# Patient Record
Sex: FEMALE | Race: WHITE | NOT HISPANIC OR LATINO
[De-identification: names, ages, dates, MRNs, and addresses within clinical notes are randomized per-mention and may not be internally consistent; named-entity substitution may affect disease eponyms.]

---

## 2020-07-09 ENCOUNTER — TRANSCRIPTION ENCOUNTER (OUTPATIENT)
Age: 85
End: 2020-07-09

## 2020-07-09 ENCOUNTER — INPATIENT (INPATIENT)
Facility: HOSPITAL | Age: 85
LOS: 3 days | Discharge: NURSING, REHAB OR LONG-TERM CARE | DRG: 482 | End: 2020-07-13
Attending: INTERNAL MEDICINE | Admitting: INTERNAL MEDICINE
Payer: MEDICARE

## 2020-07-09 VITALS
TEMPERATURE: 98 F | WEIGHT: 160.06 LBS | SYSTOLIC BLOOD PRESSURE: 115 MMHG | RESPIRATION RATE: 17 BRPM | DIASTOLIC BLOOD PRESSURE: 66 MMHG | HEART RATE: 56 BPM | OXYGEN SATURATION: 95 %

## 2020-07-09 DIAGNOSIS — M25.559 PAIN IN UNSPECIFIED HIP: ICD-10-CM

## 2020-07-09 LAB
ALBUMIN SERPL ELPH-MCNC: 3.5 G/DL — SIGNIFICANT CHANGE UP (ref 3.5–5)
ALP SERPL-CCNC: 58 U/L — SIGNIFICANT CHANGE UP (ref 40–120)
ALT FLD-CCNC: 23 U/L DA — SIGNIFICANT CHANGE UP (ref 10–60)
ANION GAP SERPL CALC-SCNC: 7 MMOL/L — SIGNIFICANT CHANGE UP (ref 5–17)
APTT BLD: 30.9 SEC — SIGNIFICANT CHANGE UP (ref 27.5–35.5)
AST SERPL-CCNC: 28 U/L — SIGNIFICANT CHANGE UP (ref 10–40)
BILIRUB SERPL-MCNC: 0.5 MG/DL — SIGNIFICANT CHANGE UP (ref 0.2–1.2)
BUN SERPL-MCNC: 23 MG/DL — HIGH (ref 7–18)
CALCIUM SERPL-MCNC: 9.1 MG/DL — SIGNIFICANT CHANGE UP (ref 8.4–10.5)
CHLORIDE SERPL-SCNC: 104 MMOL/L — SIGNIFICANT CHANGE UP (ref 96–108)
CO2 SERPL-SCNC: 26 MMOL/L — SIGNIFICANT CHANGE UP (ref 22–31)
CREAT SERPL-MCNC: 1.19 MG/DL — SIGNIFICANT CHANGE UP (ref 0.5–1.3)
GLUCOSE SERPL-MCNC: 104 MG/DL — HIGH (ref 70–99)
HCT VFR BLD CALC: 35.3 % — SIGNIFICANT CHANGE UP (ref 34.5–45)
HGB BLD-MCNC: 11.5 G/DL — SIGNIFICANT CHANGE UP (ref 11.5–15.5)
INR BLD: 0.95 RATIO — SIGNIFICANT CHANGE UP (ref 0.88–1.16)
MCHC RBC-ENTMCNC: 29.5 PG — SIGNIFICANT CHANGE UP (ref 27–34)
MCHC RBC-ENTMCNC: 32.6 GM/DL — SIGNIFICANT CHANGE UP (ref 32–36)
MCV RBC AUTO: 90.5 FL — SIGNIFICANT CHANGE UP (ref 80–100)
NRBC # BLD: 0 /100 WBCS — SIGNIFICANT CHANGE UP (ref 0–0)
PLATELET # BLD AUTO: 182 K/UL — SIGNIFICANT CHANGE UP (ref 150–400)
POTASSIUM SERPL-MCNC: 4.9 MMOL/L — SIGNIFICANT CHANGE UP (ref 3.5–5.3)
POTASSIUM SERPL-SCNC: 4.9 MMOL/L — SIGNIFICANT CHANGE UP (ref 3.5–5.3)
PROT SERPL-MCNC: 7.4 G/DL — SIGNIFICANT CHANGE UP (ref 6–8.3)
PROTHROM AB SERPL-ACNC: 11.1 SEC — SIGNIFICANT CHANGE UP (ref 10.6–13.6)
RBC # BLD: 3.9 M/UL — SIGNIFICANT CHANGE UP (ref 3.8–5.2)
RBC # FLD: 13.9 % — SIGNIFICANT CHANGE UP (ref 10.3–14.5)
SARS-COV-2 RNA SPEC QL NAA+PROBE: SIGNIFICANT CHANGE UP
SODIUM SERPL-SCNC: 137 MMOL/L — SIGNIFICANT CHANGE UP (ref 135–145)
WBC # BLD: 8.72 K/UL — SIGNIFICANT CHANGE UP (ref 3.8–10.5)
WBC # FLD AUTO: 8.72 K/UL — SIGNIFICANT CHANGE UP (ref 3.8–10.5)

## 2020-07-09 PROCEDURE — 70450 CT HEAD/BRAIN W/O DYE: CPT | Mod: 26

## 2020-07-09 PROCEDURE — 99283 EMERGENCY DEPT VISIT LOW MDM: CPT

## 2020-07-09 PROCEDURE — 73502 X-RAY EXAM HIP UNI 2-3 VIEWS: CPT | Mod: 26,LT

## 2020-07-09 PROCEDURE — 93010 ELECTROCARDIOGRAM REPORT: CPT

## 2020-07-09 PROCEDURE — 71045 X-RAY EXAM CHEST 1 VIEW: CPT | Mod: 26

## 2020-07-09 PROCEDURE — 72192 CT PELVIS W/O DYE: CPT | Mod: 26

## 2020-07-09 RX ORDER — MORPHINE SULFATE 50 MG/1
4 CAPSULE, EXTENDED RELEASE ORAL ONCE
Refills: 0 | Status: DISCONTINUED | OUTPATIENT
Start: 2020-07-09 | End: 2020-07-09

## 2020-07-09 RX ORDER — MORPHINE SULFATE 50 MG/1
2 CAPSULE, EXTENDED RELEASE ORAL ONCE
Refills: 0 | Status: DISCONTINUED | OUTPATIENT
Start: 2020-07-09 | End: 2020-07-09

## 2020-07-09 RX ADMIN — MORPHINE SULFATE 4 MILLIGRAM(S): 50 CAPSULE, EXTENDED RELEASE ORAL at 21:49

## 2020-07-09 NOTE — H&P ADULT - NSHPPHYSICALEXAM_GEN_ALL_CORE
General - Laying in bed in NAD, elderly, well nourished  Eyes - PERRLA, EOM intact  ENT - Nonicteric sclerae, PERRLA, EOMI. Oropharynx clear. Moist mucous membranes. Conjunctivae appear well perfused.   Cardiovascular - +s1/s2 regular no murmurs  Lungs - Clear to ascultation, no use of accessory muscles, no crackles or wheezes.  Skin - No rashes, skin warm and dry, no erythematous areas  Abdomen - Normal bowel sounds, abdomen soft and nontender  Extremities - No edema, cyanosis or clubbing, varicose veins, pulses +  Neurological – Alert and oriented x 3, CN 2-12 grossly intact.

## 2020-07-09 NOTE — H&P ADULT - PROBLEM SELECTOR PLAN 2
Awaiting list of home medications from patient's daughter  - Start on sliding scale for now, follow up hgb a1c On Janumet at home  - Start on sliding scale for now, follow up hgb a1c

## 2020-07-09 NOTE — ED PROVIDER NOTE - CLINICAL SUMMARY MEDICAL DECISION MAKING FREE TEXT BOX
89 yo F with mechanical fall, likley hip fx. Plan - pre op labs, CT head, xrays, pain meds, reassess.

## 2020-07-09 NOTE — ED ADULT NURSE REASSESSMENT NOTE - NS ED NURSE REASSESS COMMENT FT1
assumed care  Pt returned from xray  PIV placed, labs and COVID sent  No distress  reports falling in hallway, no DME  A&Ox3

## 2020-07-09 NOTE — H&P ADULT - ASSESSMENT
88 year old female with PMHx of CAD, HTN, DM presented s/p mechanical fall. To be admitted for orthopedic evaluation 88 year old female with PMHx of CAD, HTN, DM presented s/p mechanical fall. To be admitted for orthopedic evaluation      ** Medication list is from May 2020. If any concerns for inaccurate medication list, please contact Jesus Mogad. **

## 2020-07-09 NOTE — H&P ADULT - PROBLEM SELECTOR PLAN 1
As per ED attending, radiologist reported intertrochanteric fracture. Follow up official report  - Ortho, Dr. Cisneros was consulted by the ED  - Pain control  - Plan as per Ortho As per ED attending, radiologist reported intertrochanteric fracture. Follow up official report  - Ortho, Dr. Cisneros was consulted by the ED  - Pain control  - Plan as per Ortho  - Physical Therapy when cleared by ortho Intertrochanteric fracture as on CT scan bony pelvis  - Ortho, Dr. Cisneros was consulted by the ED  - Pain control  - Plan as per Ortho  - Physical Therapy when cleared by ortho  - Holding on all antiplatelet/anticoagulation for now until plan delineated by ortho. Please continue if no plans for intervention Intertrochanteric fracture as on CT scan bony pelvis  - Ortho, Dr. Cisneros was consulted by the ED  - Pain control  - Plan as per Ortho  - Physical Therapy when cleared by ortho  - Holding on all antiplatelet/anticoagulation for now until plan delineated by ortho. Please continue if no plans for intervention  - RCRI score of 2, medically optimized for surgery

## 2020-07-09 NOTE — ED ADULT NURSE NOTE - CAS EDN DISCHARGE ASSESSMENT
----- Message from Lashonda Prieto MD sent at 12/11/2018 10:20 AM EST -----  Please call - I want to stop her thyroid medication and recheck labs in 6 weeks.  Her thyroid is still too low for her age.  Blood counts are ok.  Kidney and liver function are ok.  Her A1c looks great at 5.9  
Pt's son informed, states understanding.   
Alert and oriented to person, place and time

## 2020-07-09 NOTE — ED ADULT NURSE NOTE - CHPI ED NUR SYMPTOMS NEG
no deformity/no bleeding/no fever/no vomiting/no abrasion/no weakness/no numbness/no confusion/no tingling/no loss of consciousness

## 2020-07-09 NOTE — ED PROVIDER NOTE - OBJECTIVE STATEMENT
87 yo F pmh of HTN presents with L hip pain s/p mechanical slip and fall. Denies other acute complaints.

## 2020-07-09 NOTE — H&P ADULT - PROBLEM SELECTOR PLAN 5
IMPROVE VTE score: 4  Will manage with:  Heparin S/C    [ ] Previous VTE                                    3  [ ] Thrombophilia                                  2  [ x] Lower limb paralysis                        2  (unable to hold up >15 seconds)    [ ] Current Cancer (within 6 months)        2   [x] Immobilization > 24 hrs                    1  [ ] ICU/CCU stay > 24 hrs                      1  [x] Age > 60                                         1 IMPROVE VTE score: 4  Will manage with:  nothing for now in case patient requires procedure for broken bone    [ ] Previous VTE                                    3  [ ] Thrombophilia                                  2  [ x] Lower limb paralysis                        2  (unable to hold up >15 seconds)    [ ] Current Cancer (within 6 months)        2   [x] Immobilization > 24 hrs                    1  [ ] ICU/CCU stay > 24 hrs                      1  [x] Age > 60                                         1

## 2020-07-09 NOTE — H&P ADULT - PROBLEM SELECTOR PLAN 4
Pt's wife called in on behalf on PT, medication for Belsomra needs prior auth and the pharmacy will not fill medication without the authorization.   She called two weeks ago and was calling to check on what was going on.        [phone] Hx of CAD s/p stents  Awaiting list of home medications from patient's daughter Hx of CAD s/p stents  Continue with statin high intensity  Hold aspirin and plavix for now in case patient requires operation

## 2020-07-09 NOTE — CHART NOTE - NSCHARTNOTEFT_GEN_A_CORE
88 F with hx htn s/p fall p/w Left Hip fracture.   Patient reports mechanical fall, no loss of consciousness.   Reviewed X rays with ED physician.   Reveals intertrochanteric fracture. Will admit to medicine, pain control. Ortho control.  Full H and P to follow.

## 2020-07-09 NOTE — ED PROVIDER NOTE - PHYSICAL EXAMINATION
GENERAL: well appearing, no acute distress   HEAD: atraumatic   EYES: EOMI, pink conjunctiva   ENT: moist oral mucosa   CARDIAC: RRR, no edema, distal pulses present   RESPIRATORY: lungs CTAB, no increased work of breathing   GASTROINTESTINAL: no abdominal tenderness, no rebound or guarding, bowel sounds presents  GENITOURINARY: no CVA tenderness   MUSCULOSKELETAL: no deformity; L hip ttp and painful ROM; compartments of leg soft; good distal pulses   NEUROLOGICAL: AAOx3, spontaneous movement of extremities   SKIN: intact   PSYCHIATRIC: cooperative  HEME LYMPH: no lymphadenopathy

## 2020-07-09 NOTE — H&P ADULT - ATTENDING COMMENTS
88 year old female, lives at home alone, with PMHx of HTN, DM, CAD(s/p stent) presented after a mechanical fall.  No other medical problems.     T(C): 36.6 (07-10-20 @ 05:13), Max: 37 (07-09-20 @ 23:47)  HR: 54 (07-10-20 @ 05:13) (54 - 68)  BP: 146/45 (07-10-20 @ 05:13) (127/67 - 146/45)  RR: 16 (07-10-20 @ 05:13) (16 - 18)  SpO2: 97% (07-10-20 @ 05:13) (96% - 98%)    s/p Fall  Intratrochanteric fracture   Cad S/P STENTS   Pain meds, Ortho consult appreciated.   Patient medically optimized. RCRI score class 2, 30 day 6%mortality risk.   Hold Plavix.   Cards eval.

## 2020-07-09 NOTE — H&P ADULT - PROBLEM SELECTOR PLAN 3
Awaiting list of home medications from patient's daughter.  Normotensive in the ED except for one isolated elevated reading.  Continue to monitor for now On ARB-hctz combo and amlodipine  - WIll start with amlodipine for now. Follow up AM BMP if creatinine stable, would continue ARB-hctz combo. Holding HCTZ and ARB due to suspect LESLIE  Continue to monitor blood pressure for now

## 2020-07-09 NOTE — ED PROVIDER NOTE - PROGRESS NOTE DETAILS
EKG - nsr, rate 54, QTc 415, low voltage EKG - nsr, rate 54, QTc 415, low voltage  imaging shows intertrochanteric fx (verbal report from radiologist)   labs - decreased GFR  Endorsed during day shift to Dr. Guillen unattached. Endorsed to evening MAR after final CT reported.

## 2020-07-10 ENCOUNTER — TRANSCRIPTION ENCOUNTER (OUTPATIENT)
Age: 85
End: 2020-07-10

## 2020-07-10 DIAGNOSIS — I10 ESSENTIAL (PRIMARY) HYPERTENSION: ICD-10-CM

## 2020-07-10 DIAGNOSIS — Z29.9 ENCOUNTER FOR PROPHYLACTIC MEASURES, UNSPECIFIED: ICD-10-CM

## 2020-07-10 DIAGNOSIS — Z71.89 OTHER SPECIFIED COUNSELING: ICD-10-CM

## 2020-07-10 DIAGNOSIS — I25.10 ATHEROSCLEROTIC HEART DISEASE OF NATIVE CORONARY ARTERY WITHOUT ANGINA PECTORIS: ICD-10-CM

## 2020-07-10 DIAGNOSIS — E11.9 TYPE 2 DIABETES MELLITUS WITHOUT COMPLICATIONS: ICD-10-CM

## 2020-07-10 DIAGNOSIS — S72.143A DISPLACED INTERTROCHANTERIC FRACTURE OF UNSPECIFIED FEMUR, INITIAL ENCOUNTER FOR CLOSED FRACTURE: ICD-10-CM

## 2020-07-10 DIAGNOSIS — Z02.9 ENCOUNTER FOR ADMINISTRATIVE EXAMINATIONS, UNSPECIFIED: ICD-10-CM

## 2020-07-10 LAB
A1C WITH ESTIMATED AVERAGE GLUCOSE RESULT: 6.1 % — HIGH (ref 4–5.6)
ABO RH CONFIRMATION: SIGNIFICANT CHANGE UP
ALBUMIN SERPL ELPH-MCNC: 3.1 G/DL — LOW (ref 3.5–5)
ALP SERPL-CCNC: 96 U/L — SIGNIFICANT CHANGE UP (ref 40–120)
ALT FLD-CCNC: 139 U/L DA — HIGH (ref 10–60)
ANION GAP SERPL CALC-SCNC: 10 MMOL/L — SIGNIFICANT CHANGE UP (ref 5–17)
ANION GAP SERPL CALC-SCNC: 7 MMOL/L — SIGNIFICANT CHANGE UP (ref 5–17)
AST SERPL-CCNC: 176 U/L — HIGH (ref 10–40)
BILIRUB SERPL-MCNC: 1.1 MG/DL — SIGNIFICANT CHANGE UP (ref 0.2–1.2)
BUN SERPL-MCNC: 18 MG/DL — SIGNIFICANT CHANGE UP (ref 7–18)
BUN SERPL-MCNC: 24 MG/DL — HIGH (ref 7–18)
CALCIUM SERPL-MCNC: 8.4 MG/DL — SIGNIFICANT CHANGE UP (ref 8.4–10.5)
CALCIUM SERPL-MCNC: 8.7 MG/DL — SIGNIFICANT CHANGE UP (ref 8.4–10.5)
CHLORIDE SERPL-SCNC: 103 MMOL/L — SIGNIFICANT CHANGE UP (ref 96–108)
CHLORIDE SERPL-SCNC: 105 MMOL/L — SIGNIFICANT CHANGE UP (ref 96–108)
CHOLEST SERPL-MCNC: 188 MG/DL — SIGNIFICANT CHANGE UP (ref 10–199)
CO2 SERPL-SCNC: 24 MMOL/L — SIGNIFICANT CHANGE UP (ref 22–31)
CO2 SERPL-SCNC: 26 MMOL/L — SIGNIFICANT CHANGE UP (ref 22–31)
CREAT SERPL-MCNC: 1.01 MG/DL — SIGNIFICANT CHANGE UP (ref 0.5–1.3)
CREAT SERPL-MCNC: 1.09 MG/DL — SIGNIFICANT CHANGE UP (ref 0.5–1.3)
ESTIMATED AVERAGE GLUCOSE: 128 MG/DL — HIGH (ref 68–114)
FOLATE SERPL-MCNC: >20 NG/ML — SIGNIFICANT CHANGE UP
GLUCOSE BLDC GLUCOMTR-MCNC: 106 MG/DL — HIGH (ref 70–99)
GLUCOSE BLDC GLUCOMTR-MCNC: 123 MG/DL — HIGH (ref 70–99)
GLUCOSE BLDC GLUCOMTR-MCNC: 127 MG/DL — HIGH (ref 70–99)
GLUCOSE SERPL-MCNC: 119 MG/DL — HIGH (ref 70–99)
GLUCOSE SERPL-MCNC: 121 MG/DL — HIGH (ref 70–99)
HCT VFR BLD CALC: 31.6 % — LOW (ref 34.5–45)
HCT VFR BLD CALC: 33.2 % — LOW (ref 34.5–45)
HDLC SERPL-MCNC: 73 MG/DL — SIGNIFICANT CHANGE UP
HGB BLD-MCNC: 10.5 G/DL — LOW (ref 11.5–15.5)
HGB BLD-MCNC: 11 G/DL — LOW (ref 11.5–15.5)
LIPID PNL WITH DIRECT LDL SERPL: 104 MG/DL — SIGNIFICANT CHANGE UP
MAGNESIUM SERPL-MCNC: 1.9 MG/DL — SIGNIFICANT CHANGE UP (ref 1.6–2.6)
MCHC RBC-ENTMCNC: 29.4 PG — SIGNIFICANT CHANGE UP (ref 27–34)
MCHC RBC-ENTMCNC: 29.6 PG — SIGNIFICANT CHANGE UP (ref 27–34)
MCHC RBC-ENTMCNC: 33.1 GM/DL — SIGNIFICANT CHANGE UP (ref 32–36)
MCHC RBC-ENTMCNC: 33.2 GM/DL — SIGNIFICANT CHANGE UP (ref 32–36)
MCV RBC AUTO: 88.5 FL — SIGNIFICANT CHANGE UP (ref 80–100)
MCV RBC AUTO: 89.2 FL — SIGNIFICANT CHANGE UP (ref 80–100)
MRSA PCR RESULT.: SIGNIFICANT CHANGE UP
NRBC # BLD: 0 /100 WBCS — SIGNIFICANT CHANGE UP (ref 0–0)
NRBC # BLD: 0 /100 WBCS — SIGNIFICANT CHANGE UP (ref 0–0)
PHOSPHATE SERPL-MCNC: 3.4 MG/DL — SIGNIFICANT CHANGE UP (ref 2.5–4.5)
PLATELET # BLD AUTO: 143 K/UL — LOW (ref 150–400)
PLATELET # BLD AUTO: 157 K/UL — SIGNIFICANT CHANGE UP (ref 150–400)
POTASSIUM SERPL-MCNC: 3.8 MMOL/L — SIGNIFICANT CHANGE UP (ref 3.5–5.3)
POTASSIUM SERPL-MCNC: 4.3 MMOL/L — SIGNIFICANT CHANGE UP (ref 3.5–5.3)
POTASSIUM SERPL-SCNC: 3.8 MMOL/L — SIGNIFICANT CHANGE UP (ref 3.5–5.3)
POTASSIUM SERPL-SCNC: 4.3 MMOL/L — SIGNIFICANT CHANGE UP (ref 3.5–5.3)
PROT SERPL-MCNC: 6.4 G/DL — SIGNIFICANT CHANGE UP (ref 6–8.3)
RBC # BLD: 3.57 M/UL — LOW (ref 3.8–5.2)
RBC # BLD: 3.72 M/UL — LOW (ref 3.8–5.2)
RBC # FLD: 13.5 % — SIGNIFICANT CHANGE UP (ref 10.3–14.5)
RBC # FLD: 13.7 % — SIGNIFICANT CHANGE UP (ref 10.3–14.5)
S AUREUS DNA NOSE QL NAA+PROBE: SIGNIFICANT CHANGE UP
SARS-COV-2 IGG SERPL QL IA: NEGATIVE — SIGNIFICANT CHANGE UP
SARS-COV-2 IGM SERPL IA-ACNC: <0.1 INDEX — SIGNIFICANT CHANGE UP
SODIUM SERPL-SCNC: 137 MMOL/L — SIGNIFICANT CHANGE UP (ref 135–145)
SODIUM SERPL-SCNC: 138 MMOL/L — SIGNIFICANT CHANGE UP (ref 135–145)
TOTAL CHOLESTEROL/HDL RATIO MEASUREMENT: 2.6 RATIO — LOW (ref 3.3–7.1)
TRIGL SERPL-MCNC: 57 MG/DL — SIGNIFICANT CHANGE UP (ref 10–149)
TSH SERPL-MCNC: 0.78 UU/ML — SIGNIFICANT CHANGE UP (ref 0.34–4.82)
VIT B12 SERPL-MCNC: 928 PG/ML — SIGNIFICANT CHANGE UP (ref 232–1245)
WBC # BLD: 10.42 K/UL — SIGNIFICANT CHANGE UP (ref 3.8–10.5)
WBC # BLD: 8.3 K/UL — SIGNIFICANT CHANGE UP (ref 3.8–10.5)
WBC # FLD AUTO: 10.42 K/UL — SIGNIFICANT CHANGE UP (ref 3.8–10.5)
WBC # FLD AUTO: 8.3 K/UL — SIGNIFICANT CHANGE UP (ref 3.8–10.5)

## 2020-07-10 PROCEDURE — 27245 TREAT THIGH FRACTURE: CPT | Mod: AS,LT

## 2020-07-10 PROCEDURE — 27170 REPAIR/GRAFT FEMUR HEAD/NECK: CPT | Mod: AS,59

## 2020-07-10 PROCEDURE — 77071 MNL APPL STRS JT RADIOGRAPHY: CPT | Mod: AS

## 2020-07-10 PROCEDURE — 76000 FLUOROSCOPY <1 HR PHYS/QHP: CPT | Mod: 26,59

## 2020-07-10 RX ORDER — FLUOXETINE HCL 10 MG
10 CAPSULE ORAL DAILY
Refills: 0 | Status: DISCONTINUED | OUTPATIENT
Start: 2020-07-10 | End: 2020-07-10

## 2020-07-10 RX ORDER — ONDANSETRON 8 MG/1
4 TABLET, FILM COATED ORAL EVERY 6 HOURS
Refills: 0 | Status: DISCONTINUED | OUTPATIENT
Start: 2020-07-10 | End: 2020-07-13

## 2020-07-10 RX ORDER — MEMANTINE HYDROCHLORIDE 10 MG/1
5 TABLET ORAL
Refills: 0 | Status: DISCONTINUED | OUTPATIENT
Start: 2020-07-10 | End: 2020-07-10

## 2020-07-10 RX ORDER — FOLIC ACID 0.8 MG
1 TABLET ORAL DAILY
Refills: 0 | Status: DISCONTINUED | OUTPATIENT
Start: 2020-07-10 | End: 2020-07-13

## 2020-07-10 RX ORDER — OXYCODONE HYDROCHLORIDE 5 MG/1
2.5 TABLET ORAL EVERY 6 HOURS
Refills: 0 | Status: DISCONTINUED | OUTPATIENT
Start: 2020-07-10 | End: 2020-07-13

## 2020-07-10 RX ORDER — ATORVASTATIN CALCIUM 80 MG/1
40 TABLET, FILM COATED ORAL AT BEDTIME
Refills: 0 | Status: DISCONTINUED | OUTPATIENT
Start: 2020-07-10 | End: 2020-07-13

## 2020-07-10 RX ORDER — LOSARTAN POTASSIUM 100 MG/1
1 TABLET, FILM COATED ORAL
Qty: 0 | Refills: 0 | DISCHARGE

## 2020-07-10 RX ORDER — HEPARIN SODIUM 5000 [USP'U]/ML
5000 INJECTION INTRAVENOUS; SUBCUTANEOUS EVERY 8 HOURS
Refills: 0 | Status: DISCONTINUED | OUTPATIENT
Start: 2020-07-10 | End: 2020-07-10

## 2020-07-10 RX ORDER — CLOPIDOGREL BISULFATE 75 MG/1
75 TABLET, FILM COATED ORAL DAILY
Refills: 0 | Status: DISCONTINUED | OUTPATIENT
Start: 2020-07-10 | End: 2020-07-10

## 2020-07-10 RX ORDER — DONEPEZIL HYDROCHLORIDE 10 MG/1
10 TABLET, FILM COATED ORAL AT BEDTIME
Refills: 0 | Status: DISCONTINUED | OUTPATIENT
Start: 2020-07-10 | End: 2020-07-13

## 2020-07-10 RX ORDER — POLYETHYLENE GLYCOL 3350 17 G/17G
17 POWDER, FOR SOLUTION ORAL DAILY
Refills: 0 | Status: DISCONTINUED | OUTPATIENT
Start: 2020-07-10 | End: 2020-07-11

## 2020-07-10 RX ORDER — CLOPIDOGREL BISULFATE 75 MG/1
75 TABLET, FILM COATED ORAL DAILY
Refills: 0 | Status: DISCONTINUED | OUTPATIENT
Start: 2020-07-11 | End: 2020-07-13

## 2020-07-10 RX ORDER — AMLODIPINE BESYLATE 2.5 MG/1
5 TABLET ORAL DAILY
Refills: 0 | Status: DISCONTINUED | OUTPATIENT
Start: 2020-07-10 | End: 2020-07-10

## 2020-07-10 RX ORDER — INSULIN LISPRO 100/ML
VIAL (ML) SUBCUTANEOUS
Refills: 0 | Status: DISCONTINUED | OUTPATIENT
Start: 2020-07-10 | End: 2020-07-13

## 2020-07-10 RX ORDER — MEMANTINE HYDROCHLORIDE 10 MG/1
1 TABLET ORAL
Qty: 0 | Refills: 0 | DISCHARGE

## 2020-07-10 RX ORDER — SODIUM CHLORIDE 9 MG/ML
1000 INJECTION INTRAMUSCULAR; INTRAVENOUS; SUBCUTANEOUS
Refills: 0 | Status: COMPLETED | OUTPATIENT
Start: 2020-07-10 | End: 2020-07-10

## 2020-07-10 RX ORDER — METOPROLOL TARTRATE 50 MG
25 TABLET ORAL
Refills: 0 | Status: DISCONTINUED | OUTPATIENT
Start: 2020-07-10 | End: 2020-07-10

## 2020-07-10 RX ORDER — ACETAMINOPHEN 500 MG
650 TABLET ORAL EVERY 6 HOURS
Refills: 0 | Status: DISCONTINUED | OUTPATIENT
Start: 2020-07-10 | End: 2020-07-13

## 2020-07-10 RX ORDER — HYDROMORPHONE HYDROCHLORIDE 2 MG/ML
0.5 INJECTION INTRAMUSCULAR; INTRAVENOUS; SUBCUTANEOUS
Refills: 0 | Status: DISCONTINUED | OUTPATIENT
Start: 2020-07-10 | End: 2020-07-11

## 2020-07-10 RX ORDER — SODIUM CHLORIDE 9 MG/ML
1000 INJECTION INTRAMUSCULAR; INTRAVENOUS; SUBCUTANEOUS
Refills: 0 | Status: DISCONTINUED | OUTPATIENT
Start: 2020-07-10 | End: 2020-07-10

## 2020-07-10 RX ORDER — DONEPEZIL HYDROCHLORIDE 10 MG/1
10 TABLET, FILM COATED ORAL AT BEDTIME
Refills: 0 | Status: DISCONTINUED | OUTPATIENT
Start: 2020-07-10 | End: 2020-07-10

## 2020-07-10 RX ORDER — MEMANTINE HYDROCHLORIDE 10 MG/1
5 TABLET ORAL
Refills: 0 | Status: DISCONTINUED | OUTPATIENT
Start: 2020-07-10 | End: 2020-07-13

## 2020-07-10 RX ORDER — CHLORHEXIDINE GLUCONATE 213 G/1000ML
1 SOLUTION TOPICAL ONCE
Refills: 0 | Status: COMPLETED | OUTPATIENT
Start: 2020-07-10 | End: 2020-07-10

## 2020-07-10 RX ORDER — SENNA PLUS 8.6 MG/1
2 TABLET ORAL AT BEDTIME
Refills: 0 | Status: DISCONTINUED | OUTPATIENT
Start: 2020-07-10 | End: 2020-07-13

## 2020-07-10 RX ORDER — POVIDONE-IODINE 5 %
1 AEROSOL (ML) TOPICAL ONCE
Refills: 0 | Status: COMPLETED | OUTPATIENT
Start: 2020-07-10 | End: 2020-07-10

## 2020-07-10 RX ORDER — INSULIN LISPRO 100/ML
VIAL (ML) SUBCUTANEOUS
Refills: 0 | Status: DISCONTINUED | OUTPATIENT
Start: 2020-07-10 | End: 2020-07-10

## 2020-07-10 RX ORDER — AMLODIPINE BESYLATE 2.5 MG/1
5 TABLET ORAL DAILY
Refills: 0 | Status: DISCONTINUED | OUTPATIENT
Start: 2020-07-10 | End: 2020-07-13

## 2020-07-10 RX ORDER — TRAMADOL HYDROCHLORIDE 50 MG/1
25 TABLET ORAL EVERY 6 HOURS
Refills: 0 | Status: DISCONTINUED | OUTPATIENT
Start: 2020-07-10 | End: 2020-07-10

## 2020-07-10 RX ORDER — MORPHINE SULFATE 50 MG/1
2 CAPSULE, EXTENDED RELEASE ORAL EVERY 6 HOURS
Refills: 0 | Status: DISCONTINUED | OUTPATIENT
Start: 2020-07-10 | End: 2020-07-10

## 2020-07-10 RX ORDER — ATORVASTATIN CALCIUM 80 MG/1
40 TABLET, FILM COATED ORAL AT BEDTIME
Refills: 0 | Status: DISCONTINUED | OUTPATIENT
Start: 2020-07-10 | End: 2020-07-10

## 2020-07-10 RX ORDER — FLUOXETINE HCL 10 MG
10 CAPSULE ORAL DAILY
Refills: 0 | Status: DISCONTINUED | OUTPATIENT
Start: 2020-07-10 | End: 2020-07-13

## 2020-07-10 RX ORDER — SODIUM CHLORIDE 9 MG/ML
1000 INJECTION, SOLUTION INTRAVENOUS
Refills: 0 | Status: DISCONTINUED | OUTPATIENT
Start: 2020-07-10 | End: 2020-07-11

## 2020-07-10 RX ORDER — METOPROLOL TARTRATE 50 MG
25 TABLET ORAL
Refills: 0 | Status: DISCONTINUED | OUTPATIENT
Start: 2020-07-10 | End: 2020-07-13

## 2020-07-10 RX ORDER — OXYCODONE HYDROCHLORIDE 5 MG/1
10 TABLET ORAL EVERY 6 HOURS
Refills: 0 | Status: DISCONTINUED | OUTPATIENT
Start: 2020-07-10 | End: 2020-07-10

## 2020-07-10 RX ORDER — FOLIC ACID 0.8 MG
1 TABLET ORAL DAILY
Refills: 0 | Status: DISCONTINUED | OUTPATIENT
Start: 2020-07-10 | End: 2020-07-10

## 2020-07-10 RX ORDER — ENOXAPARIN SODIUM 100 MG/ML
30 INJECTION SUBCUTANEOUS EVERY 12 HOURS
Refills: 0 | Status: DISCONTINUED | OUTPATIENT
Start: 2020-07-11 | End: 2020-07-13

## 2020-07-10 RX ORDER — ACETAMINOPHEN 500 MG
650 TABLET ORAL EVERY 6 HOURS
Refills: 0 | Status: DISCONTINUED | OUTPATIENT
Start: 2020-07-10 | End: 2020-07-10

## 2020-07-10 RX ADMIN — MEMANTINE HYDROCHLORIDE 5 MILLIGRAM(S): 10 TABLET ORAL at 05:36

## 2020-07-10 RX ADMIN — SODIUM CHLORIDE 75 MILLILITER(S): 9 INJECTION INTRAMUSCULAR; INTRAVENOUS; SUBCUTANEOUS at 17:56

## 2020-07-10 RX ADMIN — Medication 25 MILLIGRAM(S): at 05:36

## 2020-07-10 RX ADMIN — Medication 1 APPLICATION(S): at 17:55

## 2020-07-10 RX ADMIN — OXYCODONE HYDROCHLORIDE 10 MILLIGRAM(S): 5 TABLET ORAL at 01:15

## 2020-07-10 RX ADMIN — AMLODIPINE BESYLATE 5 MILLIGRAM(S): 2.5 TABLET ORAL at 05:36

## 2020-07-10 RX ADMIN — CHLORHEXIDINE GLUCONATE 1 APPLICATION(S): 213 SOLUTION TOPICAL at 09:45

## 2020-07-10 NOTE — DISCHARGE NOTE PROVIDER - PROVIDER TOKENS
PROVIDER:[TOKEN:[[id number]]] PROVIDER:[TOKEN:[[id number]]],PROVIDER:[TOKEN:[[id number]],FOLLOWUP:[2 weeks]]

## 2020-07-10 NOTE — PROGRESS NOTE ADULT - PROBLEM SELECTOR PLAN 4
Hx of CAD s/p stents  Continue with statin high intensity  Hold aspirin and plavix for now in case patient requires operation Hx of CAD s/p stents  Continue with statin high intensity  Hold aspirin and plavix pre-op

## 2020-07-10 NOTE — DISCHARGE NOTE PROVIDER - NSDCCPCAREPLAN_GEN_ALL_CORE_FT
PRINCIPAL DISCHARGE DIAGNOSIS  Diagnosis: Hip pain  Assessment and Plan of Treatment: Follow-up with your primary care physician.  When walking: ensure proper footwear, adequate lighting ,avoid loose rugs and clutter in walkway.   Ensure adequate rest ,nutrition, and hydration. PRINCIPAL DISCHARGE DIAGNOSIS  Diagnosis: Hip pain  Assessment and Plan of Treatment: Follow-up with your primary care physician.  When walking: ensure proper footwear, adequate lighting ,avoid loose rugs and clutter in walkway.   Ensure adequate rest ,nutrition, and hydration.        SECONDARY DISCHARGE DIAGNOSES  Diagnosis: CAD (coronary artery disease)  Assessment and Plan of Treatment: CAD (coronary artery disease)  Low salt, low fat diet.   Weight management.   Take medications as prescribed.   No smoking.  Follow up appointments with your doctor(s)  as instruced.      Diagnosis: Hypertension  Assessment and Plan of Treatment: Low salt diet  Activity as tolerated.  Take all medication as prescribed.  Follow up with your medical doctor for routine blood pressure monitoring at your next visit.  Notify your doctor if you have any of the following symptoms:   Dizziness, Lightheadedness, Blurry vision, Headache, Chest pain, Shortness of breath      Diagnosis: Diabetes Mellitus, Type 2  Assessment and Plan of Treatment: Diabetes Mellitus, Type 2  HgA1C this admission  ?.  Make sure you get your HgA1c checked every three months.  If you take oral diabetes medications, check your blood glucose two times a day.  If you take insulin, check your blood glucose before meals and at bedtime.  It's important not to skip any meals.  Keep a log of your blood glucose results and always take it with you to your doctor appointments.  Keep a list of your current medications including injectables and over the counter medications and bring this medication list with you to all your doctor appointments.  If you have not seen your ophthalmologist this year call for appointment.  Check your feet daily for redness, sores, or openings. Do not self treat. If no improvement in two days call your primary care physician for an appointment.  Low blood sugar (hypoglycemia) is a blood sugar below 70mg/dl. Check your blood sugar if you feel signs/symptoms of hypoglycemia. If your blood sugar is below 70 take 15 grams of carbohydrates (ex 4 oz of apple juice, 3-4 glucose tablets, or 4-6 oz of regular soda) wait 15 minutes and repeat blood sugar to make sure it comes up above 70.  If your blood sugar is above 70 and you are due for a meal, have a meal.  If you are not due for a meal have a snack.  This snack helps keeps your blood sugar at a safe range.

## 2020-07-10 NOTE — PROGRESS NOTE ADULT - SUBJECTIVE AND OBJECTIVE BOX
NP Note discussed with  primary attending    Patient is an 88 year old female, lives at home alone, with PMHx of HTN, DM, CAD(s/p stent) presented after a mechanical fall. Patient a poor historian and per children  had increasingly poor memory. In the ED, imaging revealed left hip intertrochanteric fracture. Dr Cisneros consulted and plan for Left hip intramedullary nailing today at 5pm. Pending echo, and cardiology clearance for OR.   Patient seen and evaluated at bedside, hemodynamically stable appears in no acute distress. Limited ROM to LLE secondary to fracture.   Offers no complaints of pain. Family updated on plan for OR today.     INTERVAL HPI/OVERNIGHT EVENTS: no new complaints    MEDICATIONS  (STANDING):  amLODIPine   Tablet 5 milliGRAM(s) Oral daily  atorvastatin 40 milliGRAM(s) Oral at bedtime  chlorhexidine 2% Cloths 1 Application(s) Topical once  donepezil 10 milliGRAM(s) Oral at bedtime  FLUoxetine 10 milliGRAM(s) Oral daily  folic acid 1 milliGRAM(s) Oral daily  insulin lispro (HumaLOG) corrective regimen sliding scale   SubCutaneous three times a day before meals  memantine 5 milliGRAM(s) Oral two times a day  metoprolol tartrate 25 milliGRAM(s) Oral two times a day  povidone iodine 5% Nasal Swab 1 Application(s) Both Nostrils once  sodium chloride 0.9%. 1000 milliLiter(s) (75 mL/Hr) IV Continuous <Continuous>  sodium chloride 0.9%. 1000 milliLiter(s) (75 mL/Hr) IV Continuous <Continuous>    MEDICATIONS  (PRN):  acetaminophen   Tablet .. 650 milliGRAM(s) Oral every 6 hours PRN Mild Pain (1 - 3)  morphine  - Injectable 2 milliGRAM(s) IV Push every 6 hours PRN Severe Pain (7 - 10)  oxyCODONE    IR 10 milliGRAM(s) Oral every 6 hours PRN Severe Pain (7 - 10)  traMADol 25 milliGRAM(s) Oral every 6 hours PRN Moderate Pain (4 - 6)      __________________________________________________  REVIEW OF SYSTEMS:    CONSTITUTIONAL: No fever,   EYES: no acute visual disturbances  NECK: No pain or stiffness  RESPIRATORY: No cough; No shortness of breath  CARDIOVASCULAR: No chest pain, no palpitations  GASTROINTESTINAL: No pain. No nausea or vomiting; No diarrhea   NEUROLOGICAL: No headache or numbness, no tremors  MUSCULOSKELETAL: +LLE pain with movement   GENITOURINARY: no dysuria, no frequency, no hesitancy  PSYCHIATRY: no depression , no anxiety  ALL OTHER  ROS negative        Vital Signs Last 24 Hrs  T(C): 36.6 (10 Jul 2020 05:13), Max: 37 (09 Jul 2020 23:47)  T(F): 97.8 (10 Jul 2020 05:13), Max: 98.6 (09 Jul 2020 23:47)  HR: 54 (10 Jul 2020 05:13) (54 - 68)  BP: 146/45 (10 Jul 2020 05:13) (115/66 - 188/64)  BP(mean): --  RR: 16 (10 Jul 2020 05:13) (16 - 18)  SpO2: 97% (10 Jul 2020 05:13) (95% - 98%)    ________________________________________________  PHYSICAL EXAM:  GENERAL: NAD  HEENT: Normocephalic;  conjunctivae and sclerae clear;  NECK : supple  CHEST/LUNG: Clear to auscultation bilaterally with good air entry   HEART: S1 S2  regular; no murmurs, gallops or rubs  ABDOMEN: Soft, Nontender, Nondistended; Bowel sounds present  EXTREMITIES: no cyanosis; no edema; no calf tenderness +pulses in all 4 extremities equal bilaterally  SKIN: warm and dry; no rash  NERVOUS SYSTEM:  Awake and alert Oriented only to person    _________________________________________________  LABS:                        11.0   10.42 )-----------( 157      ( 10 Jul 2020 07:09 )             33.2     07-10    137  |  103  |  24<H>  ----------------------------<  121<H>  4.3   |  24  |  1.09    Ca    8.7      10 Jul 2020 07:09  Phos  3.4     07-10  Mg     1.9     07-10    TPro  6.4  /  Alb  3.1<L>  /  TBili  1.1  /  DBili  x   /  AST  176<H>  /  ALT  139<H>  /  AlkPhos  96  07-10    PT/INR - ( 09 Jul 2020 19:50 )   PT: 11.1 sec;   INR: 0.95 ratio         PTT - ( 09 Jul 2020 19:50 )  PTT:30.9 sec    CAPILLARY BLOOD GLUCOSE      POCT Blood Glucose.: 123 mg/dL (10 Jul 2020 11:23)      RADIOLOGY & ADDITIONAL TESTS:    Imaging Personally Reviewed:  YES    Consultant(s) Notes Reviewed:   YES    Care Discussed with Consultants : Cardiology, Ortho    Plan of care was discussed with patient and /or primary care giver; all questions and concerns were addressed and care was aligned with patient's wishes.

## 2020-07-10 NOTE — DISCHARGE NOTE PROVIDER - CARE PROVIDERS DIRECT ADDRESSES
,DirectAddress_Unknown ,DirectAddress_Unknown,[email or web address]

## 2020-07-10 NOTE — PROGRESS NOTE ADULT - PROBLEM SELECTOR PLAN 5
IMPROVE VTE score: 4  Will manage with:  nothing for now in case patient requires procedure for broken bone    [ ] Previous VTE                                    3  [ ] Thrombophilia                                  2  [ x] Lower limb paralysis                        2  (unable to hold up >15 seconds)    [ ] Current Cancer (within 6 months)        2   [x] Immobilization > 24 hrs                    1  [ ] ICU/CCU stay > 24 hrs                      1  [x] Age > 60                                         1 IMPROVE VTE score: 4  Will manage post op     [ ] Previous VTE                                    3  [ ] Thrombophilia                                  2  [ x] Lower limb paralysis                        2  (unable to hold up >15 seconds)    [ ] Current Cancer (within 6 months)        2   [x] Immobilization > 24 hrs                    1  [ ] ICU/CCU stay > 24 hrs                      1  [x] Age > 60                                         1

## 2020-07-10 NOTE — DISCHARGE NOTE PROVIDER - CARE PROVIDER_API CALL
Gideon Mcknight  CARDIOVASCULAR DISEASE  [address] [address]  Phone: [phone]  Fax: [phone]  Follow Up Time: Gideon Mcknight  CARDIOVASCULAR DISEASE  [address] [facility] [address]  [place] [address]  Phone: [phone]  Fax: [phone]  Follow Up Time:     Bobby Cisneros  ORTHOPAEDIC SURGERY  [address] [place] [address]  Phone: [phone]  Fax: [phone]  Follow Up Time: 2 weeks

## 2020-07-10 NOTE — DISCHARGE NOTE PROVIDER - NSDCMRMEDTOKEN_GEN_ALL_CORE_FT
amLODIPine 5 mg oral tablet: 1 tab(s) orally once a day  aspirin 81 mg oral tablet, chewable: 1 tab(s) orally once a day  donepezil 10 mg oral tablet: 1 tab(s) orally once a day (at bedtime)  ezetimibe 10 mg oral tablet: 1 tab(s) orally once a day  FLUoxetine 10 mg oral capsule: 1 cap(s) orally once a day  folic acid 1 mg oral tablet: 1 tab(s) orally once a day  irbesartan-hydrochlorothiazide 150mg-12.5mg oral tablet: 1 tab(s) orally once a day  Janumet 50 mg-500 mg oral tablet: 1 tab(s) orally 2 times a day  loratadine 10 mg oral tablet: 1 tab(s) orally once a day  memantine 14 mg oral capsule, extended release: 1 cap(s) orally once a day  metoprolol succinate 50 mg oral tablet, extended release: 1 tab(s) orally once a day  Plavix 75 mg oral tablet: 1 tab(s) orally once a day  rosuvastatin 40 mg oral tablet: 1 tab(s) orally once a day Actamin 325 mg oral tablet: 2 tab(s) orally every 6 hours, As Needed - 6)  amLODIPine 5 mg oral tablet: 1 tab(s) orally once a day  aspirin 81 mg oral tablet, chewable: 1 tab(s) orally once a day  atorvastatin 40 mg oral tablet: 1 tab(s) orally once a day (at bedtime)  clopidogrel 75 mg oral tablet: 1 tab(s) orally once a day  donepezil 10 mg oral tablet: 1 tab(s) orally once a day (at bedtime)  enoxaparin: 40 milligram(s) subcutaneous once a day til 07/25/2020   FLUoxetine 10 mg oral capsule: 1 cap(s) orally once a day  folic acid 1 mg oral tablet: 1 tab(s) orally once a day  Janumet 50 mg-500 mg oral tablet: 1 tab(s) orally 2 times a day  memantine 5 mg oral tablet: 1 tab(s) orally 2 times a day  metoprolol tartrate 25 mg oral tablet: 1 tab(s) orally 2 times a day hold sbp &lt;100/60 HR &lt;60  oxyCODONE: 2.5 milligram(s) orally every 8 hours, As Needed  polyethylene glycol 3350 oral powder for reconstitution: 17 gram(s) orally once a day  senna oral tablet: 2 tab(s) orally once a day (at bedtime)

## 2020-07-10 NOTE — BRIEF OPERATIVE NOTE - NSICDXBRIEFPOSTOP_GEN_ALL_CORE_FT
POST-OP DIAGNOSIS:  Closed intertrochanteric fracture of left hip 10-Jul-2020 19:57:57  Grey Barnett

## 2020-07-10 NOTE — PROGRESS NOTE ADULT - PROBLEM SELECTOR PLAN 2
A1c- 6.1   On Janumet at home  - Start on sliding scale for now, follow up hgb a1c A1c- 6.1   Currently NPO  On Janumet at home  Continue HSS TID

## 2020-07-10 NOTE — PROGRESS NOTE ADULT - PROBLEM SELECTOR PLAN 3
On ARB-hctz combo and amlodipine  - WIll start with amlodipine for now. Follow up AM BMP if creatinine stable, would continue ARB-hctz combo. Holding HCTZ and ARB due to suspect LESLIE  Continue to monitor blood pressure for now Controlled  Continue Amlodipine + Metoprolol   Holding HCTZ and ARB due to suspect LESLIE  Continue to monitor blood pressure for now

## 2020-07-10 NOTE — CONSULT NOTE ADULT - ATTENDING COMMENTS
pt seen and evaluated.  hip It fracture.  treat with closed fracture care / treatment.  will need medical clearance / optimization.     If medically cleared / stable - rec IM nailing.   explained risks benefits alternatives to pt and family, including but not limited to bleeding, infection, nerve damage, blood clot to leg or lung, fracture, non untion, leg length discrepency, heart attack / stroke / death.  pt family understands and wishes to proceed.

## 2020-07-10 NOTE — DISCHARGE NOTE PROVIDER - HOSPITAL COURSE
88 year old female, lives at home alone, with PMHx of HTN, DM, CAD(s/p stent) presented after a mechanical fall. Patient was outside her apartment and the floors had just been waxed and patient tripped and fell, falling on her side. Patient has been having increasingly poor memory as of late as per patient's son and daughter.      Xray and CT imaging revealed intertrochanteric fracture, no surgical intervention.         ? Eval by PT, rec ???            COVID-19 PCR: Martina (09 Jul 2020 19:54)        >>>>>TO BE COMPLETED>>>>>>>>>>>>>>> 88 year old female, lives at home alone, with PMHx of HTN, DM, CAD(s/p stent) presented after a mechanical fall. Patient was outside her apartment and the floors had just been waxed and patient tripped and fell, falling on her side. Patient has been having increasingly poor memory as of late as per patient's son and daughter.      Xray and CT imaging revealed intertrochanteric fracture, no surgical intervention.     Ortho consulted - S/p Left Hip IM Nailing POD#4      Patient orthopedically stable for discharge    -  Staples to be removed on 07/25/2020 by nurse, steri-strips to be applied to incision sites    -  Continue with Lovenox until 07/25/2020            PT recommend JACINDA placement.     Discharge condition stable.

## 2020-07-11 DIAGNOSIS — G89.18 OTHER ACUTE POSTPROCEDURAL PAIN: ICD-10-CM

## 2020-07-11 LAB
ANION GAP SERPL CALC-SCNC: 9 MMOL/L — SIGNIFICANT CHANGE UP (ref 5–17)
BUN SERPL-MCNC: 17 MG/DL — SIGNIFICANT CHANGE UP (ref 7–18)
CALCIUM SERPL-MCNC: 8.5 MG/DL — SIGNIFICANT CHANGE UP (ref 8.4–10.5)
CHLORIDE SERPL-SCNC: 103 MMOL/L — SIGNIFICANT CHANGE UP (ref 96–108)
CO2 SERPL-SCNC: 26 MMOL/L — SIGNIFICANT CHANGE UP (ref 22–31)
CREAT SERPL-MCNC: 0.97 MG/DL — SIGNIFICANT CHANGE UP (ref 0.5–1.3)
GLUCOSE BLDC GLUCOMTR-MCNC: 100 MG/DL — HIGH (ref 70–99)
GLUCOSE BLDC GLUCOMTR-MCNC: 118 MG/DL — HIGH (ref 70–99)
GLUCOSE BLDC GLUCOMTR-MCNC: 130 MG/DL — HIGH (ref 70–99)
GLUCOSE BLDC GLUCOMTR-MCNC: 164 MG/DL — HIGH (ref 70–99)
GLUCOSE SERPL-MCNC: 117 MG/DL — HIGH (ref 70–99)
HCT VFR BLD CALC: 31.6 % — LOW (ref 34.5–45)
HGB BLD-MCNC: 10.6 G/DL — LOW (ref 11.5–15.5)
MCHC RBC-ENTMCNC: 29.9 PG — SIGNIFICANT CHANGE UP (ref 27–34)
MCHC RBC-ENTMCNC: 33.5 GM/DL — SIGNIFICANT CHANGE UP (ref 32–36)
MCV RBC AUTO: 89 FL — SIGNIFICANT CHANGE UP (ref 80–100)
NRBC # BLD: 0 /100 WBCS — SIGNIFICANT CHANGE UP (ref 0–0)
PLATELET # BLD AUTO: 135 K/UL — LOW (ref 150–400)
POTASSIUM SERPL-MCNC: 4.3 MMOL/L — SIGNIFICANT CHANGE UP (ref 3.5–5.3)
POTASSIUM SERPL-SCNC: 4.3 MMOL/L — SIGNIFICANT CHANGE UP (ref 3.5–5.3)
RBC # BLD: 3.55 M/UL — LOW (ref 3.8–5.2)
RBC # FLD: 13.6 % — SIGNIFICANT CHANGE UP (ref 10.3–14.5)
SODIUM SERPL-SCNC: 138 MMOL/L — SIGNIFICANT CHANGE UP (ref 135–145)
WBC # BLD: 8.32 K/UL — SIGNIFICANT CHANGE UP (ref 3.8–10.5)
WBC # FLD AUTO: 8.32 K/UL — SIGNIFICANT CHANGE UP (ref 3.8–10.5)

## 2020-07-11 PROCEDURE — 99221 1ST HOSP IP/OBS SF/LOW 40: CPT

## 2020-07-11 RX ORDER — ACETAMINOPHEN 500 MG
1000 TABLET ORAL ONCE
Refills: 0 | Status: COMPLETED | OUTPATIENT
Start: 2020-07-11 | End: 2020-07-11

## 2020-07-11 RX ORDER — MAGNESIUM HYDROXIDE 400 MG/1
30 TABLET, CHEWABLE ORAL ONCE
Refills: 0 | Status: COMPLETED | OUTPATIENT
Start: 2020-07-11 | End: 2020-07-11

## 2020-07-11 RX ORDER — POLYETHYLENE GLYCOL 3350 17 G/17G
17 POWDER, FOR SOLUTION ORAL DAILY
Refills: 0 | Status: DISCONTINUED | OUTPATIENT
Start: 2020-07-11 | End: 2020-07-13

## 2020-07-11 RX ADMIN — AMLODIPINE BESYLATE 5 MILLIGRAM(S): 2.5 TABLET ORAL at 06:02

## 2020-07-11 RX ADMIN — ATORVASTATIN CALCIUM 40 MILLIGRAM(S): 80 TABLET, FILM COATED ORAL at 21:11

## 2020-07-11 RX ADMIN — Medication 400 MILLIGRAM(S): at 09:05

## 2020-07-11 RX ADMIN — ENOXAPARIN SODIUM 30 MILLIGRAM(S): 100 INJECTION SUBCUTANEOUS at 09:05

## 2020-07-11 RX ADMIN — OXYCODONE HYDROCHLORIDE 2.5 MILLIGRAM(S): 5 TABLET ORAL at 12:40

## 2020-07-11 RX ADMIN — Medication 100 MILLIGRAM(S): at 12:37

## 2020-07-11 RX ADMIN — CLOPIDOGREL BISULFATE 75 MILLIGRAM(S): 75 TABLET, FILM COATED ORAL at 12:36

## 2020-07-11 RX ADMIN — Medication 10 MILLIGRAM(S): at 12:36

## 2020-07-11 RX ADMIN — Medication 1 MILLIGRAM(S): at 12:36

## 2020-07-11 RX ADMIN — Medication 25 MILLIGRAM(S): at 06:02

## 2020-07-11 RX ADMIN — SENNA PLUS 2 TABLET(S): 8.6 TABLET ORAL at 21:11

## 2020-07-11 RX ADMIN — POLYETHYLENE GLYCOL 3350 17 GRAM(S): 17 POWDER, FOR SOLUTION ORAL at 12:37

## 2020-07-11 RX ADMIN — OXYCODONE HYDROCHLORIDE 2.5 MILLIGRAM(S): 5 TABLET ORAL at 21:19

## 2020-07-11 RX ADMIN — MEMANTINE HYDROCHLORIDE 5 MILLIGRAM(S): 10 TABLET ORAL at 06:02

## 2020-07-11 RX ADMIN — DONEPEZIL HYDROCHLORIDE 10 MILLIGRAM(S): 10 TABLET, FILM COATED ORAL at 21:11

## 2020-07-11 RX ADMIN — ENOXAPARIN SODIUM 30 MILLIGRAM(S): 100 INJECTION SUBCUTANEOUS at 21:11

## 2020-07-11 RX ADMIN — Medication 100 MILLIGRAM(S): at 02:51

## 2020-07-11 RX ADMIN — Medication 1: at 17:24

## 2020-07-11 RX ADMIN — MAGNESIUM HYDROXIDE 30 MILLILITER(S): 400 TABLET, CHEWABLE ORAL at 18:45

## 2020-07-11 RX ADMIN — OXYCODONE HYDROCHLORIDE 2.5 MILLIGRAM(S): 5 TABLET ORAL at 06:09

## 2020-07-11 RX ADMIN — MEMANTINE HYDROCHLORIDE 5 MILLIGRAM(S): 10 TABLET ORAL at 17:24

## 2020-07-11 NOTE — CONSULT NOTE ADULT - ASSESSMENT
Confidential Drug Utilization Report  Search Terms: Gita Thurman, 06/09/1932   Search Date: 07/11/2020 11:24:33 AM   The Drug Utilization Report below displays all of the controlled substance prescriptions, if any, that your patient has filled in the last twelve months. The information displayed on this report is compiled from pharmacy submissions to the Department, and accurately reflects the information as submitted by the pharmacies.  This report was requested by: Madison Yañez | Reference #: [id number]   There are no results for the search terms that you entered.

## 2020-07-11 NOTE — PHYSICAL THERAPY INITIAL EVALUATION ADULT - CRITERIA FOR SKILLED THERAPEUTIC INTERVENTIONS
predicted duration of therapy intervention/impairments found/anticipated discharge recommendation/rehab potential/therapy frequency

## 2020-07-11 NOTE — PROGRESS NOTE ADULT - ATTENDING COMMENTS
CARDIOLOGY ATTENDING    Agree with above. Given unremarkable echo no further inpatient cardiac workup expected.

## 2020-07-11 NOTE — PROGRESS NOTE ADULT - PROBLEM SELECTOR PLAN 5
IMPROVE VTE score: 4  Will manage post op     [ ] Previous VTE                                    3  [ ] Thrombophilia                                  2  [ x] Lower limb paralysis                        2  (unable to hold up >15 seconds)    [ ] Current Cancer (within 6 months)        2   [x] Immobilization > 24 hrs                    1  [ ] ICU/CCU stay > 24 hrs                      1  [x] Age > 60                                         1

## 2020-07-11 NOTE — PROGRESS NOTE ADULT - SUBJECTIVE AND OBJECTIVE BOX
88yFemale    Diagnosis:  S/p IM nailing Left hip fx POD# 1    24hr interval/acute events: no acute events    Pain is mild (7/10); iv tylenol ordered  retook pulse ox and obtained a reading of 98% deep breaths on right middle finger  denies BM. last bm 3 days ago.     Denies cp, sob, palpitations, paresthesias, nvd.    Vital Signs Last 24 Hrs  T(C): 36.6 (11 Jul 2020 05:07), Max: 37.3 (10 Jul 2020 16:36)  T(F): 97.9 (11 Jul 2020 05:07), Max: 99.2 (10 Jul 2020 16:36)  HR: 64 (11 Jul 2020 05:07) (51 - 82)  BP: 139/54 (11 Jul 2020 05:07) (90/63 - 149/57)  BP(mean): 77 (10 Jul 2020 20:49) (69 - 77)  RR: 17 (11 Jul 2020 05:07) (12 - 20)  SpO2: 98% (11 Jul 2020 05:07) (92% - 100%)  I&O's Detail    10 Jul 2020 07:01  -  11 Jul 2020 07:00  --------------------------------------------------------  IN:    Lactated Ringers IV Bolus: 1050 mL  Total IN: 1050 mL    OUT:    Estimated Blood Loss: 50 mL    Indwelling Catheter - Urethral: 1480 mL  Total OUT: 1530 mL    Total NET: -480 mL          Physical Exam:    General: AAOx3, in NAD, resting comfortably in bed.  Left hip:    Skin pink, warm LLE.  In nl. alignment.   Dressing is C/D/I.    No ct, calves soft  2+pulses  NVI silt  5/5 strength ehl/ta/gs b/l.                          10.6   8.32  )-----------( 135      ( 11 Jul 2020 07:19 )             31.6     07-11    138  |  103  |  17  ----------------------------<  117<H>  4.3   |  26  |  0.97    Ca    8.5      11 Jul 2020 07:19  Phos  3.4     07-10  Mg     1.9     07-10    TPro  6.4  /  Alb  3.1<L>  /  TBili  1.1  /  DBili  x   /  AST  176<H>  /  ALT  139<H>  /  AlkPhos  96  07-10      Impression:  88yFemale S/p IM nailing Left hip fx POD# 1  Plan:  -  Continue pain management and home meds  -  DVT prophylaxis with Lovenox 30mg sc q12hrs  -  Daily Physical Therapy:  WBAT on LLE with appropriate assistive device  -  Discharge planning: Home Vs. Rehab pending Physical therapy eval.  -  Dc morales catheter now. good urine output overnight  -  Constipation- fleet enema ordered  -  Continue Antibiotics x 24hrs post-op  -  Case d/w

## 2020-07-11 NOTE — PHYSICAL THERAPY INITIAL EVALUATION ADULT - ACTIVE RANGE OF MOTION EXAMINATION, REHAB EVAL
Left ankle-WFL/bilateral upper extremity Active ROM was WFL (within functional limits)/Right LE Active ROM was WFL (within functional limits)

## 2020-07-11 NOTE — CONSULT NOTE ADULT - PROBLEM SELECTOR RECOMMENDATION 9
Pt with left hip pain which is somatic and neuropathic in nature due to POD #1, s/p left hip IM nailing on 7/10.   High risk medications reviewed. Avoid polypharmacy. Avoid IV opioids. Avoid NSAIDs and benzodiazepines. Non-pharmacological sleep aides initiated. Non-opioid medications and non-pharmacological pain management measures initiated.   Opioid pain recommendations   - Continue Oxycodone 2.5 mg PO q 6 hours PRN severe pain. Monitor for sedation/ respiratory depression.   Non-opioid pain recommendations   - Continue Acetaminophen 650mg PO q 6 hours PRN moderate pain.   Bowel Regimen  - Continue Miralax 17G PO daily  - Continue Senna 2 tablets at bedtime for constipation  Mild pain   - Non-pharmacological pain treatment recommendations  - Warm/ Cool packs PRN   - Repositioning, imagery, relaxation, distraction.  - Physical therapy OOB if no contraindications   Recommendations discussed with primary team and RN

## 2020-07-11 NOTE — PROGRESS NOTE ADULT - SUBJECTIVE AND OBJECTIVE BOX
NP Note discussed with  primary attending    Patient is an 88 year old female, lives at home alone, with PMHx of HTN, DM, CAD(s/p stent) presented after a mechanical fall. Patient a poor historian and per children  had increasingly poor memory. In the ED, imaging revealed left hip intertrochanteric fracture.     INTERVAL HPI/OVERNIGHT EVENTS: no new complaints    MEDICATIONS  (STANDING):  amLODIPine   Tablet 5 milliGRAM(s) Oral daily  atorvastatin 40 milliGRAM(s) Oral at bedtime  clopidogrel Tablet 75 milliGRAM(s) Oral daily  donepezil 10 milliGRAM(s) Oral at bedtime  enoxaparin Injectable 30 milliGRAM(s) SubCutaneous every 12 hours  FLUoxetine 10 milliGRAM(s) Oral daily  folic acid 1 milliGRAM(s) Oral daily  insulin lispro (HumaLOG) corrective regimen sliding scale   SubCutaneous three times a day before meals  memantine 5 milliGRAM(s) Oral two times a day  metoprolol tartrate 25 milliGRAM(s) Oral two times a day  polyethylene glycol 3350 17 Gram(s) Oral daily  senna 2 Tablet(s) Oral at bedtime    MEDICATIONS  (PRN):  acetaminophen   Tablet .. 650 milliGRAM(s) Oral every 6 hours PRN Moderate Pain (4 - 6)  ondansetron Injectable 4 milliGRAM(s) IV Push every 6 hours PRN Nausea  oxyCODONE    IR 2.5 milliGRAM(s) Oral every 6 hours PRN Severe Pain (7 - 10)      __________________________________________________  REVIEW OF SYSTEMS:    CONSTITUTIONAL: No fever,   EYES: no acute visual disturbances  NECK: No pain or stiffness  RESPIRATORY: No cough; No shortness of breath  CARDIOVASCULAR: No chest pain, no palpitations  GASTROINTESTINAL: No pain. No nausea or vomiting; No diarrhea   NEUROLOGICAL: No headache or numbness, no tremors  MUSCULOSKELETAL: +LLE pain with movement   GENITOURINARY: no dysuria, no frequency, no hesitancy  PSYCHIATRY: no depression , no anxiety  ALL OTHER  ROS negative      T(C): 36.8 (07-11-20 @ 20:51), Max: 36.8 (07-11-20 @ 20:51)  HR: 59 (07-11-20 @ 20:51) (57 - 65)  BP: 151/65 (07-11-20 @ 20:51) (120/58 - 151/65)  RR: 18 (07-11-20 @ 20:51) (18 - 18)  SpO2: 93% (07-11-20 @ 20:51) (93% - 98%)      ________________________________________________  PHYSICAL EXAM:  GENERAL: NAD  HEENT: Normocephalic;  conjunctivae and sclerae clear;  NECK : supple  CHEST/LUNG: Clear to auscultation bilaterally with good air entry   HEART: S1 S2  regular; no murmurs, gallops or rubs  ABDOMEN: Soft, Nontender, Nondistended; Bowel sounds present  EXTREMITIES: Lt Hip dressing   no cyanosis; no edema; no calf tenderness +pulses in all 4 extremities equal bilaterally  SKIN: warm and dry; no rash  NERVOUS SYSTEM:  Awake and alert Oriented only to person    _________________________________________________                        10.6   8.32  )-----------( 135      ( 11 Jul 2020 07:19 )             31.6           LIVER FUNCTIONS - ( 10 Jul 2020 07:09 )  Alb: 3.1 g/dL / Pro: 6.4 g/dL / ALK PHOS: 96 U/L / ALT: 139 U/L DA / AST: 176 U/L / GGT: x             138|103|17<117  4.3|26|0.97  8.5,--,--  07-11 @ 07:19      RADIOLOGY & ADDITIONAL TESTS:    Imaging Personally Reviewed:  YES    Consultant(s) Notes Reviewed:   YES    Care Discussed with Consultants : Cardiology, Ortho    Plan of care was discussed with patient and /or primary care giver; all questions and concerns were addressed and care was aligned with patient's wishes.

## 2020-07-11 NOTE — CONSULT NOTE ADULT - SUBJECTIVE AND OBJECTIVE BOX
HISTORY OF PRESENT ILLNESS: HPI:  Patient is an 88 year old female, lives at home alone, with PMHx of HTN, DM, ?CAD(s/p stent) follows Dr. Howell  presented after a mechanical fall. Patient was outside her apartment and the floors had just been waxed and patient tripped and fell, falling on her side. Patient denied any chest pain, SOB, dizziness, LOC blurry vision at time of fall. Has not had fevers, chills, cough, abdominal pain, nausea, vomiting, diarrhea. Patient has been having increasingly poor memory as of late as per patient's son and daughter.     In the ED, patient obtained Xray and CT. As per ED attending who received report from radiologist, imaging revealed intertrochanteric fracture.     GoC: Discussion initiated with patient. Primary team to confirm with patient and children. (09 Jul 2020 23:17)      PAST MEDICAL & SURGICAL HISTORY:  History of Cholecystectomy  Diabetes Mellitus, Type 2  Hypertension  History of Cholecystectomy          MEDICATIONS:  MEDICATIONS  (STANDING):  amLODIPine   Tablet 5 milliGRAM(s) Oral daily  atorvastatin 40 milliGRAM(s) Oral at bedtime  donepezil 10 milliGRAM(s) Oral at bedtime  FLUoxetine 10 milliGRAM(s) Oral daily  folic acid 1 milliGRAM(s) Oral daily  insulin lispro (HumaLOG) corrective regimen sliding scale   SubCutaneous three times a day before meals  memantine 5 milliGRAM(s) Oral two times a day  metoprolol tartrate 25 milliGRAM(s) Oral two times a day  sodium chloride 0.9%. 1000 milliLiter(s) (75 mL/Hr) IV Continuous <Continuous>  sodium chloride 0.9%. 1000 milliLiter(s) (75 mL/Hr) IV Continuous <Continuous>      Allergies    aspirin (Anaphylaxis)  Celebrex (Rash)  penicillin (Anaphylaxis)    Intolerances        FAMILY HISTORY:    Non-contributary for premature coronary disease or sudden cardiac death    SOCIAL HISTORY:    [X ] Non-smoker  [ ] Smoker  [ ] Alcohol        REVIEW OF SYSTEMS:  [ ]chest pain  [  ]shortness of breath  [  ]palpitations  [  ]syncope  [ ]near syncope [ ]upper extremity weakness   [ ] lower extremity weakness  [  ]diplopia  [  ]altered mental status   [  ]fevers  [ ]chills [ ]nausea  [ ]vomitting  [  ]dysphagia    [ ]abdominal pain  [ ]melena  [ ]BRBPR    [  ]epistaxis  [  ]rash    [ ]lower extremity edema        [ X] All others negative	  [ ] Unable to obtain      LABS:	 	    CARDIAC MARKERS:                              11.0   10.42 )-----------( 157      ( 10 Jul 2020 07:09 )             33.2     Hb Trend: 11.0<--, 11.5<--    07-10    137  |  103  |  24<H>  ----------------------------<  121<H>  4.3   |  24  |  1.09    Ca    8.7      10 Jul 2020 07:09  Phos  3.4     07-10  Mg     1.9     07-10    TPro  6.4  /  Alb  3.1<L>  /  TBili  1.1  /  DBili  x   /  AST  176<H>  /  ALT  139<H>  /  AlkPhos  96  07-10    Creatinine Trend: 1.09<--, 1.19<--    Coags:  PT/INR - ( 09 Jul 2020 19:50 )   PT: 11.1 sec;   INR: 0.95 ratio         PTT - ( 09 Jul 2020 19:50 )  PTT:30.9 sec    TSH: Thyroid Stimulating Hormone, Serum: 0.78 uU/mL (07-10 @ 07:09)          PHYSICAL EXAM:  T(C): 36.6 (07-10-20 @ 05:13), Max: 37 (07-09-20 @ 23:47)  HR: 54 (07-10-20 @ 05:13) (54 - 68)  BP: 146/45 (07-10-20 @ 05:13) (115/66 - 188/64)  RR: 16 (07-10-20 @ 05:13) (16 - 18)  SpO2: 97% (07-10-20 @ 05:13) (95% - 98%)  Wt(kg): --     I&O's Summary      Gen: Appears well in NAD  HEENT:  (-)icterus (-)pallor  CV: N S1 S2 1/6 MANASA (+)2 Pulses B/l  Resp:  Clear to ausculatation B/L, normal effort  GI: (+) BS Soft, NT, ND  Lymph:  (-)Edema, (-)obvious lymphadenopathy  Skin: Warm to touch, Normal turgor  Psych: Appropriate mood and affect      ECG:  	sinus wilber, low voltage    RADIOLOGY:         CXR:   No infiltrate     ASSESSMENT/PLAN: 	88y Female PMHx of HTN, DM, ?CAD(s/p stent) follows Dr. Howell  presented after a mechanical fall preop hip surgery.    - low voltage QRS on EKG, Check echo  - no evidence of clinical CHF or unstable cardiac syndromes  - by virtue of having DM and CAD she is moderate cardiac risk assuming she has normal left ventricular function and no severe valve disease on echo  - cont statin BB      I once again thank you for allowing me to participate in the care of your patient.  If you have any questions or concerns please do not hesitate to contact me.    Gideon Mcknight MD, [facility]  BEEPER [phone]
Pt Name: PRASANTH REICH  MRN: [id number]    ORTHOPEDIC CONSULT: Left hip fracture    Orthopedic diagnosis: Left hip intertrochanteric fx    88yFemaleHPI:  89 y/o F, lives at home alone, ambulates independently at baseline, with a PMHx of HTN, DM, and CAD (with stents) presents today c/o left hip pain s/p fall yesterday. Patient states she was walking to the elevators outside her apartment, where she reports the floors had just been waxed and subsequently patient slipped and fell onto her left side. Patient does not recall if there was any head trauma or LOC. Patient states pain is located at the left hip, non-radiating, scaled and "6/10" in severity, exacerbated with movement. Pt denies Chest pain, SOB, dyspnea, paresthesias, N/V/D, abdominal pain.    AMBULATION: Baseline Ambulation [ X] independent [ ] With Cane [ ] With Walker [ ]  Bedbound [ ] Pivot transfers to Wheelchair only    PAST MEDICAL & SURGICAL HISTORY:  History of Cholecystectomy  Diabetes Mellitus, Type 2  Hypertension  History of Cholecystectomy      ALLERGIES: aspirin (Anaphylaxis)  Celebrex (Rash)  penicillin (Anaphylaxis)      MEDICATIONS: acetaminophen   Tablet .. 650 milliGRAM(s) Oral every 6 hours PRN  amLODIPine   Tablet 5 milliGRAM(s) Oral daily  atorvastatin 40 milliGRAM(s) Oral at bedtime  donepezil 10 milliGRAM(s) Oral at bedtime  FLUoxetine 10 milliGRAM(s) Oral daily  folic acid 1 milliGRAM(s) Oral daily  insulin lispro (HumaLOG) corrective regimen sliding scale   SubCutaneous three times a day before meals  memantine 5 milliGRAM(s) Oral two times a day  metoprolol tartrate 25 milliGRAM(s) Oral two times a day  morphine  - Injectable 2 milliGRAM(s) IV Push every 6 hours PRN  oxyCODONE    IR 10 milliGRAM(s) Oral every 6 hours PRN  sodium chloride 0.9%. 1000 milliLiter(s) IV Continuous <Continuous>  sodium chloride 0.9%. 1000 milliLiter(s) IV Continuous <Continuous>  traMADol 25 milliGRAM(s) Oral every 6 hours PRN      PHYSICAL EXAM:    Vital Signs Last 24 Hrs  T(C): 36.6 (10 Jul 2020 05:13), Max: 37 (09 Jul 2020 23:47)  T(F): 97.8 (10 Jul 2020 05:13), Max: 98.6 (09 Jul 2020 23:47)  HR: 54 (10 Jul 2020 05:13) (54 - 68)  BP: 146/45 (10 Jul 2020 05:13) (115/66 - 188/64)  BP(mean): --  RR: 16 (10 Jul 2020 05:13) (16 - 18)  SpO2: 97% (10 Jul 2020 05:13) (95% - 98%)    Gen: well developed, well nourished, comfortable  Musculoskeletal:    Left hip: Skin warm and pink. No ecchymosis. No open wounds/lesions. Hip ROM limited 2/2 pain. TTP noted over left hip. Positive heel strike. Positive log roll. NVI. SILT.   Lower extremities: Calves soft and NTTP b/l. NVI. SILT. (+)EHL/FHL/ADF/APF intact b/l. Palpable dp pulses.        LABS:                        11.0   10.42 )-----------( 157      ( 10 Jul 2020 07:09 )             33.2     07-10    137  |  103  |  24<H>  ----------------------------<  121<H>  4.3   |  24  |  1.09    Ca    8.7      10 Jul 2020 07:09  Phos  3.4     07-10  Mg     1.9     07-10    TPro  6.4  /  Alb  3.1<L>  /  TBili  1.1  /  DBili  x   /  AST  176<H>  /  ALT  139<H>  /  AlkPhos  96  07-10    PT/INR - ( 09 Jul 2020 19:50 )   PT: 11.1 sec;   INR: 0.95 ratio         PTT - ( 09 Jul 2020 19:50 )  PTT:30.9 sec    RADIOLOGY:   EXAM:  XR HIP WITH PELV 2-3V LT                        PROCEDURE DATE:  07/09/2020      INTERPRETATION:  Left hip with full pelvic view. Patient had a fall with local trauma. 3 views.    There is an intratrochanteric fracture of the left hip. There may be comminution of the greater trochanteric tip.    There is mild degeneration of the outer corner of the left hip and minimal similar degenerative changes on the right.    IMPRESSION: Left hip fracture.    IMPRESSION: Pt is a  88y Female with Left hip intertrochanteric fx    PLAN:  -  Recommendation: [  ] Conservative treatment   [ X ] Surgical treatment/fixation  -  Pain control  -  Keep NPO today for possible surgery   -  DVT ppx with venodynes     > Hold all anticoagulation today for possible surgery  -  Fracture care, bedrest, NWB to LLE  -  Surgeon: Dr. Cisneros  -  Procedure: Left hip intramedullary nailing   -  [place] for comfort   -  CHG ordered   -  Patient medically cleared/optimized for surgery as per Dr. Chacko  -  Case d/w Dr. Cisneros
Source of information: PRASANTH REICH, Chart review  Patient language: English  : n/a    HPI:  Patient is an 88 year old female, lives at home alone, with PMHx of HTN, DM, CAD(s/p stent) presented after a mechanical fall. Patient was outside her apartment and the floors had just been waxed and patient tripped and fell, falling on her side. Patient denied any chest pain, SOB, dizziness, blurry vision at time of fall. Has not had fevers, chills, cough, abdominal pain, nausea, vomiting, diarrhea. Patient has been having increasingly poor memory as of late as per patient's son and daughter.     In the ED, patient obtained Xray and CT. As per ED attending who received report from radiologist, imaging revealed intertrochanteric fracture.     GoC: Discussion initiated with patient. Primary team to confirm with patient and children. (09 Jul 2020 23:17)      Patient is a 88y old  Female who presents to the hospital after a mechanical fall. Pt is now POD #1, s/p left hip IM nailing on 7/10. Pt seen and examined at bedside. Reports pain score 5/10 and tolerable SCALE USED: (1-10 VNRS). Pt describes pain as aching, non- radiating, alleviated by pain medication, exacerbated by movement. Pt tolerating PO diet. States she was drinking clears and is now ordered for a soft diet. Denies lethargy, nausea, vomiting, constipation, itchiness. Reports she has not yet passed flatus. Patient stated goal for pain control: to be able to take deep breaths, get out of bed to chair and ambulate with tolerable pain control. Pt denies taking medications for pain at home.     PAST MEDICAL & SURGICAL HISTORY:  History of Cholecystectomy  Diabetes Mellitus, Type 2  Hypertension  History of Cholecystectomy      Social History:  quit smoking (09 Jul 2020 23:17)   [X ] Denies illicit drug use, + ETOH use occasionally wine    Allergies    aspirin (Anaphylaxis)  Celebrex (Rash)  penicillin (Anaphylaxis)    MEDICATIONS  (STANDING):  amLODIPine   Tablet 5 milliGRAM(s) Oral daily  atorvastatin 40 milliGRAM(s) Oral at bedtime  clindamycin IVPB 900 milliGRAM(s) IV Intermittent every 8 hours  clopidogrel Tablet 75 milliGRAM(s) Oral daily  donepezil 10 milliGRAM(s) Oral at bedtime  enoxaparin Injectable 30 milliGRAM(s) SubCutaneous every 12 hours  FLUoxetine 10 milliGRAM(s) Oral daily  folic acid 1 milliGRAM(s) Oral daily  insulin lispro (HumaLOG) corrective regimen sliding scale   SubCutaneous three times a day before meals  magnesium hydroxide Suspension 30 milliLiter(s) Oral once  memantine 5 milliGRAM(s) Oral two times a day  metoprolol tartrate 25 milliGRAM(s) Oral two times a day  polyethylene glycol 3350 17 Gram(s) Oral daily  senna 2 Tablet(s) Oral at bedtime    MEDICATIONS  (PRN):  acetaminophen   Tablet .. 650 milliGRAM(s) Oral every 6 hours PRN Moderate Pain (4 - 6)  ondansetron Injectable 4 milliGRAM(s) IV Push every 6 hours PRN Nausea  oxyCODONE    IR 2.5 milliGRAM(s) Oral every 6 hours PRN Severe Pain (7 - 10)      Vital Signs Last 24 Hrs  T(C): 36.6 (11 Jul 2020 05:07), Max: 37.3 (10 Jul 2020 16:36)  T(F): 97.9 (11 Jul 2020 05:07), Max: 99.2 (10 Jul 2020 16:36)  HR: 64 (11 Jul 2020 05:07) (51 - 82)  BP: 139/54 (11 Jul 2020 05:07) (90/63 - 149/57)  BP(mean): 77 (10 Jul 2020 20:49) (69 - 77)  RR: 17 (11 Jul 2020 05:07) (12 - 20)  SpO2: 98% (11 Jul 2020 05:07) (92% - 100%)    LABS: Reviewed.                          10.6   8.32  )-----------( 135      ( 11 Jul 2020 07:19 )             31.6     07-11    138  |  103  |  17  ----------------------------<  117<H>  4.3   |  26  |  0.97    Ca    8.5      11 Jul 2020 07:19  Phos  3.4     07-10  Mg     1.9     07-10    TPro  6.4  /  Alb  3.1<L>  /  TBili  1.1  /  DBili  x   /  AST  176<H>  /  ALT  139<H>  /  AlkPhos  96  07-10    PT/INR - ( 09 Jul 2020 19:50 )   PT: 11.1 sec;   INR: 0.95 ratio         PTT - ( 09 Jul 2020 19:50 )  PTT:30.9 sec  LIVER FUNCTIONS - ( 10 Jul 2020 07:09 )  Alb: 3.1 g/dL / Pro: 6.4 g/dL / ALK PHOS: 96 U/L / ALT: 139 U/L DA / AST: 176 U/L / GGT: x             CAPILLARY BLOOD GLUCOSE      POCT Blood Glucose.: 130 mg/dL (11 Jul 2020 08:09)  POCT Blood Glucose.: 127 mg/dL (10 Jul 2020 19:53)  POCT Blood Glucose.: 106 mg/dL (10 Jul 2020 16:48)    COVID-19 PCR: NotDetec (09 Jul 2020 19:54)      Radiology: Reviewed.   < from: CT Pelvis Bony Only No Cont (07.09.20 @ 21:19) >    EXAM:  CT PELVIS BONY ONLY                            PROCEDURE DATE:  07/09/2020          INTERPRETATION:  CLINICAL INFORMATION: Trip and fall with left hip pain.    TECHNIQUE: CT of the pelvis was performed in bone and soft tissue windows with coronal and sagittal reformats. No intravenous contrast was administered. 3-D reformats were performed on a separate workstation.    COMPARISON: No similar prior studies are available for comparison.    FINDINGS:    Bone: An acute, comminuted and impacted intertrochanteric left hip fracture is noted. A well-corticated ossific density is seen superior to the left pubic symphysis likely a sequela of prior trauma. No additional fracture or dislocation is demonstrated. Degenerative changes of the visualized lower lumbar spine are seen.    Soft tissues: Vascular calcifications are noted. The urinary bladder, uterus and ovaries are unremarkable.     IMPRESSION:  Acute, comminuted and impacted intertrochanteric left hip fracture.                MARTÍNEZ GOLDSTEIN M.D., ATTENDING RADIOLOGIST  This document has been electronically signed. Jul 9 2020 11:34PM          < end of copied text >      ORT Score -   Family Hx of substance abuse	Female	      Male  Alcohol 	                                           1                     3  Illegal drugs	                                   2                     3  Rx drugs                                           4 	                  4  Personal Hx of substance abuse		  Alcohol 	                                          3	                  3  Illegal drugs                                     4	                  4  Rx drugs                                            5 	                  5  Age between 16- 45 years	           1                     1  hx preadolescent sexual abuse	   3 	                  0  Psychological disease		  ADD, OCD, bipolar, schizophrenia   2	          2  Depression                                           1 	          1  Total: 0    a score of 3 or lower indicates low risk for opioid abuse		  a score of 4-7 indicates moderate risk for opioid abuse		  a score of 8 or higher indicates high risk for opioid abuse  	  4AT (Assessment test for delirium & cognitive impairment)  _________________________________________________________  [1] ALERTNESS  This includes patients who may be markedly drowsy (eg. difficult to rouse and/or obviously sleepy  during assessment) or agitated/hyperactive. Observe the patient. If asleep, attempt to wake with  speech or gentle touch on shoulder. Ask the patient to state their name and address to assist rating.  Normal (fully alert, but not agitated, throughout assessment) 0  Mild sleepiness for <10 seconds after waking, then normal 0  Clearly abnormal 4    [2] AMT4  Age, date of birth, place (name of the hospital or building), current year.  No mistakes 0  1 mistake 1  2 or more mistakes/untestable 2    [3] ATTENTION  Ask the patient: “Please tell me the months of the year in backwards order, starting at December.”  To assist initial understanding one prompt of “what is the month before December?” is permitted.  Months of the year backwards Achieves 7 months or more correctly 0  Starts but scores <7 months / refuses to start 1   Untestable (cannot start because unwell, drowsy, inattentive) 2    [4] ACUTE CHANGE OR FLUCTUATING COURSE  Evidence of significant change or fluctuation in: alertness, cognition, other mental function  (eg. paranoia, hallucinations) arising over the last 2 weeks and still evident in last 24hrs  No 0  Yes 4    4 or above: possible delirium +/- cognitive impairment  1-3: possible cognitive impairment  0: delirium or severe cognitive impairment unlikely (but delirium still possible if [4] information incomplete)    4AT SCORE: 2    REVIEW OF SYSTEMS:  CONSTITUTIONAL: No fever or fatigue  RESPIRATORY: No cough, wheezing, chills or hemoptysis; No shortness of breath  CARDIOVASCULAR: No chest pain, palpitations, dizziness, or leg swelling  GASTROINTESTINAL: No abdominal or epigastric pain. No nausea, vomiting; No diarrhea or constipation.   GENITOURINARY: No dysuria, frequency, hematuria, retention or incontinence  MUSCULOSKELETAL: + left hip pain and mild swelling; no upper or left lower motor strength weakness, + Rt lower extremity weakness; no saddle anesthesia, bowel/bladder incontinence  NEURO: No numbness/ tingling in b/l LE   PSYCHIATRIC: No depression, anxiety, mood swings, or difficulty sleeping    PHYSICAL EXAM:  GENERAL:  Alert & Oriented X2 to person and place, confused to date, NAD, Good concentration  CHEST/LUNG: Clear to auscultation bilaterally; No rales, rhonchi, wheezing, or rubs  HEART: Regular rate and rhythm; No murmurs, rubs, or gallops  ABDOMEN: Soft, Nontender, Nondistended; Bowel sounds present  EXTREMITIES:  2+ Peripheral Pulses, No cyanosis, or edema  MUSCULOSKELETAL: Motor Strength 5/5 B/L upper and Rt lower extremities; + weakness left leg   SKIN: + left hip dressing c/d/i; No rashes or lesions    Risk factors associated with adverse outcomes related to opioid treatment  [ ]  Concurrent benzodiazepine use  [ ]  History/ Active substance use or alcohol use disorder  [ ] Psychiatric co-morbidity  [ ] Sleep apnea  [ ] COPD  [ ] BMI> 35  [ ] Liver dysfunction  [ ] Renal dysfunction  [ ] CHF  [X ] Hx Smoker  [X ]  Age > 60 years    [X ]  [place]  Reviewed and Copied to Chart. See below.    Plan of care and goal oriented pain management treatment options were discussed with patient and /or primary care giver; all questions and concerns were addressed and care was aligned with patient's wishes.    Educated patient on goal oriented pain management treatment options

## 2020-07-11 NOTE — PROGRESS NOTE ADULT - PROBLEM SELECTOR PLAN 3
Controlled  Continue Amlodipine + Metoprolol   Holding HCTZ and ARB due to suspect LESLIE  Continue to monitor blood pressure for now

## 2020-07-11 NOTE — PROGRESS NOTE ADULT - SUBJECTIVE AND OBJECTIVE BOX
Subjective: pt seen and examined, no complaints, ROS - .     acetaminophen   Tablet .. 650 milliGRAM(s) Oral every 6 hours PRN  acetaminophen  IVPB .. 1000 milliGRAM(s) IV Intermittent once  amLODIPine   Tablet 5 milliGRAM(s) Oral daily  atorvastatin 40 milliGRAM(s) Oral at bedtime  clindamycin IVPB 900 milliGRAM(s) IV Intermittent every 8 hours  clopidogrel Tablet 75 milliGRAM(s) Oral daily  donepezil 10 milliGRAM(s) Oral at bedtime  enoxaparin Injectable 30 milliGRAM(s) SubCutaneous every 12 hours  FLUoxetine 10 milliGRAM(s) Oral daily  folic acid 1 milliGRAM(s) Oral daily  insulin lispro (HumaLOG) corrective regimen sliding scale   SubCutaneous three times a day before meals  memantine 5 milliGRAM(s) Oral two times a day  metoprolol tartrate 25 milliGRAM(s) Oral two times a day  ondansetron Injectable 4 milliGRAM(s) IV Push every 6 hours PRN  oxyCODONE    IR 2.5 milliGRAM(s) Oral every 6 hours PRN  polyethylene glycol 3350 17 Gram(s) Oral daily PRN  senna 2 Tablet(s) Oral at bedtime                            10.6   8.32  )-----------( 135      ( 11 Jul 2020 07:19 )             31.6       Hemoglobin: 10.6 g/dL (07-11 @ 07:19)  Hemoglobin: 10.5 g/dL (07-10 @ 20:45)  Hemoglobin: 11.0 g/dL (07-10 @ 07:09)  Hemoglobin: 11.5 g/dL (07-09 @ 19:50)      07-11    138  |  103  |  17  ----------------------------<  117<H>  4.3   |  26  |  0.97    Ca    8.5      11 Jul 2020 07:19  Phos  3.4     07-10  Mg     1.9     07-10    TPro  6.4  /  Alb  3.1<L>  /  TBili  1.1  /  DBili  x   /  AST  176<H>  /  ALT  139<H>  /  AlkPhos  96  07-10    Creatinine Trend: 0.97<--, 1.01<--, 1.09<--, 1.19<--    COAGS:     T(C): 36.6 (07-11-20 @ 05:07), Max: 37.3 (07-10-20 @ 16:36)  HR: 64 (07-11-20 @ 05:07) (51 - 82)  BP: 139/54 (07-11-20 @ 05:07) (90/63 - 149/57)  RR: 17 (07-11-20 @ 05:07) (12 - 20)  SpO2: 94% (07-11-20 @ 05:07) (92% - 100%)  Wt(kg): --    I&O's Summary    10 Jul 2020 07:01  -  11 Jul 2020 07:00  --------------------------------------------------------  IN: 1050 mL / OUT: 1530 mL / NET: -480 mL    Appearance: Normal	  HEENT:   Normal oral mucosa, PERRL, EOMI	  Lymphatic: No lymphadenopathy , no edema  Cardiovascular: Normal S1 S2, No JVD, No murmurs , Peripheral pulses palpable 2+ bilaterally  Respiratory: Lungs clear to auscultation, normal effort 	  Gastrointestinal:  Soft, Non-tender, + BS	  Skin: No rashes, No ecchymoses, No cyanosis, warm to touch  Musculoskeletal: Normal range of motion, normal strength  Psychiatry:  Mood & affect appropriate    TELEMETRY: 	  Nsr, Messi      [ ] Echocardiogram: < from: Transthoracic Echocardiogram (07.10.20 @ 09:46) >  CONCLUSIONS:  1. Mitral annular calcification. Mild mitral regurgitation.    2. Calcified trileaflet aortic valve with decreased  opening. Peak transaortic valve gradient equals 21.5 mm Hg,  consistent with mild aortic stenosis.The dimensionless  index is .49. Mild aortic regurgitation.  3. Aortic Root: 2.5 cm.  4. Mildly dilated left atrium.  LA volume index = 35 cc/m2.  5. Normal left ventricular internal dimensions and wall  thicknesses.  6. The distal septum and apex  are hypokinetic.,  (EF =  50-55%).  7. Grade II diastolic dysfunction.  8. Normal right atrium.  9. Normal right ventricular size and systolic function  (TAPSE  cm).  10. RA Pressure is 8 mm Hg.  11. RV systolic pressure is moderately increased at  54 mm  Hg.  12. There is moderate tricuspid regurgitation.  13. Normal pulmonic valve.  14. Normal pericardium with no pericardial effusion.    ------------------------------------------------------------------------  Confirmed on  7/10/2020 - 12:39:07 by Georgina Washington MD  ------------------------------------------------------------------------    < end of copied text >    [ ]  Catheterization:  [ ] Stress Test:    OTHER: 	        ASSESSMENT/PLAN: 	88y Female  PMHx of HTN, DM, ?CAD(s/p stent) follows Dr. Howell  presented after a mechanical fall preop hip surgery. s/p IM to left hip    - ECHO noted   - Cont plavix, statin   - no evidence of clinical CHF or unstable cardiac syndromes  - tolerated sx well.   - cont statin BB  - BP stable , cont norvasc

## 2020-07-12 LAB
ANION GAP SERPL CALC-SCNC: 8 MMOL/L — SIGNIFICANT CHANGE UP (ref 5–17)
BUN SERPL-MCNC: 13 MG/DL — SIGNIFICANT CHANGE UP (ref 7–18)
CALCIUM SERPL-MCNC: 8.5 MG/DL — SIGNIFICANT CHANGE UP (ref 8.4–10.5)
CHLORIDE SERPL-SCNC: 102 MMOL/L — SIGNIFICANT CHANGE UP (ref 96–108)
CO2 SERPL-SCNC: 27 MMOL/L — SIGNIFICANT CHANGE UP (ref 22–31)
CREAT SERPL-MCNC: 0.91 MG/DL — SIGNIFICANT CHANGE UP (ref 0.5–1.3)
GLUCOSE BLDC GLUCOMTR-MCNC: 115 MG/DL — HIGH (ref 70–99)
GLUCOSE BLDC GLUCOMTR-MCNC: 126 MG/DL — HIGH (ref 70–99)
GLUCOSE BLDC GLUCOMTR-MCNC: 130 MG/DL — HIGH (ref 70–99)
GLUCOSE BLDC GLUCOMTR-MCNC: 232 MG/DL — HIGH (ref 70–99)
GLUCOSE SERPL-MCNC: 117 MG/DL — HIGH (ref 70–99)
HCT VFR BLD CALC: 27.6 % — LOW (ref 34.5–45)
HGB BLD-MCNC: 9.1 G/DL — LOW (ref 11.5–15.5)
MCHC RBC-ENTMCNC: 29.5 PG — SIGNIFICANT CHANGE UP (ref 27–34)
MCHC RBC-ENTMCNC: 33 GM/DL — SIGNIFICANT CHANGE UP (ref 32–36)
MCV RBC AUTO: 89.6 FL — SIGNIFICANT CHANGE UP (ref 80–100)
NRBC # BLD: 0 /100 WBCS — SIGNIFICANT CHANGE UP (ref 0–0)
PLATELET # BLD AUTO: 130 K/UL — LOW (ref 150–400)
POTASSIUM SERPL-MCNC: 3.7 MMOL/L — SIGNIFICANT CHANGE UP (ref 3.5–5.3)
POTASSIUM SERPL-SCNC: 3.7 MMOL/L — SIGNIFICANT CHANGE UP (ref 3.5–5.3)
RBC # BLD: 3.08 M/UL — LOW (ref 3.8–5.2)
RBC # FLD: 14 % — SIGNIFICANT CHANGE UP (ref 10.3–14.5)
SARS-COV-2 RNA SPEC QL NAA+PROBE: SIGNIFICANT CHANGE UP
SODIUM SERPL-SCNC: 137 MMOL/L — SIGNIFICANT CHANGE UP (ref 135–145)
WBC # BLD: 7.67 K/UL — SIGNIFICANT CHANGE UP (ref 3.8–10.5)
WBC # FLD AUTO: 7.67 K/UL — SIGNIFICANT CHANGE UP (ref 3.8–10.5)

## 2020-07-12 NOTE — PROGRESS NOTE ADULT - SUBJECTIVE AND OBJECTIVE BOX
pt seen and examined, no complaints, ROS - .        acetaminophen   Tablet .. 650 milliGRAM(s) Oral every 6 hours PRN  amLODIPine   Tablet 5 milliGRAM(s) Oral daily  atorvastatin 40 milliGRAM(s) Oral at bedtime  clopidogrel Tablet 75 milliGRAM(s) Oral daily  donepezil 10 milliGRAM(s) Oral at bedtime  enoxaparin Injectable 30 milliGRAM(s) SubCutaneous every 12 hours  FLUoxetine 10 milliGRAM(s) Oral daily  folic acid 1 milliGRAM(s) Oral daily  insulin lispro (HumaLOG) corrective regimen sliding scale   SubCutaneous three times a day before meals  memantine 5 milliGRAM(s) Oral two times a day  metoprolol tartrate 25 milliGRAM(s) Oral two times a day  ondansetron Injectable 4 milliGRAM(s) IV Push every 6 hours PRN  oxyCODONE    IR 2.5 milliGRAM(s) Oral every 6 hours PRN  polyethylene glycol 3350 17 Gram(s) Oral daily  senna 2 Tablet(s) Oral at bedtime                            9.1    7.67  )-----------( 130      ( 12 Jul 2020 07:44 )             27.6       Hemoglobin: 9.1 g/dL (07-12 @ 07:44)  Hemoglobin: 10.6 g/dL (07-11 @ 07:19)  Hemoglobin: 10.5 g/dL (07-10 @ 20:45)  Hemoglobin: 11.0 g/dL (07-10 @ 07:09)  Hemoglobin: 11.5 g/dL (07-09 @ 19:50)      07-12    137  |  102  |  13  ----------------------------<  117<H>  3.7   |  27  |  0.91    Ca    8.5      12 Jul 2020 07:44      Creatinine Trend: 0.91<--, 0.97<--, 1.01<--, 1.09<--, 1.19<--    COAGS:           T(C): 36.7 (07-12-20 @ 05:07), Max: 36.8 (07-11-20 @ 20:51)  HR: 70 (07-12-20 @ 05:07) (57 - 70)  BP: 126/95 (07-12-20 @ 05:07) (120/58 - 151/65)  RR: 17 (07-12-20 @ 05:07) (17 - 18)  SpO2: 93% (07-12-20 @ 05:07) (93% - 98%)  Wt(kg): --    I&O's Summary    11 Jul 2020 07:01  -  12 Jul 2020 07:00  --------------------------------------------------------  IN: 0 mL / OUT: 650 mL / NET: -650 mL      Appearance: Normal	  HEENT:   Normal oral mucosa, PERRL, EOMI	  Lymphatic: No lymphadenopathy , no edema  Cardiovascular: Normal S1 S2, No JVD, No murmurs , Peripheral pulses palpable 2+ bilaterally  Respiratory: Lungs clear to auscultation, normal effort 	  Gastrointestinal:  Soft, Non-tender, + BS	  Skin: No rashes, No ecchymoses, No cyanosis, warm to touch  Musculoskeletal: Normal range of motion, normal strength  Psychiatry:  Mood & affect appropriate    TELEMETRY: 	  Nsr, Messi      [ ] Echocardiogram: < from: Transthoracic Echocardiogram (07.10.20 @ 09:46) >  CONCLUSIONS:  1. Mitral annular calcification. Mild mitral regurgitation.    2. Calcified trileaflet aortic valve with decreased  opening. Peak transaortic valve gradient equals 21.5 mm Hg,  consistent with mild aortic stenosis.The dimensionless  index is .49. Mild aortic regurgitation.  3. Aortic Root: 2.5 cm.  4. Mildly dilated left atrium.  LA volume index = 35 cc/m2.  5. Normal left ventricular internal dimensions and wall  thicknesses.  6. The distal septum and apex  are hypokinetic.,  (EF =  50-55%).  7. Grade II diastolic dysfunction.  8. Normal right atrium.  9. Normal right ventricular size and systolic function  (TAPSE  cm).  10. RA Pressure is 8 mm Hg.  11. RV systolic pressure is moderately increased at  54 mm  Hg.  12. There is moderate tricuspid regurgitation.  13. Normal pulmonic valve.  14. Normal pericardium with no pericardial effusion.    ------------------------------------------------------------------------  Confirmed on  7/10/2020 - 12:39:07 by Georgina Washington MD  ------------------------------------------------------------------------    < end of copied text >    [ ]  Catheterization:  [ ] Stress Test:    OTHER: 	        ASSESSMENT/PLAN: 	88y Female  PMHx of HTN, DM, ?CAD(s/p stent) follows Dr. Howell  presented after a mechanical fall preop hip surgery. s/p IM to left hip    - ECHO noted   - Cont plavix, statin   - no evidence of clinical CHF or unstable cardiac syndromes  - tolerated sx well.   - cont statin BB  - BP stable , cont norvasc

## 2020-07-12 NOTE — PROGRESS NOTE ADULT - SUBJECTIVE AND OBJECTIVE BOX
Patient is an 88 year old female, lives at home alone, with PMHx of HTN, DM, CAD(s/p stent) presented after a mechanical fall. Patient a poor historian and per children  had increasingly poor memory. In the ED, imaging revealed left hip intertrochanteric fracture.     INTERVAL HPI/OVERNIGHT EVENTS: no new complaints    MEDICATIONS  (STANDING):  amLODIPine   Tablet 5 milliGRAM(s) Oral daily  atorvastatin 40 milliGRAM(s) Oral at bedtime  clopidogrel Tablet 75 milliGRAM(s) Oral daily  donepezil 10 milliGRAM(s) Oral at bedtime  enoxaparin Injectable 30 milliGRAM(s) SubCutaneous every 12 hours  FLUoxetine 10 milliGRAM(s) Oral daily  folic acid 1 milliGRAM(s) Oral daily  insulin lispro (HumaLOG) corrective regimen sliding scale   SubCutaneous three times a day before meals  memantine 5 milliGRAM(s) Oral two times a day  metoprolol tartrate 25 milliGRAM(s) Oral two times a day  polyethylene glycol 3350 17 Gram(s) Oral daily  senna 2 Tablet(s) Oral at bedtime    MEDICATIONS  (PRN):  acetaminophen   Tablet .. 650 milliGRAM(s) Oral every 6 hours PRN Moderate Pain (4 - 6)  ondansetron Injectable 4 milliGRAM(s) IV Push every 6 hours PRN Nausea  oxyCODONE    IR 2.5 milliGRAM(s) Oral every 6 hours PRN Severe Pain (7 - 10)    __________________________________________________  REVIEW OF SYSTEMS:    CONSTITUTIONAL: No fever,   EYES: no acute visual disturbances  NECK: No pain or stiffness  RESPIRATORY: No cough; No shortness of breath  CARDIOVASCULAR: No chest pain, no palpitations  GASTROINTESTINAL: No pain. No nausea or vomiting; No diarrhea   NEUROLOGICAL: No headache or numbness, no tremors  MUSCULOSKELETAL: +LLE pain with movement   GENITOURINARY: no dysuria, no frequency, no hesitancy  PSYCHIATRY: no depression , no anxiety  ALL OTHER  ROS negative      T(C): 37.1 (07-12-20 @ 13:52), Max: 37.1 (07-12-20 @ 13:52)  HR: 61 (07-12-20 @ 17:01) (58 - 61)  BP: 128/35 (07-12-20 @ 17:01) (128/35 - 149/47)  RR: 16 (07-12-20 @ 13:52) (16 - 16)  SpO2: 98% (07-12-20 @ 13:52) (98% - 98%)  ________________________________________________  PHYSICAL EXAM:  GENERAL: NAD  HEENT: Normocephalic;  conjunctivae and sclerae clear;  NECK : supple  CHEST/LUNG: Clear to auscultation bilaterally with good air entry   HEART: S1 S2  regular; no murmurs, gallops or rubs  ABDOMEN: Soft, Nontender, Nondistended; Bowel sounds present  EXTREMITIES: Lt Hip dressing   no cyanosis; no edema; no calf tenderness +pulses in all 4 extremities equal bilaterally  SKIN: warm and dry; no rash  NERVOUS SYSTEM:  Awake and alert Oriented only to person    _________________________________________________                                        9.1    7.67  )-----------( 130      ( 12 Jul 2020 07:44 )             27.6               137|102|13<117  3.7|27|0.91  8.5,--,--  07-12 @ 07:44        RADIOLOGY & ADDITIONAL TESTS:    Imaging Personally Reviewed:  YES    Consultant(s) Notes Reviewed:   YES    Care Discussed with Consultants : Cardiology, Ortho    Plan of care was discussed with patient and /or primary care giver; all questions and concerns were addressed and care was aligned with patient's wishes. Patient is an 88 year old female, lives at home alone, with PMHx of HTN, DM, CAD(s/p stent) presented after a mechanical fall. Patient a poor historian and per children  had increasingly poor memory. In the ED, imaging revealed left hip intertrochanteric fracture.     INTERVAL HPI/OVERNIGHT EVENTS: no new complaints  son bed side     MEDICATIONS  (STANDING):  amLODIPine   Tablet 5 milliGRAM(s) Oral daily  atorvastatin 40 milliGRAM(s) Oral at bedtime  clopidogrel Tablet 75 milliGRAM(s) Oral daily  donepezil 10 milliGRAM(s) Oral at bedtime  enoxaparin Injectable 30 milliGRAM(s) SubCutaneous every 12 hours  FLUoxetine 10 milliGRAM(s) Oral daily  folic acid 1 milliGRAM(s) Oral daily  insulin lispro (HumaLOG) corrective regimen sliding scale   SubCutaneous three times a day before meals  memantine 5 milliGRAM(s) Oral two times a day  metoprolol tartrate 25 milliGRAM(s) Oral two times a day  polyethylene glycol 3350 17 Gram(s) Oral daily  senna 2 Tablet(s) Oral at bedtime    MEDICATIONS  (PRN):  acetaminophen   Tablet .. 650 milliGRAM(s) Oral every 6 hours PRN Moderate Pain (4 - 6)  ondansetron Injectable 4 milliGRAM(s) IV Push every 6 hours PRN Nausea  oxyCODONE    IR 2.5 milliGRAM(s) Oral every 6 hours PRN Severe Pain (7 - 10)    __________________________________________________  REVIEW OF SYSTEMS:    CONSTITUTIONAL: No fever,   EYES: no acute visual disturbances  NECK: No pain or stiffness  RESPIRATORY: No cough; No shortness of breath  CARDIOVASCULAR: No chest pain, no palpitations  GASTROINTESTINAL: No pain. No nausea or vomiting; No diarrhea   NEUROLOGICAL: No headache or numbness, no tremors  MUSCULOSKELETAL: +LLE pain with movement   GENITOURINARY: no dysuria, no frequency, no hesitancy  PSYCHIATRY: no depression , no anxiety  ALL OTHER  ROS negative      T(C): 37.1 (07-12-20 @ 13:52), Max: 37.1 (07-12-20 @ 13:52)  HR: 61 (07-12-20 @ 17:01) (58 - 61)  BP: 128/35 (07-12-20 @ 17:01) (128/35 - 149/47)  RR: 16 (07-12-20 @ 13:52) (16 - 16)  SpO2: 98% (07-12-20 @ 13:52) (98% - 98%)  ________________________________________________  PHYSICAL EXAM:  GENERAL: NAD  HEENT: Normocephalic;  conjunctivae and sclerae clear;  NECK : supple  CHEST/LUNG: Clear to auscultation bilaterally with good air entry   HEART: S1 S2  regular; no murmurs, gallops or rubs  ABDOMEN: Soft, Nontender, Nondistended; Bowel sounds present  EXTREMITIES: Lt Hip dressing   no cyanosis; no edema; no calf tenderness +pulses in all 4 extremities equal bilaterally  SKIN: warm and dry; no rash  NERVOUS SYSTEM:  Awake and alert Oriented only to person    _________________________________________________                                        9.1    7.67  )-----------( 130      ( 12 Jul 2020 07:44 )             27.6               137|102|13<117  3.7|27|0.91  8.5,--,--  07-12 @ 07:44        RADIOLOGY & ADDITIONAL TESTS:    Imaging Personally Reviewed:  YES    Consultant(s) Notes Reviewed:   YES    Care Discussed with Consultants : Cardiology, Ortho    Plan of care was discussed with patient and /or primary care giver; all questions and concerns were addressed and care was aligned with patient's wishes.

## 2020-07-13 ENCOUNTER — TRANSCRIPTION ENCOUNTER (OUTPATIENT)
Age: 85
End: 2020-07-13

## 2020-07-13 VITALS
HEART RATE: 69 BPM | DIASTOLIC BLOOD PRESSURE: 49 MMHG | RESPIRATION RATE: 18 BRPM | TEMPERATURE: 98 F | OXYGEN SATURATION: 94 % | SYSTOLIC BLOOD PRESSURE: 135 MMHG

## 2020-07-13 LAB
ANION GAP SERPL CALC-SCNC: 9 MMOL/L — SIGNIFICANT CHANGE UP (ref 5–17)
BUN SERPL-MCNC: 13 MG/DL — SIGNIFICANT CHANGE UP (ref 7–18)
CALCIUM SERPL-MCNC: 8.6 MG/DL — SIGNIFICANT CHANGE UP (ref 8.4–10.5)
CHLORIDE SERPL-SCNC: 103 MMOL/L — SIGNIFICANT CHANGE UP (ref 96–108)
CO2 SERPL-SCNC: 26 MMOL/L — SIGNIFICANT CHANGE UP (ref 22–31)
CREAT SERPL-MCNC: 0.98 MG/DL — SIGNIFICANT CHANGE UP (ref 0.5–1.3)
GLUCOSE BLDC GLUCOMTR-MCNC: 109 MG/DL — HIGH (ref 70–99)
GLUCOSE BLDC GLUCOMTR-MCNC: 127 MG/DL — HIGH (ref 70–99)
GLUCOSE SERPL-MCNC: 102 MG/DL — HIGH (ref 70–99)
HCT VFR BLD CALC: 28.4 % — LOW (ref 34.5–45)
HGB BLD-MCNC: 9.6 G/DL — LOW (ref 11.5–15.5)
MCHC RBC-ENTMCNC: 30 PG — SIGNIFICANT CHANGE UP (ref 27–34)
MCHC RBC-ENTMCNC: 33.8 GM/DL — SIGNIFICANT CHANGE UP (ref 32–36)
MCV RBC AUTO: 88.8 FL — SIGNIFICANT CHANGE UP (ref 80–100)
NRBC # BLD: 0 /100 WBCS — SIGNIFICANT CHANGE UP (ref 0–0)
PLATELET # BLD AUTO: 147 K/UL — LOW (ref 150–400)
POTASSIUM SERPL-MCNC: 3.5 MMOL/L — SIGNIFICANT CHANGE UP (ref 3.5–5.3)
POTASSIUM SERPL-SCNC: 3.5 MMOL/L — SIGNIFICANT CHANGE UP (ref 3.5–5.3)
RBC # BLD: 3.2 M/UL — LOW (ref 3.8–5.2)
RBC # FLD: 13.7 % — SIGNIFICANT CHANGE UP (ref 10.3–14.5)
SODIUM SERPL-SCNC: 138 MMOL/L — SIGNIFICANT CHANGE UP (ref 135–145)
WBC # BLD: 6.9 K/UL — SIGNIFICANT CHANGE UP (ref 3.8–10.5)
WBC # FLD AUTO: 6.9 K/UL — SIGNIFICANT CHANGE UP (ref 3.8–10.5)

## 2020-07-13 PROCEDURE — 87641 MR-STAPH DNA AMP PROBE: CPT

## 2020-07-13 PROCEDURE — 87635 SARS-COV-2 COVID-19 AMP PRB: CPT

## 2020-07-13 PROCEDURE — 80053 COMPREHEN METABOLIC PANEL: CPT

## 2020-07-13 PROCEDURE — U0003: CPT

## 2020-07-13 PROCEDURE — 71045 X-RAY EXAM CHEST 1 VIEW: CPT

## 2020-07-13 PROCEDURE — 76000 FLUOROSCOPY <1 HR PHYS/QHP: CPT

## 2020-07-13 PROCEDURE — C1713: CPT

## 2020-07-13 PROCEDURE — 97116 GAIT TRAINING THERAPY: CPT

## 2020-07-13 PROCEDURE — 86923 COMPATIBILITY TEST ELECTRIC: CPT

## 2020-07-13 PROCEDURE — 83036 HEMOGLOBIN GLYCOSYLATED A1C: CPT

## 2020-07-13 PROCEDURE — 85027 COMPLETE CBC AUTOMATED: CPT

## 2020-07-13 PROCEDURE — 85730 THROMBOPLASTIN TIME PARTIAL: CPT

## 2020-07-13 PROCEDURE — C1769: CPT

## 2020-07-13 PROCEDURE — 70450 CT HEAD/BRAIN W/O DYE: CPT

## 2020-07-13 PROCEDURE — 93005 ELECTROCARDIOGRAM TRACING: CPT

## 2020-07-13 PROCEDURE — 80048 BASIC METABOLIC PNL TOTAL CA: CPT

## 2020-07-13 PROCEDURE — 86850 RBC ANTIBODY SCREEN: CPT

## 2020-07-13 PROCEDURE — 93306 TTE W/DOPPLER COMPLETE: CPT

## 2020-07-13 PROCEDURE — 80061 LIPID PANEL: CPT

## 2020-07-13 PROCEDURE — 83735 ASSAY OF MAGNESIUM: CPT

## 2020-07-13 PROCEDURE — 82746 ASSAY OF FOLIC ACID SERUM: CPT

## 2020-07-13 PROCEDURE — 84443 ASSAY THYROID STIM HORMONE: CPT

## 2020-07-13 PROCEDURE — 82607 VITAMIN B-12: CPT

## 2020-07-13 PROCEDURE — 97110 THERAPEUTIC EXERCISES: CPT

## 2020-07-13 PROCEDURE — 99285 EMERGENCY DEPT VISIT HI MDM: CPT | Mod: 25

## 2020-07-13 PROCEDURE — 36415 COLL VENOUS BLD VENIPUNCTURE: CPT

## 2020-07-13 PROCEDURE — 84100 ASSAY OF PHOSPHORUS: CPT

## 2020-07-13 PROCEDURE — 99231 SBSQ HOSP IP/OBS SF/LOW 25: CPT

## 2020-07-13 PROCEDURE — 87640 STAPH A DNA AMP PROBE: CPT

## 2020-07-13 PROCEDURE — 73502 X-RAY EXAM HIP UNI 2-3 VIEWS: CPT

## 2020-07-13 PROCEDURE — 86901 BLOOD TYPING SEROLOGIC RH(D): CPT

## 2020-07-13 PROCEDURE — 85610 PROTHROMBIN TIME: CPT

## 2020-07-13 PROCEDURE — 86900 BLOOD TYPING SEROLOGIC ABO: CPT

## 2020-07-13 PROCEDURE — 97530 THERAPEUTIC ACTIVITIES: CPT

## 2020-07-13 PROCEDURE — 72192 CT PELVIS W/O DYE: CPT

## 2020-07-13 PROCEDURE — 82962 GLUCOSE BLOOD TEST: CPT

## 2020-07-13 PROCEDURE — 86769 SARS-COV-2 COVID-19 ANTIBODY: CPT

## 2020-07-13 PROCEDURE — 96374 THER/PROPH/DIAG INJ IV PUSH: CPT

## 2020-07-13 RX ORDER — METOPROLOL TARTRATE 50 MG
1 TABLET ORAL
Qty: 0 | Refills: 0 | DISCHARGE
Start: 2020-07-13

## 2020-07-13 RX ORDER — METOPROLOL TARTRATE 50 MG
1 TABLET ORAL
Qty: 0 | Refills: 0 | DISCHARGE

## 2020-07-13 RX ORDER — OXYCODONE HYDROCHLORIDE 5 MG/1
2.5 TABLET ORAL
Qty: 0 | Refills: 0 | DISCHARGE
Start: 2020-07-13

## 2020-07-13 RX ORDER — ACETAMINOPHEN 500 MG
2 TABLET ORAL
Qty: 0 | Refills: 0 | DISCHARGE
Start: 2020-07-13

## 2020-07-13 RX ORDER — LORATADINE 10 MG/1
1 TABLET ORAL
Qty: 0 | Refills: 0 | DISCHARGE

## 2020-07-13 RX ORDER — MEMANTINE HYDROCHLORIDE 10 MG/1
1 TABLET ORAL
Qty: 0 | Refills: 0 | DISCHARGE
Start: 2020-07-13

## 2020-07-13 RX ORDER — AMLODIPINE BESYLATE 2.5 MG/1
1 TABLET ORAL
Qty: 0 | Refills: 0 | DISCHARGE

## 2020-07-13 RX ORDER — DONEPEZIL HYDROCHLORIDE 10 MG/1
1 TABLET, FILM COATED ORAL
Qty: 0 | Refills: 0 | DISCHARGE

## 2020-07-13 RX ORDER — ENOXAPARIN SODIUM 100 MG/ML
40 INJECTION SUBCUTANEOUS
Qty: 0 | Refills: 0 | DISCHARGE
Start: 2020-07-13

## 2020-07-13 RX ORDER — ROSUVASTATIN CALCIUM 5 MG/1
1 TABLET ORAL
Qty: 0 | Refills: 0 | DISCHARGE

## 2020-07-13 RX ORDER — AMLODIPINE BESYLATE 2.5 MG/1
1 TABLET ORAL
Qty: 0 | Refills: 0 | DISCHARGE
Start: 2020-07-13

## 2020-07-13 RX ORDER — CLOPIDOGREL BISULFATE 75 MG/1
1 TABLET, FILM COATED ORAL
Qty: 0 | Refills: 0 | DISCHARGE

## 2020-07-13 RX ORDER — EZETIMIBE 10 MG/1
1 TABLET ORAL
Qty: 0 | Refills: 0 | DISCHARGE

## 2020-07-13 RX ORDER — ATORVASTATIN CALCIUM 80 MG/1
1 TABLET, FILM COATED ORAL
Qty: 0 | Refills: 0 | DISCHARGE
Start: 2020-07-13

## 2020-07-13 RX ORDER — MEMANTINE HYDROCHLORIDE 10 MG/1
1 TABLET ORAL
Qty: 0 | Refills: 0 | DISCHARGE

## 2020-07-13 RX ORDER — CLOPIDOGREL BISULFATE 75 MG/1
1 TABLET, FILM COATED ORAL
Qty: 0 | Refills: 0 | DISCHARGE
Start: 2020-07-13

## 2020-07-13 RX ORDER — FLUOXETINE HCL 10 MG
1 CAPSULE ORAL
Qty: 0 | Refills: 0 | DISCHARGE
Start: 2020-07-13

## 2020-07-13 RX ORDER — FLUOXETINE HCL 10 MG
1 CAPSULE ORAL
Qty: 0 | Refills: 0 | DISCHARGE

## 2020-07-13 RX ORDER — DONEPEZIL HYDROCHLORIDE 10 MG/1
1 TABLET, FILM COATED ORAL
Qty: 0 | Refills: 0 | DISCHARGE
Start: 2020-07-13

## 2020-07-13 RX ORDER — POLYETHYLENE GLYCOL 3350 17 G/17G
17 POWDER, FOR SOLUTION ORAL
Qty: 0 | Refills: 0 | DISCHARGE
Start: 2020-07-13

## 2020-07-13 RX ORDER — SENNA PLUS 8.6 MG/1
2 TABLET ORAL
Qty: 0 | Refills: 0 | DISCHARGE
Start: 2020-07-13

## 2020-07-13 NOTE — PROGRESS NOTE ADULT - PROBLEM SELECTOR PROBLEM 4
CAD (coronary artery disease)
Hypertension
CAD (coronary artery disease)

## 2020-07-13 NOTE — PROGRESS NOTE ADULT - SUBJECTIVE AND OBJECTIVE BOX
Patient denies CP,SOB.  Review of systems otherwise (-)    acetaminophen   Tablet .. 650 milliGRAM(s) Oral every 6 hours PRN  amLODIPine   Tablet 5 milliGRAM(s) Oral daily  atorvastatin 40 milliGRAM(s) Oral at bedtime  clopidogrel Tablet 75 milliGRAM(s) Oral daily  donepezil 10 milliGRAM(s) Oral at bedtime  enoxaparin Injectable 30 milliGRAM(s) SubCutaneous every 12 hours  FLUoxetine 10 milliGRAM(s) Oral daily  folic acid 1 milliGRAM(s) Oral daily  insulin lispro (HumaLOG) corrective regimen sliding scale   SubCutaneous three times a day before meals  memantine 5 milliGRAM(s) Oral two times a day  metoprolol tartrate 25 milliGRAM(s) Oral two times a day  ondansetron Injectable 4 milliGRAM(s) IV Push every 6 hours PRN  oxyCODONE    IR 2.5 milliGRAM(s) Oral every 6 hours PRN  polyethylene glycol 3350 17 Gram(s) Oral daily  senna 2 Tablet(s) Oral at bedtime                            9.6    6.90  )-----------( 147      ( 13 Jul 2020 06:57 )             28.4       Hemoglobin: 9.6 g/dL (07-13 @ 06:57)  Hemoglobin: 9.1 g/dL (07-12 @ 07:44)  Hemoglobin: 10.6 g/dL (07-11 @ 07:19)  Hemoglobin: 10.5 g/dL (07-10 @ 20:45)  Hemoglobin: 11.0 g/dL (07-10 @ 07:09)      07-13    138  |  103  |  13  ----------------------------<  102<H>  3.5   |  26  |  0.98    Ca    8.6      13 Jul 2020 06:57      Creatinine Trend: 0.98<--, 0.91<--, 0.97<--, 1.01<--, 1.09<--, 1.19<--    COAGS:           T(C): 36.6 (07-13-20 @ 05:35), Max: 37.1 (07-12-20 @ 13:52)  HR: 75 (07-13-20 @ 09:24) (58 - 75)  BP: 123/66 (07-13-20 @ 09:24) (123/66 - 167/65)  RR: 16 (07-13-20 @ 05:35) (16 - 16)  SpO2: 96% (07-13-20 @ 09:24) (95% - 98%)  Wt(kg): --    I&O's Summary        ASSESSMENT/PLAN: 	88y Female  PMHx of HTN, DM, ?CAD(s/p stent) follows Dr. Howell  presented after a mechanical fall preop hip surgery. s/p IM to left hip    - Cont plavix, statin   - no evidence of clinical CHF or unstable cardiac syndromes  - tolerated sx well.   - cont statin BB  - BP stable , cont norvasc   - d/C plan per ortho      Gideon Mcknight MD, [facility]  [place] [phone]

## 2020-07-13 NOTE — PROGRESS NOTE ADULT - ASSESSMENT
88yFemale S/p IM nailing Left hip fx POD# 1
88yFemale S/p IM nailing Left hip fx POD# 1
Confidential Drug Utilization Report  Search Terms: Gita Thurman, 06/09/1932   Search Date: 07/11/2020 11:24:33 AM   The Drug Utilization Report below displays all of the controlled substance prescriptions, if any, that your patient has filled in the last twelve months. The information displayed on this report is compiled from pharmacy submissions to the Department, and accurately reflects the information as submitted by the pharmacies.  This report was requested by: Madison Yañez | Reference #: [id number]   There are no results for the search terms that you entered.
88yFemale S/p IM nailing Left hip fx POD# 1

## 2020-07-13 NOTE — PROGRESS NOTE ADULT - PROBLEM SELECTOR PLAN 6
[facility] for decision making [phone]
[facility] for decision making [phone]
[facility] for decision making [phone]
[facility] for decision making [phone]

## 2020-07-13 NOTE — PROGRESS NOTE ADULT - PROBLEM SELECTOR PROBLEM 2
Diabetes Mellitus, Type 2
Intertrochanteric fracture
Diabetes Mellitus, Type 2

## 2020-07-13 NOTE — PROGRESS NOTE ADULT - PROBLEM SELECTOR PROBLEM 1
Intertrochanteric fracture
Intertrochanteric fracture
Acute postoperative pain of left hip
Intertrochanteric fracture
Intertrochanteric fracture

## 2020-07-13 NOTE — PROGRESS NOTE ADULT - PROBLEM SELECTOR PROBLEM 5
Need for prophylactic measure
CAD (coronary artery disease)
Need for prophylactic measure

## 2020-07-13 NOTE — PROGRESS NOTE ADULT - SUBJECTIVE AND OBJECTIVE BOX
Source of information: PRASANTH REICH, Chart review  Patient language: English  : n/a    HPI:  Patient is an 88 year old female, lives at home alone, with PMHx of HTN, DM, CAD(s/p stent) presented after a mechanical fall. Patient was outside her apartment and the floors had just been waxed and patient tripped and fell, falling on her side. Patient denied any chest pain, SOB, dizziness, blurry vision at time of fall. Has not had fevers, chills, cough, abdominal pain, nausea, vomiting, diarrhea. Patient has been having increasingly poor memory as of late as per patient's son and daughter.     In the ED, patient obtained Xray and CT. As per ED attending who received report from radiologist, imaging revealed intertrochanteric fracture.     GoC: Discussion initiated with patient. Primary team to confirm with patient and children. (09 Jul 2020 23:17)      Patient is a 88y old  Female who presents to the hospital after a mechanical fall. Pt is now POD #3, s/p left hip IM nailing on 7/10. Pt seen and examined at bedside. Reports pain score 8/10 and not tolerable SCALE USED: (1-10 VNRS). Pt due for oxycodone at time of assessment, RN aware. Pt describes pain as aching, non- radiating, alleviated by pain medication, exacerbated by movement. Pt tolerating PO diet. Denies lethargy, nausea, vomiting, constipation, itchiness. Last BM 7/12. Patient stated goal for pain control: to be able to take deep breaths, get out of bed to chair and ambulate with tolerable pain control. Pt denies taking medications for pain at home.     PAST MEDICAL & SURGICAL HISTORY:  History of Cholecystectomy  Diabetes Mellitus, Type 2  Hypertension  History of Cholecystectomy    Social History:  quit smoking (09 Jul 2020 23:17)   [X ] Denies illicit drug use, + ETOH use occasionally wine    Allergies    aspirin (Anaphylaxis)  Celebrex (Rash)  penicillin (Anaphylaxis)      MEDICATIONS  (STANDING):  amLODIPine   Tablet 5 milliGRAM(s) Oral daily  atorvastatin 40 milliGRAM(s) Oral at bedtime  clopidogrel Tablet 75 milliGRAM(s) Oral daily  donepezil 10 milliGRAM(s) Oral at bedtime  enoxaparin Injectable 30 milliGRAM(s) SubCutaneous every 12 hours  FLUoxetine 10 milliGRAM(s) Oral daily  folic acid 1 milliGRAM(s) Oral daily  insulin lispro (HumaLOG) corrective regimen sliding scale   SubCutaneous three times a day before meals  memantine 5 milliGRAM(s) Oral two times a day  metoprolol tartrate 25 milliGRAM(s) Oral two times a day  polyethylene glycol 3350 17 Gram(s) Oral daily  senna 2 Tablet(s) Oral at bedtime    MEDICATIONS  (PRN):  acetaminophen   Tablet .. 650 milliGRAM(s) Oral every 6 hours PRN Moderate Pain (4 - 6)  ondansetron Injectable 4 milliGRAM(s) IV Push every 6 hours PRN Nausea  oxyCODONE    IR 2.5 milliGRAM(s) Oral every 6 hours PRN Severe Pain (7 - 10)      Vital Signs Last 24 Hrs  T(C): 36.6 (13 Jul 2020 05:35), Max: 37.1 (12 Jul 2020 13:52)  T(F): 97.8 (13 Jul 2020 05:35), Max: 98.7 (12 Jul 2020 13:52)  HR: 75 (13 Jul 2020 09:24) (58 - 75)  BP: 123/66 (13 Jul 2020 09:24) (123/66 - 167/65)  BP(mean): --  RR: 16 (13 Jul 2020 05:35) (16 - 16)  SpO2: 96% (13 Jul 2020 09:24) (95% - 98%)  COVID-19 PCR: NotDetec (12 Jul 2020 09:23)    LABS: Reviewed                          9.6    6.90  )-----------( 147      ( 13 Jul 2020 06:57 )             28.4     07-13    138  |  103  |  13  ----------------------------<  102<H>  3.5   |  26  |  0.98    Ca    8.6      13 Jul 2020 06:57            CAPILLARY BLOOD GLUCOSE      POCT Blood Glucose.: 127 mg/dL (13 Jul 2020 11:20)  POCT Blood Glucose.: 109 mg/dL (13 Jul 2020 07:47)  POCT Blood Glucose.: 115 mg/dL (12 Jul 2020 21:49)  POCT Blood Glucose.: 130 mg/dL (12 Jul 2020 16:13)    COVID-19 PCR: NotDetec (12 Jul 2020 09:23)        Radiology: Reviewed.   < from: CT Pelvis Bony Only No Cont (07.09.20 @ 21:19) >    EXAM:  CT PELVIS BONY ONLY                            PROCEDURE DATE:  07/09/2020          INTERPRETATION:  CLINICAL INFORMATION: Trip and fall with left hip pain.    TECHNIQUE: CT of the pelvis was performed in bone and soft tissue windows with coronal and sagittal reformats. No intravenous contrast was administered. 3-D reformats were performed on a separate workstation.    COMPARISON: No similar prior studies are available for comparison.    FINDINGS:    Bone: An acute, comminuted and impacted intertrochanteric left hip fracture is noted. A well-corticated ossific density is seen superior to the left pubic symphysis likely a sequela of prior trauma. No additional fracture or dislocation is demonstrated. Degenerative changes of the visualized lower lumbar spine are seen.    Soft tissues: Vascular calcifications are noted. The urinary bladder, uterus and ovaries are unremarkable.     IMPRESSION:  Acute, comminuted and impacted intertrochanteric left hip fracture.                MARTÍNEZ GOLDSTEIN M.D., ATTENDING RADIOLOGIST  This document has been electronically signed. Jul 9 2020 11:34PM          < end of copied text >      ORT Score -   Family Hx of substance abuse	Female	      Male  Alcohol 	                                           1                     3  Illegal drugs	                                   2                     3  Rx drugs                                           4 	                  4  Personal Hx of substance abuse		  Alcohol 	                                          3	                  3  Illegal drugs                                     4	                  4  Rx drugs                                            5 	                  5  Age between 16- 45 years	           1                     1  hx preadolescent sexual abuse	   3 	                  0  Psychological disease		  ADD, OCD, bipolar, schizophrenia   2	          2  Depression                                           1 	          1  Total: 0    a score of 3 or lower indicates low risk for opioid abuse		  a score of 4-7 indicates moderate risk for opioid abuse		  a score of 8 or higher indicates high risk for opioid abuse  	  4AT (Assessment test for delirium & cognitive impairment)  _________________________________________________________  [1] ALERTNESS  This includes patients who may be markedly drowsy (eg. difficult to rouse and/or obviously sleepy  during assessment) or agitated/hyperactive. Observe the patient. If asleep, attempt to wake with  speech or gentle touch on shoulder. Ask the patient to state their name and address to assist rating.  Normal (fully alert, but not agitated, throughout assessment) 0  Mild sleepiness for <10 seconds after waking, then normal 0  Clearly abnormal 4    [2] AMT4  Age, date of birth, place (name of the hospital or building), current year.  No mistakes 0  1 mistake 1  2 or more mistakes/untestable 2    [3] ATTENTION  Ask the patient: “Please tell me the months of the year in backwards order, starting at December.”  To assist initial understanding one prompt of “what is the month before December?” is permitted.  Months of the year backwards Achieves 7 months or more correctly 0  Starts but scores <7 months / refuses to start 1   Untestable (cannot start because unwell, drowsy, inattentive) 2    [4] ACUTE CHANGE OR FLUCTUATING COURSE  Evidence of significant change or fluctuation in: alertness, cognition, other mental function  (eg. paranoia, hallucinations) arising over the last 2 weeks and still evident in last 24hrs  No 0  Yes 4    4 or above: possible delirium +/- cognitive impairment  1-3: possible cognitive impairment  0: delirium or severe cognitive impairment unlikely (but delirium still possible if [4] information incomplete)    4AT SCORE: 2    REVIEW OF SYSTEMS:  CONSTITUTIONAL: No fever or fatigue  RESPIRATORY: No cough, wheezing, chills or hemoptysis; No shortness of breath  CARDIOVASCULAR: No chest pain, palpitations, dizziness, or leg swelling  GASTROINTESTINAL: No abdominal or epigastric pain. No nausea, vomiting; No diarrhea or constipation.   GENITOURINARY: No dysuria, frequency, hematuria, retention or incontinence  MUSCULOSKELETAL: + left hip pain and mild swelling; no upper or left lower motor strength weakness, + Rt lower extremity weakness; no saddle anesthesia, bowel/bladder incontinence  NEURO: No numbness/ tingling in b/l LE   PSYCHIATRIC: No depression, anxiety, mood swings, or difficulty sleeping    PHYSICAL EXAM:  GENERAL:  Alert & Oriented X2 to person and place, confused to date, NAD, Good concentration  CHEST/LUNG: Clear to auscultation bilaterally; No rales, rhonchi, wheezing, or rubs  HEART: Regular rate and rhythm; No murmurs, rubs, or gallops  ABDOMEN: Soft, Nontender, Nondistended; Bowel sounds present  EXTREMITIES:  2+ Peripheral Pulses, No cyanosis, or edema  MUSCULOSKELETAL: Motor Strength 5/5 B/L upper and Rt lower extremities; + weakness left leg   SKIN: + left hip dressing c/d/i; No rashes or lesions    Risk factors associated with adverse outcomes related to opioid treatment  [ ]  Concurrent benzodiazepine use  [ ]  History/ Active substance use or alcohol use disorder  [ ] Psychiatric co-morbidity  [ ] Sleep apnea  [ ] COPD  [ ] BMI> 35  [ ] Liver dysfunction  [ ] Renal dysfunction  [ ] CHF  [X ] Hx Smoker  [X ]  Age > 60 years    [X ]  [place]  Reviewed and Copied to Chart. See below.    Plan of care and goal oriented pain management treatment options were discussed with patient and /or primary care giver; all questions and concerns were addressed and care was aligned with patient's wishes.    Educated patient on goal oriented pain management treatment options     07-13-20 @ 12:43

## 2020-07-13 NOTE — PROGRESS NOTE ADULT - SUBJECTIVE AND OBJECTIVE BOX
88yFemale    Diagnosis:  S/p Left Hip IM Nailing POD#3    Patient was seen and evaluated at bedside. Patient with no acute complaints.   Pain is  well controlled.    Denies CP/SOB, dyspnea, paresthesias, N/V/D, palpitations.     Vital Signs Last 24 Hrs  T(C): 36.6 (13 Jul 2020 05:35), Max: 37.1 (12 Jul 2020 13:52)  T(F): 97.8 (13 Jul 2020 05:35), Max: 98.7 (12 Jul 2020 13:52)  HR: 69 (13 Jul 2020 05:35) (58 - 69)  BP: 167/65 (13 Jul 2020 05:35) (108/35 - 167/65)  BP(mean): --  RR: 16 (13 Jul 2020 05:35) (16 - 16)  SpO2: 98% (13 Jul 2020 05:35) (94% - 100%)  I&O's Detail      Physical Exam:  General:  NAD, resting comfortably in bed.    Left Hip:  Dressing with minimal serosanguinous staining. Skin is pink and warm. Staples intact. No erythema. SILT.  Wound with no drainage, healing well.   Lower extremity:  No calf tenderness, calves are soft. 2+pulses. NVI. 5/5 Strength of EHL/TA/gastrocnemius B/L.  Good capillary refill. Warm, well-perfused.                           9.6    6.90  )-----------( 147      ( 13 Jul 2020 06:57 )             28.4     07-13    138  |  103  |  x   ----------------------------<  x   3.5   |  x   |  x     Ca    8.5      12 Jul 2020 07:44        Impression:  88yFemale S/p Left Hip IM Nailing POD#3  Plan:  -  Pain management  -  DVT prophylaxis with lovenox and venodynes   -  Daily Physical Therapy  WBAT of the Left lower extremity  -  Dressing changed today   -  C/w care as per medicine   -  Patient orthopedically stable for discharge  -  Staples to be removed on 07/25/2020 by nurse, steri-strips to be applied to incision sites  -  Continue with Lovenox until 07/25/2020  -  Case d/w Dr. Cisneros

## 2020-07-13 NOTE — PROGRESS NOTE ADULT - SUBJECTIVE AND OBJECTIVE BOX
Patient is an 88 year old female, lives at home alone, with PMHx of HTN, DM, CAD(s/p stent) presented after a mechanical fall. Patient a poor historian and per children  had increasingly poor memory. In the ED, imaging revealed left hip intertrochanteric fracture.     INTERVAL HPI/OVERNIGHT EVENTS: no new complaints  son bed side     MEDICATIONS  (STANDING):  amLODIPine   Tablet 5 milliGRAM(s) Oral daily  atorvastatin 40 milliGRAM(s) Oral at bedtime  clopidogrel Tablet 75 milliGRAM(s) Oral daily  donepezil 10 milliGRAM(s) Oral at bedtime  enoxaparin Injectable 30 milliGRAM(s) SubCutaneous every 12 hours  FLUoxetine 10 milliGRAM(s) Oral daily  folic acid 1 milliGRAM(s) Oral daily  insulin lispro (HumaLOG) corrective regimen sliding scale   SubCutaneous three times a day before meals  memantine 5 milliGRAM(s) Oral two times a day  metoprolol tartrate 25 milliGRAM(s) Oral two times a day  polyethylene glycol 3350 17 Gram(s) Oral daily  senna 2 Tablet(s) Oral at bedtime    MEDICATIONS  (PRN):  acetaminophen   Tablet .. 650 milliGRAM(s) Oral every 6 hours PRN Moderate Pain (4 - 6)  ondansetron Injectable 4 milliGRAM(s) IV Push every 6 hours PRN Nausea  oxyCODONE    IR 2.5 milliGRAM(s) Oral every 6 hours PRN Severe Pain (7 - 10)    __________________________________________________  REVIEW OF SYSTEMS:    CONSTITUTIONAL: No fever,   EYES: no acute visual disturbances  NECK: No pain or stiffness  RESPIRATORY: No cough; No shortness of breath  CARDIOVASCULAR: No chest pain, no palpitations  GASTROINTESTINAL: No pain. No nausea or vomiting; No diarrhea   NEUROLOGICAL: No headache or numbness, no tremors  MUSCULOSKELETAL: +LLE pain with movement   GENITOURINARY: no dysuria, no frequency, no hesitancy  PSYCHIATRY: no depression , no anxiety  ALL OTHER  ROS negative      T(C): 37.1 (07-12-20 @ 13:52), Max: 37.1 (07-12-20 @ 13:52)  HR: 61 (07-12-20 @ 17:01) (58 - 61)  BP: 128/35 (07-12-20 @ 17:01) (128/35 - 149/47)  RR: 16 (07-12-20 @ 13:52) (16 - 16)  SpO2: 98% (07-12-20 @ 13:52) (98% - 98%)  ________________________________________________  PHYSICAL EXAM:  GENERAL: NAD  HEENT: Normocephalic;  conjunctivae and sclerae clear;  NECK : supple  CHEST/LUNG: Clear to auscultation bilaterally with good air entry   HEART: S1 S2  regular; no murmurs, gallops or rubs  ABDOMEN: Soft, Nontender, Nondistended; Bowel sounds present  EXTREMITIES: Lt Hip dressing   no cyanosis; no edema; no calf tenderness +pulses in all 4 extremities equal bilaterally  SKIN: warm and dry; no rash  NERVOUS SYSTEM:  Awake and alert Oriented only to person    _________________________________________________                                        9.1    7.67  )-----------( 130      ( 12 Jul 2020 07:44 )             27.6               137|102|13<117  3.7|27|0.91  8.5,--,--  07-12 @ 07:44        RADIOLOGY & ADDITIONAL TESTS:    Imaging Personally Reviewed:  YES    Consultant(s) Notes Reviewed:   YES    Care Discussed with Consultants : Cardiology, Ortho    Plan of care was discussed with patient and /or primary care giver; all questions and concerns were addressed and care was aligned with patient's wishes.

## 2020-07-13 NOTE — PROGRESS NOTE ADULT - PROVIDER SPECIALTY LIST ADULT
Cardiology
Internal Medicine
Internal Medicine
Orthopedics
Orthopedics
Pain Medicine
Internal Medicine
Internal Medicine

## 2020-07-13 NOTE — DISCHARGE NOTE NURSING/CASE MANAGEMENT/SOCIAL WORK - PATIENT PORTAL LINK FT
You can access the FollowMyHealth Patient Portal offered by [facility] by registering at the following website: [email or web address]. By joining [employer]’s FollowMyHealth portal, you will also be able to view your health information using other applications (apps) compatible with our system.
You can access the FollowMyHealth Patient Portal offered by [facility] by registering at the following website: [email or web address]. By joining [employer]’s FollowMyHealth portal, you will also be able to view your health information using other applications (apps) compatible with our system.

## 2020-07-13 NOTE — PROGRESS NOTE ADULT - PROBLEM SELECTOR PLAN 1
Intertrochanteric fracture as on CT scan bony pelvis  Plan for Left hip intramedullary nailing today   Pending Cardiology clearance   - OrthoDr. Cisneros   - Pain control  - Physical Therapy when cleared by ortho  - Holding on all antiplatelet/anticoagulation
Intertrochanteric fracture as on CT scan bony pelvis  s/p Left hip intramedullary nailing POD #1  - Pain control  - Physical Therapy - JACINDA placement   - Holding on all antiplatelet/anticoagulation
Intertrochanteric fracture as on CT scan bony pelvis  s/p Left hip intramedullary nailing POD #2   - Pain control  - Physical Therapy - JACINDA placement   - Holding on all antiplatelet/anticoagulation
Pt with left hip pain which is somatic and neuropathic in nature due to POD #3, s/p left hip IM nailing on 7/10.   High risk medications reviewed. Avoid polypharmacy. Avoid IV opioids. Avoid NSAIDs and benzodiazepines. Non-pharmacological sleep aides initiated. Non-opioid medications and non-pharmacological pain management measures initiated.   Opioid pain recommendations   - Continue Oxycodone 2.5 mg PO q 6 hours PRN severe pain. Monitor for sedation/ respiratory depression.   Non-opioid pain recommendations   - Continue Acetaminophen 650mg PO q 6 hours PRN moderate pain.   Bowel Regimen  - Continue Miralax 17G PO daily  - Continue Senna 2 tablets at bedtime for constipation  Mild pain   - Non-pharmacological pain treatment recommendations  - Warm/ Cool packs PRN   - Repositioning, imagery, relaxation, distraction.  - Physical therapy OOB if no contraindications   Recommendations discussed with primary team and RN.
Intertrochanteric fracture as on CT scan bony pelvis  s/p Left hip intramedullary nailing POD #3    - Pain control  - Physical Therapy - JACINDA placement   -D/C planning today

## 2020-07-14 LAB — GLUCOSE BLDC GLUCOMTR-MCNC: 116 MG/DL — HIGH (ref 70–99)

## 2020-10-11 ENCOUNTER — INPATIENT (INPATIENT)
Facility: HOSPITAL | Age: 85
LOS: 6 days | Discharge: HOME | DRG: 872 | End: 2020-10-18
Attending: INTERNAL MEDICINE | Admitting: INTERNAL MEDICINE
Payer: MEDICARE

## 2020-10-11 VITALS
RESPIRATION RATE: 18 BRPM | OXYGEN SATURATION: 94 % | HEIGHT: 60 IN | DIASTOLIC BLOOD PRESSURE: 77 MMHG | TEMPERATURE: 100 F | SYSTOLIC BLOOD PRESSURE: 154 MMHG | HEART RATE: 82 BPM

## 2020-10-11 DIAGNOSIS — R78.81 BACTEREMIA: ICD-10-CM

## 2020-10-11 LAB
ACETONE SERPL-MCNC: NEGATIVE — SIGNIFICANT CHANGE UP
ALBUMIN SERPL ELPH-MCNC: 3.2 G/DL — LOW (ref 3.5–5)
ALP SERPL-CCNC: 63 U/L — SIGNIFICANT CHANGE UP (ref 40–120)
ALT FLD-CCNC: 14 U/L DA — SIGNIFICANT CHANGE UP (ref 10–60)
ANION GAP SERPL CALC-SCNC: 8 MMOL/L — SIGNIFICANT CHANGE UP (ref 5–17)
APPEARANCE UR: CLEAR — SIGNIFICANT CHANGE UP
AST SERPL-CCNC: 18 U/L — SIGNIFICANT CHANGE UP (ref 10–40)
BACTERIA # UR AUTO: ABNORMAL /HPF
BASOPHILS # BLD AUTO: 0.02 K/UL — SIGNIFICANT CHANGE UP (ref 0–0.2)
BASOPHILS NFR BLD AUTO: 0.2 % — SIGNIFICANT CHANGE UP (ref 0–2)
BILIRUB SERPL-MCNC: 0.6 MG/DL — SIGNIFICANT CHANGE UP (ref 0.2–1.2)
BILIRUB UR-MCNC: NEGATIVE — SIGNIFICANT CHANGE UP
BUN SERPL-MCNC: 13 MG/DL — SIGNIFICANT CHANGE UP (ref 7–18)
CALCIUM SERPL-MCNC: 8.7 MG/DL — SIGNIFICANT CHANGE UP (ref 8.4–10.5)
CHLORIDE SERPL-SCNC: 106 MMOL/L — SIGNIFICANT CHANGE UP (ref 96–108)
CK SERPL-CCNC: 47 U/L — SIGNIFICANT CHANGE UP (ref 21–215)
CO2 SERPL-SCNC: 26 MMOL/L — SIGNIFICANT CHANGE UP (ref 22–31)
COLOR SPEC: YELLOW — SIGNIFICANT CHANGE UP
COMMENT - URINE: SIGNIFICANT CHANGE UP
CREAT SERPL-MCNC: 0.78 MG/DL — SIGNIFICANT CHANGE UP (ref 0.5–1.3)
DIFF PNL FLD: ABNORMAL
EOSINOPHIL # BLD AUTO: 0 K/UL — SIGNIFICANT CHANGE UP (ref 0–0.5)
EOSINOPHIL NFR BLD AUTO: 0 % — SIGNIFICANT CHANGE UP (ref 0–6)
EPI CELLS # UR: SIGNIFICANT CHANGE UP /HPF
GLUCOSE SERPL-MCNC: 111 MG/DL — HIGH (ref 70–99)
GLUCOSE UR QL: NEGATIVE — SIGNIFICANT CHANGE UP
HCT VFR BLD CALC: 29.9 % — LOW (ref 34.5–45)
HGB BLD-MCNC: 10.2 G/DL — LOW (ref 11.5–15.5)
IMM GRANULOCYTES NFR BLD AUTO: 0.5 % — SIGNIFICANT CHANGE UP (ref 0–1.5)
KETONES UR-MCNC: NEGATIVE — SIGNIFICANT CHANGE UP
LACTATE SERPL-SCNC: 1.4 MMOL/L — SIGNIFICANT CHANGE UP (ref 0.7–2)
LEUKOCYTE ESTERASE UR-ACNC: NEGATIVE — SIGNIFICANT CHANGE UP
LIDOCAIN IGE QN: 259 U/L — SIGNIFICANT CHANGE UP (ref 73–393)
LYMPHOCYTES # BLD AUTO: 0.53 K/UL — LOW (ref 1–3.3)
LYMPHOCYTES # BLD AUTO: 4.4 % — LOW (ref 13–44)
MAGNESIUM SERPL-MCNC: 1.5 MG/DL — LOW (ref 1.6–2.6)
MCHC RBC-ENTMCNC: 30 PG — SIGNIFICANT CHANGE UP (ref 27–34)
MCHC RBC-ENTMCNC: 34.1 GM/DL — SIGNIFICANT CHANGE UP (ref 32–36)
MCV RBC AUTO: 87.9 FL — SIGNIFICANT CHANGE UP (ref 80–100)
MONOCYTES # BLD AUTO: 0.39 K/UL — SIGNIFICANT CHANGE UP (ref 0–0.9)
MONOCYTES NFR BLD AUTO: 3.2 % — SIGNIFICANT CHANGE UP (ref 2–14)
NEUTROPHILS # BLD AUTO: 11.11 K/UL — HIGH (ref 1.8–7.4)
NEUTROPHILS NFR BLD AUTO: 91.7 % — HIGH (ref 43–77)
NITRITE UR-MCNC: NEGATIVE — SIGNIFICANT CHANGE UP
NRBC # BLD: 0 /100 WBCS — SIGNIFICANT CHANGE UP (ref 0–0)
PH UR: 7 — SIGNIFICANT CHANGE UP (ref 5–8)
PLATELET # BLD AUTO: 195 K/UL — SIGNIFICANT CHANGE UP (ref 150–400)
POTASSIUM SERPL-MCNC: 3.3 MMOL/L — LOW (ref 3.5–5.3)
POTASSIUM SERPL-SCNC: 3.3 MMOL/L — LOW (ref 3.5–5.3)
PROT SERPL-MCNC: 6.5 G/DL — SIGNIFICANT CHANGE UP (ref 6–8.3)
PROT UR-MCNC: 30 MG/DL
RAPID RVP RESULT: SIGNIFICANT CHANGE UP
RBC # BLD: 3.4 M/UL — LOW (ref 3.8–5.2)
RBC # FLD: 12.8 % — SIGNIFICANT CHANGE UP (ref 10.3–14.5)
RBC CASTS # UR COMP ASSIST: ABNORMAL /HPF (ref 0–2)
SARS-COV-2 RNA SPEC QL NAA+PROBE: SIGNIFICANT CHANGE UP
SARS-COV-2 RNA SPEC QL NAA+PROBE: SIGNIFICANT CHANGE UP
SODIUM SERPL-SCNC: 140 MMOL/L — SIGNIFICANT CHANGE UP (ref 135–145)
SP GR SPEC: 1.01 — SIGNIFICANT CHANGE UP (ref 1.01–1.02)
TROPONIN I SERPL-MCNC: <0.015 NG/ML — SIGNIFICANT CHANGE UP (ref 0–0.04)
UROBILINOGEN FLD QL: NEGATIVE — SIGNIFICANT CHANGE UP
WBC # BLD: 12.11 K/UL — HIGH (ref 3.8–10.5)
WBC # FLD AUTO: 12.11 K/UL — HIGH (ref 3.8–10.5)
WBC UR QL: SIGNIFICANT CHANGE UP /HPF (ref 0–5)

## 2020-10-11 PROCEDURE — 70450 CT HEAD/BRAIN W/O DYE: CPT | Mod: 26

## 2020-10-11 PROCEDURE — 71045 X-RAY EXAM CHEST 1 VIEW: CPT | Mod: 26

## 2020-10-11 PROCEDURE — 99222 1ST HOSP IP/OBS MODERATE 55: CPT

## 2020-10-11 PROCEDURE — 99285 EMERGENCY DEPT VISIT HI MDM: CPT

## 2020-10-11 PROCEDURE — 73502 X-RAY EXAM HIP UNI 2-3 VIEWS: CPT | Mod: 26,LT

## 2020-10-11 PROCEDURE — 73552 X-RAY EXAM OF FEMUR 2/>: CPT | Mod: 26,LT

## 2020-10-11 RX ORDER — FAMOTIDINE 10 MG/ML
20 INJECTION INTRAVENOUS ONCE
Refills: 0 | Status: COMPLETED | OUTPATIENT
Start: 2020-10-11 | End: 2020-10-11

## 2020-10-11 RX ORDER — ONDANSETRON 8 MG/1
4 TABLET, FILM COATED ORAL ONCE
Refills: 0 | Status: COMPLETED | OUTPATIENT
Start: 2020-10-11 | End: 2020-10-11

## 2020-10-11 RX ORDER — POTASSIUM CHLORIDE 20 MEQ
40 PACKET (EA) ORAL ONCE
Refills: 0 | Status: COMPLETED | OUTPATIENT
Start: 2020-10-11 | End: 2020-10-11

## 2020-10-11 RX ORDER — SODIUM CHLORIDE 9 MG/ML
1700 INJECTION INTRAMUSCULAR; INTRAVENOUS; SUBCUTANEOUS ONCE
Refills: 0 | Status: COMPLETED | OUTPATIENT
Start: 2020-10-11 | End: 2020-10-11

## 2020-10-11 RX ORDER — MAGNESIUM SULFATE 500 MG/ML
1 VIAL (ML) INJECTION ONCE
Refills: 0 | Status: COMPLETED | OUTPATIENT
Start: 2020-10-11 | End: 2020-10-11

## 2020-10-11 RX ORDER — AZITHROMYCIN 500 MG/1
500 TABLET, FILM COATED ORAL ONCE
Refills: 0 | Status: COMPLETED | OUTPATIENT
Start: 2020-10-11 | End: 2020-10-11

## 2020-10-11 RX ORDER — ONDANSETRON 8 MG/1
4 TABLET, FILM COATED ORAL EVERY 6 HOURS
Refills: 0 | Status: DISCONTINUED | OUTPATIENT
Start: 2020-10-11 | End: 2020-10-18

## 2020-10-11 RX ORDER — IOHEXOL 300 MG/ML
30 INJECTION, SOLUTION INTRAVENOUS ONCE
Refills: 0 | Status: COMPLETED | OUTPATIENT
Start: 2020-10-11 | End: 2020-10-11

## 2020-10-11 RX ORDER — SODIUM CHLORIDE 9 MG/ML
1000 INJECTION INTRAMUSCULAR; INTRAVENOUS; SUBCUTANEOUS
Refills: 0 | Status: DISCONTINUED | OUTPATIENT
Start: 2020-10-11 | End: 2020-10-14

## 2020-10-11 RX ORDER — CEFTRIAXONE 500 MG/1
1000 INJECTION, POWDER, FOR SOLUTION INTRAMUSCULAR; INTRAVENOUS ONCE
Refills: 0 | Status: COMPLETED | OUTPATIENT
Start: 2020-10-11 | End: 2020-10-11

## 2020-10-11 RX ORDER — ACETAMINOPHEN 500 MG
650 TABLET ORAL ONCE
Refills: 0 | Status: COMPLETED | OUTPATIENT
Start: 2020-10-11 | End: 2020-10-11

## 2020-10-11 RX ADMIN — Medication 100 GRAM(S): at 20:27

## 2020-10-11 RX ADMIN — AZITHROMYCIN 255 MILLIGRAM(S): 500 TABLET, FILM COATED ORAL at 20:24

## 2020-10-11 RX ADMIN — SODIUM CHLORIDE 150 MILLILITER(S): 9 INJECTION INTRAMUSCULAR; INTRAVENOUS; SUBCUTANEOUS at 20:28

## 2020-10-11 RX ADMIN — FAMOTIDINE 20 MILLIGRAM(S): 10 INJECTION INTRAVENOUS at 20:11

## 2020-10-11 RX ADMIN — SODIUM CHLORIDE 150 MILLILITER(S): 9 INJECTION INTRAMUSCULAR; INTRAVENOUS; SUBCUTANEOUS at 20:27

## 2020-10-11 RX ADMIN — Medication 1 GRAM(S): at 20:31

## 2020-10-11 RX ADMIN — CEFTRIAXONE 1000 MILLIGRAM(S): 500 INJECTION, POWDER, FOR SOLUTION INTRAMUSCULAR; INTRAVENOUS at 20:31

## 2020-10-11 RX ADMIN — CEFTRIAXONE 100 MILLIGRAM(S): 500 INJECTION, POWDER, FOR SOLUTION INTRAMUSCULAR; INTRAVENOUS at 20:11

## 2020-10-11 RX ADMIN — AZITHROMYCIN 500 MILLIGRAM(S): 500 TABLET, FILM COATED ORAL at 20:31

## 2020-10-11 RX ADMIN — SODIUM CHLORIDE 150 MILLILITER(S): 9 INJECTION INTRAMUSCULAR; INTRAVENOUS; SUBCUTANEOUS at 20:24

## 2020-10-11 RX ADMIN — Medication 40 MILLIEQUIVALENT(S): at 20:27

## 2020-10-11 RX ADMIN — Medication 650 MILLIGRAM(S): at 20:12

## 2020-10-11 RX ADMIN — SODIUM CHLORIDE 1700 MILLILITER(S): 9 INJECTION INTRAMUSCULAR; INTRAVENOUS; SUBCUTANEOUS at 20:31

## 2020-10-11 RX ADMIN — SODIUM CHLORIDE 3400 MILLILITER(S): 9 INJECTION INTRAMUSCULAR; INTRAVENOUS; SUBCUTANEOUS at 20:28

## 2020-10-11 RX ADMIN — Medication 650 MILLIGRAM(S): at 20:25

## 2020-10-11 RX ADMIN — IOHEXOL 30 MILLILITER(S): 300 INJECTION, SOLUTION INTRAVENOUS at 22:18

## 2020-10-11 RX ADMIN — ONDANSETRON 4 MILLIGRAM(S): 8 TABLET, FILM COATED ORAL at 20:11

## 2020-10-11 NOTE — ED PROVIDER NOTE - PMH
Diabetes Mellitus, Type 2    History of Cholecystectomy    Hyperlipidemia    Hypertension     Dementia    Diabetes Mellitus, Type 2    History of Cholecystectomy    Hyperlipidemia    Hypertension

## 2020-10-11 NOTE — ED ADULT TRIAGE NOTE - NS ED NURSE BANDS TYPE
[facility] Nephrology Progress Note               Author: Nacho Ram Date & Time created: 12/30/2017  8:04 AM     Interval History:  79-year-old  male well known to our service who I have followed as an outpatient for years.  He has   CKD stage V due to diabetes and hypertension.  We have varied in by already having placed an AV fistula in his right upper arm.  He routinely follows with this, was most recently seen 4 weeks ago.  At that time, he had creatinine of 4.8, hemoglobin of 10, normal electrolytes.  He was not uremic or fluid overloaded.  Hemodialysis has not yet been initiated.     The patient presented to the emergency room complaining of coughing for 2 or 3weeks.  He has been only productive over the last couple of days.  He became increasingly weak, unable to provide care for himself.  He could not get around.  He was not eating very well.     In the ER, he presented, he was normotensive with a blood pressure of 140/96, pulse 80.  He was mildly hypoxic on room air, was started on a little bit of oxygen.  Laboratory showed white count of 16 with a left shift.  He also has evidence potentially of fluid.  Chest x-ray suggests a possible pneumonia.     Laboratory results showed BUN 91, creatinine 5.3, potassium 5.2, was not fluid  overloaded on exam.  His lactic acid levels were elevated.  He is being admitted to the hospital under the hospitalist care.  He was given some  intravenous fluids.  We have been asked to follow him for CKD stage V + acidosis    Daily Nephrology Summary  12/29/17 - see in ER - IVF recommended.  Advanced CKD 5 - if no response to fluids will need to start dialysis  12/30/17 - placed on bicarb overnight.  SCreat down a bit.  K ok.  Bicarb improving.  Urine output 960cc       Review of Systems   Constitutional: Positive for malaise/fatigue.   HENT: Negative.    Eyes: Negative.    Respiratory: Positive for cough and sputum production.    Cardiovascular: Negative.     Gastrointestinal: Negative.    Genitourinary: Negative.    Musculoskeletal: Negative.    Skin: Negative.        Physical Exam   Constitutional: No distress.   HENT:   Head: Atraumatic.   Eyes: No scleral icterus.   Neck: No JVD present.   Cardiovascular: Normal rate.  Exam reveals no friction rub.    Pulmonary/Chest: No respiratory distress.   Abdominal: Soft. There is no tenderness.   Musculoskeletal: He exhibits no edema.   Neurological: He is alert.   Skin: Skin is warm and dry.       Labs:  Recent Labs      17   1728   ISTATAPH  7.285*   ISTATAPCO2  15.8*   ISTATAPO2  57*   ISTATATCO2  8*   [id number]  87*   ISTATARTHCO3  7.5*   ISTATARTBE  -17*   ISTATTEMP  36.2 C   ISTATFIO2  25   ISTATSPEC  Arterial   ISTATAPHTC  7.296*   [id number]  54*     Recent Labs      17   0917   1203  17   TROPONINI  0.06*  0.08*  0.05*   BNPBTYPENAT  108*   --    --      Recent Labs      17   0150  17   0415   SODIUM  138  137  139   POTASSIUM  4.9  4.7  4.6   CHLORIDE  112  111  111   CO2  7*  11*  12*   BUN  95*  89*  90*   CREATININE  5.18*  4.80*  4.75*   PHOSPHORUS   --   6.2*   --    CALCIUM  7.6*  7.4*  7.3*     Recent Labs      17   0150  17   0415   ALTSGPT  11   --    --   9   --    ASTSGOT  35   --    --   28   --    ALKPHOSPHAT  98   --    --   75   --    TBILIRUBIN  0.5   --    --   0.3   --    GLUCOSE  141*   < >  125*  101*  95    < > = values in this interval not displayed.     Recent Labs      17   RBC  3.24*   HEMOGLOBIN  9.7*   HEMATOCRIT  30.2*   PLATELETCT  163*     Recent Labs      17   015   WBC  16.6*   --    NEUTSPOLYS  63.40   --    LYMPHOCYTES  4.50*   --    MONOCYTES  7.10   --    EOSINOPHILS  0.00   --    BASOPHILS  0.00   --    ASTSGOT  35  28   ALTSGPT  11  9   ALKPHOSPHAT  98  75   TBILIRUBIN  0.5  0.3           Hemodynamics:  Temp (24hrs), Av.6 °C  (97.9 °F), Min:35.4 °C (95.7 °F), Max:38 °C (100.4 °F)  Temperature: 38 °C (100.4 °F), Monitored Temp: 37.3 °C (99.1 °F)  Pulse  Av  Min: 74  Max: 130Heart Rate (Monitored): 88  Blood Pressure : 145/91, NIBP: 118/44     Respiratory:    Respiration: (!) 24, Pulse Oximetry: 94 %, O2 Daily Delivery Respiratory : Silicone Nasal Cannula     PEP/CPT Method: Positive Airway Pressure Device, Work Of Breathing / Effort: Mild  RUL Breath Sounds: Crackles, RML Breath Sounds: Crackles, RLL Breath Sounds: Diminished, EMERSON Breath Sounds: Crackles, LLL Breath Sounds: Diminished  Fluids:    Intake/Output Summary (Last 24 hours) at 17 0804  Last data filed at 17 0800   Gross per 24 hour   Intake             6020 ml   Output              930 ml   Net             5090 ml     Weight: 76.8 kg (169 lb 5 oz)  GI/Nutrition:  Orders Placed This Encounter   Procedures   • DIET ORDER     Standing Status:   Standing     Number of Occurrences:   1     Order Specific Question:   Diet:     Answer:   Clear Liquid [10]     Order Specific Question:   Diet:     Answer:   Diabetic [3]     Medical Decision Making, by Problem:  Active Hospital Problems    Diagnosis   • *Severe sepsis (CMS-[facility]) [A41.9, R65.20]   • High anion gap metabolic acidosis [E87.2]   • Acute on chronic renal failure (CMS-HCC) [N17.9, N18.9]   • 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]   • Influenza A [J10.1]   • Insulin dependent diabetes mellitus (CMS-[facility]) [E11.9, Z79.4]   • Diarrhea [R19.7]   • Atrial fibrillation (CMS-[facility]) [I48.91]   • CAP (community acquired pneumonia) [J18.9]   • Normocytic anemia [D64.9]       IMPRESSION/PLAN    # CKD 5   -- at baseline creatinine as outpatient now (4.8)  -- no uremia at this time  -- continue with bicarb though could be PO now  -- monitor I/Os  -- avoid contrast    # Acidosis  -- metabolic  -- continue bicarb - change it to PO    # PNX  -- on O2 + ABX    # Anemia  -- chronic   -- no overt bleeding  -- presumed due to CKD  5  -- give EPOGEN while here  -- check iron levels            Quality-Core Measures     Name band;

## 2020-10-11 NOTE — H&P ADULT - NSICDXPASTMEDICALHX_GEN_ALL_CORE_FT
PAST MEDICAL HISTORY:  Dementia     Diabetes Mellitus, Type 2     History of Cholecystectomy     Hyperlipidemia     Hypertension

## 2020-10-11 NOTE — H&P ADULT - PROBLEM SELECTOR PLAN 4
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  - outpatient f/u with MRI  - f/u BMP , mg , phos and replace as needed

## 2020-10-11 NOTE — ED PROVIDER NOTE - MUSCULOSKELETAL, MLM
Spine appears normal, range of motion is not limited, Lt hip-no deformity, hematoma, sl tenderness to palp., FROM, no shortening, no CVAT

## 2020-10-11 NOTE — ED PROVIDER NOTE - PROGRESS NOTE DETAILS
daughter came, pt was @ her son's house since Fri., pt appeared to be weak since yest. evening, pt vomited few times, no appetite.  Pt lives alone, has HHA few hrs a day.  Pt did not fall today. pt with fever, weakness, hypomagnesemia, will admit, case d/w Dr. Portillo daughter came, pt was @ her son's house since Fri., pt appeared to be weak since yest. evening, pt vomited few times, no appetite, rigors.  Pt lives alone, has HHA few hrs a day.  Pt did not fall today. Case d/w Dr. Chacko, will admit pt

## 2020-10-11 NOTE — ED PROVIDER NOTE - CLINICAL SUMMARY MEDICAL DECISION MAKING FREE TEXT BOX
s/p fall, also weakness, vomiting, concern for infectious process, UTI, will get labs, x-ray, poss admission

## 2020-10-11 NOTE — ED ADULT TRIAGE NOTE - NS ED TRIAGE AVPU SCALE
Alert-The patient is alert, awake and responds to voice. The patient is oriented to time, place, and person. The triage nurse is able to obtain subjective information. Reinaldo Lopez  [facility]  [address] [place] [address]  Phone: [phone]  Fax: [phone]  Follow Up Time:

## 2020-10-11 NOTE — ED ADULT NURSE NOTE - NSIMPLEMENTINTERV_GEN_ALL_ED
Implemented All Fall with Harm Risk Interventions:  [place]estic to call system. Call bell, personal items and telephone within reach. Instruct patient to call for assistance. Room bathroom lighting operational. Non-slip footwear when patient is off stretcher. Physically safe environment: no spills, clutter or unnecessary equipment. Stretcher in lowest position, wheels locked, appropriate side rails in place. Provide visual cue, wrist band, yellow gown, etc. Monitor gait and stability. Monitor for mental status changes and reorient to person, place, and time. Review medications for side effects contributing to fall risk. Reinforce activity limits and safety measures with patient and family. Provide visual clues: red socks.

## 2020-10-11 NOTE — H&P ADULT - NSHPPHYSICALEXAM_GEN_ALL_CORE
CONSTITUTIONAL:  NAD   ENMT: Airway patent, Nasal mucosa clear. Mouth with normal mucosa. Throat has no vesicles, no oropharyngeal exudates and uvula is midline.  EYES: Clear bilaterally, pupils equal, round and reactive to light. EOMI.  CARDIAC: Normal rate, regular rhythm.  Heart sounds S1, S2.  No murmurs, rubs or gallops   RESPIRATORY: Breath sounds clear and equal bilaterally. No wheezes, rhales or rhonchi  EXTREMITIES: b/l ankle swelling  NEUROLOGICAL: Alert and oriented, no focal deficits, no motor or sensory deficits.  SKIN:  Nodular lesion on right clavicle, not infected     abdomen : soft , NT , ND

## 2020-10-11 NOTE — H&P ADULT - PROBLEM SELECTOR PLAN 3
Pt likely on statin, ezetimibe , Plavix and aspirin at home ,  Last echo on 7/10 showed   hypokinetic distal septum and apex  (EF =  50-55%). Grade II diastolic dysfunction.   Patient allergic to aspirin per chart, confirm with pharmacy if pt on aspirin  will resume Plavix , statin, will hold of betablocker and Norvasc in setting of sepsis  Patient p/w bl ankle edema, however  no evidence of clinical CHF or unstable cardiac syndromes  - check pro- BNP

## 2020-10-11 NOTE — ED PROVIDER NOTE - OBJECTIVE STATEMENT
RN translates  88 y.o. female with h/o NIDDM, last dose yest., BIBH pt claims 3 mos. ago she lost balance & fell in her hallway while trying to throw away her garbage.  Pt's neighbor helped pt up & called 911.  Pt fell on her Lt side c/o Lt hip pain, unable to ambulate.  Denies head injury, other injury.  As per Talia,  pt was picked up from son's home, with generalized weakness, vomited few times today.  No fever, abd pain, dysuria.  Pt also admits to falling again today.  Poor historian

## 2020-10-11 NOTE — ED ADULT NURSE NOTE - OBJECTIVE STATEMENT
PT aox2, BIBA from home. Poor historian, c/o left hip pain. Pt states she fell a few months ago and then she fell again today. Then she changes the story. As per EMS, pt was picked up at son's house c/o generalized body malaise, weakness and vomiting today. EKG done, pt placed on cardiac monitor, no signs of distress noted. Pt denies CP, SOB, dizziness.

## 2020-10-11 NOTE — ED PROVIDER NOTE - CARE PLAN
Principal Discharge DX:	Bacteremia  Secondary Diagnosis:	Hypomagnesemia  Secondary Diagnosis:	Hypokalemia

## 2020-10-11 NOTE — H&P ADULT - ATTENDING COMMENTS
PT seen and examined  Case discussed with Medical admitting Resident.    Vital Signs Last 24 Hrs  T(C): 36.8 (11 Oct 2020 21:06), Max: 39 (11 Oct 2020 19:00)  T(F): 98.2 (11 Oct 2020 21:06), Max: 102.2 (11 Oct 2020 19:00)  HR: 74 (11 Oct 2020 21:06) (67 - 82)  BP: 119/55 (11 Oct 2020 21:06) (112/60 - 154/77)  RR: 17 (11 Oct 2020 21:06) (16 - 18)  SpO2: 98% (11 Oct 2020 21:06) (94% - 98%)      Labs                        10.2   12.11 )-----------( 195      ( 11 Oct 2020 19:44 )             29.9     10-11    140  |  106  |  13  ----------------------<  111<H>  3.3  |  26  |  0.78    Ca    8.7        Mg     1.5      TPro  6.5  /  Alb  3.2<L>  /  TBili  0.6  /  DBili  x   /  AST  18  /  ALT  14  /  AlkPhos  63  10-11    CARDIAC MARKERS ( 11 Oct 2020 19:44 )  <0.015 ng/mL / x     / 47 U/L / x     / x        UA - negative  COVID 19 -negative    CT head  No acute transcortical infarction or acute intracranial hemorrhage. Bilateral basal ganglia chronic lacunar infarcts versus perivascular spaces.  Stable appearance of extra-axial collections along the bilateral frontoparietal convexities without evidence for acute components.    Impression PT seen and examined  Case discussed with Medical admitting Resident.  88 year old woman with PMH of DM2, HTN, CAD s/p PCI here after a fall outside her apartment while putting trash away. She denies any symptoms of note before the fall but states the floor was recently waxed. She fell on her left side. She did not lose consciousness. She states she has however not been feeling well all day. She had nausea and vomiting and in the ED had high grade fever.   NO other findings on ROS  OF note, pt was admitted a few months ago after a mechanical fall and found to have a left hip fracture. She had just recently returned from rehab.    Vital Signs Last 24 Hrs  T(C): 36.8 (11 Oct 2020 21:06), Max: 39 (11 Oct 2020 19:00)  T(F): 98.2 (11 Oct 2020 21:06), Max: 102.2 (11 Oct 2020 19:00)  HR: 74 (11 Oct 2020 21:06) (67 - 82)  BP: 119/55 (11 Oct 2020 21:06) (112/60 - 154/77)  RR: 17 (11 Oct 2020 21:06) (16 - 18)  SpO2: 98% (11 Oct 2020 21:06) (94% - 98%)    Elderly woman, lying in bed , NAD AAO X 3; denies any present concern.  CTA B/L RRR S1S2 only  Soft NTND BS +  No pedal edema  No focal deficits    Labs                        10.2   12.11 )-----------( 195      ( 11 Oct 2020 19:44 )             29.9     10-11    140  |  106  |  13  ----------------------<  111<H>  3.3  |  26  |  0.78    Ca    8.7        Mg     1.5      TPro  6.5  /  Alb  3.2<L>  /  TBili  0.6  /  DBili  x   /  AST  18  /  ALT  14  /  AlkPhos  63  10-11    CARDIAC MARKERS ( 11 Oct 2020 19:44 )  <0.015 ng/mL / x     / 47 U/L / x     / x        UA - negative  COVID 19 -negative    CT head  No acute transcortical infarction or acute intracranial hemorrhage. Bilateral basal ganglia chronic lacunar infarcts versus perivascular spaces.  Stable appearance of extra-axial collections along the bilateral frontoparietal convexities without evidence for acute components.    Impression  88 year old woman with PMH as detailed above presenting with fall preceded by nausea, vomiting, malaise and fever. There is no obvious organ-system involvement. Suspicion for infectious bronchiolitis on imaging but no respiratory symptoms.  Received a dose of empiric antibiotics in the ED; remains asymptomatic    A/P  - Febrile illness with sepsis  Admit to Medicine  Sepsis work up  Empiric antibiotics given in the ED  NO evidence of UTI or abdominal pathology  Incidental finding of a tree-in-bud in RML concerning for infectious bronchiolitis but  no clinical findings of note.    - Mechanical fall / Physical debility  recent completion of rehab   PT evaluation    CAD/DM/HTN  - Continue home meds  - PT seen and examined  Case discussed with Medical admitting Resident.  88 year old woman with PMH of DM2, HTN, CAD s/p PCI here after a fall outside her apartment while putting trash away. She denies any symptoms of note before the fall but states the floor was recently waxed. She fell on her left side. She did not lose consciousness. She states she has however not been feeling well all day. She had nausea and vomiting and in the ED had high grade fever.   NO other findings on ROS  OF note, pt was admitted a few months ago after a mechanical fall and found to have a left hip fracture. She had just recently returned from rehab.    Vital Signs Last 24 Hrs  T(C): 36.8 (11 Oct 2020 21:06), Max: 39 (11 Oct 2020 19:00)  T(F): 98.2 (11 Oct 2020 21:06), Max: 102.2 (11 Oct 2020 19:00)  HR: 74 (11 Oct 2020 21:06) (67 - 82)  BP: 119/55 (11 Oct 2020 21:06) (112/60 - 154/77)  RR: 17 (11 Oct 2020 21:06) (16 - 18)  SpO2: 98% (11 Oct 2020 21:06) (94% - 98%)    Elderly woman, lying in bed , NAD AAO X 3; denies any present concern.  CTA B/L RRR S1S2 only  Soft NTND BS +  No pedal edema  No focal deficits    Labs                        10.2   12.11 )-----------( 195      ( 11 Oct 2020 19:44 )             29.9     10-11    140  |  106  |  13  ----------------------<  111<H>  3.3  |  26  |  0.78    Ca    8.7        Mg     1.5      TPro  6.5  /  Alb  3.2<L>  /  TBili  0.6  /  DBili  x   /  AST  18  /  ALT  14  /  AlkPhos  63  10-11    CARDIAC MARKERS ( 11 Oct 2020 19:44 )  <0.015 ng/mL / x     / 47 U/L / x     / x        UA - negative  COVID 19 -negative    CT abdomen  A focus of air in the urinary bladder may be iatrogenic. Correlate with urinalysis for the possibility of cystitis.  Tree in bud nodules in the right middle lobe of the lung suggests an infectious bronchiolitis.  A 1.1 cm indeterminate right anterior hypodensity in the kidney may be further characterized with ultrasound or MRI.  Cholecystectomy. No small bowel obstruction.  A 9 mm lucency in the tail the pancreas may represent a focus of side branch intraductal papillary mucinous neoplasm. If clinically indicated, further characterization with MRI/MRCP may be considered.    CT head  No acute transcortical infarction or acute intracranial hemorrhage. Bilateral basal ganglia chronic lacunar infarcts versus perivascular spaces.  Stable appearance of extra-axial collections along the bilateral frontoparietal convexities without evidence for acute components.    Impression  88 year old woman with PMH as detailed above presenting with fall preceded by nausea, vomiting, malaise and fever. There is no obvious organ-system involvement. Suspicion for infectious bronchiolitis on imaging but no respiratory symptoms.  Received a dose of empiric antibiotics in the ED; remains asymptomatic    A/P  - Febrile illness with sepsis  Admit to Medicine  Sepsis work up  Empiric antibiotics given in the ED  NO evidence of UTI or abdominal pathology  Incidental finding of a tree-in-bud in RML concerning for infectious bronchiolitis but  no clinical findings of note.    - Mechanical fall / Physical debility  recent completion of rehab   PT evaluation    CAD/DM/HTN  - Continue home meds    - Abdominal incidentaloma  1. A 1.1 cm indeterminate right anterior hypodensity in the kidney may be further characterized with ultrasound or MRI.  2. A 9 mm lucency in the tail the pancreas may represent a focus of side branch intraductal papillary mucinous neoplasm. If clinically indicated, further characterization with MRI/M RCP may be considered.  Will consider non- emergent MRI for kidney and pancreatic findings  GI consult PT seen and examined  Case discussed with Medical admitting Resident.  88 year old woman with PMH of DM2, HTN, CAD s/p PCI here after a fall outside her apartment while putting trash away. She denies any symptoms of note before the fall but states the floor was recently waxed. She fell on her left side. She did not lose consciousness. She states she has however not been feeling well all day. She had nausea and vomiting and in the ED had high grade fever.   NO other findings on ROS  OF note, pt was admitted a few months ago after a mechanical fall and found to have a left hip fracture. She had just recently returned from rehab.    Vital Signs Last 24 Hrs  T(C): 36.8 (11 Oct 2020 21:06), Max: 39 (11 Oct 2020 19:00)  T(F): 98.2 (11 Oct 2020 21:06), Max: 102.2 (11 Oct 2020 19:00)  HR: 74 (11 Oct 2020 21:06) (67 - 82)  BP: 119/55 (11 Oct 2020 21:06) (112/60 - 154/77)  RR: 17 (11 Oct 2020 21:06) (16 - 18)  SpO2: 98% (11 Oct 2020 21:06) (94% - 98%)    Elderly woman, lying in bed , NAD AAO X 3; denies any present concern.  CTA B/L RRR S1S2 only  Soft NTND BS +  No pedal edema  No focal deficits    Labs                        10.2   12.11 )-----------( 195      ( 11 Oct 2020 19:44 )             29.9     10-11    140  |  106  |  13  ----------------------<  111<H>  3.3  |  26  |  0.78    Ca    8.7        Mg     1.5      TPro  6.5  /  Alb  3.2<L>  /  TBili  0.6  /  DBili  x   /  AST  18  /  ALT  14  /  AlkPhos  63  10-11    CARDIAC MARKERS ( 11 Oct 2020 19:44 )  <0.015 ng/mL / x     / 47 U/L / x     / x        UA - negative  COVID 19 -negative    CT abdomen  A focus of air in the urinary bladder may be iatrogenic. Correlate with urinalysis for the possibility of cystitis.  Tree in bud nodules in the right middle lobe of the lung suggests an infectious bronchiolitis.  A 1.1 cm indeterminate right anterior hypodensity in the kidney may be further characterized with ultrasound or MRI.  Cholecystectomy. No small bowel obstruction.  A 9 mm lucency in the tail the pancreas may represent a focus of side branch intraductal papillary mucinous neoplasm. If clinically indicated, further characterization with MRI/MRCP may be considered.    CT head  No acute transcortical infarction or acute intracranial hemorrhage. Bilateral basal ganglia chronic lacunar infarcts versus perivascular spaces.  Stable appearance of extra-axial collections along the bilateral frontoparietal convexities without evidence for acute components.    Impression  88 year old woman with PMH as detailed above presenting with fall preceded by nausea, vomiting, malaise and fever. There is no obvious organ-system involvement clinically but  findings suggestive of infectious bronchiolitis on imaging despite absence of respiratory symptoms.  Received a dose of empiric antibiotics in the ED; remains asymptomatic    A/P  - Febrile illness with sepsis  Concern for HCAP vs CAP based on sepsis picture and CT chest finding of a tree-in-bud in RML concerning for infectious bronchiolitis.  NO evidence of UTI or abdominal pathology  Admit to Medicine  Sepsis work up follow up  Will continue empiric antibiotics coverage with cefepime and azithromycin and plan to de-escalate in 48 to 72 hours if patient remains stable /continue to improve.    - Mechanical fall / Physical debility  recent completion of rehab   PT evaluation    CAD/DM/HTN  - Continue home meds    - Abdominal incidentaloma  1. A 1.1 cm indeterminate right anterior hypodensity in the kidney may be further characterized with ultrasound or MRI.  2. A 9 mm lucency in the tail the pancreas may represent a focus of side branch intraductal papillary mucinous neoplasm. If clinically indicated, further characterization with MRI/M RCP may be considered.  Will order non- emergent MRI for kidney and pancreatic findings  GI consult

## 2020-10-11 NOTE — H&P ADULT - PROBLEM SELECTOR PLAN 1
p/w fever . chills and vomiting, WBC: 12k, fever: 102.2, occasional cough  CXR looks clear , f/u official result or lateral view  Viral panel negative   s/p 1700 cc Ns, Rocephin and Zithromax X 1 in ED    CT abdomen with IV and Oral contrast revealed tree-in-bud Tree nodules in the right middle lobe of the lung suggests an infectious bronchiolitis   -will start on cefepime given history of residing in rehab  - f/u [facility], UC   - check CT chest  - ID Dr Baptiste Consulted p/w fever . chills and vomiting, WBC: 12k, fever: 102.2, occasional cough  CXR looks clear , f/u official result or lateral view  Viral panel negative   s/p 1700 cc Ns, Rocephin and Zithromax X 1 in ED    CT abdomen with IV and Oral contrast revealed tree-in-bud Tree nodules in the RML suggests an infectious bronchiolitis however clinically asymptomatic    -will start on Cefepime given history of residing in rehab  - f/u BC, UC   - check CT chest  - ID Dr Baptiste Consulted p/w fever . chills and vomiting, WBC: 12k, fever: 102.2, occasional cough  CXR looks clear , f/u official result or lateral view  Viral panel negative   s/p 1700 cc Ns, Rocephin and Zithromax X 1 in ED    CT abdomen with IV and Oral contrast revealed tree-in-bud Tree nodules in the RML suggests an infectious bronchiolitis however clinically asymptomatic    -will start on Cefepime given history of residing in rehab  - f/u BC, UC   - check CT chest  - primary team Consult ID in the morning

## 2020-10-11 NOTE — H&P ADULT - PROBLEM SELECTOR PLAN 7
Patient jerrell on Donepezil , Memantine and Fluoxetine at home  will resume with above mentioned meds for now   primary team to follow up with pharmacy

## 2020-10-11 NOTE — H&P ADULT - ASSESSMENT
Patient is an 88 year old female, from home, lives alone, walks with assistant at home, with shopping card outdoor, HHA (9am -1pm),with PMHx of HTN, DM, HLD, CAD(s/p stent) presented with fever, vomiting and chills. Admitte dfir sepsis with unknown origin Patient is an 88 year old female, from home, lives alone, walks with assistant at home, with shopping card outdoor, HHA (9am -1pm),with PMHx of HTN, DM, HLD, CAD(s/p stent) presented with fever, vomiting and chills. Admitted for sepsis with unknown origin.               Daughter does not recall medication, reported medication dosage has been changed recently. Med rec based on daughter's medication record on her phone. PRIMARY TEAM PLEASE CALL PHARMACY AND EDIT MED REC.

## 2020-10-11 NOTE — H&P ADULT - PROBLEM SELECTOR PLAN 2
Likely Mechanical on slippery floor, no LOC , no prodromal sx , no head trauma denies pain    - h/o fall and left hip frx , s/p rehab for >60 days    - CTH negative    - Xray hip and left femor negative for frx  - PT evaluation

## 2020-10-11 NOTE — H&P ADULT - HISTORY OF PRESENT ILLNESS
Patient is an 88 year old female, lives at home alone, with PMHx of HTN, DM, CAD(s/p stent) presented Patient is an 88 year old female, lives at home alone, walks with assistant at home, with shopping card outdoor, A (9am -1pm),with PMHx of HTN, DM, HLD, CAD(s/p stent) presented with fever , vomitihj Patient is an 88 year old female, from home, lives alone, walks with assistant at home, with shopping card outdoor, HHA (9am -1pm),with PMHx of HTN, DM, HLD, CAD(s/p stent) presented with fever, vomiting and chills. History obtained from pt and daughter at bedside. On my evaluation patient is AAOX2(place and person), Per daughter patient was in her usual health until yesterday. The other day she was at her son's house for Synagogue holiday when she started to have chills, poor apatite with non bloody non biliary vomiting associated with fever. Patient denies abdominal pain , diarrhea , LBM this morning and regular , denies urinary sx , URTI sx  , chest pain , palpitation or vertigo. Patient c/o non specific occasional cough and dizziness.   Patient was recently admitted to  on july 2020s/p mechanical fall and had and been to rehab for about 60 days. patient reports 20-30 pounds weight loss over last 6 month due to poor oral intake in rehab. Patient had b/l swelling around ankles but does not remember for how long.      ED course : patient spiked fever , with WBC 12K , Sepsis work up has been done and patient recived 1700cc Ns , Azithromycin and Rocephin X1 was given     GOC : FULL CODE      Patient is an 88 year old female, from home, lives alone, walks with assistant at home, with shopping card outdoor, HHA (9am -1pm),with PMHx of HTN, DM, HLD, CAD(s/p stent) presented with fever, vomiting and chills. History obtained from pt and daughter at bedside. On my evaluation patient is AAOX2(place and person), Per daughter patient was in her usual health until yesterday. The other day she was at her son's house for Protestant holiday when she started to have chills, poor apatite with non bloody non biliary vomiting associated with fever. Patient denies abdominal pain , diarrhea , LBM this morning and regular , denies urinary sx , URTI sx  , chest pain , palpitation or vertigo. Patient c/o non specific occasional cough and dizziness.   Patient was recently admitted to  on july 2020s/p mechanical fall and had and been to rehab for about 60 days. patient reports 20-30 pounds weight loss over last 6 month due to poor oral intake in rehab. Patient had b/l swelling around ankles but does not remember for how long.      ED course : patient spiked fever , with WBC 12K , Sepsis work up has been done and patient recived 1700cc Ns , Azithromycin and Rocephin X1 was given    Off note : patient endorsed one episode of fall today, on my evaluation patient and daughter denied any episode of fall. Xray of HIp and Left femor without frx , CTH no acute pathology    GOC : FULL CODE      Patient is an 88 year old female, from home, lives alone, walks with assistant at home, with shopping card outdoor, HHA (9am -1pm),with PMHx of HTN, DM, HLD, CAD(s/p stent) presented with fever, vomiting and chills. History obtained from pt and daughter at bedside. On my evaluation patient is AAOX2(place and person), Per daughter patient was in her usual health until yesterday. The other day she was at her son's house for J[employer] holiday when she started to have chills, poor apatite with non bloody non biliary vomiting associated with fever. Patient denies abdominal pain , diarrhea , LBM this morning and regular , denies urinary sx , URTI sx  , chest pain , palpitation or vertigo. Patient c/o non specific occasional cough and dizziness.   Patient was recently admitted to  on july 2020s/p mechanical fall and had and been to rehab for about 60 days. patient reports 20-30 pounds weight loss over last 6 month due to poor oral intake in rehab. Patient had b/l swelling around ankles but does not remember for how long.      ED course : patient spiked fever , with WBC 12K , Sepsis work up has been done and patient recived 1700cc Ns , Azithromycin and Rocephin X1 was given    Off note : patient endorsed one episode of fall today to ED attending, described it as a mechanical fall on left hip due to slippery floor. Daughter denied any episode of fall. Xray of HIp and Left femor without frx , CTH no acute pathology    GOC : FULL CODE

## 2020-10-11 NOTE — H&P ADULT - PROBLEM SELECTOR PLAN 6
on metoprolol 25mg half a tablet and Norvasc unknown dose ,primary team to follow up with pharmacy  Bp on soft side on admission   will Hold off BP meds in setting of sepsis  Monitor BP  resume medication as needed

## 2020-10-12 DIAGNOSIS — Z71.89 OTHER SPECIFIED COUNSELING: ICD-10-CM

## 2020-10-12 DIAGNOSIS — E87.6 HYPOKALEMIA: ICD-10-CM

## 2020-10-12 DIAGNOSIS — A41.9 SEPSIS, UNSPECIFIED ORGANISM: ICD-10-CM

## 2020-10-12 DIAGNOSIS — I25.10 ATHEROSCLEROTIC HEART DISEASE OF NATIVE CORONARY ARTERY WITHOUT ANGINA PECTORIS: ICD-10-CM

## 2020-10-12 DIAGNOSIS — E11.9 TYPE 2 DIABETES MELLITUS WITHOUT COMPLICATIONS: ICD-10-CM
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
## 2020-10-12 DIAGNOSIS — F03.90 UNSPECIFIED DEMENTIA, UNSPECIFIED SEVERITY, WITHOUT BEHAVIORAL DISTURBANCE, PSYCHOTIC DISTURBANCE, MOOD DISTURBANCE, AND ANXIETY: ICD-10-CM

## 2020-10-12 DIAGNOSIS — Z02.9 ENCOUNTER FOR ADMINISTRATIVE EXAMINATIONS, UNSPECIFIED: ICD-10-CM

## 2020-10-12 DIAGNOSIS — I10 ESSENTIAL (PRIMARY) HYPERTENSION: ICD-10-CM

## 2020-10-12 DIAGNOSIS — R11.10 VOMITING, UNSPECIFIED: ICD-10-CM

## 2020-10-12 DIAGNOSIS — Z29.9 ENCOUNTER FOR PROPHYLACTIC MEASURES, UNSPECIFIED: ICD-10-CM

## 2020-10-12 LAB
24R-OH-CALCIDIOL SERPL-MCNC: 35.2 NG/ML — SIGNIFICANT CHANGE UP (ref 30–80)
A1C WITH ESTIMATED AVERAGE GLUCOSE RESULT: 5.5 % — SIGNIFICANT CHANGE UP (ref 4–5.6)
ANION GAP SERPL CALC-SCNC: 5 MMOL/L — SIGNIFICANT CHANGE UP (ref 5–17)
BUN SERPL-MCNC: 9 MG/DL — SIGNIFICANT CHANGE UP (ref 7–18)
CALCIUM SERPL-MCNC: 8.1 MG/DL — LOW (ref 8.4–10.5)
CHLORIDE SERPL-SCNC: 111 MMOL/L — HIGH (ref 96–108)
CHOLEST SERPL-MCNC: 139 MG/DL — SIGNIFICANT CHANGE UP (ref 10–199)
CO2 SERPL-SCNC: 27 MMOL/L — SIGNIFICANT CHANGE UP (ref 22–31)
CREAT SERPL-MCNC: 0.73 MG/DL — SIGNIFICANT CHANGE UP (ref 0.5–1.3)
ESTIMATED AVERAGE GLUCOSE: 111 MG/DL — SIGNIFICANT CHANGE UP (ref 68–114)
FOLATE SERPL-MCNC: 16.8 NG/ML — SIGNIFICANT CHANGE UP
GLUCOSE BLDC GLUCOMTR-MCNC: 80 MG/DL — SIGNIFICANT CHANGE UP (ref 70–99)
GLUCOSE BLDC GLUCOMTR-MCNC: 82 MG/DL — SIGNIFICANT CHANGE UP (ref 70–99)
GLUCOSE BLDC GLUCOMTR-MCNC: 92 MG/DL — SIGNIFICANT CHANGE UP (ref 70–99)
GLUCOSE BLDC GLUCOMTR-MCNC: 94 MG/DL — SIGNIFICANT CHANGE UP (ref 70–99)
GLUCOSE SERPL-MCNC: 86 MG/DL — SIGNIFICANT CHANGE UP (ref 70–99)
HCT VFR BLD CALC: 26.9 % — LOW (ref 34.5–45)
HDLC SERPL-MCNC: 61 MG/DL — SIGNIFICANT CHANGE UP
HGB BLD-MCNC: 9.1 G/DL — LOW (ref 11.5–15.5)
LIPID PNL WITH DIRECT LDL SERPL: 64 MG/DL — SIGNIFICANT CHANGE UP
MAGNESIUM SERPL-MCNC: 2.1 MG/DL — SIGNIFICANT CHANGE UP (ref 1.6–2.6)
MCHC RBC-ENTMCNC: 30.3 PG — SIGNIFICANT CHANGE UP (ref 27–34)
MCHC RBC-ENTMCNC: 33.8 GM/DL — SIGNIFICANT CHANGE UP (ref 32–36)
MCV RBC AUTO: 89.7 FL — SIGNIFICANT CHANGE UP (ref 80–100)
NRBC # BLD: 0 /100 WBCS — SIGNIFICANT CHANGE UP (ref 0–0)
PHOSPHATE SERPL-MCNC: 2.8 MG/DL — SIGNIFICANT CHANGE UP (ref 2.5–4.5)
PLATELET # BLD AUTO: 176 K/UL — SIGNIFICANT CHANGE UP (ref 150–400)
POTASSIUM SERPL-MCNC: 3.4 MMOL/L — LOW (ref 3.5–5.3)
POTASSIUM SERPL-SCNC: 3.4 MMOL/L — LOW (ref 3.5–5.3)
RBC # BLD: 3 M/UL — LOW (ref 3.8–5.2)
RBC # FLD: 12.9 % — SIGNIFICANT CHANGE UP (ref 10.3–14.5)
SARS-COV-2 IGG SERPL QL IA: NEGATIVE — SIGNIFICANT CHANGE UP
SARS-COV-2 IGM SERPL IA-ACNC: <0.1 INDEX — SIGNIFICANT CHANGE UP
SODIUM SERPL-SCNC: 143 MMOL/L — SIGNIFICANT CHANGE UP (ref 135–145)
TOTAL CHOLESTEROL/HDL RATIO MEASUREMENT: 2.3 RATIO — LOW (ref 3.3–7.1)
TRIGL SERPL-MCNC: 70 MG/DL — SIGNIFICANT CHANGE UP (ref 10–149)
TSH SERPL-MCNC: 0.54 UU/ML — SIGNIFICANT CHANGE UP (ref 0.34–4.82)
VIT B12 SERPL-MCNC: 819 PG/ML — SIGNIFICANT CHANGE UP (ref 232–1245)
WBC # BLD: 10.54 K/UL — HIGH (ref 3.8–10.5)
WBC # FLD AUTO: 10.54 K/UL — HIGH (ref 3.8–10.5)

## 2020-10-12 PROCEDURE — 74177 CT ABD & PELVIS W/CONTRAST: CPT | Mod: 26

## 2020-10-12 RX ORDER — DEXTROSE 50 % IN WATER 50 %
25 SYRINGE (ML) INTRAVENOUS ONCE
Refills: 0 | Status: DISCONTINUED | OUTPATIENT
Start: 2020-10-12 | End: 2020-10-18

## 2020-10-12 RX ORDER — FOLIC ACID 0.8 MG
1 TABLET ORAL DAILY
Refills: 0 | Status: DISCONTINUED | OUTPATIENT
Start: 2020-10-12 | End: 2020-10-18

## 2020-10-12 RX ORDER — GLUCAGON INJECTION, SOLUTION 0.5 MG/.1ML
1 INJECTION, SOLUTION SUBCUTANEOUS ONCE
Refills: 0 | Status: DISCONTINUED | OUTPATIENT
Start: 2020-10-12 | End: 2020-10-18

## 2020-10-12 RX ORDER — DEXTROSE 50 % IN WATER 50 %
12.5 SYRINGE (ML) INTRAVENOUS ONCE
Refills: 0 | Status: DISCONTINUED | OUTPATIENT
Start: 2020-10-12 | End: 2020-10-18

## 2020-10-12 RX ORDER — ATORVASTATIN CALCIUM 80 MG/1
40 TABLET, FILM COATED ORAL AT BEDTIME
Refills: 0 | Status: DISCONTINUED | OUTPATIENT
Start: 2020-10-12 | End: 2020-10-18

## 2020-10-12 RX ORDER — INSULIN LISPRO 100/ML
VIAL (ML) SUBCUTANEOUS AT BEDTIME
Refills: 0 | Status: DISCONTINUED | OUTPATIENT
Start: 2020-10-12 | End: 2020-10-18

## 2020-10-12 RX ORDER — MEMANTINE HYDROCHLORIDE 10 MG/1
5 TABLET ORAL
Refills: 0 | Status: DISCONTINUED | OUTPATIENT
Start: 2020-10-12 | End: 2020-10-18

## 2020-10-12 RX ORDER — DONEPEZIL HYDROCHLORIDE 10 MG/1
10 TABLET, FILM COATED ORAL AT BEDTIME
Refills: 0 | Status: DISCONTINUED | OUTPATIENT
Start: 2020-10-12 | End: 2020-10-18

## 2020-10-12 RX ORDER — SODIUM CHLORIDE 9 MG/ML
1000 INJECTION, SOLUTION INTRAVENOUS
Refills: 0 | Status: DISCONTINUED | OUTPATIENT
Start: 2020-10-12 | End: 2020-10-18

## 2020-10-12 RX ORDER — FLUOXETINE HCL 10 MG
10 CAPSULE ORAL DAILY
Refills: 0 | Status: DISCONTINUED | OUTPATIENT
Start: 2020-10-12 | End: 2020-10-18

## 2020-10-12 RX ORDER — POTASSIUM CHLORIDE 20 MEQ
20 PACKET (EA) ORAL
Refills: 0 | Status: COMPLETED | OUTPATIENT
Start: 2020-10-12 | End: 2020-10-12

## 2020-10-12 RX ORDER — DEXTROSE 50 % IN WATER 50 %
15 SYRINGE (ML) INTRAVENOUS ONCE
Refills: 0 | Status: DISCONTINUED | OUTPATIENT
Start: 2020-10-12 | End: 2020-10-18

## 2020-10-12 RX ORDER — CLOPIDOGREL BISULFATE 75 MG/1
75 TABLET, FILM COATED ORAL DAILY
Refills: 0 | Status: DISCONTINUED | OUTPATIENT
Start: 2020-10-12 | End: 2020-10-18

## 2020-10-12 RX ORDER — INSULIN LISPRO 100/ML
VIAL (ML) SUBCUTANEOUS
Refills: 0 | Status: DISCONTINUED | OUTPATIENT
Start: 2020-10-12 | End: 2020-10-18

## 2020-10-12 RX ORDER — CEFEPIME 1 G/1
1000 INJECTION, POWDER, FOR SOLUTION INTRAMUSCULAR; INTRAVENOUS EVERY 8 HOURS
Refills: 0 | Status: DISCONTINUED | OUTPATIENT
Start: 2020-10-12 | End: 2020-10-14

## 2020-10-12 RX ADMIN — CEFEPIME 100 MILLIGRAM(S): 1 INJECTION, POWDER, FOR SOLUTION INTRAMUSCULAR; INTRAVENOUS at 22:30

## 2020-10-12 RX ADMIN — Medication 20 MILLIEQUIVALENT(S): at 11:40

## 2020-10-12 RX ADMIN — SODIUM CHLORIDE 150 MILLILITER(S): 9 INJECTION INTRAMUSCULAR; INTRAVENOUS; SUBCUTANEOUS at 08:26

## 2020-10-12 RX ADMIN — CLOPIDOGREL BISULFATE 75 MILLIGRAM(S): 75 TABLET, FILM COATED ORAL at 11:41

## 2020-10-12 RX ADMIN — ATORVASTATIN CALCIUM 40 MILLIGRAM(S): 80 TABLET, FILM COATED ORAL at 22:30

## 2020-10-12 RX ADMIN — Medication 20 MILLIEQUIVALENT(S): at 09:31

## 2020-10-12 RX ADMIN — Medication 1 MILLIGRAM(S): at 11:41

## 2020-10-12 RX ADMIN — Medication 10 MILLIGRAM(S): at 11:41

## 2020-10-12 RX ADMIN — CEFEPIME 100 MILLIGRAM(S): 1 INJECTION, POWDER, FOR SOLUTION INTRAMUSCULAR; INTRAVENOUS at 05:40

## 2020-10-12 RX ADMIN — DONEPEZIL HYDROCHLORIDE 10 MILLIGRAM(S): 10 TABLET, FILM COATED ORAL at 22:30

## 2020-10-12 RX ADMIN — MEMANTINE HYDROCHLORIDE 5 MILLIGRAM(S): 10 TABLET ORAL at 05:40

## 2020-10-12 RX ADMIN — MEMANTINE HYDROCHLORIDE 5 MILLIGRAM(S): 10 TABLET ORAL at 17:24

## 2020-10-12 RX ADMIN — SODIUM CHLORIDE 150 MILLILITER(S): 9 INJECTION INTRAMUSCULAR; INTRAVENOUS; SUBCUTANEOUS at 22:30

## 2020-10-12 RX ADMIN — CEFEPIME 100 MILLIGRAM(S): 1 INJECTION, POWDER, FOR SOLUTION INTRAMUSCULAR; INTRAVENOUS at 13:25

## 2020-10-12 NOTE — PROGRESS NOTE ADULT - PROBLEM SELECTOR PLAN 6
P/w poor oral intake, and vomiting  Admits to 20-30 pounds weight loss over last 6 month due to poor oral intake in rehab  Vomiting since resolved  C/w PRN Zofran  Nutrition consult

## 2020-10-12 NOTE — PATIENT PROFILE ADULT - FUNCTIONAL SCREEN CURRENT LEVEL: COMMUNICATION, MLM
1st attempt to schedule follow up with Dr. Monsivais for September, no answer, left message notifying.   
2nd attempt to schedule follow up, no answer, left message with direct number notifying.   
Scheduled with brown  
0 = understands/communicates without difficulty

## 2020-10-12 NOTE — PROGRESS NOTE ADULT - PROBLEM SELECTOR PLAN 9
Remains full code  Fartun Thurman (emergency contact): [phone] was updated regarding pt's plan of care.

## 2020-10-12 NOTE — PROGRESS NOTE ADULT - PROBLEM SELECTOR PLAN 3
Hx of CAD s/p stents  Echo on 7/10 showed hypokinetic distal septum and apex  (EF =  50-55%). Grade II diastolic dysfunction.  C/w Plavix and Atorvastatin

## 2020-10-12 NOTE — PROGRESS NOTE ADULT - SUBJECTIVE AND OBJECTIVE BOX
88 year old female, from home, lives alone, walks with assistant at home, with shopping card outdoor, [facility] (9am -1pm),with PMHx of HTN, DM, HLD, CAD(s/p stent) presented with fever, vomiting and chills. Per daughter patient was in her usual health until one day ago when she started to experience fever, chills, poor appetite, and vomiting.Patient c/o non specific occasional cough and dizziness which resulted in a fall. X-ray of left hip negative for fracture. CTH with no acute pathology. Recently admitted to  in 2020 s/p mechanical fall and was discharged to rehab for about 60 days. Patient admits to 20-30 pounds weight loss over last 6 month due to poor oral intake in rehab. In the ED,  was febrile with leukocytosis. CXR and UA negative. CT A&P showed Tree in bud nodules in the right middle lobe of the lung suggests an infectious bronchiolitis .Pt admitted s/p mechanical fall and for sepsis work up, started on Cefepime. ID consulted. Blood and urine cx testing.     INTERVAL HPI/OVERNIGHT EVENTS: no new complaints    MEDICATIONS  (STANDING):  atorvastatin 40 milliGRAM(s) Oral at bedtime  cefepime   IVPB 1000 milliGRAM(s) IV Intermittent every 8 hours  clopidogrel Tablet 75 milliGRAM(s) Oral daily  dextrose 5%. 1000 milliLiter(s) (50 mL/Hr) IV Continuous <Continuous>  dextrose 50% Injectable 12.5 Gram(s) IV Push once  dextrose 50% Injectable 25 Gram(s) IV Push once  dextrose 50% Injectable 25 Gram(s) IV Push once  donepezil 10 milliGRAM(s) Oral at bedtime  FLUoxetine 10 milliGRAM(s) Oral daily  folic acid 1 milliGRAM(s) Oral daily  insulin lispro (HumaLOG) corrective regimen sliding scale   SubCutaneous three times a day before meals  insulin lispro (HumaLOG) corrective regimen sliding scale   SubCutaneous at bedtime  memantine 5 milliGRAM(s) Oral two times a day  sodium chloride 0.9%. 1000 milliLiter(s) (150 mL/Hr) IV Continuous <Continuous>    MEDICATIONS  (PRN):  dextrose 40% Gel 15 Gram(s) Oral once PRN Blood Glucose LESS THAN 70 milliGRAM(s)/deciliter  glucagon  Injectable 1 milliGRAM(s) IntraMuscular once PRN Glucose LESS THAN 70 milligrams/deciliter  ondansetron Injectable 4 milliGRAM(s) IV Push every 6 hours PRN Nausea and/or Vomiting    __________________________________________________  REVIEW OF SYSTEMS:    CONSTITUTIONAL: No fever,   EYES: no acute visual disturbances  NECK: No pain or stiffness  RESPIRATORY: No cough; No shortness of breath  CARDIOVASCULAR: No chest pain, no palpitations  GASTROINTESTINAL: No pain. No nausea or vomiting; No diarrhea   NEUROLOGICAL: No headache or numbness, no tremors  MUSCULOSKELETAL: No joint pain, no muscle pain  GENITOURINARY: no dysuria, no frequency, no hesitancy  PSYCHIATRY: no depression , no anxiety  ALL OTHER  ROS negative      Vital Signs Last 24 Hrs  T(C): 36.7 (12 Oct 2020 14:19), Max: 39 (11 Oct 2020 19:00)  T(F): 98.1 (12 Oct 2020 14:19), Max: 102.2 (11 Oct 2020 19:00)  HR: 56 (12 Oct 2020 14:19) (56 - 82)  BP: 143/50 (12 Oct 2020 14:19) (112/60 - 154/77)  BP(mean): --  RR: 18 (12 Oct 2020 14:19) (16 - 18)  SpO2: 95% (12 Oct 2020 14:19) (94% - 98%)    ________________________________________________  PHYSICAL EXAM:  GENERAL: NAD  HEENT: Normocephalic;  conjunctivae and sclerae clear; moist mucous membranes;   NECK : supple  CHEST/LUNG: Clear to auscultation bilaterally with good air entry   HEART: S1 S2  regular; no murmurs, gallops or rubs  ABDOMEN: Soft, Nontender, Nondistended; Bowel sounds present  EXTREMITIES: no cyanosis; no edema; no calf tenderness  SKIN: warm and dry; no rash  NERVOUS SYSTEM:  Awake and alert; Oriented  to place, person    _________________________________________________  LABS:                        9.1    10.54 )-----------( 176      ( 12 Oct 2020 05:50 )             26.9     10-12    143  |  111<H>  |  9   ----------------------------<  86  3.4<L>   |  27  |  0.73    Ca    8.1<L>      12 Oct 2020 05:50  Phos  2.8     1012  Mg     2.1     10-12    TPro  6.5  /  Alb  3.2<L>  /  TBili  0.6  /  DBili  x   /  AST  18  /  ALT  14  /  AlkPhos  63  10-11      Urinalysis Basic - ( 11 Oct 2020 19:53 )    Color: Yellow / Appearance: Clear / S.010 / pH: x  Gluc: x / Ketone: Negative  / Bili: Negative / Urobili: Negative   Blood: x / Protein: 30 mg/dL / Nitrite: Negative   Leuk Esterase: Negative / RBC: 2-5 /HPF / WBC 3-5 /HPF   Sq Epi: x / Non Sq Epi: Few /HPF / Bacteria: Trace /HPF      CAPILLARY BLOOD GLUCOSE      POCT Blood Glucose.: 80 mg/dL (12 Oct 2020 12:01)  POCT Blood Glucose.: 94 mg/dL (12 Oct 2020 07:49)  POCT Blood Glucose.: 116 mg/dL (11 Oct 2020 18:57)    RADIOLOGY & ADDITIONAL TESTS:    < from: Xray Chest 1 View AP/PA (10.11.20 @ 20:20) >    IMPRESSION:  No active pulmonary disease.    < from: Xray Hip w/ Pelvis 2 or 3 Views, Left (10.11.20 @ 20:22) >    Frontal view of the pelvis shows no evidence of fracture nor destructive change of the intrinsic bones of the pelvis.    Two views of the left hip show intact surgical hardware with no evidence of acute fracture or dislocation of the hip joint.    IMPRESSION: No acute bony pathology.    < from: Xray Femur 2 Views, Left (10.11.20 @ 20:22) >  IMPRESSION: No acute bony pathology.    Note - This does not exclude fractures in perfect alignment and apposition as presence may be indicated at one to three weeks following callus formation.    < from: CT Abdomen and Pelvis w/ Oral Cont and w/ IV Cont (10.12.20 @ 00:34) >    IMPRESSION:    A focus of air in the urinary bladder may be iatrogenic. Correlate with urinalysis for the possibility of cystitis.  Tree in bud nodules in the right middle lobe of the lung suggests an infectious bronchiolitis.  A 1.1 cm indeterminate right anterior hypodensity in the kidney may be further characterized with ultrasound or MRI.  Cholecystectomy. No small bowel obstruction.  A 9 mm lucency in the tail the pancreas may represent a focus of side branch intraductal papillary mucinous neoplasm. If clinicallyindicated, further characterization with MRI/MRCP may be considered.      Imaging  Reviewed:  YES    Consultant(s) Notes Reviewed:   YES      Plan of care was discussed with patient and /or primary care giver; all questions and concerns were addressed

## 2020-10-12 NOTE — PROGRESS NOTE ADULT - PROBLEM SELECTOR PLAN 5
Normotensive  On Amlodopine 5 mg QD at home  Held secondary to sepsis  Monitor BP and restart BP meds as warranted  DASH/TLC diet

## 2020-10-12 NOTE — PROGRESS NOTE ADULT - PROBLEM SELECTOR PLAN 1
Afebrile, mild leukocytosis  CXR-see above  CT A&P-tree nodules in RML, concern for infectious bronchiolitis  C/w Cefepime  Blood and urine cx testing  ID Dr. Baptiste consulted

## 2020-10-12 NOTE — CONSULT NOTE ADULT - SUBJECTIVE AND OBJECTIVE BOX
Time of visit:    CHIEF COMPLAINT: Patient is a 88y old  Female who presents with a chief complaint of Sepsis (11 Oct 2020 20:59)      HPI:  Patient is an 88 year old female, from home, lives alone, walks with assistant at home, with shopping card outdoor, [facility] (9am -1pm),with PMHx of HTN, DM, HLD, CAD(s/p stent) presented with fever, vomiting and chills. History obtained from pt and daughter at bedside. On my evaluation patient is AAOX2(place and person), Per daughter patient was in her usual health until yesterday. The other day she was at her son's house for [employer] holiday when she started to have chills, poor apatite with non bloody non biliary vomiting associated with fever. Patient denies abdominal pain , diarrhea , LBM this morning and regular , denies urinary sx , URTI sx  , chest pain , palpitation or vertigo. Patient c/o non specific occasional cough and dizziness.   Patient was recently admitted to  on 2020s/p mechanical fall and had and been to rehab for about 60 days. patient reports 20-30 pounds weight loss over last 6 month due to poor oral intake in rehab. Patient had b/l swelling around ankles but does not remember for how long.      ED course : patient spiked fever , with WBC 12K , Sepsis work up has been done and patient recived 1700cc Ns , Azithromycin and Rocephin X1 was given    Off note : patient endorsed one episode of fall today to ED attending, described it as a mechanical fall on left hip due to slippery floor. Daughter denied any episode of fall. Xray of HIp and Left femor without frx , CTH no acute pathology    GOC : FULL CODE      (11 Oct 2020 20:59)   Patient seen and examined.     PAST MEDICAL & SURGICAL HISTORY:  Dementia    Hyperlipidemia    History of Cholecystectomy    Diabetes Mellitus, Type 2    Hypertension    History of Cholecystectomy        Allergies    aspirin (Anaphylaxis)  Celebrex (Rash)  penicillin (Anaphylaxis)    Intolerances        MEDICATIONS  (STANDING):  atorvastatin 40 milliGRAM(s) Oral at bedtime  cefepime   IVPB 1000 milliGRAM(s) IV Intermittent every 8 hours  clopidogrel Tablet 75 milliGRAM(s) Oral daily  dextrose 5%. 1000 milliLiter(s) (50 mL/Hr) IV Continuous <Continuous>  dextrose 50% Injectable 12.5 Gram(s) IV Push once  dextrose 50% Injectable 25 Gram(s) IV Push once  dextrose 50% Injectable 25 Gram(s) IV Push once  donepezil 10 milliGRAM(s) Oral at bedtime  FLUoxetine 10 milliGRAM(s) Oral daily  folic acid 1 milliGRAM(s) Oral daily  insulin lispro (HumaLOG) corrective regimen sliding scale   SubCutaneous three times a day before meals  insulin lispro (HumaLOG) corrective regimen sliding scale   SubCutaneous at bedtime  memantine 5 milliGRAM(s) Oral two times a day  sodium chloride 0.9%. 1000 milliLiter(s) (150 mL/Hr) IV Continuous <Continuous>      MEDICATIONS  (PRN):  dextrose 40% Gel 15 Gram(s) Oral once PRN Blood Glucose LESS THAN 70 milliGRAM(s)/deciliter  glucagon  Injectable 1 milliGRAM(s) IntraMuscular once PRN Glucose LESS THAN 70 milligrams/deciliter  ondansetron Injectable 4 milliGRAM(s) IV Push every 6 hours PRN Nausea and/or Vomiting   Medications up to date at time of exam.    Medications up to date at time of exam.    FAMILY HISTORY:      SOCIAL HISTORY  Smoking History: [   ] smoking/smoke exposure, [ x  ] former smoker  Living Condition: [   ] apartment, [   ] private house  Work History:   Travel History: denies recent travel  Illicit Substance Use: denies  Alcohol Use: denies    REVIEW OF SYSTEMS:    CONSTITUTIONAL:  denies fevers, chills, sweats, weight loss    HEENT:  denies diplopia or blurred vision, sore throat or runny nose.    CARDIOVASCULAR:  denies pressure, squeezing, tightness, or heaviness about the chest; no palpitations.    RESPIRATORY:  denies SOB, cough, CABRERA, wheezing.    GASTROINTESTINAL:  denies abdominal pain, nausea, vomiting or diarrhea.    GENITOURINARY: denies dysuria, frequency or urgency.    NEUROLOGIC:  denies numbness, tingling, seizures or weakness.    PSYCHIATRIC:  denies disorder of thought or mood.    MSK: denies swelling, redness      PHYSICAL EXAMINATION:    GENERAL: The patient is a well-developed, well-nourished, in no apparent distress.     Vital Signs Last 24 Hrs  T(C): 36.7 (12 Oct 2020 14:19), Max: 39 (11 Oct 2020 19:00)  T(F): 98.1 (12 Oct 2020 14:19), Max: 102.2 (11 Oct 2020 19:00)  HR: 56 (12 Oct 2020 14:19) (56 - 82)  BP: 143/50 (12 Oct 2020 14:19) (112/60 - 154/77)  BP(mean): --  RR: 18 (12 Oct 2020 14:19) (16 - 18)  SpO2: 95% (12 Oct 2020 14:19) (94% - 98%)   (if applicable)    Chest Tube (if applicable)    HEENT: Head is normocephalic and atraumatic. Extraocular muscles are intact. Mucous membranes are moist.     NECK: Supple, no palpable adenopathy.    LUNGS: Clear to auscultation, no wheezing, rales, or rhonchi.    HEART: Regular rate and rhythm without murmur.    ABDOMEN: Soft, nontender, and nondistended.  No hepatosplenomegaly is noted.    RENAL: No difficulty voiding, no pelvic pain    EXTREMITIES: Without any cyanosis, clubbing, rash, lesions or edema.    NEUROLOGIC: Awake, alert, oriented, grossly intact    SKIN: Warm, dry, good turgor.      LABS:                        9.1    10.54 )-----------( 176      ( 12 Oct 2020 05:50 )             26.9     10-12    143  |  111<H>  |  9   ----------------------------<  86  3.4<L>   |  27  |  0.73    Ca    8.1<L>      12 Oct 2020 05:50  Phos  2.8     10-12  Mg     2.1     10-12    TPro  6.5  /  Alb  3.2<L>  /  TBili  0.6  /  DBili  x   /  AST  18  /  ALT  14  /  AlkPhos  63  10-11      Urinalysis Basic - ( 11 Oct 2020 19:53 )    Color: Yellow / Appearance: Clear / S.010 / pH: x  Gluc: x / Ketone: Negative  / Bili: Negative / Urobili: Negative   Blood: x / Protein: 30 mg/dL / Nitrite: Negative   Leuk Esterase: Negative / RBC: 2-5 /HPF / WBC 3-5 /HPF   Sq Epi: x / Non Sq Epi: Few /HPF / Bacteria: Trace /HPF        CARDIAC MARKERS ( 11 Oct 2020 19:44 )  <0.015 ng/mL / x     / 47 U/L / x     / x              Lactate, Blood: 1.4 mmol/L (10-11-20 @ 19:44)        MICROBIOLOGY: (if applicable)    RADIOLOGY & ADDITIONAL STUDIES:  EKG:   CXR:  < from: Xray Chest 1 View AP/PA (10.11.20 @ 20:20) >    EXAM:  XR CHEST AP OR PA 1V                            PROCEDURE DATE:  10/11/2020          INTERPRETATION:  Chest one view    HISTORY: Weakness and vomiting    COMPARISON STUDY: 2020    Frontal expiratory view of the chest shows the heart to be normal in size. The lungs are clear and there is no evidence of pneumothorax nor pleural effusion. Coronary artery stents and right upper quadrant clips are again noted.    IMPRESSION:  No active pulmonary disease.    Thank you for the courtesy of this referral.            SHAHIDA RADFORD M.D., ATTENDING RADIOLOGIST  This document has been electronically signed. Oct 12 2020 10:04AM    < end of copied text >  ECHO:    IMPRESSION: 88y Female PAST MEDICAL & SURGICAL HISTORY:  Dementia    Hyperlipidemia    History of Cholecystectomy    Diabetes Mellitus, Type 2    Hypertension    History of Cholecystectomy     p/w         IMP: This is an 88 yr woman , former smoker  with   HTN, DM, HLD, CAD(s/p stent) presented with fever, vomiting and chills due to sepsis of unknown source.  COVID-19 neg. She was started on iv antibx    Sugg:  -continue antibx  -f/u cultures  -dvt/gi prophy  -continue meds  -fall precautions  -blood sugar control  -monitor PLT

## 2020-10-12 NOTE — PROGRESS NOTE ADULT - PROBLEM SELECTOR PLAN 2
S/p mechanical fall at home  [facility] and was discharged to rehab for about 60 days with home PT  X-ray of left hip with no fracture  PT eval pending

## 2020-10-13 DIAGNOSIS — K86.9 DISEASE OF PANCREAS, UNSPECIFIED: ICD-10-CM

## 2020-10-13 DIAGNOSIS — J18.9 PNEUMONIA, UNSPECIFIED ORGANISM: ICD-10-CM

## 2020-10-13 LAB
A1C WITH ESTIMATED AVERAGE GLUCOSE RESULT: 5.4 % — SIGNIFICANT CHANGE UP (ref 4–5.6)
ANION GAP SERPL CALC-SCNC: 5 MMOL/L — SIGNIFICANT CHANGE UP (ref 5–17)
BUN SERPL-MCNC: 9 MG/DL — SIGNIFICANT CHANGE UP (ref 7–18)
CALCIUM SERPL-MCNC: 8.9 MG/DL — SIGNIFICANT CHANGE UP (ref 8.4–10.5)
CHLORIDE SERPL-SCNC: 110 MMOL/L — HIGH (ref 96–108)
CO2 SERPL-SCNC: 27 MMOL/L — SIGNIFICANT CHANGE UP (ref 22–31)
CREAT SERPL-MCNC: 0.79 MG/DL — SIGNIFICANT CHANGE UP (ref 0.5–1.3)
ESTIMATED AVERAGE GLUCOSE: 108 MG/DL — SIGNIFICANT CHANGE UP (ref 68–114)
GLUCOSE BLDC GLUCOMTR-MCNC: 85 MG/DL — SIGNIFICANT CHANGE UP (ref 70–99)
GLUCOSE BLDC GLUCOMTR-MCNC: 90 MG/DL — SIGNIFICANT CHANGE UP (ref 70–99)
GLUCOSE BLDC GLUCOMTR-MCNC: 97 MG/DL — SIGNIFICANT CHANGE UP (ref 70–99)
GLUCOSE BLDC GLUCOMTR-MCNC: 99 MG/DL — SIGNIFICANT CHANGE UP (ref 70–99)
GLUCOSE SERPL-MCNC: 79 MG/DL — SIGNIFICANT CHANGE UP (ref 70–99)
HCT VFR BLD CALC: 28.4 % — LOW (ref 34.5–45)
HGB BLD-MCNC: 9.3 G/DL — LOW (ref 11.5–15.5)
MAGNESIUM SERPL-MCNC: 2 MG/DL — SIGNIFICANT CHANGE UP (ref 1.6–2.6)
MCHC RBC-ENTMCNC: 29.6 PG — SIGNIFICANT CHANGE UP (ref 27–34)
MCHC RBC-ENTMCNC: 32.7 GM/DL — SIGNIFICANT CHANGE UP (ref 32–36)
MCV RBC AUTO: 90.4 FL — SIGNIFICANT CHANGE UP (ref 80–100)
NRBC # BLD: 0 /100 WBCS — SIGNIFICANT CHANGE UP (ref 0–0)
NT-PROBNP SERPL-SCNC: 2951 PG/ML — HIGH (ref 0–450)
PHOSPHATE SERPL-MCNC: 2.3 MG/DL — LOW (ref 2.5–4.5)
PLATELET # BLD AUTO: 168 K/UL — SIGNIFICANT CHANGE UP (ref 150–400)
POTASSIUM SERPL-MCNC: 3.7 MMOL/L — SIGNIFICANT CHANGE UP (ref 3.5–5.3)
POTASSIUM SERPL-SCNC: 3.7 MMOL/L — SIGNIFICANT CHANGE UP (ref 3.5–5.3)
RBC # BLD: 3.14 M/UL — LOW (ref 3.8–5.2)
RBC # FLD: 13.1 % — SIGNIFICANT CHANGE UP (ref 10.3–14.5)
SODIUM SERPL-SCNC: 142 MMOL/L — SIGNIFICANT CHANGE UP (ref 135–145)
WBC # BLD: 5.96 K/UL — SIGNIFICANT CHANGE UP (ref 3.8–10.5)
WBC # FLD AUTO: 5.96 K/UL — SIGNIFICANT CHANGE UP (ref 3.8–10.5)

## 2020-10-13 RX ORDER — METOPROLOL TARTRATE 50 MG
25 TABLET ORAL
Refills: 0 | Status: DISCONTINUED | OUTPATIENT
Start: 2020-10-13 | End: 2020-10-18

## 2020-10-13 RX ORDER — AMLODIPINE BESYLATE 2.5 MG/1
5 TABLET ORAL DAILY
Refills: 0 | Status: DISCONTINUED | OUTPATIENT
Start: 2020-10-13 | End: 2020-10-18

## 2020-10-13 RX ADMIN — DONEPEZIL HYDROCHLORIDE 10 MILLIGRAM(S): 10 TABLET, FILM COATED ORAL at 21:49

## 2020-10-13 RX ADMIN — Medication 25 MILLIGRAM(S): at 17:59

## 2020-10-13 RX ADMIN — Medication 10 MILLIGRAM(S): at 13:08

## 2020-10-13 RX ADMIN — CEFEPIME 100 MILLIGRAM(S): 1 INJECTION, POWDER, FOR SOLUTION INTRAMUSCULAR; INTRAVENOUS at 21:49

## 2020-10-13 RX ADMIN — MEMANTINE HYDROCHLORIDE 5 MILLIGRAM(S): 10 TABLET ORAL at 05:30

## 2020-10-13 RX ADMIN — Medication 1 MILLIGRAM(S): at 13:08

## 2020-10-13 RX ADMIN — AMLODIPINE BESYLATE 5 MILLIGRAM(S): 2.5 TABLET ORAL at 16:38

## 2020-10-13 RX ADMIN — MEMANTINE HYDROCHLORIDE 5 MILLIGRAM(S): 10 TABLET ORAL at 17:59

## 2020-10-13 RX ADMIN — CEFEPIME 100 MILLIGRAM(S): 1 INJECTION, POWDER, FOR SOLUTION INTRAMUSCULAR; INTRAVENOUS at 05:29

## 2020-10-13 RX ADMIN — CLOPIDOGREL BISULFATE 75 MILLIGRAM(S): 75 TABLET, FILM COATED ORAL at 13:08

## 2020-10-13 RX ADMIN — ATORVASTATIN CALCIUM 40 MILLIGRAM(S): 80 TABLET, FILM COATED ORAL at 21:49

## 2020-10-13 RX ADMIN — CEFEPIME 100 MILLIGRAM(S): 1 INJECTION, POWDER, FOR SOLUTION INTRAMUSCULAR; INTRAVENOUS at 13:10

## 2020-10-13 NOTE — PROGRESS NOTE ADULT - PROBLEM SELECTOR PLAN 2
resolved, no leucocytosis, afebrile   CT A&P-tree nodules in RML, concern for infectious bronchiolitis  C/w Cefepime  Blood and urine cx testing  ID Dr. Baptiste consulted

## 2020-10-13 NOTE — PROGRESS NOTE ADULT - PROBLEM SELECTOR PLAN 7
Resolved  K+ 3.7   Supplemented  BMP in AM Normotensive  On Amlodopine 5 mg QD at home  Held secondary to sepsis  Monitor BP and restart BP meds as warranted  DASH/TLC diet

## 2020-10-13 NOTE — PROGRESS NOTE ADULT - SUBJECTIVE AND OBJECTIVE BOX
The chart and Labs has been reviewed. The Full consult to follow.     Thank you   Dr. Baptiste  [phone]

## 2020-10-13 NOTE — PROGRESS NOTE ADULT - PROBLEM SELECTOR PLAN 3
S/p mechanical fall at home  Recently admitted to [facility] in July 2020 s/p mechanical fall and was discharged to rehab for about 60 days with home PT  X-ray of left hip with no fracture  PT eval:  no need for physical therapy

## 2020-10-13 NOTE — CONSULT NOTE ADULT - SUBJECTIVE AND OBJECTIVE BOX
Patient is a 88y old  Female who presents with a chief complaint of Sepsis (13 Oct 2020 14:51)      INTERVAL HPI/OVERNIGHT EVENTS:        PAST MEDICAL & SURGICAL HISTORY:  Dementia    Hyperlipidemia    History of Cholecystectomy    Diabetes Mellitus, Type 2    Hypertension    History of Cholecystectomy        REVIEW OF SYSTEMS: Total of twelve systems have been reviewed with patient and found to be negative unless mentioned in HPI      SOCIAL HISTORY  Alcohol: Does not drink  Tobacco: Does not smoke  Illicit substance use: None      FAMILY HISTORY: Non contributory to the present illness        ALLERGIES: penicillin (Anaphylaxis) -- Tolerating Cefepime   aspirin (Anaphylaxis), Celebrex (Rash)        Vital Signs Last 24 Hrs  T(C): 36.5 (13 Oct 2020 13:13), Max: 36.5 (13 Oct 2020 13:13)  T(F): 97.7 (13 Oct 2020 13:13), Max: 97.7 (13 Oct 2020 13:13)  HR: 64 (13 Oct 2020 13:13) (64 - 67)  BP: 181/58 (13 Oct 2020 13:13) (143/56 - 181/58)  BP(mean): 87 (13 Oct 2020 12:52) (87 - 87)  RR: 18 (13 Oct 2020 13:13) (16 - 18)  SpO2: 99% (13 Oct 2020 13:13) (96% - 99%)          PHYSICAL EXAM:  GENERAL: Not in distress   CHEST/LUNG: Not using accessory muscles   HEART: s1 and s2 present  ABDOMEN:  Nontender and  Nondistended  EXTREMITIES: No pedal  edema  CNS: Awake and Alert        LABS:                        9.3    5.96  )-----------( 168      ( 13 Oct 2020 07:21 )             28.4         10-13    142  |  110<H>  |  9   ----------------------------<  79  3.7   |  27  |  0.79    Ca    8.9      13 Oct 2020 07:21  Phos  2.3     10-13  Mg     2.0     10-13    TPro  6.5  /  Alb  3.2<L>  /  TBili  0.6  /  DBili  x   /  AST  18  /  ALT  14  /  AlkPhos  63  10-11        CAPILLARY BLOOD GLUCOSE  POCT Blood Glucose.: 99 mg/dL (13 Oct 2020 11:23)  POCT Blood Glucose.: 90 mg/dL (13 Oct 2020 07:35)  POCT Blood Glucose.: 92 mg/dL (12 Oct 2020 21:25)  POCT Blood Glucose.: 82 mg/dL (12 Oct 2020 16:45)        Urinalysis Basic - ( 11 Oct 2020 19:53 )  Color: Yellow / Appearance: Clear / S.010 / pH: x  Gluc: x / Ketone: Negative  / Bili: Negative / Urobili: Negative   Blood: x / Protein: 30 mg/dL / Nitrite: Negative   Leuk Esterase: Negative / RBC: 2-5 /HPF / WBC 3-5 /HPF   Sq Epi: x / Non Sq Epi: Few /HPF / Bacteria: Trace /HPF          MEDICATIONS  (STANDING):    amLODIPine   Tablet 5 milliGRAM(s) Oral daily  atorvastatin 40 milliGRAM(s) Oral at bedtime  cefepime   IVPB 1000 milliGRAM(s) IV Intermittent every 8 hours  clopidogrel Tablet 75 milliGRAM(s) Oral daily  donepezil 10 milliGRAM(s) Oral at bedtime  FLUoxetine 10 milliGRAM(s) Oral daily  folic acid 1 milliGRAM(s) Oral daily  insulin lispro (HumaLOG) corrective regimen sliding scale   SubCutaneous three times a day before meals  insulin lispro (HumaLOG) corrective regimen sliding scale   SubCutaneous at bedtime  memantine 5 milliGRAM(s) Oral two times a day  metoprolol tartrate 25 milliGRAM(s) Oral two times a day  sodium chloride 0.9%. 1000 milliLiter(s) (150 mL/Hr) IV Continuous <Continuous>    MEDICATIONS  (PRN):  dextrose 40% Gel 15 Gram(s) Oral once PRN Blood Glucose LESS THAN 70 milliGRAM(s)/deciliter  glucagon  Injectable 1 milliGRAM(s) IntraMuscular once PRN Glucose LESS THAN 70 milligrams/deciliter  ondansetron Injectable 4 milliGRAM(s) IV Push every 6 hours PRN Nausea and/or Vomiting        RADIOLOGY & ADDITIONAL TESTS:    r< from: CT Abdomen and Pelvis w/ Oral Cont and w/ IV Cont (10.12.20 @ 00:34) >  A focus of air in the urinary bladder may be iatrogenic. Correlate with urinalysis for the possibility of cystitis.  Tree in bud nodules in the right middle lobe of the lung suggests an infectious bronchiolitis.  A 1.1 cm indeterminate right anterior hypodensity in the kidney may be further characterized with ultrasound or MRI.  Cholecystectomy. No small bowel obstruction.  A 9 mm lucency in the tail the pancreas may represent a focus of side branch intraductal papillary mucinous neoplasm. If clinicallyindicated, further characterization with MRI/MRCP may be considered.    < end of copied text >  < from: Xray Femur 2 Views, Left (10.11.20 @ 20:22) >  IMPRESSION: No acute bony pathology.    Note - This does not exclude fractures in perfect alignment and apposition as presence may be indicated at one to three weeks following callus formation.    < end of copied text >  < from: Xray Chest 1 View AP/PA (10.11.20 @ 20:20) >  No active pulmonary disease.    < end of copied text >  < from: CT Head No Cont (10.11.20 @ 19:57) >    -No acute intracranial findings.        MICROBIOLOGY  DATA:    Culture - Blood (10.12.20 @ 05:59)   Specimen Source: .Blood Blood-Peripheral   Culture Results:   No growth to date.     Culture - Blood (10.12.20 @ 05:59)   Specimen Source: .Blood Blood-Peripheral   Culture Results:   No growth to date.     Culture - Urine (10.12.20 @ 00:38)   Specimen Source: .Urine Clean Catch (Midstream)   Culture Results:   10,000 - 49,000 CFU/mL Escherichia coli   <10,000 CFU/ml Normal Urogenital mathew present Respiratory Viral Panel + COVID-19 by [facility] (10.11.20 @ 21:27)   Rapid RVP Result: NotDete   SARS-CoV-2: NotDete: This Respiratory Panel uses polymerase chain reaction (PCR) to detect for   adenovirus; coronavirus (HKU1, NL63, 229E, OC43); human metapneumovirus   (hMPV); human enterovirus/rhinovirus (Entero/RV); influenza A; influenza   A/H1; influenza A/H3; influenza A/H1-2009; influenza B; parainfluenza   viruses 1, 2, 3, 4; respiratory syncytial virus; Mycoplasma pneumoniae;   Chlamydophila pneumoniae; and SARS-CoV-2.          Patient is a 88y old  Female from home, lives alone, walks with assistant at home, with  HHA (9am -1pm),with PMHx of HTN, DM, HLD, CAD(s/p stent) presents to the ER on 10/11/20 for evaluation of fever, vomiting and chills.  Patient denies abdominal pain , diarrhea, and  denies urinary sx. Patient c/o non specific occasional cough and dizziness. On admission, she found to have fever, and Leukocytosis. The CXR and CT abd/pelvis was not revealing.  She has given a dose of Azithromycin and Rocephin and then started on cefepime. The ID consult requested to assist with further evaluation and antibiotic management.         REVIEW OF SYSTEMS: Total of twelve systems have been reviewed with patient and found to be negative unless mentioned in HPI        PAST MEDICAL & SURGICAL HISTORY:  Dementia  Hyperlipidemia  History of Cholecystectomy  Diabetes Mellitus, Type 2  Hypertension  History of Cholecystectomy          SOCIAL HISTORY  Alcohol: Does not drink  Tobacco: Does not smoke  Illicit substance use: None      FAMILY HISTORY: Non contributory to the present illness        ALLERGIES: penicillin (Anaphylaxis) -- Tolerating Cefepime   aspirin (Anaphylaxis), Celebrex (Rash)        Vital Signs Last 24 Hrs  T(C): 36.5 (13 Oct 2020 13:13), Max: 36.5 (13 Oct 2020 13:13)  T(F): 97.7 (13 Oct 2020 13:13), Max: 97.7 (13 Oct 2020 13:13)  HR: 64 (13 Oct 2020 13:13) (64 - 67)  BP: 181/58 (13 Oct 2020 13:13) (143/56 - 181/58)  BP(mean): 87 (13 Oct 2020 12:52) (87 - 87)  RR: 18 (13 Oct 2020 13:13) (16 - 18)  SpO2: 99% (13 Oct 2020 13:13) (96% - 99%)          PHYSICAL EXAM:  GENERAL: Not in distress   CHEST/LUNG: Not using accessory muscles   HEART: s1 and s2 present  ABDOMEN:  Nontender and  Nondistended  EXTREMITIES: No pedal  edema  CNS: Awake and Alert        LABS:                        9.3    5.96  )-----------( 168      ( 13 Oct 2020 07:21 )             28.4         10-13    142  |  110<H>  |  9   ----------------------------<  79  3.7   |  27  |  0.79    Ca    8.9      13 Oct 2020 07:21  Phos  2.3     10-13  Mg     2.0     10-13    TPro  6.5  /  Alb  3.2<L>  /  TBili  0.6  /  DBili  x   /  AST  18  /  ALT  14  /  AlkPhos  63  10-11        CAPILLARY BLOOD GLUCOSE  POCT Blood Glucose.: 99 mg/dL (13 Oct 2020 11:23)  POCT Blood Glucose.: 90 mg/dL (13 Oct 2020 07:35)  POCT Blood Glucose.: 92 mg/dL (12 Oct 2020 21:25)  POCT Blood Glucose.: 82 mg/dL (12 Oct 2020 16:45)        Urinalysis Basic - ( 11 Oct 2020 19:53 )  Color: Yellow / Appearance: Clear / S.010 / pH: x  Gluc: x / Ketone: Negative  / Bili: Negative / Urobili: Negative   Blood: x / Protein: 30 mg/dL / Nitrite: Negative   Leuk Esterase: Negative / RBC: 2-5 /HPF / WBC 3-5 /HPF   Sq Epi: x / Non Sq Epi: Few /HPF / Bacteria: Trace /HPF          MEDICATIONS  (STANDING):    amLODIPine   Tablet 5 milliGRAM(s) Oral daily  atorvastatin 40 milliGRAM(s) Oral at bedtime  cefepime   IVPB 1000 milliGRAM(s) IV Intermittent every 8 hours  clopidogrel Tablet 75 milliGRAM(s) Oral daily  donepezil 10 milliGRAM(s) Oral at bedtime  FLUoxetine 10 milliGRAM(s) Oral daily  folic acid 1 milliGRAM(s) Oral daily  insulin lispro (HumaLOG) corrective regimen sliding scale   SubCutaneous three times a day before meals  insulin lispro (HumaLOG) corrective regimen sliding scale   SubCutaneous at bedtime  memantine 5 milliGRAM(s) Oral two times a day  metoprolol tartrate 25 milliGRAM(s) Oral two times a day  sodium chloride 0.9%. 1000 milliLiter(s) (150 mL/Hr) IV Continuous <Continuous>    MEDICATIONS  (PRN):  dextrose 40% Gel 15 Gram(s) Oral once PRN Blood Glucose LESS THAN 70 milliGRAM(s)/deciliter  glucagon  Injectable 1 milliGRAM(s) IntraMuscular once PRN Glucose LESS THAN 70 milligrams/deciliter  ondansetron Injectable 4 milliGRAM(s) IV Push every 6 hours PRN Nausea and/or Vomiting        RADIOLOGY & ADDITIONAL TESTS:    10/12/20 : CT Abdomen and Pelvis w/ Oral Cont and w/ IV Cont (10.12.20 @ 00:34) A focus of air in the urinary bladder may be iatrogenic. Correlate with urinalysis for the possibility of cystitis.  Tree in bud nodules in the right middle lobe of the lung suggests an infectious bronchiolitis.  A 1.1 cm indeterminate right anterior hypodensity in the kidney may be further characterized with ultrasound or MRI.  Cholecystectomy. No small bowel obstruction.  A 9 mm lucency in the tail the pancreas may represent a focus of side branch intraductal papillary mucinous neoplasm. If clinically indicated, further characterization with MRI/MRCP may be considered.      10/11/20 : Xray Femur 2 Views, Left (10.11.20 @ 20:22) IMPRESSION: No acute bony pathology.    Note - This does not exclude fractures in perfect alignment and apposition as presence may be indicated at one to three weeks following callus formation.      10/11/20 : Xray Chest 1 View AP/PA (10.11.20 @ 20:20) No active pulmonary disease.      10/11/20 : CT Head No Cont (10.11.20 @ 19:57) No acute intracranial findings.        MICROBIOLOGY  DATA:    Culture - Blood (10.12.20 @ 05:59)   Specimen Source: .Blood Blood-Peripheral   Culture Results:   No growth to date.     Culture - Blood (10.12.20 @ 05:59)   Specimen Source: .Blood Blood-Peripheral   Culture Results:   No growth to date.     Culture - Urine (10.12.20 @ 00:38)   Specimen Source: .Urine Clean Catch (Midstream)   Culture Results:   10,000 - 49,000 CFU/mL Escherichia coli   <10,000 CFU/ml Normal Urogenital mathew present Respiratory Viral Panel + COVID-19 by [facility] (10.11.20 @ 21:27)   Rapid RVP Result: NotDetec   SARS-CoV-2: NotDete: This Respiratory Panel uses polymerase chain reaction (PCR) to detect for   adenovirus; coronavirus (HKU1, NL63, 229E, OC43); human metapneumovirus   (hMPV); human enterovirus/rhinovirus (Entero/RV); influenza A; influenza   A/H1; influenza A/H3; influenza A/H1-2009; influenza B; parainfluenza   viruses 1, 2, 3, 4; respiratory syncytial virus; Mycoplasma pneumoniae;   Chlamydophila pneumoniae; and SARS-CoV-2.

## 2020-10-13 NOTE — PROGRESS NOTE ADULT - PROBLEM SELECTOR PLAN 9
Remains full code  Fartun Thurman (emergency contact): [phone] was updated regarding pt's plan of care.

## 2020-10-13 NOTE — PROGRESS NOTE ADULT - PROBLEM SELECTOR PLAN 1
CT Abdomen and Pelvis   Tree in bud nodules in the right middle lobe of the lung suggests an infectious bronchiolitis  continue cefepime   f/u blood culture   Dr pierson ID consulted, will follow recommendations

## 2020-10-13 NOTE — PROGRESS NOTE ADULT - PROBLEM SELECTOR PLAN 5
Controlled  On Janumet BID at home  HgbA1c 5.5  C/w sliding scale Hx of CAD s/p stents  Echo on 7/10 showed hypokinetic distal septum and apex  (EF =  50-55%). Grade II diastolic dysfunction.  C/w Plavix and Atorvastatin

## 2020-10-13 NOTE — PHYSICAL THERAPY INITIAL EVALUATION ADULT - ADDITIONAL COMMENTS
reports having DME of rolling walker and cane at home s/p recent discharge from subacute rehabilitation facility; She reports not using assistive device for home ambulation.

## 2020-10-13 NOTE — PHYSICAL THERAPY INITIAL EVALUATION ADULT - DIAGNOSIS, PT EVAL
none - patient is independent in mobility and function without an assistive device; slow gait speed - fall risk

## 2020-10-13 NOTE — PROGRESS NOTE ADULT - SUBJECTIVE AND OBJECTIVE BOX
NP Note discussed with  primary attending    Patient is a 88y old  Female who presents with a chief complaint of Sepsis (13 Oct 2020 12:20)      INTERVAL HPI/OVERNIGHT EVENTS: no new complaints    MEDICATIONS  (STANDING):  atorvastatin 40 milliGRAM(s) Oral at bedtime  cefepime   IVPB 1000 milliGRAM(s) IV Intermittent every 8 hours  clopidogrel Tablet 75 milliGRAM(s) Oral daily  dextrose 5%. 1000 milliLiter(s) (50 mL/Hr) IV Continuous <Continuous>  dextrose 50% Injectable 12.5 Gram(s) IV Push once  dextrose 50% Injectable 25 Gram(s) IV Push once  dextrose 50% Injectable 25 Gram(s) IV Push once  donepezil 10 milliGRAM(s) Oral at bedtime  FLUoxetine 10 milliGRAM(s) Oral daily  folic acid 1 milliGRAM(s) Oral daily  insulin lispro (HumaLOG) corrective regimen sliding scale   SubCutaneous three times a day before meals  insulin lispro (HumaLOG) corrective regimen sliding scale   SubCutaneous at bedtime  memantine 5 milliGRAM(s) Oral two times a day  sodium chloride 0.9%. 1000 milliLiter(s) (150 mL/Hr) IV Continuous <Continuous>    MEDICATIONS  (PRN):  dextrose 40% Gel 15 Gram(s) Oral once PRN Blood Glucose LESS THAN 70 milliGRAM(s)/deciliter  glucagon  Injectable 1 milliGRAM(s) IntraMuscular once PRN Glucose LESS THAN 70 milligrams/deciliter  ondansetron Injectable 4 milliGRAM(s) IV Push every 6 hours PRN Nausea and/or Vomiting      __________________________________________________  REVIEW OF SYSTEMS:    CONSTITUTIONAL: No fever,   EYES: no acute visual disturbances  NECK: No pain or stiffness  RESPIRATORY: No cough; No shortness of breath  CARDIOVASCULAR: No chest pain, no palpitations  GASTROINTESTINAL: No pain. No nausea or vomiting; No diarrhea   NEUROLOGICAL: No headache or numbness, no tremors  MUSCULOSKELETAL: No joint pain, no muscle pain  GENITOURINARY: no dysuria, no frequency, no hesitancy  PSYCHIATRY: no depression , no anxiety  ALL OTHER  ROS negative        Vital Signs Last 24 Hrs  T(C): 36.5 (13 Oct 2020 13:13), Max: 36.5 (13 Oct 2020 13:13)  T(F): 97.7 (13 Oct 2020 13:13), Max: 97.7 (13 Oct 2020 13:13)  HR: 64 (13 Oct 2020 13:13) (64 - 67)  BP: 181/58 (13 Oct 2020 13:13) (143/56 - 181/58)  BP(mean): 87 (13 Oct 2020 12:52) (87 - 87)  RR: 18 (13 Oct 2020 13:13) (16 - 18)  SpO2: 99% (13 Oct 2020 13:13) (96% - 99%)    ________________________________________________  PHYSICAL EXAM:  GENERAL: NAD  HEENT: Normocephalic;  conjunctivae and sclerae clear; moist mucous membranes;   NECK : supple  CHEST/LUNG: Clear to ausculitation bilaterally with good air entry   HEART: S1 S2  regular; no murmurs, gallops or rubs  ABDOMEN: Soft, Nontender, Nondistended; Bowel sounds present  EXTREMITIES: no cyanosis; no edema; no calf tenderness  SKIN: warm and dry; no rash  NERVOUS SYSTEM:  Awake and alert; Oriented  to place, person and time ; no new deficits    _________________________________________________  LABS:                        9.3    5.96  )-----------( 168      ( 13 Oct 2020 07:21 )             28.4     10-13    142  |  110<H>  |  9   ----------------------------<  79  3.7   |  27  |  0.79    Ca    8.9      13 Oct 2020 07:21  Phos  2.3     10-13  Mg     2.0     10-13    TPro  6.5  /  Alb  3.2<L>  /  TBili  0.6  /  DBili  x   /  AST  18  /  ALT  14  /  AlkPhos  63  10-11      Urinalysis Basic - ( 11 Oct 2020 19:53 )    Color: Yellow / Appearance: Clear / S.010 / pH: x  Gluc: x / Ketone: Negative  / Bili: Negative / Urobili: Negative   Blood: x / Protein: 30 mg/dL / Nitrite: Negative   Leuk Esterase: Negative / RBC: 2-5 /HPF / WBC 3-5 /HPF   Sq Epi: x / Non Sq Epi: Few /HPF / Bacteria: Trace /HPF      CAPILLARY BLOOD GLUCOSE      POCT Blood Glucose.: 99 mg/dL (13 Oct 2020 11:23)  POCT Blood Glucose.: 90 mg/dL (13 Oct 2020 07:35)  POCT Blood Glucose.: 92 mg/dL (12 Oct 2020 21:25)  POCT Blood Glucose.: 82 mg/dL (12 Oct 2020 16:45)        RADIOLOGY & ADDITIONAL TESTS:    Imaging Personally Reviewed:  YES/NO    Consultant(s) Notes Reviewed:   YES/ No    Care Discussed with Consultants :     Plan of care was discussed with patient and /or primary care giver; all questions and concerns were addressed and care was aligned with patient's wishes.

## 2020-10-13 NOTE — CONSULT NOTE ADULT - SUBJECTIVE AND OBJECTIVE BOX
Patient is a 88y old  Female who presents with a chief complaint of Sepsis (13 Oct 2020 07:05)     .     HPI:  HPI:  Patient is an 88 year old female, from home, lives alone, walks with assistant at home,with PMHx of HTN, DM, HLD, CAD(s/p stent) presented with fever, vomiting and chills. History obtained from pt and daughter at bedside. On my evaluation patient is AAOX2(place and person), Per daughter patient was in her usual health until yesterday. The other day she was at her son's house for Jew holiday when she started to have chills, poor apatite with non bloody non biliary vomiting associated with fever. Patient denies abdominal pain , diarrhea , LBM this morning and regular , denies urinary sx , URTI sx  , chest pain , palpitation or vertigo. Patient c/o non specific occasional cough and dizziness.   Patient was recently admitted to  on 2020s/p mechanical fall and had and been to rehab for about 60 days. patient reports 20-30 pounds weight loss over last 6 month due to poor oral intake in rehab. Patient had b/l swelling around ankles but does not remember for how long.      ED course : patient spiked fever , with WBC 12K , Sepsis work up has been done and patient recived 1700cc Ns , Azithromycin and Rocephin X1 was given    Off note : patient endorsed one episode of fall today to ED attending, described it as a mechanical fall on left hip due to slippery floor. Daughter denied any episode of fall. Xray of HIp and Left femor without frx , CTH no acute pathology    GOC : FULL CODE      (11 Oct 2020 20:59)            REVIEW OF SYSTEMS  Constitutional:   No fever, no fatigue, no pallor, no night sweats, no weight loss.  HEENT:   No eye pain, no vision changes, no icterus, no mouth ulcers.  Respiratory:   No shortness of breath, no cough, no respiratory distress.   Cardiovascular:   No chest pain, no palpitations.   Gastrointestinal: No abdominal pain, no nausea, no vomiting , no diahrrea, no constipation, no hematochezia,no melena.  Skin:   No rashes, no jaundice, no eczema.   Musculoskeletal:   No joint pain, no swelling, no myalgia.   Neurologic:   No headache, no seizure, no weakness.   Genitourinary:   No dysuria, no decreased urine output.  Psychiatric:  No depression, no anxiety,   Endocrine:   No thyroid disease, no diabetes.  Heme/Lymphatic:   No anemia, no blood transfusions, no lymph node enlargement, no bleeding, no bruising.  ___________________________________________________________________________________________  Allergies    aspirin (Anaphylaxis)  Celebrex (Rash)  penicillin (Anaphylaxis)    Intolerances      MEDICATIONS  (STANDING):  atorvastatin 40 milliGRAM(s) Oral at bedtime  cefepime   IVPB 1000 milliGRAM(s) IV Intermittent every 8 hours  clopidogrel Tablet 75 milliGRAM(s) Oral daily  dextrose 5%. 1000 milliLiter(s) (50 mL/Hr) IV Continuous <Continuous>  dextrose 50% Injectable 12.5 Gram(s) IV Push once  dextrose 50% Injectable 25 Gram(s) IV Push once  dextrose 50% Injectable 25 Gram(s) IV Push once  donepezil 10 milliGRAM(s) Oral at bedtime  FLUoxetine 10 milliGRAM(s) Oral daily  folic acid 1 milliGRAM(s) Oral daily  insulin lispro (HumaLOG) corrective regimen sliding scale   SubCutaneous three times a day before meals  insulin lispro (HumaLOG) corrective regimen sliding scale   SubCutaneous at bedtime  memantine 5 milliGRAM(s) Oral two times a day  sodium chloride 0.9%. 1000 milliLiter(s) (150 mL/Hr) IV Continuous <Continuous>    MEDICATIONS  (PRN):  dextrose 40% Gel 15 Gram(s) Oral once PRN Blood Glucose LESS THAN 70 milliGRAM(s)/deciliter  glucagon  Injectable 1 milliGRAM(s) IntraMuscular once PRN Glucose LESS THAN 70 milligrams/deciliter  ondansetron Injectable 4 milliGRAM(s) IV Push every 6 hours PRN Nausea and/or Vomiting      PAST MEDICAL & SURGICAL HISTORY:  Dementia    Hyperlipidemia    History of Cholecystectomy    Diabetes Mellitus, Type 2    Hypertension    History of Cholecystectomy      FAMILY HISTORY:    Social History: No hsitory of : Tobacco use, IVDA, EToH  ______________________________________________________________________________________    PHYSICAL EXAM    Daily     Daily Weight in k.6 (13 Oct 2020 05:20)  BMI: 23.5 (10-11 @ 19:08)  Change in Weight:  Vital Signs Last 24 Hrs  T(C): 36.4 (13 Oct 2020 05:20), Max: 36.7 (12 Oct 2020 14:19)  T(F): 97.5 (13 Oct 2020 05:20), Max: 98.1 (12 Oct 2020 14:19)  HR: 66 (13 Oct 2020 05:20) (56 - 67)  BP: 179/55 (13 Oct 2020 05:20) (143/50 - 179/55)  BP(mean): --  RR: 18 (13 Oct 2020 05:20) (16 - 18)  SpO2: 99% (13 Oct 2020 05:20) (95% - 99%)    General:  Well developed, well nourished, alert and active, no pallor, NAD.  HEENT:    Normal appearance of conjunctiva, ears, nose, lips, oropharynx, and oral mucosa, anicteric.  Neck:  No masses, no asymmetry.  Lymph Nodes:  No lymphadenopathy.   Cardiovascular:  RRR normal S1/S2, no murmur.  Respiratory:  CTA B/L, normal respiratory effort.   Abdominal:   soft, no masses or tenderness, normoactive BS, NT/ND, no HSM.  Extremities:   No clubbing or cyanosis, normal capillary refill, no edema.   Skin:   No rash, jaundice, lesions, eczema.   Musculoskeletal:  No joint swelling, erythema or tenderness.   Neuro: No focal deficits.   Other:   _______________________________________________________________________________________________  Lab Results:                          9.3    5.96  )-----------( 168      ( 13 Oct 2020 07:21 )             28.4     10-    142  |  110<H>  |  9   ----------------------------<  79  3.7   |  27  |  0.79    Ca    8.9      13 Oct 2020 07:21  Phos  2.3     10-  Mg     2.0     10-    TPro  6.5  /  Alb  3.2<L>  /  TBili  0.6  /  DBili  x   /  AST  18  /  ALT  14  /  AlkPhos  63  10-11    LIVER FUNCTIONS - ( 11 Oct 2020 19:44 )  Alb: 3.2 g/dL / Pro: 6.5 g/dL / ALK PHOS: 63 U/L / ALT: 14 U/L DA / AST: 18 U/L / GGT: x               CARDIAC MARKERS ( 11 Oct 2020 19:44 )  <0.015 ng/mL / x     / 47 U/L / x     / x          Stool Results:          RADIOLOGY RESULTS:    SURGICAL PATHOLOGY:      Patient is a 88y old  Female who presents with a chief complaint of Sepsis (13 Oct 2020 07:05)     .     HPI:  HPI:  Patient is an 88 year old female, from home, lives alone, walks with assistant at home,with PMHx of HTN, DM, HLD, CAD(s/p stent) presented with fever, vomiting and chills. History obtained from pt and daughter at bedside. On my evaluation patient is AAOX2(place and person), Per daughter patient was in her usual health until yesterday. The other day she was at her son's house for Faith holiday when she started to have chills, poor apatite with non bloody non biliary vomiting associated with fever. Patient denies abdominal pain , diarrhea , LBM this morning and regular , denies urinary sx , URTI sx  , chest pain , palpitation or vertigo. Patient c/o non specific occasional cough and dizziness.   Patient was recently admitted to  on 2020s/p mechanical fall and had and been to rehab for about 60 days. patient reports 20-30 pounds weight loss over last 6 month due to poor oral intake in rehab. Patient had b/l swelling around ankles but does not remember for how long.      ED course : patient spiked fever , with WBC 12K , Sepsis work up has been done and patient recived 1700cc Ns , Azithromycin and Rocephin X1 was given    Off note : patient endorsed one episode of fall today to ED attending, described it as a mechanical fall on left hip due to slippery floor. Daughter denied any episode of fall. Xray of HIp and Left femor without frx , CTH no acute pathology    GOC : FULL CODE      (11 Oct 2020 20:59)            REVIEW OF SYSTEMS  Constitutional:   No fever, no fatigue, no pallor, no night sweats, + weight loss.  HEENT:   No eye pain, no vision changes, no icterus, no mouth ulcers.  Respiratory:   No shortness of breath, + cough, no respiratory distress.   Cardiovascular:   No chest pain, no palpitations.   Gastrointestinal: No abdominal pain, no nausea, no vomiting , no diahrrea, no constipation, no hematochezia,no melena.  Skin:   No rashes, no jaundice, no eczema.   Musculoskeletal:   No joint pain, no swelling, no myalgia.   Neurologic:   No headache, no seizure, no weakness.   Genitourinary:   No dysuria, no decreased urine output.  Psychiatric:  No depression, no anxiety,   Endocrine:   No thyroid disease, no diabetes.  Heme/Lymphatic:   No anemia, no blood transfusions, no lymph node enlargement, no bleeding, no bruising.  ___________________________________________________________________________________________  Allergies    aspirin (Anaphylaxis)  Celebrex (Rash)  penicillin (Anaphylaxis)    Intolerances      MEDICATIONS  (STANDING):  atorvastatin 40 milliGRAM(s) Oral at bedtime  cefepime   IVPB 1000 milliGRAM(s) IV Intermittent every 8 hours  clopidogrel Tablet 75 milliGRAM(s) Oral daily  dextrose 5%. 1000 milliLiter(s) (50 mL/Hr) IV Continuous <Continuous>  dextrose 50% Injectable 12.5 Gram(s) IV Push once  dextrose 50% Injectable 25 Gram(s) IV Push once  dextrose 50% Injectable 25 Gram(s) IV Push once  donepezil 10 milliGRAM(s) Oral at bedtime  FLUoxetine 10 milliGRAM(s) Oral daily  folic acid 1 milliGRAM(s) Oral daily  insulin lispro (HumaLOG) corrective regimen sliding scale   SubCutaneous three times a day before meals  insulin lispro (HumaLOG) corrective regimen sliding scale   SubCutaneous at bedtime  memantine 5 milliGRAM(s) Oral two times a day  sodium chloride 0.9%. 1000 milliLiter(s) (150 mL/Hr) IV Continuous <Continuous>    MEDICATIONS  (PRN):  dextrose 40% Gel 15 Gram(s) Oral once PRN Blood Glucose LESS THAN 70 milliGRAM(s)/deciliter  glucagon  Injectable 1 milliGRAM(s) IntraMuscular once PRN Glucose LESS THAN 70 milligrams/deciliter  ondansetron Injectable 4 milliGRAM(s) IV Push every 6 hours PRN Nausea and/or Vomiting      PAST MEDICAL & SURGICAL HISTORY:  Dementia    Hyperlipidemia    History of Cholecystectomy    Diabetes Mellitus, Type 2    Hypertension    History of Cholecystectomy      FAMILY HISTORY:    Social History: No hsitory of : Tobacco use, IVDA, EToH  ______________________________________________________________________________________    PHYSICAL EXAM    Daily     Daily Weight in k.6 (13 Oct 2020 05:20)  BMI: 23.5 (10-11 @ 19:08)  Change in Weight:  Vital Signs Last 24 Hrs  T(C): 36.4 (13 Oct 2020 05:20), Max: 36.7 (12 Oct 2020 14:19)  T(F): 97.5 (13 Oct 2020 05:20), Max: 98.1 (12 Oct 2020 14:19)  HR: 66 (13 Oct 2020 05:20) (56 - 67)  BP: 179/55 (13 Oct 2020 05:20) (143/50 - 179/55)  BP(mean): --  RR: 18 (13 Oct 2020 05:20) (16 - 18)  SpO2: 99% (13 Oct 2020 05:20) (95% - 99%)    General:  Well developed, well nourished, alert and active, no pallor, NAD.  HEENT:    Normal appearance of conjunctiva, ears, nose, lips, oropharynx, and oral mucosa, anicteric.  Neck:  No masses, no asymmetry.  Lymph Nodes:  No lymphadenopathy.   Cardiovascular:  RRR normal S1/S2, no murmur.  Respiratory:  CTA B/L, normal respiratory effort.   Abdominal:   soft, no masses or tenderness, normoactive BS, NT/ND, no HSM.  Extremities:   No clubbing or cyanosis, normal capillary refill, no edema.   Skin:   No rash, jaundice, lesions, eczema.   Musculoskeletal:  No joint swelling, erythema or tenderness.   Neuro: No focal deficits.   Other:   _______________________________________________________________________________________________  Lab Results:                          9.3    5.96  )-----------( 168      ( 13 Oct 2020 07:21 )             28.4     10-13    142  |  110<H>  |  9   ----------------------------<  79  3.7   |  27  |  0.79    Ca    8.9      13 Oct 2020 07:21  Phos  2.3     10-13  Mg     2.0     10-    TPro  6.5  /  Alb  3.2<L>  /  TBili  0.6  /  DBili  x   /  AST  18  /  ALT  14  /  AlkPhos  63  10-11    LIVER FUNCTIONS - ( 11 Oct 2020 19:44 )  Alb: 3.2 g/dL / Pro: 6.5 g/dL / ALK PHOS: 63 U/L / ALT: 14 U/L DA / AST: 18 U/L / GGT: x               CARDIAC MARKERS ( 11 Oct 2020 19:44 )  <0.015 ng/mL / x     / 47 U/L / x     / x          Stool Results:          RADIOLOGY RESULTS:  < from: CT Abdomen and Pelvis w/ Oral Cont and w/ IV Cont (10.12.20 @ 00:34) >    EXAM:  CT ABDOMEN AND PELVIS OC IC                            PROCEDURE DATE:  10/12/2020          INTERPRETATION:  Abdominal/Pelvic CT    10/12/2020 12:57 AM    Indication: Vomiting, weakness    Technique: Axial images were obtained following oraland IV contrast from the lung bases through pubic symphysis.  90 cc of Omnipaque 350 was administered intravenously without complication and 10 cc was discarded.  Reformatted coronal and sagittal images are submitted.    Comparison: None    FINDINGS:    LUNG BASES: The heart is enlarged. There is coronary atherosclerosis. Tree in bud nodules in the right middle lobe suggesting infectious spondylitis. Dependent atelectatic changes at the lung bases.    PERITONEUM:  There is no free air or focal collection.  Trace amount of free fluid.  LIVER: Normal.  SPLEEN: Normal.  GALLBLADDER: Cholecystectomy  BILIARY TREE: Unremarkable.  PANCREAS: There is a 9 mm cystic lucency in the tail of the pancreas..  ADRENAL GLANDS: Normal.  KIDNEYS: There is an indeterminate 1.1 cm hypodensity in the right anterior kidney.  BOWEL: The stomach is incompletely distended.  Oral contrast is seen as distally as rectosigmoid junction.  There is no small bowel obstruction, focal bowel wall thickening or diverticulitis. The appendix is unremarkable. The colon is underdistended without significant fecal load.    URINARY BLADDER: Incompletely distended with a focus of air. Correlate for recent instrumentation.  PELVIC ORGANS:    There is no significant adenopathy.  VASCULATURE: The aorta is not dilated. Moderate atherosclerotic vascular calcification.  RETROPERITONEUM:  There is no mass.  BONES: Partial visualization of hardware in the left proximal femur.  ABDOMINAL WALL: Unremarkable.    IMPRESSION:    A focus of air in the urinary bladder may be iatrogenic. Correlate with urinalysis for the possibility of cystitis.  Tree in bud nodules in the right middle lobe of the lung suggests an infectious bronchiolitis.  A 1.1 cm indeterminate right anterior hypodensity in the kidney may be further characterized with ultrasound or MRI.  Cholecystectomy. No small bowel obstruction.  A 9 mm lucency in the tail the pancreas may represent a focus of side branch intraductal papillary mucinous neoplasm. If clinicallyindicated, further characterization with MRI/MRCP may be considered.            PATTI VALDOVINOS M.D, ATTENDING RADIOLOGIST  This document has been electronically signed. Oct 12 2020  1:08AM    < end of copied text >    SURGICAL PATHOLOGY:

## 2020-10-13 NOTE — PROGRESS NOTE ADULT - PROBLEM SELECTOR PLAN 4
Hx of CAD s/p stents  Echo on 7/10 showed hypokinetic distal septum and apex  (EF =  50-55%). Grade II diastolic dysfunction.  C/w Plavix and Atorvastatin CT abd: A 9 mm lucency in the tail the pancreas may represent a focus of side branch intraductal papillary mucinous neoplasm.   GI consulted  further characterization with MRI/MRCP was recommended CT abd: A 9 mm lucency in the tail the pancreas may represent a focus of side branch intraductal papillary mucinous neoplasm.   GI consulted, recommended MRI if family agreeable to proceed with further workup   spoke with daughter Zoey Nunn at [phone] who wants to discuss that with her brother   further characterization with MRI/MRCP was recommended

## 2020-10-13 NOTE — PROGRESS NOTE ADULT - PROBLEM SELECTOR PLAN 6
Normotensive  On Amlodopine 5 mg QD at home  Held secondary to sepsis  Monitor BP and restart BP meds as warranted  DASH/TLC diet Controlled  On Janumet BID at home  HgbA1c 5.5  C/w sliding scale

## 2020-10-13 NOTE — PROGRESS NOTE ADULT - ASSESSMENT
88 year old female, from home, lives alone, walks with assistant at home, with shopping card outdoor, HHA (9am -1pm),with PMHx of HTN, DM, HLD, CAD(s/p stent) presented with fever, vomiting and chills. Per daughter patient was in her usual health until one day ago when she started to experience fever, chills, poor appetite, and vomiting.Patient c/o non specific occasional cough and dizziness which resulted in a fall. X-ray of left hip negative for fracture. CTH with no acute pathology. Recently admitted to  in July 2020 s/p mechanical fall and was discharged to rehab for about 60 days. Patient admits to 20-30 pounds weight loss over last 6 month due to poor oral intake in rehab. In the ED,  was febrile with leukocytosis. CXR and UA negative. CT A&P showed Tree in bud nodules in the right middle lobe of the lung suggests an infectious bronchiolitis .Pt admitted s/p mechanical fall and for sepsis work up, started on Cefepime for presumed pneumonia.  ID consulted. Blood and urine cx testing.   Pt seen at bedside, alert and oriented, denies any new complaints, ambulating to bathroom without assistance.

## 2020-10-13 NOTE — CONSULT NOTE ADULT - ASSESSMENT
# SIRS ( Fever + Leukocytosis )- resolved, Blood cultures are negative   # COVID 19 negative  # Positive Urine culture- E.coli  # PCN- ( ?? Anaphylaxis)- Tolerating Cefepime     would recommend:    1. Please obtain  Procalcitonin level  2. Continue Cefepime until work up is done      will follow the patient with you and make further recommendation based on the clinical course and Lab results  Thank you for the opportunity to participate in Ms. JUNE's care      Attending Attestation:    Spent more than 65 minutes on total encounter, more than 50 % of the visit was spent counseling and/or coordinating care by the Attending physician.         Patient is a 88y old  Female from home, lives alone, walks with assistant at home, with  HHA (9am -1pm),with PMHx of HTN, DM, HLD, CAD(s/p stent) presents to the ER on 10/11/20 for evaluation of fever, vomiting and chills.  Patient denies abdominal pain , diarrhea, and  denies urinary sx. Patient c/o non specific occasional cough and dizziness. On admission, she found to have fever, and Leukocytosis. The CXR and CT abd/pelvis was not revealing.  She has given a dose of Azithromycin and Rocephin and then started on cefepime. The ID consult requested to assist with further evaluation and antibiotic management.       # SIRS ( Fever + Leukocytosis )- resolved, Blood cultures are negative   # COVID 19 negative  # Positive Urine culture- E.coli  # PCN- ( ?? Anaphylaxis)- Tolerating Cefepime     would recommend:    1. Please obtain  Procalcitonin level  2. Continue Cefepime until work up is done  3. Repeat Urine analysis  4. Continue supportive care    d/w Patient    will follow the patient with you and make further recommendation based on the clinical course and Lab results  Thank you for the opportunity to participate in Ms. JUNE's care      Attending Attestation:    Spent more than 65 minutes on total encounter, more than 50 % of the visit was spent counseling and/or coordinating care by the Attending physician.

## 2020-10-13 NOTE — CONSULT NOTE ADULT - ASSESSMENT
88 year old female presents with fever and chills. Sepsis workup is ongoing. Gi consulted for new pancreatic lesion

## 2020-10-14 DIAGNOSIS — N39.0 URINARY TRACT INFECTION, SITE NOT SPECIFIED: ICD-10-CM

## 2020-10-14 LAB
-  AMIKACIN: SIGNIFICANT CHANGE UP
-  AMOXICILLIN/CLAVULANIC ACID: SIGNIFICANT CHANGE UP
-  AMPICILLIN/SULBACTAM: SIGNIFICANT CHANGE UP
-  AMPICILLIN: SIGNIFICANT CHANGE UP
-  AZTREONAM: SIGNIFICANT CHANGE UP
-  CEFAZOLIN: SIGNIFICANT CHANGE UP
-  CEFEPIME: SIGNIFICANT CHANGE UP
-  CEFOXITIN: SIGNIFICANT CHANGE UP
-  CEFTRIAXONE: SIGNIFICANT CHANGE UP
-  CIPROFLOXACIN: SIGNIFICANT CHANGE UP
-  ERTAPENEM: SIGNIFICANT CHANGE UP
-  GENTAMICIN: SIGNIFICANT CHANGE UP
-  IMIPENEM: SIGNIFICANT CHANGE UP
-  LEVOFLOXACIN: SIGNIFICANT CHANGE UP
-  MEROPENEM: SIGNIFICANT CHANGE UP
-  NITROFURANTOIN: SIGNIFICANT CHANGE UP
-  PIPERACILLIN/TAZOBACTAM: SIGNIFICANT CHANGE UP
-  TIGECYCLINE: SIGNIFICANT CHANGE UP
-  TOBRAMYCIN: SIGNIFICANT CHANGE UP
-  TRIMETHOPRIM/SULFAMETHOXAZOLE: SIGNIFICANT CHANGE UP
ANION GAP SERPL CALC-SCNC: 5 MMOL/L — SIGNIFICANT CHANGE UP (ref 5–17)
APPEARANCE UR: CLEAR — SIGNIFICANT CHANGE UP
BACTERIA # UR AUTO: ABNORMAL /HPF
BILIRUB UR-MCNC: NEGATIVE — SIGNIFICANT CHANGE UP
BUN SERPL-MCNC: 6 MG/DL — LOW (ref 7–18)
CALCIUM SERPL-MCNC: 8.9 MG/DL — SIGNIFICANT CHANGE UP (ref 8.4–10.5)
CHLORIDE SERPL-SCNC: 109 MMOL/L — HIGH (ref 96–108)
CO2 SERPL-SCNC: 26 MMOL/L — SIGNIFICANT CHANGE UP (ref 22–31)
COLOR SPEC: YELLOW — SIGNIFICANT CHANGE UP
CREAT SERPL-MCNC: 0.72 MG/DL — SIGNIFICANT CHANGE UP (ref 0.5–1.3)
CULTURE RESULTS: SIGNIFICANT CHANGE UP
DIFF PNL FLD: NEGATIVE — SIGNIFICANT CHANGE UP
EPI CELLS # UR: SIGNIFICANT CHANGE UP /HPF
GLUCOSE BLDC GLUCOMTR-MCNC: 107 MG/DL — HIGH (ref 70–99)
GLUCOSE BLDC GLUCOMTR-MCNC: 91 MG/DL — SIGNIFICANT CHANGE UP (ref 70–99)
GLUCOSE BLDC GLUCOMTR-MCNC: 95 MG/DL — SIGNIFICANT CHANGE UP (ref 70–99)
GLUCOSE BLDC GLUCOMTR-MCNC: 98 MG/DL — SIGNIFICANT CHANGE UP (ref 70–99)
GLUCOSE SERPL-MCNC: 103 MG/DL — HIGH (ref 70–99)
GLUCOSE UR QL: NEGATIVE — SIGNIFICANT CHANGE UP
HCT VFR BLD CALC: 30.8 % — LOW (ref 34.5–45)
HGB BLD-MCNC: 10 G/DL — LOW (ref 11.5–15.5)
KETONES UR-MCNC: NEGATIVE — SIGNIFICANT CHANGE UP
LEUKOCYTE ESTERASE UR-ACNC: NEGATIVE — SIGNIFICANT CHANGE UP
MCHC RBC-ENTMCNC: 29 PG — SIGNIFICANT CHANGE UP (ref 27–34)
MCHC RBC-ENTMCNC: 32.5 GM/DL — SIGNIFICANT CHANGE UP (ref 32–36)
MCV RBC AUTO: 89.3 FL — SIGNIFICANT CHANGE UP (ref 80–100)
METHOD TYPE: SIGNIFICANT CHANGE UP
NITRITE UR-MCNC: NEGATIVE — SIGNIFICANT CHANGE UP
NRBC # BLD: 0 /100 WBCS — SIGNIFICANT CHANGE UP (ref 0–0)
ORGANISM # SPEC MICROSCOPIC CNT: SIGNIFICANT CHANGE UP
ORGANISM # SPEC MICROSCOPIC CNT: SIGNIFICANT CHANGE UP
PH UR: 6.5 — SIGNIFICANT CHANGE UP (ref 5–8)
PLATELET # BLD AUTO: 197 K/UL — SIGNIFICANT CHANGE UP (ref 150–400)
POTASSIUM SERPL-MCNC: 3.3 MMOL/L — LOW (ref 3.5–5.3)
POTASSIUM SERPL-SCNC: 3.3 MMOL/L — LOW (ref 3.5–5.3)
PROT UR-MCNC: 15
RBC # BLD: 3.45 M/UL — LOW (ref 3.8–5.2)
RBC # FLD: 12.7 % — SIGNIFICANT CHANGE UP (ref 10.3–14.5)
RBC CASTS # UR COMP ASSIST: ABNORMAL /HPF (ref 0–2)
SODIUM SERPL-SCNC: 140 MMOL/L — SIGNIFICANT CHANGE UP (ref 135–145)
SP GR SPEC: 1 — LOW (ref 1.01–1.02)
SPECIMEN SOURCE: SIGNIFICANT CHANGE UP
UROBILINOGEN FLD QL: NEGATIVE — SIGNIFICANT CHANGE UP
WBC # BLD: 5.86 K/UL — SIGNIFICANT CHANGE UP (ref 3.8–10.5)
WBC # FLD AUTO: 5.86 K/UL — SIGNIFICANT CHANGE UP (ref 3.8–10.5)
WBC UR QL: SIGNIFICANT CHANGE UP /HPF (ref 0–5)

## 2020-10-14 RX ORDER — POTASSIUM CHLORIDE 20 MEQ
40 PACKET (EA) ORAL ONCE
Refills: 0 | Status: COMPLETED | OUTPATIENT
Start: 2020-10-14 | End: 2020-10-14

## 2020-10-14 RX ORDER — LANOLIN ALCOHOL/MO/W.PET/CERES
3 CREAM (GRAM) TOPICAL AT BEDTIME
Refills: 0 | Status: DISCONTINUED | OUTPATIENT
Start: 2020-10-14 | End: 2020-10-18

## 2020-10-14 RX ORDER — CIPROFLOXACIN LACTATE 400MG/40ML
400 VIAL (ML) INTRAVENOUS EVERY 12 HOURS
Refills: 0 | Status: DISCONTINUED | OUTPATIENT
Start: 2020-10-14 | End: 2020-10-16

## 2020-10-14 RX ADMIN — MEMANTINE HYDROCHLORIDE 5 MILLIGRAM(S): 10 TABLET ORAL at 05:06

## 2020-10-14 RX ADMIN — SODIUM CHLORIDE 150 MILLILITER(S): 9 INJECTION INTRAMUSCULAR; INTRAVENOUS; SUBCUTANEOUS at 05:06

## 2020-10-14 RX ADMIN — MEMANTINE HYDROCHLORIDE 5 MILLIGRAM(S): 10 TABLET ORAL at 17:22

## 2020-10-14 RX ADMIN — Medication 10 MILLIGRAM(S): at 12:16

## 2020-10-14 RX ADMIN — ATORVASTATIN CALCIUM 40 MILLIGRAM(S): 80 TABLET, FILM COATED ORAL at 21:06

## 2020-10-14 RX ADMIN — Medication 40 MILLIEQUIVALENT(S): at 10:09

## 2020-10-14 RX ADMIN — Medication 200 MILLIGRAM(S): at 17:23

## 2020-10-14 RX ADMIN — AMLODIPINE BESYLATE 5 MILLIGRAM(S): 2.5 TABLET ORAL at 05:06

## 2020-10-14 RX ADMIN — Medication 25 MILLIGRAM(S): at 17:22

## 2020-10-14 RX ADMIN — CEFEPIME 100 MILLIGRAM(S): 1 INJECTION, POWDER, FOR SOLUTION INTRAMUSCULAR; INTRAVENOUS at 05:06

## 2020-10-14 RX ADMIN — CEFEPIME 100 MILLIGRAM(S): 1 INJECTION, POWDER, FOR SOLUTION INTRAMUSCULAR; INTRAVENOUS at 15:33

## 2020-10-14 RX ADMIN — Medication 1 MILLIGRAM(S): at 12:16

## 2020-10-14 RX ADMIN — DONEPEZIL HYDROCHLORIDE 10 MILLIGRAM(S): 10 TABLET, FILM COATED ORAL at 21:06

## 2020-10-14 RX ADMIN — CLOPIDOGREL BISULFATE 75 MILLIGRAM(S): 75 TABLET, FILM COATED ORAL at 12:16

## 2020-10-14 NOTE — PROGRESS NOTE ADULT - SUBJECTIVE AND OBJECTIVE BOX
NP Note discussed with  primary attending    Patient is a 88y old  Female who presents with a chief complaint of Sepsis (13 Oct 2020 16:00)      INTERVAL HPI/OVERNIGHT EVENTS: no new complaints    MEDICATIONS  (STANDING):  amLODIPine   Tablet 5 milliGRAM(s) Oral daily  atorvastatin 40 milliGRAM(s) Oral at bedtime  cefepime   IVPB 1000 milliGRAM(s) IV Intermittent every 8 hours  clopidogrel Tablet 75 milliGRAM(s) Oral daily  dextrose 5%. 1000 milliLiter(s) (50 mL/Hr) IV Continuous <Continuous>  dextrose 50% Injectable 12.5 Gram(s) IV Push once  dextrose 50% Injectable 25 Gram(s) IV Push once  dextrose 50% Injectable 25 Gram(s) IV Push once  donepezil 10 milliGRAM(s) Oral at bedtime  FLUoxetine 10 milliGRAM(s) Oral daily  folic acid 1 milliGRAM(s) Oral daily  insulin lispro (HumaLOG) corrective regimen sliding scale   SubCutaneous three times a day before meals  insulin lispro (HumaLOG) corrective regimen sliding scale   SubCutaneous at bedtime  memantine 5 milliGRAM(s) Oral two times a day  metoprolol tartrate 25 milliGRAM(s) Oral two times a day    MEDICATIONS  (PRN):  dextrose 40% Gel 15 Gram(s) Oral once PRN Blood Glucose LESS THAN 70 milliGRAM(s)/deciliter  glucagon  Injectable 1 milliGRAM(s) IntraMuscular once PRN Glucose LESS THAN 70 milligrams/deciliter  ondansetron Injectable 4 milliGRAM(s) IV Push every 6 hours PRN Nausea and/or Vomiting      __________________________________________________  REVIEW OF SYSTEMS:    CONSTITUTIONAL: No fever,   EYES: no acute visual disturbances  NECK: No pain or stiffness  RESPIRATORY: No cough; No shortness of breath  CARDIOVASCULAR: No chest pain, no palpitations  GASTROINTESTINAL: No pain. No nausea or vomiting; No diarrhea   NEUROLOGICAL: No headache or numbness, no tremors  MUSCULOSKELETAL: No joint pain, no muscle pain  GENITOURINARY: no dysuria, no frequency, no hesitancy  PSYCHIATRY: no depression , no anxiety  ALL OTHER  ROS negative        Vital Signs Last 24 Hrs  T(C): 36.6 (14 Oct 2020 13:40), Max: 36.7 (14 Oct 2020 05:00)  T(F): 97.8 (14 Oct 2020 13:40), Max: 98.1 (14 Oct 2020 05:00)  HR: 53 (14 Oct 2020 13:40) (52 - 65)  BP: 166/44 (14 Oct 2020 13:40) (155/59 - 176/62)  BP(mean): --  RR: 17 (14 Oct 2020 13:40) (17 - 18)  SpO2: 95% (14 Oct 2020 13:40) (95% - 99%)    ________________________________________________  PHYSICAL EXAM:  GENERAL: NAD  HEENT: Normocephalic;  conjunctivae and sclerae clear; moist mucous membranes;   NECK : supple  CHEST/LUNG: Clear to ausculitation bilaterally with good air entry   HEART: S1 S2  regular; no murmurs, gallops or rubs  ABDOMEN: Soft, Nontender, Nondistended; Bowel sounds present  EXTREMITIES: no cyanosis; no edema; no calf tenderness  SKIN: warm and dry; no rash  NERVOUS SYSTEM:  Awake and alert; Oriented  to place, person and time ; no new deficits    _________________________________________________  LABS:                        10.0   5.86  )-----------( 197      ( 14 Oct 2020 06:37 )             30.8     10-14    140  |  109<H>  |  6<L>  ----------------------------<  103<H>  3.3<L>   |  26  |  0.72    Ca    8.9      14 Oct 2020 06:37  Phos  2.3     10-13  Mg     2.0     10-13          CAPILLARY BLOOD GLUCOSE      POCT Blood Glucose.: 107 mg/dL (14 Oct 2020 11:50)  POCT Blood Glucose.: 95 mg/dL (14 Oct 2020 07:39)  POCT Blood Glucose.: 85 mg/dL (13 Oct 2020 21:47)  POCT Blood Glucose.: 97 mg/dL (13 Oct 2020 17:46)        RADIOLOGY & ADDITIONAL TESTS:    Imaging Personally Reviewed:  YES/NO    Consultant(s) Notes Reviewed:   YES/ No    Care Discussed with Consultants :     Plan of care was discussed with patient and /or primary care giver; all questions and concerns were addressed and care was aligned with patient's wishes.

## 2020-10-14 NOTE — PROGRESS NOTE ADULT - PROBLEM SELECTOR PLAN 5
Hx of CAD s/p stents  Echo on 7/10 showed hypokinetic distal septum and apex  (EF =  50-55%). Grade II diastolic dysfunction.  C/w Plavix and Atorvastatin Controlled  On Janumet BID at home  HgbA1c 5.5  C/w sliding scale

## 2020-10-14 NOTE — PROGRESS NOTE ADULT - PROBLEM SELECTOR PLAN 3
S/p mechanical fall at home  Recently admitted to [facility] in July 2020 s/p mechanical fall and was discharged to rehab for about 60 days with home PT  X-ray of left hip with no fracture  PT eval:  no need for physical therapy CT abd: A 9 mm lucency in the tail the pancreas may represent a focus of side branch intraductal papillary mucinous neoplasm.   GI consulted, recommended MRI if family agreeable to proceed with further workup   spoke with daughter Zoey Nunn at [phone] she prefers outpatient MRCP

## 2020-10-14 NOTE — PROGRESS NOTE ADULT - PROBLEM SELECTOR PLAN 1
CT Abdomen and Pelvis   Tree in bud nodules in the right middle lobe of the lung suggests an infectious bronchiolitis  continue cefepime   f/u blood culture   Dr pierson ID consulted, will follow recommendations C/w Ciprofloxacin   Blood neg  ID Dr. Baptiste on board

## 2020-10-14 NOTE — PROGRESS NOTE ADULT - PROBLEM SELECTOR PLAN 10
From home (lives alone), with HHA (9a-1p)  D/c planning pending PT eval  CM following
From home (lives alone), with HHA (9a-1p)  D/c planning: home  CM following
From home (lives alone), with HHA (9a-1p)  D/c planning: home  CM following

## 2020-10-14 NOTE — PROGRESS NOTE ADULT - PROBLEM SELECTOR PLAN 6
Controlled  On Janumet BID at home  HgbA1c 5.5  C/w sliding scale Normotensive  On Amlodopine 5 mg QD at home  Held secondary to sepsis  Monitor BP and restart BP meds as warranted  DASH/TLC diet

## 2020-10-14 NOTE — PROGRESS NOTE ADULT - PROBLEM SELECTOR PLAN 4
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   GI consulted, recommended MRI if family agreeable to proceed with further workup   spoke with daughter Zoey Nunn at [phone] she prefers outpatient MRCP Hx of CAD s/p stents  Echo on 7/10 showed hypokinetic distal septum and apex  (EF =  50-55%). Grade II diastolic dysfunction.  C/w Plavix and Atorvastatin

## 2020-10-14 NOTE — PROGRESS NOTE ADULT - ASSESSMENT
Patient is a 88y old  Female from home, lives alone, walks with assistant at home, with  HHA (9am -1pm),with PMHx of HTN, DM, HLD, CAD(s/p stent) presents to the ER on 10/11/20 for evaluation of fever, vomiting and chills.  Patient denies abdominal pain , diarrhea, and  denies urinary sx. Patient c/o non specific occasional cough and dizziness. On admission, she found to have fever, and Leukocytosis. The CXR and CT abd/pelvis was not revealing.  She has given a dose of Azithromycin and Rocephin and then started on cefepime. The ID consult requested to assist with further evaluation and antibiotic management.       # SIRS ( Fever + Leukocytosis )- resolved, Blood cultures are negative   # COVID 19 negative  # Positive Urine culture- E.coli  # PCN- ( ?? Anaphylaxis)- Tolerating Cefepime     would recommend:    1. Follow up  Procalcitonin level  2. Continue Cipro until 10/17/20 if procalcitonin level is elevated > 0.25  3. OOB to chair   4. Continue supportive care    d/w Patient and covering NP      Attending Attestation:    Spent more than 45 minutes on total encounter, more than 50 % of the visit was spent counseling and/or coordinating care by the Attending physician.

## 2020-10-14 NOTE — PROGRESS NOTE ADULT - ASSESSMENT
88 year old female, from home, lives alone, walks with assistant at home, with shopping card outdoor, A (9am -1pm),with PMHx of HTN, DM, HLD, CAD(s/p stent) presented with fever, vomiting and chills. Per daughter patient was in her usual health until one day ago when she started to experience fever, chills, poor appetite, and vomiting.Patient c/o non specific occasional cough and dizziness which resulted in a fall. X-ray of left hip negative for fracture. CTH with no acute pathology. Recently admitted to  in July 2020 s/p mechanical fall and was discharged to rehab for about 60 days. Patient admits to 20-30 pounds weight loss over last 6 month due to poor oral intake in rehab. In the ED,  was febrile with leukocytosis. CXR and UA negative. CT A&P showed Tree in bud nodules in the right middle lobe of the lung suggests an infectious bronchiolitis .Pt admitted s/p mechanical fall and for sepsis work up, started on Cefepime for presumed pneumonia.  ID consulted. Blood and urine cx testing. Urine culture grew e.coli resistant to Cefepime. Cefepime discontinue and started on...  Pt seen at bedside, alert and oriented, denies any new complaints, ambulating to bathroom without assistance. 88 year old female, from home, lives alone, walks with assistant at home, with shopping card outdoor, A (9am -1pm),with PMHx of HTN, DM, HLD, CAD(s/p stent) presented with fever, vomiting and chills. Per daughter patient was in her usual health until one day ago when she started to experience fever, chills, poor appetite, and vomiting.Patient c/o non specific occasional cough and dizziness which resulted in a fall. X-ray of left hip negative for fracture. CTH with no acute pathology. Recently admitted to  in July 2020 s/p mechanical fall and was discharged to rehab for about 60 days. Patient admits to 20-30 pounds weight loss over last 6 month due to poor oral intake in rehab. In the ED,  was febrile with leukocytosis. CXR and UA negative. CT A&P showed Tree in bud nodules in the right middle lobe of the lung suggests an infectious bronchiolitis .Pt admitted s/p mechanical fall and for sepsis work up, started on Cefepime for presumed pneumonia.  ID consulted. Blood and urine cx testing. Urine culture grew e.coli resistant to Cefepime. Cefepime discontinue and started on Cipro  Pt seen at bedside, alert and oriented, denies any new complaints, ambulating to bathroom without assistance.

## 2020-10-14 NOTE — PROGRESS NOTE ADULT - PROBLEM SELECTOR PLAN 8
DVT ppx-SCDs ordered  On Plavix 2/2 CAD with stents Remains full code  Fartun Thurman (emergency contact): [phone] was updated regarding pt's plan of care.

## 2020-10-14 NOTE — PROGRESS NOTE ADULT - PROBLEM SELECTOR PLAN 7
Normotensive  On Amlodopine 5 mg QD at home  Held secondary to sepsis  Monitor BP and restart BP meds as warranted  DASH/TLC diet DVT ppx-SCDs ordered  On Plavix 2/2 CAD with stents

## 2020-10-14 NOTE — PHARMACOTHERAPY INTERVENTION NOTE - COMMENTS
Age Friendly Medication Audit: Patient's medication list reviewed for appropriate indication and dosages for older adults. No recommendations at this time.

## 2020-10-14 NOTE — PROGRESS NOTE ADULT - PROBLEM SELECTOR PLAN 9
Remains full code  Fartun Thurman (emergency contact): [phone] was updated regarding pt's plan of care. From home (lives alone), with HHA (9a-1p)  D/c planning: home  CM following

## 2020-10-14 NOTE — PROGRESS NOTE ADULT - PROBLEM SELECTOR PLAN 2
resolved, no leucocytosis, afebrile   CT A&P-tree nodules in RML, concern for infectious bronchiolitis  C/w Cefepime  Blood neg  Urine culture + ecoli   ID Dr. Baptiste on board S/p mechanical fall at home  Recently admitted to [facility] in July 2020 s/p mechanical fall and was discharged to rehab for about 60 days with home PT  X-ray of left hip with no fracture  PT eval:  no need for physical therapy

## 2020-10-14 NOTE — PROGRESS NOTE ADULT - SUBJECTIVE AND OBJECTIVE BOX
Patient is seen and examined at the bed side, is afebrile. She is doing better. The blood cultures remains negative and urine  culture grew ESBLL  E.coli.         REVIEW OF SYSTEMS: All other review systems are negative        ALLERGIES: penicillin (Anaphylaxis) -- Tolerating Cefepime   aspirin (Anaphylaxis), Celebrex (Rash)        Vital Signs Last 24 Hrs  T(C): 36.6 (14 Oct 2020 13:40), Max: 36.7 (14 Oct 2020 05:00)  T(F): 97.8 (14 Oct 2020 13:40), Max: 98.1 (14 Oct 2020 05:00)  HR: 60 (14 Oct 2020 17:20) (52 - 60)  BP: 176/61 (14 Oct 2020 17:20) (155/59 - 176/61)  BP(mean): --  RR: 17 (14 Oct 2020 13:40) (17 - 18)  SpO2: 95% (14 Oct 2020 13:40) (95% - 99%)      PHYSICAL EXAM:  GENERAL: Not in distress   CHEST/LUNG: Not using accessory muscles   HEART: s1 and s2 present  ABDOMEN:  Nontender and  Nondistended  EXTREMITIES: No pedal  edema  CNS: Awake and Alert        LABS:                        10.0   5.86  )-----------( 197      ( 14 Oct 2020 06:37 )             30.8                           9.3    5.96  )-----------( 168      ( 13 Oct 2020 07:21 )             28.4         10-14    140  |  109<H>  |  6<L>  ----------------------------<  103<H>  3.3<L>   |  26  |  0.72    Ca    8.9      14 Oct 2020 06:37  Phos  2.3     10-13  Mg     2.0     10-13      10-13    142  |  110<H>  |  9   ----------------------------<  79  3.7   |  27  |  0.79    Ca    8.9      13 Oct 2020 07:21  Phos  2.3     10-13  Mg     2.0     10-13    TPro  6.5  /  Alb  3.2<L>  /  TBili  0.6  /  DBili  x   /  AST  18  /  ALT  14  /  AlkPhos  63  10-11        CAPILLARY BLOOD GLUCOSE  POCT Blood Glucose.: 99 mg/dL (13 Oct 2020 11:23)  POCT Blood Glucose.: 90 mg/dL (13 Oct 2020 07:35)  POCT Blood Glucose.: 92 mg/dL (12 Oct 2020 21:25)  POCT Blood Glucose.: 82 mg/dL (12 Oct 2020 16:45)        Urinalysis Basic - ( 11 Oct 2020 19:53 )  Color: Yellow / Appearance: Clear / S.010 / pH: x  Gluc: x / Ketone: Negative  / Bili: Negative / Urobili: Negative   Blood: x / Protein: 30 mg/dL / Nitrite: Negative   Leuk Esterase: Negative / RBC: 2-5 /HPF / WBC 3-5 /HPF   Sq Epi: x / Non Sq Epi: Few /HPF / Bacteria: Trace /HPF          MEDICATIONS  (STANDING):    amLODIPine   Tablet 5 milliGRAM(s) Oral daily  atorvastatin 40 milliGRAM(s) Oral at bedtime  ciprofloxacin   IVPB 400 milliGRAM(s) IV Intermittent every 12 hours  clopidogrel Tablet 75 milliGRAM(s) Oral daily  dextrose 5%. 1000 milliLiter(s) (50 mL/Hr) IV Continuous <Continuous>  dextrose 50% Injectable 12.5 Gram(s) IV Push once  dextrose 50% Injectable 25 Gram(s) IV Push once  dextrose 50% Injectable 25 Gram(s) IV Push once  donepezil 10 milliGRAM(s) Oral at bedtime  FLUoxetine 10 milliGRAM(s) Oral daily  folic acid 1 milliGRAM(s) Oral daily  insulin lispro (HumaLOG) corrective regimen sliding scale   SubCutaneous three times a day before meals  insulin lispro (HumaLOG) corrective regimen sliding scale   SubCutaneous at bedtime  memantine 5 milliGRAM(s) Oral two times a day  metoprolol tartrate 25 milliGRAM(s) Oral two times a day          RADIOLOGY & ADDITIONAL TESTS:    10/12/20 : CT Abdomen and Pelvis w/ Oral Cont and w/ IV Cont (10.12.20 @ 00:34) A focus of air in the urinary bladder may be iatrogenic. Correlate with urinalysis for the possibility of cystitis.  Tree in bud nodules in the right middle lobe of the lung suggests an infectious bronchiolitis.  A 1.1 cm indeterminate right anterior hypodensity in the kidney may be further characterized with ultrasound or MRI.  Cholecystectomy. No small bowel obstruction.  A 9 mm lucency in the tail the pancreas may represent a focus of side branch intraductal papillary mucinous neoplasm. If clinically indicated, further characterization with MRI/MRCP may be considered.      10/11/20 : Xray Femur 2 Views, Left (10.11.20 @ 20:22) IMPRESSION: No acute bony pathology.    Note - This does not exclude fractures in perfect alignment and apposition as presence may be indicated at one to three weeks following callus formation.      10/11/20 : Xray Chest 1 View AP/PA (10.11.20 @ 20:20) No active pulmonary disease.      10/11/20 : CT Head No Cont (10.11.20 @ 19:57) No acute intracranial findings.        MICROBIOLOGY  DATA:    mUrine Microscopic-Add On (NC) (10.14.20 @ 16:21)   Red Blood Cell - Urine: 2-5 /HPF   White Blood Cell - Urine: 0-2 /HPF   Bacteria: Few /HPF   Epithelial Cells: Few /HPF     Culture - Urine (10.12.20 @ 00:38)   - Amikacin: S <=16   - Amoxicillin/Clavulanic Acid: S <=8/4   - Ampicillin: R >16 These ampicillin results predict results for amoxicillin   - Ampicillin/Sulbactam: I  Enterobacter, Citrobacter, and Serratia may develop resistance during prolonged therapy (3-4 days)   - Aztreonam: R >16   - Cefazolin: R >16 (MIC_CL_COM_ENTERIC_CEFAZU) For uncomplicated UTI with K. pneumoniae, E. coli, or P. mirablis: ARNOLD <=16 is sensitive and ARNOLD >=32 is resistant. This also predicts results for oral agents cefaclor, cefdinir, cefpodoxime, cefprozil, cefuroxime axetil, cephalexin and locarbef for uncomplicated UTI. Note that some isolates may be susceptible to these agents while testing resistant to cefazolin.   - Cefepime: R >16   - Cefoxitin: S <=8   - Ceftriaxone: R >32 Enterobacter, Citrobacter, and Serratia may develop resistance during prolonged therapy   - Ciprofloxacin: S <=0.25   - Ertapenem: S <=0.5   - Gentamicin: S <=2   - Imipenem: S <=1   - Levofloxacin: S <=0.5   - Meropenem: S <=1   - Nitrofurantoin: S <=32 Should not be used to treat pyelonephritis   - Piperacillin/Tazobactam: S <=8   - Tigecycline: S <=2   - Tobramycin: S <=2   - Trimethoprim/Sulfamethoxazole: S <=0.5/9.5   Specimen Source: .Urine Clean Catch (Midstream)   Culture Results:   10,000 - 49,000 CFU/mL Escherichia coli ESBL   <10,000 CFU/ml Normal Urogenital mathew present   Organism Identification: Escherichia coli ESBL   Organism: Escherichia coli ESBL   Method Type: ARNOLD     Culture - Blood (10.12.20 @ 05:59)   Specimen Source: .Blood Blood-Peripheral   Culture Results:   No growth to date.     Culture - Blood (10.12.20 @ 05:59)   Specimen Source: .Blood Blood-Peripheral   Culture Results:   No growth to date.     Culture - Urine (10.12.20 @ 00:38)   Specimen Source: .Urine Clean Catch (Midstream)   Culture Results:   10,000 - 49,000 CFU/mL Escherichia coli   <10,000 CFU/ml Normal Urogenital mathew present Respiratory Viral Panel + COVID-19 by BREN (10.11.20 @ 21:27)   Rapid RVP Result: NotDete   SARS-CoV-2: NotDete: This Respiratory Panel uses polymerase chain reaction (PCR) to detect for   adenovirus; coronavirus (HKU1, NL63, 229E, OC43); human metapneumovirus   (hMPV); human enterovirus/rhinovirus (Entero/RV); influenza A; influenza   A/H1; influenza A/H3; influenza A/H1-2009; influenza B; parainfluenza   viruses 1, 2, 3, 4; respiratory syncytial virus; Mycoplasma pneumoniae;   Chlamydophila pneumoniae; and SARS-CoV-2.

## 2020-10-15 ENCOUNTER — TRANSCRIPTION ENCOUNTER (OUTPATIENT)
Age: 85
End: 2020-10-15

## 2020-10-15 LAB
ANION GAP SERPL CALC-SCNC: 7 MMOL/L — SIGNIFICANT CHANGE UP (ref 5–17)
BUN SERPL-MCNC: 9 MG/DL — SIGNIFICANT CHANGE UP (ref 7–18)
CALCIUM SERPL-MCNC: 9.2 MG/DL — SIGNIFICANT CHANGE UP (ref 8.4–10.5)
CHLORIDE SERPL-SCNC: 111 MMOL/L — HIGH (ref 96–108)
CO2 SERPL-SCNC: 25 MMOL/L — SIGNIFICANT CHANGE UP (ref 22–31)
CREAT SERPL-MCNC: 0.82 MG/DL — SIGNIFICANT CHANGE UP (ref 0.5–1.3)
GLUCOSE BLDC GLUCOMTR-MCNC: 100 MG/DL — HIGH (ref 70–99)
GLUCOSE BLDC GLUCOMTR-MCNC: 123 MG/DL — HIGH (ref 70–99)
GLUCOSE BLDC GLUCOMTR-MCNC: 85 MG/DL — SIGNIFICANT CHANGE UP (ref 70–99)
GLUCOSE BLDC GLUCOMTR-MCNC: 99 MG/DL — SIGNIFICANT CHANGE UP (ref 70–99)
GLUCOSE SERPL-MCNC: 85 MG/DL — SIGNIFICANT CHANGE UP (ref 70–99)
HCT VFR BLD CALC: 29.4 % — LOW (ref 34.5–45)
HGB BLD-MCNC: 10.2 G/DL — LOW (ref 11.5–15.5)
MCHC RBC-ENTMCNC: 30.3 PG — SIGNIFICANT CHANGE UP (ref 27–34)
MCHC RBC-ENTMCNC: 34.7 GM/DL — SIGNIFICANT CHANGE UP (ref 32–36)
MCV RBC AUTO: 87.2 FL — SIGNIFICANT CHANGE UP (ref 80–100)
NRBC # BLD: 0 /100 WBCS — SIGNIFICANT CHANGE UP (ref 0–0)
PLATELET # BLD AUTO: 197 K/UL — SIGNIFICANT CHANGE UP (ref 150–400)
POTASSIUM SERPL-MCNC: 3.6 MMOL/L — SIGNIFICANT CHANGE UP (ref 3.5–5.3)
POTASSIUM SERPL-SCNC: 3.6 MMOL/L — SIGNIFICANT CHANGE UP (ref 3.5–5.3)
PROCALCITONIN SERPL-MCNC: 0.11 NG/ML — HIGH (ref 0.02–0.1)
RBC # BLD: 3.37 M/UL — LOW (ref 3.8–5.2)
RBC # FLD: 12.5 % — SIGNIFICANT CHANGE UP (ref 10.3–14.5)
SODIUM SERPL-SCNC: 143 MMOL/L — SIGNIFICANT CHANGE UP (ref 135–145)
WBC # BLD: 5.7 K/UL — SIGNIFICANT CHANGE UP (ref 3.8–10.5)
WBC # FLD AUTO: 5.7 K/UL — SIGNIFICANT CHANGE UP (ref 3.8–10.5)

## 2020-10-15 RX ADMIN — Medication 200 MILLIGRAM(S): at 06:02

## 2020-10-15 RX ADMIN — Medication 1 MILLIGRAM(S): at 12:21

## 2020-10-15 RX ADMIN — CLOPIDOGREL BISULFATE 75 MILLIGRAM(S): 75 TABLET, FILM COATED ORAL at 12:21

## 2020-10-15 RX ADMIN — Medication 3 MILLIGRAM(S): at 21:47

## 2020-10-15 RX ADMIN — MEMANTINE HYDROCHLORIDE 5 MILLIGRAM(S): 10 TABLET ORAL at 06:02

## 2020-10-15 RX ADMIN — Medication 25 MILLIGRAM(S): at 06:02

## 2020-10-15 RX ADMIN — Medication 25 MILLIGRAM(S): at 17:59

## 2020-10-15 RX ADMIN — Medication 200 MILLIGRAM(S): at 17:56

## 2020-10-15 RX ADMIN — DONEPEZIL HYDROCHLORIDE 10 MILLIGRAM(S): 10 TABLET, FILM COATED ORAL at 21:47

## 2020-10-15 RX ADMIN — MEMANTINE HYDROCHLORIDE 5 MILLIGRAM(S): 10 TABLET ORAL at 17:56

## 2020-10-15 RX ADMIN — AMLODIPINE BESYLATE 5 MILLIGRAM(S): 2.5 TABLET ORAL at 06:02

## 2020-10-15 RX ADMIN — Medication 10 MILLIGRAM(S): at 12:21

## 2020-10-15 RX ADMIN — ATORVASTATIN CALCIUM 40 MILLIGRAM(S): 80 TABLET, FILM COATED ORAL at 21:47

## 2020-10-15 NOTE — PROGRESS NOTE ADULT - ASSESSMENT
Patient is a 88y old  Female from home, lives alone, walks with assistant at home, with  HHA (9am -1pm),with PMHx of HTN, DM, HLD, CAD(s/p stent) presents to the ER on 10/11/20 for evaluation of fever, vomiting and chills.  Patient denies abdominal pain , diarrhea, and  denies urinary sx. Patient c/o non specific occasional cough and dizziness. On admission, she found to have fever, and Leukocytosis. The CXR and CT abd/pelvis was not revealing.  She has given a dose of Azithromycin and Rocephin and then started on cefepime. The ID consult requested to assist with further evaluation and antibiotic management.       # SIRS ( Fever + Leukocytosis )- resolved, Blood cultures are negative   # COVID 19 negative  # Positive Urine culture- E.coli in the setting of negative UA  # PCN- ( ?? Anaphylaxis)- Tolerating Cefepime     would recommend:    1. Please discontinue Cipro since procalcitonin level is low and negative UA  2. OOB to chair   3. Continue supportive care      Attending Attestation:    Spent more than 35 minutes on total encounter, more than 50 % of the visit was spent counseling and/or coordinating care by the Attending physician.

## 2020-10-15 NOTE — PROGRESS NOTE ADULT - PROBLEM SELECTOR PLAN 2
S/p mechanical fall at home  Recently admitted to [facility] in July 2020 s/p mechanical fall and was discharged to rehab for about 60 days with home PT  X-ray of left hip with no fracture  PT eval:  no need for physical therapy

## 2020-10-15 NOTE — PROGRESS NOTE ADULT - PROBLEM SELECTOR PLAN 9
From home (lives alone), with HHA (9a-1p)  D/c planning: home  discharged delayed, pt daughter unable to take  her mother home until sunday  due to personal medical issues   CM following

## 2020-10-15 NOTE — DISCHARGE NOTE PROVIDER - CARE PROVIDER_API CALL
Lorena Chacko)  Internal Medicine  [address] [place] [address]  Phone: [phone]  Fax: [phone]  Follow Up Time:

## 2020-10-15 NOTE — DISCHARGE NOTE PROVIDER - NSDCCPCAREPLAN_GEN_ALL_CORE_FT
PRINCIPAL DISCHARGE DIAGNOSIS  Diagnosis: Urinary tract infection  Assessment and Plan of Treatment: You were admitted to the hospital with fever and disgnosed with urinary tract infection. You were treated with antibiotic. HOME CARE INSTRUCTIONS  Drink enough water and fluids to keep your urine clear or pale yellow.  Avoid caffeine, tea, and carbonated beverages. They tend to irritate your bladder.  Empty your bladder often. Avoid holding urine for long periods of time.  SEEK MEDICAL CARE IF:  You have back pain.  You develop a fever.  Your symptoms do not begin to resolve within 3 days.  SEEK IMMEDIATE MEDICAL CARE IF:  You have severe back pain or lower abdominal pain.  You develop chills.  You have nausea or vomiting.  You have continued burning or discomfort with urination.      SECONDARY DISCHARGE DIAGNOSES  Diagnosis: Pneumonia  Assessment and Plan of Treatment: CT scan showed: Tree in bud nodules in the right middle lobe of the lung suggests an infectious bronchiolitis. You were treated with antibiotics   Follow up with your doctor for reevalaution       Diagnosis: Pancreatic lesion  Assessment and Plan of Treatment: A 9 mm lesion  was incidently found in the tail the pancreas. That may represent a neoplasm. further characterization with MRI/MRCP should be considered. Please follow up your doctor for further evaluaiton and managemet

## 2020-10-15 NOTE — PROGRESS NOTE ADULT - SUBJECTIVE AND OBJECTIVE BOX
Subjective:   No complaints     Objective:    MEDICATIONS  (STANDING):  amLODIPine   Tablet 5 milliGRAM(s) Oral daily  atorvastatin 40 milliGRAM(s) Oral at bedtime  ciprofloxacin   IVPB 400 milliGRAM(s) IV Intermittent every 12 hours  clopidogrel Tablet 75 milliGRAM(s) Oral daily  dextrose 5%. 1000 milliLiter(s) (50 mL/Hr) IV Continuous <Continuous>  dextrose 50% Injectable 25 Gram(s) IV Push once  dextrose 50% Injectable 25 Gram(s) IV Push once  dextrose 50% Injectable 12.5 Gram(s) IV Push once  donepezil 10 milliGRAM(s) Oral at bedtime  FLUoxetine 10 milliGRAM(s) Oral daily  folic acid 1 milliGRAM(s) Oral daily  insulin lispro (HumaLOG) corrective regimen sliding scale   SubCutaneous three times a day before meals  insulin lispro (HumaLOG) corrective regimen sliding scale   SubCutaneous at bedtime  memantine 5 milliGRAM(s) Oral two times a day  metoprolol tartrate 25 milliGRAM(s) Oral two times a day    MEDICATIONS  (PRN):  dextrose 40% Gel 15 Gram(s) Oral once PRN Blood Glucose LESS THAN 70 milliGRAM(s)/deciliter  glucagon  Injectable 1 milliGRAM(s) IntraMuscular once PRN Glucose LESS THAN 70 milligrams/deciliter  melatonin 3 milliGRAM(s) Oral at bedtime PRN Sleep  ondansetron Injectable 4 milliGRAM(s) IV Push every 6 hours PRN Nausea and/or Vomiting              Vital Signs Last 24 Hrs  T(C): 36.7 (15 Oct 2020 05:28), Max: 36.7 (14 Oct 2020 21:50)  T(F): 98 (15 Oct 2020 05:28), Max: 98 (14 Oct 2020 21:50)  HR: 56 (15 Oct 2020 05:28) (52 - 60)  BP: 169/43 (15 Oct 2020 05:28) (169/43 - 188/60)  BP(mean): --  RR: 17 (15 Oct 2020 05:28) (16 - 17)  SpO2: 98% (15 Oct 2020 05:28) (98% - 98%)      General:  Well developed, well nourished, alert and active, no pallor, NAD.  HEENT:    Normal appearance of conjunctiva, ears, nose, lips, oropharynx, and oral mucosa, anicteric.  Neck:  No masses, no asymmetry.  Lymph Nodes:  No lymphadenopathy.   Cardiovascular:  RRR normal S1/S2, no murmur.  Respiratory:  CTA B/L, normal respiratory effort.   Abdominal:   soft, no masses or tenderness, normoactive BS, NT/ND, no HSM.  Extremities:   No clubbing or cyanosis, normal capillary refill, no edema.   Skin:   No rash, jaundice, lesions, eczema.   Musculoskeletal:  No joint swelling, erythema or tenderness.   Neuro: No focal deficits.   Other:       LABS:                        10.2   5.70  )-----------( 197      ( 15 Oct 2020 06:12 )             29.4     10-15    143  |  111<H>  |  9   ----------------------------<  85  3.6   |  25  |  0.82    Ca    9.2      15 Oct 2020 06:12        Urinalysis Basic - ( 14 Oct 2020 16:21 )    Color: Yellow / Appearance: Clear / S.005 / pH: x  Gluc: x / Ketone: Negative  / Bili: Negative / Urobili: Negative   Blood: x / Protein: 15 / Nitrite: Negative   Leuk Esterase: Negative / RBC: 2-5 /HPF / WBC 0-2 /HPF   Sq Epi: x / Non Sq Epi: Few /HPF / Bacteria: Few /HPF        RADIOLOGY & ADDITIONAL TESTS:

## 2020-10-15 NOTE — PROGRESS NOTE ADULT - ASSESSMENT
88 year old female, from home, lives alone, walks with assistant at home, with shopping card outdoor, HHA (9am -1pm),with PMHx of HTN, DM, HLD, CAD(s/p stent) presented with fever, vomiting and chills. Per daughter patient was in her usual health until one day ago when she started to experience fever, chills, poor appetite, and vomiting.Patient c/o non specific occasional cough and dizziness which resulted in a fall. X-ray of left hip negative for fracture. CTH with no acute pathology. Recently admitted to  in July 2020 s/p mechanical fall and was discharged to rehab for about 60 days. Patient admits to 20-30 pounds weight loss over last 6 month due to poor oral intake in rehab. In the ED,  was febrile with leukocytosis. CXR and UA negative. CT A&P showed Tree in bud nodules in the right middle lobe of the lung suggests an infectious bronchiolitis .Pt admitted s/p mechanical fall and for sepsis work up, started on Cefepime for presumed pneumonia.  ID consulted. Blood and urine cx testing. Urine culture grew e.coli  started on Cipro  Pt seen at bedside, alert and oriented, denies any new complaints, ambulating to bathroom without assistance. Medically optimized for discharge.    88 year old female, from home, lives alone, walks with assistant at home, with shopping card outdoor, A (9am -1pm),with PMHx of HTN, DM, HLD, CAD(s/p stent) presented with fever, vomiting and chills. Per daughter patient was in her usual health until one day ago when she started to experience fever, chills, poor appetite, and vomiting.Patient c/o non specific occasional cough and dizziness which resulted in a fall. X-ray of left hip negative for fracture. CTH with no acute pathology. Recently admitted to  in July 2020 s/p mechanical fall and was discharged to rehab for about 60 days. Patient admits to 20-30 pounds weight loss over last 6 month due to poor oral intake in rehab. In the ED,  was febrile with leukocytosis. CXR and UA negative. CT A&P showed Tree in bud nodules in the right middle lobe of the lung suggests an infectious bronchiolitis .Pt admitted s/p mechanical fall and for sepsis work up, started on Cefepime for presumed pneumonia.  ID consulted. Blood and urine cx testing. Urine culture grew e.coli  started on Cipro

## 2020-10-15 NOTE — DISCHARGE NOTE PROVIDER - NSDCMRMEDTOKEN_GEN_ALL_CORE_FT
Actamin 325 mg oral tablet: 2 tab(s) orally every 6 hours, As Needed - 6)  amLODIPine 5 mg oral tablet: 1 tab(s) orally once a day  aspirin 81 mg oral tablet, chewable: 1 tab(s) orally once a day  atorvastatin 40 mg oral tablet: 1 tab(s) orally once a day (at bedtime)  clopidogrel 75 mg oral tablet: 1 tab(s) orally once a day  donepezil 10 mg oral tablet: 1 tab(s) orally once a day (at bedtime)  enoxaparin: 40 milligram(s) subcutaneous once a day til 07/25/2020   ezetimibe 10 mg oral tablet: 1 tab(s) orally once a day  FLUoxetine 10 mg oral capsule: 1 cap(s) orally once a day  folic acid 1 mg oral tablet: 1 tab(s) orally once a day  Janumet 50 mg-500 mg oral tablet: 1 tab(s) orally 2 times a day  memantine 5 mg oral tablet: 1 tab(s) orally 2 times a day  Metoprolol Tartrate 25 mg oral tablet: 0.5 tab(s) orally 2 times a day  oxyCODONE: 2.5 milligram(s) orally every 8 hours, As Needed  polyethylene glycol 3350 oral powder for reconstitution: 17 gram(s) orally once a day  senna oral tablet: 2 tab(s) orally once a day (at bedtime)   Actamin 325 mg oral tablet: 2 tab(s) orally every 6 hours, As Needed - 6)  amLODIPine 5 mg oral tablet: 1 tab(s) orally once a day  aspirin 81 mg oral tablet, chewable: 1 tab(s) orally once a day  atorvastatin 40 mg oral tablet: 1 tab(s) orally once a day (at bedtime)  clopidogrel 75 mg oral tablet: 1 tab(s) orally once a day  donepezil 10 mg oral tablet: 1 tab(s) orally once a day (at bedtime)  ezetimibe 10 mg oral tablet: 1 tab(s) orally once a day  FLUoxetine 10 mg oral capsule: 1 cap(s) orally once a day  folic acid 1 mg oral tablet: 1 tab(s) orally once a day  Janumet 50 mg-500 mg oral tablet: 1 tab(s) orally 2 times a day  memantine 5 mg oral tablet: 1 tab(s) orally 2 times a day  Metoprolol Tartrate 25 mg oral tablet: 0.5 tab(s) orally 2 times a day  polyethylene glycol 3350 oral powder for reconstitution: 17 gram(s) orally once a day  senna oral tablet: 2 tab(s) orally once a day (at bedtime)

## 2020-10-15 NOTE — DISCHARGE NOTE PROVIDER - HOSPITAL COURSE
88 year old female, from home, lives alone, walks with assistant at home, with shopping card outdoor, A (9am -1pm),with PMHx of HTN, DM, HLD, CAD(s/p stent) presented with fever, vomiting and chills. Per daughter patient was in her usual health until one day ago when she started to experience fever, chills, poor appetite, and vomiting. Patient c/o non specific occasional cough and dizziness which resulted in a fall. X-ray of left hip negative for fracture. CTH with no acute pathology. Recently admitted to  in July 2020 s/p mechanical fall and was discharged to rehab for about 60 days. Patient admits to 20-30 pounds weight loss over last 6 month due to poor oral intake in rehab. In the ED,  was febrile with leukocytosis. CXR and UA negative. CT A&P showed Tree in bud nodules in the right middle lobe of the lung suggests an infectious bronchiolitis . She has given a dose of Azithromycin and Rocephin and then started on cefepime.   Urine culture grew E coli.  ID consulted for further assistance with antibiotic therapy. Cefepime discontinue and patient was treated with Ciprofloxacin   A 9 mm lesion  was incidentally found in the tail the pancreas. That may represent a neoplasm. further characterization with MRI/MRCP should be considered.   Please follow up your doctor for further evaluaiton and managemet   Patient improved clinically and is medically optimized for discharge                            88 year old female, from home, lives alone, walks with assistant at home, with shopping card outdoor, A (9am -1pm),with PMHx of HTN, DM, HLD, CAD(s/p stent) presented with fever, vomiting and chills. Per daughter patient was in her usual health until one day ago when she started to experience fever, chills, poor appetite, and vomiting. Patient c/o non specific occasional cough and dizziness which resulted in a fall. X-ray of left hip negative for fracture. CTH with no acute pathology. Recently admitted to  in July 2020 s/p mechanical fall and was discharged to rehab for about 60 days. Patient admits to 20-30 pounds weight loss over last 6 month due to poor oral intake in rehab. In the ED,  was febrile with leukocytosis. CXR and UA negative. CT A&P showed Tree in bud nodules in the right middle lobe of the lung suggests an infectious bronchiolitis . She has given a dose of Azithromycin and Rocephin and then started on cefepime.   Urine culture grew E coli.  ID consulted for further assistance with antibiotic therapy. Cefepime discontinue and patient was treated with Ciprofloxacin   A 9 mm lesion  was incidentally found in the tail the pancreas. That may represent a neoplasm. further characterization with MRI/MRCP should be considered.   As per daughter request pt to follow up on outpatient bases for further evaluation and management  of the lesion   Patient improved clinically and is medically optimized for discharge

## 2020-10-15 NOTE — PROGRESS NOTE ADULT - PROBLEM SELECTOR PLAN 3
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   GI consulted, recommended MRI if family agreeable to proceed with further workup   spoke with daughter Zoey Nunn at [phone] she prefers outpatient MRCP

## 2020-10-15 NOTE — PROGRESS NOTE ADULT - PROBLEM SELECTOR PLAN 8
Remains full code  Fartun Thurman (emergency contact): [phone] was updated regarding pt's plan of care.

## 2020-10-15 NOTE — DISCHARGE NOTE NURSING/CASE MANAGEMENT/SOCIAL WORK - PATIENT PORTAL LINK FT
You can access the FollowMyHealth Patient Portal offered by [facility] by registering at the following website: [email or web address]. By joining [facility]’s FollowMyHealth portal, you will also be able to view your health information using other applications (apps) compatible with our system.

## 2020-10-15 NOTE — PROGRESS NOTE ADULT - SUBJECTIVE AND OBJECTIVE BOX
Patient is seen and examined at the bed side, is afebrile. No new events. The Procalcitonin level is 0.11.        REVIEW OF SYSTEMS: All other review systems are negative        ALLERGIES: penicillin (Anaphylaxis) -- Tolerating Cefepime   aspirin (Anaphylaxis), Celebrex (Rash)      Vital Signs Last 24 Hrs  T(C): 36.2 (15 Oct 2020 14:13), Max: 36.7 (14 Oct 2020 21:50)  T(F): 97.1 (15 Oct 2020 14:13), Max: 98 (14 Oct 2020 21:50)  HR: 49 (15 Oct 2020 14:13) (49 - 60)  BP: 160/61 (15 Oct 2020 14:13) (160/61 - 188/60)  BP(mean): --  RR: 16 (15 Oct 2020 14:13) (16 - 17)  SpO2: 98% (15 Oct 2020 14:13) (98% - 98%)        PHYSICAL EXAM:  GENERAL: Not in distress   CHEST/LUNG: Not using accessory muscles   HEART: s1 and s2 present  ABDOMEN:  Nontender and  Nondistended  EXTREMITIES: No pedal  edema  CNS: Awake and Alert        LABS:                        10.2   5.70  )-----------( 197      ( 15 Oct 2020 06:12 )             29.4                           10.0   5.86  )-----------( 197      ( 14 Oct 2020 06:37 )             30.8                 10-15    143  |  111<H>  |  9   ----------------------------<  85  3.6   |  25  |  0.82    Ca    9.2      15 Oct 2020 06:12      10-14    140  |  109<H>  |  6<L>  ----------------------------<  103<H>  3.3<L>   |  26  |  0.72    Ca    8.9      14 Oct 2020 06:37  Phos  2.3     10-13  Mg     2.0     10-13      TPro  6.5  /  Alb  3.2<L>  /  TBili  0.6  /  DBili  x   /  AST  18  /  ALT  14  /  AlkPhos  63  10-11        CAPILLARY BLOOD GLUCOSE  POCT Blood Glucose.: 99 mg/dL (13 Oct 2020 11:23)  POCT Blood Glucose.: 90 mg/dL (13 Oct 2020 07:35)  POCT Blood Glucose.: 92 mg/dL (12 Oct 2020 21:25)  POCT Blood Glucose.: 82 mg/dL (12 Oct 2020 16:45)        Urinalysis Basic - ( 11 Oct 2020 19:53 )  Color: Yellow / Appearance: Clear / S.010 / pH: x  Gluc: x / Ketone: Negative  / Bili: Negative / Urobili: Negative   Blood: x / Protein: 30 mg/dL / Nitrite: Negative   Leuk Esterase: Negative / RBC: 2-5 /HPF / WBC 3-5 /HPF     Procalcitonin, Serum (10.14.20 @ 19:53)   Procalcitonin, Serum: 0.11: Procalcitonin (PCT) Interpretation (ng/mL) - Diagnosis of systemic   bacterial infection/sepsis Sq Epi: x / Non Sq Epi: Few /HPF / Bacteria: Trace /HPF        MEDICATIONS  (STANDING):    amLODIPine   Tablet 5 milliGRAM(s) Oral daily  atorvastatin 40 milliGRAM(s) Oral at bedtime  ciprofloxacin   IVPB 400 milliGRAM(s) IV Intermittent every 12 hours  clopidogrel Tablet 75 milliGRAM(s) Oral daily  dextrose 50% Injectable 25 Gram(s) IV Push once  donepezil 10 milliGRAM(s) Oral at bedtime  FLUoxetine 10 milliGRAM(s) Oral daily  folic acid 1 milliGRAM(s) Oral daily  insulin lispro (HumaLOG) corrective regimen sliding scale   SubCutaneous three times a day before meals  insulin lispro (HumaLOG) corrective regimen sliding scale   SubCutaneous at bedtime  memantine 5 milliGRAM(s) Oral two times a day  metoprolol tartrate 25 milliGRAM(s) Oral two times a day        RADIOLOGY & ADDITIONAL TESTS:    10/12/20 : CT Abdomen and Pelvis w/ Oral Cont and w/ IV Cont (10.12.20 @ 00:34) A focus of air in the urinary bladder may be iatrogenic. Correlate with urinalysis for the possibility of cystitis.  Tree in bud nodules in the right middle lobe of the lung suggests an infectious bronchiolitis.  A 1.1 cm indeterminate right anterior hypodensity in the kidney may be further characterized with ultrasound or MRI.  Cholecystectomy. No small bowel obstruction.  A 9 mm lucency in the tail the pancreas may represent a focus of side branch intraductal papillary mucinous neoplasm. If clinically indicated, further characterization with MRI/MRCP may be considered.      10/11/20 : Xray Femur 2 Views, Left (10.11.20 @ 20:22) IMPRESSION: No acute bony pathology.    Note - This does not exclude fractures in perfect alignment and apposition as presence may be indicated at one to three weeks following callus formation.      10/11/20 : Xray Chest 1 View AP/PA (10.11.20 @ 20:20) No active pulmonary disease.      10/11/20 : CT Head No Cont (10.11.20 @ 19:57) No acute intracranial findings.        MICROBIOLOGY  DATA:    mUrine Microscopic-Add On (NC) (10.14.20 @ 16:21)   Red Blood Cell - Urine: 2-5 /HPF   White Blood Cell - Urine: 0-2 /HPF   Bacteria: Few /HPF   Epithelial Cells: Few /HPF     Culture - Urine (10.12.20 @ 00:38)   - Amikacin: S <=16   - Amoxicillin/Clavulanic Acid: S <=8/4   - Ampicillin: R >16 These ampicillin results predict results for amoxicillin   - Ampicillin/Sulbactam: I 16/ Enterobacter, Citrobacter, and Serratia may develop resistance during prolonged therapy (3-4 days)   - Aztreonam: R >16   - Cefazolin: R >16 (MIC_CL_COM_ENTERIC_CEFAZU) For uncomplicated UTI with K. pneumoniae, E. coli, or P. mirablis: ARNOLD <=16 is sensitive and ARNOLD >=32 is resistant. This also predicts results for oral agents cefaclor, cefdinir, cefpodoxime, cefprozil, cefuroxime axetil, cephalexin and locarbef for uncomplicated UTI. Note that some isolates may be susceptible to these agents while testing resistant to cefazolin.   - Cefepime: R >16   - Cefoxitin: S <=8   - Ceftriaxone: R >32 Enterobacter, Citrobacter, and Serratia may develop resistance during prolonged therapy   - Ciprofloxacin: S <=0.25   - Ertapenem: S <=0.5   - Gentamicin: S <=2   - Imipenem: S <=1   - Levofloxacin: S <=0.5   - Meropenem: S <=1   - Nitrofurantoin: S <=32 Should not be used to treat pyelonephritis   - Piperacillin/Tazobactam: S <=8   - Tigecycline: S <=2   - Tobramycin: S <=2   - Trimethoprim/Sulfamethoxazole: S <=0.5/9.5   Specimen Source: .Urine Clean Catch (Midstream)   Culture Results:   10,000 - 49,000 CFU/mL Escherichia coli ESBL   <10,000 CFU/ml Normal Urogenital mathew present   Organism Identification: Escherichia coli ESBL   Organism: Escherichia coli ESBL   Method Type: ARNOLD     Culture - Blood (10.12.20 @ 05:59)   Specimen Source: .Blood Blood-Peripheral   Culture Results:   No growth to date.     Culture - Blood (10.12.20 @ 05:59)   Specimen Source: .Blood Blood-Peripheral   Culture Results:   No growth to date.     Culture - Urine (10.12.20 @ 00:38)   Specimen Source: .Urine Clean Catch (Midstream)   Culture Results:   10,000 - 49,000 CFU/mL Escherichia coli   <10,000 CFU/ml Normal Urogenital mathew present Respiratory Viral Panel + COVID-19 by BREN (10.11.20 @ 21:27)   Rapid RVP Result: NotDetec   SARS-CoV-2: NotDetec: This Respiratory Panel uses polymerase chain reaction (PCR) to detect for   adenovirus; coronavirus (HKU1, NL63, 229E, OC43); human metapneumovirus   (hMPV); human enterovirus/rhinovirus (Entero/RV); influenza A; influenza   A/H1; influenza A/H3; influenza A/H1-2009; influenza B; parainfluenza   viruses 1, 2, 3, 4; respiratory syncytial virus; Mycoplasma pneumoniae;   Chlamydophila pneumoniae; and SARS-CoV-2.

## 2020-10-15 NOTE — PROGRESS NOTE ADULT - SUBJECTIVE AND OBJECTIVE BOX
NP Note discussed with  primary attending    Patient is a 88y old  Female who presents with a chief complaint of Sepsis (15 Oct 2020 14:00)      INTERVAL HPI/OVERNIGHT EVENTS: no new complaints    MEDICATIONS  (STANDING):  amLODIPine   Tablet 5 milliGRAM(s) Oral daily  atorvastatin 40 milliGRAM(s) Oral at bedtime  ciprofloxacin   IVPB 400 milliGRAM(s) IV Intermittent every 12 hours  clopidogrel Tablet 75 milliGRAM(s) Oral daily  dextrose 5%. 1000 milliLiter(s) (50 mL/Hr) IV Continuous <Continuous>  dextrose 50% Injectable 12.5 Gram(s) IV Push once  dextrose 50% Injectable 25 Gram(s) IV Push once  dextrose 50% Injectable 25 Gram(s) IV Push once  donepezil 10 milliGRAM(s) Oral at bedtime  FLUoxetine 10 milliGRAM(s) Oral daily  folic acid 1 milliGRAM(s) Oral daily  insulin lispro (HumaLOG) corrective regimen sliding scale   SubCutaneous three times a day before meals  insulin lispro (HumaLOG) corrective regimen sliding scale   SubCutaneous at bedtime  memantine 5 milliGRAM(s) Oral two times a day  metoprolol tartrate 25 milliGRAM(s) Oral two times a day    MEDICATIONS  (PRN):  dextrose 40% Gel 15 Gram(s) Oral once PRN Blood Glucose LESS THAN 70 milliGRAM(s)/deciliter  glucagon  Injectable 1 milliGRAM(s) IntraMuscular once PRN Glucose LESS THAN 70 milligrams/deciliter  melatonin 3 milliGRAM(s) Oral at bedtime PRN Sleep  ondansetron Injectable 4 milliGRAM(s) IV Push every 6 hours PRN Nausea and/or Vomiting      __________________________________________________  REVIEW OF SYSTEMS:    CONSTITUTIONAL: No fever,   EYES: no acute visual disturbances  NECK: No pain or stiffness  RESPIRATORY: No cough; No shortness of breath  CARDIOVASCULAR: No chest pain, no palpitations  GASTROINTESTINAL: No pain. No nausea or vomiting; No diarrhea   NEUROLOGICAL: No headache or numbness, no tremors  MUSCULOSKELETAL: No joint pain, no muscle pain  GENITOURINARY: no dysuria, no frequency, no hesitancy  PSYCHIATRY: no depression , no anxiety  ALL OTHER  ROS negative        Vital Signs Last 24 Hrs  T(C): 36.2 (15 Oct 2020 14:13), Max: 36.7 (14 Oct 2020 21:50)  T(F): 97.1 (15 Oct 2020 14:13), Max: 98 (14 Oct 2020 21:50)  HR: 49 (15 Oct 2020 14:13) (49 - 60)  BP: 160/61 (15 Oct 2020 14:13) (160/61 - 188/60)  BP(mean): --  RR: 16 (15 Oct 2020 14:13) (16 - 17)  SpO2: 98% (15 Oct 2020 14:13) (98% - 98%)    ________________________________________________  PHYSICAL EXAM:  GENERAL: NAD  HEENT: Normocephalic;  conjunctivae and sclerae clear; moist mucous membranes;   NECK : supple  CHEST/LUNG: Clear to ausculitation bilaterally with good air entry   HEART: S1 S2  regular; no murmurs, gallops or rubs  ABDOMEN: Soft, Nontender, Nondistended; Bowel sounds present  EXTREMITIES: no cyanosis; no edema; no calf tenderness  SKIN: warm and dry; no rash  NERVOUS SYSTEM:  Awake and alert; Oriented  to place, person and time ; no new deficits    _________________________________________________  LABS:                        10.2   5.70  )-----------( 197      ( 15 Oct 2020 06:12 )             29.4     10-15    143  |  111<H>  |  9   ----------------------------<  85  3.6   |  25  |  0.82    Ca    9.2      15 Oct 2020 06:12        Urinalysis Basic - ( 14 Oct 2020 16:21 )    Color: Yellow / Appearance: Clear / S.005 / pH: x  Gluc: x / Ketone: Negative  / Bili: Negative / Urobili: Negative   Blood: x / Protein: 15 / Nitrite: Negative   Leuk Esterase: Negative / RBC: 2-5 /HPF / WBC 0-2 /HPF   Sq Epi: x / Non Sq Epi: Few /HPF / Bacteria: Few /HPF      CAPILLARY BLOOD GLUCOSE      POCT Blood Glucose.: 123 mg/dL (15 Oct 2020 12:03)  POCT Blood Glucose.: 99 mg/dL (15 Oct 2020 08:19)  POCT Blood Glucose.: 91 mg/dL (14 Oct 2020 21:13)  POCT Blood Glucose.: 98 mg/dL (14 Oct 2020 16:36)        RADIOLOGY & ADDITIONAL TESTS:    Imaging Personally Reviewed:  YES/NO    Consultant(s) Notes Reviewed:   YES/ No    Care Discussed with Consultants :     Plan of care was discussed with patient and /or primary care giver; all questions and concerns were addressed and care was aligned with patient's wishes.

## 2020-10-16 LAB
GLUCOSE BLDC GLUCOMTR-MCNC: 110 MG/DL — HIGH (ref 70–99)
GLUCOSE BLDC GLUCOMTR-MCNC: 117 MG/DL — HIGH (ref 70–99)
GLUCOSE BLDC GLUCOMTR-MCNC: 86 MG/DL — SIGNIFICANT CHANGE UP (ref 70–99)
GLUCOSE BLDC GLUCOMTR-MCNC: 93 MG/DL — SIGNIFICANT CHANGE UP (ref 70–99)

## 2020-10-16 RX ADMIN — Medication 1 MILLIGRAM(S): at 11:36

## 2020-10-16 RX ADMIN — Medication 10 MILLIGRAM(S): at 11:36

## 2020-10-16 RX ADMIN — DONEPEZIL HYDROCHLORIDE 10 MILLIGRAM(S): 10 TABLET, FILM COATED ORAL at 22:25

## 2020-10-16 RX ADMIN — Medication 25 MILLIGRAM(S): at 17:03

## 2020-10-16 RX ADMIN — MEMANTINE HYDROCHLORIDE 5 MILLIGRAM(S): 10 TABLET ORAL at 17:03

## 2020-10-16 RX ADMIN — ATORVASTATIN CALCIUM 40 MILLIGRAM(S): 80 TABLET, FILM COATED ORAL at 22:25

## 2020-10-16 RX ADMIN — Medication 25 MILLIGRAM(S): at 05:34

## 2020-10-16 RX ADMIN — Medication 200 MILLIGRAM(S): at 05:34

## 2020-10-16 RX ADMIN — AMLODIPINE BESYLATE 5 MILLIGRAM(S): 2.5 TABLET ORAL at 05:34

## 2020-10-16 RX ADMIN — CLOPIDOGREL BISULFATE 75 MILLIGRAM(S): 75 TABLET, FILM COATED ORAL at 11:36

## 2020-10-16 RX ADMIN — MEMANTINE HYDROCHLORIDE 5 MILLIGRAM(S): 10 TABLET ORAL at 05:34

## 2020-10-16 NOTE — PROGRESS NOTE ADULT - PROBLEM SELECTOR PLAN 1
treated with Ciprofloxacin. Abx discontinued, repeat UA neg and normal procalcitonin   Blood cutlure neg  ID Dr. Baptiste on board

## 2020-10-16 NOTE — PROGRESS NOTE ADULT - ASSESSMENT
Patient is a 88y old  Female from home, lives alone, walks with assistant at home, with  HHA (9am -1pm),with PMHx of HTN, DM, HLD, CAD(s/p stent) presents to the ER on 10/11/20 for evaluation of fever, vomiting and chills.  Patient denies abdominal pain , diarrhea, and  denies urinary sx. Patient c/o non specific occasional cough and dizziness. On admission, she found to have fever, and Leukocytosis. The CXR and CT abd/pelvis was not revealing.  She has given a dose of Azithromycin and Rocephin and then started on cefepime. The ID consult requested to assist with further evaluation and antibiotic management.       # SIRS ( Fever + Leukocytosis )- resolved, Blood cultures are negative   # COVID 19 negative  # Positive Urine culture- E.coli in the setting of negative UA  # PCN- ( ?? Anaphylaxis)- Tolerating Cefepime     would recommend:    1. Monitor OFF Abx   2. OOB to chair   3. Continue supportive care      Attending Attestation:    Spent more than 35 minutes on total encounter, more than 50 % of the visit was spent counseling and/or coordinating care by the Attending physician.

## 2020-10-16 NOTE — PROGRESS NOTE ADULT - SUBJECTIVE AND OBJECTIVE BOX
Patient is seen and examined at the bed side, is afebrile. She is doing good.        REVIEW OF SYSTEMS: All other review systems are negative        ALLERGIES: penicillin (Anaphylaxis) -- Tolerating Cefepime   aspirin (Anaphylaxis), Celebrex (Rash)        Vital Signs Last 24 Hrs  T(C): 36.1 (16 Oct 2020 14:53), Max: 36.6 (15 Oct 2020 21:11)  T(F): 97 (16 Oct 2020 14:53), Max: 97.9 (15 Oct 2020 21:11)  HR: 48 (16 Oct 2020 14:53) (48 - 51)  BP: 140/43 (16 Oct 2020 14:53) (140/43 - 151/53)  BP(mean): --  RR: 18 (16 Oct 2020 14:53) (16 - 18)  SpO2: 97% (16 Oct 2020 14:53) (97% - 98%)        PHYSICAL EXAM:  GENERAL: Not in distress   CHEST/LUNG: Not using accessory muscles   HEART: s1 and s2 present  ABDOMEN:  Nontender and  Nondistended  EXTREMITIES: No pedal  edema  CNS: Awake and Alert        LABS: No new  Labs                        10.2   5.70  )-----------( 197      ( 15 Oct 2020 06:12 )             29.4                  10.0   5.86  )-----------( 197      ( 14 Oct 2020 06:37 )             30.8                 10-15    143  |  111<H>  |  9   ----------------------------<  85  3.6   |  25  |  0.82    Ca    9.2      15 Oct 2020 06:12        10-14    140  |  109<H>  |  6<L>  ----------------------------<  103<H>  3.3<L>   |  26  |  0.72    Ca    8.9      14 Oct 2020 06:37  Phos  2.3     10-13  Mg     2.0     10-13      TPro  6.5  /  Alb  3.2<L>  /  TBili  0.6  /  DBili  x   /  AST  18  /  ALT  14  /  AlkPhos  63  10-11        CAPILLARY BLOOD GLUCOSE  POCT Blood Glucose.: 99 mg/dL (13 Oct 2020 11:23)  POCT Blood Glucose.: 90 mg/dL (13 Oct 2020 07:35)  POCT Blood Glucose.: 92 mg/dL (12 Oct 2020 21:25)  POCT Blood Glucose.: 82 mg/dL (12 Oct 2020 16:45)        Urinalysis Basic - ( 11 Oct 2020 19:53 )  Color: Yellow / Appearance: Clear / S.010 / pH: x  Gluc: x / Ketone: Negative  / Bili: Negative / Urobili: Negative   Blood: x / Protein: 30 mg/dL / Nitrite: Negative   Leuk Esterase: Negative / RBC: 2-5 /HPF / WBC 3-5 /HPF     Procalcitonin, Serum (10.14.20 @ 19:53)   Procalcitonin, Serum: 0.11: Procalcitonin (PCT) Interpretation (ng/mL) - Diagnosis of systemic   bacterial infection/sepsis Sq Epi: x / Non Sq Epi: Few /HPF / Bacteria: Trace /HPF        MEDICATIONS  (STANDING):      amLODIPine   Tablet 5 milliGRAM(s) Oral daily  atorvastatin 40 milliGRAM(s) Oral at bedtime  clopidogrel Tablet 75 milliGRAM(s) Oral daily  donepezil 10 milliGRAM(s) Oral at bedtime  FLUoxetine 10 milliGRAM(s) Oral daily  folic acid 1 milliGRAM(s) Oral daily  insulin lispro (HumaLOG) corrective regimen sliding scale   SubCutaneous three times a day before meals  insulin lispro (HumaLOG) corrective regimen sliding scale   SubCutaneous at bedtime  memantine 5 milliGRAM(s) Oral two times a day  metoprolol tartrate 25 milliGRAM(s) Oral two times a day        RADIOLOGY & ADDITIONAL TESTS:    10/12/20 : CT Abdomen and Pelvis w/ Oral Cont and w/ IV Cont (10.12.20 @ 00:34) A focus of air in the urinary bladder may be iatrogenic. Correlate with urinalysis for the possibility of cystitis.  Tree in bud nodules in the right middle lobe of the lung suggests an infectious bronchiolitis.  A 1.1 cm indeterminate right anterior hypodensity in the kidney may be further characterized with ultrasound or MRI.  Cholecystectomy. No small bowel obstruction.  A 9 mm lucency in the tail the pancreas may represent a focus of side branch intraductal papillary mucinous neoplasm. If clinically indicated, further characterization with MRI/MRCP may be considered.      10/11/20 : Xray Femur 2 Views, Left (10.11.20 @ 20:22) IMPRESSION: No acute bony pathology.    Note - This does not exclude fractures in perfect alignment and apposition as presence may be indicated at one to three weeks following callus formation.      10/11/20 : Xray Chest 1 View AP/PA (10.11.20 @ 20:20) No active pulmonary disease.      10/11/20 : CT Head No Cont (10.11.20 @ 19:57) No acute intracranial findings.        MICROBIOLOGY  DATA:    mUrine Microscopic-Add On (NC) (10.14.20 @ 16:21)   Red Blood Cell - Urine: 2-5 /HPF   White Blood Cell - Urine: 0-2 /HPF   Bacteria: Few /HPF   Epithelial Cells: Few /HPF     Culture - Urine (10.12.20 @ 00:38)   - Amikacin: S <=16   - Amoxicillin/Clavulanic Acid: S <=8/4   - Ampicillin: R >16 These ampicillin results predict results for amoxicillin   - Ampicillin/Sulbactam: I  Enterobacter, Citrobacter, and Serratia may develop resistance during prolonged therapy (3-4 days)   - Aztreonam: R >16   - Cefazolin: R >16 (MIC_CL_COM_ENTERIC_CEFAZU) For uncomplicated UTI with K. pneumoniae, E. coli, or P. mirablis: ARNOLD <=16 is sensitive and ARNOLD >=32 is resistant. This also predicts results for oral agents cefaclor, cefdinir, cefpodoxime, cefprozil, cefuroxime axetil, cephalexin and locarbef for uncomplicated UTI. Note that some isolates may be susceptible to these agents while testing resistant to cefazolin.   - Cefepime: R >16   - Cefoxitin: S <=8   - Ceftriaxone: R >32 Enterobacter, Citrobacter, and Serratia may develop resistance during prolonged therapy   - Ciprofloxacin: S <=0.25   - Ertapenem: S <=0.5   - Gentamicin: S <=2   - Imipenem: S <=1   - Levofloxacin: S <=0.5   - Meropenem: S <=1   - Nitrofurantoin: S <=32 Should not be used to treat pyelonephritis   - Piperacillin/Tazobactam: S <=8   - Tigecycline: S <=2   - Tobramycin: S <=2   - Trimethoprim/Sulfamethoxazole: S <=0.5/9.5   Specimen Source: .Urine Clean Catch (Midstream)   Culture Results:   10,000 - 49,000 CFU/mL Escherichia coli ESBL   <10,000 CFU/ml Normal Urogenital mathew present   Organism Identification: Escherichia coli ESBL   Organism: Escherichia coli ESBL   Method Type: ARNOLD     Culture - Blood (10.12.20 @ 05:59)   Specimen Source: .Blood Blood-Peripheral   Culture Results:   No growth to date.     Culture - Blood (10.12.20 @ 05:59)   Specimen Source: .Blood Blood-Peripheral   Culture Results:   No growth to date.     Culture - Urine (10.12.20 @ 00:38)   Specimen Source: .Urine Clean Catch (Midstream)   Culture Results:   10,000 - 49,000 CFU/mL Escherichia coli   <10,000 CFU/ml Normal Urogenital mathew present Respiratory Viral Panel + COVID-19 by BREN (10.11.20 @ 21:27)   Rapid RVP Result: NotDete   SARS-CoV-2: NotDete: This Respiratory Panel uses polymerase chain reaction (PCR) to detect for   adenovirus; coronavirus (HKU1, NL63, 229E, OC43); human metapneumovirus   (hMPV); human enterovirus/rhinovirus (Entero/RV); influenza A; influenza   A/H1; influenza A/H3; influenza A/H1-2009; influenza B; parainfluenza   viruses 1, 2, 3, 4; respiratory syncytial virus; Mycoplasma pneumoniae;   Chlamydophila pneumoniae; and SARS-CoV-2.

## 2020-10-16 NOTE — PROGRESS NOTE ADULT - PROBLEM SELECTOR PLAN 3
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   GI consulted, recommended MRI if family agreeable to proceed with further workup   spoke with daughter Zoey Nunn at [phone] she prefers outpatient MRCP

## 2020-10-16 NOTE — DIETITIAN INITIAL EVALUATION ADULT. - ORAL INTAKE PTA/DIET HISTORY
per H&P: Patient was recently admitted to  on July 2020, s/p mechanical fall and had and been to rehab for about 60 days, reports 20-30 pounds weight loss over last 6 month due to poor oral intake in rehab.

## 2020-10-16 NOTE — DIETITIAN INITIAL EVALUATION ADULT. - FACTORS AFF FOOD INTAKE
difficulty with food procurement/preparation/[place]/ethnic/cultural/personal food preferences/persistent lack of appetite/acute on chronic comorbidities

## 2020-10-16 NOTE — DIETITIAN INITIAL EVALUATION ADULT. - PERTINENT MEDS FT
MEDICATIONS  (STANDING):  amLODIPine   Tablet 5 milliGRAM(s) Oral daily  atorvastatin 40 milliGRAM(s) Oral at bedtime  clopidogrel Tablet 75 milliGRAM(s) Oral daily  dextrose 5%. 1000 milliLiter(s) (50 mL/Hr) IV Continuous <Continuous>  dextrose 50% Injectable 12.5 Gram(s) IV Push once  dextrose 50% Injectable 25 Gram(s) IV Push once  dextrose 50% Injectable 25 Gram(s) IV Push once  donepezil 10 milliGRAM(s) Oral at bedtime  FLUoxetine 10 milliGRAM(s) Oral daily  folic acid 1 milliGRAM(s) Oral daily  insulin lispro (HumaLOG) corrective regimen sliding scale   SubCutaneous three times a day before meals  insulin lispro (HumaLOG) corrective regimen sliding scale   SubCutaneous at bedtime  memantine 5 milliGRAM(s) Oral two times a day  metoprolol tartrate 25 milliGRAM(s) Oral two times a day Alert and oriented to person, place, time/situation. normal mood and affect. no apparent risk to self or others.

## 2020-10-16 NOTE — PROGRESS NOTE ADULT - ASSESSMENT
88 year old female, from home, lives alone, walks with assistant at home, with shopping card outdoor, HHA (9am -1pm),with PMHx of HTN, DM, HLD, CAD(s/p stent) presented with fever, vomiting and chills. Per daughter patient was in her usual health until one day ago when she started to experience fever, chills, poor appetite, and vomiting. Patient c/o non specific occasional cough and dizziness which resulted in a fall. X-ray of left hip negative for fracture. CTH with no acute pathology. Recently admitted to  in July 2020 s/p mechanical fall and was discharged to rehab for about 60 days. Patient admits to 20-30 pounds weight loss over last 6 month due to poor oral intake in rehab. In the ED,  was febrile with leukocytosis. CXR and UA negative. CT A&P showed Tree in bud nodules in the right middle lobe of the lung suggests an infectious bronchiolitis . She has given a dose of Azithromycin and Rocephin and then started on cefepime.   Urine culture grew E coli.  ID consulted for further assistance with antibiotic therapy. Cefepime discontinue and patient was treated with Ciprofloxacin   A 9 mm lesion  was incidentally found in the tail the pancreas. That may represent a neoplasm. further characterization with MRI/MRCP should be considered. As per daughter request pt to follow up on outpatient bases for further evaluation and management  of the lesion.   Pt seen at bedside, NAD, VS stable, no complaints.  Patient is medically optimized for discharge.

## 2020-10-16 NOTE — PROGRESS NOTE ADULT - ATTENDING COMMENTS
D/C planning 10/18/20
Patient seen and examined.       T(C): 36.4 (10-12-20 @ 17:42), Max: 36.7 (10-12-20 @ 14:19)  HR: 67 (10-12-20 @ 17:42) (56 - 67)  BP: 143/56 (10-12-20 @ 17:42) (143/50 - 143/56)  RR: 16 (10-12-20 @ 17:42) (16 - 18)  SpO2: 98% (10-12-20 @ 17:42) (95% - 98%)  MEDICATIONS  (STANDING):  atorvastatin 40 milliGRAM(s) Oral at bedtime  cefepime   IVPB 1000 milliGRAM(s) IV Intermittent every 8 hours  clopidogrel Tablet 75 milliGRAM(s) Oral daily  dextrose 5%. 1000 milliLiter(s) (50 mL/Hr) IV Continuous <Continuous>  dextrose 50% Injectable 12.5 Gram(s) IV Push once  dextrose 50% Injectable 25 Gram(s) IV Push once  dextrose 50% Injectable 25 Gram(s) IV Push once  donepezil 10 milliGRAM(s) Oral at bedtime  FLUoxetine 10 milliGRAM(s) Oral daily  folic acid 1 milliGRAM(s) Oral daily  insulin lispro (HumaLOG) corrective regimen sliding scale   SubCutaneous three times a day before meals  insulin lispro (HumaLOG) corrective regimen sliding scale   SubCutaneous at bedtime  memantine 5 milliGRAM(s) Oral two times a day  sodium chloride 0.9%. 1000 milliLiter(s) (150 mL/Hr) IV Continuous <Continuous>    MEDICATIONS  (PRN):  dextrose 40% Gel 15 Gram(s) Oral once PRN Blood Glucose LESS THAN 70 milliGRAM(s)/deciliter  glucagon  Injectable 1 milliGRAM(s) IntraMuscular once PRN Glucose LESS THAN 70 milligrams/deciliter  ondansetron Injectable 4 milliGRAM(s) IV Push every 6 hours PRN Nausea and/or Vomiting    A 9 mm lucency in the tail the pancreas may represent a focus of side branch intraductal papillary mucinous neoplasm. If clinically indicated, further characterization with MRI/MRCP may be considered.      Fevers Infectious Bronchiolitis Pulm following .  Follow up cultures. If patient continues to spike consider CT Hip for pain.   CAD Echo reviewed Cards following.  CT abdomen suspicious finding fro Pancreatic ca will get GI consult.
Patient seen and examined.     T(C): 36.2 (10-15-20 @ 14:13), Max: 36.2 (10-15-20 @ 14:13)  HR: 56 (10-15-20 @ 18:09) (49 - 56)  BP: 132/57 (10-15-20 @ 18:09) (132/57 - 160/61)  RR: 16 (10-15-20 @ 14:13) (16 - 16)  SpO2: 98% (10-15-20 @ 14:13) (98% - 98%)    UTI   Pancreatic lesion   CAD   DM type 2    Continue with Cipro. Out Patient MRI  D/C planning
Patient seen and examined.     T(C): 36.5 (10-13-20 @ 13:13), Max: 36.5 (10-13-20 @ 13:13)  HR: 65 (10-13-20 @ 17:58) (64 - 65)  BP: 176/62 (10-13-20 @ 17:58) (169/62 - 181/58)  RR: 18 (10-13-20 @ 13:13) (18 - 18)  SpO2: 99% (10-13-20 @ 13:13) (96% - 99%)    Reviewed labs and imaging.   Sepsis  UTI urine cultures positive for E.coli     Tolerating Cefepime. ID following. 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 GI consulted, recommended MRI. Spoke to patients daughter at length she prefers patient to have MRI as out patient.   PT eval.
Patient seen and examined.     T(C): 36.6 (10-14-20 @ 13:40), Max: 36.6 (10-14-20 @ 13:40)  HR: 53 (10-14-20 @ 13:40) (53 - 53)  BP: 166/44 (10-14-20 @ 13:40) (166/44 - 166/44)  RR: 17 (10-14-20 @ 13:40) (17 - 17)  SpO2: 95% (10-14-20 @ 13:40) (95% - 95%)    ESBL UTI Continue with Cipro as per ID.   Pancreatic lesion Patient s family wants to have open MRI as out Patient  D/C planning.

## 2020-10-16 NOTE — DIETITIAN INITIAL EVALUATION ADULT. - PERTINENT LABORATORY DATA
10-15 Na143 mmol/L Glu 85 mg/dL K+ 3.6 mmol/L Cr  0.82 mg/dL BUN 9 mg/dL   10-13 Phos 2.3 mg/dL<L>   10-11 Alb 3.2 g/dL<L>       10-12 Chol 139 mg/dL LDL 64 mg/dL HDL 61 mg/dL Trig 70 mg/dL  10-13-20 @ 09:35 HgbA1C 5.4 [4.0 - 5.6]  10-12-20 @ 09:51 HgbA1C 5.5 [4.0 - 5.6]

## 2020-10-16 NOTE — PROGRESS NOTE ADULT - PROBLEM SELECTOR PLAN 2
S/p mechanical fall at home  Recently admitted to [facility] in July 2020 s/p mechanical fall and was discharged to rehab for about 60 days with home PT  X-ray of left hip with no fracture  PT eval:  no need for physical therapy

## 2020-10-16 NOTE — PROGRESS NOTE ADULT - SUBJECTIVE AND OBJECTIVE BOX
NP Note discussed with  primary attending    Patient is a 88y old  Female who presents with a chief complaint of Sepsis (15 Oct 2020 14:59)      INTERVAL HPI/OVERNIGHT EVENTS: no new complaints    MEDICATIONS  (STANDING):  amLODIPine   Tablet 5 milliGRAM(s) Oral daily  atorvastatin 40 milliGRAM(s) Oral at bedtime  clopidogrel Tablet 75 milliGRAM(s) Oral daily  dextrose 5%. 1000 milliLiter(s) (50 mL/Hr) IV Continuous <Continuous>  dextrose 50% Injectable 12.5 Gram(s) IV Push once  dextrose 50% Injectable 25 Gram(s) IV Push once  dextrose 50% Injectable 25 Gram(s) IV Push once  donepezil 10 milliGRAM(s) Oral at bedtime  FLUoxetine 10 milliGRAM(s) Oral daily  folic acid 1 milliGRAM(s) Oral daily  insulin lispro (HumaLOG) corrective regimen sliding scale   SubCutaneous three times a day before meals  insulin lispro (HumaLOG) corrective regimen sliding scale   SubCutaneous at bedtime  memantine 5 milliGRAM(s) Oral two times a day  metoprolol tartrate 25 milliGRAM(s) Oral two times a day    MEDICATIONS  (PRN):  dextrose 40% Gel 15 Gram(s) Oral once PRN Blood Glucose LESS THAN 70 milliGRAM(s)/deciliter  glucagon  Injectable 1 milliGRAM(s) IntraMuscular once PRN Glucose LESS THAN 70 milligrams/deciliter  melatonin 3 milliGRAM(s) Oral at bedtime PRN Sleep  ondansetron Injectable 4 milliGRAM(s) IV Push every 6 hours PRN Nausea and/or Vomiting      __________________________________________________  REVIEW OF SYSTEMS:    CONSTITUTIONAL: No fever,   EYES: no acute visual disturbances  NECK: No pain or stiffness  RESPIRATORY: No cough; No shortness of breath  CARDIOVASCULAR: No chest pain, no palpitations  GASTROINTESTINAL: No pain. No nausea or vomiting; No diarrhea   NEUROLOGICAL: No headache or numbness, no tremors  MUSCULOSKELETAL: No joint pain, no muscle pain  GENITOURINARY: no dysuria, no frequency, no hesitancy  PSYCHIATRY: no depression , no anxiety  ALL OTHER  ROS negative        Vital Signs Last 24 Hrs  T(C): 36.4 (16 Oct 2020 05:23), Max: 36.6 (15 Oct 2020 21:11)  T(F): 97.5 (16 Oct 2020 05:23), Max: 97.9 (15 Oct 2020 21:11)  HR: 51 (16 Oct 2020 05:23) (49 - 56)  BP: 151/53 (16 Oct 2020 05:23) (132/57 - 160/61)  BP(mean): --  RR: 17 (16 Oct 2020 05:23) (16 - 17)  SpO2: 98% (16 Oct 2020 05:23) (98% - 98%)    ________________________________________________  PHYSICAL EXAM:  GENERAL: NAD  HEENT: Normocephalic;  conjunctivae and sclerae clear; moist mucous membranes;   NECK : supple  CHEST/LUNG: Clear to ausculitation bilaterally with good air entry   HEART: S1 S2  regular; no murmurs, gallops or rubs  ABDOMEN: Soft, Nontender, Nondistended; Bowel sounds present  EXTREMITIES: no cyanosis; no edema; no calf tenderness  SKIN: warm and dry; no rash  NERVOUS SYSTEM:  Awake and alert; Oriented  to place, person and time ; no new deficits    _________________________________________________  LABS:                        10.2   5.70  )-----------( 197      ( 15 Oct 2020 06:12 )             29.4     10-15    143  |  111<H>  |  9   ----------------------------<  85  3.6   |  25  |  0.82    Ca    9.2      15 Oct 2020 06:12        Urinalysis Basic - ( 14 Oct 2020 16:21 )    Color: Yellow / Appearance: Clear / S.005 / pH: x  Gluc: x / Ketone: Negative  / Bili: Negative / Urobili: Negative   Blood: x / Protein: 15 / Nitrite: Negative   Leuk Esterase: Negative / RBC: 2-5 /HPF / WBC 0-2 /HPF   Sq Epi: x / Non Sq Epi: Few /HPF / Bacteria: Few /HPF      CAPILLARY BLOOD GLUCOSE      POCT Blood Glucose.: 93 mg/dL (16 Oct 2020 08:11)  POCT Blood Glucose.: 85 mg/dL (15 Oct 2020 21:24)  POCT Blood Glucose.: 100 mg/dL (15 Oct 2020 17:09)  POCT Blood Glucose.: 123 mg/dL (15 Oct 2020 12:03)        RADIOLOGY & ADDITIONAL TESTS:    Imaging Personally Reviewed:  YES/NO    Consultant(s) Notes Reviewed:   YES/ No    Care Discussed with Consultants :     Plan of care was discussed with patient and /or primary care giver; all questions and concerns were addressed and care was aligned with patient's wishes.

## 2020-10-16 NOTE — PROGRESS NOTE ADULT - PROBLEM SELECTOR PLAN 8
Remains full code  Fartun Thurman (emergency contact): [phone] was updated regarding pt's plan of care.

## 2020-10-16 NOTE — DIETITIAN INITIAL EVALUATION ADULT. - OTHER INFO
Pt alert, oriented, weak, well-communicated, but forgetful at times reported, lives alone at home with HHA help PTA; appetite fair, pt unsure of wt changes, denied GI distress, chewing or swallowing problem at present, 25% intake observed at breakfast, willing to try oral nutritional supplement; spoke to daughter Edouard Greer at [phone], gradually decreasing intake over past 1.5y, worsening x 2m since 07/2020 during rehab stay, wt loss ( see below), usual ag=009 lb 1.5y ago, xm=439 lb 07/2020, food choices obtained from family and forwarded to dietary

## 2020-10-16 NOTE — CHART NOTE - TREATMENT: THE FOLLOWING DIET HAS BEEN RECOMMENDED
Diet, Soft:   Consistent Carbohydrate {Evening Snacks}  DASH/TLC {Sodium & Cholesterol Restricted}  Kosher  Supplement Feeding Modality:  Oral  Glucerna Shake Cans or Servings Per Day:  1       Frequency:  Two Times a day (10-16-20 @ 09:20) [Pending Verification By Attending]

## 2020-10-17 LAB
CULTURE RESULTS: SIGNIFICANT CHANGE UP
CULTURE RESULTS: SIGNIFICANT CHANGE UP
GLUCOSE BLDC GLUCOMTR-MCNC: 108 MG/DL — HIGH (ref 70–99)
GLUCOSE BLDC GLUCOMTR-MCNC: 156 MG/DL — HIGH (ref 70–99)
GLUCOSE BLDC GLUCOMTR-MCNC: 86 MG/DL — SIGNIFICANT CHANGE UP (ref 70–99)
GLUCOSE BLDC GLUCOMTR-MCNC: 87 MG/DL — SIGNIFICANT CHANGE UP (ref 70–99)
GLUCOSE BLDC GLUCOMTR-MCNC: 93 MG/DL — SIGNIFICANT CHANGE UP (ref 70–99)
SPECIMEN SOURCE: SIGNIFICANT CHANGE UP
SPECIMEN SOURCE: SIGNIFICANT CHANGE UP

## 2020-10-17 RX ADMIN — Medication 1: at 11:24

## 2020-10-17 RX ADMIN — DONEPEZIL HYDROCHLORIDE 10 MILLIGRAM(S): 10 TABLET, FILM COATED ORAL at 21:25

## 2020-10-17 RX ADMIN — Medication 3 MILLIGRAM(S): at 21:25

## 2020-10-17 RX ADMIN — MEMANTINE HYDROCHLORIDE 5 MILLIGRAM(S): 10 TABLET ORAL at 05:34

## 2020-10-17 RX ADMIN — AMLODIPINE BESYLATE 5 MILLIGRAM(S): 2.5 TABLET ORAL at 05:35

## 2020-10-17 RX ADMIN — Medication 10 MILLIGRAM(S): at 11:03

## 2020-10-17 RX ADMIN — Medication 1 MILLIGRAM(S): at 11:03

## 2020-10-17 RX ADMIN — ATORVASTATIN CALCIUM 40 MILLIGRAM(S): 80 TABLET, FILM COATED ORAL at 21:25

## 2020-10-17 RX ADMIN — CLOPIDOGREL BISULFATE 75 MILLIGRAM(S): 75 TABLET, FILM COATED ORAL at 11:03

## 2020-10-17 RX ADMIN — MEMANTINE HYDROCHLORIDE 5 MILLIGRAM(S): 10 TABLET ORAL at 17:09

## 2020-10-17 RX ADMIN — Medication 25 MILLIGRAM(S): at 05:34

## 2020-10-17 NOTE — PROGRESS NOTE ADULT - PROBLEM SELECTOR PLAN 3
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   GI consulted, recommended MRI if family agreeable to proceed with further workup   spoke with daughter Zoey Nunn at [phone] she prefers outpatient MRCP

## 2020-10-17 NOTE — PROGRESS NOTE ADULT - PROBLEM SELECTOR PLAN 1
treated with Ciprofloxacin. Abx discontinued, repeat UA neg and normal procalcitonin   Blood cutlure neg  ID Dr. Baptiste on board  D/C planning

## 2020-10-17 NOTE — PROGRESS NOTE ADULT - PROBLEM SELECTOR PLAN 2
S/p mechanical fall at home  Recently admitted to [facility] in July 2020 s/p mechanical fall and was discharged to rehab for about 60 days with home PT  X-ray of left hip with no fracture  PT eval:  no need for physical therapy

## 2020-10-17 NOTE — PROGRESS NOTE ADULT - SUBJECTIVE AND OBJECTIVE BOX
Patient is seen and examined at the bed side, is afebrile. She has no new complaints.         REVIEW OF SYSTEMS: All other review systems are negative        ALLERGIES: penicillin (Anaphylaxis) -- Tolerating Cefepime   aspirin (Anaphylaxis), Celebrex (Rash)        Vital Signs Last 24 Hrs  T(C): 36.7 (17 Oct 2020 21:11), Max: 37 (17 Oct 2020 13:23)  T(F): 98.1 (17 Oct 2020 21:11), Max: 98.6 (17 Oct 2020 13:23)  HR: 58 (17 Oct 2020 21:11) (50 - 62)  BP: 156/65 (17 Oct 2020 21:11) (142/60 - 166/80)  BP(mean): --  RR: 18 (17 Oct 2020 21:11) (17 - 18)  SpO2: 97% (17 Oct 2020 21:11) (97% - 99%)        PHYSICAL EXAM:  GENERAL: Not in distress   CHEST/LUNG: Not using accessory muscles   HEART: s1 and s2 present  ABDOMEN:  Nontender and  Nondistended  EXTREMITIES: No pedal  edema  CNS: Awake and Alert        LABS: No new  Labs                          10.2   5.70  )-----------( 197      ( 15 Oct 2020 06:12 )             29.4                  10.0   5.86  )-----------( 197      ( 14 Oct 2020 06:37 )             30.8                   10-15    143  |  111<H>  |  9   ----------------------------<  85  3.6   |  25  |  0.82    Ca    9.2      15 Oct 2020 06:12        10-14    140  |  109<H>  |  6<L>  ----------------------------<  103<H>  3.3<L>   |  26  |  0.72    Ca    8.9      14 Oct 2020 06:37  Phos  2.3     10-13  Mg     2.0     10-13      TPro  6.5  /  Alb  3.2<L>  /  TBili  0.6  /  DBili  x   /  AST  18  /  ALT  14  /  AlkPhos  63  10-11        CAPILLARY BLOOD GLUCOSE  POCT Blood Glucose.: 99 mg/dL (13 Oct 2020 11:23)  POCT Blood Glucose.: 90 mg/dL (13 Oct 2020 07:35)  POCT Blood Glucose.: 92 mg/dL (12 Oct 2020 21:25)  POCT Blood Glucose.: 82 mg/dL (12 Oct 2020 16:45)        Urinalysis Basic - ( 11 Oct 2020 19:53 )  Color: Yellow / Appearance: Clear / S.010 / pH: x  Gluc: x / Ketone: Negative  / Bili: Negative / Urobili: Negative   Blood: x / Protein: 30 mg/dL / Nitrite: Negative   Leuk Esterase: Negative / RBC: 2-5 /HPF / WBC 3-5 /HPF     Procalcitonin, Serum (10.14.20 @ 19:53)   Procalcitonin, Serum: 0.11: Procalcitonin (PCT) Interpretation (ng/mL) - Diagnosis of systemic   bacterial infection/sepsis Sq Epi: x / Non Sq Epi: Few /HPF / Bacteria: Trace /HPF        MEDICATIONS  (STANDING):    amLODIPine   Tablet 5 milliGRAM(s) Oral daily  atorvastatin 40 milliGRAM(s) Oral at bedtime  clopidogrel Tablet 75 milliGRAM(s) Oral daily  donepezil 10 milliGRAM(s) Oral at bedtime  FLUoxetine 10 milliGRAM(s) Oral daily  folic acid 1 milliGRAM(s) Oral daily  insulin lispro (HumaLOG) corrective regimen sliding scale   SubCutaneous three times a day before meals  insulin lispro (HumaLOG) corrective regimen sliding scale   SubCutaneous at bedtime  memantine 5 milliGRAM(s) Oral two times a day  metoprolol tartrate 25 milliGRAM(s) Oral two times a day        RADIOLOGY & ADDITIONAL TESTS:    10/12/20 : CT Abdomen and Pelvis w/ Oral Cont and w/ IV Cont (10.12.20 @ 00:34) A focus of air in the urinary bladder may be iatrogenic. Correlate with urinalysis for the possibility of cystitis.  Tree in bud nodules in the right middle lobe of the lung suggests an infectious bronchiolitis.  A 1.1 cm indeterminate right anterior hypodensity in the kidney may be further characterized with ultrasound or MRI.  Cholecystectomy. No small bowel obstruction.  A 9 mm lucency in the tail the pancreas may represent a focus of side branch intraductal papillary mucinous neoplasm. If clinically indicated, further characterization with MRI/MRCP may be considered.      10/11/20 : Xray Femur 2 Views, Left (10.11.20 @ 20:22) IMPRESSION: No acute bony pathology.    Note - This does not exclude fractures in perfect alignment and apposition as presence may be indicated at one to three weeks following callus formation.      10/11/20 : Xray Chest 1 View AP/PA (10.11.20 @ 20:20) No active pulmonary disease.      10/11/20 : CT Head No Cont (10.11.20 @ 19:57) No acute intracranial findings.          MICROBIOLOGY  DATA:    Urine Microscopic-Add On (NC) (10.14.20 @ 16:21)   Red Blood Cell - Urine: 2-5 /HPF   White Blood Cell - Urine: 0-2 /HPF   Bacteria: Few /HPF   Epithelial Cells: Few /HPF       Culture - Urine (10.12.20 @ 00:38)   - Amikacin: S <=16   - Amoxicillin/Clavulanic Acid: S <=8/4   - Ampicillin: R >16 These ampicillin results predict results for amoxicillin   - Ampicillin/Sulbactam: I  Enterobacter, Citrobacter, and Serratia may develop resistance during prolonged therapy (3-4 days)   - Aztreonam: R >16   - Cefazolin: R >16 (MIC_CL_COM_ENTERIC_[id number]) For uncomplicated UTI with K. pneumoniae, E. coli, or P. mirablis: ARNOLD <=16 is sensitive and ARNOLD >=32 is resistant. This also predicts results for oral agents cefaclor, cefdinir, cefpodoxime, cefprozil, cefuroxime axetil, cephalexin and locarbef for uncomplicated UTI. Note that some isolates may be susceptible to these agents while testing resistant to cefazolin.   - Cefepime: R >16   - Cefoxitin: S <=8   - Ceftriaxone: R >32 Enterobacter, Citrobacter, and Serratia may develop resistance during prolonged therapy   - Ciprofloxacin: S <=0.25   - Ertapenem: S <=0.5   - Gentamicin: S <=2   - Imipenem: S <=1   - Levofloxacin: S <=0.5   - Meropenem: S <=1   - Nitrofurantoin: S <=32 Should not be used to treat pyelonephritis   - Piperacillin/Tazobactam: S <=8   - Tigecycline: S <=2   - Tobramycin: S <=2   - Trimethoprim/Sulfamethoxazole: S <=0.5/9.5   Specimen Source: .Urine Clean Catch (Midstream)   Culture Results:   10,000 - 49,000 CFU/mL Escherichia coli ESBL   <10,000 CFU/ml Normal Urogenital mathew present   Organism Identification: Escherichia coli ESBL   Organism: Escherichia coli ESBL   Method Type: ARNOLD     Culture - Blood (10.12.20 @ 05:59)   Specimen Source: .Blood Blood-Peripheral   Culture Results:   No growth to date.     Culture - Blood (10.12.20 @ 05:59)   Specimen Source: .Blood Blood-Peripheral   Culture Results:   No growth to date.     Culture - Urine (10.12.20 @ 00:38)   Specimen Source: .Urine Clean Catch (Midstream)   Culture Results:   10,000 - 49,000 CFU/mL Escherichia coli   <10,000 CFU/ml Normal Urogenital mathew present Respiratory Viral Panel + COVID-19 by BREN (10.11.20 @ 21:27)   Rapid RVP Result: NotDete   SARS-CoV-2: NotDete: This Respiratory Panel uses polymerase chain reaction (PCR) to detect for   adenovirus; coronavirus (HKU1, NL63, 229E, OC43); human metapneumovirus   (hMPV); human enterovirus/rhinovirus (Entero/RV); influenza A; influenza   A/H1; influenza A/H3; influenza A/H1-2009; influenza B; parainfluenza   viruses 1, 2, 3, 4; respiratory syncytial virus; Mycoplasma pneumoniae;   Chlamydophila pneumoniae; and SARS-CoV-2.

## 2020-10-17 NOTE — PROGRESS NOTE ADULT - ASSESSMENT
Patient is a 88y old  Female from home, lives alone, walks with assistant at home, with  HHA (9am -1pm),with PMHx of HTN, DM, HLD, CAD(s/p stent) presents to the ER on 10/11/20 for evaluation of fever, vomiting and chills.  Patient denies abdominal pain , diarrhea, and  denies urinary sx. Patient c/o non specific occasional cough and dizziness. On admission, she found to have fever, and Leukocytosis. The CXR and CT abd/pelvis was not revealing.  She has given a dose of Azithromycin and Rocephin and then started on cefepime. The ID consult requested to assist with further evaluation and antibiotic management.       # SIRS ( Fever + Leukocytosis )- resolved, Blood cultures are negative   # COVID 19 negative  # Positive Urine culture- E.coli in the setting of negative UA  # PCN- ( ?? Anaphylaxis)- Tolerating Cefepime     would recommend:    1. OOB to chair   2. Monitor OFF Abx   3. Continue supportive care      Attending Attestation:    Spent more than 35 minutes on total encounter, more than 50 % of the visit was spent counseling and/or coordinating care by the Attending physician.

## 2020-10-17 NOTE — PROGRESS NOTE ADULT - SUBJECTIVE AND OBJECTIVE BOX
Patient is a 88y old  Female who presents with a chief complaint of Sepsis (15 Oct 2020 14:59)      INTERVAL HPI/OVERNIGHT EVENTS: no new complaints    T(C): 36.6 (10-17-20 @ 14:26), Max: 37 (10-17-20 @ 13:23)  HR: 50 (10-17-20 @ 17:10) (50 - 50)  BP: 142/60 (10-17-20 @ 17:10) (142/60 - 147/55)  RR: 17 (10-17-20 @ 14:26) (17 - 18)  SpO2: 97% (10-17-20 @ 14:26) (97% - 99%)      MEDICATIONS  (STANDING):  amLODIPine   Tablet 5 milliGRAM(s) Oral daily  atorvastatin 40 milliGRAM(s) Oral at bedtime  clopidogrel Tablet 75 milliGRAM(s) Oral daily  dextrose 5%. 1000 milliLiter(s) (50 mL/Hr) IV Continuous <Continuous>  dextrose 50% Injectable 12.5 Gram(s) IV Push once  dextrose 50% Injectable 25 Gram(s) IV Push once  dextrose 50% Injectable 25 Gram(s) IV Push once  donepezil 10 milliGRAM(s) Oral at bedtime  FLUoxetine 10 milliGRAM(s) Oral daily  folic acid 1 milliGRAM(s) Oral daily  insulin lispro (HumaLOG) corrective regimen sliding scale   SubCutaneous three times a day before meals  insulin lispro (HumaLOG) corrective regimen sliding scale   SubCutaneous at bedtime  memantine 5 milliGRAM(s) Oral two times a day  metoprolol tartrate 25 milliGRAM(s) Oral two times a day    MEDICATIONS  (PRN):  dextrose 40% Gel 15 Gram(s) Oral once PRN Blood Glucose LESS THAN 70 milliGRAM(s)/deciliter  glucagon  Injectable 1 milliGRAM(s) IntraMuscular once PRN Glucose LESS THAN 70 milligrams/deciliter  melatonin 3 milliGRAM(s) Oral at bedtime PRN Sleep  ondansetron Injectable 4 milliGRAM(s) IV Push every 6 hours PRN Nausea and/or Vomiting  __________________________________________________  REVIEW OF SYSTEMS:    CONSTITUTIONAL: No fever,   EYES: no acute visual disturbances  NECK: No pain or stiffness  RESPIRATORY: No cough; No shortness of breath  CARDIOVASCULAR: No chest pain, no palpitations  GASTROINTESTINAL: No pain. No nausea or vomiting; No diarrhea   NEUROLOGICAL: No headache or numbness, no tremors  MUSCULOSKELETAL: No joint pain, no muscle pain  GENITOURINARY: no dysuria, no frequency, no hesitancy  PSYCHIATRY: no depression , no anxiety  ALL OTHER  ROS negative      ________________________________________________  PHYSICAL EXAM:  GENERAL: NAD  HEENT: Normocephalic;  conjunctivae and sclerae clear; moist mucous membranes;   NECK : supple  CHEST/LUNG: Clear to ausculitation bilaterally with good air entry   HEART: S1 S2  regular; no murmurs, gallops or rubs  ABDOMEN: Soft, Nontender, Nondistended; Bowel sounds present  EXTREMITIES: no cyanosis; no edema; no calf tenderness  SKIN: warm and dry; no rash  NERVOUS SYSTEM:  Awake and alert; Oriented  to place, person and time ; no new deficits    _________________________________________________        no new labs     RADIOLOGY & ADDITIONAL TESTS:    Imaging Personally Reviewed:  YES/NO    Consultant(s) Notes Reviewed:   YES/ No    Care Discussed with Consultants :     Plan of care was discussed with patient and /or primary care giver; all questions and concerns were addressed and care was aligned with patient's wishes.

## 2020-10-17 NOTE — PROGRESS NOTE ADULT - PROBLEM SELECTOR PLAN 8
Remains full code  Fartun Thurman (emergency contact): [phone] was updated regarding pt's plan of care.

## 2020-10-17 NOTE — PROGRESS NOTE ADULT - NUTRITIONAL ASSESSMENT
This patient has been assessed with a concern for Malnutrition and has been determined to have a diagnosis/diagnoses of Moderate protein-calorie malnutrition.    This patient is being managed with:   Diet Soft-  Consistent Carbohydrate {Evening Snacks}  DASH/TLC {Sodium & Cholesterol Restricted}  Kosher  Supplement Feeding Modality:  Oral  Glucerna Shake Cans or Servings Per Day:  1       Frequency:  Two Times a day  Entered: Oct 16 2020  9:20AM    
88 year old female, from home, lives alone, walks with assistant at home, with shopping card outdoor, A (9am -1pm),with PMHx of HTN, DM, HLD, CAD(s/p stent) presented with fever, vomiting and chills. Per daughter patient was in her usual health until one day ago when she started to experience fever, chills, poor appetite, and vomiting. Patient c/o non specific occasional cough and dizziness which resulted in a fall. X-ray of left hip negative for fracture. CTH with no acute pathology. Recently admitted to  in July 2020 s/p mechanical fall and was discharged to rehab for about 60 days. Patient admits to 20-30 pounds weight loss over last 6 month due to poor oral intake in rehab. In the ED,  was febrile with leukocytosis. CXR and UA negative. CT A&P showed Tree in bud nodules in the right middle lobe of the lung suggests an infectious bronchiolitis . She was given a dose of Azithromycin and Rocephin and then started on cefepime.  Urine culture grew E coli.  ID on board.  Cefepime discontinue and patient was treated with Ciprofloxacin   Pt seen at bedside, alert and oriented, denies any new complaints, ambulating to bathroom without assistance. Medically optimized for discharge.

## 2020-10-18 VITALS
RESPIRATION RATE: 18 BRPM | OXYGEN SATURATION: 98 % | SYSTOLIC BLOOD PRESSURE: 161 MMHG | DIASTOLIC BLOOD PRESSURE: 63 MMHG | HEART RATE: 71 BPM | TEMPERATURE: 97 F

## 2020-10-18 LAB
GLUCOSE BLDC GLUCOMTR-MCNC: 100 MG/DL — HIGH (ref 70–99)
GLUCOSE BLDC GLUCOMTR-MCNC: 96 MG/DL — SIGNIFICANT CHANGE UP (ref 70–99)

## 2020-10-18 PROCEDURE — 82962 GLUCOSE BLOOD TEST: CPT

## 2020-10-18 PROCEDURE — 84484 ASSAY OF TROPONIN QUANT: CPT

## 2020-10-18 PROCEDURE — 36415 COLL VENOUS BLD VENIPUNCTURE: CPT

## 2020-10-18 PROCEDURE — 87186 SC STD MICRODIL/AGAR DIL: CPT

## 2020-10-18 PROCEDURE — 71045 X-RAY EXAM CHEST 1 VIEW: CPT

## 2020-10-18 PROCEDURE — 84443 ASSAY THYROID STIM HORMONE: CPT

## 2020-10-18 PROCEDURE — 84100 ASSAY OF PHOSPHORUS: CPT

## 2020-10-18 PROCEDURE — 73552 X-RAY EXAM OF FEMUR 2/>: CPT

## 2020-10-18 PROCEDURE — 0225U NFCT DS DNA&RNA 21 SARSCOV2: CPT

## 2020-10-18 PROCEDURE — 83735 ASSAY OF MAGNESIUM: CPT

## 2020-10-18 PROCEDURE — 87635 SARS-COV-2 COVID-19 AMP PRB: CPT

## 2020-10-18 PROCEDURE — 85025 COMPLETE CBC W/AUTO DIFF WBC: CPT

## 2020-10-18 PROCEDURE — 81001 URINALYSIS AUTO W/SCOPE: CPT

## 2020-10-18 PROCEDURE — 70450 CT HEAD/BRAIN W/O DYE: CPT

## 2020-10-18 PROCEDURE — 99285 EMERGENCY DEPT VISIT HI MDM: CPT

## 2020-10-18 PROCEDURE — 82607 VITAMIN B-12: CPT

## 2020-10-18 PROCEDURE — 83036 HEMOGLOBIN GLYCOSYLATED A1C: CPT

## 2020-10-18 PROCEDURE — 87040 BLOOD CULTURE FOR BACTERIA: CPT

## 2020-10-18 PROCEDURE — 80053 COMPREHEN METABOLIC PANEL: CPT

## 2020-10-18 PROCEDURE — 80061 LIPID PANEL: CPT

## 2020-10-18 PROCEDURE — 93005 ELECTROCARDIOGRAM TRACING: CPT

## 2020-10-18 PROCEDURE — 82306 VITAMIN D 25 HYDROXY: CPT

## 2020-10-18 PROCEDURE — 96375 TX/PRO/DX INJ NEW DRUG ADDON: CPT

## 2020-10-18 PROCEDURE — 73502 X-RAY EXAM HIP UNI 2-3 VIEWS: CPT

## 2020-10-18 PROCEDURE — 86769 SARS-COV-2 COVID-19 ANTIBODY: CPT

## 2020-10-18 PROCEDURE — 82009 KETONE BODYS QUAL: CPT

## 2020-10-18 PROCEDURE — 83605 ASSAY OF LACTIC ACID: CPT

## 2020-10-18 PROCEDURE — 83880 ASSAY OF NATRIURETIC PEPTIDE: CPT

## 2020-10-18 PROCEDURE — 84145 PROCALCITONIN (PCT): CPT

## 2020-10-18 PROCEDURE — 85027 COMPLETE CBC AUTOMATED: CPT

## 2020-10-18 PROCEDURE — 82550 ASSAY OF CK (CPK): CPT

## 2020-10-18 PROCEDURE — 96374 THER/PROPH/DIAG INJ IV PUSH: CPT

## 2020-10-18 PROCEDURE — 83690 ASSAY OF LIPASE: CPT

## 2020-10-18 PROCEDURE — 87086 URINE CULTURE/COLONY COUNT: CPT

## 2020-10-18 PROCEDURE — 80048 BASIC METABOLIC PNL TOTAL CA: CPT

## 2020-10-18 PROCEDURE — 82746 ASSAY OF FOLIC ACID SERUM: CPT

## 2020-10-18 RX ADMIN — MEMANTINE HYDROCHLORIDE 5 MILLIGRAM(S): 10 TABLET ORAL at 05:25

## 2020-10-18 RX ADMIN — Medication 10 MILLIGRAM(S): at 11:07

## 2020-10-18 RX ADMIN — Medication 1 MILLIGRAM(S): at 11:07

## 2020-10-18 RX ADMIN — Medication 25 MILLIGRAM(S): at 05:25

## 2020-10-18 RX ADMIN — AMLODIPINE BESYLATE 5 MILLIGRAM(S): 2.5 TABLET ORAL at 05:25

## 2020-10-18 RX ADMIN — CLOPIDOGREL BISULFATE 75 MILLIGRAM(S): 75 TABLET, FILM COATED ORAL at 11:07

## 2021-06-26 NOTE — DISCHARGE NOTE NURSING/CASE MANAGEMENT/SOCIAL WORK - NSDCPEFALRISK_GEN_ALL_CORE
71year old male PMHX of diabetes type 2, c/o ulcers on his left toe with purulent drainage for the past week, recently moved to [place] from [place], was following with a podiatrist in [address] [place] [address] but has not established care in [place].  A&Ox4, denies any N/V/D
Detail Level: Simple
Patient information on fall and injury prevention
Patient information on fall and injury prevention

## 2021-07-01 ENCOUNTER — INPATIENT (INPATIENT)
Facility: HOSPITAL | Age: 86
LOS: 4 days | Discharge: NURSING, REHAB OR LONG-TERM CARE | DRG: 884 | End: 2021-07-06
Attending: INTERNAL MEDICINE | Admitting: INTERNAL MEDICINE
Payer: MEDICARE

## 2021-07-01 VITALS
HEART RATE: 78 BPM | SYSTOLIC BLOOD PRESSURE: 222 MMHG | OXYGEN SATURATION: 87 % | RESPIRATION RATE: 18 BRPM | DIASTOLIC BLOOD PRESSURE: 83 MMHG | HEIGHT: 60 IN | TEMPERATURE: 98 F

## 2021-07-01 DIAGNOSIS — G93.40 ENCEPHALOPATHY, UNSPECIFIED: ICD-10-CM

## 2021-07-01 PROBLEM — E78.5 HYPERLIPIDEMIA, UNSPECIFIED: Chronic | Status: ACTIVE | Noted: 2020-10-11

## 2021-07-01 PROBLEM — F03.90 UNSPECIFIED DEMENTIA, UNSPECIFIED SEVERITY, WITHOUT BEHAVIORAL DISTURBANCE, PSYCHOTIC DISTURBANCE, MOOD DISTURBANCE, AND ANXIETY: Chronic | Status: ACTIVE | Noted: 2020-10-11

## 2021-07-01 LAB
ACETONE SERPL-MCNC: NEGATIVE — SIGNIFICANT CHANGE UP
ALBUMIN SERPL ELPH-MCNC: 3.6 G/DL — SIGNIFICANT CHANGE UP (ref 3.5–5)
ALP SERPL-CCNC: 58 U/L — SIGNIFICANT CHANGE UP (ref 40–120)
ALT FLD-CCNC: 15 U/L DA — SIGNIFICANT CHANGE UP (ref 10–60)
ANION GAP SERPL CALC-SCNC: 8 MMOL/L — SIGNIFICANT CHANGE UP (ref 5–17)
AST SERPL-CCNC: 28 U/L — SIGNIFICANT CHANGE UP (ref 10–40)
BASOPHILS # BLD AUTO: 0.02 K/UL — SIGNIFICANT CHANGE UP (ref 0–0.2)
BASOPHILS NFR BLD AUTO: 0.3 % — SIGNIFICANT CHANGE UP (ref 0–2)
BILIRUB SERPL-MCNC: 0.8 MG/DL — SIGNIFICANT CHANGE UP (ref 0.2–1.2)
BUN SERPL-MCNC: 16 MG/DL — SIGNIFICANT CHANGE UP (ref 7–18)
CALCIUM SERPL-MCNC: 9.2 MG/DL — SIGNIFICANT CHANGE UP (ref 8.4–10.5)
CHLORIDE SERPL-SCNC: 108 MMOL/L — SIGNIFICANT CHANGE UP (ref 96–108)
CK SERPL-CCNC: 116 U/L — SIGNIFICANT CHANGE UP (ref 21–215)
CO2 SERPL-SCNC: 24 MMOL/L — SIGNIFICANT CHANGE UP (ref 22–31)
CREAT SERPL-MCNC: 0.87 MG/DL — SIGNIFICANT CHANGE UP (ref 0.5–1.3)
EOSINOPHIL # BLD AUTO: 0.03 K/UL — SIGNIFICANT CHANGE UP (ref 0–0.5)
EOSINOPHIL NFR BLD AUTO: 0.4 % — SIGNIFICANT CHANGE UP (ref 0–6)
GLUCOSE BLDC GLUCOMTR-MCNC: 111 MG/DL — HIGH (ref 70–99)
GLUCOSE SERPL-MCNC: 112 MG/DL — HIGH (ref 70–99)
HCT VFR BLD CALC: 32.4 % — LOW (ref 34.5–45)
HGB BLD-MCNC: 10.1 G/DL — LOW (ref 11.5–15.5)
IMM GRANULOCYTES NFR BLD AUTO: 0.4 % — SIGNIFICANT CHANGE UP (ref 0–1.5)
LYMPHOCYTES # BLD AUTO: 0.7 K/UL — LOW (ref 1–3.3)
LYMPHOCYTES # BLD AUTO: 10.2 % — LOW (ref 13–44)
MAGNESIUM SERPL-MCNC: 2.1 MG/DL — SIGNIFICANT CHANGE UP (ref 1.6–2.6)
MCHC RBC-ENTMCNC: 26.5 PG — LOW (ref 27–34)
MCHC RBC-ENTMCNC: 31.2 GM/DL — LOW (ref 32–36)
MCV RBC AUTO: 85 FL — SIGNIFICANT CHANGE UP (ref 80–100)
MONOCYTES # BLD AUTO: 0.21 K/UL — SIGNIFICANT CHANGE UP (ref 0–0.9)
MONOCYTES NFR BLD AUTO: 3.1 % — SIGNIFICANT CHANGE UP (ref 2–14)
NEUTROPHILS # BLD AUTO: 5.85 K/UL — SIGNIFICANT CHANGE UP (ref 1.8–7.4)
NEUTROPHILS NFR BLD AUTO: 85.6 % — HIGH (ref 43–77)
NRBC # BLD: 0 /100 WBCS — SIGNIFICANT CHANGE UP (ref 0–0)
NT-PROBNP SERPL-SCNC: 2225 PG/ML — HIGH (ref 0–450)
PLATELET # BLD AUTO: 219 K/UL — SIGNIFICANT CHANGE UP (ref 150–400)
POTASSIUM SERPL-MCNC: 4.5 MMOL/L — SIGNIFICANT CHANGE UP (ref 3.5–5.3)
POTASSIUM SERPL-SCNC: 4.5 MMOL/L — SIGNIFICANT CHANGE UP (ref 3.5–5.3)
PROT SERPL-MCNC: 7.4 G/DL — SIGNIFICANT CHANGE UP (ref 6–8.3)
RBC # BLD: 3.81 M/UL — SIGNIFICANT CHANGE UP (ref 3.8–5.2)
RBC # FLD: 14.6 % — HIGH (ref 10.3–14.5)
SARS-COV-2 RNA SPEC QL NAA+PROBE: SIGNIFICANT CHANGE UP
SODIUM SERPL-SCNC: 140 MMOL/L — SIGNIFICANT CHANGE UP (ref 135–145)
TROPONIN I SERPL-MCNC: <0.015 NG/ML — SIGNIFICANT CHANGE UP (ref 0–0.04)
WBC # BLD: 6.84 K/UL — SIGNIFICANT CHANGE UP (ref 3.8–10.5)
WBC # FLD AUTO: 6.84 K/UL — SIGNIFICANT CHANGE UP (ref 3.8–10.5)

## 2021-07-01 PROCEDURE — 70450 CT HEAD/BRAIN W/O DYE: CPT | Mod: 26,MA

## 2021-07-01 PROCEDURE — 71045 X-RAY EXAM CHEST 1 VIEW: CPT | Mod: 26

## 2021-07-01 PROCEDURE — 99285 EMERGENCY DEPT VISIT HI MDM: CPT

## 2021-07-01 RX ORDER — SODIUM CHLORIDE 9 MG/ML
1000 INJECTION INTRAMUSCULAR; INTRAVENOUS; SUBCUTANEOUS
Refills: 0 | Status: DISCONTINUED | OUTPATIENT
Start: 2021-07-01 | End: 2021-07-02

## 2021-07-01 RX ORDER — METOCLOPRAMIDE HCL 10 MG
10 TABLET ORAL ONCE
Refills: 0 | Status: COMPLETED | OUTPATIENT
Start: 2021-07-01 | End: 2021-07-01

## 2021-07-01 RX ORDER — MECLIZINE HCL 12.5 MG
12.5 TABLET ORAL ONCE
Refills: 0 | Status: COMPLETED | OUTPATIENT
Start: 2021-07-01 | End: 2021-07-01

## 2021-07-01 RX ORDER — MECLIZINE HCL 12.5 MG
12.5 TABLET ORAL ONCE
Refills: 0 | Status: DISCONTINUED | OUTPATIENT
Start: 2021-07-01 | End: 2021-07-01

## 2021-07-01 RX ADMIN — Medication 104 MILLIGRAM(S): at 18:26

## 2021-07-01 RX ADMIN — SODIUM CHLORIDE 125 MILLILITER(S): 9 INJECTION INTRAMUSCULAR; INTRAVENOUS; SUBCUTANEOUS at 18:27

## 2021-07-01 RX ADMIN — Medication 12.5 MILLIGRAM(S): at 18:56

## 2021-07-01 NOTE — H&P ADULT - HISTORY OF PRESENT ILLNESS
Pt is a 89 yo F from home, recently discharged from [place] [address] at baseline with PMH of HTN, DM, HLD, CAD(s/p stent), dementia, PSH of cholecystectomy came for complaints of vomiting and upper abdominal pain since yesterday. History couldn't obtain the history from patient due to dementia, so verified with daughter Zoey. Pt had 4 episodes of NBNB vomiting yesterday evening till this morning, followed by upper abdominal pain, which was intermittent. Patient states she called her daughter and told her she was not feeling well, was dizzy and vomiting. Daughter then called 911 who brought patient to the ED. Pt did not have fever, dysuria, weakness, chest pain, room spinning sensation or any other acute complaints. Patient was asymptomatic in ED and when asked all ROS was negative. Daughter would like evaluation for more hours of HHA or LTC.     GOC; FULL CODE

## 2021-07-01 NOTE — H&P ADULT - NSHPPHYSICALEXAM_GEN_ALL_CORE
ICU Vital Signs Last 24 Hrs  T(C): 36.7 (02 Jul 2021 02:00), Max: 36.7 (02 Jul 2021 02:00)  T(F): 98 (02 Jul 2021 02:00), Max: 98 (02 Jul 2021 02:00)  HR: 89 (02 Jul 2021 04:00) (60 - 89)  BP: 183/77 (02 Jul 2021 04:00) (177/70 - 222/83)  RR: 19 (02 Jul 2021 02:00) (18 - 19)  SpO2: 96% (02 Jul 2021 02:00) (87% - 96%)    GENERAL: NAD, lying in bed comfortably  HEAD:  Atraumatic, Normocephalic  EYES: EOMI, PERRLA, conjunctiva and sclera clear  ENT: Moist mucous membranes  NECK: Supple, No JVD  CHEST/LUNG: Clear to auscultation bilaterally; No rales, rhonchi, wheezing, or rubs. Unlabored respirations  HEART: Regular rate and rhythm; No murmurs, rubs, or gallops  ABDOMEN: Bowel sounds present; Soft, Nontender, Nondistended. No hepatomegaly  EXTREMITIES:  2+ Peripheral Pulses, brisk capillary refill. No clubbing, cyanosis, or edema  NERVOUS SYSTEM:  Alert & Oriented X1, speech clear. No deficits   MSK: FROM all 4 extremities, full and equal strength  SKIN: No rashes or lesions

## 2021-07-01 NOTE — H&P ADULT - ASSESSMENT
Pt is a 89 yo F from home, recently discharged from [place] [address] at baseline with PMH of HTN, DM, HLD, CAD(s/p stent), dementia, PSH of cholecystectomy came for complaints of vomiting and upper abdominal pain since yesterday. Symptoms resolved now. Unsafe for discharge from ED, so admitted for SW evaluation and possible LTC placement

## 2021-07-01 NOTE — ED PROVIDER NOTE - CLINICAL SUMMARY MEDICAL DECISION MAKING FREE TEXT BOX
89 y/o F presents with abdominal pain and vomiting. Patient now claims to be feeling better, however is A&O x2 and is unable to provide clear history. Will get labs and talk to patient's daughter. Possible admission with need for aide vs placement.

## 2021-07-01 NOTE — H&P ADULT - PROBLEM SELECTOR PLAN 7
IMPROVE VTE Individual Risk Assessment  RISK                                                                Points  [  ] Previous VTE                                                  3  [  ] Thrombophilia                                               2  [  ] Lower limb paralysis                                      2        (unable to hold up >15 seconds)    [  ] Current Cancer                                              2         (within 6 months)  [x  ] Immobilization > 24 hrs                                1  [  ] ICU/CCU stay > 24 hours                              1  [x  ] Age > 60                                                      1  IMPROVE VTE Score _________2,  lovenox sq for DVT proph

## 2021-07-01 NOTE — H&P ADULT - PROBLEM SELECTOR PLAN 6
at home on amlodipine and metoprolol  on admission BP was high at 222/83 mmHg, came down to 177/70  continued home meds  also started on vasotec IV PRN for SBP>180 at home on amlodipine and metoprolol  on admission BP was high at 222/83 mmHg, came down to 177/70  continued home meds with parameters  also started on vasotec IV PRN for SBP>180

## 2021-07-01 NOTE — ED PROVIDER NOTE - OBJECTIVE STATEMENT
88 y/o F with a significant PMHx of DM, HTN, HLD, dementia, former smoker, claims to live alone, presents to the ED with complaints of vomiting and upper abdominal pain since yesterday. Patient reports many episodes of vomiting yesterday, followed by upper abdominal pain. Patient notes pain comes and goes. Patient also endorsing dizziness, which she has had for many years. Patient notes last episode of vomiting occurred this morning. Patient states she called her daughter and told her she was not feeling well, was dizzy and vomiting. Daughter then called 911 who brought patient to the ED. Patient is a poor historian and is unable to recall last BM or number of episodes of vomiting. Denies fever, dysuria, weakness, chest pain, room spinning sensation or any other acute complaints. Patient states she feels better now. Patient states she may have received 1 COVID-19 vaccine but is unsure.

## 2021-07-01 NOTE — H&P ADULT - PROBLEM SELECTOR PLAN 1
Pt p/w with multiple episodes od NBNB vomiting and abdominal pain  In ED pt was nauseous was given one dose of metoclopramide  Vitals stable  PE: No abdominal tenderness, fullness, BS heard  pt is asymptomatic now  If nauseous can add PRN Zofran

## 2021-07-01 NOTE — H&P ADULT - PROBLEM SELECTOR PLAN 2
Pt has h/o dementia, baseline AAOx1  CT head negative for acute findings  continued home meds  SW consulted for increase in HHA or LTC placement Pt has h/o dementia, baseline AAOx1  CT head negative for acute findings  continued home meds of memantine and donepezil  SW consulted for increase in HHA or LTC placement

## 2021-07-01 NOTE — ED PROVIDER NOTE - PMH
Dementia    Diabetes Mellitus, Type 2    History of Cholecystectomy    Hyperlipidemia    Hypertension

## 2021-07-01 NOTE — H&P ADULT - PROBLEM SELECTOR PLAN 5
at home on   started on insulin sliding scale  follow A1c at home on metformin  started on insulin sliding scale  follow A1c

## 2021-07-01 NOTE — H&P ADULT - PROBLEM SELECTOR PLAN 3
has CAD s/p stent placement  EKG showed NSR with some TWI  proBNP was elevated at   no signs of volume overload  troponins x2 negative, follow T3  continued home meds of has CAD s/p stent placement? 2020  EKG showed NSR with some TWI  proBNP was elevated at 2225  CXR normal  no signs of volume overload  troponins x2 negative, follow T3  continued home meds of ASA, plavix, atorvastatin and metoprolol  Echocardiogram ordered has CAD s/p stent placement? 2020  EKG showed NSR with some TWI  proBNP was elevated at 2225  CXR normal  no signs of volume overload  troponins x2 negative, follow T3  continued home meds of plavix (ASA allergy), atorvastatin and metoprolol  Echocardiogram ordered

## 2021-07-01 NOTE — ED PROVIDER NOTE - PROGRESS NOTE DETAILS
Pt's daughter Zoey [phone] called, pt with chronic vertigo, also received COVID Pfizer vaccine.  Will speak with Dr. Portillo Case d/w Dr. Portillo, claims pt with dementia, currently on meds., agreed to admit pt Pt's daughter Zoey [phone] called, pt with chronic vertigo, also received COVID Pfizer vaccine.  Pt refused home care.  Will speak with Dr. Portillo Case d/w Dr. Portillo, claims pt with dementia, currently on meds., agreed to admit pt.  advised to admit pt under Dr. Hoffamn Case d/w Dr. Hoffman

## 2021-07-01 NOTE — H&P ADULT - ATTENDING COMMENTS
Pt appears to have baseline dementia and upon d/c by ER a safe d/c planning could not be assured or ascertained, as pt's living condition could not be confirmed (i.e. does pt live alone or with fulltime aid or guardian). Hence a social admit at present is needed, will consult SW in this regard.

## 2021-07-02 DIAGNOSIS — I10 ESSENTIAL (PRIMARY) HYPERTENSION: ICD-10-CM

## 2021-07-02 DIAGNOSIS — I25.10 ATHEROSCLEROTIC HEART DISEASE OF NATIVE CORONARY ARTERY WITHOUT ANGINA PECTORIS: ICD-10-CM

## 2021-07-02 DIAGNOSIS — F03.90 UNSPECIFIED DEMENTIA WITHOUT BEHAVIORAL DISTURBANCE: ICD-10-CM

## 2021-07-02 DIAGNOSIS — R10.9 UNSPECIFIED ABDOMINAL PAIN: ICD-10-CM

## 2021-07-02 DIAGNOSIS — D64.9 ANEMIA, UNSPECIFIED: ICD-10-CM

## 2021-07-02 DIAGNOSIS — E11.9 TYPE 2 DIABETES MELLITUS WITHOUT COMPLICATIONS: ICD-10-CM

## 2021-07-02 DIAGNOSIS — Z29.9 ENCOUNTER FOR PROPHYLACTIC MEASURES, UNSPECIFIED: ICD-10-CM

## 2021-07-02 LAB
24R-OH-CALCIDIOL SERPL-MCNC: 41.7 NG/ML — SIGNIFICANT CHANGE UP (ref 30–80)
A1C WITH ESTIMATED AVERAGE GLUCOSE RESULT: 5.5 % — SIGNIFICANT CHANGE UP (ref 4–5.6)
ALBUMIN SERPL ELPH-MCNC: 3.3 G/DL — LOW (ref 3.5–5)
ALP SERPL-CCNC: 56 U/L — SIGNIFICANT CHANGE UP (ref 40–120)
ALT FLD-CCNC: 14 U/L DA — SIGNIFICANT CHANGE UP (ref 10–60)
ANION GAP SERPL CALC-SCNC: 7 MMOL/L — SIGNIFICANT CHANGE UP (ref 5–17)
AST SERPL-CCNC: 17 U/L — SIGNIFICANT CHANGE UP (ref 10–40)
BASOPHILS # BLD AUTO: 0.03 K/UL — SIGNIFICANT CHANGE UP (ref 0–0.2)
BASOPHILS NFR BLD AUTO: 0.4 % — SIGNIFICANT CHANGE UP (ref 0–2)
BILIRUB SERPL-MCNC: 0.9 MG/DL — SIGNIFICANT CHANGE UP (ref 0.2–1.2)
BUN SERPL-MCNC: 14 MG/DL — SIGNIFICANT CHANGE UP (ref 7–18)
CALCIUM SERPL-MCNC: 9.1 MG/DL — SIGNIFICANT CHANGE UP (ref 8.4–10.5)
CHLORIDE SERPL-SCNC: 108 MMOL/L — SIGNIFICANT CHANGE UP (ref 96–108)
CHOLEST SERPL-MCNC: 213 MG/DL — HIGH
CO2 SERPL-SCNC: 27 MMOL/L — SIGNIFICANT CHANGE UP (ref 22–31)
COVID-19 SPIKE DOMAIN AB INTERP: POSITIVE
COVID-19 SPIKE DOMAIN ANTIBODY RESULT: >250 U/ML — HIGH
CREAT SERPL-MCNC: 0.93 MG/DL — SIGNIFICANT CHANGE UP (ref 0.5–1.3)
EOSINOPHIL # BLD AUTO: 0.03 K/UL — SIGNIFICANT CHANGE UP (ref 0–0.5)
EOSINOPHIL NFR BLD AUTO: 0.4 % — SIGNIFICANT CHANGE UP (ref 0–6)
ESTIMATED AVERAGE GLUCOSE: 111 MG/DL — SIGNIFICANT CHANGE UP (ref 68–114)
FOLATE SERPL-MCNC: >20 NG/ML — SIGNIFICANT CHANGE UP
GLUCOSE BLDC GLUCOMTR-MCNC: 103 MG/DL — HIGH (ref 70–99)
GLUCOSE BLDC GLUCOMTR-MCNC: 115 MG/DL — HIGH (ref 70–99)
GLUCOSE BLDC GLUCOMTR-MCNC: 96 MG/DL — SIGNIFICANT CHANGE UP (ref 70–99)
GLUCOSE BLDC GLUCOMTR-MCNC: 98 MG/DL — SIGNIFICANT CHANGE UP (ref 70–99)
GLUCOSE SERPL-MCNC: 88 MG/DL — SIGNIFICANT CHANGE UP (ref 70–99)
HCT VFR BLD CALC: 31.3 % — LOW (ref 34.5–45)
HDLC SERPL-MCNC: 68 MG/DL — SIGNIFICANT CHANGE UP
HGB BLD-MCNC: 9.7 G/DL — LOW (ref 11.5–15.5)
IMM GRANULOCYTES NFR BLD AUTO: 0.4 % — SIGNIFICANT CHANGE UP (ref 0–1.5)
LIPID PNL WITH DIRECT LDL SERPL: 132 MG/DL — HIGH
LYMPHOCYTES # BLD AUTO: 0.68 K/UL — LOW (ref 1–3.3)
LYMPHOCYTES # BLD AUTO: 10 % — LOW (ref 13–44)
MAGNESIUM SERPL-MCNC: 2.1 MG/DL — SIGNIFICANT CHANGE UP (ref 1.6–2.6)
MCHC RBC-ENTMCNC: 26.4 PG — LOW (ref 27–34)
MCHC RBC-ENTMCNC: 31 GM/DL — LOW (ref 32–36)
MCV RBC AUTO: 85.1 FL — SIGNIFICANT CHANGE UP (ref 80–100)
MONOCYTES # BLD AUTO: 0.58 K/UL — SIGNIFICANT CHANGE UP (ref 0–0.9)
MONOCYTES NFR BLD AUTO: 8.5 % — SIGNIFICANT CHANGE UP (ref 2–14)
NEUTROPHILS # BLD AUTO: 5.45 K/UL — SIGNIFICANT CHANGE UP (ref 1.8–7.4)
NEUTROPHILS NFR BLD AUTO: 80.3 % — HIGH (ref 43–77)
NON HDL CHOLESTEROL: 145 MG/DL — HIGH
NRBC # BLD: 0 /100 WBCS — SIGNIFICANT CHANGE UP (ref 0–0)
PHOSPHATE SERPL-MCNC: 2.9 MG/DL — SIGNIFICANT CHANGE UP (ref 2.5–4.5)
PLATELET # BLD AUTO: 204 K/UL — SIGNIFICANT CHANGE UP (ref 150–400)
POTASSIUM SERPL-MCNC: 4 MMOL/L — SIGNIFICANT CHANGE UP (ref 3.5–5.3)
POTASSIUM SERPL-SCNC: 4 MMOL/L — SIGNIFICANT CHANGE UP (ref 3.5–5.3)
PROT SERPL-MCNC: 6.7 G/DL — SIGNIFICANT CHANGE UP (ref 6–8.3)
RBC # BLD: 3.68 M/UL — LOW (ref 3.8–5.2)
RBC # FLD: 14.6 % — HIGH (ref 10.3–14.5)
SARS-COV-2 IGG+IGM SERPL QL IA: >250 U/ML — HIGH
SARS-COV-2 IGG+IGM SERPL QL IA: POSITIVE
SODIUM SERPL-SCNC: 142 MMOL/L — SIGNIFICANT CHANGE UP (ref 135–145)
TRIGL SERPL-MCNC: 65 MG/DL — SIGNIFICANT CHANGE UP
TROPONIN I SERPL-MCNC: <0.015 NG/ML — SIGNIFICANT CHANGE UP (ref 0–0.04)
TSH SERPL-MCNC: 1.43 UU/ML — SIGNIFICANT CHANGE UP (ref 0.34–4.82)
VIT B12 SERPL-MCNC: 1387 PG/ML — HIGH (ref 232–1245)
WBC # BLD: 6.8 K/UL — SIGNIFICANT CHANGE UP (ref 3.8–10.5)
WBC # FLD AUTO: 6.8 K/UL — SIGNIFICANT CHANGE UP (ref 3.8–10.5)

## 2021-07-02 RX ORDER — ATORVASTATIN CALCIUM 80 MG/1
40 TABLET, FILM COATED ORAL AT BEDTIME
Refills: 0 | Status: DISCONTINUED | OUTPATIENT
Start: 2021-07-02 | End: 2021-07-06

## 2021-07-02 RX ORDER — FOLIC ACID 0.8 MG
1 TABLET ORAL DAILY
Refills: 0 | Status: DISCONTINUED | OUTPATIENT
Start: 2021-07-02 | End: 2021-07-06

## 2021-07-02 RX ORDER — POLYETHYLENE GLYCOL 3350 17 G/17G
17 POWDER, FOR SOLUTION ORAL DAILY
Refills: 0 | Status: DISCONTINUED | OUTPATIENT
Start: 2021-07-02 | End: 2021-07-06

## 2021-07-02 RX ORDER — METOPROLOL TARTRATE 50 MG
12.5 TABLET ORAL
Refills: 0 | Status: DISCONTINUED | OUTPATIENT
Start: 2021-07-02 | End: 2021-07-04

## 2021-07-02 RX ORDER — DONEPEZIL HYDROCHLORIDE 10 MG/1
10 TABLET, FILM COATED ORAL AT BEDTIME
Refills: 0 | Status: DISCONTINUED | OUTPATIENT
Start: 2021-07-02 | End: 2021-07-06

## 2021-07-02 RX ORDER — ASPIRIN/CALCIUM CARB/MAGNESIUM 324 MG
81 TABLET ORAL DAILY
Refills: 0 | Status: DISCONTINUED | OUTPATIENT
Start: 2021-07-02 | End: 2021-07-02

## 2021-07-02 RX ORDER — ENOXAPARIN SODIUM 100 MG/ML
40 INJECTION SUBCUTANEOUS DAILY
Refills: 0 | Status: DISCONTINUED | OUTPATIENT
Start: 2021-07-02 | End: 2021-07-06

## 2021-07-02 RX ORDER — FLUOXETINE HCL 10 MG
10 CAPSULE ORAL DAILY
Refills: 0 | Status: DISCONTINUED | OUTPATIENT
Start: 2021-07-02 | End: 2021-07-06

## 2021-07-02 RX ORDER — AMLODIPINE BESYLATE 2.5 MG/1
5 TABLET ORAL DAILY
Refills: 0 | Status: DISCONTINUED | OUTPATIENT
Start: 2021-07-02 | End: 2021-07-06

## 2021-07-02 RX ORDER — MEMANTINE HYDROCHLORIDE 10 MG/1
5 TABLET ORAL
Refills: 0 | Status: DISCONTINUED | OUTPATIENT
Start: 2021-07-02 | End: 2021-07-06

## 2021-07-02 RX ORDER — CLOPIDOGREL BISULFATE 75 MG/1
75 TABLET, FILM COATED ORAL DAILY
Refills: 0 | Status: DISCONTINUED | OUTPATIENT
Start: 2021-07-02 | End: 2021-07-06

## 2021-07-02 RX ORDER — SENNA PLUS 8.6 MG/1
2 TABLET ORAL AT BEDTIME
Refills: 0 | Status: DISCONTINUED | OUTPATIENT
Start: 2021-07-02 | End: 2021-07-06

## 2021-07-02 RX ORDER — INSULIN LISPRO 100/ML
VIAL (ML) SUBCUTANEOUS
Refills: 0 | Status: DISCONTINUED | OUTPATIENT
Start: 2021-07-02 | End: 2021-07-06

## 2021-07-02 RX ADMIN — ATORVASTATIN CALCIUM 40 MILLIGRAM(S): 80 TABLET, FILM COATED ORAL at 22:45

## 2021-07-02 RX ADMIN — Medication 10 MILLIGRAM(S): at 02:50

## 2021-07-02 RX ADMIN — SENNA PLUS 2 TABLET(S): 8.6 TABLET ORAL at 22:45

## 2021-07-02 RX ADMIN — DONEPEZIL HYDROCHLORIDE 10 MILLIGRAM(S): 10 TABLET, FILM COATED ORAL at 22:45

## 2021-07-02 RX ADMIN — Medication 10 MILLIGRAM(S): at 13:31

## 2021-07-02 RX ADMIN — Medication 12.5 MILLIGRAM(S): at 06:33

## 2021-07-02 RX ADMIN — Medication 12.5 MILLIGRAM(S): at 17:57

## 2021-07-02 RX ADMIN — POLYETHYLENE GLYCOL 3350 17 GRAM(S): 17 POWDER, FOR SOLUTION ORAL at 11:28

## 2021-07-02 RX ADMIN — Medication 1.25 MILLIGRAM(S): at 04:01

## 2021-07-02 RX ADMIN — AMLODIPINE BESYLATE 5 MILLIGRAM(S): 2.5 TABLET ORAL at 06:33

## 2021-07-02 RX ADMIN — ENOXAPARIN SODIUM 40 MILLIGRAM(S): 100 INJECTION SUBCUTANEOUS at 22:45

## 2021-07-02 RX ADMIN — Medication 1 MILLIGRAM(S): at 11:28

## 2021-07-02 RX ADMIN — MEMANTINE HYDROCHLORIDE 5 MILLIGRAM(S): 10 TABLET ORAL at 17:57

## 2021-07-02 RX ADMIN — CLOPIDOGREL BISULFATE 75 MILLIGRAM(S): 75 TABLET, FILM COATED ORAL at 11:28

## 2021-07-02 RX ADMIN — MEMANTINE HYDROCHLORIDE 5 MILLIGRAM(S): 10 TABLET ORAL at 06:33

## 2021-07-02 NOTE — PATIENT PROFILE ADULT - NSPRONUTRITIONRISK_GEN_A_NUR
Regarding: possible UTI  ----- Message from Britney Adames sent at 9/5/2020  2:40 PM EDT -----  Pt states that she believes she is getting a UTI, states there is burning sensation No indicators present

## 2021-07-03 ENCOUNTER — TRANSCRIPTION ENCOUNTER (OUTPATIENT)
Age: 86
End: 2021-07-03

## 2021-07-03 LAB
ALBUMIN SERPL ELPH-MCNC: 3.2 G/DL — LOW (ref 3.5–5)
ALP SERPL-CCNC: 50 U/L — SIGNIFICANT CHANGE UP (ref 40–120)
ALT FLD-CCNC: 13 U/L DA — SIGNIFICANT CHANGE UP (ref 10–60)
ANION GAP SERPL CALC-SCNC: 11 MMOL/L — SIGNIFICANT CHANGE UP (ref 5–17)
AST SERPL-CCNC: 18 U/L — SIGNIFICANT CHANGE UP (ref 10–40)
BILIRUB SERPL-MCNC: 0.5 MG/DL — SIGNIFICANT CHANGE UP (ref 0.2–1.2)
BUN SERPL-MCNC: 17 MG/DL — SIGNIFICANT CHANGE UP (ref 7–18)
CALCIUM SERPL-MCNC: 9.1 MG/DL — SIGNIFICANT CHANGE UP (ref 8.4–10.5)
CHLORIDE SERPL-SCNC: 107 MMOL/L — SIGNIFICANT CHANGE UP (ref 96–108)
CO2 SERPL-SCNC: 23 MMOL/L — SIGNIFICANT CHANGE UP (ref 22–31)
CREAT SERPL-MCNC: 0.8 MG/DL — SIGNIFICANT CHANGE UP (ref 0.5–1.3)
GLUCOSE BLDC GLUCOMTR-MCNC: 106 MG/DL — HIGH (ref 70–99)
GLUCOSE BLDC GLUCOMTR-MCNC: 118 MG/DL — HIGH (ref 70–99)
GLUCOSE BLDC GLUCOMTR-MCNC: 92 MG/DL — SIGNIFICANT CHANGE UP (ref 70–99)
GLUCOSE BLDC GLUCOMTR-MCNC: 93 MG/DL — SIGNIFICANT CHANGE UP (ref 70–99)
GLUCOSE SERPL-MCNC: 87 MG/DL — SIGNIFICANT CHANGE UP (ref 70–99)
HCT VFR BLD CALC: 31.1 % — LOW (ref 34.5–45)
HGB BLD-MCNC: 9.6 G/DL — LOW (ref 11.5–15.5)
MCHC RBC-ENTMCNC: 25.9 PG — LOW (ref 27–34)
MCHC RBC-ENTMCNC: 30.9 GM/DL — LOW (ref 32–36)
MCV RBC AUTO: 83.8 FL — SIGNIFICANT CHANGE UP (ref 80–100)
NRBC # BLD: 0 /100 WBCS — SIGNIFICANT CHANGE UP (ref 0–0)
PLATELET # BLD AUTO: 228 K/UL — SIGNIFICANT CHANGE UP (ref 150–400)
POTASSIUM SERPL-MCNC: 3.4 MMOL/L — LOW (ref 3.5–5.3)
POTASSIUM SERPL-SCNC: 3.4 MMOL/L — LOW (ref 3.5–5.3)
PROT SERPL-MCNC: 6.4 G/DL — SIGNIFICANT CHANGE UP (ref 6–8.3)
RBC # BLD: 3.71 M/UL — LOW (ref 3.8–5.2)
RBC # FLD: 14.4 % — SIGNIFICANT CHANGE UP (ref 10.3–14.5)
SODIUM SERPL-SCNC: 141 MMOL/L — SIGNIFICANT CHANGE UP (ref 135–145)
WBC # BLD: 4.46 K/UL — SIGNIFICANT CHANGE UP (ref 3.8–10.5)
WBC # FLD AUTO: 4.46 K/UL — SIGNIFICANT CHANGE UP (ref 3.8–10.5)

## 2021-07-03 RX ORDER — HYDRALAZINE HCL 50 MG
10 TABLET ORAL ONCE
Refills: 0 | Status: COMPLETED | OUTPATIENT
Start: 2021-07-03 | End: 2021-07-03

## 2021-07-03 RX ORDER — HYDRALAZINE HCL 50 MG
5 TABLET ORAL ONCE
Refills: 0 | Status: COMPLETED | OUTPATIENT
Start: 2021-07-03 | End: 2021-07-03

## 2021-07-03 RX ORDER — POTASSIUM CHLORIDE 20 MEQ
40 PACKET (EA) ORAL ONCE
Refills: 0 | Status: COMPLETED | OUTPATIENT
Start: 2021-07-03 | End: 2021-07-03

## 2021-07-03 RX ADMIN — Medication 10 MILLIGRAM(S): at 08:05

## 2021-07-03 RX ADMIN — CLOPIDOGREL BISULFATE 75 MILLIGRAM(S): 75 TABLET, FILM COATED ORAL at 11:48

## 2021-07-03 RX ADMIN — ENOXAPARIN SODIUM 40 MILLIGRAM(S): 100 INJECTION SUBCUTANEOUS at 11:48

## 2021-07-03 RX ADMIN — SENNA PLUS 2 TABLET(S): 8.6 TABLET ORAL at 22:41

## 2021-07-03 RX ADMIN — MEMANTINE HYDROCHLORIDE 5 MILLIGRAM(S): 10 TABLET ORAL at 17:36

## 2021-07-03 RX ADMIN — Medication 40 MILLIEQUIVALENT(S): at 08:22

## 2021-07-03 RX ADMIN — AMLODIPINE BESYLATE 5 MILLIGRAM(S): 2.5 TABLET ORAL at 05:17

## 2021-07-03 RX ADMIN — ATORVASTATIN CALCIUM 40 MILLIGRAM(S): 80 TABLET, FILM COATED ORAL at 22:41

## 2021-07-03 RX ADMIN — Medication 10 MILLIGRAM(S): at 11:48

## 2021-07-03 RX ADMIN — Medication 12.5 MILLIGRAM(S): at 05:17

## 2021-07-03 RX ADMIN — MEMANTINE HYDROCHLORIDE 5 MILLIGRAM(S): 10 TABLET ORAL at 05:17

## 2021-07-03 RX ADMIN — DONEPEZIL HYDROCHLORIDE 10 MILLIGRAM(S): 10 TABLET, FILM COATED ORAL at 22:41

## 2021-07-03 RX ADMIN — Medication 1 MILLIGRAM(S): at 11:48

## 2021-07-03 RX ADMIN — Medication 5 MILLIGRAM(S): at 17:47

## 2021-07-03 NOTE — CHART NOTE - NSCHARTNOTEFT_GEN_A_CORE
EVENT: Vitals reviewed. 's    HPI: Pt is a 88 yo F from home, recently discharged from [place] [address] at baseline with PMH of HTN, DM, HLD, CAD(s/p stent), dementia, PSH of cholecystectomy came for complaints of vomiting and upper abdominal pain since yesterday. Symptoms resolved now. Unsafe for discharge from ED, so admitted for SW evaluation and possible LTC placement    Subjective: n/a    OBJECTIVE:  Vital Signs Last 24 Hrs  T(C): 36.7 (03 Jul 2021 06:00), Max: 36.7 (02 Jul 2021 12:51)  T(F): 98.1 (03 Jul 2021 06:00), Max: 98.1 (02 Jul 2021 12:51)  HR: 49 (03 Jul 2021 06:00) (49 - 61)  BP: 184/58 (03 Jul 2021 06:00) (143/50 - 184/58)  BP(mean): --  RR: 18 (03 Jul 2021 06:00) (14 - 20)  SpO2: 98% (03 Jul 2021 06:00) (96% - 100%)    FOCUSED PHYSICAL EXAM:  Neuro: awake, confused, in NAD  Cardiovascular: Pulses +2 B/L in lower and upper extremities, HR regular, BP stable, No edema.  Respiratory: Respirations regular, unlabored, breath sounds clear B/L.   GI: Abdomen soft, non-tender, positive bowel sounds.  : no bladder distention noted. No complaints at this time.  Skin: Dry, intact, no bruising, no diaphoresis.    LABS:                        9.6    4.46  )-----------( 228      ( 03 Jul 2021 05:50 )             31.1   CARDIAC MARKERS ( 02 Jul 2021 03:08 )  <0.015 ng/mL / x     / x     / x     / x      CARDIAC MARKERS ( 01 Jul 2021 18:43 )  <0.015 ng/mL / x     / 116 U/L / x     / x        07-03    141  |  107  |  17  ----------------------------<  87  3.4<L>   |  23  |  0.80    Ca    9.1      03 Jul 2021 05:50  Phos  2.9     07-02  Mg     2.1     07-02    TPro  6.4  /  Alb  3.2<L>  /  TBili  0.5  /  DBili  x   /  AST  18  /  ALT  13  /  AlkPhos  50  07-03      EKG:   IMAGING:    ASSESSMENT/PLAN: Hypertension      PLAN:   1. Hydralazine 10mg, IVP x 1 dose ordered  2. Monitor effectiveness of hydralazine  3. Cont with current BP meds  4. If SBP continue to be elevated consider increasing BP meds to a higher dose  5. Cont present care/treatment  6. Reassess

## 2021-07-03 NOTE — DISCHARGE NOTE PROVIDER - NSDCMRMEDTOKEN_GEN_ALL_CORE_FT
Actamin 325 mg oral tablet: 2 tab(s) orally every 6 hours, As Needed - 6)  amLODIPine 5 mg oral tablet: 1 tab(s) orally once a day  aspirin 81 mg oral tablet, chewable: 1 tab(s) orally once a day  atorvastatin 40 mg oral tablet: 1 tab(s) orally once a day (at bedtime)  clopidogrel 75 mg oral tablet: 1 tab(s) orally once a day  donepezil 10 mg oral tablet: 1 tab(s) orally once a day (at bedtime)  ezetimibe 10 mg oral tablet: 1 tab(s) orally once a day  FLUoxetine 10 mg oral capsule: 1 cap(s) orally once a day  folic acid 1 mg oral tablet: 1 tab(s) orally once a day  Janumet 50 mg-500 mg oral tablet: 1 tab(s) orally 2 times a day  memantine 5 mg oral tablet: 1 tab(s) orally 2 times a day  Metoprolol Tartrate 25 mg oral tablet: 0.5 tab(s) orally 2 times a day  polyethylene glycol 3350 oral powder for reconstitution: 17 gram(s) orally once a day  senna oral tablet: 2 tab(s) orally once a day (at bedtime)

## 2021-07-04 LAB
GLUCOSE BLDC GLUCOMTR-MCNC: 105 MG/DL — HIGH (ref 70–99)
GLUCOSE BLDC GLUCOMTR-MCNC: 114 MG/DL — HIGH (ref 70–99)
GLUCOSE BLDC GLUCOMTR-MCNC: 91 MG/DL — SIGNIFICANT CHANGE UP (ref 70–99)
GLUCOSE BLDC GLUCOMTR-MCNC: 94 MG/DL — SIGNIFICANT CHANGE UP (ref 70–99)

## 2021-07-04 RX ORDER — HYDRALAZINE HCL 50 MG
25 TABLET ORAL
Refills: 0 | Status: DISCONTINUED | OUTPATIENT
Start: 2021-07-04 | End: 2021-07-06

## 2021-07-04 RX ORDER — HYDRALAZINE HCL 50 MG
5 TABLET ORAL ONCE
Refills: 0 | Status: COMPLETED | OUTPATIENT
Start: 2021-07-04 | End: 2021-07-04

## 2021-07-04 RX ADMIN — SENNA PLUS 2 TABLET(S): 8.6 TABLET ORAL at 21:26

## 2021-07-04 RX ADMIN — Medication 10 MILLIGRAM(S): at 11:59

## 2021-07-04 RX ADMIN — ATORVASTATIN CALCIUM 40 MILLIGRAM(S): 80 TABLET, FILM COATED ORAL at 21:26

## 2021-07-04 RX ADMIN — Medication 5 MILLIGRAM(S): at 23:01

## 2021-07-04 RX ADMIN — MEMANTINE HYDROCHLORIDE 5 MILLIGRAM(S): 10 TABLET ORAL at 17:07

## 2021-07-04 RX ADMIN — ENOXAPARIN SODIUM 40 MILLIGRAM(S): 100 INJECTION SUBCUTANEOUS at 11:59

## 2021-07-04 RX ADMIN — CLOPIDOGREL BISULFATE 75 MILLIGRAM(S): 75 TABLET, FILM COATED ORAL at 11:59

## 2021-07-04 RX ADMIN — Medication 1 MILLIGRAM(S): at 11:59

## 2021-07-04 RX ADMIN — MEMANTINE HYDROCHLORIDE 5 MILLIGRAM(S): 10 TABLET ORAL at 05:26

## 2021-07-04 RX ADMIN — Medication 12.5 MILLIGRAM(S): at 17:07

## 2021-07-04 RX ADMIN — DONEPEZIL HYDROCHLORIDE 10 MILLIGRAM(S): 10 TABLET, FILM COATED ORAL at 21:26

## 2021-07-04 RX ADMIN — AMLODIPINE BESYLATE 5 MILLIGRAM(S): 2.5 TABLET ORAL at 05:26

## 2021-07-04 NOTE — CHART NOTE - NSCHARTNOTEFT_GEN_A_CORE
EVENT: /46; repeat 185/68 (04 Jul 2021 21:53) (175/59 - 192/46)    BRIEF HPI: 88 yo F from home, recently discharged from [place] [address] at baseline with PMH of HTN, DM, HLD, CAD(s/p stent), dementia, PSH of cholecystectomy came for complaints of vomiting and upper abdominal pain. Patient admitted to medicine as social admission to obtain more services at home ot LTC placement. Now with isolated systolic htn.    OBJECTIVE:  Vital Signs Last 24 Hrs  T(C): 36.3 (04 Jul 2021 20:17), Max: 36.4 (04 Jul 2021 05:00)  T(F): 97.3 (04 Jul 2021 20:17), Max: 97.6 (04 Jul 2021 12:11)  HR: 56 (04 Jul 2021 21:53) (55 - 64)  BP: 185/68 (04 Jul 2021 21:53) (175/59 - 192/46)  BP(mean): --  RR: 18 (04 Jul 2021 20:17) (16 - 18)  SpO2: 100% (04 Jul 2021 20:17) (98% - 100%)    FOCUSED PHYSICAL EXAM:  CV: S1 S2 wilber  RESP: Even, unlabored  NEURO: Awake, oriented X 1    LABS:                        9.6    4.46  )-----------( 228      ( 03 Jul 2021 05:50 )             31.1     07-03    141  |  107  |  17  ----------------------------<  87  3.4<L>   |  23  |  0.80    Ca    9.1      03 Jul 2021 05:50    TPro  6.4  /  Alb  3.2<L>  /  TBili  0.5  /  DBili  x   /  AST  18  /  ALT  13  /  AlkPhos  50  07-03      PROBLEM: Insolated systolic htn probably due to old age  PLAN:   1. Cont amlodipine Tablet 5 meme GRAM(s) Oral daily  2. Hydralazine Injectable 5 meme GRAM(s) IV Push once now  3. Cont metoprolol tartrate 12.5 meme GRAM(s) Oral two times a day    FOLLOW UP / RESULT:  BP EVENT: /46; repeat 185/68 (04 Jul 2021 21:53) (175/59 - 192/46)    BRIEF HPI: 89 yo F from home, recently discharged from [place] [address] at baseline with PMH of HTN, DM, HLD, CAD(s/p stent), dementia, PSH of cholecystectomy came for complaints of vomiting and upper abdominal pain. Patient admitted to medicine as social admission to obtain more services at home ot LTC placement. Now with isolated systolic htn.    OBJECTIVE:  Vital Signs Last 24 Hrs  T(C): 36.3 (04 Jul 2021 20:17), Max: 36.4 (04 Jul 2021 05:00)  T(F): 97.3 (04 Jul 2021 20:17), Max: 97.6 (04 Jul 2021 12:11)  HR: 56 (04 Jul 2021 21:53) (55 - 64)  BP: 185/68 (04 Jul 2021 21:53) (175/59 - 192/46)  BP(mean): --  RR: 18 (04 Jul 2021 20:17) (16 - 18)  SpO2: 100% (04 Jul 2021 20:17) (98% - 100%)    FOCUSED PHYSICAL EXAM:  CV: S1 S2 wilber  RESP: Even, unlabored  NEURO: Awake, oriented X 1    LABS:                        9.6    4.46  )-----------( 228      ( 03 Jul 2021 05:50 )             31.1     07-03    141  |  107  |  17  ----------------------------<  87  3.4<L>   |  23  |  0.80    Ca    9.1      03 Jul 2021 05:50    TPro  6.4  /  Alb  3.2<L>  /  TBili  0.5  /  DBili  x   /  AST  18  /  ALT  13  /  AlkPhos  50  07-03      PROBLEM: Insolated systolic htn probably due to old age  PLAN:   1. Cont amlodipine Tablet 5 meme GRAM(s) Oral daily  2. Hydralazine Injectable 5 meme GRAM(s) IV Push once now with parameters  3. Hydralazine 25 meme GRAM(s) Oral two times a day with parameters    FOLLOW UP / RESULT:  BP

## 2021-07-05 LAB
ALBUMIN SERPL ELPH-MCNC: 3.2 G/DL — LOW (ref 3.5–5)
ALP SERPL-CCNC: 55 U/L — SIGNIFICANT CHANGE UP (ref 40–120)
ALT FLD-CCNC: 15 U/L DA — SIGNIFICANT CHANGE UP (ref 10–60)
ANION GAP SERPL CALC-SCNC: 12 MMOL/L — SIGNIFICANT CHANGE UP (ref 5–17)
AST SERPL-CCNC: 18 U/L — SIGNIFICANT CHANGE UP (ref 10–40)
BILIRUB SERPL-MCNC: 0.6 MG/DL — SIGNIFICANT CHANGE UP (ref 0.2–1.2)
BUN SERPL-MCNC: 14 MG/DL — SIGNIFICANT CHANGE UP (ref 7–18)
CALCIUM SERPL-MCNC: 9.7 MG/DL — SIGNIFICANT CHANGE UP (ref 8.4–10.5)
CHLORIDE SERPL-SCNC: 107 MMOL/L — SIGNIFICANT CHANGE UP (ref 96–108)
CO2 SERPL-SCNC: 22 MMOL/L — SIGNIFICANT CHANGE UP (ref 22–31)
CREAT SERPL-MCNC: 0.78 MG/DL — SIGNIFICANT CHANGE UP (ref 0.5–1.3)
GLUCOSE BLDC GLUCOMTR-MCNC: 103 MG/DL — HIGH (ref 70–99)
GLUCOSE BLDC GLUCOMTR-MCNC: 108 MG/DL — HIGH (ref 70–99)
GLUCOSE BLDC GLUCOMTR-MCNC: 98 MG/DL — SIGNIFICANT CHANGE UP (ref 70–99)
GLUCOSE BLDC GLUCOMTR-MCNC: 98 MG/DL — SIGNIFICANT CHANGE UP (ref 70–99)
GLUCOSE SERPL-MCNC: 103 MG/DL — HIGH (ref 70–99)
HCT VFR BLD CALC: 35 % — SIGNIFICANT CHANGE UP (ref 34.5–45)
HGB BLD-MCNC: 11.1 G/DL — LOW (ref 11.5–15.5)
MCHC RBC-ENTMCNC: 26.5 PG — LOW (ref 27–34)
MCHC RBC-ENTMCNC: 31.7 GM/DL — LOW (ref 32–36)
MCV RBC AUTO: 83.5 FL — SIGNIFICANT CHANGE UP (ref 80–100)
NRBC # BLD: 0 /100 WBCS — SIGNIFICANT CHANGE UP (ref 0–0)
PLATELET # BLD AUTO: 259 K/UL — SIGNIFICANT CHANGE UP (ref 150–400)
POTASSIUM SERPL-MCNC: 3.6 MMOL/L — SIGNIFICANT CHANGE UP (ref 3.5–5.3)
POTASSIUM SERPL-SCNC: 3.6 MMOL/L — SIGNIFICANT CHANGE UP (ref 3.5–5.3)
PROT SERPL-MCNC: 6.9 G/DL — SIGNIFICANT CHANGE UP (ref 6–8.3)
RBC # BLD: 4.19 M/UL — SIGNIFICANT CHANGE UP (ref 3.8–5.2)
RBC # FLD: 14.7 % — HIGH (ref 10.3–14.5)
SODIUM SERPL-SCNC: 141 MMOL/L — SIGNIFICANT CHANGE UP (ref 135–145)
WBC # BLD: 7.59 K/UL — SIGNIFICANT CHANGE UP (ref 3.8–10.5)
WBC # FLD AUTO: 7.59 K/UL — SIGNIFICANT CHANGE UP (ref 3.8–10.5)

## 2021-07-05 RX ORDER — HYDRALAZINE HCL 50 MG
5 TABLET ORAL ONCE
Refills: 0 | Status: COMPLETED | OUTPATIENT
Start: 2021-07-05 | End: 2021-07-05

## 2021-07-05 RX ADMIN — Medication 25 MILLIGRAM(S): at 05:16

## 2021-07-05 RX ADMIN — AMLODIPINE BESYLATE 5 MILLIGRAM(S): 2.5 TABLET ORAL at 05:16

## 2021-07-05 RX ADMIN — POLYETHYLENE GLYCOL 3350 17 GRAM(S): 17 POWDER, FOR SOLUTION ORAL at 11:48

## 2021-07-05 RX ADMIN — MEMANTINE HYDROCHLORIDE 5 MILLIGRAM(S): 10 TABLET ORAL at 17:29

## 2021-07-05 RX ADMIN — Medication 10 MILLIGRAM(S): at 11:48

## 2021-07-05 RX ADMIN — SENNA PLUS 2 TABLET(S): 8.6 TABLET ORAL at 21:50

## 2021-07-05 RX ADMIN — ATORVASTATIN CALCIUM 40 MILLIGRAM(S): 80 TABLET, FILM COATED ORAL at 21:50

## 2021-07-05 RX ADMIN — DONEPEZIL HYDROCHLORIDE 10 MILLIGRAM(S): 10 TABLET, FILM COATED ORAL at 21:50

## 2021-07-05 RX ADMIN — ENOXAPARIN SODIUM 40 MILLIGRAM(S): 100 INJECTION SUBCUTANEOUS at 11:48

## 2021-07-05 RX ADMIN — MEMANTINE HYDROCHLORIDE 5 MILLIGRAM(S): 10 TABLET ORAL at 05:16

## 2021-07-05 RX ADMIN — CLOPIDOGREL BISULFATE 75 MILLIGRAM(S): 75 TABLET, FILM COATED ORAL at 11:48

## 2021-07-05 RX ADMIN — Medication 1 MILLIGRAM(S): at 11:48

## 2021-07-05 RX ADMIN — Medication 25 MILLIGRAM(S): at 17:29

## 2021-07-05 NOTE — PROGRESS NOTE ADULT - ATTENDING COMMENTS
Awaiting  consult and input to facilitate a safe d/c for this social admission.
Awaiting SW consult and input to facilitate a safe d/c for this social admission.    Pt is medically stable for d/c and has no medical merit for admission aside for safe d/c planning issue which can hopefully be solved by rosa
Awaiting  consult and input to facilitate a safe d/c for this social admission.
Awaiting  consult and input to facilitate a safe d/c for this social admission.

## 2021-07-05 NOTE — PROGRESS NOTE ADULT - ASSESSMENT
Pt is a 88 yo F from home, recently discharged from [place] [address] at baseline with PMH of HTN, DM, HLD, CAD(s/p stent), dementia, PSH of cholecystectomy came for complaints of vomiting and upper abdominal pain since yesterday. History couldn't obtain the history from patient due to dementia, so verified with daughter Zoey. Pt had 4 episodes of NBNB vomiting yesterday evening till this morning, followed by upper abdominal pain, which was intermittent. Patient states she called her daughter and told her she was not feeling well, was dizzy and vomiting. Daughter then called 911 who brought patient to the ED. Patient was asymptomatic in ED and when asked all ROS was negative. Daughter would like evaluation for more hours of HHA or LTC.   Patient admitted to medicine as social admission to obtain more services at home ot LTC placement.    Patient seen and examined at bedside, denies any pain, resting in comfort. SW and PT consulted f/u recommendations.
Pt is a 89 yo F from home, recently discharged from [place] [address] at baseline with PMH of HTN, DM, HLD, CAD(s/p stent), dementia, PSH of cholecystectomy came for complaints of vomiting and upper abdominal pain since yesterday. History couldn't obtain the history from patient due to dementia, so verified with daughter Zoey. Pt had 4 episodes of NBNB vomiting yesterday evening till this morning, followed by upper abdominal pain, which was intermittent. Patient states she called her daughter and told her she was not feeling well, was dizzy and vomiting. Daughter then called 911 who brought patient to the ED. Patient was asymptomatic in ED and when asked all ROS was negative. Daughter would like evaluation for more hours of HHA or LTC.   Patient admitted to medicine as social admission to obtain more services at home ot LTC placement.    Patient seen and examined at bedside, denies any pain, resting in comfort. SW and PT consulted f/u recommendations.
Pt is a 88 yo F from home, recently discharged from [place] [address] at baseline with PMH of HTN, DM, HLD, CAD(s/p stent), dementia, PSH of cholecystectomy came for complaints of vomiting and upper abdominal pain since yesterday. History couldn't obtain the history from patient due to dementia, so verified with daughter Zoey. Pt had 4 episodes of NBNB vomiting yesterday evening till this morning, followed by upper abdominal pain, which was intermittent. Patient states she called her daughter and told her she was not feeling well, was dizzy and vomiting. Daughter then called 911 who brought patient to the ED. Patient was asymptomatic in ED and when asked all ROS was negative. Daughter would like evaluation for more hours of HHA or LTC.   Patient admitted to medicine as social admission to obtain more services at home ot LTC placement.    Patient seen and examined at bedside, denies any pain, resting in comfort. SW and PT consulted f/u recommendations.
Pt is a 89 yo F from home, recently discharged from [place] [address] at baseline with PMH of HTN, DM, HLD, CAD(s/p stent), dementia, PSH of cholecystectomy came for complaints of vomiting and upper abdominal pain since yesterday. History couldn't obtain the history from patient due to dementia, so verified with daughter Zoey. Pt had 4 episodes of NBNB vomiting yesterday evening till this morning, followed by upper abdominal pain, which was intermittent. Patient states she called her daughter and told her she was not feeling well, was dizzy and vomiting. Daughter then called 911 who brought patient to the ED. Patient was asymptomatic in ED and when asked all ROS was negative. Daughter would like evaluation for more hours of HHA or LTC.   Patient admitted to medicine as social admission to obtain more services at home ot LTC placement.    Patient seen and examined at bedside, denies any pain, resting in comfort. SW and PT consulted f/u recommendations.

## 2021-07-05 NOTE — PROGRESS NOTE ADULT - PROBLEM SELECTOR PLAN 4
has CAD s/p stent placement? 2020  EKG showed NSR with some TWI  proBNP was elevated at 2225  CXR normal  no signs of volume overload  troponins x2 negative, follow T3  continued home meds of plavix (ASA allergy), atorvastatin and metoprolol  Echocardiogram ordered.

## 2021-07-05 NOTE — PROGRESS NOTE ADULT - PROBLEM SELECTOR PLAN 1
Pt has h/o dementia, baseline AAOx1  CT head negative for acute findings  continued home meds of memantine and donepezil  SW consulted for increase in HHA or LTC placement.
family not present at bedside/unable to assess

## 2021-07-05 NOTE — PROGRESS NOTE ADULT - PROBLEM SELECTOR PROBLEM 3
Diabetes Mellitus, Type 2

## 2021-07-05 NOTE — PROGRESS NOTE ADULT - PROBLEM SELECTOR PLAN 2
Pt p/w with multiple episodes od NBNB vomiting and abdominal pain  In ED pt was nauseous was given one dose of metoclopramide  Now resolved, pt denies n/v  Vitals stable  PE: No abdominal tenderness, fullness, BS heard  PRN Zofran.

## 2021-07-05 NOTE — CHART NOTE - NSCHARTNOTEFT_GEN_A_CORE
EVENT:     HPI:    SUBJECTIVE:    OBJECTIVE:  Vital Signs Last 24 Hrs  T(C): 36.6 (05 Jul 2021 21:38), Max: 36.9 (05 Jul 2021 17:25)  T(F): 97.8 (05 Jul 2021 21:38), Max: 98.5 (05 Jul 2021 17:25)  HR: 66 (05 Jul 2021 21:59) (62 - 81)  BP: 176/64 (05 Jul 2021 21:59) (109/47 - 179/64)  BP(mean): 63 (05 Jul 2021 13:28) (63 - 63)  RR: 16 (05 Jul 2021 21:38) (16 - 18)  SpO2: 99% (05 Jul 2021 21:38) (95% - 99%)    PHYSICAL EXAM:  Neuro: Awake and alert, oriented to person, place, and time  Cardiovascular: + S1, S2, no murmurs, rubs, or bruits  Respiratory: clear to auscultation bilaterally with good air entry   GI: Abdomen soft, non-tender, bowel sounds present   : Non distended;   Skin: warm and dry; no rash      LABS:                        11.1   7.59  )-----------( 259      ( 05 Jul 2021 05:55 )             35.0     07-05    141  |  107  |  14  ----------------------------<  103<H>  3.6   |  22  |  0.78    Ca    9.7      05 Jul 2021 05:55    TPro  6.9  /  Alb  3.2<L>  /  TBili  0.6  /  DBili  x   /  AST  18  /  ALT  15  /  AlkPhos  55  07-05        EKG:   IMAGING:    ASSESSMENT:    PLAN:     FOLLOW UP / RESULT: EVENT: Paged by RN, pt is hypertensive--SBP 170s x 3.     HPI:   88 yo F from home, recently discharged from [place] [address] at baseline with PMH of HTN, DM, HLD, CAD(s/p stent), dementia, PSH of cholecystectomy came for complaints of vomiting and upper abdominal pain since yesterday. History couldn't obtain the history from patient due to dementia, so verified with daughter Zoey. Pt had 4 episodes of NBNB vomiting yesterday evening till this morning, followed by upper abdominal pain, which was intermittent. Patient states she called her daughter and told her she was not feeling well, was dizzy and vomiting. Daughter then called 911 who brought patient to the ED. Patient was asymptomatic in ED and when asked all ROS was negative. Daughter would like evaluation for more hours of HHA or LTC. Patient admitted to medicine as social admission to obtain more services at home for LTC placement.    SUBJECTIVE: Unable to assess due to mental status (dementia)    OBJECTIVE:  Vital Signs Last 24 Hrs  T(C): 36.6 (05 Jul 2021 21:38), Max: 36.9 (05 Jul 2021 17:25)  T(F): 97.8 (05 Jul 2021 21:38), Max: 98.5 (05 Jul 2021 17:25)  HR: 66 (05 Jul 2021 21:59) (62 - 81)  BP: 176/64 (05 Jul 2021 21:59) (109/47 - 179/64)  BP(mean): 63 (05 Jul 2021 13:28) (63 - 63)  RR: 16 (05 Jul 2021 21:38) (16 - 18)  SpO2: 99% (05 Jul 2021 21:38) (95% - 99%)    PHYSICAL EXAM:  Neuro: A&Ox1  Cardiovascular: + S1, S2, no murmurs, rubs, or bruits  Respiratory: clear to auscultation bilaterally with good air entry   GI: Abdomen soft, non-tender, bowel sounds present   : Non distended;   Skin: warm and dry; no rash      LABS:                        11.1   7.59  )-----------( 259      ( 05 Jul 2021 05:55 )             35.0     07-05    141  |  107  |  14  ----------------------------<  103<H>  3.6   |  22  |  0.78    Ca    9.7      05 Jul 2021 05:55    TPro  6.9  /  Alb  3.2<L>  /  TBili  0.6  /  DBili  x   /  AST  18  /  ALT  15  /  AlkPhos  55  07-05        EKG:   IMAGING:    ASSESSMENT: Persistent hypertension, uncontrolled with current regimen    PLAN:     -Hydralazine 5 mg IVP x 1 dose ordered  -Continue PO Hydralazine 25 mg BID and Amlodipine 5 mg daily  -Continue current/supportive care    FOLLOW UP / RESULT:    -Monitor effectiveness of antihypertensives  -Reassess BP per hospital policy

## 2021-07-05 NOTE — PROGRESS NOTE ADULT - PROBLEM SELECTOR PLAN 3
at home on metformin  started on insulin sliding scale  A1c 5.5

## 2021-07-05 NOTE — PROGRESS NOTE ADULT - SUBJECTIVE AND OBJECTIVE BOX
PRASANTH REICH  MR# [id number]  89yFemale        Patient is a 89y old  Female who presents with a chief complaint of Social Admission (03 Jul 2021 18:40)      INTERVAL HPI/OVERNIGHT EVENTS:  Patient seen and examined at bedside. No notations of chest pain, palpitation, SOB, orthopnea, nausea, vomiting or abdominal pain.    ALLERGIES  aspirin (Anaphylaxis)  Celebrex (Rash)  penicillin (Anaphylaxis)      MEDICATIONS  amLODIPine   Tablet 5 milliGRAM(s) Oral daily  atorvastatin 40 milliGRAM(s) Oral at bedtime  clopidogrel Tablet 75 milliGRAM(s) Oral daily  donepezil 10 milliGRAM(s) Oral at bedtime  enoxaparin Injectable 40 milliGRAM(s) SubCutaneous daily  FLUoxetine 10 milliGRAM(s) Oral daily  folic acid 1 milliGRAM(s) Oral daily  insulin lispro (ADMELOG) corrective regimen sliding scale   SubCutaneous Before meals and at bedtime  memantine 5 milliGRAM(s) Oral two times a day  metoprolol tartrate 12.5 milliGRAM(s) Oral two times a day  polyethylene glycol 3350 17 Gram(s) Oral daily  senna 2 Tablet(s) Oral at bedtime              REVIEW OF SYSTEMS:  CONSTITUTIONAL: No fever, weight loss, or fatigue  EYES: No eye pain, visual disturbances, or discharge  ENT:  No difficulty hearing, tinnitus, vertigo; No sinus or throat pain  NECK: No pain or stiffness  RESPIRATORY: No cough, wheezing, chills or hemoptysis; No Shortness of Breath  CARDIOVASCULAR: No chest pain, palpitations, passing out, dizziness, or leg swelling  GASTROINTESTINAL: No abdominal or epigastric pain. No nausea, vomiting, or hematemesis; No diarrhea or constipation. No melena or hematochezia.  GENITOURINARY: No dysuria, frequency, hematuria, or incontinence  NEUROLOGICAL: No headaches, memory loss, loss of strength, numbness, or tremors  SKIN: No itching, burning, rashes, or lesions   LYMPH Nodes: No enlarged glands  ENDOCRINE: No heat or cold intolerance; No hair loss  MUSCULOSKELETAL: No joint pain or swelling; No muscle, back, or extremity pain  PSYCHIATRIC: No depression, anxiety, mood swings, or difficulty sleeping  HEME/LYMPH: No easy bruising, or bleeding gums  ALLERGY AND IMMUNOLOGIC: No hives or eczema	    [ ] All others negative	  [ ] Unable to obtain      T(C): 36.4 (07-04-21 @ 12:11), Max: 36.8 (07-03-21 @ 20:04)  T(F): 97.6 (07-04-21 @ 12:11), Max: 98.2 (07-03-21 @ 20:04)  HR: 64 (07-04-21 @ 17:11) (50 - 64)  BP: 188/64 (07-04-21 @ 17:11) (150/45 - 188/64)  RR: 16 (07-04-21 @ 12:11) (16 - 18)  SpO2: 98% (07-04-21 @ 12:11) (98% - 100%)  Wt(kg): --    I&O's Summary        PHYSICAL EXAM:  A X O x  HEAD:  Atraumatic, Normocephalic  EYES: EOMI, PERRLA, conjunctiva and sclera clear  NECK: Supple, No JVD, Normal thyroid  Resp: CTAB, No crackles, wheezing,   CVS: Regular rate and rhythm; No discernable murmurs, rubs, or gallops  ABD: Soft, Nontender, Nondistended; Bowel sounds present  EXTREMITIES:  2+ Peripheral Pulses, No edema  LYMPH: No dicernable lymphadenopathy noted  GENERAL: NAD, well-groomed, well-developed      LABS:                        9.6    4.46  )-----------( 228      ( 03 Jul 2021 05:50 )             31.1     07-03    141  |  107  |  17  ----------------------------<  87  3.4<L>   |  23  |  0.80    Ca    9.1      03 Jul 2021 05:50    TPro  6.4  /  Alb  3.2<L>  /  TBili  0.5  /  DBili  x   /  AST  18  /  ALT  13  /  AlkPhos  50  07-03        CAPILLARY BLOOD GLUCOSE      POCT Blood Glucose.: 114 mg/dL (04 Jul 2021 16:39)  POCT Blood Glucose.: 91 mg/dL (04 Jul 2021 11:18)  POCT Blood Glucose.: 94 mg/dL (04 Jul 2021 07:51)  POCT Blood Glucose.: 106 mg/dL (03 Jul 2021 20:50)      Troponins:  ProBNP:  Lipid Profile:   HgA1c:  TSH:           RADIOLOGY & ADDITIONAL TESTS:    Imaging Personally Reviewed:  [ ] YES  [ ] NO      Consultant(s) Notes Reviewed:  [x ] YES  [ ] NO    Care Discussed with Consultants/Other Providers [ x] YES  [ ] NO          PAST MEDICAL & SURGICAL HISTORY:  Hypertension    Diabetes Mellitus, Type 2    History of Cholecystectomy    Hyperlipidemia    Dementia    History of Cholecystectomy          Encephalopathy    Handoff    MEWS Score    Hypertension    Diabetes Mellitus, Type 2    History of Cholecystectomy    Hyperlipidemia    Dementia    Acute encephalopathy    Abdominal pain with vomiting    Dementia    CAD (coronary artery disease)    Anemia    Diabetes Mellitus, Type 2    Hypertension    Prophylactic measure    History of Cholecystectomy    A-NESUEA/WEAKNESS    90+    Vomiting    SysAdmin_VisitLink            
PRASANTH REICH  MR# [id number]  89yFemale        Patient is a 89y old  Female who presents with a chief complaint of Social Admission (03 Jul 2021 06:52)      INTERVAL HPI/OVERNIGHT EVENTS:  Patient seen and examined at bedside. No notations of chest pain, palpitation, SOB, orthopnea, nausea, vomiting or abdominal pain.    ALLERGIES  aspirin (Anaphylaxis)  Celebrex (Rash)  penicillin (Anaphylaxis)      MEDICATIONS  amLODIPine   Tablet 5 milliGRAM(s) Oral daily  atorvastatin 40 milliGRAM(s) Oral at bedtime  clopidogrel Tablet 75 milliGRAM(s) Oral daily  donepezil 10 milliGRAM(s) Oral at bedtime  enoxaparin Injectable 40 milliGRAM(s) SubCutaneous daily  FLUoxetine 10 milliGRAM(s) Oral daily  folic acid 1 milliGRAM(s) Oral daily  insulin lispro (ADMELOG) corrective regimen sliding scale   SubCutaneous Before meals and at bedtime  memantine 5 milliGRAM(s) Oral two times a day  metoprolol tartrate 12.5 milliGRAM(s) Oral two times a day  polyethylene glycol 3350 17 Gram(s) Oral daily  senna 2 Tablet(s) Oral at bedtime              REVIEW OF SYSTEMS:  CONSTITUTIONAL: No fever, weight loss, or fatigue  EYES: No eye pain, visual disturbances, or discharge  ENT:  No difficulty hearing, tinnitus, vertigo; No sinus or throat pain  NECK: No pain or stiffness  RESPIRATORY: No cough, wheezing, chills or hemoptysis; No Shortness of Breath  CARDIOVASCULAR: No chest pain, palpitations, passing out, dizziness, or leg swelling  GASTROINTESTINAL: No abdominal or epigastric pain. No nausea, vomiting, or hematemesis; No diarrhea or constipation. No melena or hematochezia.  GENITOURINARY: No dysuria, frequency, hematuria, or incontinence  NEUROLOGICAL: No headaches, memory loss, loss of strength, numbness, or tremors  SKIN: No itching, burning, rashes, or lesions   LYMPH Nodes: No enlarged glands  ENDOCRINE: No heat or cold intolerance; No hair loss  MUSCULOSKELETAL: No joint pain or swelling; No muscle, back, or extremity pain  PSYCHIATRIC: No depression, anxiety, mood swings, or difficulty sleeping  HEME/LYMPH: No easy bruising, or bleeding gums  ALLERGY AND IMMUNOLOGIC: No hives or eczema	    [ ] All others negative	  [ ] Unable to obtain      T(C): 36.8 (07-03-21 @ 20:04), Max: 36.8 (07-03-21 @ 20:04)  T(F): 98.2 (07-03-21 @ 20:04), Max: 98.2 (07-03-21 @ 20:04)  HR: 50 (07-03-21 @ 20:04) (48 - 54)  BP: 150/45 (07-03-21 @ 20:04) (145/42 - 188/59)  RR: 17 (07-03-21 @ 20:04) (17 - 18)  SpO2: 100% (07-03-21 @ 20:04) (98% - 100%)  Wt(kg): --    I&O's Summary        PHYSICAL EXAM:  A X O x  HEAD:  Atraumatic, Normocephalic  EYES: EOMI, PERRLA, conjunctiva and sclera clear  NECK: Supple, No JVD, Normal thyroid  Resp: CTAB, No crackles, wheezing,   CVS: Regular rate and rhythm; No discernable murmurs, rubs, or gallops  ABD: Soft, Nontender, Nondistended; Bowel sounds present  EXTREMITIES:  2+ Peripheral Pulses, No edema  LYMPH: No dicernable lymphadenopathy noted  GENERAL: NAD, well-groomed, well-developed      LABS:                        9.6    4.46  )-----------( 228      ( 03 Jul 2021 05:50 )             31.1     07-03    141  |  107  |  17  ----------------------------<  87  3.4<L>   |  23  |  0.80    Ca    9.1      03 Jul 2021 05:50  Phos  2.9     07-02  Mg     2.1     07-02    TPro  6.4  /  Alb  3.2<L>  /  TBili  0.5  /  DBili  x   /  AST  18  /  ALT  13  /  AlkPhos  50  07-03        CAPILLARY BLOOD GLUCOSE      POCT Blood Glucose.: 106 mg/dL (03 Jul 2021 20:50)  POCT Blood Glucose.: 93 mg/dL (03 Jul 2021 16:59)  POCT Blood Glucose.: 118 mg/dL (03 Jul 2021 11:23)  POCT Blood Glucose.: 92 mg/dL (03 Jul 2021 07:37)      Troponins:  ProBNP:  Lipid Profile:   HgA1c:  TSH:           RADIOLOGY & ADDITIONAL TESTS:    Imaging Personally Reviewed:  [ ] YES  [ ] NO      Consultant(s) Notes Reviewed:  [x ] YES  [ ] NO    Care Discussed with Consultants/Other Providers [ x] YES  [ ] NO          PAST MEDICAL & SURGICAL HISTORY:  Hypertension    Diabetes Mellitus, Type 2    History of Cholecystectomy    Hyperlipidemia    Dementia    History of Cholecystectomy          Encephalopathy    Handoff    MEWS Score    Hypertension    Diabetes Mellitus, Type 2    History of Cholecystectomy    Hyperlipidemia    Dementia    Acute encephalopathy    Abdominal pain with vomiting    Dementia    CAD (coronary artery disease)    Anemia    Diabetes Mellitus, Type 2    Hypertension    Prophylactic measure    History of Cholecystectomy    A-NESUEA/WEAKNESS    90+    Vomiting    SysAdmin_VisitLink            
PRASANTH REICH  MR# [id number]  89yFemale        Patient is a 89y old  Female who presents with a chief complaint of Social Admission (04 Jul 2021 19:12)      INTERVAL HPI/OVERNIGHT EVENTS:  Patient seen and examined at bedside. No notations of chest pain, palpitation, SOB, orthopnea, nausea, vomiting or abdominal pain.    ALLERGIES  aspirin (Anaphylaxis)  Celebrex (Rash)  penicillin (Anaphylaxis)      MEDICATIONS  amLODIPine   Tablet 5 milliGRAM(s) Oral daily  atorvastatin 40 milliGRAM(s) Oral at bedtime  clopidogrel Tablet 75 milliGRAM(s) Oral daily  donepezil 10 milliGRAM(s) Oral at bedtime  enoxaparin Injectable 40 milliGRAM(s) SubCutaneous daily  FLUoxetine 10 milliGRAM(s) Oral daily  folic acid 1 milliGRAM(s) Oral daily  hydrALAZINE 25 milliGRAM(s) Oral two times a day  insulin lispro (ADMELOG) corrective regimen sliding scale   SubCutaneous Before meals and at bedtime  memantine 5 milliGRAM(s) Oral two times a day  polyethylene glycol 3350 17 Gram(s) Oral daily  senna 2 Tablet(s) Oral at bedtime              REVIEW OF SYSTEMS:  CONSTITUTIONAL: No fever, weight loss, or fatigue  EYES: No eye pain, visual disturbances, or discharge  ENT:  No difficulty hearing, tinnitus, vertigo; No sinus or throat pain  NECK: No pain or stiffness  RESPIRATORY: No cough, wheezing, chills or hemoptysis; No Shortness of Breath  CARDIOVASCULAR: No chest pain, palpitations, passing out, dizziness, or leg swelling  GASTROINTESTINAL: No abdominal or epigastric pain. No nausea, vomiting, or hematemesis; No diarrhea or constipation. No melena or hematochezia.  GENITOURINARY: No dysuria, frequency, hematuria, or incontinence  NEUROLOGICAL: No headaches, memory loss, loss of strength, numbness, or tremors  SKIN: No itching, burning, rashes, or lesions   LYMPH Nodes: No enlarged glands  ENDOCRINE: No heat or cold intolerance; No hair loss  MUSCULOSKELETAL: No joint pain or swelling; No muscle, back, or extremity pain  PSYCHIATRIC: No depression, anxiety, mood swings, or difficulty sleeping  HEME/LYMPH: No easy bruising, or bleeding gums  ALLERGY AND IMMUNOLOGIC: No hives or eczema	    [ ] All others negative	  [ ] Unable to obtain      T(C): 36.3 (07-05-21 @ 13:28), Max: 36.6 (07-05-21 @ 05:00)  T(F): 97.3 (07-05-21 @ 13:28), Max: 97.8 (07-05-21 @ 05:00)  HR: 81 (07-05-21 @ 13:28) (55 - 81)  BP: 109/47 (07-05-21 @ 13:28) (109/47 - 192/46)  RR: 17 (07-05-21 @ 13:28) (17 - 18)  SpO2: 99% (07-05-21 @ 13:28) (95% - 100%)  Wt(kg): --    I&O's Summary        PHYSICAL EXAM:  A X O x  HEAD:  Atraumatic, Normocephalic  EYES: EOMI, PERRLA, conjunctiva and sclera clear  NECK: Supple, No JVD, Normal thyroid  Resp: CTAB, No crackles, wheezing,   CVS: Regular rate and rhythm; No discernable murmurs, rubs, or gallops  ABD: Soft, Nontender, Nondistended; Bowel sounds present  EXTREMITIES:  2+ Peripheral Pulses, No edema  LYMPH: No dicernable lymphadenopathy noted  GENERAL: NAD, well-groomed, well-developed      LABS:                        11.1   7.59  )-----------( 259      ( 05 Jul 2021 05:55 )             35.0     07-05    141  |  107  |  14  ----------------------------<  103<H>  3.6   |  22  |  0.78    Ca    9.7      05 Jul 2021 05:55    TPro  6.9  /  Alb  3.2<L>  /  TBili  0.6  /  DBili  x   /  AST  18  /  ALT  15  /  AlkPhos  55  07-05        CAPILLARY BLOOD GLUCOSE      POCT Blood Glucose.: 98 mg/dL (05 Jul 2021 11:14)  POCT Blood Glucose.: 108 mg/dL (05 Jul 2021 07:48)  POCT Blood Glucose.: 105 mg/dL (04 Jul 2021 20:57)  POCT Blood Glucose.: 114 mg/dL (04 Jul 2021 16:39)      Troponins:  ProBNP:  Lipid Profile:   HgA1c:  TSH:           RADIOLOGY & ADDITIONAL TESTS:    Imaging Personally Reviewed:  [ ] YES  [ ] NO      Consultant(s) Notes Reviewed:  [x ] YES  [ ] NO    Care Discussed with Consultants/Other Providers [ x] YES  [ ] NO          PAST MEDICAL & SURGICAL HISTORY:  Hypertension    Diabetes Mellitus, Type 2    History of Cholecystectomy    Hyperlipidemia    Dementia    History of Cholecystectomy          Encephalopathy    Handoff    MEWS Score    Hypertension    Diabetes Mellitus, Type 2    History of Cholecystectomy    Hyperlipidemia    Dementia    Acute encephalopathy    Abdominal pain with vomiting    Dementia    CAD (coronary artery disease)    Anemia    Diabetes Mellitus, Type 2    Hypertension    Prophylactic measure    History of Cholecystectomy    A-NESUEA/WEAKNESS    90+    Vomiting    SysAdmin_VisitLink            
NP Note discussed with Primary Attending.      Patient is a 89y old  Female who presents with a chief complaint of Social Admission (01 Jul 2021 20:43)      INTERVAL HPI/OVERNIGHT EVENTS: no new complaints    MEDICATIONS  (STANDING):  amLODIPine   Tablet 5 milliGRAM(s) Oral daily  atorvastatin 40 milliGRAM(s) Oral at bedtime  clopidogrel Tablet 75 milliGRAM(s) Oral daily  donepezil 10 milliGRAM(s) Oral at bedtime  FLUoxetine 10 milliGRAM(s) Oral daily  folic acid 1 milliGRAM(s) Oral daily  insulin lispro (ADMELOG) corrective regimen sliding scale   SubCutaneous Before meals and at bedtime  memantine 5 milliGRAM(s) Oral two times a day  metoprolol tartrate 12.5 milliGRAM(s) Oral two times a day  polyethylene glycol 3350 17 Gram(s) Oral daily  senna 2 Tablet(s) Oral at bedtime    MEDICATIONS  (PRN):      __________________________________________________  REVIEW OF SYSTEMS:    CONSTITUTIONAL: No fever,   EYES: no acute visual disturbances  NECK: No pain or stiffness  RESPIRATORY: No cough; No shortness of breath  CARDIOVASCULAR: No chest pain, no palpitations  GASTROINTESTINAL: No pain. No nausea or vomiting; No diarrhea   NEUROLOGICAL: No headache or numbness, no tremors  MUSCULOSKELETAL: No joint pain, no muscle pain  GENITOURINARY: no dysuria, no frequency, no hesitancy  PSYCHIATRY: no depression , no anxiety  ALL OTHER  ROS negative        Vital Signs Last 24 Hrs  T(C): 36.6 (02 Jul 2021 05:33), Max: 36.7 (02 Jul 2021 02:00)  T(F): 97.9 (02 Jul 2021 05:33), Max: 98 (02 Jul 2021 02:00)  HR: 56 (02 Jul 2021 05:33) (56 - 89)  BP: 175/50 (02 Jul 2021 05:33) (169/64 - 222/83)  BP(mean): --  RR: 18 (02 Jul 2021 05:33) (18 - 19)  SpO2: 95% (02 Jul 2021 05:33) (87% - 96%)    ________________________________________________  PHYSICAL EXAM:  GENERAL: NAD  HEENT: Normocephalic;  conjunctivae and sclerae clear; moist mucous membranes;   NECK : supple  CHEST/LUNG: Clear to auscultation bilaterally with good air entry   HEART: S1 S2  regular; no murmurs, gallops or rubs  ABDOMEN: Soft, Nontender, Nondistended; Bowel sounds present  EXTREMITIES: no cyanosis; no edema; no calf tenderness  SKIN: warm and dry; no rash  NERVOUS SYSTEM:  Awake and alert; Oriented  to place, person and time ; no new deficits    _________________________________________________  LABS:                        9.7    6.80  )-----------( 204      ( 02 Jul 2021 05:56 )             31.3     07-02    142  |  108  |  14  ----------------------------<  88  4.0   |  27  |  0.93    Ca    9.1      02 Jul 2021 05:56  Phos  2.9     07-02  Mg     2.1     07-02    TPro  6.7  /  Alb  3.3<L>  /  TBili  0.9  /  DBili  x   /  AST  17  /  ALT  14  /  AlkPhos  56  07-02        CAPILLARY BLOOD GLUCOSE      POCT Blood Glucose.: 96 mg/dL (02 Jul 2021 07:40)  POCT Blood Glucose.: 111 mg/dL (01 Jul 2021 21:08)        RADIOLOGY & ADDITIONAL TESTS:    Imaging  Reviewed:  YES/NO    Consultant(s) Notes Reviewed:   YES/ No      Plan of care was discussed with patient and /or primary care giver; all questions and concerns were addressed

## 2021-07-05 NOTE — PROGRESS NOTE ADULT - PROBLEM SELECTOR PLAN 6
IMPROVE VTE Individual Risk Assessment  RISK                                                                Points  [  ] Previous VTE                                                  3  [  ] Thrombophilia                                               2  [  ] Lower limb paralysis                                      2        (unable to hold up >15 seconds)    [  ] Current Cancer                                              2         (within 6 months)  [x  ] Immobilization > 24 hrs                                1  [  ] ICU/CCU stay > 24 hours                              1  [x  ] Age > 60                                                      1  IMPROVE VTE Score _________2,  lovenox sq for DVT proph.

## 2021-07-05 NOTE — PROGRESS NOTE ADULT - PROBLEM SELECTOR PROBLEM 2
Abdominal pain with vomiting

## 2021-07-06 ENCOUNTER — TRANSCRIPTION ENCOUNTER (OUTPATIENT)
Age: 86
End: 2021-07-06

## 2021-07-06 VITALS
HEART RATE: 76 BPM | RESPIRATION RATE: 18 BRPM | TEMPERATURE: 98 F | SYSTOLIC BLOOD PRESSURE: 159 MMHG | OXYGEN SATURATION: 100 % | DIASTOLIC BLOOD PRESSURE: 70 MMHG

## 2021-07-06 LAB
ALBUMIN SERPL ELPH-MCNC: 3.2 G/DL — LOW (ref 3.5–5)
ALP SERPL-CCNC: 51 U/L — SIGNIFICANT CHANGE UP (ref 40–120)
ALT FLD-CCNC: 13 U/L DA — SIGNIFICANT CHANGE UP (ref 10–60)
ANION GAP SERPL CALC-SCNC: 13 MMOL/L — SIGNIFICANT CHANGE UP (ref 5–17)
AST SERPL-CCNC: 16 U/L — SIGNIFICANT CHANGE UP (ref 10–40)
BILIRUB SERPL-MCNC: 0.6 MG/DL — SIGNIFICANT CHANGE UP (ref 0.2–1.2)
BUN SERPL-MCNC: 15 MG/DL — SIGNIFICANT CHANGE UP (ref 7–18)
CALCIUM SERPL-MCNC: 9.3 MG/DL — SIGNIFICANT CHANGE UP (ref 8.4–10.5)
CHLORIDE SERPL-SCNC: 106 MMOL/L — SIGNIFICANT CHANGE UP (ref 96–108)
CO2 SERPL-SCNC: 22 MMOL/L — SIGNIFICANT CHANGE UP (ref 22–31)
CREAT SERPL-MCNC: 0.85 MG/DL — SIGNIFICANT CHANGE UP (ref 0.5–1.3)
GLUCOSE BLDC GLUCOMTR-MCNC: 101 MG/DL — HIGH (ref 70–99)
GLUCOSE BLDC GLUCOMTR-MCNC: 169 MG/DL — HIGH (ref 70–99)
GLUCOSE BLDC GLUCOMTR-MCNC: 91 MG/DL — SIGNIFICANT CHANGE UP (ref 70–99)
GLUCOSE SERPL-MCNC: 87 MG/DL — SIGNIFICANT CHANGE UP (ref 70–99)
HCT VFR BLD CALC: 32.8 % — LOW (ref 34.5–45)
HGB BLD-MCNC: 10.3 G/DL — LOW (ref 11.5–15.5)
MCHC RBC-ENTMCNC: 26.1 PG — LOW (ref 27–34)
MCHC RBC-ENTMCNC: 31.4 GM/DL — LOW (ref 32–36)
MCV RBC AUTO: 83.2 FL — SIGNIFICANT CHANGE UP (ref 80–100)
NRBC # BLD: 0 /100 WBCS — SIGNIFICANT CHANGE UP (ref 0–0)
PLATELET # BLD AUTO: 259 K/UL — SIGNIFICANT CHANGE UP (ref 150–400)
POTASSIUM SERPL-MCNC: 3 MMOL/L — LOW (ref 3.5–5.3)
POTASSIUM SERPL-SCNC: 3 MMOL/L — LOW (ref 3.5–5.3)
PROT SERPL-MCNC: 6.5 G/DL — SIGNIFICANT CHANGE UP (ref 6–8.3)
RBC # BLD: 3.94 M/UL — SIGNIFICANT CHANGE UP (ref 3.8–5.2)
RBC # FLD: 14.8 % — HIGH (ref 10.3–14.5)
SODIUM SERPL-SCNC: 141 MMOL/L — SIGNIFICANT CHANGE UP (ref 135–145)
WBC # BLD: 4.81 K/UL — SIGNIFICANT CHANGE UP (ref 3.8–10.5)
WBC # FLD AUTO: 4.81 K/UL — SIGNIFICANT CHANGE UP (ref 3.8–10.5)

## 2021-07-06 PROCEDURE — 82607 VITAMIN B-12: CPT

## 2021-07-06 PROCEDURE — 93306 TTE W/DOPPLER COMPLETE: CPT

## 2021-07-06 PROCEDURE — 82746 ASSAY OF FOLIC ACID SERUM: CPT

## 2021-07-06 PROCEDURE — 93005 ELECTROCARDIOGRAM TRACING: CPT

## 2021-07-06 PROCEDURE — 86769 SARS-COV-2 COVID-19 ANTIBODY: CPT

## 2021-07-06 PROCEDURE — 96374 THER/PROPH/DIAG INJ IV PUSH: CPT

## 2021-07-06 PROCEDURE — 80061 LIPID PANEL: CPT

## 2021-07-06 PROCEDURE — 84443 ASSAY THYROID STIM HORMONE: CPT

## 2021-07-06 PROCEDURE — 85027 COMPLETE CBC AUTOMATED: CPT

## 2021-07-06 PROCEDURE — 36415 COLL VENOUS BLD VENIPUNCTURE: CPT

## 2021-07-06 PROCEDURE — 83735 ASSAY OF MAGNESIUM: CPT

## 2021-07-06 PROCEDURE — 82962 GLUCOSE BLOOD TEST: CPT

## 2021-07-06 PROCEDURE — 80053 COMPREHEN METABOLIC PANEL: CPT

## 2021-07-06 PROCEDURE — 84100 ASSAY OF PHOSPHORUS: CPT

## 2021-07-06 PROCEDURE — 85025 COMPLETE CBC W/AUTO DIFF WBC: CPT

## 2021-07-06 PROCEDURE — 71045 X-RAY EXAM CHEST 1 VIEW: CPT

## 2021-07-06 PROCEDURE — 82306 VITAMIN D 25 HYDROXY: CPT

## 2021-07-06 PROCEDURE — 83880 ASSAY OF NATRIURETIC PEPTIDE: CPT

## 2021-07-06 PROCEDURE — 83036 HEMOGLOBIN GLYCOSYLATED A1C: CPT

## 2021-07-06 PROCEDURE — 99285 EMERGENCY DEPT VISIT HI MDM: CPT | Mod: 25

## 2021-07-06 PROCEDURE — 82009 KETONE BODYS QUAL: CPT

## 2021-07-06 PROCEDURE — 87635 SARS-COV-2 COVID-19 AMP PRB: CPT

## 2021-07-06 PROCEDURE — 70450 CT HEAD/BRAIN W/O DYE: CPT | Mod: MA

## 2021-07-06 PROCEDURE — 82550 ASSAY OF CK (CPK): CPT

## 2021-07-06 PROCEDURE — 84484 ASSAY OF TROPONIN QUANT: CPT

## 2021-07-06 RX ORDER — POLYETHYLENE GLYCOL 3350 17 G/17G
17 POWDER, FOR SOLUTION ORAL
Qty: 0 | Refills: 0 | DISCHARGE
Start: 2021-07-06

## 2021-07-06 RX ORDER — DONEPEZIL HYDROCHLORIDE 10 MG/1
1 TABLET, FILM COATED ORAL
Qty: 0 | Refills: 0 | DISCHARGE
Start: 2021-07-06

## 2021-07-06 RX ORDER — PANTOPRAZOLE SODIUM 20 MG/1
1 TABLET, DELAYED RELEASE ORAL
Qty: 0 | Refills: 0 | DISCHARGE
Start: 2021-07-06

## 2021-07-06 RX ORDER — FLUOXETINE HCL 10 MG
1 CAPSULE ORAL
Qty: 0 | Refills: 0 | DISCHARGE
Start: 2021-07-06

## 2021-07-06 RX ORDER — SENNA PLUS 8.6 MG/1
2 TABLET ORAL
Qty: 0 | Refills: 0 | DISCHARGE
Start: 2021-07-06

## 2021-07-06 RX ORDER — LISINOPRIL 2.5 MG/1
1 TABLET ORAL
Qty: 0 | Refills: 0 | DISCHARGE
Start: 2021-07-06

## 2021-07-06 RX ORDER — CLOPIDOGREL BISULFATE 75 MG/1
1 TABLET, FILM COATED ORAL
Qty: 0 | Refills: 0 | DISCHARGE
Start: 2021-07-06

## 2021-07-06 RX ORDER — POTASSIUM CHLORIDE 20 MEQ
40 PACKET (EA) ORAL EVERY 4 HOURS
Refills: 0 | Status: COMPLETED | OUTPATIENT
Start: 2021-07-06 | End: 2021-07-06

## 2021-07-06 RX ORDER — LISINOPRIL 2.5 MG/1
10 TABLET ORAL DAILY
Refills: 0 | Status: DISCONTINUED | OUTPATIENT
Start: 2021-07-06 | End: 2021-07-06

## 2021-07-06 RX ORDER — PANTOPRAZOLE SODIUM 20 MG/1
40 TABLET, DELAYED RELEASE ORAL
Refills: 0 | Status: DISCONTINUED | OUTPATIENT
Start: 2021-07-06 | End: 2021-07-06

## 2021-07-06 RX ORDER — ASPIRIN/CALCIUM CARB/MAGNESIUM 324 MG
1 TABLET ORAL
Qty: 0 | Refills: 0 | DISCHARGE

## 2021-07-06 RX ORDER — SITAGLIPTIN AND METFORMIN HYDROCHLORIDE 500; 50 MG/1; MG/1
1 TABLET, FILM COATED ORAL
Qty: 0 | Refills: 0 | DISCHARGE

## 2021-07-06 RX ORDER — AMLODIPINE BESYLATE 2.5 MG/1
1 TABLET ORAL
Qty: 0 | Refills: 0 | DISCHARGE
Start: 2021-07-06

## 2021-07-06 RX ADMIN — MEMANTINE HYDROCHLORIDE 5 MILLIGRAM(S): 10 TABLET ORAL at 05:26

## 2021-07-06 RX ADMIN — Medication 40 MILLIEQUIVALENT(S): at 11:32

## 2021-07-06 RX ADMIN — CLOPIDOGREL BISULFATE 75 MILLIGRAM(S): 75 TABLET, FILM COATED ORAL at 11:31

## 2021-07-06 RX ADMIN — Medication 10 MILLIGRAM(S): at 11:31

## 2021-07-06 RX ADMIN — ENOXAPARIN SODIUM 40 MILLIGRAM(S): 100 INJECTION SUBCUTANEOUS at 11:31

## 2021-07-06 RX ADMIN — Medication 1 MILLIGRAM(S): at 11:31

## 2021-07-06 RX ADMIN — AMLODIPINE BESYLATE 5 MILLIGRAM(S): 2.5 TABLET ORAL at 05:25

## 2021-07-06 RX ADMIN — Medication 5 MILLIGRAM(S): at 00:51

## 2021-07-06 RX ADMIN — Medication 25 MILLIGRAM(S): at 05:26

## 2021-07-06 RX ADMIN — Medication 40 MILLIEQUIVALENT(S): at 14:17

## 2021-07-06 NOTE — DISCHARGE NOTE PROVIDER - HOSPITAL COURSE
89 y/o F( from home), recently discharged from [facility] at baseline with PMH of Dementia,  HTN, DM, HLD, CAD(s/p stent), PSH of cholecystectomy who initially presented to the ED w/ complaints of vomiting and upper abdominal pain x1 day. History couldn't obtain the history from patient due to dementia, so verified with daughter Zoey.   Patient was asymptomatic in ED and when further assessed  ROS was negative. Daughter stated that she would like  evaluation for more hours of HHA vs LTC. Patient admitted to medicine as social admission. She was evaluated by PT with recommendation for rehab made.  Followed by CM; authorization obtained. Patient stable for discharge today to [facility] facility with follow up as advised, including repeat BMP to monitor potassium level as patient was treated for hypokalemia 7/6.   *** Please see EMR for more detailed information on patient's hospital course***          
Pt is a 88 yo F from home, recently discharged from [place] [id number] at baseline with PMH of HTN, DM, HLD, CAD(s/p stent), dementia, PSH of cholecystectomy came for complaints of vomiting and upper abdominal pain since yesterday. Symptoms resolved now. Unsafe for discharge from ED, so admitted for SW evaluation and possible LTC placement.        >>>>>>>>>>>>>>>>>>>>>>>>>>>>>>>>>>>>>>>INCOMPLETE>>>>>>>>>>>>>>>>>>>>>>>>>>>>>>>>>>>>>

## 2021-07-06 NOTE — DISCHARGE NOTE PROVIDER - NSDCCPCAREPLAN_GEN_ALL_CORE_FT
PRINCIPAL DISCHARGE DIAGNOSIS  Diagnosis: Dementia  Assessment and Plan of Treatment: Continue with Medications as prescribed  Maintain Fall precaution and provide reorientation as neccessary.   HOME CARE INSTRUCTIONS.The care of individuals with dementia is varied and dependent upon the progression of the dementia. The following suggestions are intended for the person living with, or caring for, the person with dementia.Create a safe environment.Remove the locks on bathroom doors to prevent the person from accidentally locking himself or herself in.Use childproof latches on cabinets and any place where cleaning supplies, chemicals, or alcohol are kept.Keep the house free from clutter. Remove rugs or anything that might contribute to a fall.Use night lights or dim lights at night; Have a consistent nighttime routine; limit napping during the day.Monitor chewing and swallowing ability.Only give over-the-counter or prescription edicines as directed by the caregiver.SEEK MEDICAL CARE IF:New behavioral problems start uch as moodiness, aggressiveness, or seeing things that are not there (hallucinations).Any new problem with rain function such as balance, speech, or falling a lot.Problems with swallowing develop.SEEK IMMEDIATE MEDICAL CARE IF:A fever develops.New or worsened confusion and/or sleepiness develops.      SECONDARY DISCHARGE DIAGNOSES  Diagnosis: Hypertension  Assessment and Plan of Treatment: Blood pressure medication regimen was adjusted with improvement in BP trend  Low salt diet  Activity as tolerated.  Continue medication as prescribed. Patient to follow up with her medical doctor for routine blood pressure monitoring  Notify MDr if patient has  any of the following symptoms:   Dizziness, Lightheadedness, Blurry vision, Headache, Chest pain, Shortness of breath    Diagnosis: Diabetes mellitus  Assessment and Plan of Treatment: Controlled with HA1c of 5.5; Her Janumet was discontinued .  Continue with Consistent Carbohydrate diet with monitoring of HA1c every 3 months with adjustment in plan of care if necessary    Diagnosis: Moderate left ventricular systolic dysfunction  Assessment and Plan of Treatment: History of CAD w/ ECho done while hospitalized revealed moderate Left systolic dysfunction w/ EF of 40-45%   - Continue Lisinopril and Metformin as recommended  - Continue patient on  lipitor as recommended   - Patient to continue to follow up with her PMD/Cardiologist for continuity of care     PRINCIPAL DISCHARGE DIAGNOSIS  Diagnosis: Dementia  Assessment and Plan of Treatment: Continue with Medications as prescribed  Maintain Fall precaution and provide reorientation as neccessary.   HOME CARE INSTRUCTIONS.The care of individuals with dementia is varied and dependent upon the progression of the dementia. The following suggestions are intended for the person living with, or caring for, the person with dementia.Create a safe environment.Remove the locks on bathroom doors to prevent the person from accidentally locking himself or herself in.Use childproof latches on cabinets and any place where cleaning supplies, chemicals, or alcohol are kept.Keep the house free from clutter. Remove rugs or anything that might contribute to a fall.Use night lights or dim lights at night; Have a consistent nighttime routine; limit napping during the day.Monitor chewing and swallowing ability.Only give over-the-counter or prescription edicines as directed by the caregiver.SEEK MEDICAL CARE IF:New behavioral problems start uch as moodiness, aggressiveness, or seeing things that are not there (hallucinations).Any new problem with rain function such as balance, speech, or falling a lot.Problems with swallowing develop.SEEK IMMEDIATE MEDICAL CARE IF:A fever develops.New or worsened confusion and/or sleepiness develops.      SECONDARY DISCHARGE DIAGNOSES  Diagnosis: Hypertension  Assessment and Plan of Treatment: Blood pressure medication regimen was adjusted with improvement in BP trend  Low salt diet  Activity as tolerated.  Continue medication as prescribed. Patient to follow up with her medical doctor for routine blood pressure monitoring  Notify MDr if patient has  any of the following symptoms:   Dizziness, Lightheadedness, Blurry vision, Headache, Chest pain, Shortness of breath    Diagnosis: Diabetes mellitus  Assessment and Plan of Treatment: Controlled with HA1c of 5.5; Her Janumet was discontinued .  Continue with Consistent Carbohydrate diet with monitoring of HA1c every 3 months with adjustment in plan of care if necessary    Diagnosis: Moderate left ventricular systolic dysfunction  Assessment and Plan of Treatment: History of CAD w/ ECho done while hospitalized revealed moderate Left systolic dysfunction w/ EF of 40-45%   - Continue Lisinopril and Metoprolol  as recommended  - Continue patient on  lipitor, plavix  as recommended   - Patient to continue to follow up with her PMD/Cardiologist for continuity of care    Diagnosis: Hypokalemia  Assessment and Plan of Treatment: Potassium level of 3.0 today was supplemented  CHECK FOLLOW UP BMP at nursing facility tomorrow 7/7/21 with repletion if necessary.   Ensure a balanced diet

## 2021-07-06 NOTE — DISCHARGE NOTE NURSING/CASE MANAGEMENT/SOCIAL WORK - PATIENT PORTAL LINK FT
You can access the FollowMyHealth Patient Portal offered by [facility] by registering at the following website: [email or web address]. By joining [employer]’s FollowMyHealth portal, you will also be able to view your health information using other applications (apps) compatible with our system.

## 2021-07-06 NOTE — DISCHARGE NOTE PROVIDER - NSDCMRMEDTOKEN_GEN_ALL_CORE_FT
Actamin 325 mg oral tablet: 2 tab(s) orally every 6 hours, As Needed - 6)  amLODIPine 5 mg oral tablet: 1 tab(s) orally once a day  aspirin 81 mg oral tablet, chewable: 1 tab(s) orally once a day  atorvastatin 40 mg oral tablet: 1 tab(s) orally once a day (at bedtime)  clopidogrel 75 mg oral tablet: 1 tab(s) orally once a day  donepezil 10 mg oral tablet: 1 tab(s) orally once a day (at bedtime)  ezetimibe 10 mg oral tablet: 1 tab(s) orally once a day  FLUoxetine 10 mg oral capsule: 1 cap(s) orally once a day  folic acid 1 mg oral tablet: 1 tab(s) orally once a day  Janumet 50 mg-500 mg oral tablet: 1 tab(s) orally 2 times a day  memantine 5 mg oral tablet: 1 tab(s) orally 2 times a day  Metoprolol Tartrate 25 mg oral tablet: 0.5 tab(s) orally 2 times a day  polyethylene glycol 3350 oral powder for reconstitution: 17 gram(s) orally once a day  senna oral tablet: 2 tab(s) orally once a day (at bedtime)   amLODIPine 5 mg oral tablet: 1 tab(s) orally once a day  atorvastatin 40 mg oral tablet: 1 tab(s) orally once a day (at bedtime)  clopidogrel 75 mg oral tablet: 1 tab(s) orally once a day  donepezil 10 mg oral tablet: 1 tab(s) orally once a day (at bedtime)  ezetimibe 10 mg oral tablet: 1 tab(s) orally once a day  FLUoxetine 10 mg oral capsule: 1 cap(s) orally once a day  folic acid 1 mg oral tablet: 1 tab(s) orally once a day  lisinopril 10 mg oral tablet: 1 tab(s) orally once a day  memantine 5 mg oral tablet: 1 tab(s) orally 2 times a day  Metoprolol Tartrate 25 mg oral tablet: 0.5 tab(s) orally 2 times a day  pantoprazole 40 mg oral delayed release tablet: 1 tab(s) orally once a day (before a meal)  polyethylene glycol 3350 oral powder for reconstitution: 17 gram(s) orally once a day, As Needed  senna oral tablet: 2 tab(s) orally once a day (at bedtime)

## 2021-08-24 ENCOUNTER — INPATIENT (INPATIENT)
Facility: HOSPITAL | Age: 86
LOS: 3 days | Discharge: NURSING, REHAB OR LONG-TERM CARE | DRG: 563 | End: 2021-08-28
Attending: INTERNAL MEDICINE | Admitting: INTERNAL MEDICINE
Payer: MEDICARE

## 2021-08-24 VITALS
SYSTOLIC BLOOD PRESSURE: 160 MMHG | HEIGHT: 60 IN | RESPIRATION RATE: 18 BRPM | TEMPERATURE: 98 F | OXYGEN SATURATION: 96 % | HEART RATE: 64 BPM | DIASTOLIC BLOOD PRESSURE: 70 MMHG | WEIGHT: 101.41 LBS

## 2021-08-24 PROCEDURE — 99285 EMERGENCY DEPT VISIT HI MDM: CPT

## 2021-08-24 PROCEDURE — 73560 X-RAY EXAM OF KNEE 1 OR 2: CPT | Mod: 26,RT

## 2021-08-24 NOTE — ED PROVIDER NOTE - PROGRESS NOTE DETAILS
PATRICK: Patient admitted by Dr. Reynoso to Dr. Hoffman. Yasmin told her he was on vacation and asked her to admit to Iman or Eleanor. Iman also away. Eleanor did not answer multiple calls. I spoke to Dr. Yanez, who accepted to the patient to the hospitalist service. patient already on floor, not seen by me.

## 2021-08-24 NOTE — ED ADULT NURSE NOTE - NSIMPLEMENTINTERV_GEN_ALL_ED
Implemented All Fall Risk Interventions:  [place] to call system. Call bell, personal items and telephone within reach. Instruct patient to call for assistance. Room bathroom lighting operational. Non-slip footwear when patient is off stretcher. Physically safe environment: no spills, clutter or unnecessary equipment. Stretcher in lowest position, wheels locked, appropriate side rails in place. Provide visual cue, wrist band, yellow gown, etc. Monitor gait and stability. Monitor for mental status changes and reorient to person, place, and time. Review medications for side effects contributing to fall risk. Reinforce activity limits and safety measures with patient and family.

## 2021-08-24 NOTE — ED PROVIDER NOTE - NSTIMEPROVIDERCAREINITIATE_GEN_ER
Meseret Mckee is a 80 Plains Regional Medical Centerca 75.)     Dion Merritt MD  10/9/2018 24-Aug-2021 17:13

## 2021-08-24 NOTE — ED ADULT NURSE NOTE - NSFALLRSKASSESSDT_ED_ALL_ED
5-18-20 Left message for pt to call back and schedule an appt for Depo-provera.    Per Dr. Cintron pt can get depo-provera.         24-Aug-2021 17:03

## 2021-08-24 NOTE — ED PROVIDER NOTE - CARE PLAN
1 Principal Discharge DX:	Patellar fracture  Secondary Diagnosis:	Inability to ambulate due to knee

## 2021-08-24 NOTE — ED PROVIDER NOTE - OBJECTIVE STATEMENT
88 y/o F( from home), recently discharged from [place] [address] at baseline with PMH of Dementia,  HTN, DM, HLD, CAD(s/p stent), presenting with knee pain after a fall 2-3 weeks ago. Had a mechanical fall on the way to the doctor at that time, was taken for outpatient x-rays that showed knee fracture, followed up with Orthopedics who did not do any interventions. Patient continues to have knee pain and trouble ambulating.     ROS from patient limited by pt's dementia.

## 2021-08-24 NOTE — ED PROVIDER NOTE - CLINICAL SUMMARY MEDICAL DECISION MAKING FREE TEXT BOX
88 y/o F( from home), recently discharged from [place] [address] at baseline with PMH of Dementia,  HTN, DM, HLD, CAD(s/p stent), presenting with knee pain after a fall 2-3 weeks ago. Unable to ambulate. Xray demonstrating patellar fracture. Labs and CTH pending, wIll admit for inability to ambulate/inpatient PT.

## 2021-08-25 ENCOUNTER — TRANSCRIPTION ENCOUNTER (OUTPATIENT)
Age: 86
End: 2021-08-25

## 2021-08-25 DIAGNOSIS — S82.009A UNSPECIFIED FRACTURE OF UNSPECIFIED PATELLA, INITIAL ENCOUNTER FOR CLOSED FRACTURE: ICD-10-CM

## 2021-08-25 DIAGNOSIS — D64.9 ANEMIA, UNSPECIFIED: ICD-10-CM

## 2021-08-25 DIAGNOSIS — E78.5 HYPERLIPIDEMIA, UNSPECIFIED: ICD-10-CM

## 2021-08-25 DIAGNOSIS — Z86.39 PERSONAL HISTORY OF OTHER ENDOCRINE, NUTRITIONAL AND METABOLIC DISEASE: ICD-10-CM

## 2021-08-25 DIAGNOSIS — F03.90 UNSPECIFIED DEMENTIA WITHOUT BEHAVIORAL DISTURBANCE: ICD-10-CM

## 2021-08-25 DIAGNOSIS — Z29.9 ENCOUNTER FOR PROPHYLACTIC MEASURES, UNSPECIFIED: ICD-10-CM

## 2021-08-25 DIAGNOSIS — I10 ESSENTIAL (PRIMARY) HYPERTENSION: ICD-10-CM

## 2021-08-25 LAB
ALBUMIN SERPL ELPH-MCNC: 3.4 G/DL — LOW (ref 3.5–5)
ALBUMIN SERPL ELPH-MCNC: 3.5 G/DL — SIGNIFICANT CHANGE UP (ref 3.5–5)
ALP SERPL-CCNC: 52 U/L — SIGNIFICANT CHANGE UP (ref 40–120)
ALP SERPL-CCNC: 54 U/L — SIGNIFICANT CHANGE UP (ref 40–120)
ALT FLD-CCNC: 15 U/L DA — SIGNIFICANT CHANGE UP (ref 10–60)
ALT FLD-CCNC: 17 U/L DA — SIGNIFICANT CHANGE UP (ref 10–60)
ANION GAP SERPL CALC-SCNC: 7 MMOL/L — SIGNIFICANT CHANGE UP (ref 5–17)
ANION GAP SERPL CALC-SCNC: 7 MMOL/L — SIGNIFICANT CHANGE UP (ref 5–17)
AST SERPL-CCNC: 12 U/L — SIGNIFICANT CHANGE UP (ref 10–40)
AST SERPL-CCNC: 13 U/L — SIGNIFICANT CHANGE UP (ref 10–40)
BILIRUB SERPL-MCNC: 0.5 MG/DL — SIGNIFICANT CHANGE UP (ref 0.2–1.2)
BILIRUB SERPL-MCNC: 0.6 MG/DL — SIGNIFICANT CHANGE UP (ref 0.2–1.2)
BUN SERPL-MCNC: 13 MG/DL — SIGNIFICANT CHANGE UP (ref 7–18)
BUN SERPL-MCNC: 13 MG/DL — SIGNIFICANT CHANGE UP (ref 7–18)
CALCIUM SERPL-MCNC: 9.1 MG/DL — SIGNIFICANT CHANGE UP (ref 8.4–10.5)
CALCIUM SERPL-MCNC: 9.3 MG/DL — SIGNIFICANT CHANGE UP (ref 8.4–10.5)
CHLORIDE SERPL-SCNC: 108 MMOL/L — SIGNIFICANT CHANGE UP (ref 96–108)
CHLORIDE SERPL-SCNC: 108 MMOL/L — SIGNIFICANT CHANGE UP (ref 96–108)
CO2 SERPL-SCNC: 26 MMOL/L — SIGNIFICANT CHANGE UP (ref 22–31)
CO2 SERPL-SCNC: 26 MMOL/L — SIGNIFICANT CHANGE UP (ref 22–31)
CREAT SERPL-MCNC: 0.87 MG/DL — SIGNIFICANT CHANGE UP (ref 0.5–1.3)
CREAT SERPL-MCNC: 0.91 MG/DL — SIGNIFICANT CHANGE UP (ref 0.5–1.3)
GLUCOSE BLDC GLUCOMTR-MCNC: 102 MG/DL — HIGH (ref 70–99)
GLUCOSE BLDC GLUCOMTR-MCNC: 103 MG/DL — HIGH (ref 70–99)
GLUCOSE BLDC GLUCOMTR-MCNC: 105 MG/DL — HIGH (ref 70–99)
GLUCOSE SERPL-MCNC: 85 MG/DL — SIGNIFICANT CHANGE UP (ref 70–99)
GLUCOSE SERPL-MCNC: 86 MG/DL — SIGNIFICANT CHANGE UP (ref 70–99)
HCT VFR BLD CALC: 31.3 % — LOW (ref 34.5–45)
HCT VFR BLD CALC: 31.4 % — LOW (ref 34.5–45)
HGB BLD-MCNC: 9.7 G/DL — LOW (ref 11.5–15.5)
HGB BLD-MCNC: 9.8 G/DL — LOW (ref 11.5–15.5)
MAGNESIUM SERPL-MCNC: 2.2 MG/DL — SIGNIFICANT CHANGE UP (ref 1.6–2.6)
MCHC RBC-ENTMCNC: 26.8 PG — LOW (ref 27–34)
MCHC RBC-ENTMCNC: 26.8 PG — LOW (ref 27–34)
MCHC RBC-ENTMCNC: 30.9 GM/DL — LOW (ref 32–36)
MCHC RBC-ENTMCNC: 31.3 GM/DL — LOW (ref 32–36)
MCV RBC AUTO: 85.8 FL — SIGNIFICANT CHANGE UP (ref 80–100)
MCV RBC AUTO: 86.7 FL — SIGNIFICANT CHANGE UP (ref 80–100)
NRBC # BLD: 0 /100 WBCS — SIGNIFICANT CHANGE UP (ref 0–0)
NRBC # BLD: 0 /100 WBCS — SIGNIFICANT CHANGE UP (ref 0–0)
PHOSPHATE SERPL-MCNC: 3.3 MG/DL — SIGNIFICANT CHANGE UP (ref 2.5–4.5)
PLATELET # BLD AUTO: 185 K/UL — SIGNIFICANT CHANGE UP (ref 150–400)
PLATELET # BLD AUTO: 187 K/UL — SIGNIFICANT CHANGE UP (ref 150–400)
POTASSIUM SERPL-MCNC: 4.1 MMOL/L — SIGNIFICANT CHANGE UP (ref 3.5–5.3)
POTASSIUM SERPL-MCNC: 4.3 MMOL/L — SIGNIFICANT CHANGE UP (ref 3.5–5.3)
POTASSIUM SERPL-SCNC: 4.1 MMOL/L — SIGNIFICANT CHANGE UP (ref 3.5–5.3)
POTASSIUM SERPL-SCNC: 4.3 MMOL/L — SIGNIFICANT CHANGE UP (ref 3.5–5.3)
PROT SERPL-MCNC: 6.5 G/DL — SIGNIFICANT CHANGE UP (ref 6–8.3)
PROT SERPL-MCNC: 6.6 G/DL — SIGNIFICANT CHANGE UP (ref 6–8.3)
RBC # BLD: 3.62 M/UL — LOW (ref 3.8–5.2)
RBC # BLD: 3.65 M/UL — LOW (ref 3.8–5.2)
RBC # FLD: 18.9 % — HIGH (ref 10.3–14.5)
RBC # FLD: 19 % — HIGH (ref 10.3–14.5)
SARS-COV-2 RNA SPEC QL NAA+PROBE: SIGNIFICANT CHANGE UP
SODIUM SERPL-SCNC: 141 MMOL/L — SIGNIFICANT CHANGE UP (ref 135–145)
SODIUM SERPL-SCNC: 141 MMOL/L — SIGNIFICANT CHANGE UP (ref 135–145)
WBC # BLD: 4.81 K/UL — SIGNIFICANT CHANGE UP (ref 3.8–10.5)
WBC # BLD: 4.85 K/UL — SIGNIFICANT CHANGE UP (ref 3.8–10.5)
WBC # FLD AUTO: 4.81 K/UL — SIGNIFICANT CHANGE UP (ref 3.8–10.5)
WBC # FLD AUTO: 4.85 K/UL — SIGNIFICANT CHANGE UP (ref 3.8–10.5)

## 2021-08-25 PROCEDURE — 99223 1ST HOSP IP/OBS HIGH 75: CPT | Mod: GC,AI

## 2021-08-25 PROCEDURE — 70450 CT HEAD/BRAIN W/O DYE: CPT | Mod: 26,MH

## 2021-08-25 PROCEDURE — 72125 CT NECK SPINE W/O DYE: CPT | Mod: 26,MH

## 2021-08-25 RX ORDER — ACETAMINOPHEN 500 MG
650 TABLET ORAL EVERY 6 HOURS
Refills: 0 | Status: DISCONTINUED | OUTPATIENT
Start: 2021-08-25 | End: 2021-08-28

## 2021-08-25 RX ORDER — MEMANTINE HYDROCHLORIDE 10 MG/1
5 TABLET ORAL
Refills: 0 | Status: DISCONTINUED | OUTPATIENT
Start: 2021-08-25 | End: 2021-08-25

## 2021-08-25 RX ORDER — ESCITALOPRAM OXALATE 10 MG/1
5 TABLET, FILM COATED ORAL DAILY
Refills: 0 | Status: DISCONTINUED | OUTPATIENT
Start: 2021-08-25 | End: 2021-08-28

## 2021-08-25 RX ORDER — FERROUS SULFATE 325(65) MG
325 TABLET ORAL DAILY
Refills: 0 | Status: DISCONTINUED | OUTPATIENT
Start: 2021-08-25 | End: 2021-08-28

## 2021-08-25 RX ORDER — SPIRONOLACTONE 25 MG/1
50 TABLET, FILM COATED ORAL DAILY
Refills: 0 | Status: DISCONTINUED | OUTPATIENT
Start: 2021-08-25 | End: 2021-08-28

## 2021-08-25 RX ORDER — CLOPIDOGREL BISULFATE 75 MG/1
75 TABLET, FILM COATED ORAL DAILY
Refills: 0 | Status: DISCONTINUED | OUTPATIENT
Start: 2021-08-25 | End: 2021-08-28

## 2021-08-25 RX ORDER — SODIUM CHLORIDE 9 MG/ML
1000 INJECTION, SOLUTION INTRAVENOUS
Refills: 0 | Status: DISCONTINUED | OUTPATIENT
Start: 2021-08-25 | End: 2021-08-28

## 2021-08-25 RX ORDER — EZETIMIBE 10 MG/1
1 TABLET ORAL
Qty: 0 | Refills: 0 | DISCHARGE

## 2021-08-25 RX ORDER — DONEPEZIL HYDROCHLORIDE 10 MG/1
10 TABLET, FILM COATED ORAL AT BEDTIME
Refills: 0 | Status: DISCONTINUED | OUTPATIENT
Start: 2021-08-25 | End: 2021-08-28

## 2021-08-25 RX ORDER — METOPROLOL TARTRATE 50 MG
0.5 TABLET ORAL
Qty: 0 | Refills: 0 | DISCHARGE

## 2021-08-25 RX ORDER — PANTOPRAZOLE SODIUM 20 MG/1
40 TABLET, DELAYED RELEASE ORAL
Refills: 0 | Status: DISCONTINUED | OUTPATIENT
Start: 2021-08-25 | End: 2021-08-28

## 2021-08-25 RX ORDER — DEXTROSE 50 % IN WATER 50 %
12.5 SYRINGE (ML) INTRAVENOUS ONCE
Refills: 0 | Status: DISCONTINUED | OUTPATIENT
Start: 2021-08-25 | End: 2021-08-25

## 2021-08-25 RX ORDER — DEXTROSE 50 % IN WATER 50 %
15 SYRINGE (ML) INTRAVENOUS ONCE
Refills: 0 | Status: DISCONTINUED | OUTPATIENT
Start: 2021-08-25 | End: 2021-08-28

## 2021-08-25 RX ORDER — FLUOXETINE HCL 10 MG
10 CAPSULE ORAL DAILY
Refills: 0 | Status: DISCONTINUED | OUTPATIENT
Start: 2021-08-25 | End: 2021-08-25

## 2021-08-25 RX ORDER — SENNA PLUS 8.6 MG/1
2 TABLET ORAL AT BEDTIME
Refills: 0 | Status: DISCONTINUED | OUTPATIENT
Start: 2021-08-25 | End: 2021-08-28

## 2021-08-25 RX ORDER — DEXTROSE 50 % IN WATER 50 %
25 SYRINGE (ML) INTRAVENOUS ONCE
Refills: 0 | Status: DISCONTINUED | OUTPATIENT
Start: 2021-08-25 | End: 2021-08-25

## 2021-08-25 RX ORDER — POLYETHYLENE GLYCOL 3350 17 G/17G
17 POWDER, FOR SOLUTION ORAL DAILY
Refills: 0 | Status: DISCONTINUED | OUTPATIENT
Start: 2021-08-25 | End: 2021-08-28

## 2021-08-25 RX ORDER — HYDRALAZINE HCL 50 MG
5 TABLET ORAL ONCE
Refills: 0 | Status: COMPLETED | OUTPATIENT
Start: 2021-08-25 | End: 2021-08-25

## 2021-08-25 RX ORDER — AMLODIPINE BESYLATE 2.5 MG/1
5 TABLET ORAL DAILY
Refills: 0 | Status: DISCONTINUED | OUTPATIENT
Start: 2021-08-25 | End: 2021-08-25

## 2021-08-25 RX ORDER — MEMANTINE HYDROCHLORIDE 10 MG/1
10 TABLET ORAL DAILY
Refills: 0 | Status: DISCONTINUED | OUTPATIENT
Start: 2021-08-25 | End: 2021-08-26

## 2021-08-25 RX ORDER — LISINOPRIL 2.5 MG/1
10 TABLET ORAL DAILY
Refills: 0 | Status: DISCONTINUED | OUTPATIENT
Start: 2021-08-25 | End: 2021-08-25

## 2021-08-25 RX ORDER — METOPROLOL TARTRATE 50 MG
12.5 TABLET ORAL
Refills: 0 | Status: DISCONTINUED | OUTPATIENT
Start: 2021-08-25 | End: 2021-08-25

## 2021-08-25 RX ORDER — GLUCAGON INJECTION, SOLUTION 0.5 MG/.1ML
1 INJECTION, SOLUTION SUBCUTANEOUS ONCE
Refills: 0 | Status: DISCONTINUED | OUTPATIENT
Start: 2021-08-25 | End: 2021-08-28

## 2021-08-25 RX ORDER — INSULIN LISPRO 100/ML
VIAL (ML) SUBCUTANEOUS AT BEDTIME
Refills: 0 | Status: DISCONTINUED | OUTPATIENT
Start: 2021-08-25 | End: 2021-08-28

## 2021-08-25 RX ORDER — INSULIN LISPRO 100/ML
VIAL (ML) SUBCUTANEOUS
Refills: 0 | Status: DISCONTINUED | OUTPATIENT
Start: 2021-08-25 | End: 2021-08-28

## 2021-08-25 RX ORDER — ATORVASTATIN CALCIUM 80 MG/1
40 TABLET, FILM COATED ORAL AT BEDTIME
Refills: 0 | Status: DISCONTINUED | OUTPATIENT
Start: 2021-08-25 | End: 2021-08-28

## 2021-08-25 RX ORDER — ENOXAPARIN SODIUM 100 MG/ML
40 INJECTION SUBCUTANEOUS DAILY
Refills: 0 | Status: DISCONTINUED | OUTPATIENT
Start: 2021-08-25 | End: 2021-08-28

## 2021-08-25 RX ORDER — LISINOPRIL 2.5 MG/1
5 TABLET ORAL ONCE
Refills: 0 | Status: COMPLETED | OUTPATIENT
Start: 2021-08-25 | End: 2021-08-25

## 2021-08-25 RX ORDER — SPIRONOLACTONE 25 MG/1
1 TABLET, FILM COATED ORAL
Qty: 0 | Refills: 0 | DISCHARGE

## 2021-08-25 RX ORDER — TRAMADOL HYDROCHLORIDE 50 MG/1
50 TABLET ORAL ONCE
Refills: 0 | Status: DISCONTINUED | OUTPATIENT
Start: 2021-08-25 | End: 2021-08-25

## 2021-08-25 RX ORDER — DEXTROSE 50 % IN WATER 50 %
25 SYRINGE (ML) INTRAVENOUS ONCE
Refills: 0 | Status: DISCONTINUED | OUTPATIENT
Start: 2021-08-25 | End: 2021-08-28

## 2021-08-25 RX ORDER — CARVEDILOL PHOSPHATE 80 MG/1
6.25 CAPSULE, EXTENDED RELEASE ORAL EVERY 12 HOURS
Refills: 0 | Status: DISCONTINUED | OUTPATIENT
Start: 2021-08-25 | End: 2021-08-26

## 2021-08-25 RX ADMIN — AMLODIPINE BESYLATE 5 MILLIGRAM(S): 2.5 TABLET ORAL at 08:12

## 2021-08-25 RX ADMIN — ENOXAPARIN SODIUM 40 MILLIGRAM(S): 100 INJECTION SUBCUTANEOUS at 15:32

## 2021-08-25 RX ADMIN — TRAMADOL HYDROCHLORIDE 50 MILLIGRAM(S): 50 TABLET ORAL at 10:20

## 2021-08-25 RX ADMIN — ESCITALOPRAM OXALATE 5 MILLIGRAM(S): 10 TABLET, FILM COATED ORAL at 15:32

## 2021-08-25 RX ADMIN — Medication 5 MILLIGRAM(S): at 22:04

## 2021-08-25 RX ADMIN — TRAMADOL HYDROCHLORIDE 50 MILLIGRAM(S): 50 TABLET ORAL at 09:26

## 2021-08-25 RX ADMIN — Medication 325 MILLIGRAM(S): at 15:33

## 2021-08-25 RX ADMIN — PANTOPRAZOLE SODIUM 40 MILLIGRAM(S): 20 TABLET, DELAYED RELEASE ORAL at 08:11

## 2021-08-25 RX ADMIN — MEMANTINE HYDROCHLORIDE 10 MILLIGRAM(S): 10 TABLET ORAL at 22:04

## 2021-08-25 RX ADMIN — CLOPIDOGREL BISULFATE 75 MILLIGRAM(S): 75 TABLET, FILM COATED ORAL at 15:32

## 2021-08-25 RX ADMIN — DONEPEZIL HYDROCHLORIDE 10 MILLIGRAM(S): 10 TABLET, FILM COATED ORAL at 22:04

## 2021-08-25 RX ADMIN — SENNA PLUS 2 TABLET(S): 8.6 TABLET ORAL at 22:04

## 2021-08-25 RX ADMIN — Medication 650 MILLIGRAM(S): at 21:55

## 2021-08-25 RX ADMIN — ATORVASTATIN CALCIUM 40 MILLIGRAM(S): 80 TABLET, FILM COATED ORAL at 22:04

## 2021-08-25 RX ADMIN — Medication 12.5 MILLIGRAM(S): at 05:39

## 2021-08-25 RX ADMIN — LISINOPRIL 5 MILLIGRAM(S): 2.5 TABLET ORAL at 22:04

## 2021-08-25 RX ADMIN — MEMANTINE HYDROCHLORIDE 5 MILLIGRAM(S): 10 TABLET ORAL at 08:12

## 2021-08-25 RX ADMIN — LISINOPRIL 10 MILLIGRAM(S): 2.5 TABLET ORAL at 05:41

## 2021-08-25 RX ADMIN — Medication 650 MILLIGRAM(S): at 20:56

## 2021-08-25 NOTE — PACU DISCHARGE NOTE - NAUSEA/VOMITING:
Purposeful Rounding    Patient Location: Day room    Patient willing to engage in conversation: Yes    Presentation/behavior: Cooperative and Pleasant    Affect: Neutral/Euthymic(normal)    Concerns reported: No     PRN medications given: No    Environmental assessment: Room free from clutter, Clear path to bathroom  and No safety hazards noted    Fall prevention interventions in place: N/A    Daily Junior Fall Risk Score: 55    Daily Kendrick Fall Risk Score: 0 None

## 2021-08-25 NOTE — H&P ADULT - NSHPPHYSICALEXAM_GEN_ALL_CORE
GENERAL: NAD, no signs of respiratory distress, patient confused  HEAD:  Atraumatic, Normocephalic  EYES: EOMI, PERRLA, conjunctiva and sclera clear  NECK: Supple, No JVD  CHEST/LUNG: Clear to auscultation bilaterally; No wheeze; No crackles; No accessory muscles used  HEART: Regular rate and rhythm; No murmurs;   ABDOMEN: Soft, Nontender, Nondistended; Bowel sounds present; No guarding  EXTREMITIES:  2+ Peripheral Pulses, No cyanosis or edema, pain in right knee on active and passive movement  PSYCH:  Normal Affect  NEUROLOGY: non-focal, AAO X 2. Oriented to person and place, Strength is 5/5. no sensory loss.  SKIN: No rashes or lesions ICU Vital Signs Last 24 Hrs  T(C): 36.3 (25 Aug 2021 02:37), Max: 36.7 (24 Aug 2021 16:34)  T(F): 97.3 (25 Aug 2021 02:37), Max: 98 (24 Aug 2021 16:34)  HR: 60 (25 Aug 2021 02:37) (53 - 64)  BP: 170/64 (25 Aug 2021 02:37) (160/70 - 189/61)  BP(mean): --  ABP: --  ABP(mean): --  RR: 18 (25 Aug 2021 02:37) (18 - 18)  SpO2: 97% (25 Aug 2021 02:37) (96% - 100%)    GENERAL: NAD, no signs of respiratory distress, patient confused  HEAD:  Atraumatic, Normocephalic  EYES: EOMI, PERRLA, conjunctiva and sclera clear  NECK: Supple, No JVD  CHEST/LUNG: Clear to auscultation bilaterally; No wheeze; No crackles; No accessory muscles used  HEART: Regular rate and rhythm; No murmurs;   ABDOMEN: Soft, Nontender, Nondistended; Bowel sounds present; No guarding  EXTREMITIES:  2+ Peripheral Pulses, No cyanosis or edema, pain in right knee on active and passive movement  PSYCH:  Normal Affect  NEUROLOGY: non-focal, AAO X 2. Oriented to person and place, Strength is 5/5. no sensory loss.  SKIN: No rashes or lesions ICU Vital Signs Last 24 Hrs  T(C): 36.3 (25 Aug 2021 02:37), Max: 36.7 (24 Aug 2021 16:34)  T(F): 97.3 (25 Aug 2021 02:37), Max: 98 (24 Aug 2021 16:34)  HR: 60 (25 Aug 2021 02:37) (53 - 64)  BP: 170/64 (25 Aug 2021 02:37) (160/70 - 189/61)  BP(mean): --  ABP: --  ABP(mean): --  RR: 18 (25 Aug 2021 02:37) (18 - 18)  SpO2: 97% (25 Aug 2021 02:37) (96% - 100%)    GENERAL: NAD, no signs of respiratory distress, patient confused  HEAD:  Atraumatic, Normocephalic  EYES: EOMI, PERRLA, conjunctiva and sclera clear  NECK: Supple, No JVD  CHEST/LUNG: Clear to auscultation bilaterally; No wheeze; No crackles; No accessory muscles used  HEART: Regular rate and rhythm; No murmurs;   ABDOMEN: Soft, Nontender, Nondistended; Bowel sounds present; No guarding  EXTREMITIES:  2+ Peripheral Pulses, No cyanosis or edema, pain in right knee on active and passive movement  PSYCH:  Normal Affect  NEUROLOGY: non-focal, AAO X 2. Oriented to person and place, Strength is 5/5. no sensory loss. Speech appropriate and coherent  SKIN: No rashes or lesions

## 2021-08-25 NOTE — DISCHARGE NOTE PROVIDER - CARE PROVIDER_API CALL
Bill Shaffer)  Internal Medicine  [address], [place] [address]  Phone: [phone]  Fax: [phone]  Follow Up Time: 1 week    Xu Han)  Orthopaedic Surgery  [address] [facility], [address] [place] [address]  Phone: [phone]  Fax: [phone]  Follow Up Time: 1 week

## 2021-08-25 NOTE — DISCHARGE NOTE PROVIDER - NSDCMRMEDTOKEN_GEN_ALL_CORE_FT
atorvastatin 40 mg oral tablet: 1 tab(s) orally once a day (at bedtime)  carvedilol 6.25 mg oral tablet: 1 tab(s) orally 2 times a day  clopidogrel 75 mg oral tablet: 1 tab(s) orally once a day  donepezil 10 mg oral tablet: 1 tab(s) orally once a day (at bedtime)  folic acid 1 mg oral tablet: 1 tab(s) orally once a day  Lexapro 5 mg oral tablet: 1 tab(s) orally once a day  memantine 5 mg oral tablet: 1 tab(s) orally 2 times a day  pantoprazole 40 mg oral delayed release tablet: 1 tab(s) orally once a day (before a meal)  senna oral tablet: 2 tab(s) orally once a day (at bedtime)  spironolactone 25 mg oral tablet: 2 tab(s) orally once a day   acetaminophen 325 mg oral tablet: 2 tab(s) orally every 6 hours, As needed, Moderate Pain (4 - 6)  aspirin 81 mg oral delayed release tablet: 1 tab(s) orally with dinner  carvedilol 3.125 mg oral tablet: 1 tab(s) orally every 12 hours  clopidogrel 75 mg oral tablet: 1 tab(s) orally once a day  cyanocobalamin 1000 mcg/mL injectable solution: injectable once a week  escitalopram 5 mg oral tablet: 1 tab(s) orally once a day  ferrous sulfate 325 mg (65 mg elemental iron) oral tablet: 1 tab(s) orally once a day  folic acid 1 mg oral tablet: 1 tab(s) orally once a day  losartan 25 mg oral tablet: 1 tab(s) orally once a day  meclizine 12.5 mg oral tablet: 1 tab(s) orally 2 times a day, As Needed  Namzaric 28 mg-10 mg oral capsule, extended release: 1 cap(s) orally once a day with dinner  pantoprazole 40 mg oral delayed release tablet: 1 tab(s) orally once a day (before a meal)  rosuvastatin 40 mg oral tablet: 1 tab(s) orally once a day (in the evening)  senna oral tablet: 2 tab(s) orally once a day (at bedtime)  spironolactone 25 mg oral tablet: 2 tab(s) orally once a day  Vitamin D3 50 mcg (2000 intl units) oral capsule: 1 cap(s) orally once a day with breakfast  Voltaren 1% topical gel: Apply topically to affected area 4 times a day, As Needed

## 2021-08-25 NOTE — CHART NOTE - NSCHARTNOTEFT_GEN_A_CORE
Pt /65, repeated and found to be 181/68  Tylenol given for pain  Pt on max aldactone  Patient bradycardic, HR 48-50 so do not want to give more carvedilol  Ordered 5 lisinopril po and 5 hydralazine IV

## 2021-08-25 NOTE — H&P ADULT - PROBLEM SELECTOR PLAN 5
c/w home meds  supportive measures  fall and aspiration precautions c/w home meds  supportive measures  fall and aspiration precautions.

## 2021-08-25 NOTE — PHYSICAL THERAPY INITIAL EVALUATION ADULT - PREDICTED DURATION OF THERAPY (DAYS/WKS), PT EVAL
PHYSICAL MEDICINE AND REHABILITATION H&P/ADMISSION NOTE  Bo Gage 70 y o  male MRN: [id number]  Unit/Bed#: -01 Encounter: [id number]     Rehab Diagnosis: Impairment of mobility, safety and Activities of Daily Living (ADLs) due to Orthopedic Disorders:  08 9  Other Orthopedic Spondylolisthesis lumbar spine     History of Present Illness:   Bo Gage is a 70 y o  male with HTN, DM2, Gout, known NPH s/p VPS chronic left foot weakness, lumbar spinal stenosis who presented to the [facility] on 7/6/20 for worsening back pain and gait instability from known spondylolisthesis L5/S1  He was recommended to have operative intervention, and was taken to the OR on 7/6/20 by Dr Elvis Rosales for an elective L5-S1 transverse lumbar interbody fusion and deformity correction at L5-S1  Patient found to have functional deficits from baseline and admitted to [address] [place] [address] on 7/10/20  Plan:     Rehabilitation   Begin PT/OT/SLP  Rehabilitation goals are to achieve a Mod I level with mobility and self care  Prognosis is good  ELOS is 10-14 days  Estimated discharge is home  DVT prophylaxis   Managed on SQ Heparin    Bladder plan   Continent    Bowel plan   Continent    Code Status   FULL CODE      * Spondylolisthesis of lumbosacral region  Assessment & Plan  s/p L5-S1 transverse lumbar interbody fusion and deformity correction at L5-S1 on 7/6/20 by Dr Elvis Rosales    Scalp laceration  Josafat Dee Dee in place from scalp closure 4 days ago  Pain  Assessment & Plan  Managed on Tylenol and Robaxin  P r n  Oxycodone    ZHOU on CPAP  Assessment & Plan  Continue nocturnal CPAP home settings    Gout  Assessment & Plan  Managed on allopurinol    Essential hypertension  Assessment & Plan  Stable without blood pressure medication    Normal pressure hydrocephalus (HCC)  Assessment & Plan  Chronic SDH      Subjective:   Patient seen face to face    Feeling minimal pain in his back currently sitting at rest   Denies spasms in his legs  No BM in several days, but states +Flatus    Review of Systems   Constitutional: Negative  HENT: Negative  Eyes: Negative  Respiratory: Negative  Cardiovascular: Negative  Gastrointestinal: Negative  Endocrine: Negative  Genitourinary: Negative  Musculoskeletal: Negative  Skin: Negative  Allergic/Immunologic: Negative  Neurological: Negative  Hematological: Negative  Psychiatric/Behavioral: Negative  Function:  Prior level of function and living situation:  Patient lives with a friend in a [facility] with 3STE  His friend can assist   PTA, patient was independent with mobility and self care  Current level of function:  Physical Therapy: Mod - Max A with transfers, Min A with ambuation 30 feet   Occupational Therapy: Mod - Max A     Physical Exam:  /81 (BP Location: Right arm)   Pulse 58   Temp 97 8 °F (36 6 °C) (Oral)   Resp 16   Ht 6' 3" (1 905 m)   Wt 125 kg (276 lb 8 oz)   SpO2 99%   BMI 34 56 kg/m²        Intake/Output Summary (Last 24 hours) at 7/10/2020 1807  Last data filed at 7/10/2020 1756  Gross per 24 hour   Intake 240 ml   Output    Net 240 ml       Body mass index is 34 56 kg/m²  Physical Exam   Constitutional: He is oriented to person, place, and time  He appears well-developed and well-nourished  No distress  HENT:   Nose: Nose normal    Sutures on scalp    Eyes: Conjunctivae and EOM are normal    Neck: Neck supple  Cardiovascular: Normal rate and intact distal pulses  Pulmonary/Chest: Effort normal  He has no wheezes  Abdominal: Soft  He exhibits no distension  Musculoskeletal: He exhibits no edema  Neurological: He is alert and oriented to person, place, and time     Sensation to light touch is diminished in legs and hands   Motor exam:  BUE 4/5 throughout  BLE 4-/5 HF, 4/5 KE, KF, DF, PF   Skin:   Back incisions are clean dry and intact   Psychiatric: He has a normal mood and affect  Nursing note and vitals reviewed  Labs, medications, and imaging personally reviewed      Laboratory:    Lab Results   Component Value Date    SODIUM 138 07/07/2020    K 4 1 07/07/2020     (H) 07/07/2020    CO2 24 07/07/2020    BUN 16 07/07/2020    CREATININE 0 99 07/07/2020    GLUC 132 07/07/2020    CALCIUM 8 4 07/07/2020     Lab Results   Component Value Date    WBC 14 22 (H) 07/07/2020    HGB 13 0 07/07/2020    HCT 39 2 07/07/2020    MCV 87 07/07/2020     07/07/2020     Lab Results   Component Value Date    INR 1 06 06/16/2020    INR 1 14 05/28/2020    PROTIME 13 4 06/16/2020    PROTIME 14 0 05/28/2020         Current Facility-Administered Medications:     acetaminophen (TYLENOL) tablet 650 mg, 650 mg, Oral, Q8H [facility], Prieto Ponce MD    [START ON 7/11/2020] allopurinol (ZYLOPRIM) tablet 300 mg, 300 mg, Oral, Daily, Prieto Ponce MD    bisacodyl (DULCOLAX) rectal suppository 10 mg, 10 mg, Rectal, Daily PRN, Prieto Ponce MD    docusate sodium (COLACE) capsule 100 mg, 100 mg, Oral, BID, Prieto Ponce MD, 100 mg at 07/10/20 1705    heparin (porcine) subcutaneous injection 5,000 Units, 5,000 Units, Subcutaneous, Q8H [facility], Prieto Ponce MD    insulin lispro (HumaLOG) 100 units/mL subcutaneous injection 1-5 Units, 1-5 Units, Subcutaneous, TID AC **AND** [START ON 7/11/2020] Fingerstick Glucose (POCT), , , TID AC, AR Epstein    insulin lispro (HumaLOG) 100 units/mL subcutaneous injection 1-5 Units, 1-5 Units, Subcutaneous, HS, AR Epstein    melatonin tablet 6 mg, 6 mg, Oral, HS, Prieto Ponce MD    methocarbamol (ROBAXIN) tablet 500 mg, 500 mg, Oral, Q6H [facility], Prieto Ponce MD, 500 mg at 07/10/20 1705    ondansetron (ZOFRAN-ODT) dispersible tablet 4 mg, 4 mg, Oral, Q6H PRN, Prieto Ponce MD    oxyCODONE (ROXICODONE) IR tablet 2 5 mg, 2 5 mg, Oral, Q4H PRN, Prieto Ponce MD    oxyCODONE (ROXICODONE) IR tablet 5 mg, 5 mg, Oral, Q4H PRN, Bryan Henriquez MD    polyethylene glycol (MIRALAX) packet 17 g, 17 g, Oral, Daily PRN, Bryan Henriquez MD    [START ON 7/11/2020] senna (SENOKOT) tablet 8 6 mg, 1 tablet, Oral, Daily, Bryan Henriquez MD    travoprost (TRAVATAN-Z) 0 004 % ophthalmic solution 1 drop, 1 drop, Right Eye, HS, Bryan Henriquez MD  Allergies   Allergen Reactions    Pollen Extract Allergic Rhinitis      Patient Active Problem List    Diagnosis Date Noted    Spondylolisthesis of lumbosacral region 07/02/2020     Priority: High    Weakness of left foot 07/10/2020    Pain 07/10/2020    Scalp hematoma 07/10/2020    Scalp laceration 07/10/2020    Impaired functional mobility, balance, gait, and endurance 07/02/2020    Diabetic ulcer of left foot associated with type 2 diabetes mellitus ([facility] ) 05/28/2020    Type 2 diabetes mellitus ([facility] ) 05/28/2020    Normal pressure hydrocephalus ([facility] ) 05/28/2020    Essential hypertension 05/28/2020    Gout 05/28/2020    ZHOU on CPAP 05/28/2020    Status post ventriculoperitoneal shunt 04/21/2020    Unspecified abnormalities of gait and mobility 04/21/2020     Past Medical History:   Diagnosis Date    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     Diabetes mellitus ([facility] )     DM2 (diabetes mellitus, type 2) ([facility] )     Gout     HTN (hypertension)     Hydrocephalus ([facility] )     Hypertension     Neuropathy     ZHOU on CPAP     Pressure injury of skin     TIA (transient ischemic attack)      Past Surgical History:   Procedure Laterality Date    CSF SHUNT      x3 Op Reports, Lizbeth Villalobos in [facility] chart viewer    [address] Left 5/30/2020    Procedure: excision 5th metatarsal head   excision 5th metatarsal ulcer ;  Surgeon: Gabriela Lynn DPM;  Location: [facility];  Service: Podiatry    ND ARTHDSIS POST/POSTEROLATRL/POSTINTERBODY LUMBAR N/A 7/6/2020    Procedure: MINIMALLY INVASIVE TRANSVERSE LUMBAR INTERBODY FUSION L5-S1 FROM LEFT-SIDED APPROACH;  Surgeon: Fransisco Rosario MD;  Location: [facility];  Service: Neurosurgery    OH REPLACEMENT/REVISION,CSF SHUNT Right 7/6/2020    Procedure: REVISION OF SCALP OVER VENTRICULAR CATHETER IN THE RIGHT CORONAL REGION;  Surgeon: Fransisco Rosario MD;  Location[facility];  Service: Neurosurgery     Social History     Socioeconomic History    Marital status:      Spouse name: Not on file    Number of children: Not on file    Years of education: Not on file    Highest education level: Not on file   Occupational History    Not on file   Social Needs    Financial resource strain: Not on file    Food insecurity:     Worry: Not on file     Inability: Not on file    Transportation needs:     Medical: Not on file     Non-medical: Not on file   Tobacco Use    Smoking status: Never Smoker    Smokeless tobacco: Never Used   Substance and Sexual Activity    Alcohol use: Not Currently     Frequency: Monthly or less     Drinks per session: 1 or 2    Drug use: Never    Sexual activity: Not on file   Lifestyle    Physical activity:     Days per week: Not on file     Minutes per session: Not on file    Stress: Not on file   Relationships    Social connections:     Talks on phone: Not on file     Gets together: Not on file     Attends Sabianism service: Not on file     Active member of club or organization: Not on file     Attends meetings of clubs or organizations: Not on file     Relationship status: Not on file    Intimate partner violence:     Fear of current or ex partner: Not on file     Emotionally abused: Not on file     Physically abused: Not on file     Forced sexual activity: Not on file   Other Topics Concern    Not on file   Social History Narrative    Not on file     Social History     Tobacco Use   Smoking Status Never Smoker   Smokeless Tobacco Never Used     Social History     Substance and Sexual Activity   Alcohol Use Not Currently    Frequency: Monthly or less    Drinks per session: 1 or 2     Family History   Problem Relation Age of Onset    Polymyositis Mother     Heart failure Father          Medical Necessity Criteria for [facility] Admission: Hypertension, Diabetes requiring close blood glucose monitoring and incision care and pain control  In addition, the preadmission screen, post-admission physical evaluation, overall plan of care and admissions order demonstrate a reasonable expectation that the following criteria were met at the time of admission to the [facility]  1  The patient requires active and ongoing therapeutic intervention of multiple therapy disciplines (physical therapy, occupational therapy, speech-language pathology, or prosthetics/orthotics), one of which is physical or occupational therapy  2  Patient requires an intensive rehabilitation therapy program, as defined in Chapter 1, section 110 2 2 of the CMS Medicare Policy Manual  This intensive rehabilitation therapy program will consist of at least 3 hours of therapy per day at least 5 days per week or at least 15 hours of intensive rehabilitation therapy within a 7 consecutive day period, beginning with the date of admission to the [facility]  3  The patient is reasonably expected to actively participate in, and benefit significantly from, the intensive rehabilitation therapy program as defined in Chapter 1, section 110 2 2 of the CMS Medicare Policy Manual at this time of admission to the [facility]  He can reasonably be expected to make measurable improvement (that will be of practical value to improve the patients functional capacity or adaptation to impairments) as a result of the rehabilitation treatment, as defined in section 110 3, and such improvement can be expected to be made within the prescribed period of time   As noted in the CMS Medicare Policy Manual, the patient need not be expected to achieve complete independence in the domain of self-care nor be expected to return to his or her prior level of functioning in order to meet this standard  4  The patient must require physician supervision by a rehabilitation physician  As such, a rehabilitation physician will conduct face-to-face visits with the patient at least 3 days per week throughout the patients stay in the [facility] to assess the patient both medically and functionally, as well as to modify the course of treatment as needed to maximize the patients capacity to benefit from the rehabilitation process  5  The patient requires an intensive and coordinated interdisciplinary approach to providing rehabilitation, as defined in Chapter 1, section 110 2 5 of the CMS Medicare Policy Manual  This will be achieved through periodic team conferences, conducted at least once in a 7-day period, and comprising of an interdisciplinary team of medical professionals consisting of: a rehabilitation physician, registered nurse,  and/or , and a licensed/certified therapist from each therapy discipline involved in treating the patient  Changes Since Pre-admission Assessment: None -This patient's participation in rehab continues to be reasonable, necessary and appropriate  CMS Required Post-Admission Physician Evaluation Elements  History and Physical, including medical history, functional history and active comorbidities as in above text  PostAdmission Physician Evaluation:  The patient has the potential to make improvement and is in need of physical, occupational, and/or therapy services  The patient may also need nutritional services  Given the patient's complex medical condition and risk of further medical complications, rehabilitative services cannot be safely provided at a lower level of care, such as a skilled nursing facility  I have reviewed the patient's functional and medical status at the time of the preadmission screening and they are the same as on the day of this admission   I acknowledge that I have personally performed a full physical examination on this patient within 24 hours of admission  The patient and/or family demonstrated understanding the rehabilitation program and the discharge process after we discussed them  Agree in entirety: yes  Minor adaptions: none    Major changes: none    Sav Titus MD    ** Please Note: Fluency Direct voice to text software may have been used in the creation of this document   ** 2-3x a week x 4 weeks at HPT

## 2021-08-25 NOTE — H&P ADULT - PROBLEM SELECTOR PLAN 3
as per previous med rec, pt on lisinopril, metoprolol, amlodipine  Will resume  primary team to confirm

## 2021-08-25 NOTE — DISCHARGE NOTE PROVIDER - PROVIDER TOKENS
PROVIDER:[TOKEN:[[id number]],FOLLOWUP:[1 week]],PROVIDER:[TOKEN:[[id number]],FOLLOWUP:[1 week]]

## 2021-08-25 NOTE — DISCHARGE NOTE PROVIDER - ATTENDING COMMENTS
patient seen this morning    89 y/o Female from home with PMH of Dementia, HTN, DM, HLD, CAD (s/p stent), presenting with right knee pain and trouble ambulating. Patient states that she slipped and fell about 2 weeks ago.  no LOC. Denies chest pain, SOB, dizziness and fever. She states that she was seen by the doctor and they told her she had a knee fracture but only prescribed topical pain meds which did not help. Patient does not remember her meds or unable to contribute to her history. She lives alone.     In ED: XR shows right patellar fracture. sen by Ortho Conservative mgmt with weight bearing as tolerated; Seen by PT recommended Home PT. Patient lives alone. Planned for discharge to Rehab was planned for yesterday but Daughter requested discharge tonight after Sabbath.  Patient comfortable, no new complaints. walked with PT today; NAD    Vital Signs Last 24 Hrs  T(C): 36.4 (28 Aug 2021 14:00), Max: 36.7 (27 Aug 2021 19:46)  T(F): 97.6 (28 Aug 2021 14:00), Max: 98 (27 Aug 2021 19:46)  HR: 57 (28 Aug 2021 14:00) (54 - 59)  BP: 119/54 (28 Aug 2021 14:00) (119/54 - 168/58)  BP(mean): 65 (28 Aug 2021 14:00) (65 - 65)  RR: 18 (28 Aug 2021 14:00) (16 - 18)  SpO2: 99% (28 Aug 2021 14:00) (95% - 99%)    P/E: clinically unchanged; as above  Gait: Balanced with PT    Labs: stable 8/26; no new      D/D:  s/p Mechanical fall with patellar fracture with   Sinus Bradycardia asymptomatic resolved  Acute Pain and Ambulatory difficulty  HTN slightly uncontrolled    Plan:   Continue Coreg 3.125 mg twice daily; Hold only if HR less than 55; D/C Lisinopril for potential cough Hx in past  Continue Losartan and titrate up as needed to control BP; relatively well controlled;  Increase to 50 mg  if BP more than 150 mm Hg  As per Ortho consult/ Dr. Han- extensor mechanism is intact, recommend treat conservatively in a knee immobilizer  Bulky delgado - Knee immobilizer placed- keep on at all times  PT- WBAT to RLE  Pain control with Tylenol; Avoid Narcotics  DVT ppx  Patient will benefit from JACINDA placement given safety and needing assistance with underlying Dementia;   d/w  to [facility]; DAUGHTER REQUESTED TO DELAY DISCHARGE UNTIL SABBATH IS OVER AT 8 PM TONIGHT    PATIENT IS MEDICALLY STABLE FOR DISCHARGE

## 2021-08-25 NOTE — CHART NOTE - NSCHARTNOTEFT_GEN_A_CORE
Pt is a 88 y/o F from home with PMH of dementia, HTN, DM, HLD, CAD(s/p stent), presenting with right knee pain and difficulties  ambulating sp mechanical fall   2 weeks ago,  sustained right  knee fracture that time   In ED: XR shows right patellar fracture.    Admitted to medicine for right patellar fracture   Pt seen at bedside, alert, oriented to place and self, NAD, co right knee pain asking for "something for pain"   Denies any other complaints         OBJECTIVE:  Vital Signs Last 24 Hrs  T(C): 36.3 (25 Aug 2021 09:00), Max: 36.7 (24 Aug 2021 16:34)  T(F): 97.4 (25 Aug 2021 09:00), Max: 98 (24 Aug 2021 16:34)  HR: 45 (25 Aug 2021 09:00) (45 - 78)  BP: 171/41 (25 Aug 2021 09:00) (146/60 - 189/61)  BP(mean): 75 (25 Aug 2021 09:00) (75 - 75)  RR: 18 (25 Aug 2021 09:00) (18 - 19)  SpO2: 100% (25 Aug 2021 09:00) (96% - 100%)    FOCUSED PHYSICAL EXAM:  Neuro: awake, alert, oriented x 2,  No neuro deficit  Cardiovascular: Pulses +2 B/L in lower and upper extremities, HR regular, BP stable, No edema.  Respiratory: Respirations regular, unlabored, clear breath sounds   GI: Abdomen soft, non-tender, positive bowel sounds.  MSK right knee tenderness to anterior aspect, decreased ROM due to pain   : no bladder distention noted. No complaints at this time.  Skin: Dry, intact, no bruising, no diaphoresis.      PLAN:     - pain management   - PT evaluation   - ortho consulted   - continue home meds   - DVT proph   - CM/SW for discharge planning Pt is a 88 y/o F from home with PMH of dementia, HTN, DM, HLD, CAD(s/p stent), presenting with right knee pain and difficulties  ambulating sp mechanical fall   2 weeks ago,  sustained right  knee fracture that time   In ED: XR shows right patellar fracture.    Admitted to medicine for right patellar fracture   Pt seen at bedside, alert, oriented to place and self, NAD, co right knee pain asking for "something for pain"   Denies any other complaints         OBJECTIVE:  Vital Signs Last 24 Hrs  T(C): 36.3 (25 Aug 2021 09:00), Max: 36.7 (24 Aug 2021 16:34)  T(F): 97.4 (25 Aug 2021 09:00), Max: 98 (24 Aug 2021 16:34)  HR: 45 (25 Aug 2021 09:00) (45 - 78)  BP: 171/41 (25 Aug 2021 09:00) (146/60 - 189/61)  BP(mean): 75 (25 Aug 2021 09:00) (75 - 75)  RR: 18 (25 Aug 2021 09:00) (18 - 19)  SpO2: 100% (25 Aug 2021 09:00) (96% - 100%)    FOCUSED PHYSICAL EXAM:  Neuro: awake, alert, oriented x 2,  No neuro deficit  Cardiovascular: Pulses +2 B/L in lower and upper extremities, HR regular, BP stable, No edema.  Respiratory: Respirations regular, unlabored, clear breath sounds   GI: Abdomen soft, non-tender, positive bowel sounds.  MSK right knee tenderness to anterior aspect, decreased ROM due to pain   : no bladder distention noted. No complaints at this time.  Skin: Dry, intact, no bruising, no diaphoresis.      PLAN:     - pain management   - PT evaluation   - ortho consulted   - conservative treatment  in a knee immobilizer  - Daily PT-WBAT to RLE  - continue home meds   - DVT proph   - CM/SW for discharge planning

## 2021-08-25 NOTE — H&P ADULT - ATTENDING COMMENTS
Patient seen and examined. Full note to follow Patient seen and examined this morning around 9.40 AM     88 y/o Female from home with PMH of Dementia, HTN, DM, HLD, CAD (s/p stent), presenting with right knee pain and trouble ambulating. Patient states that she slipped and fell about 2 weeks ago.  no LOC. Denies chest pain, SOB, dizziness and fever. She states that she was seen by the doctor and they told her she had a knee fracture but only prescribed topical pain meds which did not help. Patient does not remember her meds or unable to contribute to her history. She lives alone.     In ED: XR shows right patellar fracture.    Vital Signs Last 24 Hrs: reviewed as above      P/E:  elderly pleasant female, comfortable in bed NAD  Neuro: AAO x3; No focal deficits  CVS: S1S2 present, regular  Resp: BLAE+, No wheeze or Rhonchi  GI: Soft, BS+, NT  Extr: No edema or calf tenderness  Right knee mild swelling and tenderness to palpation    Labs:                        9.8    4.81  )-----------( 187      ( 25 Aug 2021 05:36 )             31.3   08-25    141  |  108  |  13  ----------------------------<  86  4.1   |  26  |  0.87    Ca    9.3      25 Aug 2021 05:36  Phos  3.3     08-25  Mg     2.2     08-25    TPro  6.6  /  Alb  3.4<L>  /  TBili  0.6  /  DBili  x   /  AST  12  /  ALT  15  /  AlkPhos  54  08-25      CT Head No Cont (08.25.21 @ 02:17)    IMPRESSION:  Head CT: No displaced calvarial fracture or acute intracranial hemorrhage.  Cervical spine CT: No acute fracture. Cervical spondylosis    Xray Knee 1 or 2 Views, Right (08.24.21 @ 18:04)     Arterial calcifications are noted.  There is mild to early moderate degeneration most pronounced at the articular patella level.  There is a horizontal fracture of the upper patella.    IMPRESSION: Patellar fracture. Degeneration.    D/D:  s/p Mechanical fall with patellar fracture with   Acute Pain and Ambulatory difficulty  HTN controlled      Plan: Ortho consult appreciated;   As per Dr. Han- extensor mechanism is intact, recommend treat conservatively in a knee immobililzer  Bulky delgado - Knee immobilizer placed- keep on at all times  PT-WBAT to RLE  Pain control  DVT ppx  Patient will need JACINDA placement; d/w  Patsy  Patient lives alone   Discussed with Dr. Webster who advised to continue caring for patient in [facility]'s absence    Discussed with DON Mae; Please obtain correct current medication from family/ pharmacy/ PCP  Discussed with patient findings and plan of care Patient seen and examined this morning around 9.40 AM     88 y/o Female from home with PMH of Dementia, HTN, DM, HLD, CAD (s/p stent), presenting with right knee pain and trouble ambulating. Patient states that she slipped and fell about 2 weeks ago.  no LOC. Denies chest pain, SOB, dizziness and fever. She states that she was seen by the doctor and they told her she had a knee fracture but only prescribed topical pain meds which did not help. Patient does not remember her meds or unable to contribute to her history. She lives alone.     In ED: XR shows right patellar fracture.    Vital Signs Last 24 Hrs: reviewed as above      P/E:  elderly pleasant female, comfortable in bed NAD  Neuro: AAO x3; No focal deficits  CVS: S1S2 present, regular  Resp: BLAE+, No wheeze or Rhonchi  GI: Soft, BS+, NT  Extr: No edema or calf tenderness  Right knee mild swelling and tenderness to palpation    Labs:                        9.8    4.81  )-----------( 187      ( 25 Aug 2021 05:36 )             31.3   08-25    141  |  108  |  13  ----------------------------<  86  4.1   |  26  |  0.87    Ca    9.3      25 Aug 2021 05:36  Phos  3.3     08-25  Mg     2.2     08-25    TPro  6.6  /  Alb  3.4<L>  /  TBili  0.6  /  DBili  x   /  AST  12  /  ALT  15  /  AlkPhos  54  08-25      CT Head No Cont (08.25.21 @ 02:17)    IMPRESSION:  Head CT: No displaced calvarial fracture or acute intracranial hemorrhage.  Cervical spine CT: No acute fracture. Cervical spondylosis    Xray Knee 1 or 2 Views, Right (08.24.21 @ 18:04)     Arterial calcifications are noted.  There is mild to early moderate degeneration most pronounced at the articular patella level.  There is a horizontal fracture of the upper patella.    IMPRESSION: Patellar fracture. Degeneration.    D/D:  s/p Mechanical fall with patellar fracture with   Acute Pain and Ambulatory difficulty  HTN controlled      Plan: Ortho consult appreciated;   As per Dr. Han- extensor mechanism is intact, recommend treat conservatively in a knee immobilizer  Bulky delgado - Knee immobilizer placed- keep on at all times  PT-WBAT to RLE  Pain control  DVT ppx  Patient will need JACINDA placement; d/w  Patsy  Patient lives alone   Discussed with Dr. Webster who advised to continue caring for patient in [facility]'s absence    Discussed with DON Mae; Please obtain correct current medication from family/ pharmacy/ PCP  Discussed with patient findings and plan of care

## 2021-08-25 NOTE — H&P ADULT - HISTORY OF PRESENT ILLNESS
Pt is a 89 y/o F from home with PMH of dementia, HTN, DM, HLD, CAD(s/p stent), presenting with right knee pain and trouble ambulating. Patient states that she slipped and fell about 2 weeks ago. Patient says that she did not hit head, no LOC. Denies chest pain, SOB, dizziness and fever. She states that she was seen by the doctor and they told her she had a knee fracture but only prescribed topical pain meds which did not help. Pt is a 88 y/o F from home with PMH of dementia, HTN, DM, HLD, CAD(s/p stent), presenting with right knee pain and trouble ambulating. Patient states that she slipped and fell about 2 weeks ago. Patient says that she did not hit head, no LOC. Denies chest pain, SOB, dizziness and fever. She states that she was seen by the doctor and they told her she had a knee fracture but only prescribed topical pain meds which did not help.  In ED: XR shows right patellar fracture Pt is a 89 y/o F from home with PMH of dementia, HTN, DM, HLD, CAD(s/p stent), presenting with right knee pain and trouble ambulating. Patient states that she slipped and fell about 2 weeks ago. Patient says that she did not hit head, no LOC. Denies chest pain, SOB, dizziness and fever. She states that she was seen by the doctor and they told her she had a knee fracture but only prescribed topical pain meds which did not help. History limited 2/2 confusion  In ED: XR shows right patellar fracture Pt is a 88 y/o F from home with PMH of dementia, HTN, DM, HLD, CAD(s/p stent), presenting with right knee pain and trouble ambulating. Patient states that she slipped and fell about 2 weeks ago. Patient says that she did not hit head, no LOC. Denies chest pain, SOB, dizziness and fever. She states that she was seen by the doctor and they told her she had a knee fracture but only prescribed topical pain meds which did not help. History limited 2/2 confusion  In ED: XR shows right patellar fracture.  Dr. Hoffman's pt, Webster covering until Monday

## 2021-08-25 NOTE — CONSULT NOTE ADULT - SUBJECTIVE AND OBJECTIVE BOX
Orthopedic Consult:    Pt is a 89 y/o F from home with PMH of dementia, HTN, DM, HLD, CAD(s/p stent), presenting with right knee pain and trouble ambulating. Patient states that she slipped and fell about 2 weeks ago. Patient says that she did not hit head, no LOC. Denies chest pain, SOB, dizziness and fever. She states that she was seen by the doctor and they told her she had a knee fracture but only prescribed topical pain meds which did not help. History limited 2/2 confusion  In ED: XR shows right patellar fracture.  Dr. Hoffman's pt, Webster covering until Monday    89 y/o F w/pmhx of HTN, DM, CAD, dementia presents w/a c/o acute on chronic right knee pain x several days. Pt states she fell 2 months ago onto the right knee although did not seek medical care and she fell again "about 2 days ago" and came to the ER due to knee pain. Denies hitting her head. Pt states the pain comes on minimally with knee flexion and is improved at rest. Denies any numbness/tingling. Pt states she ambulates independently at baseline.       HEALTH ISSUES - PROBLEM Dx:  Patellar fracture  HLD (hyperlipidemia)  HTN (hypertension)  History of diabetes mellitus  Dementia  Anemia    MEDICATIONS  (STANDING):  amLODIPine   Tablet 5 milliGRAM(s) Oral daily  atorvastatin 40 milliGRAM(s) Oral at bedtime  clopidogrel Tablet 75 milliGRAM(s) Oral daily  dextrose 40% Gel 15 Gram(s) Oral once  dextrose 5%. 1000 milliLiter(s) IV Continuous <Continuous>  dextrose 5%. 1000 milliLiter(s) IV Continuous <Continuous>  dextrose 50% Injectable 25 Gram(s) IV Push once  dextrose 50% Injectable 12.5 Gram(s) IV Push once  dextrose 50% Injectable 25 Gram(s) IV Push once  donepezil 10 milliGRAM(s) Oral at bedtime  enoxaparin Injectable 40 milliGRAM(s) SubCutaneous daily  FLUoxetine 10 milliGRAM(s) Oral daily  glucagon  Injectable 1 milliGRAM(s) IntraMuscular once  insulin lispro (ADMELOG) corrective regimen sliding scale   SubCutaneous three times a day before meals  insulin lispro (ADMELOG) corrective regimen sliding scale   SubCutaneous at bedtime  lisinopril 10 milliGRAM(s) Oral daily  memantine 5 milliGRAM(s) Oral two times a day  metoprolol tartrate 12.5 milliGRAM(s) Oral two times a day  pantoprazole    Tablet 40 milliGRAM(s) Oral before breakfast  senna 2 Tablet(s) Oral at bedtime    Allergies    aspirin (Anaphylaxis)  Celebrex (Rash)  penicillin (Anaphylaxis)    Intolerances    Vital Signs Last 24 Hrs  T(C): 36.3 (08-25-21 @ 09:00), Max: 36.7 (08-24-21 @ 16:34)  T(F): 97.4 (08-25-21 @ 09:00), Max: 98 (08-24-21 @ 16:34)  HR: 45 (08-25-21 @ 09:00) (45 - 78)  BP: 171/41 (08-25-21 @ 09:00) (146/60 - 189/61)  BP(mean): 75 (08-25-21 @ 09:00) (75 - 75)  RR: 18 (08-25-21 @ 09:00) (18 - 19)  SpO2: 100% (08-25-21 @ 09:00) (96% - 100%)      Physical Exam  Gen: NAD, laying comfortably in bed  RightLE: skin intact, no erythema/ecchymosis, no swelling, minimal TTP over superior aspect of the patella, Knee ROM: 0-80 degrees without pain, Able to straight leg raise, Calves soft/NT, compartments soft/compressible, (+)EHl/FHL/ADF/APF, NVI, SILT, 2 + pulses.                9.8    4.81  )-----------( 187      ( 25 Aug 2021 05:36 )             31.3     25 Aug 2021 05:36    141    |  108    |  13     ----------------------------<  86     4.1     |  26     |  0.87     Ca    9.3        25 Aug 2021 05:36  Phos  3.3       25 Aug 2021 05:36  Mg     2.2       25 Aug 2021 05:36    TPro  6.6    /  Alb  3.4    /  TBili  0.6    /  DBili  x      /  AST  12     /  ALT  15     /  AlkPhos  54     25 Aug 2021 05:36    XR R knee demonstrates: Patellar fracture. Degeneration.    A/P: 89 y/o Female with Right patella fx  - D/w Dr. Han- extensor mechanism is intact, will treat conservatively in a knee immobililzer  - Bulky delgado  - Knee immobilizer placed- keep on at all times  - Pain control  - DVT ppx  - Daily PT-WBAT to RLE  - D/w Medical team

## 2021-08-25 NOTE — ED ADULT NURSE REASSESSMENT NOTE - NS ED NURSE REASSESS COMMENT FT1
0730: Pt received in bed, AOX2, no s/s of any distress noted. IV line in place, IV fluids infusing as per orders, no signs of infiltration noted. VS WNL. Call bell in reach, bed in lowest position. Safety maintained, hourly rounding performed. Will continue to monitor. Report given to COLLIN [place] in [id number]

## 2021-08-25 NOTE — H&P ADULT - PROBLEM SELECTOR PLAN 6
hemoglobin 9.7, MCV 86.7, RDW 18.9  Baseline hemoglobin ~10  f/u anemia panel hemoglobin 9.7, MCV 86.7, RDW 18.9  Baseline hemoglobin ~10  f/u anemia panel.

## 2021-08-25 NOTE — CONSULT NOTE ADULT - ATTENDING COMMENTS
proximal patella pole fx. fx lysis seen, questionable acuity, possible acute on chronic  tenderness proximal pole  given patient age and activity and fx pattern recommend non-op management

## 2021-08-25 NOTE — H&P ADULT - PROBLEM SELECTOR PLAN 1
s/p fall 2 weeks ago  xray shows right patellar fracture, f/u official read  Pain management with tylenol  PT eval  Fall precautions  Ortho consult s/p fall 2 weeks ago  xray shows right patellar fracture, f/u official read  Pain management with Tylenol  PT eval  Fall precautions  Ortho consult

## 2021-08-25 NOTE — PHYSICAL THERAPY INITIAL EVALUATION ADULT - GENERAL OBSERVATIONS, REHAB EVAL
Patient received in supine; AxOX2-3; patient w/ knee immobilizer on (R); IV access on LUE. Patient reports minimal pain 5/10.

## 2021-08-25 NOTE — H&P ADULT - NSICDXPASTMEDICALHX_GEN_ALL_CORE_FT
PAST MEDICAL HISTORY:  CAD (coronary artery disease)     Dementia     Diabetes Mellitus, Type 2     History of Cholecystectomy     Hyperlipidemia     Hypertension

## 2021-08-25 NOTE — DISCHARGE NOTE PROVIDER - NSDCCPCAREPLAN_GEN_ALL_CORE_FT
PRINCIPAL DISCHARGE DIAGNOSIS  Diagnosis: Patellar fracture  Assessment and Plan of Treatment: You presented to the hospital for right knee pain which started after you had a fall about 2 weeks ago at home   X helen of your knee was done which showed that you gave a Right Pateller fracture which  is a break in your kneecap.  You were evaluated by Orthopedic and Conservative management with knee immobilizer, as needed pain medication and with physical therapy was recommended   You were also seen by Physical therapist and you were able walk with assistive device   --You will receive home Physical therapy. A physical therapist teaches you exercises to help improve movement and strength, and to decrease pain.  Continue medications as prescribed  Follow up with PCP and Orthopedic within 1-2 weeks  --Apply ice to help decrease swelling and pain. Use an ice pack, or put crushed ice in a plastic bag. Cover it with a towel before you place it on your knee or supportive device. Apply ice for 15 to 20 minutes every hour  --Elevate your knee above the level of your heart as often as you can. This will help decrease swelling and pain. Prop your leg on pillows or blankets to keep it elevated comfortably. Do not put pillows directly under your knee.           SECONDARY DISCHARGE DIAGNOSES  Diagnosis: HTN (hypertension)  Assessment and Plan of Treatment: Low salt diet  Activity as tolerated.  Take all medication as prescribed.  Follow up with your medical doctor for routine blood pressure monitoring at your next visit.  Notify your doctor if you have any of the following symptoms:   Dizziness, Lightheadedness, Blurry vision, Headache, Chest pain, Shortness of breath      Diagnosis: Dementia  Assessment and Plan of Treatment: Dementia is a condition that causes loss of memory, thought control, and judgment. Alzheimer disease is the most common cause of dementia Dementia cannot be cured or prevented, but treatment may slow or reduce your symptoms.  Continue medications as prescribed   When should I or someone close to me seek immediate care?   •You have signs of delirium, such as extreme confusion, and seeing or hearing things that are not there.  •You become angry or violent, and cannot be calmed down.  •You faint and cannot be woken.  When should I or someone close to me call my doctor?   •You have a fever.  •You have increased confusion, behavior, or mood changes.  •You have questions or concerns about your condition or care       PRINCIPAL DISCHARGE DIAGNOSIS  Diagnosis: Patellar fracture  Assessment and Plan of Treatment: You presented to the hospital for right knee pain which started after you had a fall about 2 weeks ago at home . X helen of your knee was done which showed that you gave a Right Pateller fracture which  is a break in your kneecap. You were evaluated by Orthopedic and Conservative management with knee immobilizer, as needed pain medication and with physical therapy was recommended . You were also seen by Physical therapist and you were able walk with assistive device. You will receive home Physical therapy. A physical therapist teaches you exercises to help improve movement and strength, and to decrease pain.  Continue medications as prescribed  Follow up with PCP and Orthopedic within 1-2 weeks  --Apply ice to help decrease swelling and pain. Use an ice pack, or put crushed ice in a plastic bag. Cover it with a towel before you place it on your knee or supportive device. Apply ice for 15 to 20 minutes every hour  --Elevate your knee above the level of your heart as often as you can. This will help decrease swelling and pain. Prop your leg on pillows or blankets to keep it elevated comfortably. Do not put pillows directly under your knee.           SECONDARY DISCHARGE DIAGNOSES  Diagnosis: HTN (hypertension)  Assessment and Plan of Treatment: You have high bloo pressure. You should limit your salt intake Low salt diet  Activity as tolerated.  Take all medication as prescribed.  Follow up with your medical doctor for routine blood pressure monitoring at your next visit.  Notify your doctor if you have any of the following symptoms:   Dizziness, Lightheadedness, Blurry vision, Headache, Chest pain, Shortness of breath      Diagnosis: Dementia  Assessment and Plan of Treatment: Dementia is a condition that causes loss of memory, thought control, and judgment. Alzheimer disease is the most common cause of dementia. Dementia cannot be cured or prevented, but treatment may slow or reduce your symptoms. Please continue taking your medications as prescribed.   You or someone close to you should seek immediate care if  •You have signs of delirium, such as extreme confusion, and seeing or hearing things that are not there.  •You become angry or violent, and cannot be calmed down.  •You faint and cannot be woken.  You or someone close to you call your doctor if  •You have a fever.  •You have increased confusion, behavior, or mood changes.  •You have questions or concerns about your condition or care.     PRINCIPAL DISCHARGE DIAGNOSIS  Diagnosis: Patellar fracture  Assessment and Plan of Treatment: You presented to the hospital for right knee pain which started after you had a fall about 2 weeks ago at home . X helen of your knee was done which showed that you gave a Right Pateller fracture which  is a break in your kneecap. You were evaluated by Orthopedic and Conservative management with knee immobilizer, as needed pain medication and with physical therapy was recommended . You were also seen by Physical therapist and you were able walk with assistive device. You will receive home Physical therapy. A physical therapist teaches you exercises to help improve movement and strength, and to decrease pain. Continue medications as prescribed, you can take tylenol as needed for pain.   Follow up with PCP and Orthopedic within 1-2 weeks  --Apply ice to help decrease swelling and pain. Use an ice pack, or put crushed ice in a plastic bag. Cover it with a towel before you place it on your knee or supportive device. Apply ice for 15 to 20 minutes every hour  --Elevate your knee above the level of your heart as often as you can. This will help decrease swelling and pain. Prop your leg on pillows or blankets to keep it elevated comfortably. Do not put pillows directly under your knee.           SECONDARY DISCHARGE DIAGNOSES  Diagnosis: HTN (hypertension)  Assessment and Plan of Treatment: You have high blood pressure. During your admission the primary team adjusted your medications because your blood pressure was elevated.  You were continued on your losartan medication dose, You should limit your salt intake Low salt diet  Activity as tolerated.  Take all medication as prescribed.  Follow up with your medical doctor for routine blood pressure monitoring at your next visit.  Notify your doctor if you have any of the following symptoms:   Dizziness, Lightheadedness, Blurry vision, Headache, Chest pain, Shortness of breath      Diagnosis: Dementia  Assessment and Plan of Treatment: Dementia is a condition that causes loss of memory, thought control, and judgment. Alzheimer disease is the most common cause of dementia. Dementia cannot be cured or prevented, but treatment may slow or reduce your symptoms. Please continue taking your medications as prescribed.   You or someone close to you should seek immediate care if  •You have signs of delirium, such as extreme confusion, and seeing or hearing things that are not there.  •You become angry or violent, and cannot be calmed down.  •You faint and cannot be woken.  You or someone close to you call your doctor if  •You have a fever.  •You have increased confusion, behavior, or mood changes.  •You have questions or concerns about your condition or care.     PRINCIPAL DISCHARGE DIAGNOSIS  Diagnosis: Patellar fracture  Assessment and Plan of Treatment: You presented to the hospital for right knee pain which started after you had a fall about 2 weeks ago at home . X helen of your knee was done which showed that you gave a Right Pateller fracture which  is a break in your kneecap. You were evaluated by Orthopedic and Conservative management with knee immobilizer, as needed pain medication and with physical therapy was recommended . You were also seen by Physical therapist and you were able walk with assistive device. You will receive home Physical therapy. A physical therapist teaches you exercises to help improve movement and strength, and to decrease pain. Continue medications as prescribed, you can take tylenol as needed for pain.   Follow up with PCP and Orthopedic within 1-2 weeks  --Apply ice to help decrease swelling and pain. Use an ice pack, or put crushed ice in a plastic bag. Cover it with a towel before you place it on your knee or supportive device. Apply ice for 15 to 20 minutes every hour  --Elevate your knee above the level of your heart as often as you can. This will help decrease swelling and pain. Prop your leg on pillows or blankets to keep it elevated comfortably. Do not put pillows directly under your knee.           SECONDARY DISCHARGE DIAGNOSES  Diagnosis: HTN (hypertension)  Assessment and Plan of Treatment: You have high blood pressure. During your admission the primary team adjusted your medications because your blood pressure was elevated.  You were continued on your losartan medication dose and your spirinolactone was increased to 50mg daily.  Your carvediol was decreased to 3.125mg, as this medcation also affects your heart rate it was lowered because your heart rate slowed to 50-60s bpm, since lowering your heart rate stayed above 60bmp. Your were kept in the hospital for an extra day to monitor your vitals (i.e. heart rate and blood pressure), you have remained stable and are cleared to be discharged.  For your High Blood Pressure, it is recimmended:  You should limit your salt intake Low salt diet  Activity as tolerated.  Take all medication as prescribed.  Follow up with your medical doctor for routine blood pressure monitoring at your next visit.  Notify your doctor if you have any of the following symptoms:   Dizziness, Lightheadedness, Blurry vision, Headache, Chest pain, Shortness of breath      Diagnosis: Dementia  Assessment and Plan of Treatment: Dementia is a condition that causes loss of memory, thought control, and judgment. Alzheimer disease is the most common cause of dementia. Dementia cannot be cured or prevented, but treatment may slow or reduce your symptoms. Please continue taking your medications as prescribed.   You or someone close to you should seek immediate care if  •You have signs of delirium, such as extreme confusion, and seeing or hearing things that are not there.  •You become angry or violent, and cannot be calmed down.  •You faint and cannot be woken.  You or someone close to you call your doctor if  •You have a fever.  •You have increased confusion, behavior, or mood changes.  •You have questions or concerns about your condition or care.     PRINCIPAL DISCHARGE DIAGNOSIS  Diagnosis: Patellar fracture  Assessment and Plan of Treatment: You presented to the hospital for right knee pain which started after you had a fall about 2 weeks ago at home . X helen of your knee was done which showed that you have a Right Pateller fracture which  is a break in your kneecap. You were evaluated by Orthopedic and Conservative management with knee immobilizer, as needed pain medication and with physical therapy was recommended . You were also seen by Physical therapist and you were able walk with assistive device. recommended home Physical therapy. A physical therapist teaches you exercises to help improve movement and strength, and to decrease pain. Continue medications as prescribed, you can take tylenol as needed for pain.   As you live alone with no assistance available and being in a immobilizer, you may benefit form a short stay in a Rehab. which has been arranged at [facility] where your grandson works.   Follow up with PCP and Orthopedic within 1-2 weeks  --Apply ice to help decrease swelling and pain. Use an ice pack, or put crushed ice in a plastic bag. Cover it with a towel before you place it on your knee or supportive device. Apply ice for 15 to 20 minutes every hour  --Elevate your knee above the level of your heart as often as you can. This will help decrease swelling and pain. Prop your leg on pillows or blankets to keep it elevated comfortably. Do not put pillows directly under your knee.           SECONDARY DISCHARGE DIAGNOSES  Diagnosis: HTN (hypertension)  Assessment and Plan of Treatment: You have high blood pressure. During your admission the primary team adjusted your medications because your blood pressure was elevated.  You were continued on your losartan medication dose and your spirinolactone was increased to 50mg daily.  Your carvediol was decreased to 3.125mg, as this medcation also affects your heart rate it was lowered because your heart rate slowed to 50-60s bpm, since lowering your heart rate stayed above 60. our were kept in the hospital for an extra day to monitor your vitals (i.e. heart rate and blood pressure), you have remained stable and are cleared to be discharged.  For your High Blood Pressure, it is recimmended:  You should limit your salt intake Low salt diet  Activity as tolerated.  Take all medication as prescribed.  Follow up with your medical doctor for routine blood pressure monitoring at your next visit.  Notify your doctor if you have any of the following symptoms:   Dizziness, Lightheadedness, Blurry vision, Headache, Chest pain, Shortness of breath      Diagnosis: Dementia  Assessment and Plan of Treatment: Dementia is a condition that causes loss of memory, thought control, and judgment. Alzheimer disease is the most common cause of dementia. Dementia cannot be cured or prevented, but treatment may slow or reduce your symptoms. Please continue taking your medications as prescribed.   You or someone close to you should seek immediate care if  •You have signs of delirium, such as extreme confusion, and seeing or hearing things that are not there.  •You become angry or violent, and cannot be calmed down.  •You faint and cannot be woken.  You or someone close to you call your doctor if  •You have a fever.  •You have increased confusion, behavior, or mood changes.  •You have questions or concerns about your condition or care.

## 2021-08-25 NOTE — H&P ADULT - ASSESSMENT
Pt is a 88 y/o F from home with PMH of dementia, HTN, DM, HLD, CAD(s/p stent), presenting with right knee pain and trouble ambulating.  Pt is a 88 y/o F from home with PMH of dementia, HTN, DM, HLD, CAD(s/p stent), presenting with right knee pain and trouble ambulating.               Admission med rec not done, patient does not have a list, will resume based on recent discharge Pt is a 88 y/o F from home with PMH of dementia, HTN, DM, HLD, CAD(s/p stent), presenting with right knee pain and trouble ambulating.               Patients home meds were not confirmed , as she didnt have the medication list. Med rec done on basis of recent discharge in July 2021.  Primary team to confirm meds in am.

## 2021-08-25 NOTE — DISCHARGE NOTE PROVIDER - HOSPITAL COURSE
Pt is a 88 y/o F from home with PMH of dementia, HTN, DM, HLD, CAD(s/p stent), presenting with right knee pain and difficulties  ambulating sp mechanical fall   2 weeks ago,  sustained right  knee fracture that time. admitted for right patellar fracture. started on pain mgmt   Ortho consulted and conservative management recommended with  knee immobilizer  and WBAT to RLE  PT evaluated  and recommended home PT     NOT COMPLETE   Please note that this a brief summary of hospital course please refer to daily progress notes and consult notes for full course and events   Pt is a 88 y/o F from home with PMH of dementia, HTN, DM, HLD, CAD(s/p stent), presenting with right knee pain and difficulties ambulating s/p mechanical fall   2 weeks ago, sustained right knee fracture that time. Patient admitted for right patellar fracture and discharge disposition. X-Ray of the knee showed right proximal patellar pole fracture. Started on pain management for pain, patient preferred pain control with Tylenol and heat packs. Orthopedics was consulted and decided to treat conservatively, with knee immobilizer at all times. Physical therapy consulted for assessment for discharge disposition, recommending home discharge with home PT. However, patient lives alone with 24hr HHA, patient and family requested it would be safest for the patient to be discharged to [facility] in light of patient dementia and leg immobilizer limiting her mobility making her an active fall risk. CM and SW on board, arranged for discharge to [facility] as grandson is a  there and she could be accommodated. During admission patient was hypertensive and bradycardic, medication was appropriately adjusted, patient held for one day for monitoring of vitals, patient clinically stable for discharge. Patient recommended to follow up with PCP outpatient follow-up.

## 2021-08-25 NOTE — PATIENT PROFILE ADULT - PATIENT REPRESENTATIVE: ( YOU CAN CHOOSE ANY PERSON THAT CAN ASSIST YOU WITH YOUR HEALTH CARE PREFERENCES, DOES NOT HAVE TO BE A SPOUSE, IMMEDIATE FAMILY OR SIGNIFICANT OTHER/PARTNER)
Patient Education     Animal Bites and Scratches     You may need a tetanus booster.   Most bites and scratches from household pets are nothing to worry about. But some bites or scratches can be serious. Some bites or scratches may become infected or pose the risk of rabies. For that reason, it's best to talk with your healthcare provider or get medical care.  Report severe animal bites to animal control or your local health department.  When to go to the emergency room (ER)  A bite that barely breaks the skin often isn't cause for concern. But get emergency medical care if you:  · Have a deep puncture wound or badly torn skin  · Have redness, swelling, or warmth near the wound  · Think you may have a broken bone or other serious injury  · The attack was unprovoked and you don't know the animal (rabies must be ruled out)  · Are bitten by a domestic cat or a wild animal, such as a skunk, raccoon, or bat  · Don't have a spleen or have a weak immune system  What to expect in the ER  · The wound will be carefully cleaned and inspected.  · X-rays may be taken if deep damage is suspected or to make sure a small piece of the animal's tooth is not left embedded in the wound.  · Infection can occur, especially if you have a cat bite, deep wound, or weak immune system. You may be given antibiotics to help prevent this.  · You may be given a tetanus shot if you haven't had one in the past 5 years. If rabies is a concern, you may be given shots to protect you.  · If serious tissue or joint damage has been done, you may be referred to a plastic or orthopedic surgeon.    Follow-up care  You will likely need to see your healthcare provider within a day or 2 of getting emergency medical treatment. In the meantime, watch for signs of infection. These include:  · Fever of 100.4°F (38°C) or higher, or as directed by your healthcare provider  · Swelling  · Redness  · Pus draining from the wound  Arun last reviewed this educational  content on 8/1/2019  © 1593-2995 [employer]. All rights reserved. This information is not intended as a substitute for professional medical care. Always follow your healthcare professional's instructions.      [facility]    Monday - Friday 8:00 a.m. - 8:00 p.m.  Saturday  8:00 a.m. - 4:00 p.m.  Sunday   CLOSED    -*-*-*-*-*-*-*-  The [facility] is now OPEN on SUNDAYS  -*-*-*-*-*-*-*-    [email or web address]  See current wait times for [facility] in real-time  Reserve your waiting-room spot in line     [email or web address]  [place] for the patient portal  See test results and make virtual visits with your primary care provider    ----------------  Thank you for choosing [facility] today.  We hope you had a pleasant experience and we look forward to serving your future needs.   If you receive a survey in the mail about today's services, we hope that you will take a few minutes to let us know about your experience.    · If you have any questions about your VISIT, please call [phone]    · If you have any questions about your BILL, please call [phone]    · If you need a copy of your MEDICAL RECORD, please call [phone] or email [email or web address]    --------------------------------------------------------------------------------------------------------------  UNLESS OTHERWISE INSTRUCTED BY YOUR URGENT CARE PROVIDER TODAY, all follow-up for your medical issues should be managed by your primary care provider.  The Urgent Care does not manage chronic medical issues or refill medications.  You are responsible for scheduling and keeping any necessary follow-up visits with your primary care provider after this visit today.   --------------------------------------------------------------------------------------------------------------  IF YOU WERE PRESCRIBED AN ANTIBIOTIC TODAY:  We recommend taking an  over-the-counter probiotic (Such as Florajen 3 for adults or [id number] for children -- AVAILABLE IN THE [facility] and other local pharmacies too) once a day for the entire duration of your antibiotics and continuing it for 2 weeks after the antibiotics are finished.  This will help reduce your chance of developing antibiotic-related diarrhea and/or yeast infections.  --------------------------------------------------------------------------------------------------------------  IF YOU HAVE LAB RESULTS or XRAY REPORTS STILL PENDING: We typically call patients back only if (1) the results are \"significantly abnormal\", (2) we need to change your treatment plan based on the results, or (3) the report is different than what we told you during your visit. If we have not called you about your results 48 hours after your visit, you can call us at [phone] to check on the status.  --------------------------------------------------------------------------------------------------------------                declines

## 2021-08-26 LAB
ANION GAP SERPL CALC-SCNC: 4 MMOL/L — LOW (ref 5–17)
BUN SERPL-MCNC: 13 MG/DL — SIGNIFICANT CHANGE UP (ref 7–18)
CALCIUM SERPL-MCNC: 9.7 MG/DL — SIGNIFICANT CHANGE UP (ref 8.4–10.5)
CHLORIDE SERPL-SCNC: 106 MMOL/L — SIGNIFICANT CHANGE UP (ref 96–108)
CO2 SERPL-SCNC: 28 MMOL/L — SIGNIFICANT CHANGE UP (ref 22–31)
COVID-19 SPIKE DOMAIN AB INTERP: POSITIVE
COVID-19 SPIKE DOMAIN ANTIBODY RESULT: 242 U/ML — HIGH
CREAT SERPL-MCNC: 1.07 MG/DL — SIGNIFICANT CHANGE UP (ref 0.5–1.3)
GLUCOSE BLDC GLUCOMTR-MCNC: 115 MG/DL — HIGH (ref 70–99)
GLUCOSE BLDC GLUCOMTR-MCNC: 84 MG/DL — SIGNIFICANT CHANGE UP (ref 70–99)
GLUCOSE BLDC GLUCOMTR-MCNC: 92 MG/DL — SIGNIFICANT CHANGE UP (ref 70–99)
GLUCOSE BLDC GLUCOMTR-MCNC: 95 MG/DL — SIGNIFICANT CHANGE UP (ref 70–99)
GLUCOSE SERPL-MCNC: 90 MG/DL — SIGNIFICANT CHANGE UP (ref 70–99)
HCT VFR BLD CALC: 35.4 % — SIGNIFICANT CHANGE UP (ref 34.5–45)
HGB BLD-MCNC: 11 G/DL — LOW (ref 11.5–15.5)
MCHC RBC-ENTMCNC: 26.9 PG — LOW (ref 27–34)
MCHC RBC-ENTMCNC: 31.1 GM/DL — LOW (ref 32–36)
MCV RBC AUTO: 86.6 FL — SIGNIFICANT CHANGE UP (ref 80–100)
NRBC # BLD: 0 /100 WBCS — SIGNIFICANT CHANGE UP (ref 0–0)
PLATELET # BLD AUTO: 237 K/UL — SIGNIFICANT CHANGE UP (ref 150–400)
POTASSIUM SERPL-MCNC: 4.3 MMOL/L — SIGNIFICANT CHANGE UP (ref 3.5–5.3)
POTASSIUM SERPL-SCNC: 4.3 MMOL/L — SIGNIFICANT CHANGE UP (ref 3.5–5.3)
RBC # BLD: 4.09 M/UL — SIGNIFICANT CHANGE UP (ref 3.8–5.2)
RBC # FLD: 19.1 % — HIGH (ref 10.3–14.5)
SARS-COV-2 IGG+IGM SERPL QL IA: 242 U/ML — HIGH
SARS-COV-2 IGG+IGM SERPL QL IA: POSITIVE
SODIUM SERPL-SCNC: 138 MMOL/L — SIGNIFICANT CHANGE UP (ref 135–145)
WBC # BLD: 4.83 K/UL — SIGNIFICANT CHANGE UP (ref 3.8–10.5)
WBC # FLD AUTO: 4.83 K/UL — SIGNIFICANT CHANGE UP (ref 3.8–10.5)

## 2021-08-26 PROCEDURE — 99233 SBSQ HOSP IP/OBS HIGH 50: CPT | Mod: GC

## 2021-08-26 RX ORDER — SPIRONOLACTONE 25 MG/1
2 TABLET, FILM COATED ORAL
Qty: 0 | Refills: 0 | DISCHARGE

## 2021-08-26 RX ORDER — CARVEDILOL PHOSPHATE 80 MG/1
3.12 CAPSULE, EXTENDED RELEASE ORAL EVERY 12 HOURS
Refills: 0 | Status: DISCONTINUED | OUTPATIENT
Start: 2021-08-26 | End: 2021-08-28

## 2021-08-26 RX ORDER — LISINOPRIL 2.5 MG/1
5 TABLET ORAL DAILY
Refills: 0 | Status: DISCONTINUED | OUTPATIENT
Start: 2021-08-26 | End: 2021-08-26

## 2021-08-26 RX ORDER — MEMANTINE HYDROCHLORIDE 10 MG/1
5 TABLET ORAL
Refills: 0 | Status: DISCONTINUED | OUTPATIENT
Start: 2021-08-26 | End: 2021-08-28

## 2021-08-26 RX ORDER — LOSARTAN POTASSIUM 100 MG/1
25 TABLET, FILM COATED ORAL DAILY
Refills: 0 | Status: DISCONTINUED | OUTPATIENT
Start: 2021-08-26 | End: 2021-08-28

## 2021-08-26 RX ADMIN — ESCITALOPRAM OXALATE 5 MILLIGRAM(S): 10 TABLET, FILM COATED ORAL at 14:29

## 2021-08-26 RX ADMIN — Medication 325 MILLIGRAM(S): at 14:30

## 2021-08-26 RX ADMIN — ENOXAPARIN SODIUM 40 MILLIGRAM(S): 100 INJECTION SUBCUTANEOUS at 14:29

## 2021-08-26 RX ADMIN — PANTOPRAZOLE SODIUM 40 MILLIGRAM(S): 20 TABLET, DELAYED RELEASE ORAL at 05:18

## 2021-08-26 RX ADMIN — MEMANTINE HYDROCHLORIDE 5 MILLIGRAM(S): 10 TABLET ORAL at 17:36

## 2021-08-26 RX ADMIN — SPIRONOLACTONE 50 MILLIGRAM(S): 25 TABLET, FILM COATED ORAL at 05:18

## 2021-08-26 RX ADMIN — CLOPIDOGREL BISULFATE 75 MILLIGRAM(S): 75 TABLET, FILM COATED ORAL at 14:29

## 2021-08-26 NOTE — PROGRESS NOTE ADULT - PROBLEM SELECTOR PLAN 1
s/p fall 2 weeks ago  xray shows right patellar fracture, f/u official read  Pain management with Tylenol  Fall precautions  Ortho consult recs knee immobilizer ATC   PT eval recs home w/ home PT, as patient has a history of dementia and falls patient will be discharged to [facility], per patient and family request.

## 2021-08-26 NOTE — PROGRESS NOTE ADULT - SUBJECTIVE AND OBJECTIVE BOX
PGY-1 Progress Note discussed with attending    PLEASE CONTACT ON CALL TEAM:  - On Call Team (Please refer to Soni) FROM 5:00 PM - 8:30PM  - Nightfloat Team FROM 8:30 -7:30 AM    CHIEF COMPLAINT & BRIEF HOSPITAL COURSE:    INTERVAL HPI/OVERNIGHT EVENTS:       REVIEW OF SYSTEMS:  CONSTITUTIONAL: No fever, weight loss, or fatigue  RESPIRATORY: No cough, wheezing, chills or hemoptysis; No shortness of breath  CARDIOVASCULAR: No chest pain, palpitations, dizziness, or leg swelling  GASTROINTESTINAL: No abdominal pain. No nausea, vomiting, or hematemesis; No diarrhea or constipation. No melena or hematochezia.  GENITOURINARY: No dysuria or hematuria, urinary frequency  NEUROLOGICAL: No headaches, memory loss, loss of strength, numbness, or tremors  SKIN: No itching, burning, rashes, or lesions     Vital Signs Last 24 Hrs  T(C): 36.1 (26 Aug 2021 13:44), Max: 36.4 (25 Aug 2021 20:23)  T(F): 97 (26 Aug 2021 13:44), Max: 97.5 (25 Aug 2021 20:23)  HR: 55 (26 Aug 2021 13:44) (48 - 65)  BP: 117/43 (26 Aug 2021 13:44) (117/43 - 181/68)  BP(mean): --  RR: 18 (26 Aug 2021 13:44) (16 - 18)  SpO2: 100% (26 Aug 2021 13:44) (98% - 100%)    PHYSICAL EXAMINATION:  GENERAL: NAD, well built  HEAD:  Atraumatic, Normocephalic  EYES:  conjunctiva and sclera clear  NECK: Supple, No JVD, Normal thyroid  CHEST/LUNG: Clear to auscultation. Clear to percussion bilaterally; No rales, rhonchi, wheezing, or rubs  HEART: Regular rate and rhythm; No murmurs, rubs, or gallops  ABDOMEN: Soft, Nontender, Nondistended; Bowel sounds present  NERVOUS SYSTEM:  Alert & Oriented X3,    EXTREMITIES:  2+ Peripheral Pulses, No clubbing, cyanosis, or edema  SKIN: warm dry                          11.0   4.83  )-----------( 237      ( 26 Aug 2021 05:46 )             35.4     08-26    138  |  106  |  13  ----------------------------<  90  4.3   |  28  |  1.07    Ca    9.7      26 Aug 2021 05:46  Phos  3.3     08-25  Mg     2.2     08-25    TPro  6.6  /  Alb  3.4<L>  /  TBili  0.6  /  DBili  x   /  AST  12  /  ALT  15  /  AlkPhos  54  08-25    LIVER FUNCTIONS - ( 25 Aug 2021 05:36 )  Alb: 3.4 g/dL / Pro: 6.6 g/dL / ALK PHOS: 54 U/L / ALT: 15 U/L DA / AST: 12 U/L / GGT: x             CAPILLARY BLOOD GLUCOSE  CAPILLARY BLOOD GLUCOSE      POCT Blood Glucose.: 115 mg/dL (26 Aug 2021 11:48)  POCT Blood Glucose.: 95 mg/dL (26 Aug 2021 08:15)  POCT Blood Glucose.: 105 mg/dL (25 Aug 2021 21:00)  POCT Blood Glucose.: 103 mg/dL (25 Aug 2021 16:20)      RADIOLOGY & ADDITIONAL TESTS:                   PGY-1 Progress Note discussed with attending    PLEASE CONTACT ON CALL TEAM:  - On Call Team (Please refer to Soni) FROM 5:00 PM - 8:30PM  - Nightfloat Team FROM 8:30 -7:30 AM    CHIEF COMPLAINT & BRIEF HOSPITAL COURSE:  Pt is a 89 y/o F from home with PMH of dementia, HTN, DM, HLD, CAD(s/p stent), presenting with right knee pain and trouble ambulating. Patient states that she slipped and fell about 2 weeks ago. Patient says that she did not hit head, no LOC. Denies chest pain, SOB, dizziness and fever. She states that she was seen by the doctor and they told her she had a knee fracture but only prescribed topical pain meds which did not help. History limited 2/2 confusion  In ED: XR shows right patellar fracture.  Dr. Hoffman's pt, [place] covering until Monday (25 Aug 2021 04:47)      INTERVAL HPI/OVERNIGHT EVENTS:   O/N patient was sustained      REVIEW OF SYSTEMS:  CONSTITUTIONAL: No fever, weight loss, or fatigue  RESPIRATORY: No cough, wheezing, chills or hemoptysis; No shortness of breath  CARDIOVASCULAR: No chest pain, palpitations, dizziness, or leg swelling  GASTROINTESTINAL: No abdominal pain. No nausea, vomiting, or hematemesis; No diarrhea or constipation. No melena or hematochezia.  GENITOURINARY: No dysuria or hematuria, urinary frequency  NEUROLOGICAL: No headaches, memory loss, loss of strength, numbness, or tremors  SKIN: No itching, burning, rashes, or lesions     Vital Signs Last 24 Hrs  T(C): 36.1 (26 Aug 2021 13:44), Max: 36.4 (25 Aug 2021 20:23)  T(F): 97 (26 Aug 2021 13:44), Max: 97.5 (25 Aug 2021 20:23)  HR: 55 (26 Aug 2021 13:44) (48 - 65)  BP: 117/43 (26 Aug 2021 13:44) (117/43 - 181/68)  BP(mean): --  RR: 18 (26 Aug 2021 13:44) (16 - 18)  SpO2: 100% (26 Aug 2021 13:44) (98% - 100%)    PHYSICAL EXAMINATION:  GENERAL: NAD, well built  HEAD:  Atraumatic, Normocephalic  EYES:  conjunctiva and sclera clear  NECK: Supple, No JVD, Normal thyroid  CHEST/LUNG: Clear to auscultation. Clear to percussion bilaterally; No rales, rhonchi, wheezing, or rubs  HEART: Regular rate and rhythm; No murmurs, rubs, or gallops  ABDOMEN: Soft, Nontender, Nondistended; Bowel sounds present  NERVOUS SYSTEM:  Alert & Oriented X3,    EXTREMITIES:  2+ Peripheral Pulses, No clubbing, cyanosis, or edema  SKIN: warm dry                          11.0   4.83  )-----------( 237      ( 26 Aug 2021 05:46 )             35.4     08-26    138  |  106  |  13  ----------------------------<  90  4.3   |  28  |  1.07    Ca    9.7      26 Aug 2021 05:46  Phos  3.3     08-25  Mg     2.2     08-25    TPro  6.6  /  Alb  3.4<L>  /  TBili  0.6  /  DBili  x   /  AST  12  /  ALT  15  /  AlkPhos  54  08-25    LIVER FUNCTIONS - ( 25 Aug 2021 05:36 )  Alb: 3.4 g/dL / Pro: 6.6 g/dL / ALK PHOS: 54 U/L / ALT: 15 U/L DA / AST: 12 U/L / GGT: x             CAPILLARY BLOOD GLUCOSE  CAPILLARY BLOOD GLUCOSE      POCT Blood Glucose.: 115 mg/dL (26 Aug 2021 11:48)  POCT Blood Glucose.: 95 mg/dL (26 Aug 2021 08:15)  POCT Blood Glucose.: 105 mg/dL (25 Aug 2021 21:00)  POCT Blood Glucose.: 103 mg/dL (25 Aug 2021 16:20)      RADIOLOGY & ADDITIONAL TESTS:                   PGY-1 Progress Note discussed with attending    PLEASE CONTACT ON CALL TEAM:  - On Call Team (Please refer to Soni) FROM 5:00 PM - 8:30PM  - Nightfloat Team FROM 8:30 -7:30 AM    CHIEF COMPLAINT & BRIEF HOSPITAL COURSE:  Pt is a 89 y/o F from home with PMH of dementia, HTN, DM, HLD, CAD(s/p stent), presenting with right knee pain and trouble ambulating. Patient states that she slipped and fell about 2 weeks ago. Patient says that she did not hit head, no LOC. Denies chest pain, SOB, dizziness and fever. She states that she was seen by the doctor and they told her she had a knee fracture but only prescribed topical pain meds which did not help. History limited 2/2 confusion  In ED: XR shows right patellar fracture.  Dr. Hoffman's pt, [place] covering until Monday (25 Aug 2021 04:47)      INTERVAL HPI/OVERNIGHT EVENTS:   O/N patient was bradycardic with 40-50s HR and hypertensive 181/68, primary team lowered carvedilol dose to 3.125mg BID. Patient is medically stable for discharge, holding patient for one more day to monitor and observe vitals s/p medication dose adjustment. Patient has no new complaints to report. Patient will be discharged to [facility], updated [facility] about discharge disposition status. Will get COVID PCR prior to discharge tomorrow in the AM.     MEDICATIONS  (STANDING):  atorvastatin 40 milliGRAM(s) Oral at bedtime  carvedilol 3.125 milliGRAM(s) Oral every 12 hours  clopidogrel Tablet 75 milliGRAM(s) Oral daily  dextrose 40% Gel 15 Gram(s) Oral once  dextrose 5%. 1000 milliLiter(s) (50 mL/Hr) IV Continuous <Continuous>  dextrose 5%. 1000 milliLiter(s) (100 mL/Hr) IV Continuous <Continuous>  dextrose 50% Injectable 25 Gram(s) IV Push once  donepezil 10 milliGRAM(s) Oral at bedtime  enoxaparin Injectable 40 milliGRAM(s) SubCutaneous daily  escitalopram 5 milliGRAM(s) Oral daily  ferrous    sulfate 325 milliGRAM(s) Oral daily  glucagon  Injectable 1 milliGRAM(s) IntraMuscular once  insulin lispro (ADMELOG) corrective regimen sliding scale   SubCutaneous three times a day before meals  insulin lispro (ADMELOG) corrective regimen sliding scale   SubCutaneous at bedtime  losartan 25 milliGRAM(s) Oral daily  memantine 5 milliGRAM(s) Oral two times a day  pantoprazole    Tablet 40 milliGRAM(s) Oral before breakfast  senna 2 Tablet(s) Oral at bedtime  spironolactone 50 milliGRAM(s) Oral daily    MEDICATIONS  (PRN):  acetaminophen   Tablet .. 650 milliGRAM(s) Oral every 6 hours PRN Moderate Pain (4 - 6)  polyethylene glycol 3350 17 Gram(s) Oral daily PRN Constipation      REVIEW OF SYSTEMS:  CONSTITUTIONAL: No fever or fatigue  RESPIRATORY: No cough ; No shortness of breath  CARDIOVASCULAR: No chest pain, palpitations, dizziness  GASTROINTESTINAL: No abdominal pain. No N/V/D. No  constipation.   GENITOURINARY: No dysuria or hematuria, urinary frequency  NEUROLOGICAL: No headaches, loss of strength, numbness, or tremors  SKIN: No itching, burning, rashes, or lesions     Vital Signs Last 24 Hrs  T(C): 36.1 (26 Aug 2021 13:44), Max: 36.4 (25 Aug 2021 20:23)  T(F): 97 (26 Aug 2021 13:44), Max: 97.5 (25 Aug 2021 20:23)  HR: 55 (26 Aug 2021 13:44) (48 - 65)  BP: 117/43 (26 Aug 2021 13:44) (117/43 - 181/68)  BP(mean): --  RR: 18 (26 Aug 2021 13:44) (16 - 18)  SpO2: 100% (26 Aug 2021 13:44) (98% - 100%)    PHYSICAL EXAMINATION:  GENERAL: NAD, well built  HEAD:  Atraumatic, Normocephalic  EYES:  conjunctiva and sclera clear  NECK: Supple, No JVD  CHEST/LUNG: Clear to auscultation. No rales, rhonchi, wheezing, or rubs  HEART: Regular rate and rhythm; No murmurs, rubs, or gallops  ABDOMEN: Soft, Nontender, Nondistended; Bowel sounds present  NERVOUS SYSTEM:  Alert & Oriented X3   EXTREMITIES:  Right knee immobilized with knee immobilization with heat packs. 2+ Peripheral Pulses, No clubbing, cyanosis, or edema  SKIN: warm dry                          11.0   4.83  )-----------( 237      ( 26 Aug 2021 05:46 )             35.4     08-26    138  |  106  |  13  ----------------------------<  90  4.3   |  28  |  1.07    Ca    9.7      26 Aug 2021 05:46  Phos  3.3     08-25  Mg     2.2     08-25    TPro  6.6  /  Alb  3.4<L>  /  TBili  0.6  /  DBili  x   /  AST  12  /  ALT  15  /  AlkPhos  54  08-25    LIVER FUNCTIONS - ( 25 Aug 2021 05:36 )  Alb: 3.4 g/dL / Pro: 6.6 g/dL / ALK PHOS: 54 U/L / ALT: 15 U/L DA / AST: 12 U/L / GGT: x               CAPILLARY BLOOD GLUCOSE  POCT Blood Glucose.: 115 mg/dL (26 Aug 2021 11:48)  POCT Blood Glucose.: 95 mg/dL (26 Aug 2021 08:15)  POCT Blood Glucose.: 105 mg/dL (25 Aug 2021 21:00)  POCT Blood Glucose.: 103 mg/dL (25 Aug 2021 16:20)      RADIOLOGY & ADDITIONAL TESTS:  < from: Xray Knee 1 or 2 Views, Right (08.24.21 @ 18:04) >  INTERPRETATION:  Right knee. Patient has local pain. 2 views.    Arterial calcifications are noted.    There is mild to early moderate degeneration most pronounced at the articular patella level.    There is a horizontal fracture of the upper patella.    IMPRESSION: Patellar fracture. Degeneration.

## 2021-08-27 ENCOUNTER — TRANSCRIPTION ENCOUNTER (OUTPATIENT)
Age: 86
End: 2021-08-27

## 2021-08-27 LAB
GLUCOSE BLDC GLUCOMTR-MCNC: 115 MG/DL — HIGH (ref 70–99)
GLUCOSE BLDC GLUCOMTR-MCNC: 83 MG/DL — SIGNIFICANT CHANGE UP (ref 70–99)
GLUCOSE BLDC GLUCOMTR-MCNC: 83 MG/DL — SIGNIFICANT CHANGE UP (ref 70–99)
GLUCOSE BLDC GLUCOMTR-MCNC: 91 MG/DL — SIGNIFICANT CHANGE UP (ref 70–99)

## 2021-08-27 PROCEDURE — 99233 SBSQ HOSP IP/OBS HIGH 50: CPT | Mod: GC

## 2021-08-27 RX ORDER — ESCITALOPRAM OXALATE 10 MG/1
1 TABLET, FILM COATED ORAL
Qty: 0 | Refills: 0 | DISCHARGE

## 2021-08-27 RX ORDER — SPIRONOLACTONE 25 MG/1
2 TABLET, FILM COATED ORAL
Qty: 0 | Refills: 0 | DISCHARGE
Start: 2021-08-27

## 2021-08-27 RX ORDER — ACETAMINOPHEN 500 MG
2 TABLET ORAL
Qty: 0 | Refills: 0 | DISCHARGE
Start: 2021-08-27

## 2021-08-27 RX ORDER — FERROUS SULFATE 325(65) MG
1 TABLET ORAL
Qty: 0 | Refills: 0 | DISCHARGE

## 2021-08-27 RX ORDER — FERROUS SULFATE 325(65) MG
1 TABLET ORAL
Qty: 0 | Refills: 0 | DISCHARGE
Start: 2021-08-27

## 2021-08-27 RX ORDER — SENNA PLUS 8.6 MG/1
2 TABLET ORAL
Qty: 0 | Refills: 0 | DISCHARGE
Start: 2021-08-27

## 2021-08-27 RX ORDER — LOSARTAN POTASSIUM 100 MG/1
1 TABLET, FILM COATED ORAL
Qty: 0 | Refills: 0 | DISCHARGE
Start: 2021-08-27

## 2021-08-27 RX ORDER — ROSUVASTATIN CALCIUM 5 MG/1
1 TABLET ORAL
Qty: 0 | Refills: 0 | DISCHARGE

## 2021-08-27 RX ORDER — ESCITALOPRAM OXALATE 10 MG/1
2 TABLET, FILM COATED ORAL
Refills: 0 | DISCHARGE
Start: 2021-08-27

## 2021-08-27 RX ORDER — CARVEDILOL PHOSPHATE 80 MG/1
1 CAPSULE, EXTENDED RELEASE ORAL
Qty: 0 | Refills: 0 | DISCHARGE
Start: 2021-08-27

## 2021-08-27 RX ORDER — CLOPIDOGREL BISULFATE 75 MG/1
1 TABLET, FILM COATED ORAL
Qty: 0 | Refills: 0 | DISCHARGE
Start: 2021-08-27

## 2021-08-27 RX ORDER — SPIRONOLACTONE 25 MG/1
2 TABLET, FILM COATED ORAL
Qty: 0 | Refills: 0 | DISCHARGE

## 2021-08-27 RX ORDER — CARVEDILOL PHOSPHATE 80 MG/1
1 CAPSULE, EXTENDED RELEASE ORAL
Qty: 0 | Refills: 0 | DISCHARGE

## 2021-08-27 RX ORDER — PANTOPRAZOLE SODIUM 20 MG/1
1 TABLET, DELAYED RELEASE ORAL
Qty: 0 | Refills: 0 | DISCHARGE
Start: 2021-08-27

## 2021-08-27 RX ADMIN — CARVEDILOL PHOSPHATE 3.12 MILLIGRAM(S): 80 CAPSULE, EXTENDED RELEASE ORAL at 17:33

## 2021-08-27 RX ADMIN — LOSARTAN POTASSIUM 25 MILLIGRAM(S): 100 TABLET, FILM COATED ORAL at 05:25

## 2021-08-27 RX ADMIN — DONEPEZIL HYDROCHLORIDE 10 MILLIGRAM(S): 10 TABLET, FILM COATED ORAL at 21:19

## 2021-08-27 RX ADMIN — MEMANTINE HYDROCHLORIDE 5 MILLIGRAM(S): 10 TABLET ORAL at 05:25

## 2021-08-27 RX ADMIN — SPIRONOLACTONE 50 MILLIGRAM(S): 25 TABLET, FILM COATED ORAL at 05:25

## 2021-08-27 RX ADMIN — PANTOPRAZOLE SODIUM 40 MILLIGRAM(S): 20 TABLET, DELAYED RELEASE ORAL at 05:25

## 2021-08-27 RX ADMIN — ATORVASTATIN CALCIUM 40 MILLIGRAM(S): 80 TABLET, FILM COATED ORAL at 21:19

## 2021-08-27 RX ADMIN — MEMANTINE HYDROCHLORIDE 5 MILLIGRAM(S): 10 TABLET ORAL at 17:34

## 2021-08-27 RX ADMIN — SENNA PLUS 2 TABLET(S): 8.6 TABLET ORAL at 21:19

## 2021-08-27 RX ADMIN — Medication 650 MILLIGRAM(S): at 05:25

## 2021-08-27 RX ADMIN — ESCITALOPRAM OXALATE 5 MILLIGRAM(S): 10 TABLET, FILM COATED ORAL at 11:22

## 2021-08-27 RX ADMIN — CARVEDILOL PHOSPHATE 3.12 MILLIGRAM(S): 80 CAPSULE, EXTENDED RELEASE ORAL at 05:25

## 2021-08-27 RX ADMIN — CLOPIDOGREL BISULFATE 75 MILLIGRAM(S): 75 TABLET, FILM COATED ORAL at 11:22

## 2021-08-27 RX ADMIN — Medication 325 MILLIGRAM(S): at 11:22

## 2021-08-27 RX ADMIN — ENOXAPARIN SODIUM 40 MILLIGRAM(S): 100 INJECTION SUBCUTANEOUS at 11:22

## 2021-08-27 RX ADMIN — Medication 650 MILLIGRAM(S): at 06:51

## 2021-08-27 NOTE — PROGRESS NOTE ADULT - PROBLEM SELECTOR PLAN 1
s/p fall 2 weeks ago  xray shows right patellar fracture, f/u official read  Pain management with Tylenol  Fall precautions  Ortho consult recs knee immobilizer ATC   PT eval recs home w/ home PT, as patient has a history of dementia and falls patient will be discharged to [facility], per patient and family request. s/p fall 2 weeks ago  Xray shows right Patellar fracture. Degeneration.  Pain management with Tylenol  Fall precautions  Ortho consult recs knee immobilizer ATC   PT eval recs home w/ home PT, as patient has a history of dementia and falls patient will be discharged to [facility], per patient and family request.  C/w acetaminophen 650 mg q6h PRN for pain control

## 2021-08-27 NOTE — PROGRESS NOTE ADULT - ATTENDING COMMENTS
88 y/o Female from home with PMH of Dementia, HTN, DM, HLD, CAD (s/p stent), presenting with right knee pain and trouble ambulating. Patient states that she slipped and fell about 2 weeks ago.  no LOC. Denies chest pain, SOB, dizziness and fever. She states that she was seen by the doctor and they told her she had a knee fracture but only prescribed topical pain meds which did not help. Patient does not remember her meds or unable to contribute to her history. She lives alone.     In ED: XR shows right patellar fracture.    Vital Signs Last 24 Hrs  T(C): 36.8 (26 Aug 2021 20:16), Max: 36.8 (26 Aug 2021 20:16)  T(F): 98.2 (26 Aug 2021 20:16), Max: 98.2 (26 Aug 2021 20:16)  HR: 56 (26 Aug 2021 20:16) (48 - 65)  BP: 165/55 (26 Aug 2021 20:16) (117/43 - 181/68)  RR: 18 (26 Aug 2021 20:16) (16 - 18)  SpO2: 100% (26 Aug 2021 20:16) (98% - 100%)    P/E:  elderly pleasant female, comfortable in bed NAD  Neuro: AAO x3; No focal deficits  CVS: S1S2 present, regular  Resp: BLAE+, No wheeze or Rhonchi  GI: Soft, BS+, NT  Extr: No edema or calf tenderness  Right knee mild swelling and tenderness to palpation    Labs:                        11.0   4.83  )-----------( 237      ( 26 Aug 2021 05:46 )             35.4   08-26    138  |  106  |  13  ----------------------------<  90  4.3   |  28  |  1.07    Ca    9.7      26 Aug 2021 05:46  Phos  3.3     08-25  Mg     2.2     08-25    TPro  6.6  /  Alb  3.4<L>  /  TBili  0.6  /  DBili  x   /  AST  12  /  ALT  15  /  AlkPhos  54  08-25        CT Head No Cont (08.25.21 @ 02:17)    IMPRESSION:  Head CT: No displaced calvarial fracture or acute intracranial hemorrhage.  Cervical spine CT: No acute fracture. Cervical spondylosis    Xray Knee 1 or 2 Views, Right (08.24.21 @ 18:04)     Arterial calcifications are noted.  There is mild to early moderate degeneration most pronounced at the articular patella level.  There is a horizontal fracture of the upper patella.    IMPRESSION: Patellar fracture. Degeneration.    D/D:  s/p Mechanical fall with patellar fracture with   Sinus Bradyvardia asymptomatic  Acute Pain and Ambulatory difficulty  HTN controlled      Plan:   reduce Coreg to 3.125 mg twice daily; D/C Lisinopril for potential cough Hx in past  Use Losartan and titrate up as needed to control BP  Ortho consult appreciated;   As per Dr. Han- extensor mechanism is intact, recommend treat conservatively in a knee immobilizer  Bulky delgado - Knee immobilizer placed- keep on at all times  PT-WBAT to RLE  Pain control  DVT ppx  Patient will need JACINDA placement; d/w  Patsy; accpeted to [facility];   D? Cplan AM  Patient lives alone   Discussed with Dr. Webster who advised t
patient seen this afternoon.     89 y/o Female from home with PMH of Dementia, HTN, DM, HLD, CAD (s/p stent), presenting with right knee pain and trouble ambulating. Patient states that she slipped and fell about 2 weeks ago.  no LOC. Denies chest pain, SOB, dizziness and fever. She states that she was seen by the doctor and they told her she had a knee fracture but only prescribed topical pain meds which did not help. Patient does not remember her meds or unable to contribute to her history. She lives alone.     In ED: XR shows right patellar fracture. sen by Ortho Conservative mgmt with weight bearing as tolerated; Seen by PT recommended Home PT. Patient lives alone. Planned for discharge to Rehab     Vital Signs Last 24 Hrs  T(C): 36.5 (27 Aug 2021 13:06), Max: 36.8 (26 Aug 2021 20:16)  T(F): 97.7 (27 Aug 2021 13:06), Max: 98.2 (26 Aug 2021 20:16)  HR: 61 (27 Aug 2021 17:01) (55 - 61)  BP: 170/71 (27 Aug 2021 17:01) (120/45 - 174/47)  BP(mean): 80 (27 Aug 2021 05:19) (80 - 80)  RR: 16 (27 Aug 2021 13:06) (16 - 18)  SpO2: 96% (27 Aug 2021 13:06) (96% - 100%)    P/E: clinically unchanged; as above  Gait: Balanced with PT    Labs: stable 8/26                        11.0   4.83  )-----------( 237      ( 26 Aug 2021 05:46 )             35.4   08-26    138  |  106  |  13  ----------------------------<  90  4.3   |  28  |  1.07    Ca    9.7      26 Aug 2021 05:46    D/D:  s/p Mechanical fall with patellar fracture with   Sinus Bradycardia asymptomatic resolved  Acute Pain and Ambulatory difficulty  HTN slightly uncontrolled    Plan:   Continue Coreg 3.125 mg twice daily; Hold only if HR less than 55; D/C Lisinopril for potential cough Hx in past  Use Losartan and titrate up as needed to control BP; relatively well controlled;  Increase to 50 mg AM if BP more than 150 mm Hg  As per Ortho consult/ Dr. Han- extensor mechanism is intact, recommend treat conservatively in a knee immobilizer  Bulky delgado - Knee immobilizer placed- keep on at all times  PT- WBAT to RLE  Pain control with Tylenol; Avoid Narcotics  DVT ppx  Patient will benefit from JACINDA placement given safety and needing assistance with underlying Dementia;   d/w  Patsy; accepted to [facility]; was scheduled to be discharged around 3.30 PM and transport arranged but DAUGHTER REQUESTED TO DELAY DISCHARGE UNTIL SABBA IS OVER AT 8 PM TOMORROW NIGHT    PATIENT IS MEDICALLY STABLE FOR DISCHARGE

## 2021-08-27 NOTE — PROGRESS NOTE ADULT - PROBLEM SELECTOR PLAN 4
Last HbA1c: 5.5 7/02/21  c/w sliding scale Last HbA1c: 5.5 7/02/21  c/w sliding scale  c/w consistent carbohydrate diet  Glucagon 1 mg IM if blood glucose <70 mg/dl and no IV access Last HbA1c: 5.5 7/02/21  c/w sliding scale, sugars have been stable   c/w consistent carbohydrate diet  Glucagon 1 mg IM if blood glucose <70 mg/dl and no IV access  confirm DM diagnosis, not taking insulin as home meds

## 2021-08-27 NOTE — PROGRESS NOTE ADULT - PROBLEM SELECTOR PLAN 6
hemoglobin 9.7, MCV 86.7, RDW 18.9  Baseline hemoglobin ~10  f/u anemia panel. hemoglobin 9.7, MCV 86.7, RDW 18.9  Baseline hemoglobin ~10  Continue monitoring H/H  C/w Ferrous sulfate 325 mg daily

## 2021-08-27 NOTE — DISCHARGE NOTE NURSING/CASE MANAGEMENT/SOCIAL WORK - NSDCPEFALRISK_GEN_ALL_CORE
For information on Fall & injury Prevention, visit [email or web address]

## 2021-08-27 NOTE — PROGRESS NOTE ADULT - SUBJECTIVE AND OBJECTIVE BOX
PGY-1 Progress Note discussed with attending    PLEASE CONTACT ON CALL TEAM:  - On Call Team (Please refer to Soni) FROM 5:00 PM - 8:30PM  - Nightfloat Team FROM 8:30 -7:30 AM    CHIEF COMPLAINT & BRIEF HOSPITAL COURSE:      INTERVAL HPI/OVERNIGHT EVENTS:         MEDICATIONS  (STANDING):  atorvastatin 40 milliGRAM(s) Oral at bedtime  carvedilol 3.125 milliGRAM(s) Oral every 12 hours  clopidogrel Tablet 75 milliGRAM(s) Oral daily  dextrose 40% Gel 15 Gram(s) Oral once  dextrose 5%. 1000 milliLiter(s) (50 mL/Hr) IV Continuous <Continuous>  dextrose 5%. 1000 milliLiter(s) (100 mL/Hr) IV Continuous <Continuous>  dextrose 50% Injectable 25 Gram(s) IV Push once  donepezil 10 milliGRAM(s) Oral at bedtime  enoxaparin Injectable 40 milliGRAM(s) SubCutaneous daily  escitalopram 5 milliGRAM(s) Oral daily  ferrous    sulfate 325 milliGRAM(s) Oral daily  glucagon  Injectable 1 milliGRAM(s) IntraMuscular once  insulin lispro (ADMELOG) corrective regimen sliding scale   SubCutaneous three times a day before meals  insulin lispro (ADMELOG) corrective regimen sliding scale   SubCutaneous at bedtime  losartan 25 milliGRAM(s) Oral daily  memantine 5 milliGRAM(s) Oral two times a day  pantoprazole    Tablet 40 milliGRAM(s) Oral before breakfast  senna 2 Tablet(s) Oral at bedtime  spironolactone 50 milliGRAM(s) Oral daily    MEDICATIONS  (PRN):  acetaminophen   Tablet .. 650 milliGRAM(s) Oral every 6 hours PRN Moderate Pain (4 - 6)  polyethylene glycol 3350 17 Gram(s) Oral daily PRN Constipation        REVIEW OF SYSTEMS:  CONSTITUTIONAL: No fever, weight loss, or fatigue  RESPIRATORY: No cough, wheezing, chills or hemoptysis; No shortness of breath  CARDIOVASCULAR: No chest pain, palpitations, dizziness, or leg swelling  GASTROINTESTINAL: No abdominal pain. No nausea, vomiting, or hematemesis; No diarrhea or constipation. No melena or hematochezia.  GENITOURINARY: No dysuria or hematuria, urinary frequency  NEUROLOGICAL: No headaches, memory loss, loss of strength, numbness, or tremors  SKIN: No itching, burning, rashes, or lesions     Vital Signs Last 24 Hrs  T(C): 36.5 (27 Aug 2021 13:06), Max: 36.8 (26 Aug 2021 20:16)  T(F): 97.7 (27 Aug 2021 13:06), Max: 98.2 (26 Aug 2021 20:16)  HR: 58 (27 Aug 2021 13:06) (55 - 59)  BP: 120/45 (27 Aug 2021 13:06) (120/45 - 174/47)  BP(mean): 80 (27 Aug 2021 05:19) (80 - 80)  RR: 16 (27 Aug 2021 13:06) (16 - 18)  SpO2: 96% (27 Aug 2021 13:06) (96% - 100%)    PHYSICAL EXAMINATION:  GENERAL: NAD, well built  HEAD:  Atraumatic, Normocephalic  EYES:  conjunctiva and sclera clear  NECK: Supple, No JVD, Normal thyroid  CHEST/LUNG: Clear to auscultation. Clear to percussion bilaterally; No rales, rhonchi, wheezing, or rubs  HEART: Regular rate and rhythm; No murmurs, rubs, or gallops  ABDOMEN: Soft, Nontender, Nondistended; Bowel sounds present  NERVOUS SYSTEM:  Alert & Oriented X3,    EXTREMITIES:  2+ Peripheral Pulses, No clubbing, cyanosis, or edema  SKIN: warm dry                          11.0   4.83  )-----------( 237      ( 26 Aug 2021 05:46 )             35.4     08-26    138  |  106  |  13  ----------------------------<  90  4.3   |  28  |  1.07    Ca    9.7      26 Aug 2021 05:46                CAPILLARY BLOOD GLUCOSE      RADIOLOGY & ADDITIONAL TESTS:                   PGY-1 Progress Note discussed with attending    PLEASE CONTACT ON CALL TEAM:  - On Call Team (Please refer to Soni) FROM 5:00 PM - 8:30PM  - Nightfloat Team FROM 8:30 -7:30 AM    CHIEF COMPLAINT & BRIEF HOSPITAL COURSE:  Pt is a 88 y/o F from home with PMH of dementia, HTN, DM, HLD, CAD(s/p stent), presenting with right knee pain and trouble ambulating. Patient states that she slipped and fell about 2 weeks ago. Patient says that she did not hit head, no LOC. Denies chest pain, SOB, dizziness and fever. She states that she was seen by the doctor and they told her she had a knee fracture but only prescribed topical pain meds which did not help. History limited 2/2 confusion  In ED: XR shows right patellar fracture.  Dr. Hoffman's pt, [place] covering until Monday (25 Aug 2021 04:47)    INTERVAL HPI/OVERNIGHT EVENTS:   Patient is medically stable for discharge, patient's heart rate is above 50 and blood pressure improved after medication adjustment. 24Hr notice given. Patient has no new complaints to report. Patient will be discharged to [facility], updated grandson [facility] about discharge disposition status.       MEDICATIONS  (STANDING):  atorvastatin 40 milliGRAM(s) Oral at bedtime  carvedilol 3.125 milliGRAM(s) Oral every 12 hours  clopidogrel Tablet 75 milliGRAM(s) Oral daily  dextrose 40% Gel 15 Gram(s) Oral once  dextrose 5%. 1000 milliLiter(s) (50 mL/Hr) IV Continuous <Continuous>  dextrose 5%. 1000 milliLiter(s) (100 mL/Hr) IV Continuous <Continuous>  dextrose 50% Injectable 25 Gram(s) IV Push once  donepezil 10 milliGRAM(s) Oral at bedtime  enoxaparin Injectable 40 milliGRAM(s) SubCutaneous daily  escitalopram 5 milliGRAM(s) Oral daily  ferrous    sulfate 325 milliGRAM(s) Oral daily  glucagon  Injectable 1 milliGRAM(s) IntraMuscular once  insulin lispro (ADMELOG) corrective regimen sliding scale   SubCutaneous three times a day before meals  insulin lispro (ADMELOG) corrective regimen sliding scale   SubCutaneous at bedtime  losartan 25 milliGRAM(s) Oral daily  memantine 5 milliGRAM(s) Oral two times a day  pantoprazole    Tablet 40 milliGRAM(s) Oral before breakfast  senna 2 Tablet(s) Oral at bedtime  spironolactone 50 milliGRAM(s) Oral daily    MEDICATIONS  (PRN):  acetaminophen   Tablet .. 650 milliGRAM(s) Oral every 6 hours PRN Moderate Pain (4 - 6)  polyethylene glycol 3350 17 Gram(s) Oral daily PRN Constipation        REVIEW OF SYSTEMS:  CONSTITUTIONAL: No fever or fatigue  RESPIRATORY: No cough ; No shortness of breath  CARDIOVASCULAR: No chest pain, palpitations, dizziness  GASTROINTESTINAL: No abdominal pain. No N/V/D. No  constipation.   GENITOURINARY: No dysuria or hematuria, urinary frequency  NEUROLOGICAL: No headaches, loss of strength, numbness, or tremors  SKIN: No itching, burning, rashes, or lesions     Vital Signs Last 24 Hrs  T(C): 36.5 (27 Aug 2021 13:06), Max: 36.8 (26 Aug 2021 20:16)  T(F): 97.7 (27 Aug 2021 13:06), Max: 98.2 (26 Aug 2021 20:16)  HR: 58 (27 Aug 2021 13:06) (55 - 59)  BP: 120/45 (27 Aug 2021 13:06) (120/45 - 174/47)  BP(mean): 80 (27 Aug 2021 05:19) (80 - 80)  RR: 16 (27 Aug 2021 13:06) (16 - 18)  SpO2: 96% (27 Aug 2021 13:06) (96% - 100%)    PHYSICAL EXAMINATION:  GENERAL: NAD, well built  HEAD:  Atraumatic, Normocephalic  EYES:  conjunctiva and sclera clear  NECK: Supple, No JVD  CHEST/LUNG: Clear to auscultation. No rales, rhonchi, wheezing, or rubs  HEART: Regular rate and rhythm; S1+S2 with no murmurs, rubs, or gallops  ABDOMEN: Soft, Nontender, Nondistended; Bowel sounds present  NERVOUS SYSTEM:  Alert & Oriented X3   EXTREMITIES:  Right knee immobilized with knee immobilization with heat packs. 2+ Peripheral Pulses, No clubbing, cyanosis, or edema  SKIN: warm dry                        11.0   4.83  )-----------( 237      ( 26 Aug 2021 05:46 )             35.4     08-26    138  |  106  |  13  ----------------------------<  90  4.3   |  28  |  1.07    Ca    9.7      26 Aug 2021 05:46      CAPILLARY BLOOD GLUCOSE    POCT Blood Glucose.: 83 mg/dL (27 Aug 2021 11:27)  POCT Blood Glucose.: 91 mg/dL (27 Aug 2021 07:16)  POCT Blood Glucose.: 92 mg/dL (26 Aug 2021 23:43)  POCT Blood Glucose.: 84 mg/dL (26 Aug 2021 16:25)    RADIOLOGY & ADDITIONAL TESTS:                   PGY-1 Progress Note discussed with attending    PLEASE CONTACT ON CALL TEAM:  - On Call Team (Please refer to Soni) FROM 5:00 PM - 8:30PM  - Nightfloat Team FROM 8:30 -7:30 AM    CHIEF COMPLAINT & BRIEF HOSPITAL COURSE:  Pt is a 88 y/o F from home with PMH of dementia, HTN, DM, HLD, CAD(s/p stent), presenting with right knee pain and trouble ambulating. Patient states that she slipped and fell about 2 weeks ago. Patient says that she did not hit head, no LOC. Denies chest pain, SOB, dizziness and fever. She states that she was seen by the doctor and they told her she had a knee fracture but only prescribed topical pain meds which did not help. History limited 2/2 confusion  In ED: XR shows right patellar fracture.  Dr. Hoffman's pt, [place] covering until Monday (25 Aug 2021 04:47)    INTERVAL HPI/OVERNIGHT EVENTS:   Patient is medically stable for discharge, patient's heart rate is above 50 and blood pressure improved after medication adjustment. 24Hr notice given. Patient has no new complaints to report. Patient will be discharged to [facility], updated grandson [facility] about discharge disposition status.       MEDICATIONS  (STANDING):  atorvastatin 40 milliGRAM(s) Oral at bedtime  carvedilol 3.125 milliGRAM(s) Oral every 12 hours  clopidogrel Tablet 75 milliGRAM(s) Oral daily  dextrose 40% Gel 15 Gram(s) Oral once  dextrose 5%. 1000 milliLiter(s) (50 mL/Hr) IV Continuous <Continuous>  dextrose 5%. 1000 milliLiter(s) (100 mL/Hr) IV Continuous <Continuous>  dextrose 50% Injectable 25 Gram(s) IV Push once  donepezil 10 milliGRAM(s) Oral at bedtime  enoxaparin Injectable 40 milliGRAM(s) SubCutaneous daily  escitalopram 5 milliGRAM(s) Oral daily  ferrous    sulfate 325 milliGRAM(s) Oral daily  glucagon  Injectable 1 milliGRAM(s) IntraMuscular once  insulin lispro (ADMELOG) corrective regimen sliding scale   SubCutaneous three times a day before meals  insulin lispro (ADMELOG) corrective regimen sliding scale   SubCutaneous at bedtime  losartan 25 milliGRAM(s) Oral daily  memantine 5 milliGRAM(s) Oral two times a day  pantoprazole    Tablet 40 milliGRAM(s) Oral before breakfast  senna 2 Tablet(s) Oral at bedtime  spironolactone 50 milliGRAM(s) Oral daily    MEDICATIONS  (PRN):  acetaminophen   Tablet .. 650 milliGRAM(s) Oral every 6 hours PRN Moderate Pain (4 - 6)  polyethylene glycol 3350 17 Gram(s) Oral daily PRN Constipation        REVIEW OF SYSTEMS:  CONSTITUTIONAL: No fever or fatigue  RESPIRATORY: No cough ; No shortness of breath  CARDIOVASCULAR: No chest pain, palpitations, dizziness  GASTROINTESTINAL: No abdominal pain. No N/V/D. No  constipation.   GENITOURINARY: No dysuria or hematuria, urinary frequency  NEUROLOGICAL: No headaches, loss of strength, numbness, or tremors  SKIN: No itching, burning, rashes, or lesions     Vital Signs Last 24 Hrs  T(C): 36.5 (27 Aug 2021 13:06), Max: 36.8 (26 Aug 2021 20:16)  T(F): 97.7 (27 Aug 2021 13:06), Max: 98.2 (26 Aug 2021 20:16)  HR: 58 (27 Aug 2021 13:06) (55 - 59)  BP: 120/45 (27 Aug 2021 13:06) (120/45 - 174/47)  BP(mean): 80 (27 Aug 2021 05:19) (80 - 80)  RR: 16 (27 Aug 2021 13:06) (16 - 18)  SpO2: 96% (27 Aug 2021 13:06) (96% - 100%)    PHYSICAL EXAMINATION:  GENERAL: NAD, well built  HEAD:  Atraumatic, Normocephalic  EYES:  conjunctiva and sclera clear  NECK: Supple, No JVD  CHEST/LUNG: Clear to auscultation. No rales, rhonchi, wheezing, or rubs  HEART: Regular rate and rhythm; S1+S2 with no murmurs, rubs, or gallops  ABDOMEN: Soft, Nontender, Nondistended; Bowel sounds present  NERVOUS SYSTEM:  Alert & Oriented X3   EXTREMITIES:  Right knee immobilized with knee immobilization with heat packs. 2+ Peripheral Pulses, No clubbing, cyanosis, or edema  SKIN: warm dry                        11.0   4.83  )-----------( 237      ( 26 Aug 2021 05:46 )             35.4     08-26    138  |  106  |  13  ----------------------------<  90  4.3   |  28  |  1.07    Ca    9.7      26 Aug 2021 05:46      CAPILLARY BLOOD GLUCOSE    POCT Blood Glucose.: 83 mg/dL (27 Aug 2021 11:27)  POCT Blood Glucose.: 91 mg/dL (27 Aug 2021 07:16)  POCT Blood Glucose.: 92 mg/dL (26 Aug 2021 23:43)  POCT Blood Glucose.: 84 mg/dL (26 Aug 2021 16:25)      RADIOLOGY & ADDITIONAL TESTS:  < from: Xray Knee 1 or 2 Views, Right (08.24.21 @ 18:04) >  INTERPRETATION:  Right knee. Patient has local pain. 2 views.    Arterial calcifications are noted.    There is mild to early moderate degeneration most pronounced at the articular patella level.    There is a horizontal fracture of the upper patella.    IMPRESSION: Patellar fracture. Degeneration.

## 2021-08-27 NOTE — DISCHARGE NOTE NURSING/CASE MANAGEMENT/SOCIAL WORK - PATIENT PORTAL LINK FT
You can access the FollowMyHealth Patient Portal offered by [facility] by registering at the following website: [email or web address]. By joining [employer]’s FollowMyHealth portal, you will also be able to view your health information using other applications (apps) compatible with our system.

## 2021-08-27 NOTE — PROGRESS NOTE ADULT - PROBLEM SELECTOR PLAN 3
as per previous med rec, pt on lisinopril, metoprolol, amlodipine  Will resume as per previous med rec, pt on Losartan, metoprolol, amlodipine  Reduced Carvedilol to 3.125 mg bid on 8/26 as patient was bradycardic with 40-50s HR.  Resumed Losartan 25 mg daily on 8/26.  C/w Spironolactone to 50 mg daily as per previous med rec, pt on Losartan, metoprolol, amlodipine  Reduced Carvedilol to 3.125 mg bid on 8/26 as patient was bradycardic with 40-50s HR.  Resumed Losartan 25 mg daily on 8/26.  C/w Spironolactone to 50 mg daily  vitals remained improved and remain stable

## 2021-08-27 NOTE — PROGRESS NOTE ADULT - PROBLEM SELECTOR PLAN 7
RISK                                                          Points  [] Previous VTE                                           3  [] Thrombophilia                                        2  [] Lower limb paralysis                              2   [] Current Cancer                                       2   [x] Immobilization > 24 hrs                        1  [] ICU/CCU stay > 24 hours                       1  [x] Age > 60                                                   1 DVT prophylaxis: Lovenox 40 mg SQ  GI prophylaxis: Protonix 40 mg PO  Constipation: Miralax 17 grams daily prn, Senna 2 Tab qhs  RISK                                                          Points  [] Previous VTE                                           3  [] Thrombophilia                                        2  [] Lower limb paralysis                              2   [] Current Cancer                                       2   [x] Immobilization > 24 hrs                        1  [] ICU/CCU stay > 24 hours                       1  [x] Age > 60                                                   1

## 2021-08-27 NOTE — PROGRESS NOTE ADULT - NUTRITIONAL ASSESSMENT
Diet, Soft:   Consistent Carbohydrate {Evening Snacks}  DASH/TLC {Sodium & Cholesterol Restricted}  Lety (08-25-21 @ 10:39) [Active]
Diet, Soft:   Consistent Carbohydrate {Evening Snacks}  DASH/TLC {Sodium & Cholesterol Restricted}  Lety (08-25-21 @ 10:39) [Active]

## 2021-08-27 NOTE — PROGRESS NOTE ADULT - PROBLEM SELECTOR PLAN 5
c/w home meds  supportive measures  fall and aspiration precautions.  Enhanced observation c/w home meds, Memantine 5 mg bid, Donepezil 10 mg qhs and escitalopram 5 mg daily  supportive measures  fall and aspiration precautions.  Enhanced observation

## 2021-08-28 VITALS
OXYGEN SATURATION: 98 % | HEART RATE: 54 BPM | RESPIRATION RATE: 18 BRPM | SYSTOLIC BLOOD PRESSURE: 148 MMHG | DIASTOLIC BLOOD PRESSURE: 48 MMHG | TEMPERATURE: 98 F

## 2021-08-28 LAB
GLUCOSE BLDC GLUCOMTR-MCNC: 100 MG/DL — HIGH (ref 70–99)
GLUCOSE BLDC GLUCOMTR-MCNC: 83 MG/DL — SIGNIFICANT CHANGE UP (ref 70–99)
GLUCOSE BLDC GLUCOMTR-MCNC: 99 MG/DL — SIGNIFICANT CHANGE UP (ref 70–99)

## 2021-08-28 PROCEDURE — 97530 THERAPEUTIC ACTIVITIES: CPT

## 2021-08-28 PROCEDURE — 80048 BASIC METABOLIC PNL TOTAL CA: CPT

## 2021-08-28 PROCEDURE — 97110 THERAPEUTIC EXERCISES: CPT

## 2021-08-28 PROCEDURE — 70450 CT HEAD/BRAIN W/O DYE: CPT

## 2021-08-28 PROCEDURE — 84100 ASSAY OF PHOSPHORUS: CPT

## 2021-08-28 PROCEDURE — 80053 COMPREHEN METABOLIC PANEL: CPT

## 2021-08-28 PROCEDURE — 99238 HOSP IP/OBS DSCHRG MGMT 30/<: CPT

## 2021-08-28 PROCEDURE — 87635 SARS-COV-2 COVID-19 AMP PRB: CPT

## 2021-08-28 PROCEDURE — 83735 ASSAY OF MAGNESIUM: CPT

## 2021-08-28 PROCEDURE — 82962 GLUCOSE BLOOD TEST: CPT

## 2021-08-28 PROCEDURE — 86769 SARS-COV-2 COVID-19 ANTIBODY: CPT

## 2021-08-28 PROCEDURE — 97161 PT EVAL LOW COMPLEX 20 MIN: CPT

## 2021-08-28 PROCEDURE — 99285 EMERGENCY DEPT VISIT HI MDM: CPT | Mod: 25

## 2021-08-28 PROCEDURE — 97116 GAIT TRAINING THERAPY: CPT

## 2021-08-28 PROCEDURE — 85027 COMPLETE CBC AUTOMATED: CPT

## 2021-08-28 PROCEDURE — 72125 CT NECK SPINE W/O DYE: CPT

## 2021-08-28 PROCEDURE — 36415 COLL VENOUS BLD VENIPUNCTURE: CPT

## 2021-08-28 PROCEDURE — 73560 X-RAY EXAM OF KNEE 1 OR 2: CPT

## 2021-08-28 RX ADMIN — LOSARTAN POTASSIUM 25 MILLIGRAM(S): 100 TABLET, FILM COATED ORAL at 05:42

## 2021-08-28 RX ADMIN — ENOXAPARIN SODIUM 40 MILLIGRAM(S): 100 INJECTION SUBCUTANEOUS at 11:50

## 2021-08-28 RX ADMIN — Medication 325 MILLIGRAM(S): at 11:50

## 2021-08-28 RX ADMIN — MEMANTINE HYDROCHLORIDE 5 MILLIGRAM(S): 10 TABLET ORAL at 17:12

## 2021-08-28 RX ADMIN — CLOPIDOGREL BISULFATE 75 MILLIGRAM(S): 75 TABLET, FILM COATED ORAL at 11:50

## 2021-08-28 RX ADMIN — CARVEDILOL PHOSPHATE 3.12 MILLIGRAM(S): 80 CAPSULE, EXTENDED RELEASE ORAL at 05:42

## 2021-08-28 RX ADMIN — Medication 650 MILLIGRAM(S): at 11:46

## 2021-08-28 RX ADMIN — MEMANTINE HYDROCHLORIDE 5 MILLIGRAM(S): 10 TABLET ORAL at 05:41

## 2021-08-28 RX ADMIN — Medication 650 MILLIGRAM(S): at 13:16

## 2021-08-28 RX ADMIN — ESCITALOPRAM OXALATE 5 MILLIGRAM(S): 10 TABLET, FILM COATED ORAL at 11:50

## 2021-08-28 RX ADMIN — PANTOPRAZOLE SODIUM 40 MILLIGRAM(S): 20 TABLET, DELAYED RELEASE ORAL at 06:14

## 2021-08-28 RX ADMIN — CARVEDILOL PHOSPHATE 3.12 MILLIGRAM(S): 80 CAPSULE, EXTENDED RELEASE ORAL at 17:13

## 2021-08-28 RX ADMIN — SPIRONOLACTONE 50 MILLIGRAM(S): 25 TABLET, FILM COATED ORAL at 05:42

## 2021-09-03 NOTE — PROGRESS NOTE ADULT - PROBLEM SELECTOR PLAN 5
Notified Sary, patient's mother, that we received prior authorization approval for Elena's genetic testing. Valid from 8/16/2021 to 8/15/2022. Authorization number is [id number].     Explained that insurance benefits may still apply, therefore, there could be an out of pocket cost.     Sary expressed understanding and stated that she wants Elena to proceed with testing. We will call when results are available. Sary had no further questions.    TALHA Victoria  Genomics Billing    [facility]   Molecular Diagnostics Laboratory  [address] [place] [id number]  [phone]    
On admission pt had hbg of 10.1 (baseline around 10)  H&H 9.7/31.3 today  f/u CBC n AM

## 2021-09-28 NOTE — PATIENT PROFILE ADULT - NSPRESCRUSEDDRG_GEN_A_NUR
He has may check blood sugars twice a day as needed for monitoring of his diabetes.  Also established patient with diabetic educator for poorly-controlled diabetes.  Give patient contact number diabetic educator so he can schedule an appointment.  Please send new script for checking blood sugars twice a day for poorly-controlled diabetes.   No

## 2021-10-05 PROBLEM — I25.10 ATHEROSCLEROTIC HEART DISEASE OF NATIVE CORONARY ARTERY WITHOUT ANGINA PECTORIS: Chronic | Status: ACTIVE | Noted: 2021-08-25

## 2021-10-19 NOTE — CONSULT NOTE ADULT - CONSULT REASON
Orders received for Resuming HHC.
You can reach your [facility] Care Team any time of the day by calling [phone]. This number will put you in touch with the 24 hour nurse line if the clinic is closed.    To contact your OB/GYN Station Coordinator/Surgery Scheduler please call [phone]. This is a direct number for your care team between 8 a.m. and 4 p.m. Monday through Friday.    [facility] is open for your convenience:  Monday through Friday 8 a.m. to 6 p.m.  Closed weekends and all major holidays.    
-
sepsis
sepsis

## 2021-11-08 ENCOUNTER — RESULT REVIEW (OUTPATIENT)
Age: 86
End: 2021-11-08

## 2021-11-10 ENCOUNTER — APPOINTMENT (OUTPATIENT)
Dept: RADIOLOGY | Facility: HOSPITAL | Age: 86
End: 2021-11-10

## 2021-11-10 ENCOUNTER — APPOINTMENT (OUTPATIENT)
Dept: ORTHOPEDIC SURGERY | Facility: HOSPITAL | Age: 86
End: 2021-11-10

## 2021-11-10 ENCOUNTER — OUTPATIENT (OUTPATIENT)
Dept: OUTPATIENT SERVICES | Facility: HOSPITAL | Age: 86
LOS: 1 days | End: 2021-11-10
Payer: MEDICARE

## 2021-11-10 VITALS
DIASTOLIC BLOOD PRESSURE: 58 MMHG | HEIGHT: 55 IN | HEART RATE: 55 BPM | SYSTOLIC BLOOD PRESSURE: 175 MMHG | BODY MASS INDEX: 28.46 KG/M2 | TEMPERATURE: 97.2 F | WEIGHT: 123 LBS

## 2021-11-10 DIAGNOSIS — S82.001A UNSPECIFIED FRACTURE OF RIGHT PATELLA, INITIAL ENCOUNTER FOR CLOSED FRACTURE: ICD-10-CM

## 2021-11-10 PROCEDURE — 73562 X-RAY EXAM OF KNEE 3: CPT | Mod: 26,RT

## 2022-02-08 ENCOUNTER — RESULT REVIEW (OUTPATIENT)
Age: 87
End: 2022-02-08

## 2022-02-09 ENCOUNTER — OUTPATIENT (OUTPATIENT)
Dept: OUTPATIENT SERVICES | Facility: HOSPITAL | Age: 87
LOS: 1 days | End: 2022-02-09
Payer: MEDICARE

## 2022-02-09 ENCOUNTER — APPOINTMENT (OUTPATIENT)
Dept: ORTHOPEDIC SURGERY | Facility: HOSPITAL | Age: 87
End: 2022-02-09

## 2022-02-09 ENCOUNTER — APPOINTMENT (OUTPATIENT)
Dept: RADIOLOGY | Facility: HOSPITAL | Age: 87
End: 2022-02-09

## 2022-02-09 VITALS
TEMPERATURE: 97.3 F | DIASTOLIC BLOOD PRESSURE: 64 MMHG | HEART RATE: 73 BPM | WEIGHT: 121 LBS | HEIGHT: 55 IN | BODY MASS INDEX: 28 KG/M2 | SYSTOLIC BLOOD PRESSURE: 160 MMHG

## 2022-02-09 DIAGNOSIS — S82.031D DISPLACED TRANSVERSE FRACTURE OF RIGHT PATELLA, SUBSEQUENT ENCOUNTER FOR CLOSED FRACTURE WITH ROUTINE HEALING: ICD-10-CM

## 2022-02-09 PROCEDURE — 73564 X-RAY EXAM KNEE 4 OR MORE: CPT | Mod: 26,RT

## 2022-02-10 DIAGNOSIS — S82.031D DISPLACED TRANSVERSE FRACTURE OF RIGHT PATELLA, SUBSEQUENT ENCOUNTER FOR CLOSED FRACTURE WITH ROUTINE HEALING: ICD-10-CM

## 2022-02-20 NOTE — H&P ADULT - I WAS PHYSICALLY PRESENT FOR THE KEY PORTIONS OF THE EVALUATION AND MANAGEMENT (E/M) SERVICE PROVIDED.  I AGREE WITH THE ABOVE HISTORY, PHYSICAL, AND PLAN WHICH I HAVE REVIEWED AND EDITED WHERE APPROPRIATE
Bed in lowest position, wheels locked, appropriate side rails in place/Call bell, personal items and telephone in reach/Instruct patient to call for assistance before getting out of bed or chair/Non-slip footwear when patient is out of bed/[place] to call system/Physically safe environment - no spills, clutter or unnecessary equipment/Purposeful Proactive Rounding/Room/bathroom lighting operational, light cord in reach
Statement Selected

## 2023-01-18 ENCOUNTER — EMERGENCY (EMERGENCY)
Facility: HOSPITAL | Age: 88
LOS: 1 days | Discharge: HOME | End: 2023-01-18
Attending: STUDENT IN AN ORGANIZED HEALTH CARE EDUCATION/TRAINING PROGRAM
Payer: MEDICARE

## 2023-01-18 VITALS
DIASTOLIC BLOOD PRESSURE: 80 MMHG | SYSTOLIC BLOOD PRESSURE: 180 MMHG | TEMPERATURE: 98 F | HEIGHT: 59 IN | WEIGHT: 143.08 LBS | OXYGEN SATURATION: 95 % | HEART RATE: 60 BPM | RESPIRATION RATE: 18 BRPM

## 2023-01-18 PROCEDURE — 99284 EMERGENCY DEPT VISIT MOD MDM: CPT

## 2023-01-18 RX ORDER — LIDOCAINE HCL 20 MG/ML
10 VIAL (ML) INJECTION ONCE
Refills: 0 | Status: DISCONTINUED | OUTPATIENT
Start: 2023-01-18 | End: 2023-01-22

## 2023-01-18 RX ORDER — ACETAMINOPHEN 500 MG
650 TABLET ORAL ONCE
Refills: 0 | Status: COMPLETED | OUTPATIENT
Start: 2023-01-18 | End: 2023-01-18

## 2023-01-18 RX ORDER — TETANUS TOXOID, REDUCED DIPHTHERIA TOXOID AND ACELLULAR PERTUSSIS VACCINE, ADSORBED 5; 2.5; 8; 8; 2.5 [IU]/.5ML; [IU]/.5ML; UG/.5ML; UG/.5ML; UG/.5ML
0.5 SUSPENSION INTRAMUSCULAR ONCE
Refills: 0 | Status: COMPLETED | OUTPATIENT
Start: 2023-01-18 | End: 2023-01-18

## 2023-01-19 PROCEDURE — 90471 IMMUNIZATION ADMIN: CPT

## 2023-01-19 PROCEDURE — 90715 TDAP VACCINE 7 YRS/> IM: CPT

## 2023-01-19 PROCEDURE — 99283 EMERGENCY DEPT VISIT LOW MDM: CPT | Mod: 25

## 2023-01-19 RX ADMIN — TETANUS TOXOID, REDUCED DIPHTHERIA TOXOID AND ACELLULAR PERTUSSIS VACCINE, ADSORBED 0.5 MILLILITER(S): 5; 2.5; 8; 8; 2.5 SUSPENSION INTRAMUSCULAR at 00:14

## 2023-01-19 RX ADMIN — Medication 650 MILLIGRAM(S): at 00:13

## 2023-01-19 NOTE — ED PROVIDER NOTE - PHYSICAL EXAMINATION
Vital Signs Reviewed  GEN: Comfortable, NAD, AAOx3  HEENT: NCAT, MMM, Neck Supple  RESP: CTAB, No rales/rhonchi/wheezing  CV: RRR, S1S2, No murmurs  ABD: No TTP, Soft, ND, No masses, No CVA Tenderness  Extrem/Skin: Superficial lac on lateral aspect of distal R index finger with no active bleeding, Equal pulses bilat, No cyanosis/edema/rashes  Neuro: No focal deficits

## 2023-01-19 NOTE — ED PROVIDER NOTE - OBJECTIVE STATEMENT
89 yo F h/o HTN, denies any blood thinners p/w lac on R index finger from an exposed nail in a wall at 5pm. Pt felt well before the event and denies head trauma, LOC, vomiting, other pain/injuries, etoh/drug use, fever/infectious symptoms, severe headache or neck stiffness, focal numbness/weakness, other recent illness or hospitalizations. Last tetanus unknown.

## 2023-01-19 NOTE — ED PROVIDER NOTE - CLINICAL SUMMARY MEDICAL DECISION MAKING FREE TEXT BOX
Pt p/w superficial lac of R index finger not requiring sutures. Steristrips and splint placed with updated tetanus. Discussed with pt my clinical impression and results, patient given strict return precautions if persistent or worsening of symptoms occurs, and need for close follow up. Pt expressed understanding and agrees with plan. Pt is well appearing with a reassuring exam. Discharge home with PMD or Specialist f/u within 5 days.

## 2023-01-19 NOTE — ED PROVIDER NOTE - PATIENT PORTAL LINK FT
You can access the FollowMyHealth Patient Portal offered by [facility] by registering at the following website: [email or web address]. By joining [employer]’s FollowMyHealth portal, you will also be able to view your health information using other applications (apps) compatible with our system.

## 2023-01-19 NOTE — ED PROVIDER NOTE - NSFOLLOWUPINSTRUCTIONS_ED_ALL_ED_FT
You were seen in the emergency room today for a finger laceration. Please monitor for signs of infection like we discussed.    Please call your primary doctor to inform them of this ER visit and obtain the next available appointment within the next 5 days. As we discussed, return to the ER if you have any worsening symptoms.    We no longer feel that you need further emergency care or admission to the hospital at this time.    While we have determined that you are currently stable for discharge, we know that things can change. Please seek immediate medical attention or return to the ER if you experience any of the following:  Any worsening or persistent symptoms  Severe Pain  Chest Pain  Difficulty Breathing  Bleeding  Passing Out  Severe Rash  Inability to Eat or Drink  Persistent Fever    Please see a primary care doctor or specialist within 5 days to ensure that you are improving.    Please call the [facility] phone numbers on this document if you have any problems obtaining a follow up appointment.    I wish you well! -Dr Yao

## 2023-05-19 NOTE — INPATIENT CERTIFICATION FOR MEDICARE PATIENTS - CURRENT MEDICAL NEEDS AND CARE PLANS
HPI: Ms. Magda Hawkins is a 59-year-old lady here today for her preoperative visit regarding her right shoulder. She has been diagnosed with a high-grade partial-thickness tear and has failed attempts at conservative management. Consequently she is elected to proceed with surgery by way of an arthroscopic rotator cuff repair. We once again discussed the details of the procedure, postoperative recovery course and expected outcome. Evaluation of her right shoulder and upper extremity demonstrates intact skin without warmth erythema or swelling. She was provided her prescriptions for postoperative analgesia and a sling to protect her shoulder leading up to and following surgery. She has obtained appropriate preoperative medical clearance. All questions were answered. We will proceed with surgery as currently scheduled. Of note today she did indicate that she tripped and fell into a wall and struck the ulnar aspect of her right wrist against the wall a few days ago. This is her wrist that she has had a previous ORIF of a distal radius fracture end. She is having pain primarily along the ulnar aspect of her wrist and was just concerned that she may have done some damage to the previously repaired wrist.  Consequently x-rays were obtained as outlined below. No significant acute osseous abnormalities. She was encouraged with this news and will continue to manage her current pain symptomatically expecting that it will gradually resolve. Imaging studies:  3 x-ray views of the right wrist completed on 5/19/2023 reviewed independently demonstrating well-preserved joint spaces. A volar-based distal radius plate and screw construct. No obvious acute fracture, dislocation or subluxation.
Possible Skilled Nursing Facilty (SNF)

## 2023-05-19 NOTE — ED ADULT NURSE NOTE - NSFALLRSKHARMRISK_ED_ALL_ED
Patient has been going to [facility].  He states that he has low testosterone.  I do not have any lab work that confirms this.  I am going to send him to endocrinology to determine if he in fact needs testosterone replacement therapy.  His PSA has tripled in the last 3 years and his hemoglobin hematocrit have increased.  I am worried he is having side effects to testosterone therapy.  This may preclude him to continue this form of therapy.  I will let the endocrinologist determine this.   no

## 2023-07-10 NOTE — PATIENT PROFILE ADULT - NSPROPTRIGHTBILLOFRIGHTS_GEN_A_NUR
Anesthesia Volume In Cc: 0 Detail Level: Detailed Anesthesia Type: 1% lidocaine with epinephrine patient

## 2023-07-27 NOTE — DISCHARGE NOTE NURSING/CASE MANAGEMENT/SOCIAL WORK - NURSING SECTION COMPLETE
Alisa Hernandez was admitted to [facility] from ED via cart accompanied by transport staff.   Reason for hospitalization is Right sided weakness, R sided facial numbness.   Upon arrival, patient is stable. Patient has history significant for   Past Medical History:   Diagnosis Date   • Abscess of great toe 10/2021   • Arthritis     feet, hands, wrists, knees   • Bronchitis     recurrent   • Cancer (CMD)     cervix  CA at 16   • Cellulitis of left lower leg 10/2021   • Chronic pain    • Complete tear of right rotator cuff 06/05/2018   • Concussion     as a kid   • Depression     in past, no meds at present   • History of gestational diabetes 1991   • Pneumonia    • Psoriasis 04/06/2022   • Psoriatic arthritis (CMD) 04/06/2022   • Urinary incontinence      Patient oriented to bed, call light, , room and unit.  Patient provided with the following educational materials upon admission:safety, advanced directives, infection control and pain.   Level of understanding patient verbalized understanding.   Admission orders received at this time.   Dr. Jamil notified of patient arrival.   See Epic documentation for patient individualized nursing care plan.   Patient/Caregiver provided printed discharge information.

## 2023-11-09 ENCOUNTER — EMERGENCY (EMERGENCY)
Facility: HOSPITAL | Age: 88
LOS: 1 days | Discharge: HOME | End: 2023-11-09
Attending: EMERGENCY MEDICINE
Payer: MEDICARE

## 2023-11-09 VITALS
HEART RATE: 60 BPM | TEMPERATURE: 98 F | RESPIRATION RATE: 18 BRPM | DIASTOLIC BLOOD PRESSURE: 75 MMHG | OXYGEN SATURATION: 98 % | SYSTOLIC BLOOD PRESSURE: 150 MMHG

## 2023-11-09 VITALS
SYSTOLIC BLOOD PRESSURE: 151 MMHG | HEART RATE: 60 BPM | HEIGHT: 60 IN | OXYGEN SATURATION: 98 % | TEMPERATURE: 97 F | RESPIRATION RATE: 18 BRPM | DIASTOLIC BLOOD PRESSURE: 59 MMHG | WEIGHT: 114.64 LBS

## 2023-11-09 LAB
ALBUMIN SERPL ELPH-MCNC: 3.7 G/DL — SIGNIFICANT CHANGE UP (ref 3.5–5)
ALBUMIN SERPL ELPH-MCNC: 3.7 G/DL — SIGNIFICANT CHANGE UP (ref 3.5–5)
ALP SERPL-CCNC: 43 U/L — SIGNIFICANT CHANGE UP (ref 40–120)
ALP SERPL-CCNC: 43 U/L — SIGNIFICANT CHANGE UP (ref 40–120)
ALT FLD-CCNC: 32 U/L DA — SIGNIFICANT CHANGE UP (ref 10–60)
ALT FLD-CCNC: 32 U/L DA — SIGNIFICANT CHANGE UP (ref 10–60)
ANION GAP SERPL CALC-SCNC: 5 MMOL/L — SIGNIFICANT CHANGE UP (ref 5–17)
ANION GAP SERPL CALC-SCNC: 5 MMOL/L — SIGNIFICANT CHANGE UP (ref 5–17)
APPEARANCE UR: ABNORMAL
APPEARANCE UR: ABNORMAL
AST SERPL-CCNC: 25 U/L — SIGNIFICANT CHANGE UP (ref 10–40)
AST SERPL-CCNC: 25 U/L — SIGNIFICANT CHANGE UP (ref 10–40)
BACTERIA # UR AUTO: ABNORMAL /HPF
BACTERIA # UR AUTO: ABNORMAL /HPF
BASOPHILS # BLD AUTO: 0.03 K/UL — SIGNIFICANT CHANGE UP (ref 0–0.2)
BASOPHILS # BLD AUTO: 0.03 K/UL — SIGNIFICANT CHANGE UP (ref 0–0.2)
BASOPHILS NFR BLD AUTO: 0.4 % — SIGNIFICANT CHANGE UP (ref 0–2)
BASOPHILS NFR BLD AUTO: 0.4 % — SIGNIFICANT CHANGE UP (ref 0–2)
BILIRUB SERPL-MCNC: 0.8 MG/DL — SIGNIFICANT CHANGE UP (ref 0.2–1.2)
BILIRUB SERPL-MCNC: 0.8 MG/DL — SIGNIFICANT CHANGE UP (ref 0.2–1.2)
BILIRUB UR-MCNC: NEGATIVE — SIGNIFICANT CHANGE UP
BILIRUB UR-MCNC: NEGATIVE — SIGNIFICANT CHANGE UP
BLD GP AB SCN SERPL QL: SIGNIFICANT CHANGE UP
BLD GP AB SCN SERPL QL: SIGNIFICANT CHANGE UP
BUN SERPL-MCNC: 20 MG/DL — HIGH (ref 7–18)
BUN SERPL-MCNC: 20 MG/DL — HIGH (ref 7–18)
CALCIUM SERPL-MCNC: 9.4 MG/DL — SIGNIFICANT CHANGE UP (ref 8.4–10.5)
CALCIUM SERPL-MCNC: 9.4 MG/DL — SIGNIFICANT CHANGE UP (ref 8.4–10.5)
CHLORIDE SERPL-SCNC: 104 MMOL/L — SIGNIFICANT CHANGE UP (ref 96–108)
CHLORIDE SERPL-SCNC: 104 MMOL/L — SIGNIFICANT CHANGE UP (ref 96–108)
CO2 SERPL-SCNC: 28 MMOL/L — SIGNIFICANT CHANGE UP (ref 22–31)
CO2 SERPL-SCNC: 28 MMOL/L — SIGNIFICANT CHANGE UP (ref 22–31)
COLOR SPEC: YELLOW — SIGNIFICANT CHANGE UP
COLOR SPEC: YELLOW — SIGNIFICANT CHANGE UP
CREAT SERPL-MCNC: 1.72 MG/DL — HIGH (ref 0.5–1.3)
CREAT SERPL-MCNC: 1.72 MG/DL — HIGH (ref 0.5–1.3)
DIFF PNL FLD: ABNORMAL
DIFF PNL FLD: ABNORMAL
EGFR: 28 ML/MIN/1.73M2 — LOW
EGFR: 28 ML/MIN/1.73M2 — LOW
EOSINOPHIL # BLD AUTO: 0.06 K/UL — SIGNIFICANT CHANGE UP (ref 0–0.5)
EOSINOPHIL # BLD AUTO: 0.06 K/UL — SIGNIFICANT CHANGE UP (ref 0–0.5)
EOSINOPHIL NFR BLD AUTO: 0.9 % — SIGNIFICANT CHANGE UP (ref 0–6)
EOSINOPHIL NFR BLD AUTO: 0.9 % — SIGNIFICANT CHANGE UP (ref 0–6)
EPI CELLS # UR: PRESENT
EPI CELLS # UR: PRESENT
GLUCOSE SERPL-MCNC: 127 MG/DL — HIGH (ref 70–99)
GLUCOSE SERPL-MCNC: 127 MG/DL — HIGH (ref 70–99)
GLUCOSE UR QL: NEGATIVE MG/DL — SIGNIFICANT CHANGE UP
GLUCOSE UR QL: NEGATIVE MG/DL — SIGNIFICANT CHANGE UP
HCT VFR BLD CALC: 27.4 % — LOW (ref 34.5–45)
HCT VFR BLD CALC: 27.4 % — LOW (ref 34.5–45)
HGB BLD-MCNC: 8.8 G/DL — LOW (ref 11.5–15.5)
HGB BLD-MCNC: 8.8 G/DL — LOW (ref 11.5–15.5)
IMM GRANULOCYTES NFR BLD AUTO: 0.3 % — SIGNIFICANT CHANGE UP (ref 0–0.9)
IMM GRANULOCYTES NFR BLD AUTO: 0.3 % — SIGNIFICANT CHANGE UP (ref 0–0.9)
KETONES UR-MCNC: ABNORMAL MG/DL
KETONES UR-MCNC: ABNORMAL MG/DL
LACTATE SERPL-SCNC: 1.4 MMOL/L — SIGNIFICANT CHANGE UP (ref 0.7–2)
LACTATE SERPL-SCNC: 1.4 MMOL/L — SIGNIFICANT CHANGE UP (ref 0.7–2)
LEUKOCYTE ESTERASE UR-ACNC: ABNORMAL
LEUKOCYTE ESTERASE UR-ACNC: ABNORMAL
LIDOCAIN IGE QN: 81 U/L — HIGH (ref 13–75)
LIDOCAIN IGE QN: 81 U/L — HIGH (ref 13–75)
LYMPHOCYTES # BLD AUTO: 1.23 K/UL — SIGNIFICANT CHANGE UP (ref 1–3.3)
LYMPHOCYTES # BLD AUTO: 1.23 K/UL — SIGNIFICANT CHANGE UP (ref 1–3.3)
LYMPHOCYTES # BLD AUTO: 18.2 % — SIGNIFICANT CHANGE UP (ref 13–44)
LYMPHOCYTES # BLD AUTO: 18.2 % — SIGNIFICANT CHANGE UP (ref 13–44)
MCHC RBC-ENTMCNC: 26.5 PG — LOW (ref 27–34)
MCHC RBC-ENTMCNC: 26.5 PG — LOW (ref 27–34)
MCHC RBC-ENTMCNC: 32.1 GM/DL — SIGNIFICANT CHANGE UP (ref 32–36)
MCHC RBC-ENTMCNC: 32.1 GM/DL — SIGNIFICANT CHANGE UP (ref 32–36)
MCV RBC AUTO: 82.5 FL — SIGNIFICANT CHANGE UP (ref 80–100)
MCV RBC AUTO: 82.5 FL — SIGNIFICANT CHANGE UP (ref 80–100)
MONOCYTES # BLD AUTO: 0.49 K/UL — SIGNIFICANT CHANGE UP (ref 0–0.9)
MONOCYTES # BLD AUTO: 0.49 K/UL — SIGNIFICANT CHANGE UP (ref 0–0.9)
MONOCYTES NFR BLD AUTO: 7.3 % — SIGNIFICANT CHANGE UP (ref 2–14)
MONOCYTES NFR BLD AUTO: 7.3 % — SIGNIFICANT CHANGE UP (ref 2–14)
NEUTROPHILS # BLD AUTO: 4.91 K/UL — SIGNIFICANT CHANGE UP (ref 1.8–7.4)
NEUTROPHILS # BLD AUTO: 4.91 K/UL — SIGNIFICANT CHANGE UP (ref 1.8–7.4)
NEUTROPHILS NFR BLD AUTO: 72.9 % — SIGNIFICANT CHANGE UP (ref 43–77)
NEUTROPHILS NFR BLD AUTO: 72.9 % — SIGNIFICANT CHANGE UP (ref 43–77)
NITRITE UR-MCNC: POSITIVE
NITRITE UR-MCNC: POSITIVE
NRBC # BLD: 0 /100 WBCS — SIGNIFICANT CHANGE UP (ref 0–0)
NRBC # BLD: 0 /100 WBCS — SIGNIFICANT CHANGE UP (ref 0–0)
PH UR: 5.5 — SIGNIFICANT CHANGE UP (ref 5–8)
PH UR: 5.5 — SIGNIFICANT CHANGE UP (ref 5–8)
PLATELET # BLD AUTO: 264 K/UL — SIGNIFICANT CHANGE UP (ref 150–400)
PLATELET # BLD AUTO: 264 K/UL — SIGNIFICANT CHANGE UP (ref 150–400)
POTASSIUM SERPL-MCNC: 3 MMOL/L — LOW (ref 3.5–5.3)
POTASSIUM SERPL-MCNC: 3 MMOL/L — LOW (ref 3.5–5.3)
POTASSIUM SERPL-SCNC: 3 MMOL/L — LOW (ref 3.5–5.3)
POTASSIUM SERPL-SCNC: 3 MMOL/L — LOW (ref 3.5–5.3)
PROT SERPL-MCNC: 6.9 G/DL — SIGNIFICANT CHANGE UP (ref 6–8.3)
PROT SERPL-MCNC: 6.9 G/DL — SIGNIFICANT CHANGE UP (ref 6–8.3)
PROT UR-MCNC: 100 MG/DL
PROT UR-MCNC: 100 MG/DL
RBC # BLD: 3.32 M/UL — LOW (ref 3.8–5.2)
RBC # BLD: 3.32 M/UL — LOW (ref 3.8–5.2)
RBC # FLD: 14 % — SIGNIFICANT CHANGE UP (ref 10.3–14.5)
RBC # FLD: 14 % — SIGNIFICANT CHANGE UP (ref 10.3–14.5)
RBC CASTS # UR COMP ASSIST: 6 /HPF — HIGH (ref 0–4)
RBC CASTS # UR COMP ASSIST: 6 /HPF — HIGH (ref 0–4)
SODIUM SERPL-SCNC: 137 MMOL/L — SIGNIFICANT CHANGE UP (ref 135–145)
SODIUM SERPL-SCNC: 137 MMOL/L — SIGNIFICANT CHANGE UP (ref 135–145)
SP GR SPEC: 1.02 — SIGNIFICANT CHANGE UP (ref 1–1.03)
SP GR SPEC: 1.02 — SIGNIFICANT CHANGE UP (ref 1–1.03)
UROBILINOGEN FLD QL: 1 MG/DL — SIGNIFICANT CHANGE UP (ref 0.2–1)
UROBILINOGEN FLD QL: 1 MG/DL — SIGNIFICANT CHANGE UP (ref 0.2–1)
WBC # BLD: 6.74 K/UL — SIGNIFICANT CHANGE UP (ref 3.8–10.5)
WBC # BLD: 6.74 K/UL — SIGNIFICANT CHANGE UP (ref 3.8–10.5)
WBC # FLD AUTO: 6.74 K/UL — SIGNIFICANT CHANGE UP (ref 3.8–10.5)
WBC # FLD AUTO: 6.74 K/UL — SIGNIFICANT CHANGE UP (ref 3.8–10.5)
WBC UR QL: ABNORMAL /HPF (ref 0–5)
WBC UR QL: ABNORMAL /HPF (ref 0–5)

## 2023-11-09 PROCEDURE — 86850 RBC ANTIBODY SCREEN: CPT

## 2023-11-09 PROCEDURE — 74176 CT ABD & PELVIS W/O CONTRAST: CPT | Mod: 26,MA

## 2023-11-09 PROCEDURE — 81001 URINALYSIS AUTO W/SCOPE: CPT

## 2023-11-09 PROCEDURE — 70450 CT HEAD/BRAIN W/O DYE: CPT | Mod: 26,MA

## 2023-11-09 PROCEDURE — 83605 ASSAY OF LACTIC ACID: CPT

## 2023-11-09 PROCEDURE — 85025 COMPLETE CBC W/AUTO DIFF WBC: CPT

## 2023-11-09 PROCEDURE — 83690 ASSAY OF LIPASE: CPT

## 2023-11-09 PROCEDURE — 36415 COLL VENOUS BLD VENIPUNCTURE: CPT

## 2023-11-09 PROCEDURE — 74176 CT ABD & PELVIS W/O CONTRAST: CPT | Mod: MA

## 2023-11-09 PROCEDURE — 86900 BLOOD TYPING SEROLOGIC ABO: CPT

## 2023-11-09 PROCEDURE — 99284 EMERGENCY DEPT VISIT MOD MDM: CPT

## 2023-11-09 PROCEDURE — 86901 BLOOD TYPING SEROLOGIC RH(D): CPT

## 2023-11-09 PROCEDURE — 70450 CT HEAD/BRAIN W/O DYE: CPT | Mod: MA

## 2023-11-09 PROCEDURE — 99284 EMERGENCY DEPT VISIT MOD MDM: CPT | Mod: 25

## 2023-11-09 PROCEDURE — 80053 COMPREHEN METABOLIC PANEL: CPT

## 2023-11-09 RX ORDER — NITROFURANTOIN MACROCRYSTAL 50 MG
100 CAPSULE ORAL ONCE
Refills: 0 | Status: COMPLETED | OUTPATIENT
Start: 2023-11-09 | End: 2023-11-09

## 2023-11-09 RX ORDER — SODIUM CHLORIDE 9 MG/ML
500 INJECTION INTRAMUSCULAR; INTRAVENOUS; SUBCUTANEOUS ONCE
Refills: 0 | Status: COMPLETED | OUTPATIENT
Start: 2023-11-09 | End: 2023-11-09

## 2023-11-09 RX ORDER — POTASSIUM CHLORIDE 20 MEQ
40 PACKET (EA) ORAL ONCE
Refills: 0 | Status: COMPLETED | OUTPATIENT
Start: 2023-11-09 | End: 2023-11-09

## 2023-11-09 RX ORDER — NITROFURANTOIN MACROCRYSTAL 50 MG
1 CAPSULE ORAL
Qty: 14 | Refills: 0
Start: 2023-11-09 | End: 2023-11-15

## 2023-11-09 RX ADMIN — Medication 40 MILLIEQUIVALENT(S): at 19:24

## 2023-11-09 RX ADMIN — SODIUM CHLORIDE 500 MILLILITER(S): 9 INJECTION INTRAMUSCULAR; INTRAVENOUS; SUBCUTANEOUS at 15:13

## 2023-11-09 RX ADMIN — Medication 100 MILLIGRAM(S): at 19:15

## 2023-11-09 NOTE — ED ADULT NURSE NOTE - OBJECTIVE STATEMENT
Per pt's daughter, pt endorses generalized abdominal pain x multiple months and decrease intake. Pt denies pain at this time, no distress noted. pt denies N/V/D. PIV inserted, labs drawn and sent, bolus infusing per MAR. Pt and family updated on plan of care, pt and family verbalized understanding.

## 2023-11-09 NOTE — ED PROVIDER NOTE - PROGRESS NOTE DETAILS
Educated pt and daughter on anemia, CT results. Also discussed tx for UTI. Pt would like to go home and daughter would like to taker her home. Educated on care and close f/u Dr. Shaffer, signs/symptoms to return sooner to ED.

## 2023-11-09 NOTE — ED PROVIDER NOTE - PATIENT PORTAL LINK FT
You can access the FollowMyHealth Patient Portal offered by [facility] by registering at the following website: [email or web address]. By joining [facility]’s FollowMyHealth portal, you will also be able to view your health information using other applications (apps) compatible with our system.

## 2023-11-09 NOTE — ED ADULT TRIAGE NOTE - ADDITIONAL SAFETY/BANDS...
Quality 130: Documentation Of Current Medications In The Medical Record: Current Medications Documented Additional Safety/Bands: Detail Level: Zone

## 2023-11-09 NOTE — ED PROVIDER NOTE - OBJECTIVE STATEMENT
89-year-old female history of dementia presenting with several of worsening episode episodes of abdominal pain with nausea and vomiting now almost every day.  Daughter states that patient appetite has decreased.  Patient was seen by her PCP  who told her to bring her mother to the ER if pain persist.  Daughter states she is not sure about the specifics of her history as she does not live with patient.  States that patient has HHA 24/7.

## 2023-11-09 NOTE — ED PROVIDER NOTE - NSFOLLOWUPINSTRUCTIONS_ED_ALL_ED_FT
Bronquitis aguda    LO QUE NECESITA SABER:    ¿Qué es la bronquitis aguda?La bronquitis aguda es la inflamación e irritación en los pulmones. Normalmente es causada por un virus y ocurre más a menudo en el invierno. La bronquitis también puede ser causada por bacterias o por un irritante químico, keisha el humo.    ¿Cuáles son los signos y síntomas de la bronquitis aguda?    Tos que dura hasta 3 semanas    Secreción nasal o nariz tapada    Ronquera, dolor de garganta    Fiebre    Sentirse más cansado de lo normal y melvin corporales    Sibilancias o dolor al respirar o toser  ¿Cómo se trata la bronquitis crónica?Es posible que usted necesite alguno de los siguientes:    Los jarabes para la tosdisminuyen cassidy necesidad de toser.    Los descongestivosayudan a despejar la mucosidad en elen pulmones y que sea más fácil expectorarla. [place] puede ayudarle a respirar mejor.    Inhaladorespodrían administrarse. Cassidy médico podría darle judy o más inhaladores para ayudarle a respirar más fácil y tener menos tos. Un inhalador le administra medicamento para abrir elen vías aéreas. Pídale a cassidy médico que le muestre cómo usar cassidy inhalador correctamente.  Inhalador de dosis medidas      Medicamentos antiviralestratan las infecciones provocadas por un virus.    Los antibióticospueden administrarse si cassidy bronquitis es causada por bacterias o si tiene win afección pulmonar.    Acetaminofénalivia el dolor y baja la fiebre. Está disponible sin receta médica. Pregunte la cantidad y la frecuencia con que debe tomarlos. Siga las indicaciones. Sofya las etiquetas de todos los demás medicamentos que esté usando para saber si también contienen acetaminofén, o pregunte a cassidy médico o farmacéutico. El acetaminofén puede causar daño en el hígado cuando no se owen de forma correcta.    AINEpueden disminuir la inflamación y el dolor o la fiebre. Nilda medicamento está disponible con o sin win receta médica. Los PANCHO pueden causar sangrado estomacal o problemas renales en ciertas personas. Si usted owen un medicamento anticoagulante, siempre pregúntele a cassidy médico si los PANCHO son seguros para usted. Siempre sofya la etiqueta de nilda medicamento y siga las instrucciones.  ¿Cómo puedo controlar los síntomas?    Convent líquidos keisha se le haya indicado.Es posible que deba derrick más líquido que de costumbre para mantenerse hidratado. Pregunte cuánto líquido debe derrick cada día y cuáles líquidos son los más adecuados para usted.    Use un humidificador de vapor frío.Nilda aumenta el nivel de humedad en el aire de cassidy hogar. [place] podría ayudarle a respirar más fácilmente y al mismo tiempo disminuir la tos.    Descanse más.El descanso ayuda a cassidy cuerpo a sanar. Empiece a hacer un poco más día a día. Descanse cuando usted sienta que es necesario.  ¿Qué puedo ayudar a prevenir la bronquitis aguda?      Pregunte sobre las vacunas que pudiera necesitar.Vacúnese contra la gripe todos los años tan pronto keisha se recomiende, generalmente en septiembre u octubre. Pregunte a cassidy médico si también debe recibir la vacuna contra la neumonía o contra la COVID-19. Cassidy médico puede indicarle si también debe recibir otras vacunas, y cuándo aplicárselas.    Evite la propagación de gérmenes.  Lávese las moy frecuentemente con agua y jabón. Lleve win loción o gel antiséptico para las moy. Usted puede usar la loción o el gel para limpiar elen moy cuando no haya agua disponible.  Lavado de moy      No se toque los ojos, la nariz o la boca a menos que se haya lavado las moy melanie.    Siempre cubra cassidy boca al toser para evitar la propagación de gérmenes. Lo mejor es toser en un pañuelo desechable o en la manga de cassidy camisa, en lugar de en cassidy mano. Pídale a los que le rodean que cubran elen bocas al toser.    Trate de evitar a las personas que están resfriadas o tienen gripe. Si usted está enfermo, manténgase alejado de otras personas lo más que pueda.    Evite irritantes en el aire.Evite productos químicos, gases y polvo. Use win mascarilla si tiene que trabajar alrededor de polvo o gases. Permanezca dentro cuando los niveles de contaminación dm altos. Si tiene alergias, permanezca adentro cuando el conteo de polen sea alto. No use productos en aerosol, keisha desodorante, aerosol contra los insectos y aerosol para el jeny.    No fume ni esté alrededor de personas que fuman.La nicotina y otras sustancias químicas que contienen los cigarrillos y cigarros pueden dañar los pulmones. Pida información a cassidy médico si usted actualmente fuma y necesita ayuda para dejar de fumar. Los cigarrillos electrónicos o el tabaco sin humo igualmente contienen nicotina. Consulte con cassidy médico antes de utilizar estos productos.  ¿Cuándo callum buscar atención inmediata?    Usted expectora gil.    Elen labios o uñas de los dedos se ponen azules.    Usted siente que no está recibiendo suficiente aire cuando respira.  ¿Cuándo callum llamar a mi médico?    Elen síntomas no desaparecen o empeoran, incluso después del tratamiento.    Cassidy tos no mejora dentro de 4 semanas.    Usted tiene preguntas o inquietudes acerca de cassidy condición o cuidado.  ACUERDOS SOBRE CASSIDY CUIDADO:    Usted tiene el derecho de ayudar a planear cassidy cuidado. Aprenda todo lo que pueda sobre cassidy condición y keisha darle tratamiento. Discuta elen opciones de tratamiento con elen médicos para decidir el cuidado que usted desea recibir. Usted siempre tiene el derecho de rechazar el tratamiento.

## 2023-11-09 NOTE — ED PROVIDER NOTE - CLINICAL SUMMARY MEDICAL DECISION MAKING FREE TEXT BOX
89-year-old female presenting with increased episodes of mid abdominal pain and vomiting with poor appetite.  Symptoms may suggest gastritis, pancreatitis, constipation, obstruction or other causes.  Plan to perform labs, CT abdomen and pelvis, IV hydration and reassess.

## 2023-11-26 ENCOUNTER — INPATIENT (INPATIENT)
Facility: HOSPITAL | Age: 88
LOS: 15 days | Discharge: NURSING, REHAB OR LONG-TERM CARE | DRG: 884 | End: 2023-12-12
Attending: HOSPITALIST | Admitting: STUDENT IN AN ORGANIZED HEALTH CARE EDUCATION/TRAINING PROGRAM
Payer: MEDICARE

## 2023-11-26 VITALS
OXYGEN SATURATION: 96 % | DIASTOLIC BLOOD PRESSURE: 45 MMHG | RESPIRATION RATE: 20 BRPM | SYSTOLIC BLOOD PRESSURE: 90 MMHG | WEIGHT: 119.93 LBS | HEART RATE: 54 BPM | HEIGHT: 60 IN | TEMPERATURE: 98 F

## 2023-11-26 DIAGNOSIS — N17.0 ACUTE KIDNEY FAILURE WITH TUBULAR NECROSIS: ICD-10-CM

## 2023-11-26 DIAGNOSIS — E87.6 HYPOKALEMIA: ICD-10-CM

## 2023-11-26 DIAGNOSIS — Z29.9 ENCOUNTER FOR PROPHYLACTIC MEASURES, UNSPECIFIED: ICD-10-CM

## 2023-11-26 DIAGNOSIS — N17.9 ACUTE KIDNEY FAILURE, UNSPECIFIED: ICD-10-CM

## 2023-11-26 DIAGNOSIS — Z96.642 PRESENCE OF LEFT ARTIFICIAL HIP JOINT: Chronic | ICD-10-CM

## 2023-11-26 DIAGNOSIS — E78.5 HYPERLIPIDEMIA, UNSPECIFIED: ICD-10-CM

## 2023-11-26 DIAGNOSIS — F03.90 UNSPECIFIED DEMENTIA, UNSPECIFIED SEVERITY, WITHOUT BEHAVIORAL DISTURBANCE, PSYCHOTIC DISTURBANCE, MOOD DISTURBANCE, AND ANXIETY: ICD-10-CM

## 2023-11-26 DIAGNOSIS — Z96.641 PRESENCE OF RIGHT ARTIFICIAL HIP JOINT: Chronic | ICD-10-CM

## 2023-11-26 DIAGNOSIS — I50.22 CHRONIC SYSTOLIC (CONGESTIVE) HEART FAILURE: ICD-10-CM

## 2023-11-26 DIAGNOSIS — R10.13 EPIGASTRIC PAIN: ICD-10-CM

## 2023-11-26 DIAGNOSIS — I25.10 ATHEROSCLEROTIC HEART DISEASE OF NATIVE CORONARY ARTERY WITHOUT ANGINA PECTORIS: ICD-10-CM

## 2023-11-26 DIAGNOSIS — I10 ESSENTIAL (PRIMARY) HYPERTENSION: ICD-10-CM

## 2023-11-26 DIAGNOSIS — E87.20 ACIDOSIS, UNSPECIFIED: ICD-10-CM

## 2023-11-26 DIAGNOSIS — R62.7 ADULT FAILURE TO THRIVE: ICD-10-CM

## 2023-11-26 DIAGNOSIS — E11.9 TYPE 2 DIABETES MELLITUS WITHOUT COMPLICATIONS: ICD-10-CM

## 2023-11-26 DIAGNOSIS — I95.1 ORTHOSTATIC HYPOTENSION: ICD-10-CM

## 2023-11-26 LAB
A1C WITH ESTIMATED AVERAGE GLUCOSE RESULT: 6.3 % — HIGH (ref 4–5.6)
A1C WITH ESTIMATED AVERAGE GLUCOSE RESULT: 6.3 % — HIGH (ref 4–5.6)
ALBUMIN SERPL ELPH-MCNC: 2.8 G/DL — LOW (ref 3.3–5)
ALBUMIN SERPL ELPH-MCNC: 2.8 G/DL — LOW (ref 3.3–5)
ALP SERPL-CCNC: 39 U/L — LOW (ref 40–120)
ALP SERPL-CCNC: 39 U/L — LOW (ref 40–120)
ALT FLD-CCNC: 42 U/L — SIGNIFICANT CHANGE UP (ref 10–45)
ALT FLD-CCNC: 42 U/L — SIGNIFICANT CHANGE UP (ref 10–45)
ANION GAP SERPL CALC-SCNC: 13 MMOL/L — SIGNIFICANT CHANGE UP (ref 5–17)
ANION GAP SERPL CALC-SCNC: 13 MMOL/L — SIGNIFICANT CHANGE UP (ref 5–17)
ANION GAP SERPL CALC-SCNC: 16 MMOL/L — SIGNIFICANT CHANGE UP (ref 5–17)
ANION GAP SERPL CALC-SCNC: 16 MMOL/L — SIGNIFICANT CHANGE UP (ref 5–17)
APPEARANCE UR: CLEAR — SIGNIFICANT CHANGE UP
APPEARANCE UR: CLEAR — SIGNIFICANT CHANGE UP
APTT BLD: 31 SEC — SIGNIFICANT CHANGE UP (ref 24.5–35.6)
APTT BLD: 31 SEC — SIGNIFICANT CHANGE UP (ref 24.5–35.6)
AST SERPL-CCNC: 41 U/L — HIGH (ref 10–40)
AST SERPL-CCNC: 41 U/L — HIGH (ref 10–40)
BACTERIA # UR AUTO: NEGATIVE /HPF — SIGNIFICANT CHANGE UP
BACTERIA # UR AUTO: NEGATIVE /HPF — SIGNIFICANT CHANGE UP
BASE EXCESS BLDV CALC-SCNC: -10.1 MMOL/L — LOW (ref -2–3)
BASE EXCESS BLDV CALC-SCNC: -10.1 MMOL/L — LOW (ref -2–3)
BASOPHILS # BLD AUTO: 0 K/UL — SIGNIFICANT CHANGE UP (ref 0–0.2)
BASOPHILS # BLD AUTO: 0 K/UL — SIGNIFICANT CHANGE UP (ref 0–0.2)
BASOPHILS NFR BLD AUTO: 0 % — SIGNIFICANT CHANGE UP (ref 0–2)
BASOPHILS NFR BLD AUTO: 0 % — SIGNIFICANT CHANGE UP (ref 0–2)
BILIRUB SERPL-MCNC: 0.4 MG/DL — SIGNIFICANT CHANGE UP (ref 0.2–1.2)
BILIRUB SERPL-MCNC: 0.4 MG/DL — SIGNIFICANT CHANGE UP (ref 0.2–1.2)
BILIRUB UR-MCNC: NEGATIVE — SIGNIFICANT CHANGE UP
BILIRUB UR-MCNC: NEGATIVE — SIGNIFICANT CHANGE UP
BLD GP AB SCN SERPL QL: NEGATIVE — SIGNIFICANT CHANGE UP
BLD GP AB SCN SERPL QL: NEGATIVE — SIGNIFICANT CHANGE UP
BUN SERPL-MCNC: 45 MG/DL — HIGH (ref 7–23)
BUN SERPL-MCNC: 45 MG/DL — HIGH (ref 7–23)
BUN SERPL-MCNC: 46 MG/DL — HIGH (ref 7–23)
BUN SERPL-MCNC: 46 MG/DL — HIGH (ref 7–23)
CA-I SERPL-SCNC: 1.22 MMOL/L — SIGNIFICANT CHANGE UP (ref 1.15–1.33)
CA-I SERPL-SCNC: 1.22 MMOL/L — SIGNIFICANT CHANGE UP (ref 1.15–1.33)
CALCIUM SERPL-MCNC: 8.1 MG/DL — LOW (ref 8.4–10.5)
CALCIUM SERPL-MCNC: 8.1 MG/DL — LOW (ref 8.4–10.5)
CALCIUM SERPL-MCNC: 8.3 MG/DL — LOW (ref 8.4–10.5)
CALCIUM SERPL-MCNC: 8.3 MG/DL — LOW (ref 8.4–10.5)
CAST: 2 /LPF — SIGNIFICANT CHANGE UP (ref 0–4)
CAST: 2 /LPF — SIGNIFICANT CHANGE UP (ref 0–4)
CHLORIDE BLDV-SCNC: 104 MMOL/L — SIGNIFICANT CHANGE UP (ref 96–108)
CHLORIDE BLDV-SCNC: 104 MMOL/L — SIGNIFICANT CHANGE UP (ref 96–108)
CHLORIDE SERPL-SCNC: 101 MMOL/L — SIGNIFICANT CHANGE UP (ref 96–108)
CHLORIDE SERPL-SCNC: 101 MMOL/L — SIGNIFICANT CHANGE UP (ref 96–108)
CHLORIDE SERPL-SCNC: 107 MMOL/L — SIGNIFICANT CHANGE UP (ref 96–108)
CHLORIDE SERPL-SCNC: 107 MMOL/L — SIGNIFICANT CHANGE UP (ref 96–108)
CK SERPL-CCNC: 65 U/L — SIGNIFICANT CHANGE UP (ref 25–170)
CK SERPL-CCNC: 65 U/L — SIGNIFICANT CHANGE UP (ref 25–170)
CO2 BLDV-SCNC: 16 MMOL/L — LOW (ref 22–26)
CO2 BLDV-SCNC: 16 MMOL/L — LOW (ref 22–26)
CO2 SERPL-SCNC: 13 MMOL/L — LOW (ref 22–31)
COLOR SPEC: YELLOW — SIGNIFICANT CHANGE UP
COLOR SPEC: YELLOW — SIGNIFICANT CHANGE UP
CREAT ?TM UR-MCNC: 29 MG/DL — SIGNIFICANT CHANGE UP
CREAT ?TM UR-MCNC: 29 MG/DL — SIGNIFICANT CHANGE UP
CREAT SERPL-MCNC: 7 MG/DL — HIGH (ref 0.5–1.3)
CREAT SERPL-MCNC: 7 MG/DL — HIGH (ref 0.5–1.3)
CREAT SERPL-MCNC: 7.14 MG/DL — HIGH (ref 0.5–1.3)
CREAT SERPL-MCNC: 7.14 MG/DL — HIGH (ref 0.5–1.3)
DIFF PNL FLD: ABNORMAL
DIFF PNL FLD: ABNORMAL
EGFR: 5 ML/MIN/1.73M2 — LOW
EOSINOPHIL # BLD AUTO: 0.03 K/UL — SIGNIFICANT CHANGE UP (ref 0–0.5)
EOSINOPHIL # BLD AUTO: 0.03 K/UL — SIGNIFICANT CHANGE UP (ref 0–0.5)
EOSINOPHIL NFR BLD AUTO: 0.3 % — SIGNIFICANT CHANGE UP (ref 0–6)
EOSINOPHIL NFR BLD AUTO: 0.3 % — SIGNIFICANT CHANGE UP (ref 0–6)
ESTIMATED AVERAGE GLUCOSE: 134 MG/DL — HIGH (ref 68–114)
ESTIMATED AVERAGE GLUCOSE: 134 MG/DL — HIGH (ref 68–114)
FLUAV AG NPH QL: SIGNIFICANT CHANGE UP
FLUAV AG NPH QL: SIGNIFICANT CHANGE UP
FLUBV AG NPH QL: SIGNIFICANT CHANGE UP
FLUBV AG NPH QL: SIGNIFICANT CHANGE UP
GAS PNL BLDV: 127 MMOL/L — LOW (ref 136–145)
GAS PNL BLDV: 127 MMOL/L — LOW (ref 136–145)
GAS PNL BLDV: SIGNIFICANT CHANGE UP
GLUCOSE BLDC GLUCOMTR-MCNC: 109 MG/DL — HIGH (ref 70–99)
GLUCOSE BLDC GLUCOMTR-MCNC: 109 MG/DL — HIGH (ref 70–99)
GLUCOSE BLDC GLUCOMTR-MCNC: 128 MG/DL — HIGH (ref 70–99)
GLUCOSE BLDC GLUCOMTR-MCNC: 128 MG/DL — HIGH (ref 70–99)
GLUCOSE BLDV-MCNC: 108 MG/DL — HIGH (ref 70–99)
GLUCOSE BLDV-MCNC: 108 MG/DL — HIGH (ref 70–99)
GLUCOSE SERPL-MCNC: 107 MG/DL — HIGH (ref 70–99)
GLUCOSE SERPL-MCNC: 107 MG/DL — HIGH (ref 70–99)
GLUCOSE SERPL-MCNC: 111 MG/DL — HIGH (ref 70–99)
GLUCOSE SERPL-MCNC: 111 MG/DL — HIGH (ref 70–99)
GLUCOSE UR QL: 250 MG/DL
GLUCOSE UR QL: 250 MG/DL
HCO3 BLDV-SCNC: 15 MMOL/L — LOW (ref 22–29)
HCO3 BLDV-SCNC: 15 MMOL/L — LOW (ref 22–29)
HCT VFR BLD CALC: 27.2 % — LOW (ref 34.5–45)
HCT VFR BLD CALC: 27.2 % — LOW (ref 34.5–45)
HCT VFR BLDA CALC: 27 % — LOW (ref 34.5–46.5)
HCT VFR BLDA CALC: 27 % — LOW (ref 34.5–46.5)
HGB BLD CALC-MCNC: 9.1 G/DL — LOW (ref 11.7–16.1)
HGB BLD CALC-MCNC: 9.1 G/DL — LOW (ref 11.7–16.1)
HGB BLD-MCNC: 8.8 G/DL — LOW (ref 11.5–15.5)
HGB BLD-MCNC: 8.8 G/DL — LOW (ref 11.5–15.5)
IMM GRANULOCYTES NFR BLD AUTO: 0.8 % — SIGNIFICANT CHANGE UP (ref 0–0.9)
IMM GRANULOCYTES NFR BLD AUTO: 0.8 % — SIGNIFICANT CHANGE UP (ref 0–0.9)
INR BLD: 1.05 RATIO — SIGNIFICANT CHANGE UP (ref 0.85–1.18)
INR BLD: 1.05 RATIO — SIGNIFICANT CHANGE UP (ref 0.85–1.18)
KETONES UR-MCNC: NEGATIVE MG/DL — SIGNIFICANT CHANGE UP
KETONES UR-MCNC: NEGATIVE MG/DL — SIGNIFICANT CHANGE UP
LACTATE BLDV-MCNC: 1.2 MMOL/L — SIGNIFICANT CHANGE UP (ref 0.5–2)
LACTATE BLDV-MCNC: 1.2 MMOL/L — SIGNIFICANT CHANGE UP (ref 0.5–2)
LEUKOCYTE ESTERASE UR-ACNC: NEGATIVE — SIGNIFICANT CHANGE UP
LEUKOCYTE ESTERASE UR-ACNC: NEGATIVE — SIGNIFICANT CHANGE UP
LYMPHOCYTES # BLD AUTO: 0.54 K/UL — LOW (ref 1–3.3)
LYMPHOCYTES # BLD AUTO: 0.54 K/UL — LOW (ref 1–3.3)
LYMPHOCYTES # BLD AUTO: 5.7 % — LOW (ref 13–44)
LYMPHOCYTES # BLD AUTO: 5.7 % — LOW (ref 13–44)
MAGNESIUM SERPL-MCNC: 1.7 MG/DL — SIGNIFICANT CHANGE UP (ref 1.6–2.6)
MAGNESIUM SERPL-MCNC: 1.7 MG/DL — SIGNIFICANT CHANGE UP (ref 1.6–2.6)
MCHC RBC-ENTMCNC: 26.3 PG — LOW (ref 27–34)
MCHC RBC-ENTMCNC: 26.3 PG — LOW (ref 27–34)
MCHC RBC-ENTMCNC: 32.4 GM/DL — SIGNIFICANT CHANGE UP (ref 32–36)
MCHC RBC-ENTMCNC: 32.4 GM/DL — SIGNIFICANT CHANGE UP (ref 32–36)
MCV RBC AUTO: 81.2 FL — SIGNIFICANT CHANGE UP (ref 80–100)
MCV RBC AUTO: 81.2 FL — SIGNIFICANT CHANGE UP (ref 80–100)
MONOCYTES # BLD AUTO: 0.91 K/UL — HIGH (ref 0–0.9)
MONOCYTES # BLD AUTO: 0.91 K/UL — HIGH (ref 0–0.9)
MONOCYTES NFR BLD AUTO: 9.6 % — SIGNIFICANT CHANGE UP (ref 2–14)
MONOCYTES NFR BLD AUTO: 9.6 % — SIGNIFICANT CHANGE UP (ref 2–14)
MRSA PCR RESULT.: SIGNIFICANT CHANGE UP
MRSA PCR RESULT.: SIGNIFICANT CHANGE UP
NEUTROPHILS # BLD AUTO: 7.96 K/UL — HIGH (ref 1.8–7.4)
NEUTROPHILS # BLD AUTO: 7.96 K/UL — HIGH (ref 1.8–7.4)
NEUTROPHILS NFR BLD AUTO: 83.6 % — HIGH (ref 43–77)
NEUTROPHILS NFR BLD AUTO: 83.6 % — HIGH (ref 43–77)
NITRITE UR-MCNC: NEGATIVE — SIGNIFICANT CHANGE UP
NITRITE UR-MCNC: NEGATIVE — SIGNIFICANT CHANGE UP
NRBC # BLD: 0 /100 WBCS — SIGNIFICANT CHANGE UP (ref 0–0)
NRBC # BLD: 0 /100 WBCS — SIGNIFICANT CHANGE UP (ref 0–0)
OSMOLALITY UR: 154 MOS/KG — LOW (ref 300–900)
OSMOLALITY UR: 154 MOS/KG — LOW (ref 300–900)
PCO2 BLDV: 29 MMHG — LOW (ref 39–42)
PCO2 BLDV: 29 MMHG — LOW (ref 39–42)
PH BLDV: 7.32 — SIGNIFICANT CHANGE UP (ref 7.32–7.43)
PH BLDV: 7.32 — SIGNIFICANT CHANGE UP (ref 7.32–7.43)
PH UR: 6.5 — SIGNIFICANT CHANGE UP (ref 5–8)
PH UR: 6.5 — SIGNIFICANT CHANGE UP (ref 5–8)
PHOSPHATE SERPL-MCNC: 4.3 MG/DL — SIGNIFICANT CHANGE UP (ref 2.5–4.5)
PHOSPHATE SERPL-MCNC: 4.3 MG/DL — SIGNIFICANT CHANGE UP (ref 2.5–4.5)
PLATELET # BLD AUTO: 193 K/UL — SIGNIFICANT CHANGE UP (ref 150–400)
PLATELET # BLD AUTO: 193 K/UL — SIGNIFICANT CHANGE UP (ref 150–400)
PO2 BLDV: 123 MMHG — HIGH (ref 25–45)
PO2 BLDV: 123 MMHG — HIGH (ref 25–45)
POTASSIUM BLDV-SCNC: 2.9 MMOL/L — CRITICAL LOW (ref 3.5–5.1)
POTASSIUM BLDV-SCNC: 2.9 MMOL/L — CRITICAL LOW (ref 3.5–5.1)
POTASSIUM SERPL-MCNC: 3 MMOL/L — LOW (ref 3.5–5.3)
POTASSIUM SERPL-MCNC: 3 MMOL/L — LOW (ref 3.5–5.3)
POTASSIUM SERPL-MCNC: 4.3 MMOL/L — SIGNIFICANT CHANGE UP (ref 3.5–5.3)
POTASSIUM SERPL-MCNC: 4.3 MMOL/L — SIGNIFICANT CHANGE UP (ref 3.5–5.3)
POTASSIUM SERPL-SCNC: 3 MMOL/L — LOW (ref 3.5–5.3)
POTASSIUM SERPL-SCNC: 3 MMOL/L — LOW (ref 3.5–5.3)
POTASSIUM SERPL-SCNC: 4.3 MMOL/L — SIGNIFICANT CHANGE UP (ref 3.5–5.3)
POTASSIUM SERPL-SCNC: 4.3 MMOL/L — SIGNIFICANT CHANGE UP (ref 3.5–5.3)
POTASSIUM UR-SCNC: 17 MMOL/L — SIGNIFICANT CHANGE UP
POTASSIUM UR-SCNC: 17 MMOL/L — SIGNIFICANT CHANGE UP
PROT ?TM UR-MCNC: 42 MG/DL — HIGH (ref 0–12)
PROT ?TM UR-MCNC: 42 MG/DL — HIGH (ref 0–12)
PROT SERPL-MCNC: 5.1 G/DL — LOW (ref 6–8.3)
PROT SERPL-MCNC: 5.1 G/DL — LOW (ref 6–8.3)
PROT UR-MCNC: 100 MG/DL
PROT UR-MCNC: 100 MG/DL
PROT/CREAT UR-RTO: 1.4 RATIO — HIGH (ref 0–0.2)
PROT/CREAT UR-RTO: 1.4 RATIO — HIGH (ref 0–0.2)
PROTHROM AB SERPL-ACNC: 11.5 SEC — SIGNIFICANT CHANGE UP (ref 9.5–13)
PROTHROM AB SERPL-ACNC: 11.5 SEC — SIGNIFICANT CHANGE UP (ref 9.5–13)
RBC # BLD: 3.35 M/UL — LOW (ref 3.8–5.2)
RBC # BLD: 3.35 M/UL — LOW (ref 3.8–5.2)
RBC # FLD: 14.8 % — HIGH (ref 10.3–14.5)
RBC # FLD: 14.8 % — HIGH (ref 10.3–14.5)
RBC CASTS # UR COMP ASSIST: 2 /HPF — SIGNIFICANT CHANGE UP (ref 0–4)
RBC CASTS # UR COMP ASSIST: 2 /HPF — SIGNIFICANT CHANGE UP (ref 0–4)
REVIEW: SIGNIFICANT CHANGE UP
REVIEW: SIGNIFICANT CHANGE UP
RH IG SCN BLD-IMP: POSITIVE — SIGNIFICANT CHANGE UP
RH IG SCN BLD-IMP: POSITIVE — SIGNIFICANT CHANGE UP
RSV RNA NPH QL NAA+NON-PROBE: SIGNIFICANT CHANGE UP
RSV RNA NPH QL NAA+NON-PROBE: SIGNIFICANT CHANGE UP
S AUREUS DNA NOSE QL NAA+PROBE: SIGNIFICANT CHANGE UP
S AUREUS DNA NOSE QL NAA+PROBE: SIGNIFICANT CHANGE UP
SAO2 % BLDV: 99.9 % — HIGH (ref 67–88)
SAO2 % BLDV: 99.9 % — HIGH (ref 67–88)
SARS-COV-2 RNA SPEC QL NAA+PROBE: SIGNIFICANT CHANGE UP
SARS-COV-2 RNA SPEC QL NAA+PROBE: SIGNIFICANT CHANGE UP
SODIUM SERPL-SCNC: 130 MMOL/L — LOW (ref 135–145)
SODIUM SERPL-SCNC: 130 MMOL/L — LOW (ref 135–145)
SODIUM SERPL-SCNC: 133 MMOL/L — LOW (ref 135–145)
SODIUM SERPL-SCNC: 133 MMOL/L — LOW (ref 135–145)
SODIUM UR-SCNC: 30 MMOL/L — SIGNIFICANT CHANGE UP
SODIUM UR-SCNC: 30 MMOL/L — SIGNIFICANT CHANGE UP
SP GR SPEC: 1.01 — SIGNIFICANT CHANGE UP (ref 1–1.03)
SP GR SPEC: 1.01 — SIGNIFICANT CHANGE UP (ref 1–1.03)
SQUAMOUS # UR AUTO: 1 /HPF — SIGNIFICANT CHANGE UP (ref 0–5)
SQUAMOUS # UR AUTO: 1 /HPF — SIGNIFICANT CHANGE UP (ref 0–5)
UROBILINOGEN FLD QL: 0.2 MG/DL — SIGNIFICANT CHANGE UP (ref 0.2–1)
UROBILINOGEN FLD QL: 0.2 MG/DL — SIGNIFICANT CHANGE UP (ref 0.2–1)
UUN UR-MCNC: 159 MG/DL — SIGNIFICANT CHANGE UP
UUN UR-MCNC: 159 MG/DL — SIGNIFICANT CHANGE UP
WBC # BLD: 9.52 K/UL — SIGNIFICANT CHANGE UP (ref 3.8–10.5)
WBC # BLD: 9.52 K/UL — SIGNIFICANT CHANGE UP (ref 3.8–10.5)
WBC # FLD AUTO: 9.52 K/UL — SIGNIFICANT CHANGE UP (ref 3.8–10.5)
WBC # FLD AUTO: 9.52 K/UL — SIGNIFICANT CHANGE UP (ref 3.8–10.5)
WBC UR QL: 1 /HPF — SIGNIFICANT CHANGE UP (ref 0–5)
WBC UR QL: 1 /HPF — SIGNIFICANT CHANGE UP (ref 0–5)

## 2023-11-26 PROCEDURE — 93010 ELECTROCARDIOGRAM REPORT: CPT

## 2023-11-26 PROCEDURE — 99285 EMERGENCY DEPT VISIT HI MDM: CPT | Mod: GC

## 2023-11-26 PROCEDURE — 71045 X-RAY EXAM CHEST 1 VIEW: CPT | Mod: 26

## 2023-11-26 PROCEDURE — 99222 1ST HOSP IP/OBS MODERATE 55: CPT | Mod: GC

## 2023-11-26 PROCEDURE — 99223 1ST HOSP IP/OBS HIGH 75: CPT | Mod: GC

## 2023-11-26 RX ORDER — SODIUM BICARBONATE 1 MEQ/ML
0.28 SYRINGE (ML) INTRAVENOUS
Qty: 150 | Refills: 0 | Status: DISCONTINUED | OUTPATIENT
Start: 2023-11-26 | End: 2023-11-27

## 2023-11-26 RX ORDER — DEXTROSE 50 % IN WATER 50 %
25 SYRINGE (ML) INTRAVENOUS ONCE
Refills: 0 | Status: DISCONTINUED | OUTPATIENT
Start: 2023-11-26 | End: 2023-12-12

## 2023-11-26 RX ORDER — MECLIZINE HCL 12.5 MG
1 TABLET ORAL
Qty: 0 | Refills: 0 | DISCHARGE

## 2023-11-26 RX ORDER — SUCRALFATE 1 G
1 TABLET ORAL
Refills: 0 | Status: DISCONTINUED | OUTPATIENT
Start: 2023-11-26 | End: 2023-11-28

## 2023-11-26 RX ORDER — QUETIAPINE FUMARATE 200 MG/1
25 TABLET, FILM COATED ORAL AT BEDTIME
Refills: 0 | Status: DISCONTINUED | OUTPATIENT
Start: 2023-11-26 | End: 2023-11-28

## 2023-11-26 RX ORDER — CHLORHEXIDINE GLUCONATE 213 G/1000ML
1 SOLUTION TOPICAL DAILY
Refills: 0 | Status: DISCONTINUED | OUTPATIENT
Start: 2023-11-26 | End: 2023-12-12

## 2023-11-26 RX ORDER — SODIUM CHLORIDE 9 MG/ML
1000 INJECTION, SOLUTION INTRAVENOUS
Refills: 0 | Status: DISCONTINUED | OUTPATIENT
Start: 2023-11-26 | End: 2023-12-12

## 2023-11-26 RX ORDER — HEPARIN SODIUM 5000 [USP'U]/ML
5000 INJECTION INTRAVENOUS; SUBCUTANEOUS EVERY 12 HOURS
Refills: 0 | Status: DISCONTINUED | OUTPATIENT
Start: 2023-11-26 | End: 2023-12-04

## 2023-11-26 RX ORDER — DEXTROSE 50 % IN WATER 50 %
15 SYRINGE (ML) INTRAVENOUS ONCE
Refills: 0 | Status: DISCONTINUED | OUTPATIENT
Start: 2023-11-26 | End: 2023-12-12

## 2023-11-26 RX ORDER — OLANZAPINE 15 MG/1
2.5 TABLET, FILM COATED ORAL ONCE
Refills: 0 | Status: COMPLETED | OUTPATIENT
Start: 2023-11-26 | End: 2023-11-26

## 2023-11-26 RX ORDER — DICLOFENAC SODIUM 30 MG/G
0 GEL TOPICAL
Qty: 0 | Refills: 0 | DISCHARGE

## 2023-11-26 RX ORDER — INSULIN LISPRO 100/ML
VIAL (ML) SUBCUTANEOUS
Refills: 0 | Status: DISCONTINUED | OUTPATIENT
Start: 2023-11-26 | End: 2023-12-09

## 2023-11-26 RX ORDER — SUCRALFATE 1 G
1 TABLET ORAL
Refills: 0 | Status: DISCONTINUED | OUTPATIENT
Start: 2023-11-26 | End: 2023-11-26

## 2023-11-26 RX ORDER — INSULIN LISPRO 100/ML
VIAL (ML) SUBCUTANEOUS AT BEDTIME
Refills: 0 | Status: DISCONTINUED | OUTPATIENT
Start: 2023-11-26 | End: 2023-12-09

## 2023-11-26 RX ORDER — ACETAMINOPHEN 500 MG
650 TABLET ORAL EVERY 6 HOURS
Refills: 0 | Status: DISCONTINUED | OUTPATIENT
Start: 2023-11-26 | End: 2023-12-12

## 2023-11-26 RX ORDER — POTASSIUM CHLORIDE 20 MEQ
40 PACKET (EA) ORAL ONCE
Refills: 0 | Status: COMPLETED | OUTPATIENT
Start: 2023-11-26 | End: 2023-11-26

## 2023-11-26 RX ORDER — SODIUM CHLORIDE 9 MG/ML
500 INJECTION INTRAMUSCULAR; INTRAVENOUS; SUBCUTANEOUS ONCE
Refills: 0 | Status: COMPLETED | OUTPATIENT
Start: 2023-11-26 | End: 2023-11-26

## 2023-11-26 RX ORDER — POTASSIUM CHLORIDE 20 MEQ
40 PACKET (EA) ORAL ONCE
Refills: 0 | Status: DISCONTINUED | OUTPATIENT
Start: 2023-11-26 | End: 2023-11-26

## 2023-11-26 RX ORDER — PREGABALIN 225 MG/1
0 CAPSULE ORAL
Qty: 0 | Refills: 0 | DISCHARGE

## 2023-11-26 RX ORDER — ASPIRIN/CALCIUM CARB/MAGNESIUM 324 MG
1 TABLET ORAL
Qty: 0 | Refills: 0 | DISCHARGE

## 2023-11-26 RX ORDER — LANOLIN ALCOHOL/MO/W.PET/CERES
3 CREAM (GRAM) TOPICAL AT BEDTIME
Refills: 0 | Status: DISCONTINUED | OUTPATIENT
Start: 2023-11-26 | End: 2023-11-26

## 2023-11-26 RX ORDER — SODIUM BICARBONATE 1 MEQ/ML
1300 SYRINGE (ML) INTRAVENOUS
Refills: 0 | Status: DISCONTINUED | OUTPATIENT
Start: 2023-11-26 | End: 2023-11-26

## 2023-11-26 RX ORDER — DEXTROSE 50 % IN WATER 50 %
12.5 SYRINGE (ML) INTRAVENOUS ONCE
Refills: 0 | Status: DISCONTINUED | OUTPATIENT
Start: 2023-11-26 | End: 2023-12-12

## 2023-11-26 RX ORDER — CHOLECALCIFEROL (VITAMIN D3) 125 MCG
2000 CAPSULE ORAL DAILY
Refills: 0 | Status: DISCONTINUED | OUTPATIENT
Start: 2023-11-26 | End: 2023-12-07

## 2023-11-26 RX ORDER — LANOLIN ALCOHOL/MO/W.PET/CERES
6 CREAM (GRAM) TOPICAL AT BEDTIME
Refills: 0 | Status: DISCONTINUED | OUTPATIENT
Start: 2023-11-26 | End: 2023-12-07

## 2023-11-26 RX ORDER — ONDANSETRON 8 MG/1
4 TABLET, FILM COATED ORAL EVERY 8 HOURS
Refills: 0 | Status: DISCONTINUED | OUTPATIENT
Start: 2023-11-26 | End: 2023-12-12

## 2023-11-26 RX ORDER — POTASSIUM CHLORIDE 20 MEQ
20 PACKET (EA) ORAL
Refills: 0 | Status: DISCONTINUED | OUTPATIENT
Start: 2023-11-26 | End: 2023-11-26

## 2023-11-26 RX ORDER — ATORVASTATIN CALCIUM 80 MG/1
80 TABLET, FILM COATED ORAL AT BEDTIME
Refills: 0 | Status: DISCONTINUED | OUTPATIENT
Start: 2023-11-26 | End: 2023-12-07

## 2023-11-26 RX ORDER — PANTOPRAZOLE SODIUM 20 MG/1
40 TABLET, DELAYED RELEASE ORAL
Refills: 0 | Status: DISCONTINUED | OUTPATIENT
Start: 2023-11-26 | End: 2023-12-07

## 2023-11-26 RX ORDER — GLUCAGON INJECTION, SOLUTION 0.5 MG/.1ML
1 INJECTION, SOLUTION SUBCUTANEOUS ONCE
Refills: 0 | Status: DISCONTINUED | OUTPATIENT
Start: 2023-11-26 | End: 2023-12-12

## 2023-11-26 RX ORDER — FERROUS SULFATE 325(65) MG
325 TABLET ORAL DAILY
Refills: 0 | Status: DISCONTINUED | OUTPATIENT
Start: 2023-11-26 | End: 2023-11-26

## 2023-11-26 RX ORDER — CLOPIDOGREL BISULFATE 75 MG/1
75 TABLET, FILM COATED ORAL DAILY
Refills: 0 | Status: DISCONTINUED | OUTPATIENT
Start: 2023-11-26 | End: 2023-12-02

## 2023-11-26 RX ORDER — ESCITALOPRAM OXALATE 10 MG/1
10 TABLET, FILM COATED ORAL DAILY
Refills: 0 | Status: DISCONTINUED | OUTPATIENT
Start: 2023-11-26 | End: 2023-12-07

## 2023-11-26 RX ORDER — FOLIC ACID 0.8 MG
1 TABLET ORAL DAILY
Refills: 0 | Status: DISCONTINUED | OUTPATIENT
Start: 2023-11-26 | End: 2023-12-07

## 2023-11-26 RX ADMIN — OLANZAPINE 2.5 MILLIGRAM(S): 15 TABLET, FILM COATED ORAL at 18:24

## 2023-11-26 RX ADMIN — Medication 40 MILLIEQUIVALENT(S): at 08:50

## 2023-11-26 RX ADMIN — HEPARIN SODIUM 5000 UNIT(S): 5000 INJECTION INTRAVENOUS; SUBCUTANEOUS at 18:18

## 2023-11-26 RX ADMIN — SODIUM CHLORIDE 500 MILLILITER(S): 9 INJECTION INTRAMUSCULAR; INTRAVENOUS; SUBCUTANEOUS at 08:23

## 2023-11-26 RX ADMIN — ATORVASTATIN CALCIUM 80 MILLIGRAM(S): 80 TABLET, FILM COATED ORAL at 21:23

## 2023-11-26 RX ADMIN — OLANZAPINE 2.5 MILLIGRAM(S): 15 TABLET, FILM COATED ORAL at 14:14

## 2023-11-26 RX ADMIN — CHLORHEXIDINE GLUCONATE 1 APPLICATION(S): 213 SOLUTION TOPICAL at 12:32

## 2023-11-26 RX ADMIN — Medication 1 GRAM(S): at 18:18

## 2023-11-26 RX ADMIN — ESCITALOPRAM OXALATE 10 MILLIGRAM(S): 10 TABLET, FILM COATED ORAL at 12:14

## 2023-11-26 RX ADMIN — Medication 100 MEQ/KG/HR: at 18:23

## 2023-11-26 RX ADMIN — Medication 40 MILLIEQUIVALENT(S): at 13:06

## 2023-11-26 RX ADMIN — Medication 6 MILLIGRAM(S): at 21:23

## 2023-11-26 RX ADMIN — SODIUM CHLORIDE 500 MILLILITER(S): 9 INJECTION INTRAMUSCULAR; INTRAVENOUS; SUBCUTANEOUS at 11:11

## 2023-11-26 RX ADMIN — CLOPIDOGREL BISULFATE 75 MILLIGRAM(S): 75 TABLET, FILM COATED ORAL at 12:15

## 2023-11-26 RX ADMIN — QUETIAPINE FUMARATE 25 MILLIGRAM(S): 200 TABLET, FILM COATED ORAL at 21:22

## 2023-11-26 NOTE — H&P ADULT - PROBLEM SELECTOR PLAN 1
- Differential for syncope includes orthostatic hypotension vs. cardiac (arrythmia, valvular disease) vs. neuro  - Pt has h/o HFrEF, so increased risk of arrythmia  - EKG NSR, no events on tele  - favor orthostatic hypotension as cause as pt has positive orthostatics (103/46 --> 65/45)  - s/p fluid resuscitation w/ 1L NS  - will obtain TTE to eval for valvular disease  - continue to monitor on tele

## 2023-11-26 NOTE — PROVIDER CONTACT NOTE (MEDICATION) - ASSESSMENT
Pt AOx1, confused and agitated. Educated on the importance of taking meds but stilll refused. unable to reorient.

## 2023-11-26 NOTE — H&P ADULT - PROBLEM SELECTOR PLAN 6
- controlled w/ diet  - A1c 6.3%  - FS/low ISS  - hypoglycemia protocol in place given poor PO intake - home regimen: coreg 3.125 BID, valsartan-HCTZ 80-12.5 qd, aldactone 50mg qd  - hold antihypertensives d/t orthostatic hypotension

## 2023-11-26 NOTE — H&P ADULT - PROBLEM SELECTOR PLAN 4
- home regimen: coreg 3.125 BID, valsartan-HCTZ 80-12.5 qd, aldactone 50mg qd  - hold antihypertensives d/t orthostatic hypotension - pt eating less 2/2 reported epigastric pain/nausea  - also suspect there is an element of progressing dementia  - fluid resuscitation  - encourage PO intake, honor dietary preferences

## 2023-11-26 NOTE — PHYSICAL THERAPY INITIAL EVALUATION ADULT - GENERAL OBSERVATIONS, REHAB EVAL
Pt received in bed attempting to get OOB, in NAD, VSS (+) orthostatic, +morales, A&Ox2, confused, impulsive, agreeable to PT.

## 2023-11-26 NOTE — ED PROVIDER NOTE - PHYSICAL EXAMINATION
VITALS:   T(C): 36.4 (11-26-23 @ 03:02), Max: 36.6 (11-26-23 @ 02:23)  HR: 67 (11-26-23 @ 03:02) (54 - 67)  BP: 111/47 (11-26-23 @ 03:02) (90/45 - 111/47)  RR: 19 (11-26-23 @ 03:02) (19 - 20)  SpO2: 100% (11-26-23 @ 03:02) (96% - 100%)    GENERAL: NAD, lying in bed comfortably  HEAD:  Atraumatic, Normocephalic  EYES: EOMI, conjunctiva and sclera clear  CHEST/LUNG: Clear to auscultation bilaterally; No rales, rhonchi, wheezing, or rubs. Unlabored respirations  HEART: Regular rate and rhythm; No murmurs, rubs, or gallops  ABDOMEN: BSx4; Soft, mildly tender in suprapubic region, nondistended  EXTREMITIES:  2+ Peripheral Pulses, brisk capillary refill. No clubbing, cyanosis, or edema  NERVOUS SYSTEM:  A&Ox1-2, no focal deficits   SKIN: No rashes or lesions  Psych: Normal speech, normal behavior, normal affect

## 2023-11-26 NOTE — H&P ADULT - NSHPREVIEWOFSYSTEMS_GEN_ALL_CORE
REVIEW OF SYSTEMS:    CONSTITUTIONAL: No weakness, fevers or chills  EYES/ENT: No visual changes;  No vertigo or throat pain   NECK: No pain or stiffness  RESPIRATORY: No cough, wheezing, hemoptysis; No shortness of breath  CARDIOVASCULAR: No chest pain or palpitations  GASTROINTESTINAL: No abdominal or epigastric pain. No nausea, vomiting, or hematemesis; No diarrhea or constipation. No melena or hematochezia.  GENITOURINARY: +discomfort from [place] catheter  NEUROLOGICAL: No numbness or weakness  SKIN: No itching, burning, rashes, or lesions   All other review of systems is negative unless indicated above.

## 2023-11-26 NOTE — CONSULT NOTE ADULT - SUBJECTIVE AND OBJECTIVE BOX
[facility] DIVISION OF KIDNEY DISEASES AND HYPERTENSION -- [phone]  -- INITIAL CONSULT NOTE  --------------------------------------------------------------------------------  HPI: Ms. Thurman is a 89-year-old female with medical hx of dementia, HTN, HLD anemia, CAD, and multiple past UTIs (growing E. coli or P. mirabilis) presenting from [facility] for syncope and found to have LESLIE, for which nephrology service was consulted. Per EMS, pt was walking to the bathroom when she synopsized and fell backwards. However, pt was caught by staff so didn't hit her head or body on anything. Was then brought to ED. Of note, patient has been reportedly refusing to drink and had been placed on D5-1/2NS at the [facility]. Per paperwork that came with pt, recent positive UA on 11/13 and treated with CTX (unknown culture result). Patient herself is a poor historian due to her dementia and is AAOx1, but she did report some epigastric pain. ROS otherwise negative. She recently presented to [facility] ED on 11/9 for abdominal pain and had CT A/P without contrast that was unremarkable. Cr showed a slight bump to 1.72 at that time. She had a grossly positive UA with no follow-up Cx but was given IVF and Macrobid and discharged to follow up with PCP.    On presentation to the ER this time, patient's BP was 90/54, HR 54, satting well on room air and afebrile. Labs revealed Na 130, K 3.0, bicarb 13, BUN 45, Cr 7.00. Patient has no hx of kidney disease. [facility] ~1.00 and Na typically 135-140s. UA revealed small blood with 100mg/dL protein but otherwise negative for infection. Of note bladder scan in ED showed 300cc urine and morales was inserted.      PAST HISTORY  --------------------------------------------------------------------------------  PAST MEDICAL & SURGICAL HISTORY:  Hypertension  Diabetes Mellitus, Type 2  History of Cholecystectomy  Hyperlipidemia  Dementia  CAD (coronary artery disease)    FAMILY HISTORY:    PAST SOCIAL HISTORY:    ALLERGIES & MEDICATIONS  --------------------------------------------------------------------------------  Allergies    aspirin (Anaphylaxis)  Celebrex (Rash)  penicillin (Anaphylaxis)      Standing Inpatient Medications    PRN Inpatient Medications      REVIEW OF SYSTEMS  --------------------------------------------------------------------------------  Constitutional: No fevers/chills  HEENT: No HA, sore throat   Respiratory: No dyspnea, cough  Cardiovascular: No chest pain  Gastrointestinal: +epigastric pain  Genitourinary: No dysuria, hematuria, urgency  Extremities: No edema  Skin: No rashes  Heme: No easy bruising or bleeding    All other systems were reviewed and are negative, except as noted.    VITALS  --------------------------------------------------------------------------------  T(C): 36.4 (11-26-23 @ 03:02), Max: 36.6 (11-26-23 @ 02:23)  HR: 67 (11-26-23 @ 03:02) (54 - 67)  BP: 111/47 (11-26-23 @ 03:02) (90/45 - 111/47)  RR: 19 (11-26-23 @ 03:02) (19 - 20)  SpO2: 100% (11-26-23 @ 03:02) (96% - 100%)  Wt(kg): --  Height (cm): 152.4 (11-26-23 @ 02:23)  Weight (kg): 54.4 (11-26-23 @ 02:23)  BMI (kg/m2): 23.4 (11-26-23 @ 02:23)  BSA (m2): 1.5 (11-26-23 @ 02:23)    PHYSICAL EXAM:  General: no acute distress  Neuro: no focal deficits, AAOx1  HEENT: NC/AT, anicteric, no JVD, dry oral mucosa  Pulmonary: lungs CTA B/L  Cardiovascular/Chest: +S1S2, RRR  GI/Abdomen: soft, NT/ND, +bowel sounds  Extremities: No edema  : morales with clear urine  Skin: Warm and dry    LABS/STUDIES  --------------------------------------------------------------------------------              8.8    9.52  >-----------<  193      [11-26-23 @ 03:51]              27.2     130  |  101  |  45  ----------------------------<  107      [11-26-23 @ 03:51]  3.0   |  13  |  7.00        Ca     8.1     [11-26-23 @ 03:51]      Mg     1.7     [11-26-23 @ 03:51]      Phos  4.3     [11-26-23 @ 03:51]    TPro  5.1  /  Alb  2.8  /  TBili  0.4  /  DBili  x   /  AST  41  /  ALT  42  /  AlkPhos  39  [11-26-23 @ 03:51]    PT/INR: PT 11.5 , INR 1.05       [11-26-23 @ 03:51]  PTT: 31.0       [11-26-23 @ 03:51]      Creatinine Trend:  SCr 7.00 [11-26 @ 03:51]  SCr 1.72 [11-09 @ 15:10]    Urinalysis - [11-26-23 @ 05:02]      Color Yellow / Appearance Clear / SG 1.006 / pH 6.5      Gluc 250 / Ketone Negative  / Bili Negative / Urobili 0.2       Blood Small / Protein 100 / Leuk Est Negative / Nitrite Negative      RBC 2 / WBC 1 / Hyaline  / Gran  / Sq Epi  / Non Sq Epi 1 / Bacteria Negative    Urine Creatinine 29      [11-26-23 @ 05:30]  Urine Protein 42      [11-26-23 @ 05:30]  Urine Sodium 30      [11-26-23 @ 05:30]  Urine Potassium 17      [11-26-23 @ 05:30]  Urine Osmolality 154      [11-26-23 @ 05:30]

## 2023-11-26 NOTE — CONSULT NOTE ADULT - ASSESSMENT
Ms. Thurman is a 89-year-old female with medical hx of dementia, HTN, HLD anemia, CAD, and multiple past UTIs (growing E. coli or P. mirabilis) presenting from SNF for syncope and found to have LESLIE, for which nephrology service was consulted.   On presentation to the ER this time, patient's BP was 90/54, HR 54, satting well on room air and afebrile. Labs revealed Na 130, K 3.0, bicarb 13, BUN 45, Cr 7.00. Patient has no hx of kidney disease. Per [facility], SCr baseline ~1.00 and Na typically 135-140s. UA revealed small blood with 100mg/dL protein but otherwise negative for infection. Of note bladder scan in ED showed 300cc urine and morales was inserted. CXR was clear.      Prerenal LESLIE with hypovolemic hyponatremia and hypokalemia:   Urine lytes c/w prerenal etiology. In addition, pt appears dry on exam and was hypotensive on presentation. Would check orthostats, may have contributed to reported syncopal episode. Please bolus 1L NS and replete K with 80meq KCl now and recheck renal panel 4 hours after.  Please obtain renal US to r/o hydro given pt may have been retaining. Would review patient's home meds and avoid any anticholinergic meds in the elderly that could contribute to urinary retention, confusion, or falls.  No indication for RRT.  Please record STRICT Is+Os  Avoid any nephrotoxic agents at this time (would hold her antihypertensives given low BP and LESLIE) and dose all other meds based on evolving eGFRs.      Alok Akers, DO  Nephrology Fellow  Feel free to contact me directly on TEAMS with any questions.  (After 5pm or on weekends, please call the on-call fellow).

## 2023-11-26 NOTE — PHYSICAL THERAPY INITIAL EVALUATION ADULT - ADDITIONAL COMMENTS
PTA pt was at skilled nursing facility where she had syncopal event while walking to the bathroom. Pt unable to provide accurate social hx.

## 2023-11-26 NOTE — H&P ADULT - ATTENDING COMMENTS
Patient is a 89 year old female, with PMH of advanced dementia, HTN, HLD, DM2, anemia, CAD (s/p LAD and LCx stent 2010), HFrEF (EF 40-45% 2021), h/o TIA no residual deficits, h/o recurrent UTI presents w/ syncope and fall w/o trauma in the setting of 1 month of poor PO intake, nausea, vomiting, epigastric pain. C/f failure to thrive i/s/o ?PUD? vs. dementia. Syncope likely orthostatic in nature    - orthostatic positive; s/p IV bolus; continue to monitor orthostatics   - hold BP meds for now in setting of orthostatic hypotension   - ECHO ordered to be done   - nephro eval for acute renal failure  - team attempted to obtain repeat BMP this afternoon but patient agitated and not allowing for blood draw; will plan to repeat in AM with phlebotomy   - S/S eval pending   - PT eval   - obtain renal US     Rest as above. Discussed with HS.

## 2023-11-26 NOTE — ED ADULT NURSE NOTE - OBJECTIVE STATEMENT
89 y/o Axox1 F arrived from rehab brought by ambulance status post witnessed fall. PMH dementia, DM, HLD. As per EMS, pt was walking with aide to bathroom at 0000 when staff observed pt syncopize, was lowered to the floor by staff, did not hit head/no AC use. As per EMS, pt does not remember falling, also was told by staff pt has not eaten/drank in 3 days, was on 'dextrose fluids' through a 22G RAC IV placed 11/23 by facility. Pt on CM NSR.

## 2023-11-26 NOTE — ED ADULT TRIAGE NOTE - IDEAL BODY WEIGHT(KG)
----- Message from Elliott Eden sent at 9/13/2018  9:46 AM CDT -----  Contact: pt  The Pt is requesting a call back regarding her BP. Please call Pt to advise.     Call Back#: [phone]   Thanks    
Patient has been taking bp-     127/78 is average. Patient advised this is normal and to monitor.   
46

## 2023-11-26 NOTE — H&P ADULT - NSICDXPASTMEDICALHX_GEN_ALL_CORE_FT
PAST MEDICAL HISTORY:  Anemia     CAD (coronary artery disease)     Dementia     Diabetes Mellitus, Type 2     HFrEF (heart failure with reduced ejection fraction)     History of TIA (transient ischemic attack)     Hyperlipidemia     Hypertension

## 2023-11-26 NOTE — CHART NOTE - NSCHARTNOTEFT_GEN_A_CORE
Notified by RN that patient was agitated, screaming at staff. Saw and evaluated patient at bedside. She was screaming at staff "I want to be inside". She was inside the room at this time. She was also endorsing discomfort from the Morales. I acknowledged her distress and offered pain medications or a topical cream for the morales, which she declined. I attempted to redirect her, telling her she was in the hospital inside her room. Ultimately, I called her daughter Zoey who was able to speak with her on the phone and redirect her.    Later in the day, after her family left bedside, I was notified by RN again that patient was agitated, screaming at staff, attempting to pull out Morales. I saw and evaluated patient at bedside. At this time, 2 staff members were holding her arms and attempting to redirect her since she was trying to pull an inflated Morales out, which would cause harm to her. I attempted to redirect her, as well as provide distraction. She was not redirectable and was an imminent harm to herself. It would not be appropriate to remove her Morales since she has urinary retention and ongoing LESLIE. Therefore, the decision was made to give Zyprexa 2.5mg IM. Shortly after this, she was redirectable. A 1:1 constant obs was started. Patient would benefit from being in an enhanced supervision room, but none were available at that time. Continued attempts at redirection and distraction will be made.    An EKG from today demonstrated a QTc<450ms    Plan:  - if agitated, first attempt to redirect  - then administer Zyprexa 2.5mg IM  - can give another dose after 30 mins  - do not give ativan/benzos within 30 mins of zyprexa d/t risk of respiratory depression  - if this does not work, may have to implement soft mittens until patient is redirectable and not at risk from harming self by pulling out inflated morales

## 2023-11-26 NOTE — H&P ADULT - NSHPPHYSICALEXAM_GEN_ALL_CORE
T(C): 36.4 (11-26-23 @ 12:03), Max: 36.6 (11-26-23 @ 02:23)  HR: 55 (11-26-23 @ 12:03) (54 - 67)  BP: 114/52 (11-26-23 @ 12:03) (90/45 - 114/52)  RR: 18 (11-26-23 @ 12:03) (18 - 20)  SpO2: 96% (11-26-23 @ 12:03) (96% - 100%)    GENERAL: Well-appearing, well-nourished, NAD  EYES: EOMI, no scleral icterus  NECK: No JVD, supple  LUNG: CTAB, no wheezes, no rhonchi, no accessory muscle use  HEART: RRR, no m/g/r  ABDOMEN: Soft, NT, ND. No suprapubic tenderness  EXTREMITIES:  No BLE edema  PSYCH: anxious  NEUROLOGY: A&Ox2 (name, hospital; cannot state year or month); non-focal, MAEx4  SKIN: No rashes or lesions

## 2023-11-26 NOTE — H&P ADULT - NSHPSOCIALHISTORY_GEN_ALL_CORE
Reports history of smoking, from approx age 20 to age 70. Cannot report how many ppd  Drinks <1 alcoholic drink per month  No recreational drugs  Had 24hr HHA. Dependent for ADLs. Came from SNF

## 2023-11-26 NOTE — ED PROVIDER NOTE - CLINICAL SUMMARY MEDICAL DECISION MAKING FREE TEXT BOX
89yo/F w/ PMHx dementia, HLD, E. coli UTI in past, HTN, anemia, FTT, CAD coming in from SNF for syncope. Will obtain CBC, CMP to rule out anemia/electrolyte derangements as causes of syncope. Will order UA to rule out UTI, CXR to rule out PNA as possible causes of syncope. RVP to rule out URI. 89yo/F w/ PMHx dementia, HLD, E. coli UTI in past, HTN, anemia, FTT, CAD coming in from SNF for syncope. Will obtain CBC, CMP to rule out anemia/electrolyte derangements as causes of syncope. Will order UA to rule out UTI, CXR to rule out PNA as possible causes of syncope. RVP to rule out URI. EKG to rule out arrhythmias. Ddx UTI vs. cardiac etiology. 89yo/F w/ PMHx dementia, HLD, E. coli UTI in past, HTN, anemia, FTT, CAD coming in from SNF for syncope. Will obtain CBC, CMP to rule out anemia/electrolyte derangements as causes of syncope. Will order UA to rule out UTI, CXR to rule out PNA as possible causes of syncope. RVP to rule out URI. EKG to rule out arrhythmias. Ddx UTI vs. cardiac etiology. EKG without NEAL/STD. Labs significant for hyponatremia, Cr 7. Bladder scan >300mL. Cornell placed. Will c/s nephrology. Will admit to medicine. 89yo/F w/ PMHx dementia, HLD, E. coli UTI in past, HTN, anemia, FTT, CAD coming in from SNF for syncope. Will obtain CBC, CMP to rule out anemia/electrolyte derangements as causes of syncope. Will order UA to rule out UTI, CXR to rule out PNA as possible causes of syncope. RVP to rule out URI. EKG to rule out arrhythmias. Ddx UTI vs. cardiac etiology. EKG without NEAL/STD. Labs significant for hyponatremia, Cr 7. Bladder scan >300mL. Cornell placed. Will c/s nephrology for LESLIE on CKD and hyopnatremia. Sent off UA and urine studies. Will admit to medicine. 89yo/F w/ PMHx dementia, HLD, E. coli UTI in past, HTN, anemia, FTT, CAD coming in from SNF for syncope. Will obtain CBC, CMP to rule out anemia/electrolyte derangements as causes of syncope. Will order UA to rule out UTI, CXR to rule out PNA as possible causes of syncope. RVP to rule out URI. EKG to rule out arrhythmias. Ddx UTI vs. cardiac etiology. EKG without NEAL/STD.     Purvi Madrigal MD - Attending Physician: Pt here with multiple medical problems, here after unprovoked syncope while walking. No trauma (caught by staff), now at baseline. Multiple risk factors. Has also been refusing PO for 3 days. Labs, EKG, UA for eval

## 2023-11-26 NOTE — H&P ADULT - PROBLEM SELECTOR PLAN 5
- w/ agitation  - seems to be FAST Stage 6? No official staging outpt noted  - hold home nanzeric in the setting of orthostatic hypotension  - Zyprexa 2.5mg IM PRN for agitation w/ harm to self (e.g. pulling at lines) - peptic ulcer disease vs. pancreatitis vs. functional  - no findings on CT AP of pancreatitis, does not have classic history  - never had endoscopy to evaluate  - will trial protonix 40mg qd   - continue sucralfate 1g BID

## 2023-11-26 NOTE — H&P ADULT - PROBLEM SELECTOR PLAN 10
DVT ppx: HSQ  Diet: regular, Kosher  Dispo: pending medical improvement, PT eval - home regimen: coreg 3.125 BID, valsartan-HCTZ 80-12.5 qd, aldactone 50mg qd  - hold given orthostatic hypotensoin

## 2023-11-26 NOTE — H&P ADULT - PROBLEM SELECTOR PLAN 2
- pt eating less 2/2 - pt eating less 2/2 reported epigastric pain/nausea  - also suspect there is an element of progressing dementia  - fluid resuscitation  - encourage PO intake, honor dietary preferences - SCr on admissoin 7  - baseline SCr is around 1  - appreciate nephro recs  - appears to be both prerenal and obstructive  - fluid resuscitation for prerenal  - has morales in place for obstructive LESLIE  - will monitor for post-LESLIE diuresis  - replete electrolytes PRN

## 2023-11-26 NOTE — ED PROVIDER NOTE - CARE PLAN
Principal Discharge DX:	Syncope   1 Principal Discharge DX:	Acute kidney injury superimposed on CKD  Secondary Diagnosis:	Syncope

## 2023-11-26 NOTE — PHYSICAL THERAPY INITIAL EVALUATION ADULT - PERTINENT HX OF CURRENT PROBLEM, REHAB EVAL
89yo/F w/ PMHx dementia, HLD, E. coli UTI in past, HTN, anemia, FTT, CAD coming in from SNF for syncope. Per EMS, pt was walking to the bathroom when she synopsized and fell backwards. However, pt was caught by staff so didn't hit her head or body on anything. Was then brought to ED. Of note, patient has been reportedly refusing to drink and has been placed on D5-1/2NS IVF for nutrition. Per paperwork that came with pt, recent positive UA on 11/13 and treated with CTX. ROS negative for lightheadedness, SOB, CP, palpitations, abd pain. 89 y/o F pmhx advanced dementia, HTN, HLD, DM2, anemia, CAD (s/p LAD and LCx stent 2010), HFrEF (EF 40-45% 2021), h/o TIA no residual deficits, h/o recurrent UTI presents w/ syncope and fall w/o trauma in the setting of 1 month of poor PO intake, nausea, vomiting, epigastric pain. Collateral obtained from pt's son Gianni and daughter Zoey. They report that her baseline is A&Ox2 (does not know location) and has a tendency to become agitated, consisting of screaming while confused. She has 24hr HHA and is dependent for most ADLs. She was had 2 ED visits in the past month for her poor PO intake and epigastric pain. She was given sucralfate by her PCP, with the presumed diagnosis of peptic ulcer disease. She has only taken some of her medications intermittently due to her decreased interest in eating/pain. Her children report her pain is worse with eating. Yesterday, she was at her SNF when she syncopized. It was reported that staff caught her before she hit t he ground. She has no recollection of this incident. Her children are not able to provide collateral on this syncopal event. ED course: found to have LESLIE 2/2 urinary retention, . Cornell placed.

## 2023-11-26 NOTE — H&P ADULT - ASSESSMENT
89 y/o F pmhx advanced dementia, HTN, HLD, DM2, anemia, CAD (s/p LAD and LCx stent 2010), HFrEF (EF 40-45% 2021), h/o TIA no residual deficits, h/o recurrent UTI presents w/ syncope and fall w/o trauma in the setting of 1 month of poor PO intake, nausea, vomiting, epigastric pain. C/f failure to thrive i/s/o ?PUD? vs. dementia. Syncope likely orthostatic in nature

## 2023-11-26 NOTE — H&P ADULT - PROBLEM SELECTOR PLAN 7
- home regimen: Plavix 75mg qd, rosuvastatin 40mg qd, Zetia 10mg qd  - continue home Plavix 75mg qd  - Zetia is nonformulary  - intertherapeutic interchange of rosuvastatin 40mg to lipitor 80mg qd - w/ agitation  - seems to be FAST Stage 6? No official staging outpt noted  - hold home nanzeric in the setting of orthostatic hypotension  - Zyprexa 2.5mg IM PRN for agitation w/ harm to self (e.g. pulling at lines) - w/ agitation  - seems to be FAST Stage 6? No official staging outpt noted  - hold home nanzeric in the setting of orthostatic hypotension  - will move to enhanced supervision room  - Zyprexa 2.5mg IM PRN for agitation w/ harm to self (e.g. pulling at lines)

## 2023-11-26 NOTE — H&P ADULT - NSHPLABSRESULTS_GEN_ALL_CORE
LABS:                        8.8    9.52  )-----------( 193      ( 2023 03:51 )             27.2     -    130<L>  |  101  |  45<H>  ----------------------------<  107<H>  3.0<L>   |  13<L>  |  7.00<H>    Ca    8.1<L>      2023 03:51  Phos  4.3       Mg     1.7         TPro  5.1<L>  /  Alb  2.8<L>  /  TBili  0.4  /  DBili  x   /  AST  41<H>  /  ALT  42  /  AlkPhos  39<L>      CARDIAC MARKERS ( 2023 03:51 )  x     / x     / 65 U/L / x     / x          PT/INR - ( 2023 03:51 )   PT: 11.5 sec;   INR: 1.05 ratio         PTT - ( 2023 03:51 )  PTT:31.0 sec  Urinalysis Basic - ( 2023 05:02 )    Color: Yellow / Appearance: Clear / S.006 / pH: x  Gluc: x / Ketone: Negative mg/dL  / Bili: Negative / Urobili: 0.2 mg/dL   Blood: x / Protein: 100 mg/dL / Nitrite: Negative   Leuk Esterase: Negative / RBC: 2 /HPF / WBC 1 /HPF   Sq Epi: x / Non Sq Epi: 1 /HPF / Bacteria: Negative /HPF      I&O's Summary    BNP    RADIOLOGY & ADDITIONAL STUDIES:    TELEMETRY: reviewed    EKG: reviewed  NSR 60s w/ rare PACs

## 2023-11-26 NOTE — ED PROVIDER NOTE - OBJECTIVE STATEMENT
89yo/F w/ PMHx dementia, HLD, E. coli UTI in past, HTN, anemia, FTT, CAD coming in from SNF for syncope. Per EMS, pt was walking to the bathroom when she synopsized and fell backwards. However, pt was caught by staff so didn't hit her head or body on anything. Was then brought to ED. Per paperwork that came with pt, recent positive UA on 11/13 and treated with CTX. ROS negative for lightheadedness, SOB, CP, palpitations, 89yo/F w/ PMHx dementia, HLD, E. coli UTI in past, HTN, anemia, FTT, CAD coming in from SNF for syncope. Per EMS, pt was walking to the bathroom when she synopsized and fell backwards. However, pt was caught by staff so didn't hit her head or body on anything. Was then brought to ED. Per paperwork that came with pt, recent positive UA on 11/13 and treated with CTX. ROS negative for lightheadedness, SOB, CP, palpitations, abd pain. 89yo/F w/ PMHx dementia, HLD, E. coli UTI in past, HTN, anemia, FTT, CAD coming in from SNF for syncope. Per EMS, pt was walking to the bathroom when she synopsized and fell backwards. However, pt was caught by staff so didn't hit her head or body on anything. Was then brought to ED. Of note, patient has been reportedly refusing to drink and has been placed on D5-1/2NS IVF for nutrition. Per paperwork that came with pt, recent positive UA on 11/13 and treated with CTX. ROS negative for lightheadedness, SOB, CP, palpitations, abd pain.

## 2023-11-26 NOTE — H&P ADULT - PROBLEM SELECTOR PLAN 11
- home regimen: rosuvastatin 40mg qd, Zetia 10mg qd  - Zetia is nonformulary  - intertherapeutic interchange of rosuvastatin 40mg to lipitor 80mg qd

## 2023-11-26 NOTE — H&P ADULT - PROBLEM SELECTOR PLAN 3
- peptic ulcer disease vs. pancreatitis vs. functional  - no findings on CT AP of pancreatitis, does not have classic history  - never had endoscopy to evaluate  - will trial protonix 40mg qd   - continue sucralfate 1g BID - bicarb on admission 13  - 2/2 LESLIE  - if not improved on repeat BMP after morales, will start sodium bicarb 1300 BID

## 2023-11-26 NOTE — H&P ADULT - HISTORY OF PRESENT ILLNESS
IN PROGRESS 89 y/o F pmhx advanced dementia, HTN, HLD, DM2, anemia, CAD (s/p LAD and LCx stent 2010), HFrEF (EF 40-45% 2021), h/o TIA no residual deficits, h/o recurrent UTI presents w/ syncope and fall w/o trauma in the setting of 1 month of poor PO intake, nausea, vomiting, epigastric pain.  89 y/o F pmhx advanced dementia, HTN, HLD, DM2, anemia, CAD (s/p LAD and LCx stent 2010), HFrEF (EF 40-45% 2021), h/o TIA no residual deficits, h/o recurrent UTI presents w/ syncope and fall w/o trauma in the setting of 1 month of poor PO intake, nausea, vomiting, epigastric pain. Collateral obtained from pt's son Gianni and daughter Zoey. They report that her baseline is A&Ox2 (does not know location) and has a tendency to become agitated, consisting of screaming while confused. She has 24hr HHA and is dependent for most ADLs. She was had 2 ED visits in the past month for her poor PO intake and epigastric pain. She was given sucralfate by her PCP, with the presumed diagnosis of peptic ulcer disease. She has only taken some of her medications intermittently due to her decreased interest in eating/pain. Her children report her pain is worse with eating. Yesterday, she was at her SNF when she syncopized. It was reported that staff caught her before she hit t he ground. She has no recollection of this incident. Her children are not able to provide collateral on this syncopal event.    ED course: found to have LESLIE 2/2 urinary retention, . Cornell placed.

## 2023-11-26 NOTE — ED PROVIDER NOTE - PROGRESS NOTE DETAILS
Labs significant for hyponatremia, Cr 7. Bladder scan >300mL. Cornell placed. Will c/s nephrology for LESLIE on CKD and hyponatremia. Sent off UA and urine studies. Will admit to medicine.

## 2023-11-26 NOTE — H&P ADULT - PROBLEM SELECTOR PLAN 9
- home regimen: rosuvastatin 40mg qd, Zetia 10mg qd  - Zetia is nonformulary  - intertherapeutic interchange of rosuvastatin 40mg to lipitor 80mg qd - home regimen: Plavix 75mg qd, rosuvastatin 40mg qd, Zetia 10mg qd  - continue home Plavix 75mg qd  - Zetia is nonformulary  - intertherapeutic interchange of rosuvastatin 40mg to lipitor 80mg qd

## 2023-11-26 NOTE — H&P ADULT - CONVERSATION DETAILS
I reviewed with the family the natural history of dementia and discussed with them how it is a progressive disease w/o a cure.  Reviewed what a code status is with pt's adult children. They expressed understanding. They state that she is [employer] and would like to have her rabbi involved in that decision. They would only like to make that decision if a life-threatening situation should arise. Patient is FULL CODE.

## 2023-11-26 NOTE — ED ADULT NURSE REASSESSMENT NOTE - NS ED NURSE REASSESS COMMENT FT1
Indwelling urinary catheter placed using aseptic technique. Sterile equipment utilized. Second RN present to confirm sterility. Draining to gravity - initial output of 400ml. Secured w/ stat lock to upper R leg. Explained procedure as it was being done - Pt tolerated procedure well. Comfort and safety provided. Indwelling urinary catheter placed using aseptic technique. Sterile equipment utilized. Second RN present to confirm sterility. Draining to gravity - initial output of 200ml. Secured w/ stat lock to upper R leg. Explained procedure as it was being done - Pt tolerated procedure well. Comfort and safety provided.

## 2023-11-26 NOTE — ED ADULT NURSE NOTE - NSFALLHARMRISKINTERV_ED_ALL_ED
Assistance OOB with selected safe patient handling equipment if applicable/Assistance with ambulation/Communicate risk of Fall with Harm to all staff, patient, and family/Monitor gait and stability/Monitor for mental status changes and reorient to person, place, and time, as needed/Move patient closer to nursing station/within visual sight of ED staff/Provide visual cue: red socks, yellow wristband, yellow gown, etc/Reinforce activity limits and safety measures with patient and family/Toileting schedule using arm’s reach rule for commode and bathroom/Use of alarms - bed, stretcher, chair and/or video monitoring/Bed in lowest position, wheels locked, appropriate side rails in place/Call bell, personal items and telephone in reach/Instruct patient to call for assistance before getting out of bed/chair/stretcher/Non-slip footwear applied when patient is off stretcher/[place] to call system/Physically safe environment - no spills, clutter or unnecessary equipment/Purposeful Proactive Rounding/Room/bathroom lighting operational, light cord in reach

## 2023-11-27 LAB
ANION GAP SERPL CALC-SCNC: 13 MMOL/L — SIGNIFICANT CHANGE UP (ref 5–17)
ANION GAP SERPL CALC-SCNC: 13 MMOL/L — SIGNIFICANT CHANGE UP (ref 5–17)
ANION GAP SERPL CALC-SCNC: 14 MMOL/L — SIGNIFICANT CHANGE UP (ref 5–17)
ANION GAP SERPL CALC-SCNC: 14 MMOL/L — SIGNIFICANT CHANGE UP (ref 5–17)
BASE EXCESS BLDV CALC-SCNC: -6.3 MMOL/L — LOW (ref -2–3)
BASE EXCESS BLDV CALC-SCNC: -6.3 MMOL/L — LOW (ref -2–3)
BASE EXCESS BLDV CALC-SCNC: -8.4 MMOL/L — LOW (ref -2–3)
BASE EXCESS BLDV CALC-SCNC: -8.4 MMOL/L — LOW (ref -2–3)
BUN SERPL-MCNC: 44 MG/DL — HIGH (ref 7–23)
BUN SERPL-MCNC: 44 MG/DL — HIGH (ref 7–23)
BUN SERPL-MCNC: 46 MG/DL — HIGH (ref 7–23)
BUN SERPL-MCNC: 46 MG/DL — HIGH (ref 7–23)
CA-I SERPL-SCNC: 1.19 MMOL/L — SIGNIFICANT CHANGE UP (ref 1.15–1.33)
CA-I SERPL-SCNC: 1.19 MMOL/L — SIGNIFICANT CHANGE UP (ref 1.15–1.33)
CA-I SERPL-SCNC: 1.21 MMOL/L — SIGNIFICANT CHANGE UP (ref 1.15–1.33)
CA-I SERPL-SCNC: 1.21 MMOL/L — SIGNIFICANT CHANGE UP (ref 1.15–1.33)
CALCIUM SERPL-MCNC: 8.2 MG/DL — LOW (ref 8.4–10.5)
CALCIUM SERPL-MCNC: 8.2 MG/DL — LOW (ref 8.4–10.5)
CALCIUM SERPL-MCNC: 8.3 MG/DL — LOW (ref 8.4–10.5)
CALCIUM SERPL-MCNC: 8.3 MG/DL — LOW (ref 8.4–10.5)
CHLORIDE BLDV-SCNC: 104 MMOL/L — SIGNIFICANT CHANGE UP (ref 96–108)
CHLORIDE BLDV-SCNC: 104 MMOL/L — SIGNIFICANT CHANGE UP (ref 96–108)
CHLORIDE BLDV-SCNC: 105 MMOL/L — SIGNIFICANT CHANGE UP (ref 96–108)
CHLORIDE BLDV-SCNC: 105 MMOL/L — SIGNIFICANT CHANGE UP (ref 96–108)
CHLORIDE SERPL-SCNC: 104 MMOL/L — SIGNIFICANT CHANGE UP (ref 96–108)
CHLORIDE SERPL-SCNC: 104 MMOL/L — SIGNIFICANT CHANGE UP (ref 96–108)
CHLORIDE SERPL-SCNC: 105 MMOL/L — SIGNIFICANT CHANGE UP (ref 96–108)
CHLORIDE SERPL-SCNC: 105 MMOL/L — SIGNIFICANT CHANGE UP (ref 96–108)
CO2 BLDV-SCNC: 18 MMOL/L — LOW (ref 22–26)
CO2 BLDV-SCNC: 18 MMOL/L — LOW (ref 22–26)
CO2 BLDV-SCNC: 21 MMOL/L — LOW (ref 22–26)
CO2 BLDV-SCNC: 21 MMOL/L — LOW (ref 22–26)
CO2 SERPL-SCNC: 17 MMOL/L — LOW (ref 22–31)
CO2 SERPL-SCNC: 17 MMOL/L — LOW (ref 22–31)
CO2 SERPL-SCNC: 21 MMOL/L — LOW (ref 22–31)
CO2 SERPL-SCNC: 21 MMOL/L — LOW (ref 22–31)
CREAT SERPL-MCNC: 6.77 MG/DL — HIGH (ref 0.5–1.3)
CREAT SERPL-MCNC: 6.77 MG/DL — HIGH (ref 0.5–1.3)
CREAT SERPL-MCNC: 6.85 MG/DL — HIGH (ref 0.5–1.3)
CREAT SERPL-MCNC: 6.85 MG/DL — HIGH (ref 0.5–1.3)
CULTURE RESULTS: NO GROWTH — SIGNIFICANT CHANGE UP
CULTURE RESULTS: NO GROWTH — SIGNIFICANT CHANGE UP
EGFR: 5 ML/MIN/1.73M2 — LOW
FOLATE SERPL-MCNC: 14.6 NG/ML — SIGNIFICANT CHANGE UP
FOLATE SERPL-MCNC: 14.6 NG/ML — SIGNIFICANT CHANGE UP
GAS PNL BLDV: 132 MMOL/L — LOW (ref 136–145)
GAS PNL BLDV: SIGNIFICANT CHANGE UP
GLUCOSE BLDC GLUCOMTR-MCNC: 117 MG/DL — HIGH (ref 70–99)
GLUCOSE BLDC GLUCOMTR-MCNC: 117 MG/DL — HIGH (ref 70–99)
GLUCOSE BLDC GLUCOMTR-MCNC: 77 MG/DL — SIGNIFICANT CHANGE UP (ref 70–99)
GLUCOSE BLDC GLUCOMTR-MCNC: 77 MG/DL — SIGNIFICANT CHANGE UP (ref 70–99)
GLUCOSE BLDC GLUCOMTR-MCNC: 82 MG/DL — SIGNIFICANT CHANGE UP (ref 70–99)
GLUCOSE BLDC GLUCOMTR-MCNC: 82 MG/DL — SIGNIFICANT CHANGE UP (ref 70–99)
GLUCOSE BLDC GLUCOMTR-MCNC: 88 MG/DL — SIGNIFICANT CHANGE UP (ref 70–99)
GLUCOSE BLDC GLUCOMTR-MCNC: 88 MG/DL — SIGNIFICANT CHANGE UP (ref 70–99)
GLUCOSE BLDC GLUCOMTR-MCNC: 89 MG/DL — SIGNIFICANT CHANGE UP (ref 70–99)
GLUCOSE BLDC GLUCOMTR-MCNC: 89 MG/DL — SIGNIFICANT CHANGE UP (ref 70–99)
GLUCOSE BLDV-MCNC: 126 MG/DL — HIGH (ref 70–99)
GLUCOSE BLDV-MCNC: 126 MG/DL — HIGH (ref 70–99)
GLUCOSE BLDV-MCNC: 97 MG/DL — SIGNIFICANT CHANGE UP (ref 70–99)
GLUCOSE BLDV-MCNC: 97 MG/DL — SIGNIFICANT CHANGE UP (ref 70–99)
GLUCOSE SERPL-MCNC: 77 MG/DL — SIGNIFICANT CHANGE UP (ref 70–99)
GLUCOSE SERPL-MCNC: 77 MG/DL — SIGNIFICANT CHANGE UP (ref 70–99)
GLUCOSE SERPL-MCNC: 97 MG/DL — SIGNIFICANT CHANGE UP (ref 70–99)
GLUCOSE SERPL-MCNC: 97 MG/DL — SIGNIFICANT CHANGE UP (ref 70–99)
HCO3 BLDV-SCNC: 17 MMOL/L — LOW (ref 22–29)
HCO3 BLDV-SCNC: 17 MMOL/L — LOW (ref 22–29)
HCO3 BLDV-SCNC: 20 MMOL/L — LOW (ref 22–29)
HCO3 BLDV-SCNC: 20 MMOL/L — LOW (ref 22–29)
HCT VFR BLD CALC: 29.2 % — LOW (ref 34.5–45)
HCT VFR BLD CALC: 29.2 % — LOW (ref 34.5–45)
HCT VFR BLDA CALC: 28 % — LOW (ref 34.5–46.5)
HCT VFR BLDA CALC: 28 % — LOW (ref 34.5–46.5)
HCT VFR BLDA CALC: 31 % — LOW (ref 34.5–46.5)
HCT VFR BLDA CALC: 31 % — LOW (ref 34.5–46.5)
HGB BLD CALC-MCNC: 10 G/DL — LOW (ref 11.7–16.1)
HGB BLD CALC-MCNC: 10 G/DL — LOW (ref 11.7–16.1)
HGB BLD CALC-MCNC: 9.2 G/DL — LOW (ref 11.7–16.1)
HGB BLD CALC-MCNC: 9.2 G/DL — LOW (ref 11.7–16.1)
HGB BLD-MCNC: 9.5 G/DL — LOW (ref 11.5–15.5)
HGB BLD-MCNC: 9.5 G/DL — LOW (ref 11.5–15.5)
LACTATE BLDV-MCNC: 1.3 MMOL/L — SIGNIFICANT CHANGE UP (ref 0.5–2)
LACTATE BLDV-MCNC: 1.3 MMOL/L — SIGNIFICANT CHANGE UP (ref 0.5–2)
LACTATE BLDV-MCNC: 1.5 MMOL/L — SIGNIFICANT CHANGE UP (ref 0.5–2)
LACTATE BLDV-MCNC: 1.5 MMOL/L — SIGNIFICANT CHANGE UP (ref 0.5–2)
LACTATE BLDV-MCNC: 1.8 MMOL/L — SIGNIFICANT CHANGE UP (ref 0.5–2)
LACTATE BLDV-MCNC: 1.8 MMOL/L — SIGNIFICANT CHANGE UP (ref 0.5–2)
MAGNESIUM SERPL-MCNC: 1.7 MG/DL — SIGNIFICANT CHANGE UP (ref 1.6–2.6)
MAGNESIUM SERPL-MCNC: 1.7 MG/DL — SIGNIFICANT CHANGE UP (ref 1.6–2.6)
MCHC RBC-ENTMCNC: 26.2 PG — LOW (ref 27–34)
MCHC RBC-ENTMCNC: 26.2 PG — LOW (ref 27–34)
MCHC RBC-ENTMCNC: 32.5 GM/DL — SIGNIFICANT CHANGE UP (ref 32–36)
MCHC RBC-ENTMCNC: 32.5 GM/DL — SIGNIFICANT CHANGE UP (ref 32–36)
MCV RBC AUTO: 80.7 FL — SIGNIFICANT CHANGE UP (ref 80–100)
MCV RBC AUTO: 80.7 FL — SIGNIFICANT CHANGE UP (ref 80–100)
NRBC # BLD: 0 /100 WBCS — SIGNIFICANT CHANGE UP (ref 0–0)
NRBC # BLD: 0 /100 WBCS — SIGNIFICANT CHANGE UP (ref 0–0)
OTHER CELLS CSF MANUAL: 12.9 ML/DL — LOW (ref 18–22)
OTHER CELLS CSF MANUAL: 12.9 ML/DL — LOW (ref 18–22)
PCO2 BLDV: 35 MMHG — LOW (ref 39–42)
PCO2 BLDV: 35 MMHG — LOW (ref 39–42)
PCO2 BLDV: 40 MMHG — SIGNIFICANT CHANGE UP (ref 39–42)
PCO2 BLDV: 40 MMHG — SIGNIFICANT CHANGE UP (ref 39–42)
PH BLDV: 7.3 — LOW (ref 7.32–7.43)
PHOSPHATE SERPL-MCNC: 4.1 MG/DL — SIGNIFICANT CHANGE UP (ref 2.5–4.5)
PHOSPHATE SERPL-MCNC: 4.1 MG/DL — SIGNIFICANT CHANGE UP (ref 2.5–4.5)
PLATELET # BLD AUTO: 171 K/UL — SIGNIFICANT CHANGE UP (ref 150–400)
PLATELET # BLD AUTO: 171 K/UL — SIGNIFICANT CHANGE UP (ref 150–400)
PO2 BLDV: 53 MMHG — HIGH (ref 25–45)
PO2 BLDV: 53 MMHG — HIGH (ref 25–45)
PO2 BLDV: 55 MMHG — HIGH (ref 25–45)
PO2 BLDV: 55 MMHG — HIGH (ref 25–45)
POTASSIUM BLDV-SCNC: 3.9 MMOL/L — SIGNIFICANT CHANGE UP (ref 3.5–5.1)
POTASSIUM BLDV-SCNC: 3.9 MMOL/L — SIGNIFICANT CHANGE UP (ref 3.5–5.1)
POTASSIUM BLDV-SCNC: 4.1 MMOL/L — SIGNIFICANT CHANGE UP (ref 3.5–5.1)
POTASSIUM BLDV-SCNC: 4.1 MMOL/L — SIGNIFICANT CHANGE UP (ref 3.5–5.1)
POTASSIUM SERPL-MCNC: 3.6 MMOL/L — SIGNIFICANT CHANGE UP (ref 3.5–5.3)
POTASSIUM SERPL-MCNC: 3.6 MMOL/L — SIGNIFICANT CHANGE UP (ref 3.5–5.3)
POTASSIUM SERPL-MCNC: 3.8 MMOL/L — SIGNIFICANT CHANGE UP (ref 3.5–5.3)
POTASSIUM SERPL-MCNC: 3.8 MMOL/L — SIGNIFICANT CHANGE UP (ref 3.5–5.3)
POTASSIUM SERPL-SCNC: 3.6 MMOL/L — SIGNIFICANT CHANGE UP (ref 3.5–5.3)
POTASSIUM SERPL-SCNC: 3.6 MMOL/L — SIGNIFICANT CHANGE UP (ref 3.5–5.3)
POTASSIUM SERPL-SCNC: 3.8 MMOL/L — SIGNIFICANT CHANGE UP (ref 3.5–5.3)
POTASSIUM SERPL-SCNC: 3.8 MMOL/L — SIGNIFICANT CHANGE UP (ref 3.5–5.3)
RBC # BLD: 3.62 M/UL — LOW (ref 3.8–5.2)
RBC # BLD: 3.62 M/UL — LOW (ref 3.8–5.2)
RBC # FLD: 14.9 % — HIGH (ref 10.3–14.5)
RBC # FLD: 14.9 % — HIGH (ref 10.3–14.5)
SAO2 % BLDV: 82.1 % — SIGNIFICANT CHANGE UP (ref 67–88)
SAO2 % BLDV: 82.1 % — SIGNIFICANT CHANGE UP (ref 67–88)
SAO2 % BLDV: 98.3 % — HIGH (ref 67–88)
SAO2 % BLDV: 98.3 % — HIGH (ref 67–88)
SODIUM SERPL-SCNC: 135 MMOL/L — SIGNIFICANT CHANGE UP (ref 135–145)
SODIUM SERPL-SCNC: 135 MMOL/L — SIGNIFICANT CHANGE UP (ref 135–145)
SODIUM SERPL-SCNC: 139 MMOL/L — SIGNIFICANT CHANGE UP (ref 135–145)
SODIUM SERPL-SCNC: 139 MMOL/L — SIGNIFICANT CHANGE UP (ref 135–145)
SPECIMEN SOURCE: SIGNIFICANT CHANGE UP
SPECIMEN SOURCE: SIGNIFICANT CHANGE UP
T4 FREE+ TSH PNL SERPL: 3.75 UIU/ML — SIGNIFICANT CHANGE UP (ref 0.27–4.2)
T4 FREE+ TSH PNL SERPL: 3.75 UIU/ML — SIGNIFICANT CHANGE UP (ref 0.27–4.2)
TSH SERPL-MCNC: 4.33 UIU/ML — HIGH (ref 0.27–4.2)
TSH SERPL-MCNC: 4.33 UIU/ML — HIGH (ref 0.27–4.2)
VIT B12 SERPL-MCNC: >2000 PG/ML — HIGH (ref 232–1245)
VIT B12 SERPL-MCNC: >2000 PG/ML — HIGH (ref 232–1245)
WBC # BLD: 7.4 K/UL — SIGNIFICANT CHANGE UP (ref 3.8–10.5)
WBC # BLD: 7.4 K/UL — SIGNIFICANT CHANGE UP (ref 3.8–10.5)
WBC # FLD AUTO: 7.4 K/UL — SIGNIFICANT CHANGE UP (ref 3.8–10.5)
WBC # FLD AUTO: 7.4 K/UL — SIGNIFICANT CHANGE UP (ref 3.8–10.5)

## 2023-11-27 PROCEDURE — 99232 SBSQ HOSP IP/OBS MODERATE 35: CPT | Mod: GC

## 2023-11-27 PROCEDURE — 99233 SBSQ HOSP IP/OBS HIGH 50: CPT | Mod: GC

## 2023-11-27 PROCEDURE — 76775 US EXAM ABDO BACK WALL LIM: CPT | Mod: 26

## 2023-11-27 RX ORDER — POTASSIUM CHLORIDE 20 MEQ
20 PACKET (EA) ORAL ONCE
Refills: 0 | Status: DISCONTINUED | OUTPATIENT
Start: 2023-11-27 | End: 2023-11-27

## 2023-11-27 RX ORDER — POTASSIUM CHLORIDE 20 MEQ
40 PACKET (EA) ORAL ONCE
Refills: 0 | Status: DISCONTINUED | OUTPATIENT
Start: 2023-11-27 | End: 2023-11-27

## 2023-11-27 RX ORDER — SODIUM BICARBONATE 1 MEQ/ML
1300 SYRINGE (ML) INTRAVENOUS THREE TIMES A DAY
Refills: 0 | Status: DISCONTINUED | OUTPATIENT
Start: 2023-11-27 | End: 2023-12-07

## 2023-11-27 RX ORDER — MAGNESIUM SULFATE 500 MG/ML
2 VIAL (ML) INJECTION ONCE
Refills: 0 | Status: COMPLETED | OUTPATIENT
Start: 2023-11-27 | End: 2023-11-27

## 2023-11-27 RX ORDER — SODIUM CHLORIDE 9 MG/ML
1000 INJECTION, SOLUTION INTRAVENOUS
Refills: 0 | Status: DISCONTINUED | OUTPATIENT
Start: 2023-11-27 | End: 2023-11-28

## 2023-11-27 RX ADMIN — CHLORHEXIDINE GLUCONATE 1 APPLICATION(S): 213 SOLUTION TOPICAL at 13:53

## 2023-11-27 RX ADMIN — Medication 25 GRAM(S): at 08:39

## 2023-11-27 RX ADMIN — Medication 1 GRAM(S): at 06:02

## 2023-11-27 RX ADMIN — SODIUM CHLORIDE 50 MILLILITER(S): 9 INJECTION, SOLUTION INTRAVENOUS at 16:45

## 2023-11-27 RX ADMIN — HEPARIN SODIUM 5000 UNIT(S): 5000 INJECTION INTRAVENOUS; SUBCUTANEOUS at 18:25

## 2023-11-27 RX ADMIN — PANTOPRAZOLE SODIUM 40 MILLIGRAM(S): 20 TABLET, DELAYED RELEASE ORAL at 06:02

## 2023-11-27 RX ADMIN — HEPARIN SODIUM 5000 UNIT(S): 5000 INJECTION INTRAVENOUS; SUBCUTANEOUS at 06:02

## 2023-11-27 NOTE — SWALLOW BEDSIDE ASSESSMENT ADULT - SWALLOW EVAL: DIAGNOSIS
Pt is a 89 y/o female w/ PMHx of dementia, , h/o recurrent UTI presents w/ syncope and fall w/o trauma in the setting of 1 month of poor PO intake, nausea, vomiting, epigastric pain. C/f failure to thrive i/s/o ?PUD? vs. dementia. Unable to assess swallow function given level of agitation, therefore diet deferred to team. As per d/w RN and chart review, when pt awake/alert and participating, she is tolerating diet and medications. Will f/u re-evaluation as medically appropriate. Consider Palliative Care consult d/t c/f FTT.

## 2023-11-27 NOTE — PROGRESS NOTE ADULT - ASSESSMENT
89 y/o F pmhx advanced dementia, HTN, HLD, DM2, anemia, CAD (s/p LAD and LCx stent 2010), HFrEF (EF 40-45% 2021), h/o TIA no residual deficits, h/o recurrent UTI presents w/ syncope and fall w/o trauma in the setting of 1 month of poor PO intake, nausea, vomiting, epigastric pain. C/f failure to thrive i/s/o ?PUD? vs. dementia. Syncope likely orthostatic in nature.  Nephrology following for LESLIE.

## 2023-11-27 NOTE — PROGRESS NOTE ADULT - PROBLEM SELECTOR PLAN 9
- home regimen: Plavix 75mg qd, rosuvastatin 40mg qd, Zetia 10mg qd  - continue home Plavix 75mg qd  - Zetia is nonformulary  - intertherapeutic interchange of rosuvastatin 40mg to lipitor 80mg qd

## 2023-11-27 NOTE — PROGRESS NOTE ADULT - SUBJECTIVE AND OBJECTIVE BOX
[facility] DIVISION OF KIDNEY DISEASES AND HYPERTENSION   FOLLOW UP NOTE    --------------------------------------------------------------------------------  Chief Complaint:    24 hour events/subjective: Pt. was seen and examined today. Overnight events  noted.       PAST HISTORY  --------------------------------------------------------------------------------  No significant changes to PMH, PSH, FHx, SHx, unless otherwise noted    ALLERGIES & MEDICATIONS  --------------------------------------------------------------------------------  Allergies    aspirin (Anaphylaxis)  Celebrex (Rash)  penicillin (Anaphylaxis)    Intolerances      Standing Inpatient Medications  atorvastatin 80 milliGRAM(s) Oral at bedtime  chlorhexidine 2% Cloths 1 Application(s) Topical daily  cholecalciferol 2000 Unit(s) Oral daily  clopidogrel Tablet 75 milliGRAM(s) Oral daily  dextrose 5%. 1000 milliLiter(s) IV Continuous <Continuous>  dextrose 5%. 1000 milliLiter(s) IV Continuous <Continuous>  dextrose 50% Injectable 25 Gram(s) IV Push once  dextrose 50% Injectable 25 Gram(s) IV Push once  dextrose 50% Injectable 12.5 Gram(s) IV Push once  escitalopram 10 milliGRAM(s) Oral daily  folic acid 1 milliGRAM(s) Oral daily  glucagon  Injectable 1 milliGRAM(s) IntraMuscular once  heparin   Injectable 5000 Unit(s) SubCutaneous every 12 hours  insulin lispro (ADMELOG) corrective regimen sliding scale   SubCutaneous three times a day before meals  insulin lispro (ADMELOG) corrective regimen sliding scale   SubCutaneous at bedtime  melatonin 6 milliGRAM(s) Oral at bedtime  pantoprazole    Tablet 40 milliGRAM(s) Oral before breakfast  QUEtiapine 25 milliGRAM(s) Oral at bedtime  sodium bicarbonate 1300 milliGRAM(s) Oral three times a day  sucralfate suspension 1 Gram(s) Oral two times a day    PRN Inpatient Medications  acetaminophen     Tablet .. 650 milliGRAM(s) Oral every 6 hours PRN  aluminum hydroxide/magnesium hydroxide/simethicone Suspension 30 milliLiter(s) Oral every 4 hours PRN  dextrose Oral Gel 15 Gram(s) Oral once PRN  ondansetron Injectable 4 milliGRAM(s) IV Push every 8 hours PRN    VITALS/PHYSICAL EXAM  --------------------------------------------------------------------------------  T(C): 34.8 (11-27-23 @ 11:33), Max: 37 (11-27-23 @ 06:19)  HR: 54 (11-27-23 @ 11:52) (52 - 54)  BP: 149/56 (11-27-23 @ 11:52) (132/71 - 149/56)  RR: 20 (11-27-23 @ 11:52) (18 - 20)  SpO2: 96% (11-27-23 @ 11:52) (96% - 98%)  Wt(kg): --  Height (cm): 152.4 (11-26-23 @ 02:23)  Weight (kg): 54.4 (11-26-23 @ 02:23)  BMI (kg/m2): 23.4 (11-26-23 @ 02:23)  BSA (m2): 1.5 (11-26-23 @ 02:23)      11-26-23 @ 07:01  -  11-27-23 @ 07:00  --------------------------------------------------------  IN: 0 mL / OUT: 850 mL / NET: -850 mL    11-27-23 @ 07:01  -  11-27-23 @ 13:42  --------------------------------------------------------  IN: 0 mL / OUT: 0 mL / NET: 0 mL        Physical Exam:  	Gen: NAD  	HEENT: Anicteric  	Pulm: CTA B/L  	CV: S1S2+  	Abd: Soft, +BS   	Ext: No LE edema B/L  	Neuro: Awake          :Cornell cath+ with clear urine  	Skin: Warm and dry      LABS/STUDIES  --------------------------------------------------------------------------------              9.5    7.40  >-----------<  171      [11-27-23 @ 06:51]              29.2     135  |  104  |  44  ----------------------------<  97      [11-27-23 @ 06:50]  3.8   |  17  |  6.77        Ca     8.2     [11-27-23 @ 06:50]      Mg     1.7     [11-27-23 @ 06:50]      Phos  4.1     [11-27-23 @ 06:50]    TPro  5.1  /  Alb  2.8  /  TBili  0.4  /  DBili  x   /  AST  41  /  ALT  42  /  AlkPhos  39  [11-26-23 @ 03:51]    PT/INR: PT 11.5 , INR 1.05       [11-26-23 @ 03:51]  PTT: 31.0       [11-26-23 @ 03:51]    CK 65      [11-26-23 @ 03:51]    Creatinine Trend:  SCr 6.77 [11-27 @ 06:50]  SCr 7.14 [11-26 @ 16:59]  SCr 7.00 [11-26 @ 03:51]  SCr 1.72 [11-09 @ 15:10]    Urinalysis - [11-27-23 @ 06:50]      Color  / Appearance  / SG  / pH       Gluc 97 / Ketone   / Bili  / Urobili        Blood  / Protein  / Leuk Est  / Nitrite       RBC  / WBC  / Hyaline  / Gran  / Sq Epi  / Non Sq Epi  / Bacteria     Urine Creatinine 29      [11-26-23 @ 05:30]  Urine Protein 42      [11-26-23 @ 05:30]  Urine Sodium 30      [11-26-23 @ 05:30]  Urine Urea Nitrogen 159      [11-26-23 @ 05:42]  Urine Potassium 17      [11-26-23 @ 05:30]  Urine Osmolality 154      [11-26-23 @ 05:30]        Tacrolimus  Cyclosporine  Sirolimus  Mycophenolate  BK PCR  CMV PCR  Parvo PCR  EBV PCR

## 2023-11-27 NOTE — PROGRESS NOTE ADULT - PROBLEM SELECTOR PLAN 10
- home regimen: coreg 3.125 BID, valsartan-HCTZ 80-12.5 qd, aldactone 50mg qd  - hold given orthostatic hypotensoin

## 2023-11-27 NOTE — SWALLOW BEDSIDE ASSESSMENT ADULT - SLP GENERAL OBSERVATIONS
Pt encountered supine in bed, RA, sleeping. 1:1 PCA present. HOB elevated and pt roused via verbal/tactile stimuli. Pt immediately agitated, covering face with blankets, yelling "again". Unable to be redirected. As per RN Linda, pt has not received Zyprexa since prior evening. Untouched breakfast tray at bedside.

## 2023-11-27 NOTE — PROVIDER CONTACT NOTE (SEPSIS SCREENING) - BACKGROUND:
89 y/o F pmhx advanced dementia, HTN, HLD, DM2, anemia, CAD (s/p LAD and LCx stent 2010), HFrEF (EF 40-45% 2021), h/o TIA no residual deficits, h/o recurrent UTI presents w/ syncope and fal

## 2023-11-27 NOTE — SWALLOW BEDSIDE ASSESSMENT ADULT - COMMENTS
11/26 - Provider contact note and chart note; Multiple episodes of agitation, not redirectable, administered Zyprexa and placed on 1:1. Pt AOx1. Pt aggitated, restless, pulling on IV and Cornell. Unable to reorient. 1:1 initited for patient safety and safety of others.    11/26 CXR IMPRESSION: Slightly elevated right hemidiaphragm. Underinflated but otherwise clear lungs. No pleural effusions or pneumothorax.  Stable cardiac and mediastinal silhouettes including aortic calcifications and convex fullness of superior mediastinal/paratracheal soft tissue contour which could be related to engorged/tortuous brachiocephalic vasculature. Generalized osteopenia.    SWALLOW HISTORY: No reports in SCM or in PACS prior to this admission.

## 2023-11-27 NOTE — SWALLOW BEDSIDE ASSESSMENT ADULT - ASR SWALLOW ASPIRATION MONITOR
Monitor for s/s aspiration/laryngeal penetration. If noted:  D/C p.o. intake, provide non-oral nutrition/hydration/meds, and contact this service @ [phone]/change of breathing pattern/cough/gurgly voice/fever/pneumonia/throat clearing/upper respiratory infection

## 2023-11-27 NOTE — PROGRESS NOTE ADULT - PROBLEM SELECTOR PLAN 3
- bicarb on admission 13  - 2/2 LESLIE  - if not improved on repeat BMP after morales, will start sodium bicarb 1300 BID - bicarb on admission 13  - 2/2 LESLIE  - was on bicarb gtt w/ improvement in acidosis  - will transition to PO bicarb 1300 BID

## 2023-11-27 NOTE — PROGRESS NOTE ADULT - PROBLEM SELECTOR PLAN 1
LESLIE/ATN due to hypotension, volume depletion and urinary retention. + produced urine after morales was place.   Baseline Cr ~1; admission Cr 7.   On presentation to the ER this time, patient's BP was 90/54, HR 54.    Recs:  -Cw morales; Cr downtrended to 6.77; no indication for dialysis  -Needs strict I/O; monitor UOP  -Cw Bicarb gtt    *THIS NOTE IS NOT FINALIZED UNTIL SIGNED BY THE ATTENDING*  Steve Willard  Nephrology Fellow  Feel free to contact me on TEAMS  After 4 pm please contact the on-call Fellow. LESLIE/ATN due to hypotension, volume depletion and urinary retention. + produced urine after morales was place.   Baseline Cr ~1; admission Cr 7.   On presentation to the ER this time, patient's BP was 90/54, HR 54.  -Renal US unremarkable     Recs:  -Cw morales; Cr downtrended to 6.77; no indication for dialysis  -Needs strict I/O; monitor UOP  -Cw Bicarb gtt    *THIS NOTE IS NOT FINALIZED UNTIL SIGNED BY THE ATTENDING*  Steve Willard  Nephrology Fellow  Feel free to contact me on TEAMS  After 4 pm please contact the on-call Fellow.

## 2023-11-27 NOTE — PROGRESS NOTE ADULT - PROBLEM SELECTOR PLAN 8
- controlled w/ diet  - A1c 6.3%  - FS/low ISS  - hypoglycemia protocol in place given poor PO intake

## 2023-11-27 NOTE — PROGRESS NOTE ADULT - ATTENDING COMMENTS
LESLIE with acidosis in setting of hypotension, retention   Cornell cath draining urine  Cr improving slowly  Can give po bicarb and Isotonic saline as ordered    Gilma Madrid MD  Off: [phone]  contact me on teams    (After 5 pm or on weekends please page the on-call fellow/attending, can check [email or web address] for schedule. Login is lo mendoza, schedule under [facility] medicine, psych, derm)

## 2023-11-27 NOTE — PROGRESS NOTE ADULT - SUBJECTIVE AND OBJECTIVE BOX
DATE OF SERVICE: 11-27-23 @ 07:26    Patient is a 89y old  Female who presents with a chief complaint of fall, LESLIE (26 Nov 2023 07:25)      SUBJECTIVE / OVERNIGHT EVENTS:    MEDICATIONS  (STANDING):  atorvastatin 80 milliGRAM(s) Oral at bedtime  chlorhexidine 2% Cloths 1 Application(s) Topical daily  cholecalciferol 2000 Unit(s) Oral daily  clopidogrel Tablet 75 milliGRAM(s) Oral daily  dextrose 5%. 1000 milliLiter(s) (50 mL/Hr) IV Continuous <Continuous>  dextrose 5%. 1000 milliLiter(s) (100 mL/Hr) IV Continuous <Continuous>  dextrose 50% Injectable 25 Gram(s) IV Push once  dextrose 50% Injectable 25 Gram(s) IV Push once  dextrose 50% Injectable 12.5 Gram(s) IV Push once  escitalopram 10 milliGRAM(s) Oral daily  folic acid 1 milliGRAM(s) Oral daily  glucagon  Injectable 1 milliGRAM(s) IntraMuscular once  heparin   Injectable 5000 Unit(s) SubCutaneous every 12 hours  insulin lispro (ADMELOG) corrective regimen sliding scale   SubCutaneous three times a day before meals  insulin lispro (ADMELOG) corrective regimen sliding scale   SubCutaneous at bedtime  melatonin 6 milliGRAM(s) Oral at bedtime  pantoprazole    Tablet 40 milliGRAM(s) Oral before breakfast  QUEtiapine 25 milliGRAM(s) Oral at bedtime  sodium bicarbonate  Infusion 0.276 mEq/kG/Hr (100 mL/Hr) IV Continuous <Continuous>  sucralfate suspension 1 Gram(s) Oral two times a day    MEDICATIONS  (PRN):  acetaminophen     Tablet .. 650 milliGRAM(s) Oral every 6 hours PRN Temp greater or equal to 38C (100.4F), Mild Pain (1 - 3)  aluminum hydroxide/magnesium hydroxide/simethicone Suspension 30 milliLiter(s) Oral every 4 hours PRN Dyspepsia  dextrose Oral Gel 15 Gram(s) Oral once PRN Blood Glucose LESS THAN 70 milliGRAM(s)/deciliter  ondansetron Injectable 4 milliGRAM(s) IV Push every 8 hours PRN Nausea and/or Vomiting      Vital Signs Last 24 Hrs  T(C): 37 (27 Nov 2023 06:19), Max: 37 (27 Nov 2023 06:19)  T(F): 98.6 (27 Nov 2023 06:19), Max: 98.6 (27 Nov 2023 06:19)  HR: 53 (27 Nov 2023 06:19) (52 - 63)  BP: 147/67 (27 Nov 2023 06:19) (114/52 - 147/67)  BP(mean): --  RR: 20 (27 Nov 2023 06:19) (18 - 20)  SpO2: 97% (27 Nov 2023 06:19) (96% - 98%)    Parameters below as of 27 Nov 2023 06:19  Patient On (Oxygen Delivery Method): room air      CAPILLARY BLOOD GLUCOSE      POCT Blood Glucose.: 128 mg/dL (26 Nov 2023 21:03)  POCT Blood Glucose.: 109 mg/dL (26 Nov 2023 16:42)    I&O's Summary    26 Nov 2023 07:01  -  27 Nov 2023 07:00  --------------------------------------------------------  IN: 0 mL / OUT: 850 mL / NET: -850 mL        PHYSICAL EXAM:  GENERAL: NAD, well-developed  HEAD:  Atraumatic, Normocephalic  EYES: EOMI, PERRLA, conjunctiva and sclera clear  NECK: Supple, No JVD  CHEST/LUNG: Clear to auscultation bilaterally; No wheeze  HEART: Regular rate and rhythm; No murmurs, rubs, or gallops  ABDOMEN: Soft, Nontender, Nondistended; Bowel sounds present  EXTREMITIES:  2+ Peripheral Pulses, No clubbing, cyanosis, or edema  PSYCH: AAOx3  NEUROLOGY: non-focal  SKIN: No rashes or lesions    LABS:                        9.5    7.40  )-----------( 171      ( 27 Nov 2023 06:51 )             29.2     11-26    133<L>  |  107  |  46<H>  ----------------------------<  111<H>  4.3   |  13<L>  |  7.14<H>    Ca    8.3<L>      26 Nov 2023 16:59  Phos  4.3     11-26  Mg     1.7     11-26    TPro  5.1<L>  /  Alb  2.8<L>  /  TBili  0.4  /  DBili  x   /  AST  41<H>  /  ALT  42  /  AlkPhos  39<L>  11-26    PT/INR - ( 26 Nov 2023 03:51 )   PT: 11.5 sec;   INR: 1.05 ratio         PTT - ( 26 Nov 2023 03:51 )  PTT:31.0 sec  CARDIAC MARKERS ( 26 Nov 2023 03:51 )  x     / x     / 65 U/L / x     / x          Urinalysis Basic - ( 26 Nov 2023 16:59 )    Color: x / Appearance: x / SG: x / pH: x  Gluc: 111 mg/dL / Ketone: x  / Bili: x / Urobili: x   Blood: x / Protein: x / Nitrite: x   Leuk Esterase: x / RBC: x / WBC x   Sq Epi: x / Non Sq Epi: x / Bacteria: x        RADIOLOGY & ADDITIONAL TESTS:    Imaging Personally Reviewed:    Consultant(s) Notes Reviewed:      Care Discussed with Consultants/Other Providers:   DATE OF SERVICE: 11-27-23 @ 07:26    Patient is a 89y old  Female who presents with a chief complaint of fall, LESLIE (26 Nov 2023 07:25)      SUBJECTIVE / OVERNIGHT EVENTS: NAOE. Pt refused to participate in interview    MEDICATIONS  (STANDING):  atorvastatin 80 milliGRAM(s) Oral at bedtime  chlorhexidine 2% Cloths 1 Application(s) Topical daily  cholecalciferol 2000 Unit(s) Oral daily  clopidogrel Tablet 75 milliGRAM(s) Oral daily  dextrose 5%. 1000 milliLiter(s) (50 mL/Hr) IV Continuous <Continuous>  dextrose 5%. 1000 milliLiter(s) (100 mL/Hr) IV Continuous <Continuous>  dextrose 50% Injectable 25 Gram(s) IV Push once  dextrose 50% Injectable 25 Gram(s) IV Push once  dextrose 50% Injectable 12.5 Gram(s) IV Push once  escitalopram 10 milliGRAM(s) Oral daily  folic acid 1 milliGRAM(s) Oral daily  glucagon  Injectable 1 milliGRAM(s) IntraMuscular once  heparin   Injectable 5000 Unit(s) SubCutaneous every 12 hours  insulin lispro (ADMELOG) corrective regimen sliding scale   SubCutaneous three times a day before meals  insulin lispro (ADMELOG) corrective regimen sliding scale   SubCutaneous at bedtime  melatonin 6 milliGRAM(s) Oral at bedtime  pantoprazole    Tablet 40 milliGRAM(s) Oral before breakfast  QUEtiapine 25 milliGRAM(s) Oral at bedtime  sodium bicarbonate  Infusion 0.276 mEq/kG/Hr (100 mL/Hr) IV Continuous <Continuous>  sucralfate suspension 1 Gram(s) Oral two times a day    MEDICATIONS  (PRN):  acetaminophen     Tablet .. 650 milliGRAM(s) Oral every 6 hours PRN Temp greater or equal to 38C (100.4F), Mild Pain (1 - 3)  aluminum hydroxide/magnesium hydroxide/simethicone Suspension 30 milliLiter(s) Oral every 4 hours PRN Dyspepsia  dextrose Oral Gel 15 Gram(s) Oral once PRN Blood Glucose LESS THAN 70 milliGRAM(s)/deciliter  ondansetron Injectable 4 milliGRAM(s) IV Push every 8 hours PRN Nausea and/or Vomiting      Vital Signs Last 24 Hrs  T(C): 37 (27 Nov 2023 06:19), Max: 37 (27 Nov 2023 06:19)  T(F): 98.6 (27 Nov 2023 06:19), Max: 98.6 (27 Nov 2023 06:19)  HR: 53 (27 Nov 2023 06:19) (52 - 63)  BP: 147/67 (27 Nov 2023 06:19) (114/52 - 147/67)  BP(mean): --  RR: 20 (27 Nov 2023 06:19) (18 - 20)  SpO2: 97% (27 Nov 2023 06:19) (96% - 98%)    Parameters below as of 27 Nov 2023 06:19  Patient On (Oxygen Delivery Method): room air      CAPILLARY BLOOD GLUCOSE      POCT Blood Glucose.: 128 mg/dL (26 Nov 2023 21:03)  POCT Blood Glucose.: 109 mg/dL (26 Nov 2023 16:42)    I&O's Summary    26 Nov 2023 07:01  -  27 Nov 2023 07:00  --------------------------------------------------------  IN: 0 mL / OUT: 850 mL / NET: -850 mL        PHYSICAL EXAM:  GENERAL: NAD, well-developed  HEAD:  Atraumatic, Normocephalic  EYES: EOMI, conjunctiva and sclera clear  NECK: Supple, No JVD  CHEST/LUNG: Clear to auscultation bilaterally; No wheeze  HEART: Regular rate and rhythm; No murmurs, rubs, or gallops  ABDOMEN: Soft, Nontender, Nondistended; Bowel sounds present  EXTREMITIES:  2+ Peripheral Pulses, No clubbing, cyanosis, or edema  NEUROLOGY: A&Ox0. Not following commands. Arouses to voice. Non-focal. MAEx4  SKIN: No rashes or lesions    LABS:                        9.5    7.40  )-----------( 171      ( 27 Nov 2023 06:51 )             29.2     11-26    133<L>  |  107  |  46<H>  ----------------------------<  111<H>  4.3   |  13<L>  |  7.14<H>    Ca    8.3<L>      26 Nov 2023 16:59  Phos  4.3     11-26  Mg     1.7     11-26    TPro  5.1<L>  /  Alb  2.8<L>  /  TBili  0.4  /  DBili  x   /  AST  41<H>  /  ALT  42  /  AlkPhos  39<L>  11-26    PT/INR - ( 26 Nov 2023 03:51 )   PT: 11.5 sec;   INR: 1.05 ratio         PTT - ( 26 Nov 2023 03:51 )  PTT:31.0 sec  CARDIAC MARKERS ( 26 Nov 2023 03:51 )  x     / x     / 65 U/L / x     / x          Urinalysis Basic - ( 26 Nov 2023 16:59 )    Color: x / Appearance: x / SG: x / pH: x  Gluc: 111 mg/dL / Ketone: x  / Bili: x / Urobili: x   Blood: x / Protein: x / Nitrite: x   Leuk Esterase: x / RBC: x / WBC x   Sq Epi: x / Non Sq Epi: x / Bacteria: x        RADIOLOGY & ADDITIONAL TESTS:    Imaging Personally Reviewed:    Consultant(s) Notes Reviewed:      Care Discussed with Consultants/Other Providers:

## 2023-11-27 NOTE — PROGRESS NOTE ADULT - PROBLEM SELECTOR PLAN 7
- w/ agitation  - seems to be FAST Stage 6? No official staging outpt noted  - hold home nanzeric in the setting of orthostatic hypotension  - will move to enhanced supervision room  - Zyprexa 2.5mg IM PRN for agitation w/ harm to self (e.g. pulling at lines) - w/ agitation  - seems to be FAST Stage 6? No official staging outpt noted  - hold home nanzeric in the setting of orthostatic hypotension  - will move to enhanced supervision room  -Haldol IV PRN for agitation w/ harm to self (e.g. pulling at lines)

## 2023-11-27 NOTE — PROGRESS NOTE ADULT - PROBLEM SELECTOR PLAN 2
- SCr on admissoin 7  - baseline SCr is around 1  - appreciate nephro recs  - appears to be both prerenal and obstructive  - fluid resuscitation for prerenal  - has morales in place for obstructive LESLIE  - will monitor for post-LESLIE diuresis  - replete electrolytes PRN

## 2023-11-27 NOTE — PROGRESS NOTE ADULT - PROBLEM SELECTOR PLAN 5
- peptic ulcer disease vs. pancreatitis vs. functional  - no findings on CT AP of pancreatitis, does not have classic history  - never had endoscopy to evaluate  - will trial protonix 40mg qd   - continue sucralfate 1g BID

## 2023-11-27 NOTE — SWALLOW BEDSIDE ASSESSMENT ADULT - CONSISTENCIES ADMINISTERED
Despite attempted administration of coffee (pt's favorite) or thin liquids w/ max encouragement, pt refused to participate, holding blanket over head

## 2023-11-27 NOTE — SWALLOW BEDSIDE ASSESSMENT ADULT - SLP PERTINENT HISTORY OF CURRENT PROBLEM
89 y/o F pmhx advanced dementia, HTN, HLD, DM2, anemia, CAD (s/p LAD and LCx stent 2010), HFrEF (EF 40-45% 2021), h/o TIA no residual deficits, h/o recurrent UTI presents w/ syncope and fall w/o trauma in the setting of 1 month of poor PO intake, nausea, vomiting, epigastric pain. C/f failure to thrive i/s/o ?PUD? vs. dementia. Syncope likely orthostatic in nature.

## 2023-11-27 NOTE — PROGRESS NOTE ADULT - PROBLEM SELECTOR PLAN 6
- home regimen: coreg 3.125 BID, valsartan-HCTZ 80-12.5 qd, aldactone 50mg qd  - hold antihypertensives d/t orthostatic hypotension

## 2023-11-27 NOTE — PROVIDER CONTACT NOTE (SEPSIS SCREENING) - ASSESSMENT:
Pt is awake and alert, lethargic, restless when awake and uncooperative Warm to touch, no shivering

## 2023-11-27 NOTE — PROVIDER CONTACT NOTE (OTHER) - ACTION/TREATMENT ORDERED:
MD informed, R 2 pads placed on pt. Ok to have HR above 45 as per MD and tele tech informed about the range at 1048am.

## 2023-11-27 NOTE — PROGRESS NOTE ADULT - PROBLEM SELECTOR PLAN 4
- pt eating less 2/2 reported epigastric pain/nausea  - also suspect there is an element of progressing dementia  - fluid resuscitation  - encourage PO intake, honor dietary preferences

## 2023-11-28 LAB
ANION GAP SERPL CALC-SCNC: 13 MMOL/L — SIGNIFICANT CHANGE UP (ref 5–17)
ANION GAP SERPL CALC-SCNC: 13 MMOL/L — SIGNIFICANT CHANGE UP (ref 5–17)
BUN SERPL-MCNC: 46 MG/DL — HIGH (ref 7–23)
BUN SERPL-MCNC: 46 MG/DL — HIGH (ref 7–23)
CALCIUM SERPL-MCNC: 8.7 MG/DL — SIGNIFICANT CHANGE UP (ref 8.4–10.5)
CALCIUM SERPL-MCNC: 8.7 MG/DL — SIGNIFICANT CHANGE UP (ref 8.4–10.5)
CHLORIDE SERPL-SCNC: 107 MMOL/L — SIGNIFICANT CHANGE UP (ref 96–108)
CHLORIDE SERPL-SCNC: 107 MMOL/L — SIGNIFICANT CHANGE UP (ref 96–108)
CO2 SERPL-SCNC: 20 MMOL/L — LOW (ref 22–31)
CO2 SERPL-SCNC: 20 MMOL/L — LOW (ref 22–31)
CORTIS AM PEAK SERPL-MCNC: 20.6 UG/DL — HIGH (ref 6–18.4)
CORTIS AM PEAK SERPL-MCNC: 20.6 UG/DL — HIGH (ref 6–18.4)
CREAT SERPL-MCNC: 6.77 MG/DL — HIGH (ref 0.5–1.3)
CREAT SERPL-MCNC: 6.77 MG/DL — HIGH (ref 0.5–1.3)
EGFR: 5 ML/MIN/1.73M2 — LOW
EGFR: 5 ML/MIN/1.73M2 — LOW
GLUCOSE BLDC GLUCOMTR-MCNC: 104 MG/DL — HIGH (ref 70–99)
GLUCOSE BLDC GLUCOMTR-MCNC: 104 MG/DL — HIGH (ref 70–99)
GLUCOSE BLDC GLUCOMTR-MCNC: 81 MG/DL — SIGNIFICANT CHANGE UP (ref 70–99)
GLUCOSE BLDC GLUCOMTR-MCNC: 81 MG/DL — SIGNIFICANT CHANGE UP (ref 70–99)
GLUCOSE BLDC GLUCOMTR-MCNC: 83 MG/DL — SIGNIFICANT CHANGE UP (ref 70–99)
GLUCOSE BLDC GLUCOMTR-MCNC: 83 MG/DL — SIGNIFICANT CHANGE UP (ref 70–99)
GLUCOSE BLDC GLUCOMTR-MCNC: 88 MG/DL — SIGNIFICANT CHANGE UP (ref 70–99)
GLUCOSE BLDC GLUCOMTR-MCNC: 88 MG/DL — SIGNIFICANT CHANGE UP (ref 70–99)
GLUCOSE SERPL-MCNC: 87 MG/DL — SIGNIFICANT CHANGE UP (ref 70–99)
GLUCOSE SERPL-MCNC: 87 MG/DL — SIGNIFICANT CHANGE UP (ref 70–99)
HCT VFR BLD CALC: 31.3 % — LOW (ref 34.5–45)
HCT VFR BLD CALC: 31.3 % — LOW (ref 34.5–45)
HGB BLD-MCNC: 9.9 G/DL — LOW (ref 11.5–15.5)
HGB BLD-MCNC: 9.9 G/DL — LOW (ref 11.5–15.5)
MAGNESIUM SERPL-MCNC: 2.5 MG/DL — SIGNIFICANT CHANGE UP (ref 1.6–2.6)
MAGNESIUM SERPL-MCNC: 2.5 MG/DL — SIGNIFICANT CHANGE UP (ref 1.6–2.6)
MCHC RBC-ENTMCNC: 25.6 PG — LOW (ref 27–34)
MCHC RBC-ENTMCNC: 25.6 PG — LOW (ref 27–34)
MCHC RBC-ENTMCNC: 31.6 GM/DL — LOW (ref 32–36)
MCHC RBC-ENTMCNC: 31.6 GM/DL — LOW (ref 32–36)
MCV RBC AUTO: 80.9 FL — SIGNIFICANT CHANGE UP (ref 80–100)
MCV RBC AUTO: 80.9 FL — SIGNIFICANT CHANGE UP (ref 80–100)
NRBC # BLD: 0 /100 WBCS — SIGNIFICANT CHANGE UP (ref 0–0)
NRBC # BLD: 0 /100 WBCS — SIGNIFICANT CHANGE UP (ref 0–0)
PHOSPHATE SERPL-MCNC: 4.2 MG/DL — SIGNIFICANT CHANGE UP (ref 2.5–4.5)
PHOSPHATE SERPL-MCNC: 4.2 MG/DL — SIGNIFICANT CHANGE UP (ref 2.5–4.5)
PLATELET # BLD AUTO: 209 K/UL — SIGNIFICANT CHANGE UP (ref 150–400)
PLATELET # BLD AUTO: 209 K/UL — SIGNIFICANT CHANGE UP (ref 150–400)
POTASSIUM SERPL-MCNC: 3.9 MMOL/L — SIGNIFICANT CHANGE UP (ref 3.5–5.3)
POTASSIUM SERPL-MCNC: 3.9 MMOL/L — SIGNIFICANT CHANGE UP (ref 3.5–5.3)
POTASSIUM SERPL-SCNC: 3.9 MMOL/L — SIGNIFICANT CHANGE UP (ref 3.5–5.3)
POTASSIUM SERPL-SCNC: 3.9 MMOL/L — SIGNIFICANT CHANGE UP (ref 3.5–5.3)
RBC # BLD: 3.87 M/UL — SIGNIFICANT CHANGE UP (ref 3.8–5.2)
RBC # BLD: 3.87 M/UL — SIGNIFICANT CHANGE UP (ref 3.8–5.2)
RBC # FLD: 15.5 % — HIGH (ref 10.3–14.5)
RBC # FLD: 15.5 % — HIGH (ref 10.3–14.5)
SODIUM SERPL-SCNC: 140 MMOL/L — SIGNIFICANT CHANGE UP (ref 135–145)
SODIUM SERPL-SCNC: 140 MMOL/L — SIGNIFICANT CHANGE UP (ref 135–145)
WBC # BLD: 9.73 K/UL — SIGNIFICANT CHANGE UP (ref 3.8–10.5)
WBC # BLD: 9.73 K/UL — SIGNIFICANT CHANGE UP (ref 3.8–10.5)
WBC # FLD AUTO: 9.73 K/UL — SIGNIFICANT CHANGE UP (ref 3.8–10.5)
WBC # FLD AUTO: 9.73 K/UL — SIGNIFICANT CHANGE UP (ref 3.8–10.5)

## 2023-11-28 PROCEDURE — 99232 SBSQ HOSP IP/OBS MODERATE 35: CPT | Mod: GC

## 2023-11-28 PROCEDURE — 99233 SBSQ HOSP IP/OBS HIGH 50: CPT | Mod: GC

## 2023-11-28 PROCEDURE — 93010 ELECTROCARDIOGRAM REPORT: CPT

## 2023-11-28 RX ORDER — SODIUM CHLORIDE 9 MG/ML
1000 INJECTION, SOLUTION INTRAVENOUS
Refills: 0 | Status: DISCONTINUED | OUTPATIENT
Start: 2023-11-28 | End: 2023-11-29

## 2023-11-28 RX ADMIN — Medication 6 MILLIGRAM(S): at 21:33

## 2023-11-28 RX ADMIN — HEPARIN SODIUM 5000 UNIT(S): 5000 INJECTION INTRAVENOUS; SUBCUTANEOUS at 06:00

## 2023-11-28 RX ADMIN — PANTOPRAZOLE SODIUM 40 MILLIGRAM(S): 20 TABLET, DELAYED RELEASE ORAL at 08:30

## 2023-11-28 RX ADMIN — Medication 1300 MILLIGRAM(S): at 21:34

## 2023-11-28 RX ADMIN — HEPARIN SODIUM 5000 UNIT(S): 5000 INJECTION INTRAVENOUS; SUBCUTANEOUS at 17:25

## 2023-11-28 RX ADMIN — Medication 1300 MILLIGRAM(S): at 13:52

## 2023-11-28 RX ADMIN — Medication 1 MILLIGRAM(S): at 12:21

## 2023-11-28 RX ADMIN — SODIUM CHLORIDE 50 MILLILITER(S): 9 INJECTION, SOLUTION INTRAVENOUS at 17:25

## 2023-11-28 RX ADMIN — CLOPIDOGREL BISULFATE 75 MILLIGRAM(S): 75 TABLET, FILM COATED ORAL at 12:21

## 2023-11-28 RX ADMIN — CHLORHEXIDINE GLUCONATE 1 APPLICATION(S): 213 SOLUTION TOPICAL at 12:36

## 2023-11-28 RX ADMIN — Medication 1 GRAM(S): at 08:30

## 2023-11-28 RX ADMIN — ESCITALOPRAM OXALATE 10 MILLIGRAM(S): 10 TABLET, FILM COATED ORAL at 12:20

## 2023-11-28 RX ADMIN — Medication 2000 UNIT(S): at 12:20

## 2023-11-28 RX ADMIN — Medication 1300 MILLIGRAM(S): at 08:30

## 2023-11-28 NOTE — PROGRESS NOTE ADULT - PROBLEM SELECTOR PLAN 1
LESLIE/ATN due to hypotension, volume depletion and urinary retention. + produced urine after morales was place.   Baseline Cr ~1; admission Cr 7.   On presentation to the ER this time, patient's BP was 90/54, HR 54.  -Renal US unremarkable     Recs:  -Cw morales; Cr stable 6.88; no indication for dialysis; await today labs  -Needs strict I/O; monitor UOP  -Cw IVF      *THIS NOTE IS NOT FINALIZED UNTIL SIGNED BY THE ATTENDING*  Steve Willard  Nephrology Fellow  Feel free to contact me on TEAMS  After 4 pm please contact the on-call Fellow.

## 2023-11-28 NOTE — PROGRESS NOTE ADULT - ATTENDING COMMENTS
Patient seen and evaluated on bedside rounds. Patient opened up eyes,but did not cooperate with questioning. Was able to obtain physical exam other than HEENT as patient resisted eye opening.  GEN:NAD  CARDS: +S1S2 rrr. No m/r/g  RESP:CTA b/l. No r/r/w  Abd:NT x 4 quadrants. Hypoactive but present bowel sounds. No guarding or rigidity.  EXT:No rash. No edema, clubbing or cyanosis.  : Morales catheter with clear urine.    ECG at bedside reviewed: ?low voltage, but sinus bradycardia.   labs reviewed:   Normocytic/microcytic anemia, stable  Creatinine slowly improving, bicarbonate slowly improving. pH 7.3    #LESLIE  -unclear etiology, suspect combination of pre-renal with hemodynamically mediated aTn.  -avoid nephrotoxic agents, including home valsartan and spironolactone  -s/p over 3L, slow down hydration.  -continue morales for now.   -appreciate nephrology follow up.  -of note, patient hypokalemic with the LESLIE. Given urone lytes, suspect chronic prolonged decrease nutrition with minimal solute intake. Low WBC in urine makes aIN less likely    #Syncope  -suspect orthoastic, but given bradycardia currently off of bet ablocker cannot r/o bradycardia.   -continue to hold all Av ayaz agents.  -monitor on telemetry  -place r2 pads on patient.  -if still bradycardic 11/28, will ask EP to weigh in.  -check TSH for bradycardia.    #HFmrEF  -holding GDMT in setting of syncope, bradycardia and LESLIE.  -TTe in 2021 with Ef 40-45%, mild AS.  -at this time patient appears hypovolemic to euvolemic, even if significvant murmur, patient not candidate for TAVR given LESLIE,will hold off at this time as will not change immediate management.    #dyspepsia  -red flag symtpoms.  -no findings on CT.  -lauren to discuss with family re: GOC. If want further interventions, may start with barium swallow/upper GI series to look for any mass prior to placing patient under anesthesia with EGD.  -protonix as ordered    #Alzehimer's disease with functional quadraplegia  -supportive management, mood stabilizers.   -QTc<500, if agitated, low dose haldol IV    #advanced care planning  -over weekend, team with family discussion,two children surrogates, not in agreement on plan of action, with one wanting rabbi input prior to holding any life-sustaining measures. Will follow up for guidance on how to proceed with patient, especially if further procedures are needed. Patient seen and evaluated on bedside rounds with son-in-law at bedside. Patient more alert, following commands. No pain when eating at time, but per patient, food gets stuck occasionally in throat associcated with abdominal pain.    Telemetry reviewed: sinus 50-70's    #LESLIE  -unclear etiology, suspect combination of pre-renal with hemodynamically mediated aTn.  -avoid nephrotoxic agents, including home valsartan and spironolactone  -s/p over 3L, monitor PO intake  -continue morales for now.   -appreciate nephrology follow up.  -of note, patient hypokalemic with the LESLIE. Given urone lytes, suspect chronic prolonged decrease nutrition with minimal solute intake. Low WBC in urine makes aIN less likely    #Syncope  -suspect orthoastic, but given bradycardia currently off of bet ablocker cannot r/o bradycardia.   -continue to hold all Av ayaz agents.  -monitor on telemetry  -place r2 pads on patient.  -compression stockings, repeat orhtostatics    #bradycardia  -sinus wilber, reviewed 2021 admission was wilber then, butassymptomatic  -concern is lack of chronotropic compensation, which can exacerbate orthostatics  -continue off of beta blcokers  -when able, ambulateto see if HR rises.  -family discussion on if would even want consideration of PPM.      #HFmrEF  -holding GDMT in setting of syncope, bradycardia and LESLIE.  -TTe in 2021 with Ef 40-45%, mild AS.  -at this time patient appears hypovolemic to euvolemic, even if significvant murmur, patient not candidate for TAVR given LESLIE,will hold off at this time as will not change immediate management.    #dyspepsia  -red flag symtpoms.  -no findings on CT.  -still awaiting family decision re; intervention.. If want further interventions, may start with barium swallow/upper GI series to look for any mass prior to placing patient under anesthesia with EGD.  -protonix as ordered  -TPN not a good option for patient. PEG in advanced dementia does not decrease risk of aspiration and cannot do without EGD, which family is hesitant about. Continue family discussions.    #Alzehimer's disease with functional quadraplegia  -supportive management, mood stabilizers.   -QTc<500, if agitated, low dose haldol IV    #advanced care planning  -over weekend, team with family discussion,two children surrogates, not in agreement on plan of action, with one wanting rabbi input prior to holding any life-sustaining measures. Will follow up for guidance on how to proceed with patient, especially if further procedures are needed.

## 2023-11-28 NOTE — PROGRESS NOTE ADULT - ASSESSMENT
89 y/o F pmhx advanced dementia, HTN, HLD, DM2, anemia, CAD (s/p LAD and LCx stent 2010), HFrEF (EF 40-45% 2021), h/o TIA no residual deficits, h/o recurrent UTI presents w/ syncope and fall w/o trauma in the setting of 1 month of poor PO intake, nausea, vomiting, epigastric pain. C/f failure to thrive i/s/o ?PUD? vs. dementia. Syncope likely orthostatic in nature.  Nephrology following for LESLIE.

## 2023-11-28 NOTE — PROGRESS NOTE ADULT - PROBLEM SELECTOR PLAN 1
- Differential for syncope includes orthostatic hypotension vs. cardiac (arrythmia, valvular disease) vs. neuro  - Pt has h/o HFrEF, so increased risk of arrythmia  - EKG NSR, no events on tele  - favor orthostatic hypotension as cause as pt has positive orthostatics (103/46 --> 65/45)  - s/p fluid resuscitation w/ 1L NS  - will obtain TTE to eval for valvular disease  - continue to monitor on tele - Differential for syncope includes orthostatic hypotension vs. cardiac (arrythmia, valvular disease) vs. neuro  - Pt has h/o HFrEF, so increased risk of arrythmia  - EKG NSR, no events on tele  - favor orthostatic hypotension as cause as pt has positive orthostatics (103/46 --> 65/45)  - s/p fluid resuscitation w/ 1L NS + 2L sodium bicarb gtt  - will obtain TTE to eval for valvular disease  - continue to monitor on tele

## 2023-11-28 NOTE — PROGRESS NOTE ADULT - PROBLEM SELECTOR PLAN 7
- w/ agitation  - seems to be FAST Stage 6? No official staging outpt noted  - hold home nanzeric in the setting of orthostatic hypotension  - will move to enhanced supervision room  -Haldol IV PRN for agitation w/ harm to self (e.g. pulling at lines)

## 2023-11-28 NOTE — PROGRESS NOTE ADULT - PROBLEM SELECTOR PLAN 3
- bicarb on admission 13  - 2/2 LESLIE  - was on bicarb gtt w/ improvement in acidosis  - will transition to PO bicarb 1300 BID - bicarb on admission 13  - 2/2 LESLIE  - was on bicarb gtt w/ improvement in acidosis  - will transition to PO bicarb 1300 BID  - IMPROVING

## 2023-11-28 NOTE — ADVANCED PRACTICE NURSE CONSULT - ASSESSMENT
Midline Catheter Insertion Note    Catheter type: 4F  : Bard  Power injectable: Yes  LOT# [id number]                                                                                                                                                                                                                        Procedure assisted by: KENIA Cote RN  Time out was preformed, confirming the patient's first and last name, date of birth, procedure, and correct site prior to state of procedure.    Patient was placed with HOB 30 degrees. Patient placement site was prepped with chlorhexidine solution, then draped using maximum sterile barrier protection. Using the Bard Site Rite 8, the catheter was placed using the Modified Seldinger Technique. Strict adherence to outline aseptic technique including handwashing, glove and gown, utilizing mask and cap, plus draping the patient with a sterile drape was observed. Upon completion of line placement, the insertion site was covered with a sterile occlusive CHG dressing. Pt tolerated procedure well.     All materials used for catheter insertion, including the intact guide wires, were accounted for at the end of the procedure.  Number of attempts: 1  Complications/Comments: None    Emergency Placement: No  Site: New  Anatomical Site of insertion: Left Basilic vein  Catheter size/length: 4F, 20cm  US guided Bard single lumen power midline placed   Klisyri Pregnancy And Lactation Text: It is unknown if this medication can harm a developing fetus or if it is excreted in breast milk.

## 2023-11-28 NOTE — CHART NOTE - NSCHARTNOTEFT_GEN_A_CORE
Spoke w/ patient's grandsons Chung (oral surgeon) and Tyler (nursing home director; she was at his nursing home) at bedside. Patient's son, Gianni, is their father. Gianni and Zoey wanted them to get more detailed info on the patient's medical condition since they are both in the medical field. We discussed the patient's LESLIE, metabolic acidosis, bradycardia, and nutrition status.    Regarding the LESLIE and metabolic acidodis, we discussed how her current condition is stable. We discussed the prerenal and obstructive etiology of her LESLIE and the interventions on both of these. They inquired about her prognosis from a renal standpoint, which is difficult to comment on given her ongoing course, but I acknowledged that she does not currently need HD.    Regarding her bradycardia, we discussed additional possible steps. We discussed how this may have been a contributing factor for her syncope. We also spoke about next possible steps and inquired about what Gita would have wanted - whether she would have wanted a PPM or not. They acknowledged that a procedure for a PPM implantation may be risky given her age and numerous comorbidities.    We also discussed her nutrition status, which was a big concern for Tyler. This has been an ongoing issue for the past few months, but has worsened in the past few weeks, resulting in mulitple hospitalizations and ED visits. Chung brought up the possibility of TPN/PPN. While this is a possible modality for nutrition, I expressed concern that she would be at a high risk for infection and would likely not be a candidate for it since she does not have a known mechanical obstruction that would be a contraindication to enteral nutrition. I also expressed concern that TPN/PPN is typically used as a bridge to resuming enteral feeds, which may not be feasible for her, should this be an irreversible etiology, such as progression of dementia. Tyler brought up the possibility of a PEG. We discussed the risk of surgical complications and whether this might be something she would have wanted. I also acknowledged that PEG tubes are not known to improve mortality. The [employer] Ger[employer] Society position statement on tube feeds states that they do not recommend tube feeds since it has no benefits for mortality, aspiration PNA, functional status, or comfort, but instead worsens agitation and possible healthcare related complications. Tyler and Chung were interested in hearing more about the literature around PEG tubes which we stated we would bring more detailed information at a future time.     We also discussed a possible etiology for her poor PO intake. It is possible this is from progression of her dementia. It is also possible there is a mechanical/organic etiology. We discussed possible diagnostic modalities, such as MBS/UGS, but we expressed concern that even if we find an obstruction, would we be able to act on it? Chung and Chon acknowledged that this was a concern for them as well.    We also discussed code status. Chung and Tyler brought up that they were surprise that she was full code. It seemed they thought she should not be, but acknowledged that Gianni and Zoey are the surrogate decision makers.    They stated they would bring this information back to Gianni and Zoey and discuss it with them to help make further decisions.

## 2023-11-28 NOTE — PROGRESS NOTE ADULT - ASSESSMENT
89 y/o F pmhx advanced dementia, HTN, HLD, DM2, anemia, CAD (s/p LAD and LCx stent 2010), HFrEF (EF 40-45% 2021), h/o TIA no residual deficits, h/o recurrent UTI presents w/ syncope and fall w/o trauma in the setting of 1 month of poor PO intake, nausea, vomiting, epigastric pain. C/f failure to thrive i/s/o ?PUD? vs. dementia. Syncope likely orthostatic in nature

## 2023-11-28 NOTE — PROGRESS NOTE ADULT - ATTENDING COMMENTS
LESLIE with acidosis in setting of hypotension, retention   Cornell cath draining urine  Cr improving slowly  Can give po bicarb  Trend conservatively     Gilma Madrid MD  Off: [phone]  contact me on teams    (After 5 pm or on weekends please page the on-call fellow/attending, can check [email or web address] for schedule. Login is lo mendoza, schedule under [facility] medicine, psych, derm)

## 2023-11-28 NOTE — PROGRESS NOTE ADULT - SUBJECTIVE AND OBJECTIVE BOX
DATE OF SERVICE: 11-28-23 @ 07:18    Patient is a 89y old  Female who presents with a chief complaint of fall, LESLIE (27 Nov 2023 13:42)      SUBJECTIVE / OVERNIGHT EVENTS:    MEDICATIONS  (STANDING):  atorvastatin 80 milliGRAM(s) Oral at bedtime  chlorhexidine 2% Cloths 1 Application(s) Topical daily  cholecalciferol 2000 Unit(s) Oral daily  clopidogrel Tablet 75 milliGRAM(s) Oral daily  dextrose 5% + lactated ringers. 1000 milliLiter(s) (50 mL/Hr) IV Continuous <Continuous>  dextrose 5%. 1000 milliLiter(s) (50 mL/Hr) IV Continuous <Continuous>  dextrose 5%. 1000 milliLiter(s) (100 mL/Hr) IV Continuous <Continuous>  dextrose 50% Injectable 25 Gram(s) IV Push once  dextrose 50% Injectable 25 Gram(s) IV Push once  dextrose 50% Injectable 12.5 Gram(s) IV Push once  escitalopram 10 milliGRAM(s) Oral daily  folic acid 1 milliGRAM(s) Oral daily  glucagon  Injectable 1 milliGRAM(s) IntraMuscular once  heparin   Injectable 5000 Unit(s) SubCutaneous every 12 hours  insulin lispro (ADMELOG) corrective regimen sliding scale   SubCutaneous at bedtime  insulin lispro (ADMELOG) corrective regimen sliding scale   SubCutaneous three times a day before meals  melatonin 6 milliGRAM(s) Oral at bedtime  pantoprazole    Tablet 40 milliGRAM(s) Oral before breakfast  QUEtiapine 25 milliGRAM(s) Oral at bedtime  sodium bicarbonate 1300 milliGRAM(s) Oral three times a day  sucralfate suspension 1 Gram(s) Oral two times a day    MEDICATIONS  (PRN):  acetaminophen     Tablet .. 650 milliGRAM(s) Oral every 6 hours PRN Temp greater or equal to 38C (100.4F), Mild Pain (1 - 3)  aluminum hydroxide/magnesium hydroxide/simethicone Suspension 30 milliLiter(s) Oral every 4 hours PRN Dyspepsia  dextrose Oral Gel 15 Gram(s) Oral once PRN Blood Glucose LESS THAN 70 milliGRAM(s)/deciliter  ondansetron Injectable 4 milliGRAM(s) IV Push every 8 hours PRN Nausea and/or Vomiting      Vital Signs Last 24 Hrs  T(C): 36.4 (28 Nov 2023 05:48), Max: 36.5 (27 Nov 2023 16:07)  T(F): 97.5 (28 Nov 2023 05:48), Max: 97.7 (27 Nov 2023 16:07)  HR: 56 (28 Nov 2023 05:48) (54 - 56)  BP: 133/69 (28 Nov 2023 05:48) (132/54 - 149/56)  BP(mean): --  RR: 18 (28 Nov 2023 05:48) (18 - 20)  SpO2: 98% (28 Nov 2023 05:48) (96% - 98%)    Parameters below as of 28 Nov 2023 05:48  Patient On (Oxygen Delivery Method): room air      CAPILLARY BLOOD GLUCOSE      POCT Blood Glucose.: 88 mg/dL (27 Nov 2023 21:32)  POCT Blood Glucose.: 77 mg/dL (27 Nov 2023 16:23)  POCT Blood Glucose.: 82 mg/dL (27 Nov 2023 13:16)  POCT Blood Glucose.: 117 mg/dL (27 Nov 2023 07:53)    I&O's Summary    27 Nov 2023 07:01  -  28 Nov 2023 07:00  --------------------------------------------------------  IN: 0 mL / OUT: 1350 mL / NET: -1350 mL        PHYSICAL EXAM:  GENERAL: NAD, well-developed  HEAD:  Atraumatic, Normocephalic  EYES: EOMI, PERRLA, conjunctiva and sclera clear  NECK: Supple, No JVD  CHEST/LUNG: Clear to auscultation bilaterally; No wheeze  HEART: Regular rate and rhythm; No murmurs, rubs, or gallops  ABDOMEN: Soft, Nontender, Nondistended; Bowel sounds present  EXTREMITIES:  2+ Peripheral Pulses, No clubbing, cyanosis, or edema  PSYCH: AAOx3  NEUROLOGY: non-focal  SKIN: No rashes or lesions    LABS:                        9.5    7.40  )-----------( 171      ( 27 Nov 2023 06:51 )             29.2     11-27    139  |  105  |  46<H>  ----------------------------<  77  3.6   |  21<L>  |  6.85<H>    Ca    8.3<L>      27 Nov 2023 17:51  Phos  4.1     11-27  Mg     1.7     11-27            Urinalysis Basic - ( 27 Nov 2023 17:51 )    Color: x / Appearance: x / SG: x / pH: x  Gluc: 77 mg/dL / Ketone: x  / Bili: x / Urobili: x   Blood: x / Protein: x / Nitrite: x   Leuk Esterase: x / RBC: x / WBC x   Sq Epi: x / Non Sq Epi: x / Bacteria: x        RADIOLOGY & ADDITIONAL TESTS:    Imaging Personally Reviewed:    Consultant(s) Notes Reviewed:      Care Discussed with Consultants/Other Providers:   DATE OF SERVICE: 11-28-23 @ 07:18    Patient is a 89y old  Female who presents with a chief complaint of fall, LESLIE (27 Nov 2023 13:42)      SUBJECTIVE / OVERNIGHT EVENTS: NAOE. She did not participate in the interview    MEDICATIONS  (STANDING):  atorvastatin 80 milliGRAM(s) Oral at bedtime  chlorhexidine 2% Cloths 1 Application(s) Topical daily  cholecalciferol 2000 Unit(s) Oral daily  clopidogrel Tablet 75 milliGRAM(s) Oral daily  dextrose 5% + lactated ringers. 1000 milliLiter(s) (50 mL/Hr) IV Continuous <Continuous>  dextrose 5%. 1000 milliLiter(s) (50 mL/Hr) IV Continuous <Continuous>  dextrose 5%. 1000 milliLiter(s) (100 mL/Hr) IV Continuous <Continuous>  dextrose 50% Injectable 25 Gram(s) IV Push once  dextrose 50% Injectable 25 Gram(s) IV Push once  dextrose 50% Injectable 12.5 Gram(s) IV Push once  escitalopram 10 milliGRAM(s) Oral daily  folic acid 1 milliGRAM(s) Oral daily  glucagon  Injectable 1 milliGRAM(s) IntraMuscular once  heparin   Injectable 5000 Unit(s) SubCutaneous every 12 hours  insulin lispro (ADMELOG) corrective regimen sliding scale   SubCutaneous at bedtime  insulin lispro (ADMELOG) corrective regimen sliding scale   SubCutaneous three times a day before meals  melatonin 6 milliGRAM(s) Oral at bedtime  pantoprazole    Tablet 40 milliGRAM(s) Oral before breakfast  QUEtiapine 25 milliGRAM(s) Oral at bedtime  sodium bicarbonate 1300 milliGRAM(s) Oral three times a day  sucralfate suspension 1 Gram(s) Oral two times a day    MEDICATIONS  (PRN):  acetaminophen     Tablet .. 650 milliGRAM(s) Oral every 6 hours PRN Temp greater or equal to 38C (100.4F), Mild Pain (1 - 3)  aluminum hydroxide/magnesium hydroxide/simethicone Suspension 30 milliLiter(s) Oral every 4 hours PRN Dyspepsia  dextrose Oral Gel 15 Gram(s) Oral once PRN Blood Glucose LESS THAN 70 milliGRAM(s)/deciliter  ondansetron Injectable 4 milliGRAM(s) IV Push every 8 hours PRN Nausea and/or Vomiting      Vital Signs Last 24 Hrs  T(C): 36.4 (28 Nov 2023 05:48), Max: 36.5 (27 Nov 2023 16:07)  T(F): 97.5 (28 Nov 2023 05:48), Max: 97.7 (27 Nov 2023 16:07)  HR: 56 (28 Nov 2023 05:48) (54 - 56)  BP: 133/69 (28 Nov 2023 05:48) (132/54 - 149/56)  BP(mean): --  RR: 18 (28 Nov 2023 05:48) (18 - 20)  SpO2: 98% (28 Nov 2023 05:48) (96% - 98%)    Parameters below as of 28 Nov 2023 05:48  Patient On (Oxygen Delivery Method): room air      CAPILLARY BLOOD GLUCOSE      POCT Blood Glucose.: 88 mg/dL (27 Nov 2023 21:32)  POCT Blood Glucose.: 77 mg/dL (27 Nov 2023 16:23)  POCT Blood Glucose.: 82 mg/dL (27 Nov 2023 13:16)  POCT Blood Glucose.: 117 mg/dL (27 Nov 2023 07:53)    I&O's Summary    27 Nov 2023 07:01  -  28 Nov 2023 07:00  --------------------------------------------------------  IN: 0 mL / OUT: 1350 mL / NET: -1350 mL        PHYSICAL EXAM:  GENERAL: NAD, well-developed  HEAD:  Atraumatic, Normocephalic  EYES: EOMI, conjunctiva and sclera clear  NECK: Supple, No JVD  CHEST/LUNG: Clear to auscultation bilaterally; No wheeze  HEART: Regular rate and rhythm; No murmurs, rubs, or gallops  ABDOMEN: Soft, Nontender, Nondistended; Bowel sounds present  EXTREMITIES:  2+ Peripheral Pulses, No clubbing, cyanosis, or edema  NEUROLOGY: A&Ox2 (name, location; does not know time/date). Follows commands. Non-focal. MAEx4  SKIN: No rashes or lesions    LABS:                        9.5    7.40  )-----------( 171      ( 27 Nov 2023 06:51 )             29.2     11-27    139  |  105  |  46<H>  ----------------------------<  77  3.6   |  21<L>  |  6.85<H>    Ca    8.3<L>      27 Nov 2023 17:51  Phos  4.1     11-27  Mg     1.7     11-27            Urinalysis Basic - ( 27 Nov 2023 17:51 )    Color: x / Appearance: x / SG: x / pH: x  Gluc: 77 mg/dL / Ketone: x  / Bili: x / Urobili: x   Blood: x / Protein: x / Nitrite: x   Leuk Esterase: x / RBC: x / WBC x   Sq Epi: x / Non Sq Epi: x / Bacteria: x        RADIOLOGY & ADDITIONAL TESTS:    Imaging Personally Reviewed:    Consultant(s) Notes Reviewed:      Care Discussed with Consultants/Other Providers:

## 2023-11-28 NOTE — PROVIDER CONTACT NOTE (OTHER) - REASON
Patient is on enhanced supervision, Contacting provider to ask if we can discontinue enhanced supervision

## 2023-11-28 NOTE — PROGRESS NOTE ADULT - PROBLEM SELECTOR PLAN 5
- peptic ulcer disease vs. pancreatitis vs. functional  - no findings on CT AP of pancreatitis, does not have classic history  - never had endoscopy to evaluate  - will trial protonix 40mg qd   - continue sucralfate 1g BID - peptic ulcer disease vs. pancreatitis vs. functional  - no findings on CT AP of pancreatitis, does not have classic history  - never had endoscopy to evaluate  - will trial protonix 40mg qd   - discussed w/ family GOC (see chart note); decision still to be made

## 2023-11-28 NOTE — PROGRESS NOTE ADULT - SUBJECTIVE AND OBJECTIVE BOX
[facility] DIVISION OF KIDNEY DISEASES AND HYPERTENSION   FOLLOW UP NOTE    --------------------------------------------------------------------------------  Chief Complaint:    24 hour events/subjective: No acute events.        PAST HISTORY  --------------------------------------------------------------------------------  No significant changes to PMH, PSH, FHx, SHx, unless otherwise noted    ALLERGIES & MEDICATIONS  --------------------------------------------------------------------------------  Allergies    aspirin (Anaphylaxis)  Celebrex (Rash)  penicillin (Anaphylaxis)    Intolerances      Standing Inpatient Medications  atorvastatin 80 milliGRAM(s) Oral at bedtime  chlorhexidine 2% Cloths 1 Application(s) Topical daily  cholecalciferol 2000 Unit(s) Oral daily  clopidogrel Tablet 75 milliGRAM(s) Oral daily  dextrose 5%. 1000 milliLiter(s) IV Continuous <Continuous>  dextrose 5%. 1000 milliLiter(s) IV Continuous <Continuous>  dextrose 50% Injectable 25 Gram(s) IV Push once  dextrose 50% Injectable 25 Gram(s) IV Push once  dextrose 50% Injectable 12.5 Gram(s) IV Push once  escitalopram 10 milliGRAM(s) Oral daily  folic acid 1 milliGRAM(s) Oral daily  glucagon  Injectable 1 milliGRAM(s) IntraMuscular once  heparin   Injectable 5000 Unit(s) SubCutaneous every 12 hours  insulin lispro (ADMELOG) corrective regimen sliding scale   SubCutaneous at bedtime  insulin lispro (ADMELOG) corrective regimen sliding scale   SubCutaneous three times a day before meals  melatonin 6 milliGRAM(s) Oral at bedtime  pantoprazole    Tablet 40 milliGRAM(s) Oral before breakfast  QUEtiapine 25 milliGRAM(s) Oral at bedtime  sodium bicarbonate 1300 milliGRAM(s) Oral three times a day    PRN Inpatient Medications  acetaminophen     Tablet .. 650 milliGRAM(s) Oral every 6 hours PRN  aluminum hydroxide/magnesium hydroxide/simethicone Suspension 30 milliLiter(s) Oral every 4 hours PRN  dextrose Oral Gel 15 Gram(s) Oral once PRN  ondansetron Injectable 4 milliGRAM(s) IV Push every 8 hours PRN      VITALS/PHYSICAL EXAM  --------------------------------------------------------------------------------  T(C): 36.4 (11-28-23 @ 12:09), Max: 36.5 (11-27-23 @ 16:07)  HR: 49 (11-28-23 @ 12:09) (49 - 56)  BP: 134/63 (11-28-23 @ 12:09) (132/54 - 134/63)  RR: 18 (11-28-23 @ 12:09) (18 - 18)  SpO2: 97% (11-28-23 @ 12:09) (96% - 98%)  Wt(kg): --        11-27-23 @ 07:01  -  11-28-23 @ 07:00  --------------------------------------------------------  IN: 0 mL / OUT: 1350 mL / NET: -1350 mL        Physical Exam:  	Gen: NAD  	HEENT: Anicteric  	Pulm: CTA B/L  	CV: S1S2+  	Abd: Soft, +BS   	Ext: No LE edema B/L  	Neuro: Awake             : Cornell+ with clear urine  	Skin: Warm and dry      LABS/STUDIES  --------------------------------------------------------------------------------              9.9    9.73  >-----------<  209      [11-28-23 @ 11:53]              31.3     139  |  105  |  46  ----------------------------<  77      [11-27-23 @ 17:51]  3.6   |  21  |  6.85        Ca     8.3     [11-27-23 @ 17:51]      Mg     1.7     [11-27-23 @ 06:50]      Phos  4.1     [11-27-23 @ 06:50]            Creatinine Trend:  SCr 6.85 [11-27 @ 17:51]  SCr 6.77 [11-27 @ 06:50]  SCr 7.14 [11-26 @ 16:59]  SCr 7.00 [11-26 @ 03:51]  SCr 1.72 [11-09 @ 15:10]    Urinalysis - [11-27-23 @ 17:51]      Color  / Appearance  / SG  / pH       Gluc 77 / Ketone   / Bili  / Urobili        Blood  / Protein  / Leuk Est  / Nitrite       RBC  / WBC  / Hyaline  / Gran  / Sq Epi  / Non Sq Epi  / Bacteria     Urine Creatinine 29      [11-26-23 @ 05:30]  Urine Protein 42      [11-26-23 @ 05:30]  Urine Sodium 30      [11-26-23 @ 05:30]  Urine Urea Nitrogen 159      [11-26-23 @ 05:42]  Urine Potassium 17      [11-26-23 @ 05:30]  Urine Osmolality 154      [11-26-23 @ 05:30]        Tacrolimus  Cyclosporine  Sirolimus  Mycophenolate  BK PCR  CMV PCR  Parvo PCR  EBV PCR

## 2023-11-29 LAB
ANION GAP SERPL CALC-SCNC: 13 MMOL/L — SIGNIFICANT CHANGE UP (ref 5–17)
ANION GAP SERPL CALC-SCNC: 13 MMOL/L — SIGNIFICANT CHANGE UP (ref 5–17)
BUN SERPL-MCNC: 42 MG/DL — HIGH (ref 7–23)
BUN SERPL-MCNC: 42 MG/DL — HIGH (ref 7–23)
CALCIUM SERPL-MCNC: 8.6 MG/DL — SIGNIFICANT CHANGE UP (ref 8.4–10.5)
CALCIUM SERPL-MCNC: 8.6 MG/DL — SIGNIFICANT CHANGE UP (ref 8.4–10.5)
CHLORIDE SERPL-SCNC: 106 MMOL/L — SIGNIFICANT CHANGE UP (ref 96–108)
CHLORIDE SERPL-SCNC: 106 MMOL/L — SIGNIFICANT CHANGE UP (ref 96–108)
CO2 SERPL-SCNC: 22 MMOL/L — SIGNIFICANT CHANGE UP (ref 22–31)
CO2 SERPL-SCNC: 22 MMOL/L — SIGNIFICANT CHANGE UP (ref 22–31)
CREAT SERPL-MCNC: 6.67 MG/DL — HIGH (ref 0.5–1.3)
CREAT SERPL-MCNC: 6.67 MG/DL — HIGH (ref 0.5–1.3)
EGFR: 5 ML/MIN/1.73M2 — LOW
EGFR: 5 ML/MIN/1.73M2 — LOW
GLUCOSE BLDC GLUCOMTR-MCNC: 103 MG/DL — HIGH (ref 70–99)
GLUCOSE BLDC GLUCOMTR-MCNC: 103 MG/DL — HIGH (ref 70–99)
GLUCOSE BLDC GLUCOMTR-MCNC: 105 MG/DL — HIGH (ref 70–99)
GLUCOSE BLDC GLUCOMTR-MCNC: 92 MG/DL — SIGNIFICANT CHANGE UP (ref 70–99)
GLUCOSE BLDC GLUCOMTR-MCNC: 92 MG/DL — SIGNIFICANT CHANGE UP (ref 70–99)
GLUCOSE SERPL-MCNC: 106 MG/DL — HIGH (ref 70–99)
GLUCOSE SERPL-MCNC: 106 MG/DL — HIGH (ref 70–99)
MAGNESIUM SERPL-MCNC: 2.2 MG/DL — SIGNIFICANT CHANGE UP (ref 1.6–2.6)
MAGNESIUM SERPL-MCNC: 2.2 MG/DL — SIGNIFICANT CHANGE UP (ref 1.6–2.6)
PHOSPHATE SERPL-MCNC: 3.6 MG/DL — SIGNIFICANT CHANGE UP (ref 2.5–4.5)
PHOSPHATE SERPL-MCNC: 3.6 MG/DL — SIGNIFICANT CHANGE UP (ref 2.5–4.5)
POTASSIUM SERPL-MCNC: 3.5 MMOL/L — SIGNIFICANT CHANGE UP (ref 3.5–5.3)
POTASSIUM SERPL-MCNC: 3.5 MMOL/L — SIGNIFICANT CHANGE UP (ref 3.5–5.3)
POTASSIUM SERPL-SCNC: 3.5 MMOL/L — SIGNIFICANT CHANGE UP (ref 3.5–5.3)
POTASSIUM SERPL-SCNC: 3.5 MMOL/L — SIGNIFICANT CHANGE UP (ref 3.5–5.3)
SODIUM SERPL-SCNC: 141 MMOL/L — SIGNIFICANT CHANGE UP (ref 135–145)
SODIUM SERPL-SCNC: 141 MMOL/L — SIGNIFICANT CHANGE UP (ref 135–145)

## 2023-11-29 PROCEDURE — 93306 TTE W/DOPPLER COMPLETE: CPT | Mod: 26

## 2023-11-29 PROCEDURE — 99232 SBSQ HOSP IP/OBS MODERATE 35: CPT | Mod: GC

## 2023-11-29 PROCEDURE — 99233 SBSQ HOSP IP/OBS HIGH 50: CPT | Mod: GC

## 2023-11-29 RX ORDER — SODIUM CHLORIDE 9 MG/ML
1000 INJECTION, SOLUTION INTRAVENOUS
Refills: 0 | Status: DISCONTINUED | OUTPATIENT
Start: 2023-11-29 | End: 2023-11-30

## 2023-11-29 RX ADMIN — HEPARIN SODIUM 5000 UNIT(S): 5000 INJECTION INTRAVENOUS; SUBCUTANEOUS at 17:32

## 2023-11-29 RX ADMIN — CLOPIDOGREL BISULFATE 75 MILLIGRAM(S): 75 TABLET, FILM COATED ORAL at 12:32

## 2023-11-29 RX ADMIN — SODIUM CHLORIDE 50 MILLILITER(S): 9 INJECTION, SOLUTION INTRAVENOUS at 15:19

## 2023-11-29 RX ADMIN — CHLORHEXIDINE GLUCONATE 1 APPLICATION(S): 213 SOLUTION TOPICAL at 11:47

## 2023-11-29 RX ADMIN — Medication 2000 UNIT(S): at 12:33

## 2023-11-29 RX ADMIN — PANTOPRAZOLE SODIUM 40 MILLIGRAM(S): 20 TABLET, DELAYED RELEASE ORAL at 05:31

## 2023-11-29 RX ADMIN — Medication 1300 MILLIGRAM(S): at 13:27

## 2023-11-29 RX ADMIN — Medication 6 MILLIGRAM(S): at 22:31

## 2023-11-29 RX ADMIN — HEPARIN SODIUM 5000 UNIT(S): 5000 INJECTION INTRAVENOUS; SUBCUTANEOUS at 05:33

## 2023-11-29 RX ADMIN — ESCITALOPRAM OXALATE 10 MILLIGRAM(S): 10 TABLET, FILM COATED ORAL at 12:32

## 2023-11-29 RX ADMIN — ATORVASTATIN CALCIUM 80 MILLIGRAM(S): 80 TABLET, FILM COATED ORAL at 22:31

## 2023-11-29 RX ADMIN — Medication 1300 MILLIGRAM(S): at 22:32

## 2023-11-29 RX ADMIN — Medication 1300 MILLIGRAM(S): at 05:31

## 2023-11-29 RX ADMIN — Medication 1 MILLIGRAM(S): at 12:33

## 2023-11-29 NOTE — PROGRESS NOTE ADULT - PROBLEM SELECTOR PLAN 1
- Differential for syncope includes orthostatic hypotension vs. cardiac (arrythmia, valvular disease) vs. neuro  - Pt has h/o HFrEF, so increased risk of arrythmia  - EKG NSR, no events on tele  - favor orthostatic hypotension as cause as pt has positive orthostatics (103/46 --> 65/45)  - s/p fluid resuscitation w/ 1L NS + 2L sodium bicarb gtt  - will obtain TTE to eval for valvular disease  - continue to monitor on tele

## 2023-11-29 NOTE — CHART NOTE - NSCHARTNOTEFT_GEN_A_CORE
89 y/o F pmhx advanced dementia, HTN, HLD, DM2, anemia, CAD (s/p LAD and LCx stent 2010), HFrEF (EF 40-45% 2021), h/o TIA no residual deficits, h/o recurrent UTI presents w/ syncope and fall w/o trauma in the setting of 1 month of poor PO intake, nausea, vomiting, epigastric pain. C/f failure to thrive i/s/o ?PUD? vs. dementia. Syncope likely orthostatic in nature.    11/26 - Provider contact note and chart note; Multiple episodes of agitation, not redirectable, administered Zyprexa and placed on 1:1. Pt AOx1. Pt agitated, restless, pulling on IV and Cornell. Unable to reorient. 1:1 initiated for patient safety and safety of others.    11/27 pt seen by this service for bedside swallow evaluation. Diet deferred to team given level of agitation and poor participation.     Today, pt seen for re-evaluation of swallow function as requested by MD Dawn d/t improved mentation. Esophogram currently ordered, however as per d/w MD, on hold at this time pending swallow evaluation. Pt encountered upright in bed, RA, NAD. Pt oriented to self and general location. Pt declined to participate w/ clinician initially, however eventually amenable given encouragement. Although, pt only accepting of limited textures d/t c/o nausea. Pt accepting of thin liquids via straw and soft-bite sized solids. Swallow sequence remarkable for adequate mastication of solids, delayed oral transit, and mild latency in pharyngeal trigger. No overt s/s aspiration observed across limited textures. Pt refusing crushed meds by RN d/t nausea at end of evaluation. Purposeful proactive rounding reinforced and 5 Ps addressed. Pt left in no distress.    Impression: Pt p/w grossly adequate oropharyngeal swallow for limited textures of soft-bite sized solids and thin liquids. Suspect poor PO intake d/t progression of dementia vs. true dysphagia.     Above d/w grandpablo Thurman regarding results of evaluation and MD Dawn. Chung declined dysphagia PTA. All questions answered. At this time, instrumental assessment unlikely to  given low c/f dysphagia and poor participation in setting of progression of cognitive deficits. Additionally, nahed addressed c/f for pt's ability to participate in esophogram given pt's current mentation. Discussed this with MD Dawn that based on participation during swallow evaluation, mentation may be a barrier to successfully complete GI testing.     Recommendations:   1) Soft-bite sized/thin liquids  2) Strict aspiration and reflux precautions!   3) Monitor for s/s aspiration/laryngeal penetration. If noted:  D/C p.o. intake, provide non-oral nutrition/hydration/meds, and contact this service @ [phone]    Will stay on to monitor diet tolerance and assess candidacy for diet advancement as mentation and participation improves.     Sindy Gillespie CCC-SLP   Prefer teams or [phone]

## 2023-11-29 NOTE — PROGRESS NOTE ADULT - PROBLEM SELECTOR PLAN 1
LESLIE/ATN due to hypotension, volume depletion and urinary retention. + produced urine after morales was place.   Baseline Cr ~1; admission Cr 7.   On presentation to the ER patient's BP was 90/54, HR 54.  -Renal US unremarkable     Recs:  -Cw morales; Cr trending down today 6.67; no indication for dialysis  -Needs strict I/O; monitor UOP  -Cw IVF and PO intake as tolerated      *THIS NOTE IS NOT FINALIZED UNTIL SIGNED BY THE ATTENDING*  Steve Willard  Nephrology Fellow  Feel free to contact me on TEAMS  After 4 pm please contact the on-call Fellow.

## 2023-11-29 NOTE — PROGRESS NOTE ADULT - ATTENDING COMMENTS
LESLIE with acidosis in setting of hypotension, retention   Cornell cath draining urine  Cr improving slowly  Can give po bicarb  Trend conservatively     Gilma Madrid MD  Off: [phone]  contact me on teams    (After 5 pm or on weekends please page the on-call fellow/attending, can check [email or web address] for schedule. Login is lo mendoza, schedule under [facility] medicine, psych, derm)

## 2023-11-29 NOTE — PROGRESS NOTE ADULT - SUBJECTIVE AND OBJECTIVE BOX
DATE OF SERVICE: 11-29-23 @ 07:21    Patient is a 89y old  Female who presents with a chief complaint of fall, LESLIE (28 Nov 2023 12:22)      SUBJECTIVE / OVERNIGHT EVENTS:    MEDICATIONS  (STANDING):  atorvastatin 80 milliGRAM(s) Oral at bedtime  chlorhexidine 2% Cloths 1 Application(s) Topical daily  cholecalciferol 2000 Unit(s) Oral daily  clopidogrel Tablet 75 milliGRAM(s) Oral daily  dextrose 5% + lactated ringers. 1000 milliLiter(s) (50 mL/Hr) IV Continuous <Continuous>  dextrose 5%. 1000 milliLiter(s) (50 mL/Hr) IV Continuous <Continuous>  dextrose 5%. 1000 milliLiter(s) (100 mL/Hr) IV Continuous <Continuous>  dextrose 50% Injectable 12.5 Gram(s) IV Push once  dextrose 50% Injectable 25 Gram(s) IV Push once  dextrose 50% Injectable 25 Gram(s) IV Push once  escitalopram 10 milliGRAM(s) Oral daily  folic acid 1 milliGRAM(s) Oral daily  glucagon  Injectable 1 milliGRAM(s) IntraMuscular once  heparin   Injectable 5000 Unit(s) SubCutaneous every 12 hours  insulin lispro (ADMELOG) corrective regimen sliding scale   SubCutaneous at bedtime  insulin lispro (ADMELOG) corrective regimen sliding scale   SubCutaneous three times a day before meals  melatonin 6 milliGRAM(s) Oral at bedtime  pantoprazole    Tablet 40 milliGRAM(s) Oral before breakfast  sodium bicarbonate 1300 milliGRAM(s) Oral three times a day    MEDICATIONS  (PRN):  acetaminophen     Tablet .. 650 milliGRAM(s) Oral every 6 hours PRN Temp greater or equal to 38C (100.4F), Mild Pain (1 - 3)  aluminum hydroxide/magnesium hydroxide/simethicone Suspension 30 milliLiter(s) Oral every 4 hours PRN Dyspepsia  dextrose Oral Gel 15 Gram(s) Oral once PRN Blood Glucose LESS THAN 70 milliGRAM(s)/deciliter  ondansetron Injectable 4 milliGRAM(s) IV Push every 8 hours PRN Nausea and/or Vomiting      Vital Signs Last 24 Hrs  T(C): 36.4 (29 Nov 2023 05:38), Max: 36.7 (28 Nov 2023 20:27)  T(F): 97.6 (29 Nov 2023 05:38), Max: 98 (28 Nov 2023 20:27)  HR: 50 (29 Nov 2023 05:38) (49 - 55)  BP: 165/71 (29 Nov 2023 05:38) (134/63 - 165/71)  BP(mean): --  RR: 18 (29 Nov 2023 05:38) (18 - 18)  SpO2: 99% (29 Nov 2023 05:38) (95% - 99%)    Parameters below as of 29 Nov 2023 05:38  Patient On (Oxygen Delivery Method): room air      CAPILLARY BLOOD GLUCOSE      POCT Blood Glucose.: 104 mg/dL (28 Nov 2023 20:29)  POCT Blood Glucose.: 83 mg/dL (28 Nov 2023 16:29)  POCT Blood Glucose.: 81 mg/dL (28 Nov 2023 12:08)  POCT Blood Glucose.: 88 mg/dL (28 Nov 2023 07:57)    I&O's Summary    28 Nov 2023 07:01  -  29 Nov 2023 07:00  --------------------------------------------------------  IN: 60 mL / OUT: 600 mL / NET: -540 mL        PHYSICAL EXAM:  GENERAL: NAD, well-developed  HEAD:  Atraumatic, Normocephalic  EYES: EOMI, PERRLA, conjunctiva and sclera clear  NECK: Supple, No JVD  CHEST/LUNG: Clear to auscultation bilaterally; No wheeze  HEART: Regular rate and rhythm; No murmurs, rubs, or gallops  ABDOMEN: Soft, Nontender, Nondistended; Bowel sounds present  EXTREMITIES:  2+ Peripheral Pulses, No clubbing, cyanosis, or edema  PSYCH: AAOx3  NEUROLOGY: non-focal  SKIN: No rashes or lesions    LABS:                        9.9    9.73  )-----------( 209      ( 28 Nov 2023 11:53 )             31.3     11-28    140  |  107  |  46<H>  ----------------------------<  87  3.9   |  20<L>  |  6.77<H>    Ca    8.7      28 Nov 2023 11:53  Phos  4.2     11-28  Mg     2.5     11-28            Urinalysis Basic - ( 28 Nov 2023 11:53 )    Color: x / Appearance: x / SG: x / pH: x  Gluc: 87 mg/dL / Ketone: x  / Bili: x / Urobili: x   Blood: x / Protein: x / Nitrite: x   Leuk Esterase: x / RBC: x / WBC x   Sq Epi: x / Non Sq Epi: x / Bacteria: x        RADIOLOGY & ADDITIONAL TESTS:    Imaging Personally Reviewed:    Consultant(s) Notes Reviewed:      Care Discussed with Consultants/Other Providers:   DATE OF SERVICE: 11-29-23 @ 07:21    Patient is a 89y old  Female who presents with a chief complaint of fall, LESLIE (28 Nov 2023 12:22)      SUBJECTIVE / OVERNIGHT EVENTS: NAOE. She states she continues to have abdominal pain and nausea w/ eating.    MEDICATIONS  (STANDING):  atorvastatin 80 milliGRAM(s) Oral at bedtime  chlorhexidine 2% Cloths 1 Application(s) Topical daily  cholecalciferol 2000 Unit(s) Oral daily  clopidogrel Tablet 75 milliGRAM(s) Oral daily  dextrose 5% + lactated ringers. 1000 milliLiter(s) (50 mL/Hr) IV Continuous <Continuous>  dextrose 5%. 1000 milliLiter(s) (50 mL/Hr) IV Continuous <Continuous>  dextrose 5%. 1000 milliLiter(s) (100 mL/Hr) IV Continuous <Continuous>  dextrose 50% Injectable 12.5 Gram(s) IV Push once  dextrose 50% Injectable 25 Gram(s) IV Push once  dextrose 50% Injectable 25 Gram(s) IV Push once  escitalopram 10 milliGRAM(s) Oral daily  folic acid 1 milliGRAM(s) Oral daily  glucagon  Injectable 1 milliGRAM(s) IntraMuscular once  heparin   Injectable 5000 Unit(s) SubCutaneous every 12 hours  insulin lispro (ADMELOG) corrective regimen sliding scale   SubCutaneous at bedtime  insulin lispro (ADMELOG) corrective regimen sliding scale   SubCutaneous three times a day before meals  melatonin 6 milliGRAM(s) Oral at bedtime  pantoprazole    Tablet 40 milliGRAM(s) Oral before breakfast  sodium bicarbonate 1300 milliGRAM(s) Oral three times a day    MEDICATIONS  (PRN):  acetaminophen     Tablet .. 650 milliGRAM(s) Oral every 6 hours PRN Temp greater or equal to 38C (100.4F), Mild Pain (1 - 3)  aluminum hydroxide/magnesium hydroxide/simethicone Suspension 30 milliLiter(s) Oral every 4 hours PRN Dyspepsia  dextrose Oral Gel 15 Gram(s) Oral once PRN Blood Glucose LESS THAN 70 milliGRAM(s)/deciliter  ondansetron Injectable 4 milliGRAM(s) IV Push every 8 hours PRN Nausea and/or Vomiting      Vital Signs Last 24 Hrs  T(C): 36.4 (29 Nov 2023 05:38), Max: 36.7 (28 Nov 2023 20:27)  T(F): 97.6 (29 Nov 2023 05:38), Max: 98 (28 Nov 2023 20:27)  HR: 50 (29 Nov 2023 05:38) (49 - 55)  BP: 165/71 (29 Nov 2023 05:38) (134/63 - 165/71)  BP(mean): --  RR: 18 (29 Nov 2023 05:38) (18 - 18)  SpO2: 99% (29 Nov 2023 05:38) (95% - 99%)    Parameters below as of 29 Nov 2023 05:38  Patient On (Oxygen Delivery Method): room air      CAPILLARY BLOOD GLUCOSE      POCT Blood Glucose.: 104 mg/dL (28 Nov 2023 20:29)  POCT Blood Glucose.: 83 mg/dL (28 Nov 2023 16:29)  POCT Blood Glucose.: 81 mg/dL (28 Nov 2023 12:08)  POCT Blood Glucose.: 88 mg/dL (28 Nov 2023 07:57)    I&O's Summary    28 Nov 2023 07:01  -  29 Nov 2023 07:00  --------------------------------------------------------  IN: 60 mL / OUT: 600 mL / NET: -540 mL        PHYSICAL EXAM:  GENERAL: NAD, well-developed  HEAD:  Atraumatic, Normocephalic  EYES: EOMI, conjunctiva and sclera clear  NECK: Supple, No JVD  CHEST/LUNG: Clear to auscultation bilaterally; No wheeze  HEART: Regular rate and rhythm; No murmurs, rubs, or gallops  ABDOMEN: Soft, Nontender, Nondistended; Bowel sounds present  EXTREMITIES:  2+ Peripheral Pulses, No clubbing, cyanosis, or edema  NEUROLOGY: A&Ox2 (name, location; does not know time/date). Follows commands. Non-focal. MAEx4  SKIN: No rashes or lesions    LABS:                        9.9    9.73  )-----------( 209      ( 28 Nov 2023 11:53 )             31.3     11-28    140  |  107  |  46<H>  ----------------------------<  87  3.9   |  20<L>  |  6.77<H>    Ca    8.7      28 Nov 2023 11:53  Phos  4.2     11-28  Mg     2.5     11-28            Urinalysis Basic - ( 28 Nov 2023 11:53 )    Color: x / Appearance: x / SG: x / pH: x  Gluc: 87 mg/dL / Ketone: x  / Bili: x / Urobili: x   Blood: x / Protein: x / Nitrite: x   Leuk Esterase: x / RBC: x / WBC x   Sq Epi: x / Non Sq Epi: x / Bacteria: x        RADIOLOGY & ADDITIONAL TESTS:    Imaging Personally Reviewed:    Consultant(s) Notes Reviewed:      Care Discussed with Consultants/Other Providers:

## 2023-11-29 NOTE — PROGRESS NOTE ADULT - PROBLEM SELECTOR PLAN 5
- peptic ulcer disease vs. pancreatitis vs. functional  - no findings on CT AP of pancreatitis, does not have classic history  - never had endoscopy to evaluate  - will trial protonix 40mg qd   - discussed w/ family GOC (see chart note); decision still to be made

## 2023-11-29 NOTE — PROGRESS NOTE ADULT - SUBJECTIVE AND OBJECTIVE BOX
[facility] DIVISION OF KIDNEY DISEASES AND HYPERTENSION   FOLLOW UP NOTE    --------------------------------------------------------------------------------  Chief Complaint:    24 hour events/subjective: No acute events.        PAST HISTORY  --------------------------------------------------------------------------------  No significant changes to PMH, PSH, FHx, SHx, unless otherwise noted    ALLERGIES & MEDICATIONS  --------------------------------------------------------------------------------  Allergies    aspirin (Anaphylaxis)  Celebrex (Rash)  penicillin (Anaphylaxis)    Intolerances      Standing Inpatient Medications  atorvastatin 80 milliGRAM(s) Oral at bedtime  chlorhexidine 2% Cloths 1 Application(s) Topical daily  cholecalciferol 2000 Unit(s) Oral daily  clopidogrel Tablet 75 milliGRAM(s) Oral daily  dextrose 5% + lactated ringers. 1000 milliLiter(s) IV Continuous <Continuous>  dextrose 5%. 1000 milliLiter(s) IV Continuous <Continuous>  dextrose 5%. 1000 milliLiter(s) IV Continuous <Continuous>  dextrose 50% Injectable 25 Gram(s) IV Push once  dextrose 50% Injectable 25 Gram(s) IV Push once  dextrose 50% Injectable 12.5 Gram(s) IV Push once  escitalopram 10 milliGRAM(s) Oral daily  folic acid 1 milliGRAM(s) Oral daily  glucagon  Injectable 1 milliGRAM(s) IntraMuscular once  heparin   Injectable 5000 Unit(s) SubCutaneous every 12 hours  insulin lispro (ADMELOG) corrective regimen sliding scale   SubCutaneous three times a day before meals  insulin lispro (ADMELOG) corrective regimen sliding scale   SubCutaneous at bedtime  melatonin 6 milliGRAM(s) Oral at bedtime  pantoprazole    Tablet 40 milliGRAM(s) Oral before breakfast  sodium bicarbonate 1300 milliGRAM(s) Oral three times a day    PRN Inpatient Medications  acetaminophen     Tablet .. 650 milliGRAM(s) Oral every 6 hours PRN  aluminum hydroxide/magnesium hydroxide/simethicone Suspension 30 milliLiter(s) Oral every 4 hours PRN  dextrose Oral Gel 15 Gram(s) Oral once PRN  ondansetron Injectable 4 milliGRAM(s) IV Push every 8 hours PRN        VITALS/PHYSICAL EXAM  --------------------------------------------------------------------------------  T(C): 36.3 (11-29-23 @ 12:21), Max: 36.7 (11-28-23 @ 20:27)  HR: 48 (11-29-23 @ 12:21) (48 - 50)  BP: 184/49 (11-29-23 @ 12:21) (150/61 - 184/49)  RR: 18 (11-29-23 @ 12:21) (18 - 18)  SpO2: 99% (11-29-23 @ 12:21) (96% - 99%)  Wt(kg): --        11-28-23 @ 07:01  -  11-29-23 @ 07:00  --------------------------------------------------------  IN: 60 mL / OUT: 600 mL / NET: -540 mL        Physical Exam:  	Gen: NAD  	HEENT: Anicteric  	Pulm: CTA B/L  	CV: S1S2+  	Abd: Soft, +BS   	Ext: No LE edema B/L  	Neuro: Awake             : [place]+ with clear urine  	Skin: Warm and dry      LABS/STUDIES  --------------------------------------------------------------------------------              9.9    9.73  >-----------<  209      [11-28-23 @ 11:53]              31.3     141  |  106  |  42  ----------------------------<  106      [11-29-23 @ 12:34]  3.5   |  22  |  6.67        Ca     8.6     [11-29-23 @ 12:34]      Mg     2.2     [11-29-23 @ 12:34]      Phos  3.6     [11-29-23 @ 12:34]            Creatinine Trend:  SCr 6.67 [11-29 @ 12:34]  SCr 6.77 [11-28 @ 11:53]  SCr 6.85 [11-27 @ 17:51]  SCr 6.77 [11-27 @ 06:50]  SCr 7.14 [11-26 @ 16:59]    Urinalysis - [11-29-23 @ 12:34]      Color  / Appearance  / SG  / pH       Gluc 106 / Ketone   / Bili  / Urobili        Blood  / Protein  / Leuk Est  / Nitrite       RBC  / WBC  / Hyaline  / Gran  / Sq Epi  / Non Sq Epi  / Bacteria     Urine Creatinine 29      [11-26-23 @ 05:30]  Urine Protein 42      [11-26-23 @ 05:30]  Urine Sodium 30      [11-26-23 @ 05:30]  Urine Urea Nitrogen 159      [11-26-23 @ 05:42]  Urine Potassium 17      [11-26-23 @ 05:30]  Urine Osmolality 154      [11-26-23 @ 05:30]        Tacrolimus  Cyclosporine  Sirolimus  Mycophenolate  BK PCR  CMV PCR  Parvo PCR  EBV PCR

## 2023-11-29 NOTE — PROGRESS NOTE ADULT - ATTENDING COMMENTS
Patient seen and evaluated on bedside rounds with son-in-law at bedside. Patient more alert, following commands. No pain when eating at time, but per patient, food gets stuck occasionally in throat associcated with abdominal pain.    Seen with grandson at bedside. Patient with food sensation of getting stuck to solids, tolerated water without issue. No dizziness. No other acute complaints.     Telemetry reviewed: sinus 50-70's  Creatinine slowly downtrending, still elevated.     #LESLIE  -unclear etiology, suspect combination of pre-renal with hemodynamically mediated aTn.  -avoid nephrotoxic agents, including home valsartan and spironolactone  -s/p over 3L, monitor PO intake, gentle hydraiton.  -continue morales for now.   -appreciate nephrology follow up.  -of note, patient hypokalemic with the LESLIE. Given urine lytes, suspect chronic prolonged decrease nutrition with minimal solute intake. Low WBC in urine makes aIN less likely  -urinary ToV next 24-48 hours.     #Syncope  -suspect orthoastic, but given bradycardia currently off of beta blocker cannot r/o bradycardia.   -continue to hold all Av ayaz agents.  -monitor on telemetry  -place r2 pads on patient.  -compression stockings, repeat orhtostatics. Maintain off of beta blockers.     #bradycardia  -sinus wilber, reviewed 2021 admission was wilber then, butassymptomatic  -concern is lack of chronotropic compensation, which can exacerbate orthostatics  -continue off of beta blockers  -when able, ambulate see if HR rises.  -family discussion on if would even want consideration of PPM.      #HFmrEF  -holding GDMT in setting of syncope, bradycardia and LESLIE.  -TTe in 2021 with Ef 40-45%, mild AS.  -at this time patient appears hypovolemic to euvolemic, even if significant murmur, patient not candidate for TAVR given LESLIE, will hold off at this time as will not change immediate management.    #dyspepsia  -red flag symptoms.  -no findings on CT.  -still awaiting family decision re; intervention.. If want further interventions, may start with barium swallow/upper GI series to look for any mass prior to placing patient under anesthesia with EGD.  -protonix as ordered  -TPN not a good option for patient. PEG in advanced dementia does not decrease risk of aspiration and cannot do without EGD, which family is hesitant about. Continue family discussions.    #Alzehimer's disease with functional quadriplegia  -supportive management, mood stabilizers.       #advanced care planning  -continue GOC conversations

## 2023-11-29 NOTE — PROGRESS NOTE ADULT - PROBLEM SELECTOR PLAN 3
- bicarb on admission 13  - 2/2 LESLIE  - was on bicarb gtt w/ improvement in acidosis  - will transition to PO bicarb 1300 BID  - IMPROVING

## 2023-11-30 LAB
ANION GAP SERPL CALC-SCNC: 14 MMOL/L — SIGNIFICANT CHANGE UP (ref 5–17)
ANION GAP SERPL CALC-SCNC: 14 MMOL/L — SIGNIFICANT CHANGE UP (ref 5–17)
BUN SERPL-MCNC: 44 MG/DL — HIGH (ref 7–23)
BUN SERPL-MCNC: 44 MG/DL — HIGH (ref 7–23)
CALCIUM SERPL-MCNC: 8.8 MG/DL — SIGNIFICANT CHANGE UP (ref 8.4–10.5)
CALCIUM SERPL-MCNC: 8.8 MG/DL — SIGNIFICANT CHANGE UP (ref 8.4–10.5)
CHLORIDE SERPL-SCNC: 109 MMOL/L — HIGH (ref 96–108)
CHLORIDE SERPL-SCNC: 109 MMOL/L — HIGH (ref 96–108)
CO2 SERPL-SCNC: 21 MMOL/L — LOW (ref 22–31)
CO2 SERPL-SCNC: 21 MMOL/L — LOW (ref 22–31)
CREAT SERPL-MCNC: 6.22 MG/DL — HIGH (ref 0.5–1.3)
CREAT SERPL-MCNC: 6.22 MG/DL — HIGH (ref 0.5–1.3)
EGFR: 6 ML/MIN/1.73M2 — LOW
EGFR: 6 ML/MIN/1.73M2 — LOW
GLUCOSE BLDC GLUCOMTR-MCNC: 109 MG/DL — HIGH (ref 70–99)
GLUCOSE BLDC GLUCOMTR-MCNC: 109 MG/DL — HIGH (ref 70–99)
GLUCOSE BLDC GLUCOMTR-MCNC: 91 MG/DL — SIGNIFICANT CHANGE UP (ref 70–99)
GLUCOSE BLDC GLUCOMTR-MCNC: 93 MG/DL — SIGNIFICANT CHANGE UP (ref 70–99)
GLUCOSE BLDC GLUCOMTR-MCNC: 93 MG/DL — SIGNIFICANT CHANGE UP (ref 70–99)
GLUCOSE SERPL-MCNC: 88 MG/DL — SIGNIFICANT CHANGE UP (ref 70–99)
GLUCOSE SERPL-MCNC: 88 MG/DL — SIGNIFICANT CHANGE UP (ref 70–99)
HCT VFR BLD CALC: 28 % — LOW (ref 34.5–45)
HCT VFR BLD CALC: 28 % — LOW (ref 34.5–45)
HGB BLD-MCNC: 9.1 G/DL — LOW (ref 11.5–15.5)
HGB BLD-MCNC: 9.1 G/DL — LOW (ref 11.5–15.5)
MAGNESIUM SERPL-MCNC: 2 MG/DL — SIGNIFICANT CHANGE UP (ref 1.6–2.6)
MAGNESIUM SERPL-MCNC: 2 MG/DL — SIGNIFICANT CHANGE UP (ref 1.6–2.6)
MCHC RBC-ENTMCNC: 25.9 PG — LOW (ref 27–34)
MCHC RBC-ENTMCNC: 25.9 PG — LOW (ref 27–34)
MCHC RBC-ENTMCNC: 32.5 GM/DL — SIGNIFICANT CHANGE UP (ref 32–36)
MCHC RBC-ENTMCNC: 32.5 GM/DL — SIGNIFICANT CHANGE UP (ref 32–36)
MCV RBC AUTO: 79.8 FL — LOW (ref 80–100)
MCV RBC AUTO: 79.8 FL — LOW (ref 80–100)
NRBC # BLD: 0 /100 WBCS — SIGNIFICANT CHANGE UP (ref 0–0)
NRBC # BLD: 0 /100 WBCS — SIGNIFICANT CHANGE UP (ref 0–0)
PHOSPHATE SERPL-MCNC: 3.8 MG/DL — SIGNIFICANT CHANGE UP (ref 2.5–4.5)
PHOSPHATE SERPL-MCNC: 3.8 MG/DL — SIGNIFICANT CHANGE UP (ref 2.5–4.5)
PLATELET # BLD AUTO: 204 K/UL — SIGNIFICANT CHANGE UP (ref 150–400)
PLATELET # BLD AUTO: 204 K/UL — SIGNIFICANT CHANGE UP (ref 150–400)
POTASSIUM SERPL-MCNC: 3.6 MMOL/L — SIGNIFICANT CHANGE UP (ref 3.5–5.3)
POTASSIUM SERPL-MCNC: 3.6 MMOL/L — SIGNIFICANT CHANGE UP (ref 3.5–5.3)
POTASSIUM SERPL-SCNC: 3.6 MMOL/L — SIGNIFICANT CHANGE UP (ref 3.5–5.3)
POTASSIUM SERPL-SCNC: 3.6 MMOL/L — SIGNIFICANT CHANGE UP (ref 3.5–5.3)
RBC # BLD: 3.51 M/UL — LOW (ref 3.8–5.2)
RBC # BLD: 3.51 M/UL — LOW (ref 3.8–5.2)
RBC # FLD: 15.4 % — HIGH (ref 10.3–14.5)
RBC # FLD: 15.4 % — HIGH (ref 10.3–14.5)
SODIUM SERPL-SCNC: 144 MMOL/L — SIGNIFICANT CHANGE UP (ref 135–145)
SODIUM SERPL-SCNC: 144 MMOL/L — SIGNIFICANT CHANGE UP (ref 135–145)
WBC # BLD: 5.54 K/UL — SIGNIFICANT CHANGE UP (ref 3.8–10.5)
WBC # BLD: 5.54 K/UL — SIGNIFICANT CHANGE UP (ref 3.8–10.5)
WBC # FLD AUTO: 5.54 K/UL — SIGNIFICANT CHANGE UP (ref 3.8–10.5)
WBC # FLD AUTO: 5.54 K/UL — SIGNIFICANT CHANGE UP (ref 3.8–10.5)

## 2023-11-30 PROCEDURE — 99232 SBSQ HOSP IP/OBS MODERATE 35: CPT | Mod: GC

## 2023-11-30 RX ORDER — HYDRALAZINE HCL 50 MG
10 TABLET ORAL ONCE
Refills: 0 | Status: COMPLETED | OUTPATIENT
Start: 2023-11-30 | End: 2023-11-30

## 2023-11-30 RX ORDER — SENNA PLUS 8.6 MG/1
2 TABLET ORAL AT BEDTIME
Refills: 0 | Status: DISCONTINUED | OUTPATIENT
Start: 2023-11-30 | End: 2023-12-07

## 2023-11-30 RX ORDER — SODIUM CHLORIDE 9 MG/ML
1000 INJECTION, SOLUTION INTRAVENOUS
Refills: 0 | Status: DISCONTINUED | OUTPATIENT
Start: 2023-11-30 | End: 2023-12-02

## 2023-11-30 RX ORDER — POLYETHYLENE GLYCOL 3350 17 G/17G
17 POWDER, FOR SOLUTION ORAL DAILY
Refills: 0 | Status: DISCONTINUED | OUTPATIENT
Start: 2023-11-30 | End: 2023-12-07

## 2023-11-30 RX ADMIN — SENNA PLUS 2 TABLET(S): 8.6 TABLET ORAL at 22:33

## 2023-11-30 RX ADMIN — HEPARIN SODIUM 5000 UNIT(S): 5000 INJECTION INTRAVENOUS; SUBCUTANEOUS at 05:10

## 2023-11-30 RX ADMIN — Medication 1300 MILLIGRAM(S): at 13:52

## 2023-11-30 RX ADMIN — Medication 1 MILLIGRAM(S): at 11:54

## 2023-11-30 RX ADMIN — POLYETHYLENE GLYCOL 3350 17 GRAM(S): 17 POWDER, FOR SOLUTION ORAL at 11:55

## 2023-11-30 RX ADMIN — Medication 1300 MILLIGRAM(S): at 22:32

## 2023-11-30 RX ADMIN — Medication 2000 UNIT(S): at 11:55

## 2023-11-30 RX ADMIN — HEPARIN SODIUM 5000 UNIT(S): 5000 INJECTION INTRAVENOUS; SUBCUTANEOUS at 17:08

## 2023-11-30 RX ADMIN — Medication 1300 MILLIGRAM(S): at 05:10

## 2023-11-30 RX ADMIN — ESCITALOPRAM OXALATE 10 MILLIGRAM(S): 10 TABLET, FILM COATED ORAL at 11:54

## 2023-11-30 RX ADMIN — Medication 6 MILLIGRAM(S): at 22:33

## 2023-11-30 RX ADMIN — Medication 10 MILLIGRAM(S): at 22:33

## 2023-11-30 RX ADMIN — CHLORHEXIDINE GLUCONATE 1 APPLICATION(S): 213 SOLUTION TOPICAL at 11:54

## 2023-11-30 RX ADMIN — CLOPIDOGREL BISULFATE 75 MILLIGRAM(S): 75 TABLET, FILM COATED ORAL at 11:55

## 2023-11-30 RX ADMIN — PANTOPRAZOLE SODIUM 40 MILLIGRAM(S): 20 TABLET, DELAYED RELEASE ORAL at 05:10

## 2023-11-30 RX ADMIN — ATORVASTATIN CALCIUM 80 MILLIGRAM(S): 80 TABLET, FILM COATED ORAL at 22:32

## 2023-11-30 NOTE — PROGRESS NOTE ADULT - SUBJECTIVE AND OBJECTIVE BOX
DATE OF SERVICE: 11-30-23 @ 07:10    Patient is a 89y old  Female who presents with a chief complaint of fall, LESLIE (29 Nov 2023 14:20)      SUBJECTIVE / OVERNIGHT EVENTS:    MEDICATIONS  (STANDING):  atorvastatin 80 milliGRAM(s) Oral at bedtime  chlorhexidine 2% Cloths 1 Application(s) Topical daily  cholecalciferol 2000 Unit(s) Oral daily  clopidogrel Tablet 75 milliGRAM(s) Oral daily  dextrose 5% + lactated ringers. 1000 milliLiter(s) (50 mL/Hr) IV Continuous <Continuous>  dextrose 5%. 1000 milliLiter(s) (50 mL/Hr) IV Continuous <Continuous>  dextrose 5%. 1000 milliLiter(s) (100 mL/Hr) IV Continuous <Continuous>  dextrose 50% Injectable 25 Gram(s) IV Push once  dextrose 50% Injectable 25 Gram(s) IV Push once  dextrose 50% Injectable 12.5 Gram(s) IV Push once  escitalopram 10 milliGRAM(s) Oral daily  folic acid 1 milliGRAM(s) Oral daily  glucagon  Injectable 1 milliGRAM(s) IntraMuscular once  heparin   Injectable 5000 Unit(s) SubCutaneous every 12 hours  insulin lispro (ADMELOG) corrective regimen sliding scale   SubCutaneous at bedtime  insulin lispro (ADMELOG) corrective regimen sliding scale   SubCutaneous three times a day before meals  melatonin 6 milliGRAM(s) Oral at bedtime  pantoprazole    Tablet 40 milliGRAM(s) Oral before breakfast  sodium bicarbonate 1300 milliGRAM(s) Oral three times a day    MEDICATIONS  (PRN):  acetaminophen     Tablet .. 650 milliGRAM(s) Oral every 6 hours PRN Temp greater or equal to 38C (100.4F), Mild Pain (1 - 3)  aluminum hydroxide/magnesium hydroxide/simethicone Suspension 30 milliLiter(s) Oral every 4 hours PRN Dyspepsia  dextrose Oral Gel 15 Gram(s) Oral once PRN Blood Glucose LESS THAN 70 milliGRAM(s)/deciliter  ondansetron Injectable 4 milliGRAM(s) IV Push every 8 hours PRN Nausea and/or Vomiting      Vital Signs Last 24 Hrs  T(C): 36.5 (30 Nov 2023 04:23), Max: 36.9 (29 Nov 2023 22:26)  T(F): 97.7 (30 Nov 2023 04:23), Max: 98.4 (29 Nov 2023 22:26)  HR: 54 (30 Nov 2023 04:23) (48 - 54)  BP: 142/63 (30 Nov 2023 04:23) (141/58 - 186/60)  BP(mean): --  RR: 18 (30 Nov 2023 04:23) (18 - 18)  SpO2: 97% (30 Nov 2023 04:23) (96% - 99%)    Parameters below as of 30 Nov 2023 04:23  Patient On (Oxygen Delivery Method): room air      CAPILLARY BLOOD GLUCOSE      POCT Blood Glucose.: 103 mg/dL (29 Nov 2023 21:25)  POCT Blood Glucose.: 105 mg/dL (29 Nov 2023 16:33)  POCT Blood Glucose.: 92 mg/dL (29 Nov 2023 12:13)  POCT Blood Glucose.: 105 mg/dL (29 Nov 2023 08:04)    I&O's Summary    29 Nov 2023 07:01  -  30 Nov 2023 07:00  --------------------------------------------------------  IN: 60 mL / OUT: 450 mL / NET: -390 mL        PHYSICAL EXAM:  GENERAL: NAD, well-developed  HEAD:  Atraumatic, Normocephalic  EYES: EOMI, PERRLA, conjunctiva and sclera clear  NECK: Supple, No JVD  CHEST/LUNG: Clear to auscultation bilaterally; No wheeze  HEART: Regular rate and rhythm; No murmurs, rubs, or gallops  ABDOMEN: Soft, Nontender, Nondistended; Bowel sounds present  EXTREMITIES:  2+ Peripheral Pulses, No clubbing, cyanosis, or edema  PSYCH: AAOx3  NEUROLOGY: non-focal  SKIN: No rashes or lesions    LABS:                        9.1    5.54  )-----------( 204      ( 30 Nov 2023 06:42 )             28.0     11-29    141  |  106  |  42<H>  ----------------------------<  106<H>  3.5   |  22  |  6.67<H>    Ca    8.6      29 Nov 2023 12:34  Phos  3.6     11-29  Mg     2.2     11-29            Urinalysis Basic - ( 29 Nov 2023 12:34 )    Color: x / Appearance: x / SG: x / pH: x  Gluc: 106 mg/dL / Ketone: x  / Bili: x / Urobili: x   Blood: x / Protein: x / Nitrite: x   Leuk Esterase: x / RBC: x / WBC x   Sq Epi: x / Non Sq Epi: x / Bacteria: x        RADIOLOGY & ADDITIONAL TESTS:    Imaging Personally Reviewed:    Consultant(s) Notes Reviewed:      Care Discussed with Consultants/Other Providers:   DATE OF SERVICE: 11-30-23 @ 07:10    Patient is a 89y old  Female who presents with a chief complaint of fall, LESLIE (29 Nov 2023 14:20)      SUBJECTIVE / OVERNIGHT EVENTS: O/n had 2 runs of PAT approx 5 seconds each. Today she feels fine, no acute complaints. Still not much appetite    MEDICATIONS  (STANDING):  atorvastatin 80 milliGRAM(s) Oral at bedtime  chlorhexidine 2% Cloths 1 Application(s) Topical daily  cholecalciferol 2000 Unit(s) Oral daily  clopidogrel Tablet 75 milliGRAM(s) Oral daily  dextrose 5% + lactated ringers. 1000 milliLiter(s) (50 mL/Hr) IV Continuous <Continuous>  dextrose 5%. 1000 milliLiter(s) (50 mL/Hr) IV Continuous <Continuous>  dextrose 5%. 1000 milliLiter(s) (100 mL/Hr) IV Continuous <Continuous>  dextrose 50% Injectable 25 Gram(s) IV Push once  dextrose 50% Injectable 25 Gram(s) IV Push once  dextrose 50% Injectable 12.5 Gram(s) IV Push once  escitalopram 10 milliGRAM(s) Oral daily  folic acid 1 milliGRAM(s) Oral daily  glucagon  Injectable 1 milliGRAM(s) IntraMuscular once  heparin   Injectable 5000 Unit(s) SubCutaneous every 12 hours  insulin lispro (ADMELOG) corrective regimen sliding scale   SubCutaneous at bedtime  insulin lispro (ADMELOG) corrective regimen sliding scale   SubCutaneous three times a day before meals  melatonin 6 milliGRAM(s) Oral at bedtime  pantoprazole    Tablet 40 milliGRAM(s) Oral before breakfast  sodium bicarbonate 1300 milliGRAM(s) Oral three times a day    MEDICATIONS  (PRN):  acetaminophen     Tablet .. 650 milliGRAM(s) Oral every 6 hours PRN Temp greater or equal to 38C (100.4F), Mild Pain (1 - 3)  aluminum hydroxide/magnesium hydroxide/simethicone Suspension 30 milliLiter(s) Oral every 4 hours PRN Dyspepsia  dextrose Oral Gel 15 Gram(s) Oral once PRN Blood Glucose LESS THAN 70 milliGRAM(s)/deciliter  ondansetron Injectable 4 milliGRAM(s) IV Push every 8 hours PRN Nausea and/or Vomiting      Vital Signs Last 24 Hrs  T(C): 36.5 (30 Nov 2023 04:23), Max: 36.9 (29 Nov 2023 22:26)  T(F): 97.7 (30 Nov 2023 04:23), Max: 98.4 (29 Nov 2023 22:26)  HR: 54 (30 Nov 2023 04:23) (48 - 54)  BP: 142/63 (30 Nov 2023 04:23) (141/58 - 186/60)  BP(mean): --  RR: 18 (30 Nov 2023 04:23) (18 - 18)  SpO2: 97% (30 Nov 2023 04:23) (96% - 99%)    Parameters below as of 30 Nov 2023 04:23  Patient On (Oxygen Delivery Method): room air      CAPILLARY BLOOD GLUCOSE      POCT Blood Glucose.: 103 mg/dL (29 Nov 2023 21:25)  POCT Blood Glucose.: 105 mg/dL (29 Nov 2023 16:33)  POCT Blood Glucose.: 92 mg/dL (29 Nov 2023 12:13)  POCT Blood Glucose.: 105 mg/dL (29 Nov 2023 08:04)    I&O's Summary    29 Nov 2023 07:01  -  30 Nov 2023 07:00  --------------------------------------------------------  IN: 60 mL / OUT: 450 mL / NET: -390 mL        PHYSICAL EXAM:  GENERAL: NAD, well-developed  HEAD:  Atraumatic, Normocephalic  EYES: EOMI, conjunctiva and sclera clear  NECK: Supple, No JVD  CHEST/LUNG: Clear to auscultation bilaterally; No wheeze  HEART: Regular rate and rhythm; No murmurs, rubs, or gallops  ABDOMEN: Soft, Nontender, Nondistended; Bowel sounds present  EXTREMITIES:  2+ Peripheral Pulses, No clubbing, cyanosis, or edema  NEUROLOGY: A&Ox2 (name, location; does not know time/date). Follows commands. Non-focal. MAEx4  SKIN: No rashes or lesions    LABS:                        9.1    5.54  )-----------( 204      ( 30 Nov 2023 06:42 )             28.0     11-29    141  |  106  |  42<H>  ----------------------------<  106<H>  3.5   |  22  |  6.67<H>    Ca    8.6      29 Nov 2023 12:34  Phos  3.6     11-29  Mg     2.2     11-29            Urinalysis Basic - ( 29 Nov 2023 12:34 )    Color: x / Appearance: x / SG: x / pH: x  Gluc: 106 mg/dL / Ketone: x  / Bili: x / Urobili: x   Blood: x / Protein: x / Nitrite: x   Leuk Esterase: x / RBC: x / WBC x   Sq Epi: x / Non Sq Epi: x / Bacteria: x        RADIOLOGY & ADDITIONAL TESTS:    Imaging Personally Reviewed:    Consultant(s) Notes Reviewed:      Care Discussed with Consultants/Other Providers:

## 2023-11-30 NOTE — PROGRESS NOTE ADULT - SUBJECTIVE AND OBJECTIVE BOX
[facility] DIVISION OF KIDNEY DISEASES AND HYPERTENSION   FOLLOW UP NOTE    --------------------------------------------------------------------------------  Chief Complaint:    24 hour events/subjective: No acute events.        PAST HISTORY  --------------------------------------------------------------------------------  No significant changes to PMH, PSH, FHx, SHx, unless otherwise noted    ALLERGIES & MEDICATIONS  --------------------------------------------------------------------------------  Allergies    aspirin (Anaphylaxis)  Celebrex (Rash)  penicillin (Anaphylaxis)    Intolerances      Standing Inpatient Medications  atorvastatin 80 milliGRAM(s) Oral at bedtime  chlorhexidine 2% Cloths 1 Application(s) Topical daily  cholecalciferol 2000 Unit(s) Oral daily  clopidogrel Tablet 75 milliGRAM(s) Oral daily  dextrose 5% + lactated ringers. 1000 milliLiter(s) IV Continuous <Continuous>  dextrose 5%. 1000 milliLiter(s) IV Continuous <Continuous>  dextrose 5%. 1000 milliLiter(s) IV Continuous <Continuous>  dextrose 50% Injectable 12.5 Gram(s) IV Push once  dextrose 50% Injectable 25 Gram(s) IV Push once  dextrose 50% Injectable 25 Gram(s) IV Push once  escitalopram 10 milliGRAM(s) Oral daily  folic acid 1 milliGRAM(s) Oral daily  glucagon  Injectable 1 milliGRAM(s) IntraMuscular once  heparin   Injectable 5000 Unit(s) SubCutaneous every 12 hours  insulin lispro (ADMELOG) corrective regimen sliding scale   SubCutaneous three times a day before meals  insulin lispro (ADMELOG) corrective regimen sliding scale   SubCutaneous at bedtime  melatonin 6 milliGRAM(s) Oral at bedtime  pantoprazole    Tablet 40 milliGRAM(s) Oral before breakfast  polyethylene glycol 3350 17 Gram(s) Oral daily  senna 2 Tablet(s) Oral at bedtime  sodium bicarbonate 1300 milliGRAM(s) Oral three times a day    PRN Inpatient Medications  acetaminophen     Tablet .. 650 milliGRAM(s) Oral every 6 hours PRN  aluminum hydroxide/magnesium hydroxide/simethicone Suspension 30 milliLiter(s) Oral every 4 hours PRN  dextrose Oral Gel 15 Gram(s) Oral once PRN  ondansetron Injectable 4 milliGRAM(s) IV Push every 8 hours PRN    VITALS/PHYSICAL EXAM  --------------------------------------------------------------------------------  T(C): 36.3 (11-30-23 @ 12:05), Max: 36.9 (11-29-23 @ 22:26)  HR: 53 (11-30-23 @ 12:05) (48 - 54)  BP: 178/64 (11-30-23 @ 12:05) (141/58 - 186/60)  RR: 18 (11-30-23 @ 12:05) (18 - 18)  SpO2: 99% (11-30-23 @ 12:05) (96% - 99%)  Wt(kg): --        11-29-23 @ 07:01  -  11-30-23 @ 07:00  --------------------------------------------------------  IN: 60 mL / OUT: 450 mL / NET: -390 mL        Physical Exam:  	Gen: NAD  	HEENT: Anicteric  	Pulm: CTA B/L  	CV: S1S2+  	Abd: Soft, +BS   	Ext: No LE edema B/L  	Neuro: Awake             : [facility]+ with clear urine  	Skin: Warm and dry        LABS/STUDIES  --------------------------------------------------------------------------------              9.1    5.54  >-----------<  204      [11-30-23 @ 06:42]              28.0     144  |  109  |  44  ----------------------------<  88      [11-30-23 @ 06:41]  3.6   |  21  |  6.22        Ca     8.8     [11-30-23 @ 06:41]      Mg     2.0     [11-30-23 @ 06:41]      Phos  3.8     [11-30-23 @ 06:41]            Creatinine Trend:  SCr 6.22 [11-30 @ 06:41]  SCr 6.67 [11-29 @ 12:34]  SCr 6.77 [11-28 @ 11:53]  SCr 6.85 [11-27 @ 17:51]  SCr 6.77 [11-27 @ 06:50]    Urinalysis - [11-30-23 @ 06:41]      Color  / Appearance  / SG  / pH       Gluc 88 / Ketone   / Bili  / Urobili        Blood  / Protein  / Leuk Est  / Nitrite       RBC  / WBC  / Hyaline  / Gran  / Sq Epi  / Non Sq Epi  / Bacteria     Urine Creatinine 29      [11-26-23 @ 05:30]  Urine Protein 42      [11-26-23 @ 05:30]  Urine Sodium 30      [11-26-23 @ 05:30]  Urine Urea Nitrogen 159      [11-26-23 @ 05:42]  Urine Potassium 17      [11-26-23 @ 05:30]  Urine Osmolality 154      [11-26-23 @ 05:30]        Tacrolimus  Cyclosporine  Sirolimus  Mycophenolate  BK PCR  CMV PCR  Parvo PCR  EBV PCR

## 2023-11-30 NOTE — PROGRESS NOTE ADULT - PROBLEM SELECTOR PLAN 2
- SCr on admissoin 7  - baseline SCr is around 1  - appreciate nephro recs  - appears to be both prerenal and obstructive  - fluid resuscitation for prerenal  - has morales in place for obstructive LESLIE  - will monitor for post-LESLIE diuresis  - replete electrolytes PRN - SCr on admissoin 7  - baseline SCr is around 1  - appreciate nephro recs  - appears to be both prerenal and obstructive  - fluid resuscitation for prerenal  - has morales in place for obstructive LESLIE  - will monitor for post-LESLIE diuresis  - replete electrolytes PRN  - IMPROVING

## 2023-11-30 NOTE — PROGRESS NOTE ADULT - PROBLEM SELECTOR PLAN 1
LESLIE/ATN due to hypotension, volume depletion and urinary retention. + produced urine after morales was place.   Baseline Cr ~1; admission Cr 7.   On presentation to the ER patient's BP was 90/54, HR 54.  -Renal US unremarkable     Recs:  -Cw morales; Cr trending down today 6.22; no indication for dialysis  -Needs strict I/O; monitor UOP  -Cw IVF and PO intake as tolerated      *THIS NOTE IS NOT FINALIZED UNTIL SIGNED BY THE ATTENDING*  Steve Willard  Nephrology Fellow  Feel free to contact me on TEAMS  After 4 pm please contact the on-call Fellow.

## 2023-11-30 NOTE — CONSULT NOTE ADULT - SUBJECTIVE AND OBJECTIVE BOX
C A [facility]  *********************    DATE OF SERVICE: 11-30-23    HISTORY OF PRESENT ILLNESS: HPI:  Patient is a 89 y/o Female with PMH of advanced dementia, HTN, HLD, DM2, anemia, CAD (s/p LAD and LCx stent 2010), HFrEF (EF 40-45% 2021), h/o TIA no residual deficits, h/o recurrent UTI who presented with syncope and fall w/o trauma in the setting of 1 month of poor PO intake, nausea, vomiting, epigastric pain. Found to have LESLIE, sinus bradycardia and orthostatic hypotension. Cardiology consulted for further evaluation. Patient seen earlier with granddaughter at bedside. Patient has no complaints. Poor historian given dementia.Denies chest pain, SOB, palpitations, or dizziness.    PAST MEDICAL & SURGICAL HISTORY:  Hypertension      Diabetes Mellitus, Type 2      Hyperlipidemia      Dementia      CAD (coronary artery disease)      History of TIA (transient ischemic attack)      Anemia      HFrEF (heart failure with reduced ejection fraction)      History of Cholecystectomy      S/P hip replacement, left              MEDICATIONS:  MEDICATIONS  (STANDING):  atorvastatin 80 milliGRAM(s) Oral at bedtime  chlorhexidine 2% Cloths 1 Application(s) Topical daily  cholecalciferol 2000 Unit(s) Oral daily  clopidogrel Tablet 75 milliGRAM(s) Oral daily  dextrose 5% + sodium chloride 0.45%. 1000 milliLiter(s) (50 mL/Hr) IV Continuous <Continuous>  dextrose 5%. 1000 milliLiter(s) (50 mL/Hr) IV Continuous <Continuous>  dextrose 5%. 1000 milliLiter(s) (100 mL/Hr) IV Continuous <Continuous>  dextrose 50% Injectable 25 Gram(s) IV Push once  dextrose 50% Injectable 25 Gram(s) IV Push once  dextrose 50% Injectable 12.5 Gram(s) IV Push once  escitalopram 10 milliGRAM(s) Oral daily  folic acid 1 milliGRAM(s) Oral daily  glucagon  Injectable 1 milliGRAM(s) IntraMuscular once  heparin   Injectable 5000 Unit(s) SubCutaneous every 12 hours  insulin lispro (ADMELOG) corrective regimen sliding scale   SubCutaneous three times a day before meals  insulin lispro (ADMELOG) corrective regimen sliding scale   SubCutaneous at bedtime  melatonin 6 milliGRAM(s) Oral at bedtime  pantoprazole    Tablet 40 milliGRAM(s) Oral before breakfast  polyethylene glycol 3350 17 Gram(s) Oral daily  senna 2 Tablet(s) Oral at bedtime  sodium bicarbonate 1300 milliGRAM(s) Oral three times a day      Allergies    aspirin (Anaphylaxis)  Celebrex (Rash)  penicillin (Anaphylaxis)    Intolerances        FAMILY HISTORY:    Non-contributary for premature coronary disease or sudden cardiac death    SOCIAL HISTORY:    [x ] Non-smoker  [ ] Smoker  [ ] Alcohol    FLU VACCINE THIS YEAR STARTS IN AUGUST:  [ ] Yes    [ ] No    IF OVER 65 HAVE YOU EVER HAD A PNA VACCINE:  [ ] Yes    [ ] No       [ ] N/A      REVIEW OF SYSTEMS:  [ ]chest pain  [  ]shortness of breath  [  ]palpitations  [ x ]syncope  [ ]near syncope [ ]upper extremity weakness   [ ] lower extremity weakness  [  ]diplopia  [  ]altered mental status   [  ]fevers  [ ]chills [x ]nausea  [ x]vomiting  [  ]dysphagia    [ x]abdominal pain  [ ]melena  [ ]BRBPR    [  ]epistaxis  [  ]rash    [ ]lower extremity edema        [X] All others negative	  [ ] Unable to obtain      LABS:	 	    CARDIAC MARKERS:                              9.1    5.54  )-----------( 204      ( 30 Nov 2023 06:42 )             28.0     Hb Trend: 9.1<--    11-30    144  |  109<H>  |  44<H>  ----------------------------<  88  3.6   |  21<L>  |  6.22<H>    Ca    8.8      30 Nov 2023 06:41  Phos  3.8     11-30  Mg     2.0     11-30      Creatinine Trend: 6.22<--, 6.67<--, 6.77<--, 6.85<--, 6.77<--, 7.14<--    Coags:      proBNP:   Lipid Profile:   HgA1c:   TSH:         PHYSICAL EXAM:  T(C): 36.3 (11-30-23 @ 12:05), Max: 36.9 (11-29-23 @ 22:26)  HR: 58 (11-30-23 @ 13:00) (49 - 58)  BP: 161/66 (11-30-23 @ 13:00) (141/58 - 186/60)  RR: 18 (11-30-23 @ 12:05) (18 - 18)  SpO2: 99% (11-30-23 @ 12:05) (96% - 99%)  Wt(kg): --   BMI (kg/m2): 23.4 (11-26-23 @ 02:23)  I&O's Summary    29 Nov 2023 07:01  -  30 Nov 2023 07:00  --------------------------------------------------------  IN: 60 mL / OUT: 450 mL / NET: -390 mL    30 Nov 2023 07:01  -  30 Nov 2023 19:15  --------------------------------------------------------  IN: 0 mL / OUT: 200 mL / NET: -200 mL        Gen: NAD  HEENT:  (-)icterus (-)pallor  CV: N S1 S2 1/6 MANASA (+)2 Pulses B/l  Resp:  Clear to auscultation B/L, normal effort  GI: (+) BS Soft, NT, ND  Lymph:  (-)Edema, (-)obvious lymphadenopathy  Skin: Warm to touch, Normal turgor  Psych: Appropriate mood and affect      TELEMETRY: SB/SR 40-60s	      ECG: Sinus Bradycardia, narrow QRS    RADIOLOGY:         CXR: < from: Xray Chest 1 View- PORTABLE-Urgent (11.26.23 @ 02:59) >  IMPRESSION:  Slightly elevated right hemidiaphragm. Underinflated but otherwise clear   lungs. No pleural effusions or pneumothorax.    Stable cardiac and mediastinal silhouettes including aortic   calcifications and convex fullness of superior mediastinal/paratracheal   soft tissue contour which could be related to engorged/tortuous   brachiocephalic vasculature.    Generalized osteopenia.    --- End of Report ---    < end of copied text >      ASSESSMENT/PLAN: Patient is a 89 y/o Female with PMH of advanced dementia, HTN, HLD, DM2, anemia, CAD (s/p LAD and LCx stent 2010), HFrEF (EF 40-45% 2021), h/o TIA no residual deficits, h/o recurrent UTI who presented with syncope and fall w/o trauma in the setting of 1 month of poor PO intake, nausea, vomiting, epigastric pain. Found to have LESLIE, sinus bradycardia and orthostatic hypotension. Cardiology consulted for further evaluation.    #Syncope  - Suspect due to orthostatic hypotension  - TTE noted with EF 39%, mod AI  - Monitor telemetry however appears asymptomatic in terms of sinus bradycardia    #Orthostatic Hypotension  - Agree with holding all antihypertensives/GDMT  - Avoid midodrine and florinef given CHF  - Continue compression stockings and monitor for improvement    #Sinus Bradycardia  - Appears asymptomatic at rest  - EP consult with Dr. Calderón called for further eval  - Check HR with ambulation    #LESLIE  - Management per renal    #CAD  - Continue Plavix and Lipitor    Rainer Wallace PA-C  Pager: [phone]

## 2023-11-30 NOTE — PROGRESS NOTE ADULT - ATTENDING COMMENTS
Patient seen and evaluated on bedside rounds with son-in-law at bedside. Patient more alert, following commands.  No complaints.     Telemetry reviewed: sinus 50-70's 1 run of PAT  Creatinine continues to donwtrend, stable hemoglobin.    #LESLIE  -unclear etiology, suspect combination of pre-renal with hemodynamically mediated aTn.  -avoid nephrotoxic agents, including home valsartan and spironolactone  -s/p over 3L, monitor PO intake, gentle hydraiton.  -continue morales for now.   -appreciate nephrology follow up.  -of note, patient hypokalemic with the LESLIE. Given urine lytes, suspect chronic prolonged decrease nutrition with minimal solute intake. Low WBC in urine makes aIN less likely  -urinary ToV next 24-48 hours.     #Syncope  -suspect orthostatic, but given bradycardia currently off of beta blocker cannot r/o bradycardia.   -continue to hold all Av ayaz agents.  -monitor on telemetry  -place r2 pads on patient.  -compression stockings, repeat orhtostatics. Maintain off of beta blockers.   -as still orthostatic as of 11/30, cannot place on midodrine 2/2 bradycardia and heart failure, not fludricortisone given supine hypertension, cards consult for both the orthostatics and bradycardia.  -abdominal binder for now along with compression stockings.     #bradycardia  -sinus wilber, reviewed 2021 admission was wilber then, butassymptomatic  -concern is lack of chronotropic compensation, which can exacerbate orthostatics  -continue off of beta blockers  -when able, ambulate see if HR rises.  -family discussion on if would even want consideration of PPM still ongoing.       #HFrEF  -EF 39% with segmental cahnges  -cannot go on GDMT at this time.   -if BP remains high, may start low dose hydral/isordil.   -no beta blocker, ACE/ARB/ARNI/MRA/SGLT2 given creatine and bradycardia.   -monitor for signs of volume overload.     #dyspepsia  -red flag symptoms.  -no findings on CT.  -for barium swallow for dysphagia. If evidence of stricture or obstruction, disuss if would want EGD.  -protonix as ordered  -TPN not a good option for patient. PEG in advanced dementia does not decrease risk of aspiration and cannot do without EGD, which family is hesitant about. Continue family discussions.    #Alzehimer's disease with functional quadriplegia  -supportive management, mood stabilizers.       #advanced care planning  -continue GOC conversations. While family appers to lean to DNR/DNI, need to speak to their rabbi prior. For now is full code

## 2023-11-30 NOTE — CONSULT NOTE ADULT - ASSESSMENT
Patient seen and examined, agree with above assessment and plan as transcribed above.    - EP consult Dr. Calderón called  - cont tele  - Hold AV ayaz blockers and antihypertensives    Gideon Mcknight MD, [facility]  BEEPER [phone]

## 2023-11-30 NOTE — PROGRESS NOTE ADULT - ATTENDING COMMENTS
Gilma Madrid MD  Off: [phone]  contact me on teams    (After 5 pm or on weekends please page the on-call fellow/attending, can check [email or web address] for schedule. Login is lo mendoza, schedule under [facility] medicine, psych, derm)

## 2023-11-30 NOTE — PROGRESS NOTE ADULT - PROBLEM SELECTOR PLAN 5
- peptic ulcer disease vs. pancreatitis vs. functional  - no findings on CT AP of pancreatitis, does not have classic history  - never had endoscopy to evaluate  - will trial protonix 40mg qd   - discussed w/ family GOC (see chart note); decision still to be made - peptic ulcer disease vs. pancreatitis vs. functional  - no findings on CT AP of pancreatitis, does not have classic history  - never had endoscopy to evaluate  - will trial protonix 40mg qd   - discussed w/ family GOC (see chart note); decision still to be made  - was initially planned for esophogram, but family would like to defer for now - peptic ulcer disease vs. pancreatitis vs. functional  - no findings on CT AP of pancreatitis, does not have classic history  - never had endoscopy to evaluate  - will trial protonix 40mg qd   - discussed w/ family GOC (see chart note); decision still to be made  - still plan to do esophogram

## 2023-12-01 DIAGNOSIS — R00.1 BRADYCARDIA, UNSPECIFIED: ICD-10-CM

## 2023-12-01 LAB
ANION GAP SERPL CALC-SCNC: 14 MMOL/L — SIGNIFICANT CHANGE UP (ref 5–17)
ANION GAP SERPL CALC-SCNC: 14 MMOL/L — SIGNIFICANT CHANGE UP (ref 5–17)
BUN SERPL-MCNC: 46 MG/DL — HIGH (ref 7–23)
BUN SERPL-MCNC: 46 MG/DL — HIGH (ref 7–23)
CALCIUM SERPL-MCNC: 8.3 MG/DL — LOW (ref 8.4–10.5)
CALCIUM SERPL-MCNC: 8.3 MG/DL — LOW (ref 8.4–10.5)
CHLORIDE SERPL-SCNC: 108 MMOL/L — SIGNIFICANT CHANGE UP (ref 96–108)
CHLORIDE SERPL-SCNC: 108 MMOL/L — SIGNIFICANT CHANGE UP (ref 96–108)
CO2 SERPL-SCNC: 22 MMOL/L — SIGNIFICANT CHANGE UP (ref 22–31)
CO2 SERPL-SCNC: 22 MMOL/L — SIGNIFICANT CHANGE UP (ref 22–31)
CREAT SERPL-MCNC: 6.15 MG/DL — HIGH (ref 0.5–1.3)
CREAT SERPL-MCNC: 6.15 MG/DL — HIGH (ref 0.5–1.3)
EGFR: 6 ML/MIN/1.73M2 — LOW
EGFR: 6 ML/MIN/1.73M2 — LOW
FERRITIN SERPL-MCNC: 42 NG/ML — SIGNIFICANT CHANGE UP (ref 13–330)
FERRITIN SERPL-MCNC: 42 NG/ML — SIGNIFICANT CHANGE UP (ref 13–330)
GLUCOSE BLDC GLUCOMTR-MCNC: 104 MG/DL — HIGH (ref 70–99)
GLUCOSE BLDC GLUCOMTR-MCNC: 104 MG/DL — HIGH (ref 70–99)
GLUCOSE BLDC GLUCOMTR-MCNC: 80 MG/DL — SIGNIFICANT CHANGE UP (ref 70–99)
GLUCOSE BLDC GLUCOMTR-MCNC: 80 MG/DL — SIGNIFICANT CHANGE UP (ref 70–99)
GLUCOSE BLDC GLUCOMTR-MCNC: 90 MG/DL — SIGNIFICANT CHANGE UP (ref 70–99)
GLUCOSE BLDC GLUCOMTR-MCNC: 90 MG/DL — SIGNIFICANT CHANGE UP (ref 70–99)
GLUCOSE BLDC GLUCOMTR-MCNC: 94 MG/DL — SIGNIFICANT CHANGE UP (ref 70–99)
GLUCOSE BLDC GLUCOMTR-MCNC: 94 MG/DL — SIGNIFICANT CHANGE UP (ref 70–99)
GLUCOSE SERPL-MCNC: 92 MG/DL — SIGNIFICANT CHANGE UP (ref 70–99)
GLUCOSE SERPL-MCNC: 92 MG/DL — SIGNIFICANT CHANGE UP (ref 70–99)
HCT VFR BLD CALC: 25.2 % — LOW (ref 34.5–45)
HCT VFR BLD CALC: 25.2 % — LOW (ref 34.5–45)
HGB BLD-MCNC: 8.2 G/DL — LOW (ref 11.5–15.5)
HGB BLD-MCNC: 8.2 G/DL — LOW (ref 11.5–15.5)
IRON SATN MFR SERPL: 11 % — LOW (ref 14–50)
IRON SATN MFR SERPL: 11 % — LOW (ref 14–50)
IRON SATN MFR SERPL: 30 UG/DL — SIGNIFICANT CHANGE UP (ref 30–160)
IRON SATN MFR SERPL: 30 UG/DL — SIGNIFICANT CHANGE UP (ref 30–160)
MAGNESIUM SERPL-MCNC: 2 MG/DL — SIGNIFICANT CHANGE UP (ref 1.6–2.6)
MAGNESIUM SERPL-MCNC: 2 MG/DL — SIGNIFICANT CHANGE UP (ref 1.6–2.6)
MCHC RBC-ENTMCNC: 25.7 PG — LOW (ref 27–34)
MCHC RBC-ENTMCNC: 25.7 PG — LOW (ref 27–34)
MCHC RBC-ENTMCNC: 32.5 GM/DL — SIGNIFICANT CHANGE UP (ref 32–36)
MCHC RBC-ENTMCNC: 32.5 GM/DL — SIGNIFICANT CHANGE UP (ref 32–36)
MCV RBC AUTO: 79 FL — LOW (ref 80–100)
MCV RBC AUTO: 79 FL — LOW (ref 80–100)
NRBC # BLD: 0 /100 WBCS — SIGNIFICANT CHANGE UP (ref 0–0)
NRBC # BLD: 0 /100 WBCS — SIGNIFICANT CHANGE UP (ref 0–0)
PHOSPHATE SERPL-MCNC: 3.8 MG/DL — SIGNIFICANT CHANGE UP (ref 2.5–4.5)
PHOSPHATE SERPL-MCNC: 3.8 MG/DL — SIGNIFICANT CHANGE UP (ref 2.5–4.5)
PLATELET # BLD AUTO: 197 K/UL — SIGNIFICANT CHANGE UP (ref 150–400)
PLATELET # BLD AUTO: 197 K/UL — SIGNIFICANT CHANGE UP (ref 150–400)
POTASSIUM SERPL-MCNC: 3.3 MMOL/L — LOW (ref 3.5–5.3)
POTASSIUM SERPL-MCNC: 3.3 MMOL/L — LOW (ref 3.5–5.3)
POTASSIUM SERPL-SCNC: 3.3 MMOL/L — LOW (ref 3.5–5.3)
POTASSIUM SERPL-SCNC: 3.3 MMOL/L — LOW (ref 3.5–5.3)
RBC # BLD: 3.19 M/UL — LOW (ref 3.8–5.2)
RBC # BLD: 3.19 M/UL — LOW (ref 3.8–5.2)
RBC # FLD: 15.4 % — HIGH (ref 10.3–14.5)
RBC # FLD: 15.4 % — HIGH (ref 10.3–14.5)
SODIUM SERPL-SCNC: 144 MMOL/L — SIGNIFICANT CHANGE UP (ref 135–145)
SODIUM SERPL-SCNC: 144 MMOL/L — SIGNIFICANT CHANGE UP (ref 135–145)
TIBC SERPL-MCNC: 266 UG/DL — SIGNIFICANT CHANGE UP (ref 220–430)
TIBC SERPL-MCNC: 266 UG/DL — SIGNIFICANT CHANGE UP (ref 220–430)
TRANSFERRIN SERPL-MCNC: 203 MG/DL — SIGNIFICANT CHANGE UP (ref 200–360)
TRANSFERRIN SERPL-MCNC: 203 MG/DL — SIGNIFICANT CHANGE UP (ref 200–360)
UIBC SERPL-MCNC: 236 UG/DL — SIGNIFICANT CHANGE UP (ref 110–370)
UIBC SERPL-MCNC: 236 UG/DL — SIGNIFICANT CHANGE UP (ref 110–370)
WBC # BLD: 5.32 K/UL — SIGNIFICANT CHANGE UP (ref 3.8–10.5)
WBC # BLD: 5.32 K/UL — SIGNIFICANT CHANGE UP (ref 3.8–10.5)
WBC # FLD AUTO: 5.32 K/UL — SIGNIFICANT CHANGE UP (ref 3.8–10.5)
WBC # FLD AUTO: 5.32 K/UL — SIGNIFICANT CHANGE UP (ref 3.8–10.5)

## 2023-12-01 PROCEDURE — 99232 SBSQ HOSP IP/OBS MODERATE 35: CPT | Mod: GC

## 2023-12-01 PROCEDURE — 99233 SBSQ HOSP IP/OBS HIGH 50: CPT | Mod: GC

## 2023-12-01 PROCEDURE — 99223 1ST HOSP IP/OBS HIGH 75: CPT | Mod: GC

## 2023-12-01 RX ORDER — POTASSIUM CHLORIDE 20 MEQ
40 PACKET (EA) ORAL ONCE
Refills: 0 | Status: COMPLETED | OUTPATIENT
Start: 2023-12-01 | End: 2023-12-01

## 2023-12-01 RX ORDER — POTASSIUM CHLORIDE 20 MEQ
40 PACKET (EA) ORAL ONCE
Refills: 0 | Status: DISCONTINUED | OUTPATIENT
Start: 2023-12-01 | End: 2023-12-03

## 2023-12-01 RX ADMIN — CHLORHEXIDINE GLUCONATE 1 APPLICATION(S): 213 SOLUTION TOPICAL at 12:16

## 2023-12-01 RX ADMIN — HEPARIN SODIUM 5000 UNIT(S): 5000 INJECTION INTRAVENOUS; SUBCUTANEOUS at 05:28

## 2023-12-01 NOTE — PROGRESS NOTE ADULT - PROBLEM SELECTOR PLAN 2
- SCr on admissoin 7  - baseline SCr is around 1  - appreciate nephro recs  - appears to be both prerenal and obstructive  - fluid resuscitation for prerenal  - has morales in place for obstructive LESLIE  - will monitor for post-LESLIE diuresis  - replete electrolytes PRN  - IMPROVING - on tele, sinus wilber 45-50s  - appreciate EP recs  - avoid AV ayaz blockers

## 2023-12-01 NOTE — CONSULT NOTE ADULT - SUBJECTIVE AND OBJECTIVE BOX
EP Attending  HISTORY OF PRESENT ILLNESS: HPI:  89 y/o F pmhx advanced dementia, HTN, HLD, DM2, anemia, CAD (s/p LAD and LCx stent 2010), HFrEF (EF 40-45% 2021), h/o TIA no residual deficits, h/o recurrent UTI presents w/ syncope and fall w/o trauma in the setting of 1 month of poor PO intake, nausea, vomiting, epigastric pain. Collateral obtained from pt's son Gianni and daughter Zoey. They report that her baseline is A&Ox2 (does not know location) and has a tendency to become agitated, consisting of screaming while confused. She has 24hr HHA and is dependent for most ADLs. She was had 2 ED visits in the past month for her poor PO intake and epigastric pain. She was given sucralfate by her PCP, with the presumed diagnosis of peptic ulcer disease. She has only taken some of her medications intermittently due to her decreased interest in eating/pain. Her children report her pain is worse with eating. Yesterday, she was at her SNF when she syncopized. It was reported that staff caught her before she hit t he ground. She has no recollection of this incident. Her children are not able to provide collateral on this syncopal event.  ED course: found to have LESLIE 2/2 urinary retention, . Cornell placed. (26 Nov 2023 07:25)    Joined at bedside by patient's son Edward Crowell, and by telephone w/ grandson Dr Dimas Crowell.  Ms Thurman is a pleasant 89yo woman, residing at home with home health aides, dealing with worsening dementia. More recently, poor appetite, +/- post meal dyspeptic symptoms.  This is 3rd such admission.  Apparently had a fall vs fainting episode, and was brought into hospital for further workup.  EP called re: sinus bradycardia, and inability to tolerate beta blockers for CHF treatment.  Per patient's son, she may not speak much, but does understand some of our discussion.  Able to answer yes/no questions re: what she may want to eat.  A 10 pt ROS is not possible in the traditional sense, due to dementia.    PAST MEDICAL & SURGICAL HISTORY:  Hypertension  Diabetes Mellitus, Type 2  Hyperlipidemia  Dementia  CAD (coronary artery disease)  History of TIA (transient ischemic attack)  Anemia  HFrEF (heart failure with reduced ejection fraction)  History of Cholecystectomy  S/P hip replacement, left    MEDICATIONS  (STANDING):  atorvastatin 80 milliGRAM(s) Oral at bedtime  chlorhexidine 2% Cloths 1 Application(s) Topical daily  cholecalciferol 2000 Unit(s) Oral daily  clopidogrel Tablet 75 milliGRAM(s) Oral daily  dextrose 5% + sodium chloride 0.45%. 1000 milliLiter(s) (50 mL/Hr) IV Continuous <Continuous>  dextrose 5%. 1000 milliLiter(s) (50 mL/Hr) IV Continuous <Continuous>  dextrose 5%. 1000 milliLiter(s) (100 mL/Hr) IV Continuous <Continuous>  dextrose 50% Injectable 12.5 Gram(s) IV Push once  dextrose 50% Injectable 25 Gram(s) IV Push once  dextrose 50% Injectable 25 Gram(s) IV Push once  escitalopram 10 milliGRAM(s) Oral daily  folic acid 1 milliGRAM(s) Oral daily  glucagon  Injectable 1 milliGRAM(s) IntraMuscular once  heparin   Injectable 5000 Unit(s) SubCutaneous every 12 hours  insulin lispro (ADMELOG) corrective regimen sliding scale   SubCutaneous at bedtime  insulin lispro (ADMELOG) corrective regimen sliding scale   SubCutaneous three times a day before meals  melatonin 6 milliGRAM(s) Oral at bedtime  pantoprazole    Tablet 40 milliGRAM(s) Oral before breakfast  polyethylene glycol 3350 17 Gram(s) Oral daily  senna 2 Tablet(s) Oral at bedtime  sodium bicarbonate 1300 milliGRAM(s) Oral three times a day    Allergies    aspirin (Anaphylaxis)  Celebrex (Rash)  penicillin (Anaphylaxis)    Intolerances    FAMILY HISTORY:    Non-contributary for premature coronary disease or sudden cardiac death    SOCIAL HISTORY:    [x] Non-smoker  [ ] Smoker  [ ] Alcohol    PHYSICAL EXAM:  T(C): 36.4 (12-01-23 @ 11:06), Max: 36.6 (11-30-23 @ 20:51)  HR: 47 (12-01-23 @ 11:06) (47 - 58)  BP: 151/59 (12-01-23 @ 11:06) (121/63 - 185/69)  RR: 18 (12-01-23 @ 11:06) (18 - 20)  SpO2: 94% (12-01-23 @ 11:06) (94% - 98%)  Wt(kg): --    Appearance: frail elderly woman in no acute distress, awake	  HEENT:   Normal oral mucosa, PERRL, EOMI	  Lymphatic: No lymphadenopathy , no edema  Cardiovascular: Normal S1 S2, No JVD, No murmurs , Peripheral pulses palpable 2+ bilaterally  Respiratory: Lungs clear to auscultation, normal effort 	  Gastrointestinal:  Soft, Non-tender, + BS	  Skin: No rashes, No ecchymoses, No cyanosis, warm to touch  Musculoskeletal: Normal range of motion, normal strength  Psychiatry:  Mood & affect appropriate    TELEMETRY: sinus bradycardia 40-55bpm.  narrow QRS	    ECG:  sinus bradycardia 50s.  Echo:  < from: TTE W or WO Ultrasound Enhancing Agent (11.29.23 @ 12:42) >  CONCLUSIONS:      1. Technically difficult image quality.   2. Left ventricular cavity is severely dilated. Left ventricular wall thickness is normal. Left ventricular systolic function is moderately to severely decreased with an ejection fraction of 39 % by Lomeli's method of disks. Regional wall motion abnormalities consistent with ischemic heart disease.   3. Multiple segmental abnormalities exist. See findings.   4. Moderate aortic regurgitation.    ________________________________________________________________________________________  FINDINGS:     Left Ventricle:  After obtainingconsent, Definity ultrasound enhancing agent was given for enhanced left ventricular opacification and improved delineation of the left ventricular endocardial borders. The left ventricular cavity is severely dilated. Left ventricular wall thickness is normal. Left ventricular systolic function is moderately to severely decreased with a calculated ejection fraction of 39 % by the Lomeli's biplane method of disks. There are regional wall motion abnormalities consistent with ischemic heart disease. Impaired relaxation with normal wall motion. There is no evidence of a left ventricular thrombus.  LV Wall Scoring: The apical septal segment, apical lateral segment, apical  inferior segment, and apex are aneurysmal. The mid anteroseptal segment, mid  inferoseptal segment, and mid inferior segment are akinetic. The basal and mid  anterolateral wall is hypokinetic. All remaining scored segments are normal.          Right Ventricle:  The right ventricular cavity is normal in size and reduced systolic function. Tricuspid annular plane systolic excursion (TAPSE) is 1.3 cm (normal >=1.7 cm).     Left Atrium:  The left atrium is moderately dilated with an indexed volume of 30.69 ml/m².     Right Atrium:  The right atrium is normal in size with anindexed volume of 12.65 ml/m².     Interatrial Septum:  The interatrial septum appears intact.     Aortic Valve:  There is moderate calcification of the aortic valve leaflets. There is mild aortic stenosis. There is moderate aortic regurgitation. AI VMax is 4.59 m/s. AI pressure half time is 642 msec. AI slope is 2.14 m/s².     Mitral Valve:  Structurally normal mitral valve with normal leaflet excursion. There is calcification of the mitral valve annulus. There is trace mitral regurgitation.     Tricuspid Valve:  Structurally normal tricuspid valve with normal leaflet excursion. There is mild tricuspid regurgitation. Estimated pulmonary artery systolic pressure is 27 mmHg, consistent with normal pulmonary artery pressure.     Pulmonic Valve:  Structurally normal pulmonic valve with normal leaflet excursion.     Aorta:  The aortic annulus and aortic root appear normal in size.     Pericardium:  No pericardial effusion seen.     Systemic Veins:  The inferior vena cava is normal in size (normal <2.1cm) with normal inspiratory collapse (normal >50%) consistent with normal right atrial pressure (~3, range 0-5mmHg).    < end of copied text >    Cath: Prior LAD PCI.  LCx PCI in 2010.	  	  LABS:	 	                          8.2    5.32  )-----------( 197      ( 01 Dec 2023 11:33 )             25.2     12-01    144  |  108  |  46<H>  ----------------------------<  92  3.3<L>   |  22  |  6.15<H>    Ca    8.3<L>      01 Dec 2023 11:33  Phos  3.8     12-01  Mg     2.0     12-01      ASSESSMENT/PLAN: Ms Thurman is a pleasant 89y Female admitted w/ failure to thrive.  Question of fall vs fainting outside of hospital.  Has chronic systolic CHF due to ischemic/infarct mediated cardiomyopathy, with LVEF now in 35-40% range, and anterior/septal/apical/inferoseptal aneurysm.  Telemetry shows sinus bradycardia 40s-50s, and she is unable to tolerate beta blockers due to this low resting heartrate.  Blood pressure is adequate at rest, but orthostatically very sensitive, and she becomes symptomatically lightheaded.  Awaiting PT eval re: safety w/ ambulation.  She is dependent on HHA for ADL assistance at home.    Awaiting GI eval re: ability to progress her diet.  Able to state she is not hungry for heavier foods (sandwich, fish, etc), but is willing to try ice cream for example.  Typically, I would offer a permanent pacemaker (in this case conduction system pacing) to support beta blocker use in CHF/CAD.  In the setting of Ms Thurman's advanced age, failure to thrive, organ dysfunction, and inability to ambulate/exercise safelty, this may not be a helpful intervention regarding her global plan of care.  Recommend to hold AV ayaz blockers. Maintain telemetry. Correct K to 4-4.5, Mg to 2.  Will followup w/ patient and her family prior to discharge to determine if permanent pacemaker utilization will help her recover and stay well.      Georges Calderón M.D.  Cardiac Electrophysiology    office [phone]  pager [phone]

## 2023-12-01 NOTE — CONSULT NOTE ADULT - ASSESSMENT
89 y/o F pmhx advanced dementia, DM2, anemia, CKD, CAD (s/p LAD and LCx stent 2010), HFrEF (EF 40-45% 2021), h/o TIA no residual deficits, h/o recurrent UTI presented w/ syncope and fall suspected to be orthostatic in addition to a 1 month hx of poor PO intake with reports of dysphagia prior to presentation    Impression  #PEG evaluation/poor PO intake  #?dysphagia symptoms prior to presentation  - evaluated by S&S; recommended for soft bite-sized/thin liquids; however, pt with poor PO intake  - currently ordered for X-Ray esophagram    #syncope, suspected orthostatic in nature  #CAD s/p stent; on Plavix    Recommendations  - follow up esophagram  - calorie count; would encourage family to bring food that patient likes  - if family wants PEG tube, pt will need to be off Plavix for 5 days  - if above workup is negative, Plavix held for 5 days, and family desires PEG, please call back    Note and recommendations are incomplete until reviewed and attested by attending.    Douglas Levi MD  GI/Hepatology Fellow, PGY4  Teams preferred (7AM to 5PM); after 5PM, call GI fellow on call    NEW CONSULTS:  Please email [email or web address] 89 y/o F pmhx advanced dementia, DM2, anemia, CKD, CAD (s/p LAD and LCx stent 2010), HFrEF (EF 40-45% 2021), h/o TIA no residual deficits, h/o recurrent UTI presented w/ syncope and fall suspected to be orthostatic in addition to a 1 month hx of poor PO intake with reports of dysphagia prior to presentation    Impression  #PEG evaluation/poor PO intake  #?esophageal dysphagia symptoms prior to presentation  - evaluated by S&S; recommended for soft bite-sized/thin liquids; however, pt with poor PO intake  - currently ordered for X-Ray esophagram    #syncope, suspected orthostatic in nature  #CAD s/p stent; on Plavix    Recommendations  - follow up esophagram to rule out esophageal phase dysphagia  - obtain calorie count; would encourage family to bring food that patient likes  - if family wants PEG tube, pt will need to be off Plavix for 5 days, would stop now  - if patient fails calorie count and esophagram is unremarkable and Plavix held for 5 days, please let us know and we will schedule for PEG    Note and recommendations are incomplete until reviewed and attested by attending.    Douglas Levi MD  GI/Hepatology Fellow, PGY4  Teams preferred (7AM to 5PM); after 5PM, call GI fellow on call    NEW CONSULTS:  Please email [email or web address]

## 2023-12-01 NOTE — PROGRESS NOTE ADULT - PROBLEM SELECTOR PLAN 1
LESLIE/ATN due to hypotension, volume depletion and urinary retention. + produced urine after morales was place.   Baseline Cr ~1; admission Cr 7.   On presentation to the ER patient's BP was 90/54, HR 54.  -Renal US unremarkable     Recs:  -Cr has been trending down to 6.22; no indication for dialysis; await labs for today  -Needs strict I/O; monitor UOP  -Cw IVF and PO intake as tolerated    *THIS NOTE IS NOT FINALIZED UNTIL SIGNED BY THE ATTENDING*  Steve Willard  Nephrology Fellow  Feel free to contact me on TEAMS  After 4 pm please contact the on-call Fellow.

## 2023-12-01 NOTE — PROGRESS NOTE ADULT - PROBLEM SELECTOR PLAN 1
- Differential for syncope includes orthostatic hypotension vs. cardiac (arrythmia, valvular disease) vs. neuro  - Pt has h/o HFrEF, so increased risk of arrythmia  - EKG NSR, no events on tele  - favor orthostatic hypotension as cause as pt has positive orthostatics (103/46 --> 65/45)  - s/p fluid resuscitation w/ 1L NS + 2L sodium bicarb gtt  - will obtain TTE to eval for valvular disease  - continue to monitor on tele - Differential for syncope includes orthostatic hypotension vs. cardiac (arrythmia, valvular disease) vs. neuro  - Pt has h/o HFrEF, so increased risk of arrythmia  - EKG NSR, no events on tele  - favor orthostatic hypotension as cause as pt has positive orthostatics (103/46 --> 65/45)  - s/p fluid resuscitation w/ 1L NS + 2L sodium bicarb gtt  - will obtain TTE to eval for valvular disease  - continue to monitor on tele  - appreciate cards and EP recs

## 2023-12-01 NOTE — PROGRESS NOTE ADULT - ASSESSMENT
Agree with above assessment and plan as outlined above.    - EP radha appreciated    Gideon Mcknight MD, [facility]  BEEPER [phone]

## 2023-12-01 NOTE — PROGRESS NOTE ADULT - SUBJECTIVE AND OBJECTIVE BOX
[facility] DIVISION OF KIDNEY DISEASES AND HYPERTENSION   FOLLOW UP NOTE    --------------------------------------------------------------------------------  Chief Complaint:    24 hour events/subjective: No acute events.        PAST HISTORY  --------------------------------------------------------------------------------  No significant changes to PMH, PSH, FHx, SHx, unless otherwise noted    ALLERGIES & MEDICATIONS  --------------------------------------------------------------------------------  Allergies    aspirin (Anaphylaxis)  Celebrex (Rash)  penicillin (Anaphylaxis)    Intolerances      Standing Inpatient Medications  atorvastatin 80 milliGRAM(s) Oral at bedtime  chlorhexidine 2% Cloths 1 Application(s) Topical daily  cholecalciferol 2000 Unit(s) Oral daily  clopidogrel Tablet 75 milliGRAM(s) Oral daily  dextrose 5% + sodium chloride 0.45%. 1000 milliLiter(s) IV Continuous <Continuous>  dextrose 5%. 1000 milliLiter(s) IV Continuous <Continuous>  dextrose 5%. 1000 milliLiter(s) IV Continuous <Continuous>  dextrose 50% Injectable 12.5 Gram(s) IV Push once  dextrose 50% Injectable 25 Gram(s) IV Push once  dextrose 50% Injectable 25 Gram(s) IV Push once  escitalopram 10 milliGRAM(s) Oral daily  folic acid 1 milliGRAM(s) Oral daily  glucagon  Injectable 1 milliGRAM(s) IntraMuscular once  heparin   Injectable 5000 Unit(s) SubCutaneous every 12 hours  insulin lispro (ADMELOG) corrective regimen sliding scale   SubCutaneous at bedtime  insulin lispro (ADMELOG) corrective regimen sliding scale   SubCutaneous three times a day before meals  melatonin 6 milliGRAM(s) Oral at bedtime  pantoprazole    Tablet 40 milliGRAM(s) Oral before breakfast  polyethylene glycol 3350 17 Gram(s) Oral daily  senna 2 Tablet(s) Oral at bedtime  sodium bicarbonate 1300 milliGRAM(s) Oral three times a day    PRN Inpatient Medications  acetaminophen     Tablet .. 650 milliGRAM(s) Oral every 6 hours PRN  aluminum hydroxide/magnesium hydroxide/simethicone Suspension 30 milliLiter(s) Oral every 4 hours PRN  dextrose Oral Gel 15 Gram(s) Oral once PRN  ondansetron Injectable 4 milliGRAM(s) IV Push every 8 hours PRN      VITALS/PHYSICAL EXAM  --------------------------------------------------------------------------------  T(C): 36.4 (12-01-23 @ 11:06), Max: 36.6 (11-30-23 @ 20:51)  HR: 47 (12-01-23 @ 11:06) (47 - 58)  BP: 151/59 (12-01-23 @ 11:06) (121/63 - 185/69)  RR: 18 (12-01-23 @ 11:06) (18 - 20)  SpO2: 94% (12-01-23 @ 11:06) (94% - 99%)  Wt(kg): --        11-30-23 @ 07:01  -  12-01-23 @ 07:00  --------------------------------------------------------  IN: 550 mL / OUT: 200 mL / NET: 350 mL        Physical Exam:  	Gen: NAD  	HEENT: Anicteric  	Pulm: CTA B/L  	CV: S1S2+  	Abd: Soft, +BS   	Ext: No LE edema B/L  	Neuro: Awake          : Has coreyck -canister is empty  	Skin: Warm and dry      LABS/STUDIES  --------------------------------------------------------------------------------              9.1    5.54  >-----------<  204      [11-30-23 @ 06:42]              28.0     144  |  109  |  44  ----------------------------<  88      [11-30-23 @ 06:41]  3.6   |  21  |  6.22        Ca     8.8     [11-30-23 @ 06:41]      Mg     2.0     [11-30-23 @ 06:41]      Phos  3.8     [11-30-23 @ 06:41]            Creatinine Trend:  SCr 6.22 [11-30 @ 06:41]  SCr 6.67 [11-29 @ 12:34]  SCr 6.77 [11-28 @ 11:53]  SCr 6.85 [11-27 @ 17:51]  SCr 6.77 [11-27 @ 06:50]    Urinalysis - [11-30-23 @ 06:41]      Color  / Appearance  / SG  / pH       Gluc 88 / Ketone   / Bili  / Urobili        Blood  / Protein  / Leuk Est  / Nitrite       RBC  / WBC  / Hyaline  / Gran  / Sq Epi  / Non Sq Epi  / Bacteria     Urine Creatinine 29      [11-26-23 @ 05:30]  Urine Protein 42      [11-26-23 @ 05:30]  Urine Sodium 30      [11-26-23 @ 05:30]  Urine Urea Nitrogen 159      [11-26-23 @ 05:42]  Urine Potassium 17      [11-26-23 @ 05:30]  Urine Osmolality 154      [11-26-23 @ 05:30]        Tacrolimus  Cyclosporine  Sirolimus  Mycophenolate  BK PCR  CMV PCR  Parvo PCR  EBV PCR

## 2023-12-01 NOTE — PROGRESS NOTE ADULT - PROBLEM SELECTOR PLAN 5
- peptic ulcer disease vs. pancreatitis vs. functional  - no findings on CT AP of pancreatitis, does not have classic history  - never had endoscopy to evaluate  - will trial protonix 40mg qd   - discussed w/ family GOC (see chart note); decision still to be made  - still plan to do esophogram - peptic ulcer disease vs. pancreatitis vs. functional  - no findings on CT AP of pancreatitis, does not have classic history  - never had endoscopy to evaluate  - will trial protonix 40mg qd   - discussed w/ family GOC (see chart note); decision still to be made  - esophogram attempted 12/1 - unable to tolerate; did not participate and follow commands

## 2023-12-01 NOTE — PROGRESS NOTE ADULT - PROBLEM SELECTOR PLAN 3
- bicarb on admission 13  - 2/2 LESLIE  - was on bicarb gtt w/ improvement in acidosis  - will transition to PO bicarb 1300 BID  - IMPROVING - SCr on admissoin 7  - baseline SCr is around 1  - appreciate nephro recs  - appears to be both prerenal and obstructive  - fluid resuscitation for prerenal  - will monitor for post-LESLIE diuresis  - replete electrolytes PRN  - PO bicarb 1300 BID  - IMPROVING  - Cornell removed 11/30 and pt has been spontaneously voiding

## 2023-12-01 NOTE — CHART NOTE - NSCHARTNOTEFT_GEN_A_CORE
I spoke with patient's grandson Chung Thurman ([phone]) and one of her healthcare surrogates Gianni Thurman (son, [phone]). I provided an update about Gita's plan of care. We then discussed next steps in her care. They are interested in an evaluation for a PEG. Gianni is hopeful she may eat on her own but would consider a PEG. I explained to him what a PEG is and the current evidence surrounding it, including the American Geriatric[employer] Society recommendation for careful hand feeding and against tube feeds. We also discussed code status. Gianni acknowledged that he thinks Gita would not do well after CPR, but says he needs to speak with their rabbi first. He would like to continue this discussion on Monday. I let him know that a new team would be there on Monday but they would be informed about her plan of care I spoke with patient's grandson Chung Thurman ([phone]) and one of her healthcare surrogates Gianni Thurman (son, [phone]). I provided an update about Gita's plan of care. We then discussed next steps in her care. They are interested in an evaluation for a PEG. Gianni is hopeful she may eat on her own but would consider a PEG. I explained to him what a PEG is and the current evidence surrounding it, including the American Ger[employer] Society recommendation for careful hand feeding and against tube feeds. We also discussed code status. Gianni acknowledged that he thinks Gita would not do well after CPR, but says he needs to speak with their rabbi first. He would like to continue this discussion on Monday. I let him know that a new team would be there on Monday but they would be informed about her plan of care    Addendum 12/1/23 16:50: Zoey and Gianni are now in agreement for DNR/DNI. Please see GO note for full details

## 2023-12-01 NOTE — PROGRESS NOTE ADULT - SUBJECTIVE AND OBJECTIVE BOX
DATE OF SERVICE: 12-01-23 @ 07:21    Patient is a 89y old  Female who presents with a chief complaint of fall, LESLIE (30 Nov 2023 19:13)      SUBJECTIVE / OVERNIGHT EVENTS:    MEDICATIONS  (STANDING):  atorvastatin 80 milliGRAM(s) Oral at bedtime  chlorhexidine 2% Cloths 1 Application(s) Topical daily  cholecalciferol 2000 Unit(s) Oral daily  clopidogrel Tablet 75 milliGRAM(s) Oral daily  dextrose 5% + sodium chloride 0.45%. 1000 milliLiter(s) (50 mL/Hr) IV Continuous <Continuous>  dextrose 5%. 1000 milliLiter(s) (50 mL/Hr) IV Continuous <Continuous>  dextrose 5%. 1000 milliLiter(s) (100 mL/Hr) IV Continuous <Continuous>  dextrose 50% Injectable 25 Gram(s) IV Push once  dextrose 50% Injectable 25 Gram(s) IV Push once  dextrose 50% Injectable 12.5 Gram(s) IV Push once  escitalopram 10 milliGRAM(s) Oral daily  folic acid 1 milliGRAM(s) Oral daily  glucagon  Injectable 1 milliGRAM(s) IntraMuscular once  heparin   Injectable 5000 Unit(s) SubCutaneous every 12 hours  insulin lispro (ADMELOG) corrective regimen sliding scale   SubCutaneous three times a day before meals  insulin lispro (ADMELOG) corrective regimen sliding scale   SubCutaneous at bedtime  melatonin 6 milliGRAM(s) Oral at bedtime  pantoprazole    Tablet 40 milliGRAM(s) Oral before breakfast  polyethylene glycol 3350 17 Gram(s) Oral daily  senna 2 Tablet(s) Oral at bedtime  sodium bicarbonate 1300 milliGRAM(s) Oral three times a day    MEDICATIONS  (PRN):  acetaminophen     Tablet .. 650 milliGRAM(s) Oral every 6 hours PRN Temp greater or equal to 38C (100.4F), Mild Pain (1 - 3)  aluminum hydroxide/magnesium hydroxide/simethicone Suspension 30 milliLiter(s) Oral every 4 hours PRN Dyspepsia  dextrose Oral Gel 15 Gram(s) Oral once PRN Blood Glucose LESS THAN 70 milliGRAM(s)/deciliter  ondansetron Injectable 4 milliGRAM(s) IV Push every 8 hours PRN Nausea and/or Vomiting      Vital Signs Last 24 Hrs  T(C): 36.4 (01 Dec 2023 05:09), Max: 36.6 (30 Nov 2023 20:51)  T(F): 97.5 (01 Dec 2023 05:09), Max: 97.9 (30 Nov 2023 20:51)  HR: 53 (01 Dec 2023 05:09) (50 - 58)  BP: 140/54 (01 Dec 2023 05:09) (121/63 - 185/69)  BP(mean): --  RR: 18 (01 Dec 2023 05:09) (18 - 20)  SpO2: 94% (01 Dec 2023 05:09) (94% - 99%)    Parameters below as of 30 Nov 2023 23:40  Patient On (Oxygen Delivery Method): room air      CAPILLARY BLOOD GLUCOSE      POCT Blood Glucose.: 109 mg/dL (30 Nov 2023 22:38)  POCT Blood Glucose.: 91 mg/dL (30 Nov 2023 16:24)  POCT Blood Glucose.: 91 mg/dL (30 Nov 2023 12:37)  POCT Blood Glucose.: 93 mg/dL (30 Nov 2023 08:13)    I&O's Summary    30 Nov 2023 07:01  -  01 Dec 2023 07:00  --------------------------------------------------------  IN: 550 mL / OUT: 200 mL / NET: 350 mL        PHYSICAL EXAM:  GENERAL: NAD, well-developed  HEAD:  Atraumatic, Normocephalic  EYES: EOMI, PERRLA, conjunctiva and sclera clear  NECK: Supple, No JVD  CHEST/LUNG: Clear to auscultation bilaterally; No wheeze  HEART: Regular rate and rhythm; No murmurs, rubs, or gallops  ABDOMEN: Soft, Nontender, Nondistended; Bowel sounds present  EXTREMITIES:  2+ Peripheral Pulses, No clubbing, cyanosis, or edema  PSYCH: AAOx3  NEUROLOGY: non-focal  SKIN: No rashes or lesions    LABS:                        9.1    5.54  )-----------( 204      ( 30 Nov 2023 06:42 )             28.0     11-30    144  |  109<H>  |  44<H>  ----------------------------<  88  3.6   |  21<L>  |  6.22<H>    Ca    8.8      30 Nov 2023 06:41  Phos  3.8     11-30  Mg     2.0     11-30            Urinalysis Basic - ( 30 Nov 2023 06:41 )    Color: x / Appearance: x / SG: x / pH: x  Gluc: 88 mg/dL / Ketone: x  / Bili: x / Urobili: x   Blood: x / Protein: x / Nitrite: x   Leuk Esterase: x / RBC: x / WBC x   Sq Epi: x / Non Sq Epi: x / Bacteria: x        RADIOLOGY & ADDITIONAL TESTS:    Imaging Personally Reviewed:    Consultant(s) Notes Reviewed:      Care Discussed with Consultants/Other Providers:   DATE OF SERVICE: 12-01-23 @ 07:21    Patient is a 89y old  Female who presents with a chief complaint of fall, LESLIE (30 Nov 2023 19:13)      SUBJECTIVE / OVERNIGHT EVENTS: NAOE. When asked how she is she says "I don't know".    MEDICATIONS  (STANDING):  atorvastatin 80 milliGRAM(s) Oral at bedtime  chlorhexidine 2% Cloths 1 Application(s) Topical daily  cholecalciferol 2000 Unit(s) Oral daily  clopidogrel Tablet 75 milliGRAM(s) Oral daily  dextrose 5% + sodium chloride 0.45%. 1000 milliLiter(s) (50 mL/Hr) IV Continuous <Continuous>  dextrose 5%. 1000 milliLiter(s) (50 mL/Hr) IV Continuous <Continuous>  dextrose 5%. 1000 milliLiter(s) (100 mL/Hr) IV Continuous <Continuous>  dextrose 50% Injectable 25 Gram(s) IV Push once  dextrose 50% Injectable 25 Gram(s) IV Push once  dextrose 50% Injectable 12.5 Gram(s) IV Push once  escitalopram 10 milliGRAM(s) Oral daily  folic acid 1 milliGRAM(s) Oral daily  glucagon  Injectable 1 milliGRAM(s) IntraMuscular once  heparin   Injectable 5000 Unit(s) SubCutaneous every 12 hours  insulin lispro (ADMELOG) corrective regimen sliding scale   SubCutaneous three times a day before meals  insulin lispro (ADMELOG) corrective regimen sliding scale   SubCutaneous at bedtime  melatonin 6 milliGRAM(s) Oral at bedtime  pantoprazole    Tablet 40 milliGRAM(s) Oral before breakfast  polyethylene glycol 3350 17 Gram(s) Oral daily  senna 2 Tablet(s) Oral at bedtime  sodium bicarbonate 1300 milliGRAM(s) Oral three times a day    MEDICATIONS  (PRN):  acetaminophen     Tablet .. 650 milliGRAM(s) Oral every 6 hours PRN Temp greater or equal to 38C (100.4F), Mild Pain (1 - 3)  aluminum hydroxide/magnesium hydroxide/simethicone Suspension 30 milliLiter(s) Oral every 4 hours PRN Dyspepsia  dextrose Oral Gel 15 Gram(s) Oral once PRN Blood Glucose LESS THAN 70 milliGRAM(s)/deciliter  ondansetron Injectable 4 milliGRAM(s) IV Push every 8 hours PRN Nausea and/or Vomiting      Vital Signs Last 24 Hrs  T(C): 36.4 (01 Dec 2023 05:09), Max: 36.6 (30 Nov 2023 20:51)  T(F): 97.5 (01 Dec 2023 05:09), Max: 97.9 (30 Nov 2023 20:51)  HR: 53 (01 Dec 2023 05:09) (50 - 58)  BP: 140/54 (01 Dec 2023 05:09) (121/63 - 185/69)  BP(mean): --  RR: 18 (01 Dec 2023 05:09) (18 - 20)  SpO2: 94% (01 Dec 2023 05:09) (94% - 99%)    Parameters below as of 30 Nov 2023 23:40  Patient On (Oxygen Delivery Method): room air      CAPILLARY BLOOD GLUCOSE      POCT Blood Glucose.: 109 mg/dL (30 Nov 2023 22:38)  POCT Blood Glucose.: 91 mg/dL (30 Nov 2023 16:24)  POCT Blood Glucose.: 91 mg/dL (30 Nov 2023 12:37)  POCT Blood Glucose.: 93 mg/dL (30 Nov 2023 08:13)    I&O's Summary    30 Nov 2023 07:01  -  01 Dec 2023 07:00  --------------------------------------------------------  IN: 550 mL / OUT: 200 mL / NET: 350 mL        PHYSICAL EXAM:  GENERAL: NAD, well-developed  HEAD:  Atraumatic, Normocephalic  EYES: EOMI, conjunctiva and sclera clear  NECK: Supple, No JVD  CHEST/LUNG: Clear to auscultation bilaterally; No wheeze  HEART: Regular rate and rhythm; No murmurs, rubs, or gallops  ABDOMEN: Soft, Nontender, Nondistended; Bowel sounds present  EXTREMITIES:  2+ Peripheral Pulses, No clubbing, cyanosis, or edema  NEUROLOGY: A&Ox2 (name, location; does not know time/date). Follows commands. Non-focal. MAEx4  SKIN: No rashes or lesions    LABS:                        9.1    5.54  )-----------( 204      ( 30 Nov 2023 06:42 )             28.0     11-30    144  |  109<H>  |  44<H>  ----------------------------<  88  3.6   |  21<L>  |  6.22<H>    Ca    8.8      30 Nov 2023 06:41  Phos  3.8     11-30  Mg     2.0     11-30            Urinalysis Basic - ( 30 Nov 2023 06:41 )    Color: x / Appearance: x / SG: x / pH: x  Gluc: 88 mg/dL / Ketone: x  / Bili: x / Urobili: x   Blood: x / Protein: x / Nitrite: x   Leuk Esterase: x / RBC: x / WBC x   Sq Epi: x / Non Sq Epi: x / Bacteria: x        RADIOLOGY & ADDITIONAL TESTS:    Imaging Personally Reviewed:    Consultant(s) Notes Reviewed:      Care Discussed with Consultants/Other Providers:

## 2023-12-01 NOTE — PROGRESS NOTE ADULT - ATTENDING COMMENTS
Patient seen and evaluated on bedside rounds with son-in-law at bedside. Patient more alert, following commands.  No complaints.     Telemetry reviewed: sinus 50-70's 1 run of PAT  Creatinine continues to donwtrend, stable hemoglobin.    #LESLIE  -unclear etiology, suspect combination of pre-renal with hemodynamically mediated aTn.  -avoid nephrotoxic agents, including home valsartan and spironolactone  -s/p over 3L, monitor PO intake, gentle hydraiton.  -continue morales for now.   -appreciate nephrology follow up.  -of note, patient hypokalemic with the LESLIE. Given urine lytes, suspect chronic prolonged decrease nutrition with minimal solute intake. Low WBC in urine makes aIN less likely  -ToV 11/30 PM, follow bladder scans    #Syncope  -suspect orthostatic, but given bradycardia currently off of beta blocker cannot r/o bradycardia.   -continue to hold all Av ayaz agents.  -monitor on telemetry  -place r2 pads on patients  -as still orthostatic as of 11/30, cannot place on midodrine 2/2 bradycardia and heart failure, not fludricortisone given supine hypertension, cards consult for both the orthostatics and bradycardia.  -abdominal binder for now along with compression stockings.     #bradycardia  -sinus wilber, reviewed 2021 admission was wilber then, butassymptomatic  -concern is lack of chronotropic compensation, which can exacerbate orthostatics  -continue off of beta blockers  -when able, ambulate see if HR rises.  -family discussion on if would even want consideration of PPM still ongoing.       #HFrEF  -EF 39% with segmental cahnges  -cannot go on GDMT at this time.   -if BP remains high, may start low dose hydral/isordil.   -no beta blocker, ACE/ARB/ARNI/MRA/SGLT2 given creatine and bradycardia.   -monitor for signs of volume overload.     #dyspepsia  -red flag symptoms.  -no findings on CT.  -for barium swallow for dysphagia. If evidence of stricture or obstruction, disuss if would want EGD.  -protonix as ordered  -TPN not a good option for patient. PEG in advanced dementia does not decrease risk of aspiration and cannot do without EGD, which family is hesitant about. Continue family discussions.    #Alzehimer's disease with functional quadriplegia  -supportive management, mood stabilizers.       #advanced care planning  -continue GOC conversations. While family appers to lean to DNR/DNI, need to speak to their rabbi prior. For now is full code Patient seen and evaluated on bedside rounds with son-in-law at bedside. Patient more alert, following commands.  No complaints.     Telemetry reviewed: sinus 50-70's 1 run of PAT  Creatinine continues to donwtrend, stable hemoglobin.    #LESLIE  -unclear etiology, suspect combination of pre-renal with hemodynamically mediated aTn.  -avoid nephrotoxic agents, including home valsartan and spironolactone  -s/p over 3L, monitor PO intake, gentle hydraiton.  -continue morales for now.   -appreciate nephrology follow up.  -of note, patient hypokalemic with the LESLIE. Given urine lytes, suspect chronic prolonged decrease nutrition with minimal solute intake. Low WBC in urine makes aIN less likely  -ToV 11/30 PM, follow bladder scans    #Syncope  -suspect orthostatic, but given bradycardia currently off of beta blocker cannot r/o bradycardia.   -continue to hold all Av ayaz agents.  -monitor on telemetry  -place r2 pads on patients  -as still orthostatic as of 11/30, cannot place on midodrine 2/2 bradycardia and heart failure, not fludricortisone given supine hypertension, cards consult for both the orthostatics and bradycardia.  -abdominal binder for now along with compression stockings.     #bradycardia  -sinus wilber, reviewed 2021 admission was wilber then, butassymptomatic  -concern is lack of chronotropic compensation, which can exacerbate orthostatics  -continue off of beta blockers  -when able, ambulate see if HR rises.  -family discussion on if would even want consideration of PPM still ongoing.       #HFrEF  -EF 39% with segmental cahnges  -cannot go on GDMT at this time.   -if BP remains high, may start low dose hydral/isordil.   -no beta blocker, ACE/ARB/ARNI/MRA/SGLT2 given creatine and bradycardia.   -monitor for signs of volume overload.     #dysphagia  -red flag symptoms.  -no findings on CT.  -for barium swallow for dysphagia. If evidence of stricture or obstruction, disuss if would want EGD.  -protonix as ordered  -family inquirted about PEG tube. At this time without doing any diagnostic workup, I do not feel PEG tube is appropritate. Family is concerned about any anesthesia risk and hesistant for EGD. this is why starting with barium swallow to see if any obstructive lesions, which can guide further conversations.     #Alzehimer's disease with functional quadriplegia  -supportive management, mood stabilizers.       #advanced care planning  -continue GOC conversations. While family appers to lean to DNR/DNI, need to speak to their rabbi prior. For now is full code

## 2023-12-01 NOTE — CONSULT NOTE ADULT - SUBJECTIVE AND OBJECTIVE BOX
Chief Complaint:  Patient is a 89y old  Female who presents with a chief complaint of fall, LESLIE (01 Dec 2023 12:53)    Admission HPI:  89 y/o F pmhx advanced dementia, HTN, HLD, DM2, anemia, CAD (s/p LAD and LCx stent ), HFrEF (EF 40-45% ), h/o TIA no residual deficits, h/o recurrent UTI presented w/ syncope and fall w/o trauma in the setting of 1 month of poor PO intake. Per chart review, baseline is A&Ox2 (does not know location) and has a tendency to become agitated, consisting of screaming while confused. She has 24hr HHA and is dependent for most ADLs. GI consulted for PEG evaluation; evaluated by S&S and recommended for diet. However, patient with poor PO intake in setting of dementia.    Allergies:  aspirin (Anaphylaxis)  Celebrex (Rash)  penicillin (Anaphylaxis)      Home Medications:    Hospital Medications:  acetaminophen     Tablet .. 650 milliGRAM(s) Oral every 6 hours PRN  aluminum hydroxide/magnesium hydroxide/simethicone Suspension 30 milliLiter(s) Oral every 4 hours PRN  atorvastatin 80 milliGRAM(s) Oral at bedtime  chlorhexidine 2% Cloths 1 Application(s) Topical daily  cholecalciferol 2000 Unit(s) Oral daily  clopidogrel Tablet 75 milliGRAM(s) Oral daily  dextrose 5% + sodium chloride 0.45%. 1000 milliLiter(s) IV Continuous <Continuous>  dextrose 5%. 1000 milliLiter(s) IV Continuous <Continuous>  dextrose 5%. 1000 milliLiter(s) IV Continuous <Continuous>  dextrose 50% Injectable 25 Gram(s) IV Push once  dextrose 50% Injectable 25 Gram(s) IV Push once  dextrose 50% Injectable 12.5 Gram(s) IV Push once  dextrose Oral Gel 15 Gram(s) Oral once PRN  escitalopram 10 milliGRAM(s) Oral daily  folic acid 1 milliGRAM(s) Oral daily  glucagon  Injectable 1 milliGRAM(s) IntraMuscular once  heparin   Injectable 5000 Unit(s) SubCutaneous every 12 hours  insulin lispro (ADMELOG) corrective regimen sliding scale   SubCutaneous three times a day before meals  insulin lispro (ADMELOG) corrective regimen sliding scale   SubCutaneous at bedtime  melatonin 6 milliGRAM(s) Oral at bedtime  ondansetron Injectable 4 milliGRAM(s) IV Push every 8 hours PRN  pantoprazole    Tablet 40 milliGRAM(s) Oral before breakfast  polyethylene glycol 3350 17 Gram(s) Oral daily  potassium chloride    Tablet ER 40 milliEquivalent(s) Oral once  senna 2 Tablet(s) Oral at bedtime  sodium bicarbonate 1300 milliGRAM(s) Oral three times a day      PMHX/PSHX:  Hypertension    Diabetes Mellitus, Type 2    History of Cholecystectomy    Hyperlipidemia    Dementia    CAD (coronary artery disease)    History of TIAs    History of TIA (transient ischemic attack)    Anemia    HFrEF (heart failure with reduced ejection fraction)    History of Cholecystectomy    S/P hip replacement, right    S/P hip replacement, left        Family history:  No pertinent family history in first degree relatives        Denies family history of colon cancer/polyps, stomach cancer/polyps, pancreatic cancer/masses, liver cancer/disease, ovarian cancer and endometrial cancer.    Social History:     Tob: Denies  EtOH: Denies  Illicit Drugs: Denies    ROS:   Limited due to dementia    PHYSICAL EXAM:     GENERAL:  No acute distress  HEENT:  no scleral icterus  CHEST:  no accessory muscle use  HEART:  Regular rate and rhythm  ABDOMEN:  Soft, non-tender, non-distended  EXTREMITIES: No edema  SKIN:  No rash/ecchymoses  NEURO:  Awake, following commands    Vital Signs:  Vital Signs Last 24 Hrs  T(C): 36.4 (01 Dec 2023 11:06), Max: 36.6 (2023 20:51)  T(F): 97.6 (01 Dec 2023 11:06), Max: 97.9 (2023 20:51)  HR: 47 (01 Dec 2023 11:06) (47 - 56)  BP: 151/59 (01 Dec 2023 11:06) (121/63 - 185/69)  BP(mean): --  RR: 18 (01 Dec 2023 11:06) (18 - 20)  SpO2: 94% (01 Dec 2023 11:06) (94% - 98%)    Parameters below as of 01 Dec 2023 11:06  Patient On (Oxygen Delivery Method): room air      Daily     Daily Weight in k.5 (01 Dec 2023 11:06)    LABS:                        8.2    5.32  )-----------( 197      ( 01 Dec 2023 11:33 )             25.2     Mean Cell Volume: 79.0 fl (12--23 @ 11:33)        144  |  108  |  46<H>  ----------------------------<  92  3.3<L>   |  22  |  6.15<H>    Ca    8.3<L>      01 Dec 2023 11:33  Phos  3.8       Mg     2.0               Urinalysis Basic - ( 01 Dec 2023 11:33 )    Color: x / Appearance: x / SG: x / pH: x  Gluc: 92 mg/dL / Ketone: x  / Bili: x / Urobili: x   Blood: x / Protein: x / Nitrite: x   Leuk Esterase: x / RBC: x / WBC x   Sq Epi: x / Non Sq Epi: x / Bacteria: x                              8.2    5.32  )-----------( 197      ( 01 Dec 2023 11:33 )             25.2                         9.1    5.54  )-----------( 204       06:42 )             28.0 Chief Complaint:  Patient is a 89y old  Female who presents with a chief complaint of fall, LESLIE (01 Dec 2023 12:53)    Admission HPI:  89 y/o F pmhx advanced dementia, HTN, HLD, DM2, anemia, CAD (s/p LAD and LCx stent ), HFrEF (EF 40-45% ), h/o TIA no residual deficits, h/o recurrent UTI presented w/ syncope and fall w/o trauma in the setting of 1 month of poor PO intake. Per chart review, baseline is A&Ox2 (does not know location) and has a tendency to become agitated, consisting of screaming while confused. She has 24hr HHA and is dependent for most ADLs. GI consulted for PEG evaluation; evaluated by S&S and recommended for diet. However, patient with poor PO intake in setting of dementia. Patient is poor historian, states sometimes she feels food getting stuck in epigastric area, but she cannot say if it's solid/liquid or how long it's been going on for. No abdominal pain/nausea/vomiting.    Allergies:  aspirin (Anaphylaxis)  Celebrex (Rash)  penicillin (Anaphylaxis)      Home Medications:    Hospital Medications:  acetaminophen     Tablet .. 650 milliGRAM(s) Oral every 6 hours PRN  aluminum hydroxide/magnesium hydroxide/simethicone Suspension 30 milliLiter(s) Oral every 4 hours PRN  atorvastatin 80 milliGRAM(s) Oral at bedtime  chlorhexidine 2% Cloths 1 Application(s) Topical daily  cholecalciferol 2000 Unit(s) Oral daily  clopidogrel Tablet 75 milliGRAM(s) Oral daily  dextrose 5% + sodium chloride 0.45%. 1000 milliLiter(s) IV Continuous <Continuous>  dextrose 5%. 1000 milliLiter(s) IV Continuous <Continuous>  dextrose 5%. 1000 milliLiter(s) IV Continuous <Continuous>  dextrose 50% Injectable 25 Gram(s) IV Push once  dextrose 50% Injectable 25 Gram(s) IV Push once  dextrose 50% Injectable 12.5 Gram(s) IV Push once  dextrose Oral Gel 15 Gram(s) Oral once PRN  escitalopram 10 milliGRAM(s) Oral daily  folic acid 1 milliGRAM(s) Oral daily  glucagon  Injectable 1 milliGRAM(s) IntraMuscular once  heparin   Injectable 5000 Unit(s) SubCutaneous every 12 hours  insulin lispro (ADMELOG) corrective regimen sliding scale   SubCutaneous three times a day before meals  insulin lispro (ADMELOG) corrective regimen sliding scale   SubCutaneous at bedtime  melatonin 6 milliGRAM(s) Oral at bedtime  ondansetron Injectable 4 milliGRAM(s) IV Push every 8 hours PRN  pantoprazole    Tablet 40 milliGRAM(s) Oral before breakfast  polyethylene glycol 3350 17 Gram(s) Oral daily  potassium chloride    Tablet ER 40 milliEquivalent(s) Oral once  senna 2 Tablet(s) Oral at bedtime  sodium bicarbonate 1300 milliGRAM(s) Oral three times a day      PMHX/PSHX:  Hypertension    Diabetes Mellitus, Type 2    History of Cholecystectomy    Hyperlipidemia    Dementia    CAD (coronary artery disease)    History of TIAs    History of TIA (transient ischemic attack)    Anemia    HFrEF (heart failure with reduced ejection fraction)    History of Cholecystectomy    S/P hip replacement, right    S/P hip replacement, left        Family history:  No pertinent family history in first degree relatives        Denies family history of colon cancer/polyps, stomach cancer/polyps, pancreatic cancer/masses, liver cancer/disease, ovarian cancer and endometrial cancer.    Social History:     Tob: Denies  EtOH: Denies  Illicit Drugs: Denies    ROS:   Limited due to dementia    PHYSICAL EXAM:     GENERAL:  No acute distress  HEENT:  no scleral icterus  CHEST:  no accessory muscle use  HEART:  Regular rate and rhythm  ABDOMEN:  Soft, non-tender, non-distended  EXTREMITIES: No edema  SKIN:  No rash/ecchymoses  NEURO:  Awake, following commands; A+O x 2    Vital Signs:  Vital Signs Last 24 Hrs  T(C): 36.4 (01 Dec 2023 11:06), Max: 36.6 (2023 20:51)  T(F): 97.6 (01 Dec 2023 11:06), Max: 97.9 (2023 20:51)  HR: 47 (01 Dec 2023 11:06) (47 - 56)  BP: 151/59 (01 Dec 2023 11:06) (121/63 - 185/69)  BP(mean): --  RR: 18 (01 Dec 2023 11:06) (18 - 20)  SpO2: 94% (01 Dec 2023 11:06) (94% - 98%)    Parameters below as of 01 Dec 2023 11:06  Patient On (Oxygen Delivery Method): room air      Daily     Daily Weight in k.5 (01 Dec 2023 11:06)    LABS:                        8.2    5.32  )-----------( 197      ( 01 Dec 2023 11:33 )             25.2     Mean Cell Volume: 79.0 fl (- @ 11:33)        144  |  108  |  46<H>  ----------------------------<  92  3.3<L>   |  22  |  6.15<H>    Ca    8.3<L>      01 Dec 2023 11:33  Phos  3.8       Mg     2.0               Urinalysis Basic - ( 01 Dec 2023 11:33 )    Color: x / Appearance: x / SG: x / pH: x  Gluc: 92 mg/dL / Ketone: x  / Bili: x / Urobili: x   Blood: x / Protein: x / Nitrite: x   Leuk Esterase: x / RBC: x / WBC x   Sq Epi: x / Non Sq Epi: x / Bacteria: x                              8.2    5.32  )-----------( 197      ( 01 Dec 2023 11:33 )             25.2                         9.1    5.54  )-----------( 204      ( 2023 06:42 )             28      < from: CT Abdomen and Pelvis No Cont (23 @ 17:38) >    FINDINGS:  LOWER CHEST: Mild bibasilar dependent atelectasis. Aortic and coronary   atherosclerosis. Right breastdemonstrates right retroareolar glandular   tissue with nipple retraction, similar to prior.    LIVER: Within normal limits.  BILE DUCTS: Normal caliber.  GALLBLADDER: Cholecystectomy.  SPLEEN: Within normal limits.  PANCREAS: Atrophic.  ADRENALS: Within normal limits.  KIDNEYS/URETERS: Midpole cyst. No hydroureteronephrosis.    BLADDER: Within normal limits.  REPRODUCTIVE ORGANS: Unremarkable uterus.    BOWEL: No bowel obstruction. Appendix is normal.  PERITONEUM: No ascites.  VESSELS: Atherosclerotic changes.  RETROPERITONEUM/LYMPH NODES: No lymphadenopathy.  ABDOMINAL WALL: Within normal limits.  BONES: ORIF left hip. The first vertebra without ribs is labeled as L1.   Grade 1 L4-5 anterolisthesis.    IMPRESSION:    Evaluation of the solidorgans, vascular structures and GI tract is   limited without oral and IV contrast.    No acute intra-abdominal pathology.    Right breast demonstrates right retroareolar glandular tissue with nipple   retraction, similar to prior. Recommend dedicated breast imaging to   evaluate for malignancy.      < end of copied text >

## 2023-12-01 NOTE — PROGRESS NOTE ADULT - ATTENDING COMMENTS
LESLIE ATN -hypotension volume depletion and retention  Renal function slowly improving  Conservative management and trend CR    Gilma Madrid MD  Off: [phone]  contact me on teams    (After 5 pm or on weekends please page the on-call fellow/attending, can check [email or web address] for schedule. Login is lo mendoza, schedule under [facility], psych, derm)

## 2023-12-01 NOTE — CONSULT NOTE ADULT - ATTENDING COMMENTS
Agree with above. Tried calling son/daughter in law today, went straight to voicemail. Patient with poor PO intake, GI consulted for PEG. Per RN only ate a small amount of ice cream. She is a very poor historian, reports ?food hang-up in epigastric area, but she cannot give any details or qualify it any further. Passed oropharyngeal swallow evaluation. Would first obtain calorie count and x-ray esophagram which is already ordered. If she fails calorie count and there is no findings on esophagram, can plan for PEG once patient is off Plavix for 5 days given increased risk of bleeding with Plavix.
# LESLIE /ATN - multifactorial  ATN due to hypotension, volume depletion and urinary retention.   + produced urine after morales was place.   No symptoms of uremia.     Plan:   No indication for dialysis. Repeat BMP again this afternoon.     # Metabolic acidosis - secondary to LESLIE. If serum HCO3 worsens, we can start sodium bicard 1300mg BID.     # Hypokalemia - continue repletion. Since patient is producing a lot of urine, she may have post-LESLIE diuresis --> resulting in further hypokalemia.  Continue to monitor for hypokalemia.     The rest of the recommendations as per fellow's note.    Lanie Olmos MD  Attending Nephrologist  [phone] or via [employer]

## 2023-12-01 NOTE — PROGRESS NOTE ADULT - SUBJECTIVE AND OBJECTIVE BOX
Date of service 12/1/23    chief complaint: syncope    extended hpi:  89 y/o Female with PMH of advanced dementia, HTN, HLD, DM2, anemia, CAD (s/p LAD and LCx stent 2010), HFrEF (EF 40-45% 2021), h/o TIA no residual deficits, h/o recurrent UTI who presented with syncope and fall w/o trauma in the setting of 1 month of poor PO intake, nausea, vomiting, epigastric pain. Found to have LESLIE, sinus bradycardia and orthostatic hypotension. Cardiology consulted for further evaluation.    laying in bed, son at bedside, no events overnight    Review of Systems:   Constitutional: [ ] fevers, [ ] chills.   Skin: [ ] dry skin. [ ] rashes.  Psychiatric: [ ] depression, [ ] anxiety.   Gastrointestinal: [ ] BRBPR, [ ] melena.   Neurological: [ ] confusion. [ ] seizures. [ ] shuffling gait.   Ears,Nose,Mouth and Throat: [ ] ear pain [ ] sore throat.   Eyes: [ ] diplopia.   Respiratory: [ ] hemoptysis. [ ] shortness of breath  Cardiovascular: See HPI above  Hematologic/Lymphatic: [ ] anemia. [ ] painful nodes. [ ] prolonged bleeding.   Genitourinary: [ ] hematuria. [ ] flank pain.   Endocrine: [ ] significant change in weight. [ ] intolerance to heat and cold.     Review of systems [x ] otherwise negative, [ ] otherwise unable to obtain    FH: no family history of sudden cardiac death in first degree relatives    SH: [ ] tobacco, [ ] alcohol, [ ] drugs    acetaminophen     Tablet .. 650 milliGRAM(s) Oral every 6 hours PRN  aluminum hydroxide/magnesium hydroxide/simethicone Suspension 30 milliLiter(s) Oral every 4 hours PRN  atorvastatin 80 milliGRAM(s) Oral at bedtime  chlorhexidine 2% Cloths 1 Application(s) Topical daily  cholecalciferol 2000 Unit(s) Oral daily  clopidogrel Tablet 75 milliGRAM(s) Oral daily  dextrose 5% + sodium chloride 0.45%. 1000 milliLiter(s) IV Continuous <Continuous>  escitalopram 10 milliGRAM(s) Oral daily  folic acid 1 milliGRAM(s) Oral daily  glucagon  Injectable 1 milliGRAM(s) IntraMuscular once  heparin   Injectable 5000 Unit(s) SubCutaneous every 12 hours  insulin lispro (ADMELOG) corrective regimen sliding scale   SubCutaneous three times a day before meals  insulin lispro (ADMELOG) corrective regimen sliding scale   SubCutaneous at bedtime  melatonin 6 milliGRAM(s) Oral at bedtime  ondansetron Injectable 4 milliGRAM(s) IV Push every 8 hours PRN  pantoprazole    Tablet 40 milliGRAM(s) Oral before breakfast  polyethylene glycol 3350 17 Gram(s) Oral daily  senna 2 Tablet(s) Oral at bedtime  sodium bicarbonate 1300 milliGRAM(s) Oral three times a day                            8.2    5.32  )-----------( 197      ( 01 Dec 2023 11:33 )             25.2       144  |  108  |  46<H>  ----------------------------<  92  3.3<L>   |  22  |  6.15<H>    Ca    8.3<L>      01 Dec 2023 11:33  Phos  3.8     12-01  Mg     2.0     12-01      T(C): 36.4 (12-01-23 @ 11:06), Max: 36.6 (11-30-23 @ 20:51)  HR: 47 (12-01-23 @ 11:06) (47 - 58)  BP: 151/59 (12-01-23 @ 11:06) (121/63 - 185/69)  RR: 18 (12-01-23 @ 11:06) (18 - 20)  SpO2: 94% (12-01-23 @ 11:06) (94% - 98%)  Wt(kg): --    I&O's Summary    30 Nov 2023 07:01  -  01 Dec 2023 07:00  --------------------------------------------------------  IN: 550 mL / OUT: 200 mL / NET: 350 mL      General: Well nourished in no acute distress.   Head: Normocephalic and atraumatic.   Neck: No JVD. No bruits. Supple. Does not appear to be enlarged.   Cardiovascular: + S1,S2 ; RRR Soft systolic murmur at the left lower sternal border. No rubs noted.    Lungs: CTA b/l. No rhonchi, rales or wheezes.   Abdomen: + BS, soft. Non tender. Non distended. No rebound. No guarding.   Extremities: No clubbing/cyanosis/edema.   Neurologic: Moves all four extremities. Full range of motion.   Skin: Warm and moist. The patient's skin has normal elasticity and good skin turgor.   Psychiatric: unable to fully assess  Musculoskeletal: Normal range of motion, normal strength    DATA      TELEMETRY: SB/SR 40-60s	      ECG: Sinus Bradycardia, narrow QRS    RADIOLOGY:         CXR: < from: Xray Chest 1 View- PORTABLE-Urgent (11.26.23 @ 02:59) >  IMPRESSION:  Slightly elevated right hemidiaphragm. Underinflated but otherwise clear   lungs. No pleural effusions or pneumothorax.    Stable cardiac and mediastinal silhouettes including aortic   calcifications and convex fullness of superior mediastinal/paratracheal   soft tissue contour which could be related to engorged/tortuous   brachiocephalic vasculature.    Generalized osteopenia.    --- End of Report ---    < end of copied text >      ASSESSMENT/PLAN: Patient is a 89 y/o Female with PMH of advanced dementia, HTN, HLD, DM2, anemia, CAD (s/p LAD and LCx stent 2010), HFrEF (EF 40-45% 2021), h/o TIA no residual deficits, h/o recurrent UTI who presented with syncope and fall w/o trauma in the setting of 1 month of poor PO intake, nausea, vomiting, epigastric pain. Found to have LESLIE, sinus bradycardia and orthostatic hypotension. Cardiology consulted for further evaluation.    #Syncope  - Suspect due to orthostatic hypotension  - TTE noted with EF 39%, mod AI  - Monitor telemetry however appears asymptomatic in terms of sinus bradycardia    #Orthostatic Hypotension  - Agree with holding all antihypertensives/GDMT  - Avoid midodrine and florinef given CHF  - Continue compression stockings and monitor for improvement    #Sinus Bradycardia  - Appears asymptomatic at rest  - EP consult with Dr. Calderón appreciated  - Check HR with ambulation if able    #LESLIE  - Management per renal    #CAD  - Continue Plavix and Lipitor

## 2023-12-02 ENCOUNTER — TRANSCRIPTION ENCOUNTER (OUTPATIENT)
Age: 88
End: 2023-12-02

## 2023-12-02 LAB
ANION GAP SERPL CALC-SCNC: 16 MMOL/L — SIGNIFICANT CHANGE UP (ref 5–17)
ANION GAP SERPL CALC-SCNC: 16 MMOL/L — SIGNIFICANT CHANGE UP (ref 5–17)
BUN SERPL-MCNC: 50 MG/DL — HIGH (ref 7–23)
BUN SERPL-MCNC: 50 MG/DL — HIGH (ref 7–23)
CALCIUM SERPL-MCNC: 8.8 MG/DL — SIGNIFICANT CHANGE UP (ref 8.4–10.5)
CALCIUM SERPL-MCNC: 8.8 MG/DL — SIGNIFICANT CHANGE UP (ref 8.4–10.5)
CHLORIDE SERPL-SCNC: 109 MMOL/L — HIGH (ref 96–108)
CHLORIDE SERPL-SCNC: 109 MMOL/L — HIGH (ref 96–108)
CO2 SERPL-SCNC: 17 MMOL/L — LOW (ref 22–31)
CO2 SERPL-SCNC: 17 MMOL/L — LOW (ref 22–31)
CREAT SERPL-MCNC: 5.95 MG/DL — HIGH (ref 0.5–1.3)
CREAT SERPL-MCNC: 5.95 MG/DL — HIGH (ref 0.5–1.3)
EGFR: 6 ML/MIN/1.73M2 — LOW
EGFR: 6 ML/MIN/1.73M2 — LOW
GLUCOSE BLDC GLUCOMTR-MCNC: 126 MG/DL — HIGH (ref 70–99)
GLUCOSE BLDC GLUCOMTR-MCNC: 126 MG/DL — HIGH (ref 70–99)
GLUCOSE BLDC GLUCOMTR-MCNC: 201 MG/DL — HIGH (ref 70–99)
GLUCOSE BLDC GLUCOMTR-MCNC: 201 MG/DL — HIGH (ref 70–99)
GLUCOSE BLDC GLUCOMTR-MCNC: 76 MG/DL — SIGNIFICANT CHANGE UP (ref 70–99)
GLUCOSE BLDC GLUCOMTR-MCNC: 76 MG/DL — SIGNIFICANT CHANGE UP (ref 70–99)
GLUCOSE BLDC GLUCOMTR-MCNC: 86 MG/DL — SIGNIFICANT CHANGE UP (ref 70–99)
GLUCOSE BLDC GLUCOMTR-MCNC: 86 MG/DL — SIGNIFICANT CHANGE UP (ref 70–99)
GLUCOSE SERPL-MCNC: 71 MG/DL — SIGNIFICANT CHANGE UP (ref 70–99)
GLUCOSE SERPL-MCNC: 71 MG/DL — SIGNIFICANT CHANGE UP (ref 70–99)
MAGNESIUM SERPL-MCNC: 2.1 MG/DL — SIGNIFICANT CHANGE UP (ref 1.6–2.6)
MAGNESIUM SERPL-MCNC: 2.1 MG/DL — SIGNIFICANT CHANGE UP (ref 1.6–2.6)
PHOSPHATE SERPL-MCNC: 3.8 MG/DL — SIGNIFICANT CHANGE UP (ref 2.5–4.5)
PHOSPHATE SERPL-MCNC: 3.8 MG/DL — SIGNIFICANT CHANGE UP (ref 2.5–4.5)
POTASSIUM SERPL-MCNC: 4 MMOL/L — SIGNIFICANT CHANGE UP (ref 3.5–5.3)
POTASSIUM SERPL-MCNC: 4 MMOL/L — SIGNIFICANT CHANGE UP (ref 3.5–5.3)
POTASSIUM SERPL-SCNC: 4 MMOL/L — SIGNIFICANT CHANGE UP (ref 3.5–5.3)
POTASSIUM SERPL-SCNC: 4 MMOL/L — SIGNIFICANT CHANGE UP (ref 3.5–5.3)
SODIUM SERPL-SCNC: 142 MMOL/L — SIGNIFICANT CHANGE UP (ref 135–145)
SODIUM SERPL-SCNC: 142 MMOL/L — SIGNIFICANT CHANGE UP (ref 135–145)

## 2023-12-02 PROCEDURE — 99233 SBSQ HOSP IP/OBS HIGH 50: CPT

## 2023-12-02 RX ADMIN — CHLORHEXIDINE GLUCONATE 1 APPLICATION(S): 213 SOLUTION TOPICAL at 11:20

## 2023-12-02 RX ADMIN — Medication 1300 MILLIGRAM(S): at 13:32

## 2023-12-02 RX ADMIN — Medication 1300 MILLIGRAM(S): at 21:01

## 2023-12-02 RX ADMIN — Medication 2000 UNIT(S): at 11:21

## 2023-12-02 RX ADMIN — Medication 1 MILLIGRAM(S): at 11:20

## 2023-12-02 RX ADMIN — Medication 6 MILLIGRAM(S): at 21:02

## 2023-12-02 RX ADMIN — HEPARIN SODIUM 5000 UNIT(S): 5000 INJECTION INTRAVENOUS; SUBCUTANEOUS at 17:31

## 2023-12-02 RX ADMIN — ESCITALOPRAM OXALATE 10 MILLIGRAM(S): 10 TABLET, FILM COATED ORAL at 11:20

## 2023-12-02 RX ADMIN — PANTOPRAZOLE SODIUM 40 MILLIGRAM(S): 20 TABLET, DELAYED RELEASE ORAL at 08:12

## 2023-12-02 RX ADMIN — ATORVASTATIN CALCIUM 80 MILLIGRAM(S): 80 TABLET, FILM COATED ORAL at 21:01

## 2023-12-02 NOTE — PROGRESS NOTE ADULT - PROBLEM SELECTOR PLAN 3
- SCr on admissoin 7  - baseline SCr is around 1  - appreciate nephro recs  - appears to be both prerenal and obstructive  - fluid resuscitation for prerenal  - will monitor for post-LESLIE diuresis  - replete electrolytes PRN  - PO bicarb 1300 BID  - IMPROVING  - Cornell removed 11/30 and pt has been spontaneously voiding - SCr on admission 7. Baseline SCr is around 1  - Appears to be both prerenal and obstructive. Likely an ATN component as well given hypotension. Slowly improving  - Cornell removed 11/30 and pt has been spontaneously voiding

## 2023-12-02 NOTE — PROGRESS NOTE ADULT - PROBLEM SELECTOR PLAN 11
- home regimen: rosuvastatin 40mg qd, Zetia 10mg qd  - Zetia is nonformulary  - intertherapeutic interchange of rosuvastatin 40mg to lipitor 80mg qd DVT ppx: HSQ  Diet: regular, Kosher  Dispo: pending medical improvement, PT eval

## 2023-12-02 NOTE — DISCHARGE NOTE PROVIDER - HOSPITAL COURSE
89 y/o F pmhx advanced dementia, HTN, HLD, DM2, anemia, CAD (s/p LAD and LCx stent 2010), HFrEF (EF 40-45% 2021), h/o TIA no residual deficits, h/o recurrent UTI presents w/ syncope and fall w/o trauma in the setting of 1 month of poor PO intake, nausea, vomiting, epigastric pain. Collateral obtained from pt's son Gianni and daughter Zoey. They report that her baseline is A&Ox2 (does not know location) and has a tendency to become agitated, consisting of screaming while confused. She has 24hr HHA and is dependent for most ADLs. She was had 2 ED visits in the past month for her poor PO intake and epigastric pain. She was given sucralfate by her PCP, with the presumed diagnosis of peptic ulcer disease. She has only taken some of her medications intermittently due to her decreased interest in eating/pain. Her children report her pain is worse with eating. Yesterday, she was at her SNF when she syncopized. It was reported that staff caught her before she hit t he ground. She has no recollection of this incident. Her children are not able to provide collateral on this syncopal event.    Hospital course:  89 y/o F pmhx advanced dementia and HFrEF p/w syncope in the setting of failure to thrive. On admission, patient found to have a severe LESLIE w/ SCr 7.0 (baseline around 1) and to have urinary retention. Cornell catheter was placed and eventually removed 11/30/23. Pt was seen by nephro. LESLIE felt to be multifactorial - urinary retention and prerenal due to persistently poor PO intake and failure to thrive. She was found to have signifcant orthostatic hypotension, which was felt to be the etiology of her syncope.  Patient had been having poor PO intake for several months, which she reports is because of epigastric pain/nausea/dysphagia/globus sensation. Subjective examination by SLP did not reveal any apparent pathology; objective examination such as by esophagram could not be obtained as patient would not participate in the study. The medical team had several G[facility] conversation with the family regarding nutrition, and ultimately the decision was made to ____.  Pt's hospital course was also complicated persistent asymptomatic bradycardia, 45-50s. She was seen by EP and cardiology. AV ayaz blockers held. Ultimately, the decision was made to ____.      Important Medication Changes and Reason:  1.     Active or Pending Issues Requiring Follow-up:  1.     Advanced Directives:   [ ] Full code  [X] DNR/DNI  [ ] Hospice    Discharge Diagnoses:     89 y/o F pmhx advanced dementia, HTN, HLD, DM2, anemia, CAD (s/p LAD and LCx stent 2010), HFrEF (EF 40-45% 2021), h/o TIA no residual deficits, h/o recurrent UTI presents w/ syncope and fall w/o trauma in the setting of 1 month of poor PO intake, nausea, vomiting, epigastric pain. Collateral obtained from pt's son Gianni and daughter Zoey. They report that her baseline is A&Ox2 (does not know location) and has a tendency to become agitated, consisting of screaming while confused. She has 24hr HHA and is dependent for most ADLs. She was had 2 ED visits in the past month for her poor PO intake and epigastric pain. She was given sucralfate by her PCP, with the presumed diagnosis of peptic ulcer disease. She has only taken some of her medications intermittently due to her decreased interest in eating/pain. Her children report her pain is worse with eating. Yesterday, she was at her SNF when she syncopized. It was reported that staff caught her before she hit t he ground. She has no recollection of this incident. Her children are not able to provide collateral on this syncopal event.    Hospital course:  89 y/o F pmhx advanced dementia and HFrEF p/w syncope in the setting of failure to thrive. On admission, patient found to have a severe LESLIE w/ SCr 7.0 (baseline around 1) and to have urinary retention. Cornell catheter was placed and eventually removed 11/30/23. Pt was seen by nephro. LESLIE felt to be multifactorial - urinary retention and prerenal due to persistently poor PO intake and failure to thrive. She was found to have signifcant orthostatic hypotension, which was felt to be the etiology of her syncope.  Patient had been having poor PO intake for several months, which she reports is because of epigastric pain/nausea/dysphagia/globus sensation. Subjective examination by SLP did not reveal any apparent pathology; objective examination such as by esophagram could not be obtained as patient would not participate in the study. The medical team had several GOC conversation with the family regarding nutrition, and ultimately the decision was made to ____.  Pt's hospital course was also complicated persistent asymptomatic bradycardia, 45-50s. She was seen by EP and cardiology. AV ayaz blockers held. Ultimately, the decision was made to ____.      Important Medication Changes and Reason:  1.     Active or Pending Issues Requiring Follow-up:  1.     Advanced Directives:   [ ] Full code  [X] DNR/DNI  [ ] Hospice    Discharge Diagnoses:     89 y/o F pmhx advanced dementia, HTN, HLD, DM2, anemia, CAD (s/p LAD and LCx stent 2010), HFrEF (EF 40-45% 2021), h/o TIA no residual deficits, h/o recurrent UTI presents w/ syncope and fall w/o trauma in the setting of 1 month of poor PO intake, nausea, vomiting, epigastric pain. Collateral obtained from pt's son Gianni and daughter Zoey. They report that her baseline is A&Ox2 (does not know location) and has a tendency to become agitated, consisting of screaming while confused. She has 24hr HHA and is dependent for most ADLs. She was had 2 ED visits in the past month for her poor PO intake and epigastric pain. She was given sucralfate by her PCP, with the presumed diagnosis of peptic ulcer disease. She has only taken some of her medications intermittently due to her decreased interest in eating/pain. Her children report her pain is worse with eating. Yesterday, she was at her SNF when she syncopized. It was reported that staff caught her before she hit t he ground. She has no recollection of this incident. Her children are not able to provide collateral on this syncopal event.    Hospital course:  89 y/o F pmhx advanced dementia and HFrEF p/w syncope in the setting of failure to thrive. On admission, patient found to have a severe LESLIE w/ SCr 7.0 (baseline around 1) and to have urinary retention. Cornell catheter was placed and eventually removed 11/30/23. Pt was seen by nephro. LESLIE felt to be multifactorial - urinary retention and prerenal due to persistently poor PO intake and failure to thrive. She was found to have significant orthostatic hypotension, which was felt to be the etiology of her syncope. Pt also hypernatremic all thought to be in the setting of poor PO intake.  TTE with LVEF 39%. Noted Sinus bradycardia on tele 55-60 and periods of PAF, seen by EP and cardiology. AV ayaz blockers held. No plans for PPM,  avoid AV ayaz blockers. Given her elevated chads-vasc score please continue lifelong a/c. EP recommend AC if no contraindication and within GOC. Family opted for no AC given risks per med discussion  Patient had been having poor PO intake for several months, which she reports is because of epigastric pain/nausea/dysphagia/globus sensation. Subjective examination by SLP did not reveal any apparent pathology; objective examination such as by esophagram could not be obtained as patient would not participate in the study. The medical team had several C conversation with the family regarding nutrition, and ultimately the decision was made to place PEG. PEG tube placed on 12/06 tube feeds initiated on 12/07 after nutrition recs, currently at goal. Pt tolerating well    Important Medication Changes and Reason:  1.     Active or Pending Issues Requiring Follow-up:  1.     Advanced Directives:   [ ] Full code  [X] DNR/DNI  [ ] Hospice    Discharge Diagnoses:   # Syncope due to orthostatic hypotension sec to FTT   # Sinus bradycardia.   # LESLIE (acute kidney injury).   # Hypernatremia.   # Failure to thrive in adult - S/P PEG tube placed on 12/06   # Chronic HFrEF - coreg 3.125 BID, valsartan-HCTZ 80-12.5 qd, aldactone 50mg qd,  hold antihypertensives d/t orthostatic hypotension.  # Dementia w/ agitation -  hold home nanzeric in the setting of orthostatic hypotension.  # Diabetes mellitus, type 2 controlled w/ diet,  A1c 6.3%  # CAD - Continue home regimen: Plavix 75mg qd, rosuvastatin 40mg qd, Zetia 10mg qd  # HTN  home regimen: coreg 3.125 BID, valsartan-HCTZ 80-12.5 qd, aldactone 50mg qd - held given orthostatic hypotension  - Amlodipine 5 mg daily started (patient on Amlodipine 5 mg BID at home). reassess need to resume     89 y/o F pmhx advanced dementia, HTN, HLD, DM2, anemia, CAD (s/p LAD and LCx stent 2010), HFrEF (EF 40-45% 2021), h/o TIA no residual deficits, h/o recurrent UTI presents w/ syncope and fall w/o trauma in the setting of 1 month of poor PO intake, nausea, vomiting, epigastric pain. Collateral obtained from pt's son Gianni and daughter Zoey. They report that her baseline is A&Ox2 (does not know location) and has a tendency to become agitated, consisting of screaming while confused. She has 24hr HHA and is dependent for most ADLs. She was had 2 ED visits in the past month for her poor PO intake and epigastric pain. She was given sucralfate by her PCP, with the presumed diagnosis of peptic ulcer disease. She has only taken some of her medications intermittently due to her decreased interest in eating/pain. Her children report her pain is worse with eating. Yesterday, she was at her SNF when she syncopized. It was reported that staff caught her before she hit t he ground. She has no recollection of this incident. Her children are not able to provide collateral on this syncopal event.    Hospital course:  89 y/o F pmhx advanced dementia and HFrEF p/w syncope in the setting of failure to thrive. On admission, patient found to have a severe LESLIE w/ SCr 7.0 (baseline around 1) and to have urinary retention. Cornell catheter was placed and eventually removed 11/30/23. Pt was seen by nephro. LESLIE felt to be multifactorial - urinary retention and prerenal due to persistently poor PO intake and failure to thrive. She was found to have significant orthostatic hypotension, which was felt to be the etiology of her syncope. Pt also hypernatremic all thought to be in the setting of poor PO intake.  TTE with LVEF 39%. Noted Sinus bradycardia on tele 55-60 and periods of PAF, seen by EP and cardiology. AV ayaz blockers held. No plans for PPM,  avoid AV ayaz blockers. Given her elevated chads-vasc score please continue lifelong a/c. EP recommend AC if no contraindication and within GOC. Family opted for no AC given risks per med discussion  Patient had been having poor PO intake for several months, which she reports is because of epigastric pain/nausea/dysphagia/globus sensation. Subjective examination by SLP did not reveal any apparent pathology; objective examination such as by esophagram could not be obtained as patient would not participate in the study. The medical team had several [facility] conversation with the family regarding nutrition, and ultimately the decision was made to place PEG. PEG tube placed on 12/06 tube feeds initiated on 12/07 after nutrition recs, currently at goal. Pt tolerating well    Important Medication Changes and Reason:  1.  Hold Namzaric in the setting of orthostatic hypotension    Active or Pending Issues Requiring Follow-up:  1.     Advanced Directives:   [ ] Full code  [X] DNR/DNI  [ ] Hospice    Discharge Diagnoses:   # Syncope due to orthostatic hypotension sec to FTT   # Sinus bradycardia.   # LESLIE (acute kidney injury).   # Hypernatremia.   # Failure to thrive in adult - S/P PEG tube placed on 12/06   # Chronic HFrEF - coreg 3.125 BID, valsartan-HCTZ 80-12.5 qd, aldactone 50mg qd,  hold antihypertensives d/t orthostatic hypotension.  # Dementia w/ agitation -  hold home nanzeric in the setting of orthostatic hypotension.  # Diabetes mellitus, type 2 controlled w/ diet,  A1c 6.3%  # CAD - Continue home regimen: Plavix 75mg qd, rosuvastatin 40mg qd, Zetia 10mg qd  # HTN  home regimen: coreg 3.125 BID, valsartan-HCTZ 80-12.5 qd, aldactone 50mg qd - held given orthostatic hypotension  - Amlodipine 5 mg daily started (patient on Amlodipine 5 mg BID at home). reassess need to resume     89 y/o F pmhx advanced dementia, HTN, HLD, DM2, anemia, CAD (s/p LAD and LCx stent 2010), HFrEF (EF 40-45% 2021), h/o TIA no residual deficits, h/o recurrent UTI presents w/ syncope and fall w/o trauma in the setting of 1 month of poor PO intake, nausea, vomiting, epigastric pain. Collateral obtained from pt's son Gianni and daughter Zoey. They report that her baseline is A&Ox2 (does not know location) and has a tendency to become agitated, consisting of screaming while confused. She has 24hr HHA and is dependent for most ADLs. She was had 2 ED visits in the past month for her poor PO intake and epigastric pain. She was given sucralfate by her PCP, with the presumed diagnosis of peptic ulcer disease. She has only taken some of her medications intermittently due to her decreased interest in eating/pain. Her children report her pain is worse with eating. Yesterday, she was at her SNF when she syncopized. It was reported that staff caught her before she hit t he ground. She has no recollection of this incident. Her children are not able to provide collateral on this syncopal event.    Hospital course:  89 y/o F pmhx advanced dementia and HFrEF p/w syncope in the setting of failure to thrive. On admission, patient found to have a severe LESLIE w/ SCr 7.0 (baseline around 1) and to have urinary retention. Cornell catheter was placed and eventually removed 11/30/23. Pt was seen by nephro. LESLIE felt to be multifactorial - urinary retention and prerenal due to persistently poor PO intake and failure to thrive. She was found to have significant orthostatic hypotension, which was felt to be the etiology of her syncope. Pt also hypernatremic all thought to be in the setting of poor PO intake.  TTE with LVEF 39%. Noted Sinus bradycardia on tele 55-60 and periods of PAF, seen by EP and cardiology. AV ayaz blockers held. No plans for PPM,  avoid AV ayaz blockers. Given her elevated chads-vasc score please continue lifelong a/c. EP recommend AC if no contraindication and within GOC. Family opted for no AC given risks per med discussion  Patient had been having poor PO intake for several months, which she reports is because of epigastric pain/nausea/dysphagia/globus sensation. Subjective examination by SLP did not reveal any apparent pathology; objective examination such as by esophagram could not be obtained as patient would not participate in the study. The medical team had several [facility] conversation with the family regarding nutrition, and ultimately the decision was made to place PEG. PEG tube placed on 12/06 tube feeds initiated on 12/07 after nutrition recs, currently at goal. Pt tolerating well    Important Medication Changes and Reason:  1.  Hold Namzaric in the setting of orthostatic hypotension    Active or Pending Issues Requiring Follow-up:  1.     Advanced Directives:   [ ] Full code  [X] DNR/DNI  [ ] Hospice    Discharge Diagnoses:   # Syncope due to orthostatic hypotension sec to FTT   # Sinus bradycardia.   # LESLIE (acute kidney injury).   # Hypernatremia.   # Failure to thrive in adult - S/P PEG tube placed on 12/06   # Chronic HFrEF - coreg 3.125 BID, valsartan-HCTZ 80-12.5 qd, aldactone 50mg qd,  hold antihypertensives d/t orthostatic hypotension.  # Dementia w/ agitation -  hold home nanzeric in the setting of orthostatic hypotension.  # Diabetes mellitus, type 2 controlled w/ diet,  A1c 6.3%  # CAD - Continue home regimen: Plavix 75mg qd, rosuvastatin 40mg qd, Zetia 10mg qd  # HTN  home regimen: coreg 3.125 BID, valsartan-HCTZ 80-12.5 qd, aldactone 50mg qd - held given orthostatic hypotension  - Amlodipine 5 mg daily started (patient on Amlodipine 5 mg BID at home). reassess need to resume     89 y/o F pmhx advanced dementia, HTN, HLD, DM2, anemia, CAD (s/p LAD and LCx stent 2010), HFrEF (EF 40-45% 2021), h/o TIA no residual deficits, h/o recurrent UTI presents w/ syncope and fall w/o trauma in the setting of 1 month of poor PO intake, nausea, vomiting, epigastric pain. Collateral obtained from pt's son Gianni and daughter Zoey. They report that her baseline is A&Ox2 (does not know location) and has a tendency to become agitated, consisting of screaming while confused. She has 24hr HHA and is dependent for most ADLs. She was had 2 ED visits in the past month for her poor PO intake and epigastric pain. She was given sucralfate by her PCP, with the presumed diagnosis of peptic ulcer disease. She has only taken some of her medications intermittently due to her decreased interest in eating/pain. Her children report her pain is worse with eating. Yesterday, she was at her SNF when she syncopized. It was reported that staff caught her before she hit t he ground. She has no recollection of this incident. Her children are not able to provide collateral on this syncopal event.    Hospital course:  89 y/o F pmhx advanced dementia and HFrEF p/w syncope in the setting of failure to thrive. On admission, patient found to have a severe LESLIE w/ SCr 7.0 (baseline around 1) and to have urinary retention. Cornell catheter was placed and eventually removed 11/30/23. Pt was seen by nephro. LESLIE felt to be multifactorial - urinary retention and prerenal due to persistently poor PO intake and failure to thrive. She was found to have significant orthostatic hypotension, which was felt to be the etiology of her syncope. Pt also hypernatremic all thought to be in the setting of poor PO intake.  TTE with LVEF 39%. Noted Sinus bradycardia on tele 55-60 and periods of PAF, seen by EP and cardiology. AV ayaz blockers held. No plans for PPM,  avoid AV ayaz blockers. Given her elevated chads-vasc score please continue lifelong a/c. EP recommend AC if no contraindication and within GOC. Family opted for no AC given risks per med discussion  Patient had been having poor PO intake for several months, which she reports is because of epigastric pain/nausea/dysphagia/globus sensation. Subjective examination by SLP did not reveal any apparent pathology; objective examination such as by esophagram could not be obtained as patient would not participate in the study. The medical team had several [facility] conversation with the family regarding nutrition, and ultimately the decision was made to place PEG. PEG tube placed on 12/06 tube feeds initiated on 12/07 after nutrition recs, currently at goal. Pt tolerating well    Important Medication Changes and Reason:  1.  Hold Namzaric in the setting of orthostatic hypotension    Active or Pending Issues Requiring Follow-up:  1.     Advanced Directives:   [ ] Full code  [X] DNR/DNI  [ ] Hospice    Discharge Diagnoses:   # Syncope due to orthostatic hypotension sec to FTT   # Sinus bradycardia.   # LESLIE (acute kidney injury).   # Hypernatremia.   # Failure to thrive in adult - S/P PEG tube placed on 12/06   # Chronic HFrEF - coreg 3.125 BID, valsartan-HCTZ 80-12.5 qd, aldactone 50mg qd,  hold antihypertensives d/t orthostatic hypotension.  # Dementia w/ agitation -  hold home nanzeric in the setting of orthostatic hypotension.  # Diabetes mellitus, type 2 controlled w/ diet,  A1c 6.3%  # CAD - Continue home regimen: Plavix 75mg qd, rosuvastatin 40mg qd, Zetia 10mg qd  # HTN  home regimen: coreg 3.125 BID, valsartan-HCTZ 80-12.5 qd, aldactone 50mg qd - held given orthostatic hypotension  - Amlodipine 5 mg daily started (patient on Amlodipine 5 mg BID at home). reassess need to resume     89 y/o F pmhx advanced dementia, HTN, HLD, DM2, anemia, CAD (s/p LAD and LCx stent 2010), HFrEF (EF 40-45% 2021), h/o TIA no residual deficits, h/o recurrent UTI presents w/ syncope and fall w/o trauma in the setting of 1 month of poor PO intake, nausea, vomiting, epigastric pain. Collateral obtained from pt's son Gianni and daughter Zoey. They report that her baseline is A&Ox2 (does not know location) and has a tendency to become agitated, consisting of screaming while confused. She has 24hr HHA and is dependent for most ADLs. She was had 2 ED visits in the past month for her poor PO intake and epigastric pain. She was given sucralfate by her PCP, with the presumed diagnosis of peptic ulcer disease. She has only taken some of her medications intermittently due to her decreased interest in eating/pain. Her children report her pain is worse with eating. Yesterday, she was at her SNF when she syncopized. It was reported that staff caught her before she hit t he ground. She has no recollection of this incident. Her children are not able to provide collateral on this syncopal event.    Hospital course:  89 y/o F pmhx advanced dementia and HFrEF p/w syncope in the setting of failure to thrive. On admission, patient found to have a severe LESLIE w/ SCr 7.0 (baseline around 1) and to have urinary retention. Cornell catheter was placed and eventually removed 11/30/23. Pt was seen by nephro. LESLIE felt to be multifactorial - urinary retention and prerenal due to persistently poor PO intake and failure to thrive. She was found to have significant orthostatic hypotension, which was felt to be the etiology of her syncope. Pt also hypernatremic all thought to be in the setting of poor PO intake.  TTE with LVEF 39%. Noted Sinus bradycardia on tele 55-60 and periods of PAF, seen by EP and cardiology. AV ayaz blockers held. No plans for PPM,  avoid AV ayaz blockers. Given her elevated chads-vasc score please continue lifelong a/c. EP recommend AC if no contraindication and within GOC. Family opted for no AC given risks per med discussion  Patient had been having poor PO intake for several months, which she reports is because of epigastric pain/nausea/dysphagia/globus sensation. Subjective examination by SLP did not reveal any apparent pathology; objective examination such as by esophagram could not be obtained as patient would not participate in the study. The medical team had several [facility] conversation with the family regarding nutrition, and ultimately the decision was made to place PEG. PEG tube placed on 12/06 tube feeds initiated on 12/07 after nutrition recs, currently at goal. Pt tolerating well    post discharge addendum:  poor po intake for several months likely secondary to underlying dementia. no mechanical swallowing abnormality found on exam.     Important Medication Changes and Reason:  1.  Hold Namzaric in the setting of orthostatic hypotension    Active or Pending Issues Requiring Follow-up:  1.     Advanced Directives:   [ ] Full code  [X] DNR/DNI  [ ] Hospice    Discharge Diagnoses:   # Syncope due to orthostatic hypotension sec to FTT   # Sinus bradycardia.   # LESLIE (acute kidney injury).   # Hypernatremia.   # Failure to thrive in adult - S/P PEG tube placed on 12/06   # Chronic HFrEF - coreg 3.125 BID, valsartan-HCTZ 80-12.5 qd, aldactone 50mg qd,  hold antihypertensives d/t orthostatic hypotension.  # Dementia w/ agitation -  hold home nanzeric in the setting of orthostatic hypotension.  # Diabetes mellitus, type 2 controlled w/ diet,  A1c 6.3%  # CAD - Continue home regimen: Plavix 75mg qd, rosuvastatin 40mg qd, Zetia 10mg qd  # HTN  home regimen: coreg 3.125 BID, valsartan-HCTZ 80-12.5 qd, aldactone 50mg qd - held given orthostatic hypotension  - Amlodipine 5 mg daily started (patient on Amlodipine 5 mg BID at home). reassess need to resume     89 y/o F pmhx advanced dementia, HTN, HLD, DM2, anemia, CAD (s/p LAD and LCx stent 2010), HFrEF (EF 40-45% 2021), h/o TIA no residual deficits, h/o recurrent UTI presents w/ syncope and fall w/o trauma in the setting of 1 month of poor PO intake, nausea, vomiting, epigastric pain. Collateral obtained from pt's son Gianni and daughter Zoey. They report that her baseline is A&Ox2 (does not know location) and has a tendency to become agitated, consisting of screaming while confused. She has 24hr HHA and is dependent for most ADLs. She was had 2 ED visits in the past month for her poor PO intake and epigastric pain. She was given sucralfate by her PCP, with the presumed diagnosis of peptic ulcer disease. She has only taken some of her medications intermittently due to her decreased interest in eating/pain. Her children report her pain is worse with eating. Yesterday, she was at her SNF when she syncopized. It was reported that staff caught her before she hit t he ground. She has no recollection of this incident. Her children are not able to provide collateral on this syncopal event.    Hospital course:  89 y/o F pmhx advanced dementia and HFrEF p/w syncope in the setting of failure to thrive. On admission, patient found to have a severe LESLIE w/ SCr 7.0 (baseline around 1) and to have urinary retention. Cornell catheter was placed and eventually removed 11/30/23. Pt was seen by nephro. LESLIE felt to be multifactorial - urinary retention and prerenal due to persistently poor PO intake and failure to thrive. She was found to have significant orthostatic hypotension, which was felt to be the etiology of her syncope. Pt also hypernatremic all thought to be in the setting of poor PO intake.  TTE with LVEF 39%. Noted Sinus bradycardia on tele 55-60 and periods of PAF, seen by EP and cardiology. AV ayaz blockers held. No plans for PPM,  avoid AV ayaz blockers. Given her elevated chads-vasc score please continue lifelong a/c. EP recommend AC if no contraindication and within GOC. Family opted for no AC given risks per med discussion  Patient had been having poor PO intake for several months, which she reports is because of epigastric pain/nausea/dysphagia/globus sensation. Subjective examination by SLP did not reveal any apparent pathology; objective examination such as by esophagram could not be obtained as patient would not participate in the study. The medical team had several [facility] conversation with the family regarding nutrition, and ultimately the decision was made to place PEG. PEG tube placed on 12/06 tube feeds initiated on 12/07 after nutrition recs, currently at goal. Pt tolerating well    post discharge addendum:  poor po intake for several months likely secondary to underlying dementia. no mechanical swallowing abnormality found on exam.     Important Medication Changes and Reason:  1.  Hold Namzaric in the setting of orthostatic hypotension    Active or Pending Issues Requiring Follow-up:  1.     Advanced Directives:   [ ] Full code  [X] DNR/DNI  [ ] Hospice    Discharge Diagnoses:   # Syncope due to orthostatic hypotension sec to FTT   # Sinus bradycardia.   # LESLIE (acute kidney injury).   # Hypernatremia.   # Failure to thrive in adult - S/P PEG tube placed on 12/06   # Chronic HFrEF - coreg 3.125 BID, valsartan-HCTZ 80-12.5 qd, aldactone 50mg qd,  hold antihypertensives d/t orthostatic hypotension.  # Dementia w/ agitation -  hold home nanzeric in the setting of orthostatic hypotension.  # Diabetes mellitus, type 2 controlled w/ diet,  A1c 6.3%  # CAD - Continue home regimen: Plavix 75mg qd, rosuvastatin 40mg qd, Zetia 10mg qd  # HTN  home regimen: coreg 3.125 BID, valsartan-HCTZ 80-12.5 qd, aldactone 50mg qd - held given orthostatic hypotension  - Amlodipine 5 mg daily started (patient on Amlodipine 5 mg BID at home). reassess need to resume     89 y/o F pmhx advanced dementia, HTN, HLD, DM2, anemia, CAD (s/p LAD and LCx stent 2010), HFrEF (EF 40-45% 2021), h/o TIA no residual deficits, h/o recurrent UTI presents w/ syncope and fall w/o trauma in the setting of 1 month of poor PO intake, nausea, vomiting, epigastric pain. Collateral obtained from pt's son Gianni and daughter Zoey. They report that her baseline is A&Ox2 (does not know location) and has a tendency to become agitated, consisting of screaming while confused. She has 24hr HHA and is dependent for most ADLs. She was had 2 ED visits in the past month for her poor PO intake and epigastric pain. She was given sucralfate by her PCP, with the presumed diagnosis of peptic ulcer disease. She has only taken some of her medications intermittently due to her decreased interest in eating/pain. Her children report her pain is worse with eating. Yesterday, she was at her SNF when she syncopized. It was reported that staff caught her before she hit t he ground. She has no recollection of this incident. Her children are not able to provide collateral on this syncopal event.    Hospital course:  89 y/o F pmhx advanced dementia and HFrEF p/w syncope in the setting of failure to thrive. On admission, patient found to have a severe LESLIE w/ SCr 7.0 (baseline around 1) and to have urinary retention. Cornell catheter was placed and eventually removed 11/30/23. Pt was seen by nephro. LESLIE felt to be multifactorial - urinary retention and prerenal due to persistently poor PO intake and failure to thrive. She was found to have significant orthostatic hypotension, which was felt to be the etiology of her syncope. Pt also hypernatremic all thought to be in the setting of poor PO intake.  TTE with LVEF 39%. Noted Sinus bradycardia on tele 55-60 and periods of PAF, seen by EP and cardiology. AV ayaz blockers held. No plans for PPM,  avoid AV ayaz blockers. Given her elevated chads-vasc score please continue lifelong a/c. EP recommend AC if no contraindication and within GOC. Family opted for no AC given risks per med discussion  Patient had been having poor PO intake for several months, which she reports is because of epigastric pain/nausea/dysphagia/globus sensation. Subjective examination by SLP did not reveal any apparent pathology; objective examination such as by esophagram could not be obtained as patient would not participate in the study. The medical team had several [facility] conversation with the family regarding nutrition, and ultimately the decision was made to place PEG. PEG tube placed on 12/06 tube feeds initiated on 12/07 after nutrition recs, currently at goal. Pt tolerating well    post discharge addendum:  poor po intake for several months likely secondary to underlying dementia. no mechanical swallowing abnormality found on exam.     Important Medication Changes and Reason:  1.  Hold Namzaric in the setting of orthostatic hypotension    Active or Pending Issues Requiring Follow-up:  1.     Advanced Directives:   [ ] Full code  [X] DNR/DNI  [ ] Hospice    Discharge Diagnoses:   # Syncope due to orthostatic hypotension sec to FTT   # Sinus bradycardia.   # LESLIE (acute kidney injury).   # Hypernatremia.   # Failure to thrive in adult - S/P PEG tube placed on 12/06   # Chronic HFrEF - coreg 3.125 BID, valsartan-HCTZ 80-12.5 qd, aldactone 50mg qd,  hold antihypertensives d/t orthostatic hypotension.  # Dementia w/ agitation -  hold home nanzeric in the setting of orthostatic hypotension.  # Diabetes mellitus, type 2 controlled w/ diet,  A1c 6.3%  # CAD - Continue home regimen: Plavix 75mg qd, rosuvastatin 40mg qd, Zetia 10mg qd  # HTN  home regimen: coreg 3.125 BID, valsartan-HCTZ 80-12.5 qd, aldactone 50mg qd - held given orthostatic hypotension  - Amlodipine 5 mg daily started (patient on Amlodipine 5 mg BID at home). reassess need to resume

## 2023-12-02 NOTE — PROGRESS NOTE ADULT - SUBJECTIVE AND OBJECTIVE BOX
PROGRESS NOTE:   Authored by: Jesus Lyle M.D.   Internal Medicine PGY-2  Please Contact Via Teams    Patient is a 89y old  Female who presents with a chief complaint of fall, LESLIE (01 Dec 2023 17:01)      SUBJECTIVE / OVERNIGHT EVENTS:  No acute events overnight.     Tele:    Brief Daily Plan:    MEDICATIONS  (STANDING):  atorvastatin 80 milliGRAM(s) Oral at bedtime  chlorhexidine 2% Cloths 1 Application(s) Topical daily  cholecalciferol 2000 Unit(s) Oral daily  dextrose 5% + sodium chloride 0.45%. 1000 milliLiter(s) (50 mL/Hr) IV Continuous <Continuous>  dextrose 5%. 1000 milliLiter(s) (50 mL/Hr) IV Continuous <Continuous>  dextrose 5%. 1000 milliLiter(s) (100 mL/Hr) IV Continuous <Continuous>  dextrose 50% Injectable 25 Gram(s) IV Push once  dextrose 50% Injectable 25 Gram(s) IV Push once  dextrose 50% Injectable 12.5 Gram(s) IV Push once  escitalopram 10 milliGRAM(s) Oral daily  folic acid 1 milliGRAM(s) Oral daily  glucagon  Injectable 1 milliGRAM(s) IntraMuscular once  heparin   Injectable 5000 Unit(s) SubCutaneous every 12 hours  insulin lispro (ADMELOG) corrective regimen sliding scale   SubCutaneous at bedtime  insulin lispro (ADMELOG) corrective regimen sliding scale   SubCutaneous three times a day before meals  melatonin 6 milliGRAM(s) Oral at bedtime  pantoprazole    Tablet 40 milliGRAM(s) Oral before breakfast  polyethylene glycol 3350 17 Gram(s) Oral daily  potassium chloride    Tablet ER 40 milliEquivalent(s) Oral once  senna 2 Tablet(s) Oral at bedtime  sodium bicarbonate 1300 milliGRAM(s) Oral three times a day    MEDICATIONS  (PRN):  acetaminophen     Tablet .. 650 milliGRAM(s) Oral every 6 hours PRN Temp greater or equal to 38C (100.4F), Mild Pain (1 - 3)  aluminum hydroxide/magnesium hydroxide/simethicone Suspension 30 milliLiter(s) Oral every 4 hours PRN Dyspepsia  dextrose Oral Gel 15 Gram(s) Oral once PRN Blood Glucose LESS THAN 70 milliGRAM(s)/deciliter  ondansetron Injectable 4 milliGRAM(s) IV Push every 8 hours PRN Nausea and/or Vomiting      CAPILLARY BLOOD GLUCOSE      POCT Blood Glucose.: 80 mg/dL (01 Dec 2023 22:07)  POCT Blood Glucose.: 104 mg/dL (01 Dec 2023 16:33)  POCT Blood Glucose.: 90 mg/dL (01 Dec 2023 11:52)  POCT Blood Glucose.: 94 mg/dL (01 Dec 2023 08:01)    I&O's Summary    01 Dec 2023 07:01  -  02 Dec 2023 07:00  --------------------------------------------------------  IN: 0 mL / OUT: 350 mL / NET: -350 mL        PHYSICAL EXAM:  Vital Signs Last 24 Hrs  T(C): 36.5 (02 Dec 2023 05:20), Max: 36.5 (02 Dec 2023 05:20)  T(F): 97.7 (02 Dec 2023 05:20), Max: 97.7 (02 Dec 2023 05:20)  HR: 48 (02 Dec 2023 05:20) (46 - 48)  BP: 164/55 (02 Dec 2023 05:20) (151/59 - 164/55)  BP(mean): --  RR: 18 (02 Dec 2023 05:20) (18 - 18)  SpO2: 96% (02 Dec 2023 05:20) (94% - 97%)    Parameters below as of 02 Dec 2023 05:20  Patient On (Oxygen Delivery Method): room air        CONSTITUTIONAL: NAD, well-developed  RESPIRATORY: Normal respiratory effort; lungs are clear to auscultation bilaterally  CARDIOVASCULAR: Regular rate and rhythm, normal S1 and S2, no murmur/rub/gallop; No lower extremity edema; Peripheral pulses are 2+ bilaterally  ABDOMEN: Nontender to palpation, normoactive bowel sounds, no rebound/guarding; No hepatosplenomegaly  MUSCLOSKELETAL: no clubbing or cyanosis of digits; no joint swelling or tenderness to palpation  PSYCH: A+O to person, place, and time; affect appropriate    LABS:                        8.2    5.32  )-----------( 197      ( 01 Dec 2023 11:33 )             25.2     12-01    144  |  108  |  46<H>  ----------------------------<  92  3.3<L>   |  22  |  6.15<H>    Ca    8.3<L>      01 Dec 2023 11:33  Phos  3.8     12-01  Mg     2.0     12-01              MICRO:  Urinalysis Basic - ( 01 Dec 2023 11:33 )    Color: x / Appearance: x / SG: x / pH: x  Gluc: 92 mg/dL / Ketone: x  / Bili: x / Urobili: x   Blood: x / Protein: x / Nitrite: x   Leuk Esterase: x / RBC: x / WBC x   Sq Epi: x / Non Sq Epi: x / Bacteria: x          IMAGING: PROGRESS NOTE:   Authored by: Jesus Lyle M.D.   Internal Medicine PGY-2  Please Contact Via Teams    Patient is a 89y old  Female who presents with a chief complaint of fall, LESLIE (01 Dec 2023 17:01)      SUBJECTIVE / OVERNIGHT EVENTS:  No acute events overnight. Patient is feeling well this morning and appears comfortable. She is thirsty    Brief Daily Plan:  - Calorie count    MEDICATIONS  (STANDING):  atorvastatin 80 milliGRAM(s) Oral at bedtime  chlorhexidine 2% Cloths 1 Application(s) Topical daily  cholecalciferol 2000 Unit(s) Oral daily  dextrose 5% + sodium chloride 0.45%. 1000 milliLiter(s) (50 mL/Hr) IV Continuous <Continuous>  dextrose 5%. 1000 milliLiter(s) (50 mL/Hr) IV Continuous <Continuous>  dextrose 5%. 1000 milliLiter(s) (100 mL/Hr) IV Continuous <Continuous>  dextrose 50% Injectable 25 Gram(s) IV Push once  dextrose 50% Injectable 25 Gram(s) IV Push once  dextrose 50% Injectable 12.5 Gram(s) IV Push once  escitalopram 10 milliGRAM(s) Oral daily  folic acid 1 milliGRAM(s) Oral daily  glucagon  Injectable 1 milliGRAM(s) IntraMuscular once  heparin   Injectable 5000 Unit(s) SubCutaneous every 12 hours  insulin lispro (ADMELOG) corrective regimen sliding scale   SubCutaneous at bedtime  insulin lispro (ADMELOG) corrective regimen sliding scale   SubCutaneous three times a day before meals  melatonin 6 milliGRAM(s) Oral at bedtime  pantoprazole    Tablet 40 milliGRAM(s) Oral before breakfast  polyethylene glycol 3350 17 Gram(s) Oral daily  potassium chloride    Tablet ER 40 milliEquivalent(s) Oral once  senna 2 Tablet(s) Oral at bedtime  sodium bicarbonate 1300 milliGRAM(s) Oral three times a day    MEDICATIONS  (PRN):  acetaminophen     Tablet .. 650 milliGRAM(s) Oral every 6 hours PRN Temp greater or equal to 38C (100.4F), Mild Pain (1 - 3)  aluminum hydroxide/magnesium hydroxide/simethicone Suspension 30 milliLiter(s) Oral every 4 hours PRN Dyspepsia  dextrose Oral Gel 15 Gram(s) Oral once PRN Blood Glucose LESS THAN 70 milliGRAM(s)/deciliter  ondansetron Injectable 4 milliGRAM(s) IV Push every 8 hours PRN Nausea and/or Vomiting      CAPILLARY BLOOD GLUCOSE      POCT Blood Glucose.: 80 mg/dL (01 Dec 2023 22:07)  POCT Blood Glucose.: 104 mg/dL (01 Dec 2023 16:33)  POCT Blood Glucose.: 90 mg/dL (01 Dec 2023 11:52)  POCT Blood Glucose.: 94 mg/dL (01 Dec 2023 08:01)    I&O's Summary    01 Dec 2023 07:01  -  02 Dec 2023 07:00  --------------------------------------------------------  IN: 0 mL / OUT: 350 mL / NET: -350 mL        PHYSICAL EXAM:  Vital Signs Last 24 Hrs  T(C): 36.5 (02 Dec 2023 05:20), Max: 36.5 (02 Dec 2023 05:20)  T(F): 97.7 (02 Dec 2023 05:20), Max: 97.7 (02 Dec 2023 05:20)  HR: 48 (02 Dec 2023 05:20) (46 - 48)  BP: 164/55 (02 Dec 2023 05:20) (151/59 - 164/55)  BP(mean): --  RR: 18 (02 Dec 2023 05:20) (18 - 18)  SpO2: 96% (02 Dec 2023 05:20) (94% - 97%)    Parameters below as of 02 Dec 2023 05:20  Patient On (Oxygen Delivery Method): room air        PHYSICAL EXAM:  GENERAL: NAD  EYES: EOMI, conjunctiva and sclera clear  CHEST/LUNG: Clear to auscultation bilaterally; No wheezing  HEART: Regular rate and rhythm; No murmurs  ABDOMEN: Soft, Nontender, Nondistended; Bowel sounds present  EXTREMITIES:  2+ Peripheral Pulses. No LE neftali  NEUROLOGY: A&Ox2 (name, location; does not know time/date).  SKIN: No rashes or lesions    LABS:                        8.2    5.32  )-----------( 197      ( 01 Dec 2023 11:33 )             25.2     12-01    144  |  108  |  46<H>  ----------------------------<  92  3.3<L>   |  22  |  6.15<H>    Ca    8.3<L>      01 Dec 2023 11:33  Phos  3.8     12-01  Mg     2.0     12-01              MICRO:  Urinalysis Basic - ( 01 Dec 2023 11:33 )    Color: x / Appearance: x / SG: x / pH: x  Gluc: 92 mg/dL / Ketone: x  / Bili: x / Urobili: x   Blood: x / Protein: x / Nitrite: x   Leuk Esterase: x / RBC: x / WBC x   Sq Epi: x / Non Sq Epi: x / Bacteria: x

## 2023-12-02 NOTE — DISCHARGE NOTE PROVIDER - NSDCFUADDAPPT_GEN_ALL_CORE_FT
APPTS ARE READY TO BE MADE: [ ] YES    Best Family or Patient Contact (if needed):    Additional Information about above appointments (if needed):    1:   2:   3:     Other comments or requests:    APPTS ARE READY TO BE MADE: [ x] YES    Best Family or Patient Contact (if needed):    Additional Information about above appointments (if needed):    1:   2:   3:     Other comments or requests:    APPTS ARE READY TO BE MADE: [ x] YES    Best Family or Patient Contact (if needed):    Additional Information about above appointments (if needed):    1:   2:   3:     Other comments or requests:   Patient is being discharged to [facility]. Patient/caregiver will arrange follow up appointments.   APPTS ARE READY TO BE MADE: [ x] YES    Best Family or Patient Contact (if needed):    Additional Information about above appointments (if needed):    1:   2:   3:     Other comments or requests:   Patient is being discharged to [facility]. Patient/caregiver will arrange follow up appointments.   APPTS ARE READY TO BE MADE: [ x] YES    Best Family or Patient Contact (if needed):    Additional Information about above appointments (if needed):    1:   2:   3:     Other comments or requests:   Patient is being discharged to [facility]. Patient/caregiver will arrange follow up appointments.

## 2023-12-02 NOTE — DIETITIAN INITIAL EVALUATION ADULT - OTHER CALCULATIONS
Use current dosing wt of 54.4 kg (11/26) for caloric and protein needs in consideration of advanced age, malnutrition, CHF.   Defer fluid needs to team secondary to CHF.

## 2023-12-02 NOTE — DISCHARGE NOTE PROVIDER - CARE PROVIDER_API CALL
Bill Shaffer  Internal Medicine  [address] [facility] [place] [address]  Phone: [phone]  Fax: [phone]  Established Patient  Follow Up Time: 2 weeks   Bill Shaffer  Internal Medicine  [address] [facility] [place] [address]  Phone: [phone]  Fax: [phone]  Established Patient  Follow Up Time: 2 weeks   Bill Shaffer  Internal Medicine  [address] [facility] [place] [address]  Phone: [phone]  Fax: [phone]  Established Patient  Follow Up Time: 2 weeks

## 2023-12-02 NOTE — DISCHARGE NOTE PROVIDER - DETAILS OF MALNUTRITION DIAGNOSIS/DIAGNOSES
This patient has been assessed with a concern for Malnutrition and was treated during this hospitalization for the following Nutrition diagnosis/diagnoses:     -  12/02/2023: Moderate protein-calorie malnutrition

## 2023-12-02 NOTE — DIETITIAN INITIAL EVALUATION ADULT - REASON
Nutrition Focused Physical Exam deferred at this time secondary to AMS; upon visual observation, no overt signs of muscle/fat wasting.

## 2023-12-02 NOTE — DIETITIAN INITIAL EVALUATION ADULT - NSFNSGIIOFT_GEN_A_CORE
I&O's Detail    01 Dec 2023 07:01  -  02 Dec 2023 07:00  --------------------------------------------------------  IN:  Total IN: 0 mL    OUT:    Voided (mL): 350 mL  Total OUT: 350 mL    Total NET: -350 mL      02 Dec 2023 07:01  -  02 Dec 2023 15:57  --------------------------------------------------------  IN:    Oral Fluid: 300 mL  Total IN: 300 mL    OUT:  Total OUT: 0 mL    Total NET: 300 mL

## 2023-12-02 NOTE — PROGRESS NOTE ADULT - ATTENDING COMMENTS
Assessed patient at bedside, alert oriented x2, unremarkable physical exam, continued to have poor PO intake   # syncope  # orthostatic hypotension   # sinus bradycardia   - c/w telemetry monitoring  - c/w holding AV ayaz blocking agent   - supportive care for orthostatic hypotension   - EP onboard, f/u recs       # LESLIE  likely combination from prerenal (poor PO intake), ATN from hypotension, and obstructive component   - Cr down trending  - electrolytes WNL, normal respiratory status   - monitor BMP Mg Phos daily   - nephrology following, no urgent need for HD   - morales removed 11/30     # failure to thrive  # epigastirc pain   # dysphagia   - poor PO intake at home and inpatient   - CT A/P no acute intraabdominal abnormality   - family interested in PEG   - patient unable to participate in esophogram 12/1  - GI following, c/w calorie count, also plan for EGD to assess if any structural abnormality ( hold clopidogrel since 12/1, per GI, need hold 5 days before proceed with EGD)     D/w Dr. Lyle

## 2023-12-02 NOTE — DIETITIAN INITIAL EVALUATION ADULT - ADD RECOMMEND
1) Continue with no concentrated K+ diet (Kosher, soft and bite sized); Defer texture/consistency to Speech Language Pathologist prn.         - Encourage small, frequent calorically dense meals to optimize PO intake.   2) Add Think Global Renal Support Protein Shake (per serving provides 20 g PRO, 450 kcal) 1x/day to optimize protein intake.   3) Consider addressing GOC regarding long term nutrition support as able/appropriate. RD will continue to follow pt's clinical course and provide appropriate Recommendations within family/pt's further GOC.   3) Calorie Count Initiated (running from 12/1 until 12/3); RD to follow up on 12/4 or as able with Calorie Count Results.  RD will continue to monitor PO intake, GI tolerance, weight trends, skin integrity, BMs, labs/electrolytes prn.   RD remains available upon request.  1) Consider liberalizing diet to Regular (Kosher, soft and bite sized) to optimize PO intake; Defer texture/consistency to Speech Language Pathologist prn.         - Encourage small, frequent calorically dense meals to optimize PO intake.   2) Add Provider to RN to allow [employer] Standard 1.0 Protein Shake (provides 16 g PRO, 325 kcal per serving) 1x/day and [employer] Glucose Support Protein Shake (per serving provides 16 g PRO, 300 kcal) 1x/day to optimize flavor preferences and protein intake.   3) Consider addressing GOC regarding long term nutrition support as able/appropriate. RD will continue to follow pt's clinical course and provide appropriate Recommendations within family/pt's further GOC.   3) Calorie Count Initiated (running from 12/2 until 12/4); RD to follow up on 12/5 or as able with Calorie Count Results.  4) Malnutrition sticker placed in chart   5) RD will continue to monitor PO intake, GI tolerance, weight trends, skin integrity, BMs, labs/electrolytes prn.   RD remains available upon request.

## 2023-12-02 NOTE — PROGRESS NOTE ADULT - PROBLEM SELECTOR PLAN 5
- peptic ulcer disease vs. pancreatitis vs. functional  - no findings on CT AP of pancreatitis, does not have classic history  - never had endoscopy to evaluate  - will trial protonix 40mg qd   - discussed w/ family GOC (see chart note); decision still to be made  - esophogram attempted 12/1 - unable to tolerate; did not participate and follow commands - peptic ulcer disease vs. pancreatitis vs. functional  - no findings on CT AP of pancreatitis, does not have classic history  - never had endoscopy to evaluate  - will trial protonix 40mg qd   - esophogram attempted 12/1 - unable to tolerate; did not participate and follow commands  - GI following. Will perform calorie count. Family interested in PEG, would require scope. Continue GOC discussions.

## 2023-12-02 NOTE — DIETITIAN INITIAL EVALUATION ADULT - ORAL INTAKE PTA/DIET HISTORY
Per chart, pt lives at home with HHA. Noted pt with poor appetite/PO intake x 1 mos PTA. No known food allergies/intolerances documented. Per speech language pathologist (11/29), pt with no prior hx of chewing/swallowing difficulties; pt was consuming a regular textured diet/thin liquids.  Per chart, pt lives at home with HHA. Noted pt with poor appetite/PO intake x 1 mos PTA. No known food allergies/intolerances documented. Per speech language pathologist (11/29), pt with no prior hx of chewing/swallowing difficulties; pt was consuming a regular textured diet/thin liquids. Unable to determine if pt followed therapeutic diet restrictions PTA.

## 2023-12-02 NOTE — DIETITIAN INITIAL EVALUATION ADULT - PROBLEM SELECTOR PLAN 7
- w/ agitation  - seems to be FAST Stage 6? No official staging outpt noted  - hold home nanzeric in the setting of orthostatic hypotension  - will move to enhanced supervision room  - Zyprexa 2.5mg IM PRN for agitation w/ harm to self (e.g. pulling at lines)

## 2023-12-02 NOTE — PROGRESS NOTE ADULT - PROBLEM SELECTOR PLAN 1
- Differential for syncope includes orthostatic hypotension vs. cardiac (arrythmia, valvular disease) vs. neuro  - Pt has h/o HFrEF, so increased risk of arrythmia  - EKG NSR, no events on tele  - favor orthostatic hypotension as cause as pt has positive orthostatics (103/46 --> 65/45)  - s/p fluid resuscitation w/ 1L NS + 2L sodium bicarb gtt  - will obtain TTE to eval for valvular disease  - continue to monitor on tele  - appreciate cards and EP recs - Differential for syncope includes orthostatic hypotension vs. cardiac (arrythmia, valvular disease) vs. neuro  - Pt has h/o HFrEF, so increased risk of arrythmia  - Favor orthostatic hypotension as cause as pt has positive orthostatics (103/46 --> 65/45)  - Sinus wilber on tele, HR increases with standing vitals. Unclear if causing   - TTE with LVEF 39%.   - continue to monitor on tele  - appreciate cards and EP recs

## 2023-12-02 NOTE — DIETITIAN INITIAL EVALUATION ADULT - PROBLEM SELECTOR PLAN 3
- bicarb on admission 13  - 2/2 LESLIE  - if not improved on repeat BMP after morales, will start sodium bicarb 1300 BID

## 2023-12-02 NOTE — PROGRESS NOTE ADULT - PROBLEM SELECTOR PLAN 7
- w/ agitation  - seems to be FAST Stage 6? No official staging outpt noted  - hold home nanzeric in the setting of orthostatic hypotension  - will move to enhanced supervision room  -Haldol IV PRN for agitation w/ harm to self (e.g. pulling at lines) - w/ agitation  - seems to be FAST Stage 6? No official staging outpt noted  - hold home nanzeric in the setting of orthostatic hypotension

## 2023-12-02 NOTE — DISCHARGE NOTE PROVIDER - NSDCMRMEDTOKEN_GEN_ALL_CORE_FT
amLODIPine 5 mg oral tablet: 1 tab(s) orally 2 times a day  carvedilol 3.125 mg oral tablet: 1 tab(s) orally every 12 hours  clopidogrel 75 mg oral tablet: 1 tab(s) orally once a day  escitalopram 5 mg oral tablet: 2 tab(s) orally once a day  ezetimibe 10 mg oral tablet: 1 tab(s) orally once a day  ferrous sulfate 325 mg (65 mg elemental iron) oral tablet: 1 tab(s) orally once a day  folic acid 1 mg oral tablet: 1 tab(s) orally once a day  Namzaric 28 mg-10 mg oral capsule, extended release: 1 cap(s) orally once a day with dinner  pantoprazole 40 mg oral delayed release tablet: 1 tab(s) orally once a day (before a meal)  rosuvastatin 40 mg oral tablet: 1 tab(s) orally once a day (in the evening)  spironolactone 25 mg oral tablet: 2 tab(s) orally once a day  valsartan-hydrochlorothiazide 80 mg-12.5 mg oral tablet: 1 tab(s) orally once a day  Vitamin D3 50 mcg (2000 intl units) oral capsule: 1 cap(s) orally once a day with breakfast   aluminum hydroxide-magnesium hydroxide 200 mg-200 mg/5 mL oral suspension: 30 milliliter(s) orally every 4 hours As needed Dyspepsia  amLODIPine 10 mg oral tablet: 1 tab(s) orally once a day  atorvastatin 80 mg oral tablet: 1 tab(s) orally once a day (at bedtime)  cholecalciferol oral tablet: 2000 unit(s) orally once a day  clopidogrel 75 mg oral tablet: 1 tab(s) orally once a day  escitalopram 10 mg oral tablet: 1 tab(s) orally once a day  folic acid 1 mg oral tablet: 1 tab(s) orally once a day  melatonin 0.25 mg/mL oral liquid: 24 milliliter(s) orally once a day (at bedtime)  pantoprazole 40 mg oral granule, delayed release: orally once a day  polyethylene glycol 3350 oral powder for reconstitution: 17 gram(s) orally once a day  sodium bicarbonate 650 mg oral tablet: 2 tab(s) orally 3 times a day   aluminum hydroxide-magnesium hydroxide 200 mg-200 mg/5 mL oral suspension: 30 milliliter(s) orally every 4 hours As needed Dyspepsia  amLODIPine 10 mg oral tablet: 1 tab(s) orally once a day  atorvastatin 80 mg oral tablet: 1 tab(s) orally once a day (at bedtime)  cholecalciferol oral tablet: 2000 unit(s) orally once a day  clopidogrel 75 mg oral tablet: 1 tab(s) orally once a day  escitalopram 10 mg oral tablet: 1 tab(s) orally once a day  folic acid 1 mg oral tablet: 1 tab(s) orally once a day  melatonin 0.25 mg/mL oral liquid: 24 milliliter(s) orally once a day (at bedtime)  oxyCODONE 5 mg oral tablet: 1 tab(s) orally 2 times a day As needed Moderate Pain (4 - 6)  pantoprazole 40 mg oral granule, delayed release: orally once a day  polyethylene glycol 3350 oral powder for reconstitution: 17 gram(s) orally once a day  sodium bicarbonate 650 mg oral tablet: 2 tab(s) orally 3 times a day

## 2023-12-02 NOTE — PROGRESS NOTE ADULT - SUBJECTIVE AND OBJECTIVE BOX
no events overnight     acetaminophen     Tablet .. 650 milliGRAM(s) Oral every 6 hours PRN  aluminum hydroxide/magnesium hydroxide/simethicone Suspension 30 milliLiter(s) Oral every 4 hours PRN  atorvastatin 80 milliGRAM(s) Oral at bedtime  chlorhexidine 2% Cloths 1 Application(s) Topical daily  cholecalciferol 2000 Unit(s) Oral daily  dextrose 5% + sodium chloride 0.45%. 1000 milliLiter(s) IV Continuous <Continuous>  dextrose 5%. 1000 milliLiter(s) IV Continuous <Continuous>  dextrose 5%. 1000 milliLiter(s) IV Continuous <Continuous>  dextrose 50% Injectable 25 Gram(s) IV Push once  dextrose 50% Injectable 25 Gram(s) IV Push once  dextrose 50% Injectable 12.5 Gram(s) IV Push once  dextrose Oral Gel 15 Gram(s) Oral once PRN  escitalopram 10 milliGRAM(s) Oral daily  folic acid 1 milliGRAM(s) Oral daily  glucagon  Injectable 1 milliGRAM(s) IntraMuscular once  heparin   Injectable 5000 Unit(s) SubCutaneous every 12 hours  insulin lispro (ADMELOG) corrective regimen sliding scale   SubCutaneous three times a day before meals  insulin lispro (ADMELOG) corrective regimen sliding scale   SubCutaneous at bedtime  melatonin 6 milliGRAM(s) Oral at bedtime  ondansetron Injectable 4 milliGRAM(s) IV Push every 8 hours PRN  pantoprazole    Tablet 40 milliGRAM(s) Oral before breakfast  polyethylene glycol 3350 17 Gram(s) Oral daily  potassium chloride    Tablet ER 40 milliEquivalent(s) Oral once  senna 2 Tablet(s) Oral at bedtime  sodium bicarbonate 1300 milliGRAM(s) Oral three times a day                            8.2    5.32  )-----------( 197      ( 01 Dec 2023 11:33 )             25.2       Hemoglobin: 8.2 g/dL (12-01 @ 11:33)  Hemoglobin: 9.1 g/dL (11-30 @ 06:42)  Hemoglobin: 9.9 g/dL (11-28 @ 11:53)      12-01    144  |  108  |  46<H>  ----------------------------<  92  3.3<L>   |  22  |  6.15<H>    Ca    8.3<L>      01 Dec 2023 11:33  Phos  3.8     12-01  Mg     2.0     12-01      Creatinine Trend: 6.15<--, 6.22<--, 6.67<--, 6.77<--, 6.85<--, 6.77<--    COAGS:           T(C): 36.5 (12-02-23 @ 05:20), Max: 36.5 (12-02-23 @ 05:20)  HR: 48 (12-02-23 @ 05:20) (46 - 48)  BP: 164/55 (12-02-23 @ 05:20) (151/59 - 164/55)  RR: 18 (12-02-23 @ 05:20) (18 - 18)  SpO2: 96% (12-02-23 @ 05:20) (94% - 97%)  Wt(kg): --    I&O's Summary    01 Dec 2023 07:01  -  02 Dec 2023 07:00  --------------------------------------------------------  IN: 0 mL / OUT: 350 mL / NET: -350 mL      Appearance: frail elderly woman in no acute distress, awake	  HEENT:   Normal oral mucosa, PERRL, EOMI	  Lymphatic: No lymphadenopathy , no edema  Cardiovascular: Normal S1 S2, No JVD, No murmurs , Peripheral pulses palpable 2+ bilaterally  Respiratory: Lungs clear to auscultation, normal effort 	  Gastrointestinal:  Soft, Non-tender, + BS	  Skin: No rashes, No ecchymoses, No cyanosis, warm to touch  Musculoskeletal: Normal range of motion, normal strength  Psychiatry:  Mood & affect appropriate    TELEMETRY: sinus bradycardia 40-55bpm.  narrow QRS	    ECG:  sinus bradycardia 50s.  Echo:  < from: TTE W or WO Ultrasound Enhancing Agent (11.29.23 @ 12:42) >  CONCLUSIONS:      1. Technically difficult image quality.   2. Left ventricular cavity is severely dilated. Left ventricular wall thickness is normal. Left ventricular systolic function is moderately to severely decreased with an ejection fraction of 39 % by Lomeli's method of disks. Regional wall motion abnormalities consistent with ischemic heart disease.   3. Multiple segmental abnormalities exist. See findings.   4. Moderate aortic regurgitation.    ________________________________________________________________________________________  FINDINGS:     Left Ventricle:  After obtainingconsent, Definity ultrasound enhancing agent was given for enhanced left ventricular opacification and improved delineation of the left ventricular endocardial borders. The left ventricular cavity is severely dilated. Left ventricular wall thickness is normal. Left ventricular systolic function is moderately to severely decreased with a calculated ejection fraction of 39 % by the Lomeli's biplane method of disks. There are regional wall motion abnormalities consistent with ischemic heart disease. Impaired relaxation with normal wall motion. There is no evidence of a left ventricular thrombus.  LV Wall Scoring: The apical septal segment, apical lateral segment, apical  inferior segment, and apex are aneurysmal. The mid anteroseptal segment, mid  inferoseptal segment, and mid inferior segment are akinetic. The basal and mid  anterolateral wall is hypokinetic. All remaining scored segments are normal.          Right Ventricle:  The right ventricular cavity is normal in size and reduced systolic function. Tricuspid annular plane systolic excursion (TAPSE) is 1.3 cm (normal >=1.7 cm).     Left Atrium:  The left atrium is moderately dilated with an indexed volume of 30.69 ml/m².     Right Atrium:  The right atrium is normal in size with anindexed volume of 12.65 ml/m².     Interatrial Septum:  The interatrial septum appears intact.     Aortic Valve:  There is moderate calcification of the aortic valve leaflets. There is mild aortic stenosis. There is moderate aortic regurgitation. AI VMax is 4.59 m/s. AI pressure half time is 642 msec. AI slope is 2.14 m/s².     Mitral Valve:  Structurally normal mitral valve with normal leaflet excursion. There is calcification of the mitral valve annulus. There is trace mitral regurgitation.     Tricuspid Valve:  Structurally normal tricuspid valve with normal leaflet excursion. There is mild tricuspid regurgitation. Estimated pulmonary artery systolic pressure is 27 mmHg, consistent with normal pulmonary artery pressure.     Pulmonic Valve:  Structurally normal pulmonic valve with normal leaflet excursion.     Aorta:  The aortic annulus and aortic root appear normal in size.     Pericardium:  No pericardial effusion seen.     Systemic Veins:  The inferior vena cava is normal in size (normal <2.1cm) with normal inspiratory collapse (normal >50%) consistent with normal right atrial pressure (~3, range 0-5mmHg).    < end of copied text >    Cath: Prior LAD PCI.  LCx PCI in 2010.	     ASSESSMENT/PLAN: Ms Thurman is a pleasant 89y Female admitted w/ failure to thrive.  Question of fall vs fainting outside of hospital.  Has chronic systolic CHF due to ischemic/infarct mediated cardiomyopathy, with LVEF now in 35-40% range, and anterior/septal/apical/inferoseptal aneurysm.  Telemetry shows sinus bradycardia 40s-50s, and she is unable to tolerate beta blockers due to this low resting heartrate.  Blood pressure is adequate at rest, but orthostatically very sensitive, and she becomes symptomatically lightheaded.  Awaiting PT eval re: safety w/ ambulation.  She is dependent on [facility] for ADL assistance at home.    Awaiting GI eval re: ability to progress her diet.  Able to state she is not hungry for heavier foods (sandwich, fish, etc), but is willing to try ice cream for example.  Typically, I would offer a permanent pacemaker (in this case conduction system pacing) to support beta blocker use in CHF/CAD.  In the setting of Ms Thurman's advanced age, failure to thrive, organ dysfunction, and inability to ambulate/exercise safelty, this may not be a helpful intervention regarding her global plan of care.  Recommend to hold AV ayaz blockers. Maintain telemetry. Correct K to 4-4.5, Mg to 2.  Will followup w/ patient and her family prior to discharge to determine if permanent pacemaker utilization will help her recover and stay well.

## 2023-12-02 NOTE — DIETITIAN INITIAL EVALUATION ADULT - PERTINENT LABORATORY DATA
12-02    142  |  109<H>  |  50<H>  ----------------------------<  71  4.0   |  17<L>  |  5.95<H>    Ca    8.8      02 Dec 2023 10:52  Phos  3.8     12-02  Mg     2.1     12-02    POCT Blood Glucose.: 126 mg/dL (12-02-23 @ 11:49)  A1C with Estimated Average Glucose Result: 6.3 % (11-26-23 @ 08:48)

## 2023-12-02 NOTE — PROGRESS NOTE ADULT - PROBLEM SELECTOR PLAN 9
- home regimen: Plavix 75mg qd, rosuvastatin 40mg qd, Zetia 10mg qd  - continue home Plavix 75mg qd  - Zetia is nonformulary  - intertherapeutic interchange of rosuvastatin 40mg to lipitor 80mg qd - home regimen: Plavix 75mg qd, rosuvastatin 40mg qd, Zetia 10mg qd  - Zetia is nonformulary  - intertherapeutic interchange of rosuvastatin 40mg to lipitor 80mg qd    - Hold plavix (last dose 11/30) given possibility of PEG placement

## 2023-12-02 NOTE — DIETITIAN INITIAL EVALUATION ADULT - PERTINENT MEDS FT
MEDICATIONS  (STANDING):  atorvastatin 80 milliGRAM(s) Oral at bedtime  chlorhexidine 2% Cloths 1 Application(s) Topical daily  cholecalciferol 2000 Unit(s) Oral daily  dextrose 5%. 1000 milliLiter(s) (50 mL/Hr) IV Continuous <Continuous>  dextrose 5%. 1000 milliLiter(s) (100 mL/Hr) IV Continuous <Continuous>  dextrose 50% Injectable 12.5 Gram(s) IV Push once  dextrose 50% Injectable 25 Gram(s) IV Push once  dextrose 50% Injectable 25 Gram(s) IV Push once  escitalopram 10 milliGRAM(s) Oral daily  folic acid 1 milliGRAM(s) Oral daily  glucagon  Injectable 1 milliGRAM(s) IntraMuscular once  heparin   Injectable 5000 Unit(s) SubCutaneous every 12 hours  insulin lispro (ADMELOG) corrective regimen sliding scale   SubCutaneous at bedtime  insulin lispro (ADMELOG) corrective regimen sliding scale   SubCutaneous three times a day before meals  melatonin 6 milliGRAM(s) Oral at bedtime  pantoprazole    Tablet 40 milliGRAM(s) Oral before breakfast  polyethylene glycol 3350 17 Gram(s) Oral daily  potassium chloride    Tablet ER 40 milliEquivalent(s) Oral once  senna 2 Tablet(s) Oral at bedtime  sodium bicarbonate 1300 milliGRAM(s) Oral three times a day    MEDICATIONS  (PRN):  acetaminophen     Tablet .. 650 milliGRAM(s) Oral every 6 hours PRN Temp greater or equal to 38C (100.4F), Mild Pain (1 - 3)  aluminum hydroxide/magnesium hydroxide/simethicone Suspension 30 milliLiter(s) Oral every 4 hours PRN Dyspepsia  dextrose Oral Gel 15 Gram(s) Oral once PRN Blood Glucose LESS THAN 70 milliGRAM(s)/deciliter  ondansetron Injectable 4 milliGRAM(s) IV Push every 8 hours PRN Nausea and/or Vomiting

## 2023-12-02 NOTE — DIETITIAN INITIAL EVALUATION ADULT - REASON INDICATOR FOR ASSESSMENT
RD Consulted for: Calorie Count   Source: Team, Electronic Medical Record; Unable to interview pt at this time secondary to advanced dementia.   Chart reviewed, events noted.

## 2023-12-02 NOTE — DIETITIAN INITIAL EVALUATION ADULT - OTHER INFO
GI:  - Noted speech language pathologist formal bedside swallow evaluation (11/29); recommended pt to be ordered for soft and bite sized diet with thin liquids secondary to dementia/poor participation.   - Pt with poor PO intake/appetite; per nursing documentation, pt consuming <25% (11/27 - 11/29). RD to add [employer] Renal Support Protein Shake (per serving provides 20 g PRO, 450 kcal) 1x/day to optimize protein intake.   - Epigastric pain likely secondary to peptic ulcer disease vs pancreatitis. Per chart, attempted X-ray esophagram (12/1) however, pt unable to tolerate.  - Noted calorie count initiated; running from 12/1 to 12/3; RD to follow up on 12/4 with calorie count results.   - GOC conversation (12/1) regarding long term nutrition support; family considering PEG for alternative means of nutrition at this time. Continue to address as able; RD will continue to follow pt's clinical course and provide appropriate recommendations within family/pt's further GOC.   - Per chart, pt c/o nausea. No diarrhea or constipation reported per chart. Last BM not documented. Ordered for bowel regimen: Senna.     Cardiac:  - CHF.   - CAD.     Endo:  - T2DM. RD will continue to monitor blood glucose levels prn.     Renal:  - LESLIE secondary to hypotension, volume depletion, urinary retention. RD will continue to monitor electrolytes prn.    Wt Hx:  - Pt reports UBW of kg. Per chart, dosing wt of 54.4 kg (11/26). RD obtained bedscale wt of XX  (12/2).  - Wt hx in kg ([facility]) as follows: . RD will continue to monitor weight trends as available/able.   - IBW: 48.2 kg (based on ht of 60 in)  GI:  - Noted speech language pathologist formal bedside swallow evaluation (11/29); recommended pt to be ordered for soft and bite sized diet with thin liquids secondary to dementia/poor participation.   - Pt with poor PO intake/appetite; per nursing documentation, pt consuming <25% (11/27 - 11/29). RD to trial [employer] Glucose Support Protein Shake (per serving provides 16 g PRO, 300 kcal) 1x/day and [employer] Standard 1.0 Protein Shake (provides 16 g PRO, 325 kcal per serving) 1x/day to optimize flavor preferences (chocolate/vanilla). Obtained pt's food preferences; RD to honor as able.   - Epigastric pain likely secondary to peptic ulcer disease vs pancreatitis. Per chart, attempted X-ray esophagram (12/1) however, pt unable to tolerate.  - Noted calorie count initiated; running from 12/2 to 12/4; RD to follow up on 12/5 with calorie count results.   - GOC conversation (12/1) regarding long term nutrition support; family considering PEG for alternative means of nutrition at this time. Continue to address as able; RD will continue to follow pt's clinical course and provide appropriate recommendations within family/pt's further GOC.   - Per RN, endorsed pt with nausea and 1x episode of emesis this AM. No diarrhea or constipation reported per chart. Last BM not documented. Ordered for bowel regimen: Senna.     Cardiac:  - Hx of CHF; CAD.     Endo:  - T2DM. POCTs x 24 hrs: 80 - 126 mg/dL. HgbA1C of 6.3% (11/26). Noted pt on hypoglycemia protocol secondary to poor PO intake. RD will continue to monitor blood glucose levels prn.     Renal:  - LESLIE secondary to hypotension, volume depletion, urinary retention. Noted Na, K+ WNL as of today (12/2). RD will continue to monitor electrolytes prn.    Wt Hx:  - Unable to obtain pt's UBW at this time. Per chart, dosing wt of 54.4 kg (11/26). RD obtained bedscale wt of 58.2 kg  (12/2) - ? accuracy in setting of edema which may mask true current wt/wt loss. No further wt hx available in [facility] at this time. RD will continue to monitor weight trends as available/able.   - IBW: 48.2 kg (based on ht of 60 in)

## 2023-12-02 NOTE — DISCHARGE NOTE PROVIDER - NSDCCAREPROVSEEN_GEN_ALL_CORE_FT
[facility] Team 8 [facility] Team 8 [facility] Team 8 Julius, Diamante Mcknight, Gideon Calderón, Georges Madrid, Gilma

## 2023-12-02 NOTE — DIETITIAN INITIAL EVALUATION ADULT - PROBLEM SELECTOR PLAN 9
VDRL/RPR/rubella/HBsAG/HIV - home regimen: Plavix 75mg qd, rosuvastatin 40mg qd, Zetia 10mg qd  - continue home Plavix 75mg qd  - Zetia is nonformulary  - intertherapeutic interchange of rosuvastatin 40mg to lipitor 80mg qd HIV/group B strep/HBsAG/rubella/VDRL/RPR

## 2023-12-02 NOTE — PROGRESS NOTE ADULT - SUBJECTIVE AND OBJECTIVE BOX
DATE OF SERVICE: 12-02-23    Patient denies chest pain or shortness of breath, confused .   Review of symptoms otherwise negative.    acetaminophen     Tablet .. 650 milliGRAM(s) Oral every 6 hours PRN  aluminum hydroxide/magnesium hydroxide/simethicone Suspension 30 milliLiter(s) Oral every 4 hours PRN  atorvastatin 80 milliGRAM(s) Oral at bedtime  chlorhexidine 2% Cloths 1 Application(s) Topical daily  cholecalciferol 2000 Unit(s) Oral daily  dextrose 5%. 1000 milliLiter(s) IV Continuous <Continuous>  dextrose 5%. 1000 milliLiter(s) IV Continuous <Continuous>  dextrose 50% Injectable 25 Gram(s) IV Push once  dextrose 50% Injectable 25 Gram(s) IV Push once  dextrose 50% Injectable 12.5 Gram(s) IV Push once  dextrose Oral Gel 15 Gram(s) Oral once PRN  escitalopram 10 milliGRAM(s) Oral daily  folic acid 1 milliGRAM(s) Oral daily  glucagon  Injectable 1 milliGRAM(s) IntraMuscular once  heparin   Injectable 5000 Unit(s) SubCutaneous every 12 hours  insulin lispro (ADMELOG) corrective regimen sliding scale   SubCutaneous three times a day before meals  insulin lispro (ADMELOG) corrective regimen sliding scale   SubCutaneous at bedtime  melatonin 6 milliGRAM(s) Oral at bedtime  ondansetron Injectable 4 milliGRAM(s) IV Push every 8 hours PRN  pantoprazole    Tablet 40 milliGRAM(s) Oral before breakfast  polyethylene glycol 3350 17 Gram(s) Oral daily  potassium chloride    Tablet ER 40 milliEquivalent(s) Oral once  senna 2 Tablet(s) Oral at bedtime  sodium bicarbonate 1300 milliGRAM(s) Oral three times a day                            8.2    5.32  )-----------( 197      ( 01 Dec 2023 11:33 )             25.2       Hemoglobin: 8.2 g/dL (12-01 @ 11:33)  Hemoglobin: 9.1 g/dL (11-30 @ 06:42)  Hemoglobin: 9.9 g/dL (11-28 @ 11:53)      12-02    142  |  109<H>  |  50<H>  ----------------------------<  71  4.0   |  17<L>  |  5.95<H>    Ca    8.8      02 Dec 2023 10:52  Phos  3.8     12-02  Mg     2.1     12-02      Creatinine Trend: 5.95<--, 6.15<--, 6.22<--, 6.67<--, 6.77<--, 6.85<--    COAGS:           T(C): 36.4 (12-02-23 @ 11:11), Max: 36.5 (12-02-23 @ 05:20)  HR: 46 (12-02-23 @ 11:11) (46 - 48)  BP: 169/61 (12-02-23 @ 11:11) (163/55 - 169/61)  RR: 18 (12-02-23 @ 11:11) (18 - 18)  SpO2: 98% (12-02-23 @ 11:11) (96% - 98%)  Wt(kg): --    I&O's Summary    01 Dec 2023 07:01  -  02 Dec 2023 07:00  --------------------------------------------------------  IN: 0 mL / OUT: 350 mL / NET: -350 mL    02 Dec 2023 07:01  -  02 Dec 2023 16:36  --------------------------------------------------------  IN: 300 mL / OUT: 0 mL / NET: 300 mL            Gen: Appears well in NAD  HEENT:  (-)icterus (-)pallor  CV: N S1 S2 1/6 MANASA (+)2 Pulses B/l  Resp:  Clear to ausculatation B/L, normal effort  GI: (+) BS Soft, NT, ND  Lymph:  (-)Edema, (-)obvious lymphadenopathy  Skin: Warm to touch, Normal turgor  Psych: Appropriate mood and affect, confused     TELEMETRY: SB/SR 40-60s	      RADIOLOGY:         CXR: < from: Xray Chest 1 View- PORTABLE-Urgent (11.26.23 @ 02:59) >  IMPRESSION:  Slightly elevated right hemidiaphragm. Underinflated but otherwise clear   lungs. No pleural effusions or pneumothorax.    Stable cardiac and mediastinal silhouettes including aortic   calcifications and convex fullness of superior mediastinal/paratracheal   soft tissue contour which could be related to engorged/tortuous   brachiocephalic vasculature.    Generalized osteopenia.    --- End of Report ---    < end of copied text >      ASSESSMENT/PLAN: Patient is a 89 y/o Female with PMH of advanced dementia, HTN, HLD, DM2, anemia, CAD (s/p LAD and LCx stent 2010), HFrEF (EF 40-45% 2021), h/o TIA no residual deficits, h/o recurrent UTI who presented with syncope and fall w/o trauma in the setting of 1 month of poor PO intake, nausea, vomiting, epigastric pain. Found to have LESLIE, sinus bradycardia and orthostatic hypotension. Cardiology consulted for further evaluation.    #Syncope  - Suspect due to orthostatic hypotension  - TTE noted with EF 39%, mod AI  - Monitor telemetry however appears asymptomatic in terms of sinus bradycardia    #Orthostatic Hypotension  - Agree with holding all antihypertensives/GDMT  - Avoid midodrine and florinef given CHF  - Continue compression stockings and monitor for improvement    #Sinus Bradycardia  - Appears asymptomatic at rest  - EP consult with Dr. Calderón appreciated  - Check HR with ambulation if able    #LESLIE  - Management per renal    #CAD  - Continue Plavix and Lipitor          Gideon Mcknight MD, [facility]  BEEPER [phone]

## 2023-12-02 NOTE — DISCHARGE NOTE PROVIDER - NSDCCPCAREPLAN_GEN_ALL_CORE_FT
PRINCIPAL DISCHARGE DIAGNOSIS  Diagnosis: LESLIE (acute kidney injury)  Assessment and Plan of Treatment: You came to the hospital and were found to have a kidney injury. This was caused by 2 different things. First you were having difficulty voiding, so this caused a backup of urine and damage to the kidneys - this is called urinary retention. Next, you were not eating and drinking much for an extended period and this dehydration also caused damage to your kidneys. The nephrologists determined you did not need dialysis. By relieving the urinary retention and hydrating you, your kidney function improved. While you do not currently need dialysis, you may have some amount of kidney damage that is irreversible; the exact amount is impossible to predict.      SECONDARY DISCHARGE DIAGNOSES  Diagnosis: Syncope due to orthostatic hypotension  Assessment and Plan of Treatment: You came to the hospital because you fell. This was likely because you did not have enough to eat and drink for a while, causing orthostatic hypotension (low blood pressure when changing positions). You were rehydrated with IV fluids. Some degree of your orthostatic hypotension is likely due to age.    Diagnosis: Failure to thrive in adult  Assessment and Plan of Treatment: You have not been eating or drinking much for an extended period of time. A possible explanation for this is progression of dementia; it is natural for people to lose appetite as dementia progresses. You were seen by the nutritionist and the GI doctors. Ultimately, you and your family decided to ____.

## 2023-12-03 DIAGNOSIS — E87.0 HYPEROSMOLALITY AND HYPERNATREMIA: ICD-10-CM

## 2023-12-03 LAB
ALBUMIN SERPL ELPH-MCNC: 2.7 G/DL — LOW (ref 3.3–5)
ALBUMIN SERPL ELPH-MCNC: 2.7 G/DL — LOW (ref 3.3–5)
ALP SERPL-CCNC: 36 U/L — LOW (ref 40–120)
ALP SERPL-CCNC: 36 U/L — LOW (ref 40–120)
ALT FLD-CCNC: 13 U/L — SIGNIFICANT CHANGE UP (ref 10–45)
ALT FLD-CCNC: 13 U/L — SIGNIFICANT CHANGE UP (ref 10–45)
ANION GAP SERPL CALC-SCNC: 14 MMOL/L — SIGNIFICANT CHANGE UP (ref 5–17)
ANION GAP SERPL CALC-SCNC: 14 MMOL/L — SIGNIFICANT CHANGE UP (ref 5–17)
AST SERPL-CCNC: 11 U/L — SIGNIFICANT CHANGE UP (ref 10–40)
AST SERPL-CCNC: 11 U/L — SIGNIFICANT CHANGE UP (ref 10–40)
BASOPHILS # BLD AUTO: 0 K/UL — SIGNIFICANT CHANGE UP (ref 0–0.2)
BASOPHILS # BLD AUTO: 0 K/UL — SIGNIFICANT CHANGE UP (ref 0–0.2)
BASOPHILS NFR BLD AUTO: 0 % — SIGNIFICANT CHANGE UP (ref 0–2)
BASOPHILS NFR BLD AUTO: 0 % — SIGNIFICANT CHANGE UP (ref 0–2)
BILIRUB SERPL-MCNC: 0.7 MG/DL — SIGNIFICANT CHANGE UP (ref 0.2–1.2)
BILIRUB SERPL-MCNC: 0.7 MG/DL — SIGNIFICANT CHANGE UP (ref 0.2–1.2)
BUN SERPL-MCNC: 49 MG/DL — HIGH (ref 7–23)
BUN SERPL-MCNC: 49 MG/DL — HIGH (ref 7–23)
CALCIUM SERPL-MCNC: 8.5 MG/DL — SIGNIFICANT CHANGE UP (ref 8.4–10.5)
CALCIUM SERPL-MCNC: 8.5 MG/DL — SIGNIFICANT CHANGE UP (ref 8.4–10.5)
CHLORIDE SERPL-SCNC: 109 MMOL/L — HIGH (ref 96–108)
CHLORIDE SERPL-SCNC: 109 MMOL/L — HIGH (ref 96–108)
CO2 SERPL-SCNC: 23 MMOL/L — SIGNIFICANT CHANGE UP (ref 22–31)
CO2 SERPL-SCNC: 23 MMOL/L — SIGNIFICANT CHANGE UP (ref 22–31)
CREAT SERPL-MCNC: 5.63 MG/DL — HIGH (ref 0.5–1.3)
CREAT SERPL-MCNC: 5.63 MG/DL — HIGH (ref 0.5–1.3)
EGFR: 7 ML/MIN/1.73M2 — LOW
EGFR: 7 ML/MIN/1.73M2 — LOW
EOSINOPHIL # BLD AUTO: 0.06 K/UL — SIGNIFICANT CHANGE UP (ref 0–0.5)
EOSINOPHIL # BLD AUTO: 0.06 K/UL — SIGNIFICANT CHANGE UP (ref 0–0.5)
EOSINOPHIL NFR BLD AUTO: 1 % — SIGNIFICANT CHANGE UP (ref 0–6)
EOSINOPHIL NFR BLD AUTO: 1 % — SIGNIFICANT CHANGE UP (ref 0–6)
GLUCOSE BLDC GLUCOMTR-MCNC: 103 MG/DL — HIGH (ref 70–99)
GLUCOSE BLDC GLUCOMTR-MCNC: 103 MG/DL — HIGH (ref 70–99)
GLUCOSE BLDC GLUCOMTR-MCNC: 109 MG/DL — HIGH (ref 70–99)
GLUCOSE BLDC GLUCOMTR-MCNC: 109 MG/DL — HIGH (ref 70–99)
GLUCOSE BLDC GLUCOMTR-MCNC: 155 MG/DL — HIGH (ref 70–99)
GLUCOSE BLDC GLUCOMTR-MCNC: 155 MG/DL — HIGH (ref 70–99)
GLUCOSE BLDC GLUCOMTR-MCNC: 86 MG/DL — SIGNIFICANT CHANGE UP (ref 70–99)
GLUCOSE BLDC GLUCOMTR-MCNC: 86 MG/DL — SIGNIFICANT CHANGE UP (ref 70–99)
GLUCOSE SERPL-MCNC: 81 MG/DL — SIGNIFICANT CHANGE UP (ref 70–99)
GLUCOSE SERPL-MCNC: 81 MG/DL — SIGNIFICANT CHANGE UP (ref 70–99)
HCT VFR BLD CALC: 24.6 % — LOW (ref 34.5–45)
HCT VFR BLD CALC: 24.6 % — LOW (ref 34.5–45)
HGB BLD-MCNC: 7.7 G/DL — LOW (ref 11.5–15.5)
HGB BLD-MCNC: 7.7 G/DL — LOW (ref 11.5–15.5)
IMM GRANULOCYTES NFR BLD AUTO: 0.9 % — SIGNIFICANT CHANGE UP (ref 0–0.9)
IMM GRANULOCYTES NFR BLD AUTO: 0.9 % — SIGNIFICANT CHANGE UP (ref 0–0.9)
LYMPHOCYTES # BLD AUTO: 0.7 K/UL — LOW (ref 1–3.3)
LYMPHOCYTES # BLD AUTO: 0.7 K/UL — LOW (ref 1–3.3)
LYMPHOCYTES # BLD AUTO: 12 % — LOW (ref 13–44)
LYMPHOCYTES # BLD AUTO: 12 % — LOW (ref 13–44)
MAGNESIUM SERPL-MCNC: 2 MG/DL — SIGNIFICANT CHANGE UP (ref 1.6–2.6)
MAGNESIUM SERPL-MCNC: 2 MG/DL — SIGNIFICANT CHANGE UP (ref 1.6–2.6)
MCHC RBC-ENTMCNC: 25.7 PG — LOW (ref 27–34)
MCHC RBC-ENTMCNC: 25.7 PG — LOW (ref 27–34)
MCHC RBC-ENTMCNC: 31.3 GM/DL — LOW (ref 32–36)
MCHC RBC-ENTMCNC: 31.3 GM/DL — LOW (ref 32–36)
MCV RBC AUTO: 82 FL — SIGNIFICANT CHANGE UP (ref 80–100)
MCV RBC AUTO: 82 FL — SIGNIFICANT CHANGE UP (ref 80–100)
MONOCYTES # BLD AUTO: 0.32 K/UL — SIGNIFICANT CHANGE UP (ref 0–0.9)
MONOCYTES # BLD AUTO: 0.32 K/UL — SIGNIFICANT CHANGE UP (ref 0–0.9)
MONOCYTES NFR BLD AUTO: 5.5 % — SIGNIFICANT CHANGE UP (ref 2–14)
MONOCYTES NFR BLD AUTO: 5.5 % — SIGNIFICANT CHANGE UP (ref 2–14)
NEUTROPHILS # BLD AUTO: 4.7 K/UL — SIGNIFICANT CHANGE UP (ref 1.8–7.4)
NEUTROPHILS # BLD AUTO: 4.7 K/UL — SIGNIFICANT CHANGE UP (ref 1.8–7.4)
NEUTROPHILS NFR BLD AUTO: 80.6 % — HIGH (ref 43–77)
NEUTROPHILS NFR BLD AUTO: 80.6 % — HIGH (ref 43–77)
NRBC # BLD: 0 /100 WBCS — SIGNIFICANT CHANGE UP (ref 0–0)
NRBC # BLD: 0 /100 WBCS — SIGNIFICANT CHANGE UP (ref 0–0)
PHOSPHATE SERPL-MCNC: 3.3 MG/DL — SIGNIFICANT CHANGE UP (ref 2.5–4.5)
PHOSPHATE SERPL-MCNC: 3.3 MG/DL — SIGNIFICANT CHANGE UP (ref 2.5–4.5)
PLATELET # BLD AUTO: 165 K/UL — SIGNIFICANT CHANGE UP (ref 150–400)
PLATELET # BLD AUTO: 165 K/UL — SIGNIFICANT CHANGE UP (ref 150–400)
POTASSIUM SERPL-MCNC: 3 MMOL/L — LOW (ref 3.5–5.3)
POTASSIUM SERPL-MCNC: 3 MMOL/L — LOW (ref 3.5–5.3)
POTASSIUM SERPL-SCNC: 3 MMOL/L — LOW (ref 3.5–5.3)
POTASSIUM SERPL-SCNC: 3 MMOL/L — LOW (ref 3.5–5.3)
PROT SERPL-MCNC: 4.9 G/DL — LOW (ref 6–8.3)
PROT SERPL-MCNC: 4.9 G/DL — LOW (ref 6–8.3)
RBC # BLD: 3 M/UL — LOW (ref 3.8–5.2)
RBC # BLD: 3 M/UL — LOW (ref 3.8–5.2)
RBC # FLD: 15.4 % — HIGH (ref 10.3–14.5)
RBC # FLD: 15.4 % — HIGH (ref 10.3–14.5)
SODIUM SERPL-SCNC: 146 MMOL/L — HIGH (ref 135–145)
SODIUM SERPL-SCNC: 146 MMOL/L — HIGH (ref 135–145)
WBC # BLD: 5.83 K/UL — SIGNIFICANT CHANGE UP (ref 3.8–10.5)
WBC # BLD: 5.83 K/UL — SIGNIFICANT CHANGE UP (ref 3.8–10.5)
WBC # FLD AUTO: 5.83 K/UL — SIGNIFICANT CHANGE UP (ref 3.8–10.5)
WBC # FLD AUTO: 5.83 K/UL — SIGNIFICANT CHANGE UP (ref 3.8–10.5)

## 2023-12-03 PROCEDURE — 99233 SBSQ HOSP IP/OBS HIGH 50: CPT

## 2023-12-03 PROCEDURE — 93010 ELECTROCARDIOGRAM REPORT: CPT

## 2023-12-03 RX ORDER — METOPROLOL TARTRATE 50 MG
2.5 TABLET ORAL ONCE
Refills: 0 | Status: DISCONTINUED | OUTPATIENT
Start: 2023-12-03 | End: 2023-12-03

## 2023-12-03 RX ORDER — DEXTROSE MONOHYDRATE, SODIUM CHLORIDE, AND POTASSIUM CHLORIDE 50; .745; 4.5 G/1000ML; G/1000ML; G/1000ML
1000 INJECTION, SOLUTION INTRAVENOUS
Refills: 0 | Status: DISCONTINUED | OUTPATIENT
Start: 2023-12-03 | End: 2023-12-07

## 2023-12-03 RX ORDER — POTASSIUM CHLORIDE 20 MEQ
10 PACKET (EA) ORAL
Refills: 0 | Status: COMPLETED | OUTPATIENT
Start: 2023-12-03 | End: 2023-12-03

## 2023-12-03 RX ORDER — SODIUM CHLORIDE 9 MG/ML
1000 INJECTION, SOLUTION INTRAVENOUS
Refills: 0 | Status: DISCONTINUED | OUTPATIENT
Start: 2023-12-03 | End: 2023-12-03

## 2023-12-03 RX ORDER — POTASSIUM CHLORIDE 20 MEQ
40 PACKET (EA) ORAL EVERY 4 HOURS
Refills: 0 | Status: DISCONTINUED | OUTPATIENT
Start: 2023-12-03 | End: 2023-12-03

## 2023-12-03 RX ORDER — METOPROLOL TARTRATE 50 MG
2.5 TABLET ORAL ONCE
Refills: 0 | Status: COMPLETED | OUTPATIENT
Start: 2023-12-03 | End: 2023-12-03

## 2023-12-03 RX ADMIN — DEXTROSE MONOHYDRATE, SODIUM CHLORIDE, AND POTASSIUM CHLORIDE 50 MILLILITER(S): 50; .745; 4.5 INJECTION, SOLUTION INTRAVENOUS at 08:36

## 2023-12-03 RX ADMIN — POLYETHYLENE GLYCOL 3350 17 GRAM(S): 17 POWDER, FOR SOLUTION ORAL at 11:12

## 2023-12-03 RX ADMIN — SODIUM CHLORIDE 50 MILLILITER(S): 9 INJECTION, SOLUTION INTRAVENOUS at 07:20

## 2023-12-03 RX ADMIN — Medication 100 MILLIEQUIVALENT(S): at 08:40

## 2023-12-03 RX ADMIN — Medication 1300 MILLIGRAM(S): at 13:26

## 2023-12-03 RX ADMIN — ATORVASTATIN CALCIUM 80 MILLIGRAM(S): 80 TABLET, FILM COATED ORAL at 21:30

## 2023-12-03 RX ADMIN — ESCITALOPRAM OXALATE 10 MILLIGRAM(S): 10 TABLET, FILM COATED ORAL at 11:12

## 2023-12-03 RX ADMIN — SENNA PLUS 2 TABLET(S): 8.6 TABLET ORAL at 21:30

## 2023-12-03 RX ADMIN — HEPARIN SODIUM 5000 UNIT(S): 5000 INJECTION INTRAVENOUS; SUBCUTANEOUS at 05:11

## 2023-12-03 RX ADMIN — Medication 2.5 MILLIGRAM(S): at 21:57

## 2023-12-03 RX ADMIN — Medication 100 MILLIEQUIVALENT(S): at 10:39

## 2023-12-03 RX ADMIN — Medication 1300 MILLIGRAM(S): at 21:30

## 2023-12-03 RX ADMIN — Medication 6 MILLIGRAM(S): at 21:30

## 2023-12-03 RX ADMIN — ONDANSETRON 4 MILLIGRAM(S): 8 TABLET, FILM COATED ORAL at 08:47

## 2023-12-03 RX ADMIN — HEPARIN SODIUM 5000 UNIT(S): 5000 INJECTION INTRAVENOUS; SUBCUTANEOUS at 17:25

## 2023-12-03 RX ADMIN — Medication 1300 MILLIGRAM(S): at 05:01

## 2023-12-03 RX ADMIN — Medication 1 MILLIGRAM(S): at 11:11

## 2023-12-03 RX ADMIN — CHLORHEXIDINE GLUCONATE 1 APPLICATION(S): 213 SOLUTION TOPICAL at 11:11

## 2023-12-03 RX ADMIN — Medication 2000 UNIT(S): at 11:11

## 2023-12-03 RX ADMIN — Medication 100 MILLIEQUIVALENT(S): at 09:28

## 2023-12-03 NOTE — PROGRESS NOTE ADULT - PROBLEM SELECTOR PLAN 10
- home regimen: coreg 3.125 BID, valsartan-HCTZ 80-12.5 qd, aldactone 50mg qd  - hold given orthostatic hypotensoin - home regimen: Plavix 75mg qd, rosuvastatin 40mg qd, Zetia 10mg qd  - Zetia is nonformulary  - intertherapeutic interchange of rosuvastatin 40mg to lipitor 80mg qd    - Hold plavix (last dose 11/30) given possibility of PEG placement

## 2023-12-03 NOTE — PROGRESS NOTE ADULT - PROBLEM SELECTOR PLAN 8
- controlled w/ diet  - A1c 6.3%  - FS/low ISS  - hypoglycemia protocol in place given poor PO intake - w/ agitation  - seems to be FAST Stage 6? No official staging outpt noted  - hold home nanzeric in the setting of orthostatic hypotension

## 2023-12-03 NOTE — PROGRESS NOTE ADULT - SUBJECTIVE AND OBJECTIVE BOX
DATE OF SERVICE: 12-03-23    Patient denies chest pain or shortness of breath.   Review of symptoms otherwise negative.    acetaminophen     Tablet .. 650 milliGRAM(s) Oral every 6 hours PRN  aluminum hydroxide/magnesium hydroxide/simethicone Suspension 30 milliLiter(s) Oral every 4 hours PRN  atorvastatin 80 milliGRAM(s) Oral at bedtime  chlorhexidine 2% Cloths 1 Application(s) Topical daily  cholecalciferol 2000 Unit(s) Oral daily  dextrose 5% + sodium chloride 0.45% with potassium chloride 20 mEq/L 1000 milliLiter(s) IV Continuous <Continuous>  dextrose 5%. 1000 milliLiter(s) IV Continuous <Continuous>  dextrose 5%. 1000 milliLiter(s) IV Continuous <Continuous>  dextrose 50% Injectable 12.5 Gram(s) IV Push once  dextrose 50% Injectable 25 Gram(s) IV Push once  dextrose 50% Injectable 25 Gram(s) IV Push once  dextrose Oral Gel 15 Gram(s) Oral once PRN  escitalopram 10 milliGRAM(s) Oral daily  folic acid 1 milliGRAM(s) Oral daily  glucagon  Injectable 1 milliGRAM(s) IntraMuscular once  heparin   Injectable 5000 Unit(s) SubCutaneous every 12 hours  insulin lispro (ADMELOG) corrective regimen sliding scale   SubCutaneous three times a day before meals  insulin lispro (ADMELOG) corrective regimen sliding scale   SubCutaneous at bedtime  melatonin 6 milliGRAM(s) Oral at bedtime  ondansetron Injectable 4 milliGRAM(s) IV Push every 8 hours PRN  pantoprazole    Tablet 40 milliGRAM(s) Oral before breakfast  polyethylene glycol 3350 17 Gram(s) Oral daily  senna 2 Tablet(s) Oral at bedtime  sodium bicarbonate 1300 milliGRAM(s) Oral three times a day                            7.7    5.83  )-----------( 165      ( 03 Dec 2023 06:11 )             24.6       Hemoglobin: 7.7 g/dL (12-03 @ 06:11)  Hemoglobin: 8.2 g/dL (12-01 @ 11:33)  Hemoglobin: 9.1 g/dL (11-30 @ 06:42)      12-03    146<H>  |  109<H>  |  49<H>  ----------------------------<  81  3.0<L>   |  23  |  5.63<H>    Ca    8.5      03 Dec 2023 06:12  Phos  3.3     12-03  Mg     2.0     12-03    TPro  4.9<L>  /  Alb  2.7<L>  /  TBili  0.7  /  DBili  x   /  AST  11  /  ALT  13  /  AlkPhos  36<L>  12-03    Creatinine Trend: 5.63<--, 5.95<--, 6.15<--, 6.22<--, 6.67<--, 6.77<--    COAGS:           T(C): 36.4 (12-03-23 @ 10:44), Max: 36.6 (12-02-23 @ 21:02)  HR: 59 (12-03-23 @ 10:44) (52 - 59)  BP: 163/60 (12-03-23 @ 10:44) (149/57 - 174/55)  RR: 18 (12-03-23 @ 10:44) (18 - 18)  SpO2: 99% (12-03-23 @ 10:44) (96% - 99%)  Wt(kg): --    I&O's Summary    02 Dec 2023 07:01  -  03 Dec 2023 07:00  --------------------------------------------------------  IN: 470 mL / OUT: 300 mL / NET: 170 mL    03 Dec 2023 07:01  -  03 Dec 2023 14:59  --------------------------------------------------------  IN: 1180 mL / OUT: 0 mL / NET: 1180 mL              Gen: Appears well in NAD  HEENT:  (-)icterus (-)pallor  CV: N S1 S2 1/6 MANASA (+)2 Pulses B/l  Resp:  Clear to ausculatation B/L, normal effort  GI: (+) BS Soft, NT, ND  Lymph:  (-)Edema, (-)obvious lymphadenopathy  Skin: Warm to touch, Normal turgor  Psych: Appropriate mood and affect, confused     TELEMETRY: SB/SR 40-60s	      RADIOLOGY:         CXR: < from: Xray Chest 1 View- PORTABLE-Urgent (11.26.23 @ 02:59) >  IMPRESSION:  Slightly elevated right hemidiaphragm. Underinflated but otherwise clear   lungs. No pleural effusions or pneumothorax.    Stable cardiac and mediastinal silhouettes including aortic   calcifications and convex fullness of superior mediastinal/paratracheal   soft tissue contour which could be related to engorged/tortuous   brachiocephalic vasculature.    Generalized osteopenia.    --- End of Report ---    < end of copied text >      ASSESSMENT/PLAN: Patient is a 89 y/o Female with PMH of advanced dementia, HTN, HLD, DM2, anemia, CAD (s/p LAD and LCx stent 2010), HFrEF (EF 40-45% 2021), h/o TIA no residual deficits, h/o recurrent UTI who presented with syncope and fall w/o trauma in the setting of 1 month of poor PO intake, nausea, vomiting, epigastric pain. Found to have LESLIE, sinus bradycardia and orthostatic hypotension. Cardiology consulted for further evaluation.    #Syncope  - Suspect due to orthostatic hypotension  - TTE noted with EF 39%, mod AI  - Monitor telemetry however appears asymptomatic in terms of sinus bradycardia    #Orthostatic Hypotension  - Agree with holding all antihypertensives/GDMT  - Avoid midodrine and florinef given CHF  - Continue compression stockings and monitor for improvement    #Sinus Bradycardia  - Appears asymptomatic at rest  - EP consult with Dr. Calderón appreciated  - Check HR with ambulation if able    #LESLIE  - Management per renal    #CAD  - Continue Plavix and Lipitor          Gideon Mcknight MD, [facility]  BEEPER [phone]     DATE OF SERVICE: 12-03-23    Patient denies chest pain or shortness of breath.   Review of symptoms otherwise negative.    acetaminophen     Tablet .. 650 milliGRAM(s) Oral every 6 hours PRN  aluminum hydroxide/magnesium hydroxide/simethicone Suspension 30 milliLiter(s) Oral every 4 hours PRN  atorvastatin 80 milliGRAM(s) Oral at bedtime  chlorhexidine 2% Cloths 1 Application(s) Topical daily  cholecalciferol 2000 Unit(s) Oral daily  dextrose 5% + sodium chloride 0.45% with potassium chloride 20 mEq/L 1000 milliLiter(s) IV Continuous <Continuous>  dextrose 5%. 1000 milliLiter(s) IV Continuous <Continuous>  dextrose 5%. 1000 milliLiter(s) IV Continuous <Continuous>  dextrose 50% Injectable 12.5 Gram(s) IV Push once  dextrose 50% Injectable 25 Gram(s) IV Push once  dextrose 50% Injectable 25 Gram(s) IV Push once  dextrose Oral Gel 15 Gram(s) Oral once PRN  escitalopram 10 milliGRAM(s) Oral daily  folic acid 1 milliGRAM(s) Oral daily  glucagon  Injectable 1 milliGRAM(s) IntraMuscular once  heparin   Injectable 5000 Unit(s) SubCutaneous every 12 hours  insulin lispro (ADMELOG) corrective regimen sliding scale   SubCutaneous three times a day before meals  insulin lispro (ADMELOG) corrective regimen sliding scale   SubCutaneous at bedtime  melatonin 6 milliGRAM(s) Oral at bedtime  ondansetron Injectable 4 milliGRAM(s) IV Push every 8 hours PRN  pantoprazole    Tablet 40 milliGRAM(s) Oral before breakfast  polyethylene glycol 3350 17 Gram(s) Oral daily  senna 2 Tablet(s) Oral at bedtime  sodium bicarbonate 1300 milliGRAM(s) Oral three times a day                            7.7    5.83  )-----------( 165      ( 03 Dec 2023 06:11 )             24.6       Hemoglobin: 7.7 g/dL (12-03 @ 06:11)  Hemoglobin: 8.2 g/dL (12-01 @ 11:33)  Hemoglobin: 9.1 g/dL (11-30 @ 06:42)      12-03    146<H>  |  109<H>  |  49<H>  ----------------------------<  81  3.0<L>   |  23  |  5.63<H>    Ca    8.5      03 Dec 2023 06:12  Phos  3.3     12-03  Mg     2.0     12-03    TPro  4.9<L>  /  Alb  2.7<L>  /  TBili  0.7  /  DBili  x   /  AST  11  /  ALT  13  /  AlkPhos  36<L>  12-03    Creatinine Trend: 5.63<--, 5.95<--, 6.15<--, 6.22<--, 6.67<--, 6.77<--    COAGS:           T(C): 36.4 (12-03-23 @ 10:44), Max: 36.6 (12-02-23 @ 21:02)  HR: 59 (12-03-23 @ 10:44) (52 - 59)  BP: 163/60 (12-03-23 @ 10:44) (149/57 - 174/55)  RR: 18 (12-03-23 @ 10:44) (18 - 18)  SpO2: 99% (12-03-23 @ 10:44) (96% - 99%)  Wt(kg): --    I&O's Summary    02 Dec 2023 07:01  -  03 Dec 2023 07:00  --------------------------------------------------------  IN: 470 mL / OUT: 300 mL / NET: 170 mL    03 Dec 2023 07:01  -  03 Dec 2023 14:59  --------------------------------------------------------  IN: 1180 mL / OUT: 0 mL / NET: 1180 mL              Gen: Appears well in NAD  HEENT:  (-)icterus (-)pallor  CV: N S1 S2 1/6 MANASA (+)2 Pulses B/l  Resp:  Clear to ausculatation B/L, normal effort  GI: (+) BS Soft, NT, ND  Lymph:  (-)Edema, (-)obvious lymphadenopathy  Skin: Warm to touch, Normal turgor  Psych: Appropriate mood and affect, confused     TELEMETRY: SB/SR 40-60s	      RADIOLOGY:         CXR: < from: Xray Chest 1 View- PORTABLE-Urgent (11.26.23 @ 02:59) >  IMPRESSION:  Slightly elevated right hemidiaphragm. Underinflated but otherwise clear   lungs. No pleural effusions or pneumothorax.    Stable cardiac and mediastinal silhouettes including aortic   calcifications and convex fullness of superior mediastinal/paratracheal   soft tissue contour which could be related to engorged/tortuous   brachiocephalic vasculature.    Generalized osteopenia.    --- End of Report ---    < end of copied text >      ASSESSMENT/PLAN: Patient is a 89 y/o Female with PMH of advanced dementia, HTN, HLD, DM2, anemia, CAD (s/p LAD and LCx stent 2010), HFrEF (EF 40-45% 2021), h/o TIA no residual deficits, h/o recurrent UTI who presented with syncope and fall w/o trauma in the setting of 1 month of poor PO intake, nausea, vomiting, epigastric pain. Found to have LESLIE, sinus bradycardia and orthostatic hypotension. Cardiology consulted for further evaluation.    #Syncope  - Suspect due to orthostatic hypotension  - TTE noted with EF 39%, mod AI  - Monitor telemetry however appears asymptomatic in terms of sinus bradycardia    #Orthostatic Hypotension  - Agree with holding all antihypertensives/GDMT  - Avoid midodrine and florinef given CHF  - Continue compression stockings and monitor for improvement    #Sinus Bradycardia  - Appears asymptomatic at rest  - EP consult with Dr. Calderón appreciated  - Check HR with ambulation if able    #LESLIE  - Management per renal    #CAD  - Continue Plavix and Lipitor          Gideon Mcknight MD, [facility]  BEEPER [phone]

## 2023-12-03 NOTE — PROGRESS NOTE ADULT - SUBJECTIVE AND OBJECTIVE BOX
DATE OF SERVICE: 12-03-23 @ 07:09    Patient is a 89y old  Female who presents with a chief complaint of fall, LESLIE (02 Dec 2023 16:36)      SUBJECTIVE / OVERNIGHT EVENTS:    MEDICATIONS  (STANDING):  atorvastatin 80 milliGRAM(s) Oral at bedtime  chlorhexidine 2% Cloths 1 Application(s) Topical daily  cholecalciferol 2000 Unit(s) Oral daily  dextrose 5%. 1000 milliLiter(s) (50 mL/Hr) IV Continuous <Continuous>  dextrose 5%. 1000 milliLiter(s) (100 mL/Hr) IV Continuous <Continuous>  dextrose 50% Injectable 12.5 Gram(s) IV Push once  dextrose 50% Injectable 25 Gram(s) IV Push once  dextrose 50% Injectable 25 Gram(s) IV Push once  escitalopram 10 milliGRAM(s) Oral daily  folic acid 1 milliGRAM(s) Oral daily  glucagon  Injectable 1 milliGRAM(s) IntraMuscular once  heparin   Injectable 5000 Unit(s) SubCutaneous every 12 hours  insulin lispro (ADMELOG) corrective regimen sliding scale   SubCutaneous three times a day before meals  insulin lispro (ADMELOG) corrective regimen sliding scale   SubCutaneous at bedtime  melatonin 6 milliGRAM(s) Oral at bedtime  pantoprazole    Tablet 40 milliGRAM(s) Oral before breakfast  polyethylene glycol 3350 17 Gram(s) Oral daily  potassium chloride    Tablet ER 40 milliEquivalent(s) Oral once  senna 2 Tablet(s) Oral at bedtime  sodium bicarbonate 1300 milliGRAM(s) Oral three times a day    MEDICATIONS  (PRN):  acetaminophen     Tablet .. 650 milliGRAM(s) Oral every 6 hours PRN Temp greater or equal to 38C (100.4F), Mild Pain (1 - 3)  aluminum hydroxide/magnesium hydroxide/simethicone Suspension 30 milliLiter(s) Oral every 4 hours PRN Dyspepsia  dextrose Oral Gel 15 Gram(s) Oral once PRN Blood Glucose LESS THAN 70 milliGRAM(s)/deciliter  ondansetron Injectable 4 milliGRAM(s) IV Push every 8 hours PRN Nausea and/or Vomiting      Vital Signs Last 24 Hrs  T(C): 36.4 (03 Dec 2023 04:53), Max: 36.6 (02 Dec 2023 21:02)  T(F): 97.5 (03 Dec 2023 04:53), Max: 97.9 (02 Dec 2023 21:02)  HR: 54 (03 Dec 2023 04:53) (46 - 54)  BP: 149/57 (03 Dec 2023 04:53) (149/57 - 174/55)  BP(mean): --  RR: 18 (03 Dec 2023 04:53) (18 - 18)  SpO2: 96% (03 Dec 2023 04:53) (96% - 98%)    Parameters below as of 03 Dec 2023 04:53  Patient On (Oxygen Delivery Method): room air      CAPILLARY BLOOD GLUCOSE      POCT Blood Glucose.: 201 mg/dL (02 Dec 2023 22:49)  POCT Blood Glucose.: 86 mg/dL (02 Dec 2023 16:24)  POCT Blood Glucose.: 126 mg/dL (02 Dec 2023 11:49)  POCT Blood Glucose.: 76 mg/dL (02 Dec 2023 07:57)    I&O's Summary    02 Dec 2023 07:01  -  03 Dec 2023 07:00  --------------------------------------------------------  IN: 420 mL / OUT: 300 mL / NET: 120 mL        PHYSICAL EXAM:  GENERAL: NAD, well-developed  HEAD:  Atraumatic, Normocephalic  EYES: EOMI, PERRLA, conjunctiva and sclera clear  NECK: Supple, No JVD  CHEST/LUNG: Clear to auscultation bilaterally; No wheeze  HEART: Regular rate and rhythm; No murmurs, rubs, or gallops  ABDOMEN: Soft, Nontender, Nondistended; Bowel sounds present  EXTREMITIES:  2+ Peripheral Pulses, No clubbing, cyanosis, or edema  PSYCH: AAOx3  NEUROLOGY: non-focal  SKIN: No rashes or lesions    LABS:                        8.2    5.32  )-----------( 197      ( 01 Dec 2023 11:33 )             25.2     12-02    142  |  109<H>  |  50<H>  ----------------------------<  71  4.0   |  17<L>  |  5.95<H>    Ca    8.8      02 Dec 2023 10:52  Phos  3.8     12-02  Mg     2.1     12-02            Urinalysis Basic - ( 02 Dec 2023 10:52 )    Color: x / Appearance: x / SG: x / pH: x  Gluc: 71 mg/dL / Ketone: x  / Bili: x / Urobili: x   Blood: x / Protein: x / Nitrite: x   Leuk Esterase: x / RBC: x / WBC x   Sq Epi: x / Non Sq Epi: x / Bacteria: x        RADIOLOGY & ADDITIONAL TESTS:    Imaging Personally Reviewed:    Consultant(s) Notes Reviewed:      Care Discussed with Consultants/Other Providers:   DATE OF SERVICE: 12-03-23 @ 07:09    Patient is a 89y old  Female who presents with a chief complaint of fall, LESLIE (02 Dec 2023 16:36)      SUBJECTIVE / OVERNIGHT EVENTS: NAOE. Pt seen eating food this AM. Feels well, no acute complaints. Per RN, has been voiding less.    MEDICATIONS  (STANDING):  atorvastatin 80 milliGRAM(s) Oral at bedtime  chlorhexidine 2% Cloths 1 Application(s) Topical daily  cholecalciferol 2000 Unit(s) Oral daily  dextrose 5%. 1000 milliLiter(s) (50 mL/Hr) IV Continuous <Continuous>  dextrose 5%. 1000 milliLiter(s) (100 mL/Hr) IV Continuous <Continuous>  dextrose 50% Injectable 12.5 Gram(s) IV Push once  dextrose 50% Injectable 25 Gram(s) IV Push once  dextrose 50% Injectable 25 Gram(s) IV Push once  escitalopram 10 milliGRAM(s) Oral daily  folic acid 1 milliGRAM(s) Oral daily  glucagon  Injectable 1 milliGRAM(s) IntraMuscular once  heparin   Injectable 5000 Unit(s) SubCutaneous every 12 hours  insulin lispro (ADMELOG) corrective regimen sliding scale   SubCutaneous three times a day before meals  insulin lispro (ADMELOG) corrective regimen sliding scale   SubCutaneous at bedtime  melatonin 6 milliGRAM(s) Oral at bedtime  pantoprazole    Tablet 40 milliGRAM(s) Oral before breakfast  polyethylene glycol 3350 17 Gram(s) Oral daily  potassium chloride    Tablet ER 40 milliEquivalent(s) Oral once  senna 2 Tablet(s) Oral at bedtime  sodium bicarbonate 1300 milliGRAM(s) Oral three times a day    MEDICATIONS  (PRN):  acetaminophen     Tablet .. 650 milliGRAM(s) Oral every 6 hours PRN Temp greater or equal to 38C (100.4F), Mild Pain (1 - 3)  aluminum hydroxide/magnesium hydroxide/simethicone Suspension 30 milliLiter(s) Oral every 4 hours PRN Dyspepsia  dextrose Oral Gel 15 Gram(s) Oral once PRN Blood Glucose LESS THAN 70 milliGRAM(s)/deciliter  ondansetron Injectable 4 milliGRAM(s) IV Push every 8 hours PRN Nausea and/or Vomiting      Vital Signs Last 24 Hrs  T(C): 36.4 (03 Dec 2023 04:53), Max: 36.6 (02 Dec 2023 21:02)  T(F): 97.5 (03 Dec 2023 04:53), Max: 97.9 (02 Dec 2023 21:02)  HR: 54 (03 Dec 2023 04:53) (46 - 54)  BP: 149/57 (03 Dec 2023 04:53) (149/57 - 174/55)  BP(mean): --  RR: 18 (03 Dec 2023 04:53) (18 - 18)  SpO2: 96% (03 Dec 2023 04:53) (96% - 98%)    Parameters below as of 03 Dec 2023 04:53  Patient On (Oxygen Delivery Method): room air      CAPILLARY BLOOD GLUCOSE      POCT Blood Glucose.: 201 mg/dL (02 Dec 2023 22:49)  POCT Blood Glucose.: 86 mg/dL (02 Dec 2023 16:24)  POCT Blood Glucose.: 126 mg/dL (02 Dec 2023 11:49)  POCT Blood Glucose.: 76 mg/dL (02 Dec 2023 07:57)    I&O's Summary    02 Dec 2023 07:01  -  03 Dec 2023 07:00  --------------------------------------------------------  IN: 420 mL / OUT: 300 mL / NET: 120 mL        PHYSICAL EXAM:  GENERAL: NAD, well-developed  HEAD:  Atraumatic, Normocephalic  EYES: EOMI, conjunctiva and sclera clear  NECK: Supple, No JVD  CHEST/LUNG: Clear to auscultation bilaterally; No wheeze  HEART: Regular rate and rhythm; No murmurs, rubs, or gallops  ABDOMEN: Soft, Nontender, Nondistended; Bowel sounds present  EXTREMITIES: No clubbing, cyanosis, or edema  NEUROLOGY: A&Ox2 (name, location; does not know time/date). Follows commands. Non-focal. MAEx4  SKIN: No rashes or lesions    LABS:                        8.2    5.32  )-----------( 197      ( 01 Dec 2023 11:33 )             25.2     12-02    142  |  109<H>  |  50<H>  ----------------------------<  71  4.0   |  17<L>  |  5.95<H>    Ca    8.8      02 Dec 2023 10:52  Phos  3.8     12-02  Mg     2.1     12-02            Urinalysis Basic - ( 02 Dec 2023 10:52 )    Color: x / Appearance: x / SG: x / pH: x  Gluc: 71 mg/dL / Ketone: x  / Bili: x / Urobili: x   Blood: x / Protein: x / Nitrite: x   Leuk Esterase: x / RBC: x / WBC x   Sq Epi: x / Non Sq Epi: x / Bacteria: x        RADIOLOGY & ADDITIONAL TESTS:    Imaging Personally Reviewed:    Consultant(s) Notes Reviewed:      Care Discussed with Consultants/Other Providers:

## 2023-12-03 NOTE — PROGRESS NOTE ADULT - PROBLEM SELECTOR PLAN 3
- SCr on admission 7. Baseline SCr is around 1  - Appears to be both prerenal and obstructive. Likely an ATN component as well given hypotension. Slowly improving  - Cornell removed 11/30 and pt has been spontaneously voiding - SCr on admission 7. Baseline SCr is around 1  - Appears to be both prerenal and obstructive. Likely an ATN component as well given hypotension. Slowly improving  - Cornell removed 11/30 and pt has been spontaneously voiding  - SCr continues to improve

## 2023-12-03 NOTE — PROGRESS NOTE ADULT - ATTENDING COMMENTS
Assessed patient at bedside, alert oriented x2, unremarkable physical exam, continued to have poor PO intake. Granddaughter provided translation. Patient had sips of water and small spoon of ice cream. No appetite    # syncope  # orthostatic hypotension   # sinus bradycardia   - c/w telemetry monitoring  - c/w holding AV ayaz blocking agent   - supportive care for orthostatic hypotension   - EP onboard, f/u recs     # LESLIE  likely combination from prerenal (poor PO intake), ATN from hypotension, and obstructive component   - Cr down trending. encourage PO intake  - electrolytes WNL, normal respiratory status   - monitor BMP Mg Phos daily   - nephrology following, no urgent need for HD   - morales removed 11/30     # failure to thrive  # epigastirc pain   # dysphagia   - poor PO intake at home and inpatient. Could consider remeron to increase appeitite  - CT A/P no acute intraabdominal abnormality   - family interested in PEG   - patient unable to participate in esophogram 12/1. F/u GI Monday for plans egd to rule out obstructive etiology (plavix was held since 12/1, needs to be held 5 days prior to PEG)    d/w HS  Aylin Pearl MD  Division of Hospital Medicine  Available on [employer] Teams

## 2023-12-03 NOTE — PROGRESS NOTE ADULT - PROBLEM SELECTOR PLAN 7
- w/ agitation  - seems to be FAST Stage 6? No official staging outpt noted  - hold home nanzeric in the setting of orthostatic hypotension - home regimen: coreg 3.125 BID, valsartan-HCTZ 80-12.5 qd, aldactone 50mg qd  - hold antihypertensives d/t orthostatic hypotension

## 2023-12-03 NOTE — PROGRESS NOTE ADULT - PROBLEM SELECTOR PLAN 6
- home regimen: coreg 3.125 BID, valsartan-HCTZ 80-12.5 qd, aldactone 50mg qd  - hold antihypertensives d/t orthostatic hypotension - peptic ulcer disease vs. pancreatitis vs. functional  - no findings on CT AP of pancreatitis, does not have classic history  - never had endoscopy to evaluate  - will trial protonix 40mg qd   - esophogram attempted 12/1 - unable to tolerate; did not participate and follow commands  - family would like to reattempt esophogram - maybe on 12/4?  - GI following. Will perform calorie count. Family interested in PEG, would require scope. Continue GOC discussions.

## 2023-12-03 NOTE — PROGRESS NOTE ADULT - PROBLEM SELECTOR PLAN 1
- Differential for syncope includes orthostatic hypotension vs. cardiac (arrythmia, valvular disease) vs. neuro  - Pt has h/o HFrEF, so increased risk of arrythmia  - Favor orthostatic hypotension as cause as pt has positive orthostatics (103/46 --> 65/45)  - Sinus wilber on tele, HR increases with standing vitals. Unclear if causing   - TTE with LVEF 39%.   - continue to monitor on tele  - appreciate cards and EP recs

## 2023-12-03 NOTE — PROGRESS NOTE ADULT - PROBLEM SELECTOR PLAN 4
- pt eating less 2/2 reported epigastric pain/nausea  - also suspect there is an element of progressing dementia  - fluid resuscitation  - encourage PO intake, honor dietary preferences - likely d/t poor PO intake and poor free water intake  - pt also likely volume depleted  - will given D5 1/2NS +20mEq K instead of D5W as maintenance fluids to both correct hypernatrmia and give some volume and to replete K 12/3

## 2023-12-03 NOTE — PROGRESS NOTE ADULT - PROBLEM SELECTOR PLAN 5
- peptic ulcer disease vs. pancreatitis vs. functional  - no findings on CT AP of pancreatitis, does not have classic history  - never had endoscopy to evaluate  - will trial protonix 40mg qd   - esophogram attempted 12/1 - unable to tolerate; did not participate and follow commands  - GI following. Will perform calorie count. Family interested in PEG, would require scope. Continue GOC discussions. - pt eating less 2/2 reported epigastric pain/nausea  - also suspect there is an element of progressing dementia  - fluid resuscitation  - encourage PO intake, honor dietary preferences

## 2023-12-03 NOTE — PROGRESS NOTE ADULT - PROBLEM SELECTOR PLAN 9
- home regimen: Plavix 75mg qd, rosuvastatin 40mg qd, Zetia 10mg qd  - Zetia is nonformulary  - intertherapeutic interchange of rosuvastatin 40mg to lipitor 80mg qd    - Hold plavix (last dose 11/30) given possibility of PEG placement - controlled w/ diet  - A1c 6.3%  - FS/low ISS  - hypoglycemia protocol in place given poor PO intake

## 2023-12-03 NOTE — PROGRESS NOTE ADULT - SUBJECTIVE AND OBJECTIVE BOX
no events overnight           acetaminophen     Tablet .. 650 milliGRAM(s) Oral every 6 hours PRN  aluminum hydroxide/magnesium hydroxide/simethicone Suspension 30 milliLiter(s) Oral every 4 hours PRN  atorvastatin 80 milliGRAM(s) Oral at bedtime  chlorhexidine 2% Cloths 1 Application(s) Topical daily  cholecalciferol 2000 Unit(s) Oral daily  dextrose 5% + sodium chloride 0.45% with potassium chloride 20 mEq/L 1000 milliLiter(s) IV Continuous <Continuous>  dextrose 5%. 1000 milliLiter(s) IV Continuous <Continuous>  dextrose 5%. 1000 milliLiter(s) IV Continuous <Continuous>  dextrose 50% Injectable 25 Gram(s) IV Push once  dextrose 50% Injectable 25 Gram(s) IV Push once  dextrose 50% Injectable 12.5 Gram(s) IV Push once  dextrose Oral Gel 15 Gram(s) Oral once PRN  escitalopram 10 milliGRAM(s) Oral daily  folic acid 1 milliGRAM(s) Oral daily  glucagon  Injectable 1 milliGRAM(s) IntraMuscular once  heparin   Injectable 5000 Unit(s) SubCutaneous every 12 hours  insulin lispro (ADMELOG) corrective regimen sliding scale   SubCutaneous three times a day before meals  insulin lispro (ADMELOG) corrective regimen sliding scale   SubCutaneous at bedtime  melatonin 6 milliGRAM(s) Oral at bedtime  ondansetron Injectable 4 milliGRAM(s) IV Push every 8 hours PRN  pantoprazole    Tablet 40 milliGRAM(s) Oral before breakfast  polyethylene glycol 3350 17 Gram(s) Oral daily  potassium chloride  10 mEq/100 mL IVPB 10 milliEquivalent(s) IV Intermittent every 1 hour  senna 2 Tablet(s) Oral at bedtime  sodium bicarbonate 1300 milliGRAM(s) Oral three times a day                            7.7    5.83  )-----------( 165      ( 03 Dec 2023 06:11 )             24.6       Hemoglobin: 7.7 g/dL (12-03 @ 06:11)  Hemoglobin: 8.2 g/dL (12-01 @ 11:33)  Hemoglobin: 9.1 g/dL (11-30 @ 06:42)  Hemoglobin: 9.9 g/dL (11-28 @ 11:53)      12-03    146<H>  |  109<H>  |  49<H>  ----------------------------<  81  3.0<L>   |  23  |  5.63<H>    Ca    8.5      03 Dec 2023 06:12  Phos  3.3     12-03  Mg     2.0     12-03    TPro  4.9<L>  /  Alb  2.7<L>  /  TBili  0.7  /  DBili  x   /  AST  11  /  ALT  13  /  AlkPhos  36<L>  12-03    Creatinine Trend: 5.63<--, 5.95<--, 6.15<--, 6.22<--, 6.67<--, 6.77<--    COAGS:           T(C): 36.4 (12-03-23 @ 04:53), Max: 36.6 (12-02-23 @ 21:02)  HR: 54 (12-03-23 @ 04:53) (46 - 54)  BP: 149/57 (12-03-23 @ 04:53) (149/57 - 174/55)  RR: 18 (12-03-23 @ 04:53) (18 - 18)  SpO2: 96% (12-03-23 @ 04:53) (96% - 98%)  Wt(kg): --    I&O's Summary    02 Dec 2023 07:01  -  03 Dec 2023 07:00  --------------------------------------------------------  IN: 470 mL / OUT: 300 mL / NET: 170 mL      Appearance: frail elderly woman in no acute distress, awake	  HEENT:   Normal oral mucosa, PERRL, EOMI	  Lymphatic: No lymphadenopathy , no edema  Cardiovascular: Normal S1 S2, No JVD, No murmurs , Peripheral pulses palpable 2+ bilaterally  Respiratory: Lungs clear to auscultation, normal effort 	  Gastrointestinal:  Soft, Non-tender, + BS	  Skin: No rashes, No ecchymoses, No cyanosis, warm to touch  Musculoskeletal: Normal range of motion, normal strength  Psychiatry:  Mood & affect appropriate    TELEMETRY: sinus bradycardia 40-55bpm.  narrow QRS	    ECG:  sinus bradycardia 50s.  Echo:  < from: TTE W or WO Ultrasound Enhancing Agent (11.29.23 @ 12:42) >  CONCLUSIONS:      1. Technically difficult image quality.   2. Left ventricular cavity is severely dilated. Left ventricular wall thickness is normal. Left ventricular systolic function is moderately to severely decreased with an ejection fraction of 39 % by Lomeli's method of disks. Regional wall motion abnormalities consistent with ischemic heart disease.   3. Multiple segmental abnormalities exist. See findings.   4. Moderate aortic regurgitation.    ________________________________________________________________________________________  FINDINGS:     Left Ventricle:  After obtainingconsent, Definity ultrasound enhancing agent was given for enhanced left ventricular opacification and improved delineation of the left ventricular endocardial borders. The left ventricular cavity is severely dilated. Left ventricular wall thickness is normal. Left ventricular systolic function is moderately to severely decreased with a calculated ejection fraction of 39 % by the Lomeli's biplane method of disks. There are regional wall motion abnormalities consistent with ischemic heart disease. Impaired relaxation with normal wall motion. There is no evidence of a left ventricular thrombus.  LV Wall Scoring: The apical septal segment, apical lateral segment, apical  inferior segment, and apex are aneurysmal. The mid anteroseptal segment, mid  inferoseptal segment, and mid inferior segment are akinetic. The basal and mid  anterolateral wall is hypokinetic. All remaining scored segments are normal.          Right Ventricle:  The right ventricular cavity is normal in size and reduced systolic function. Tricuspid annular plane systolic excursion (TAPSE) is 1.3 cm (normal >=1.7 cm).     Left Atrium:  The left atrium is moderately dilated with an indexed volume of 30.69 ml/m².     Right Atrium:  The right atrium is normal in size with anindexed volume of 12.65 ml/m².     Interatrial Septum:  The interatrial septum appears intact.     Aortic Valve:  There is moderate calcification of the aortic valve leaflets. There is mild aortic stenosis. There is moderate aortic regurgitation. AI VMax is 4.59 m/s. AI pressure half time is 642 msec. AI slope is 2.14 m/s².     Mitral Valve:  Structurally normal mitral valve with normal leaflet excursion. There is calcification of the mitral valve annulus. There is trace mitral regurgitation.     Tricuspid Valve:  Structurally normal tricuspid valve with normal leaflet excursion. There is mild tricuspid regurgitation. Estimated pulmonary artery systolic pressure is 27 mmHg, consistent with normal pulmonary artery pressure.     Pulmonic Valve:  Structurally normal pulmonic valve with normal leaflet excursion.     Aorta:  The aortic annulus and aortic root appear normal in size.     Pericardium:  No pericardial effusion seen.     Systemic Veins:  The inferior vena cava is normal in size (normal <2.1cm) with normal inspiratory collapse (normal >50%) consistent with normal right atrial pressure (~3, range 0-5mmHg).    < end of copied text >    Cath: Prior LAD PCI.  LCx PCI in 2010.	     ASSESSMENT/PLAN: Ms Thurman is a pleasant 89y Female admitted w/ failure to thrive.  Question of fall vs fainting outside of hospital.  Has chronic systolic CHF due to ischemic/infarct mediated cardiomyopathy, with LVEF now in 35-40% range, and anterior/septal/apical/inferoseptal aneurysm.  Telemetry shows sinus bradycardia 40s-50s, and she is unable to tolerate beta blockers due to this low resting heartrate.  Blood pressure is adequate at rest, but orthostatically very sensitive, and she becomes symptomatically lightheaded.  Awaiting PT eval re: safety w/ ambulation.  She is dependent on [facility] for ADL assistance at home.    Awaiting GI eval re: ability to progress her diet.  Able to state she is not hungry for heavier foods (sandwich, fish, etc), but is willing to try ice cream for example.  Typically, I would offer a permanent pacemaker (in this case conduction system pacing) to support beta blocker use in CHF/CAD.  In the setting of Ms Thurman's advanced age, failure to thrive, organ dysfunction, and inability to ambulate/exercise safelty, this may not be a helpful intervention regarding her global plan of care.  Recommend to hold AV ayaz blockers. Maintain telemetry. Correct K to 4-4.5, Mg to 2.  Will followup w/ patient and her family prior to discharge to determine if permanent pacemaker utilization will help her recover and stay well.

## 2023-12-04 LAB
ANION GAP SERPL CALC-SCNC: 12 MMOL/L — SIGNIFICANT CHANGE UP (ref 5–17)
ANION GAP SERPL CALC-SCNC: 12 MMOL/L — SIGNIFICANT CHANGE UP (ref 5–17)
BLD GP AB SCN SERPL QL: NEGATIVE — SIGNIFICANT CHANGE UP
BLD GP AB SCN SERPL QL: NEGATIVE — SIGNIFICANT CHANGE UP
BUN SERPL-MCNC: 44 MG/DL — HIGH (ref 7–23)
BUN SERPL-MCNC: 44 MG/DL — HIGH (ref 7–23)
CALCIUM SERPL-MCNC: 8.4 MG/DL — SIGNIFICANT CHANGE UP (ref 8.4–10.5)
CALCIUM SERPL-MCNC: 8.4 MG/DL — SIGNIFICANT CHANGE UP (ref 8.4–10.5)
CHLORIDE SERPL-SCNC: 109 MMOL/L — HIGH (ref 96–108)
CHLORIDE SERPL-SCNC: 109 MMOL/L — HIGH (ref 96–108)
CO2 SERPL-SCNC: 22 MMOL/L — SIGNIFICANT CHANGE UP (ref 22–31)
CO2 SERPL-SCNC: 22 MMOL/L — SIGNIFICANT CHANGE UP (ref 22–31)
CREAT SERPL-MCNC: 5.02 MG/DL — HIGH (ref 0.5–1.3)
CREAT SERPL-MCNC: 5.02 MG/DL — HIGH (ref 0.5–1.3)
EGFR: 8 ML/MIN/1.73M2 — LOW
EGFR: 8 ML/MIN/1.73M2 — LOW
GLUCOSE BLDC GLUCOMTR-MCNC: 114 MG/DL — HIGH (ref 70–99)
GLUCOSE BLDC GLUCOMTR-MCNC: 114 MG/DL — HIGH (ref 70–99)
GLUCOSE BLDC GLUCOMTR-MCNC: 173 MG/DL — HIGH (ref 70–99)
GLUCOSE BLDC GLUCOMTR-MCNC: 173 MG/DL — HIGH (ref 70–99)
GLUCOSE BLDC GLUCOMTR-MCNC: 75 MG/DL — SIGNIFICANT CHANGE UP (ref 70–99)
GLUCOSE BLDC GLUCOMTR-MCNC: 75 MG/DL — SIGNIFICANT CHANGE UP (ref 70–99)
GLUCOSE BLDC GLUCOMTR-MCNC: 76 MG/DL — SIGNIFICANT CHANGE UP (ref 70–99)
GLUCOSE BLDC GLUCOMTR-MCNC: 76 MG/DL — SIGNIFICANT CHANGE UP (ref 70–99)
GLUCOSE BLDC GLUCOMTR-MCNC: 82 MG/DL — SIGNIFICANT CHANGE UP (ref 70–99)
GLUCOSE BLDC GLUCOMTR-MCNC: 82 MG/DL — SIGNIFICANT CHANGE UP (ref 70–99)
GLUCOSE BLDC GLUCOMTR-MCNC: 85 MG/DL — SIGNIFICANT CHANGE UP (ref 70–99)
GLUCOSE BLDC GLUCOMTR-MCNC: 85 MG/DL — SIGNIFICANT CHANGE UP (ref 70–99)
GLUCOSE SERPL-MCNC: 98 MG/DL — SIGNIFICANT CHANGE UP (ref 70–99)
GLUCOSE SERPL-MCNC: 98 MG/DL — SIGNIFICANT CHANGE UP (ref 70–99)
HCT VFR BLD CALC: 26.1 % — LOW (ref 34.5–45)
HCT VFR BLD CALC: 26.1 % — LOW (ref 34.5–45)
HCT VFR BLD CALC: 30.3 % — LOW (ref 34.5–45)
HCT VFR BLD CALC: 30.3 % — LOW (ref 34.5–45)
HGB BLD-MCNC: 8.1 G/DL — LOW (ref 11.5–15.5)
HGB BLD-MCNC: 8.1 G/DL — LOW (ref 11.5–15.5)
HGB BLD-MCNC: 9.4 G/DL — LOW (ref 11.5–15.5)
HGB BLD-MCNC: 9.4 G/DL — LOW (ref 11.5–15.5)
MAGNESIUM SERPL-MCNC: 1.9 MG/DL — SIGNIFICANT CHANGE UP (ref 1.6–2.6)
MAGNESIUM SERPL-MCNC: 1.9 MG/DL — SIGNIFICANT CHANGE UP (ref 1.6–2.6)
MCHC RBC-ENTMCNC: 25.8 PG — LOW (ref 27–34)
MCHC RBC-ENTMCNC: 25.8 PG — LOW (ref 27–34)
MCHC RBC-ENTMCNC: 25.9 PG — LOW (ref 27–34)
MCHC RBC-ENTMCNC: 25.9 PG — LOW (ref 27–34)
MCHC RBC-ENTMCNC: 31 GM/DL — LOW (ref 32–36)
MCV RBC AUTO: 83.1 FL — SIGNIFICANT CHANGE UP (ref 80–100)
MCV RBC AUTO: 83.1 FL — SIGNIFICANT CHANGE UP (ref 80–100)
MCV RBC AUTO: 83.5 FL — SIGNIFICANT CHANGE UP (ref 80–100)
MCV RBC AUTO: 83.5 FL — SIGNIFICANT CHANGE UP (ref 80–100)
NRBC # BLD: 0 /100 WBCS — SIGNIFICANT CHANGE UP (ref 0–0)
PHOSPHATE SERPL-MCNC: 2.6 MG/DL — SIGNIFICANT CHANGE UP (ref 2.5–4.5)
PHOSPHATE SERPL-MCNC: 2.6 MG/DL — SIGNIFICANT CHANGE UP (ref 2.5–4.5)
PLATELET # BLD AUTO: 158 K/UL — SIGNIFICANT CHANGE UP (ref 150–400)
PLATELET # BLD AUTO: 158 K/UL — SIGNIFICANT CHANGE UP (ref 150–400)
PLATELET # BLD AUTO: 163 K/UL — SIGNIFICANT CHANGE UP (ref 150–400)
PLATELET # BLD AUTO: 163 K/UL — SIGNIFICANT CHANGE UP (ref 150–400)
POTASSIUM SERPL-MCNC: 3.5 MMOL/L — SIGNIFICANT CHANGE UP (ref 3.5–5.3)
POTASSIUM SERPL-MCNC: 3.5 MMOL/L — SIGNIFICANT CHANGE UP (ref 3.5–5.3)
POTASSIUM SERPL-SCNC: 3.5 MMOL/L — SIGNIFICANT CHANGE UP (ref 3.5–5.3)
POTASSIUM SERPL-SCNC: 3.5 MMOL/L — SIGNIFICANT CHANGE UP (ref 3.5–5.3)
RBC # BLD: 3.14 M/UL — LOW (ref 3.8–5.2)
RBC # BLD: 3.14 M/UL — LOW (ref 3.8–5.2)
RBC # BLD: 3.63 M/UL — LOW (ref 3.8–5.2)
RBC # BLD: 3.63 M/UL — LOW (ref 3.8–5.2)
RBC # FLD: 15.4 % — HIGH (ref 10.3–14.5)
RBC # FLD: 15.4 % — HIGH (ref 10.3–14.5)
RBC # FLD: 15.5 % — HIGH (ref 10.3–14.5)
RBC # FLD: 15.5 % — HIGH (ref 10.3–14.5)
RH IG SCN BLD-IMP: POSITIVE — SIGNIFICANT CHANGE UP
RH IG SCN BLD-IMP: POSITIVE — SIGNIFICANT CHANGE UP
SODIUM SERPL-SCNC: 143 MMOL/L — SIGNIFICANT CHANGE UP (ref 135–145)
SODIUM SERPL-SCNC: 143 MMOL/L — SIGNIFICANT CHANGE UP (ref 135–145)
WBC # BLD: 6.27 K/UL — SIGNIFICANT CHANGE UP (ref 3.8–10.5)
WBC # BLD: 6.27 K/UL — SIGNIFICANT CHANGE UP (ref 3.8–10.5)
WBC # BLD: 8.07 K/UL — SIGNIFICANT CHANGE UP (ref 3.8–10.5)
WBC # BLD: 8.07 K/UL — SIGNIFICANT CHANGE UP (ref 3.8–10.5)
WBC # FLD AUTO: 6.27 K/UL — SIGNIFICANT CHANGE UP (ref 3.8–10.5)
WBC # FLD AUTO: 6.27 K/UL — SIGNIFICANT CHANGE UP (ref 3.8–10.5)
WBC # FLD AUTO: 8.07 K/UL — SIGNIFICANT CHANGE UP (ref 3.8–10.5)
WBC # FLD AUTO: 8.07 K/UL — SIGNIFICANT CHANGE UP (ref 3.8–10.5)

## 2023-12-04 PROCEDURE — 99233 SBSQ HOSP IP/OBS HIGH 50: CPT | Mod: GC

## 2023-12-04 PROCEDURE — 99232 SBSQ HOSP IP/OBS MODERATE 35: CPT

## 2023-12-04 RX ORDER — HEPARIN SODIUM 5000 [USP'U]/ML
INJECTION INTRAVENOUS; SUBCUTANEOUS
Qty: 25000 | Refills: 0 | Status: DISCONTINUED | OUTPATIENT
Start: 2023-12-04 | End: 2023-12-06

## 2023-12-04 RX ORDER — HEPARIN SODIUM 5000 [USP'U]/ML
4000 INJECTION INTRAVENOUS; SUBCUTANEOUS ONCE
Refills: 0 | Status: COMPLETED | OUTPATIENT
Start: 2023-12-04 | End: 2023-12-04

## 2023-12-04 RX ORDER — HEPARIN SODIUM 5000 [USP'U]/ML
4000 INJECTION INTRAVENOUS; SUBCUTANEOUS EVERY 6 HOURS
Refills: 0 | Status: DISCONTINUED | OUTPATIENT
Start: 2023-12-04 | End: 2023-12-07

## 2023-12-04 RX ORDER — METOPROLOL TARTRATE 50 MG
5 TABLET ORAL ONCE
Refills: 0 | Status: COMPLETED | OUTPATIENT
Start: 2023-12-04 | End: 2023-12-04

## 2023-12-04 RX ORDER — DEXTROSE 50 % IN WATER 50 %
12.5 SYRINGE (ML) INTRAVENOUS ONCE
Refills: 0 | Status: COMPLETED | OUTPATIENT
Start: 2023-12-04 | End: 2023-12-04

## 2023-12-04 RX ORDER — HEPARIN SODIUM 5000 [USP'U]/ML
2000 INJECTION INTRAVENOUS; SUBCUTANEOUS EVERY 6 HOURS
Refills: 0 | Status: DISCONTINUED | OUTPATIENT
Start: 2023-12-04 | End: 2023-12-07

## 2023-12-04 RX ADMIN — Medication 1300 MILLIGRAM(S): at 06:39

## 2023-12-04 RX ADMIN — ATORVASTATIN CALCIUM 80 MILLIGRAM(S): 80 TABLET, FILM COATED ORAL at 23:37

## 2023-12-04 RX ADMIN — CHLORHEXIDINE GLUCONATE 1 APPLICATION(S): 213 SOLUTION TOPICAL at 12:34

## 2023-12-04 RX ADMIN — Medication 5 MILLIGRAM(S): at 01:53

## 2023-12-04 RX ADMIN — POLYETHYLENE GLYCOL 3350 17 GRAM(S): 17 POWDER, FOR SOLUTION ORAL at 12:15

## 2023-12-04 RX ADMIN — HEPARIN SODIUM 5000 UNIT(S): 5000 INJECTION INTRAVENOUS; SUBCUTANEOUS at 06:47

## 2023-12-04 RX ADMIN — PANTOPRAZOLE SODIUM 40 MILLIGRAM(S): 20 TABLET, DELAYED RELEASE ORAL at 06:39

## 2023-12-04 RX ADMIN — Medication 1 MILLIGRAM(S): at 12:15

## 2023-12-04 RX ADMIN — Medication 1300 MILLIGRAM(S): at 16:50

## 2023-12-04 RX ADMIN — HEPARIN SODIUM 4000 UNIT(S): 5000 INJECTION INTRAVENOUS; SUBCUTANEOUS at 16:43

## 2023-12-04 RX ADMIN — ESCITALOPRAM OXALATE 10 MILLIGRAM(S): 10 TABLET, FILM COATED ORAL at 12:15

## 2023-12-04 RX ADMIN — Medication 2000 UNIT(S): at 12:14

## 2023-12-04 RX ADMIN — HEPARIN SODIUM 1000 UNIT(S)/HR: 5000 INJECTION INTRAVENOUS; SUBCUTANEOUS at 16:44

## 2023-12-04 RX ADMIN — Medication 1300 MILLIGRAM(S): at 23:38

## 2023-12-04 NOTE — PROGRESS NOTE ADULT - SUBJECTIVE AND OBJECTIVE BOX
EP  Date of service 12/4  New dx of AFib/RVR overnight.  Patient apparently asymptomatic, not complaining of chest pain or palpitations.     acetaminophen     Tablet .. 650 milliGRAM(s) Oral every 6 hours PRN  aluminum hydroxide/magnesium hydroxide/simethicone Suspension 30 milliLiter(s) Oral every 4 hours PRN  atorvastatin 80 milliGRAM(s) Oral at bedtime  chlorhexidine 2% Cloths 1 Application(s) Topical daily  cholecalciferol 2000 Unit(s) Oral daily  dextrose 5% + sodium chloride 0.45% with potassium chloride 20 mEq/L 1000 milliLiter(s) IV Continuous <Continuous>  dextrose 5%. 1000 milliLiter(s) IV Continuous <Continuous>  dextrose 5%. 1000 milliLiter(s) IV Continuous <Continuous>  dextrose 50% Injectable 25 Gram(s) IV Push once  dextrose 50% Injectable 25 Gram(s) IV Push once  dextrose 50% Injectable 12.5 Gram(s) IV Push once  dextrose Oral Gel 15 Gram(s) Oral once PRN  escitalopram 10 milliGRAM(s) Oral daily  folic acid 1 milliGRAM(s) Oral daily  glucagon  Injectable 1 milliGRAM(s) IntraMuscular once  heparin   Injectable 5000 Unit(s) SubCutaneous every 12 hours  insulin lispro (ADMELOG) corrective regimen sliding scale   SubCutaneous at bedtime  insulin lispro (ADMELOG) corrective regimen sliding scale   SubCutaneous three times a day before meals  melatonin 6 milliGRAM(s) Oral at bedtime  ondansetron Injectable 4 milliGRAM(s) IV Push every 8 hours PRN  pantoprazole    Tablet 40 milliGRAM(s) Oral before breakfast  polyethylene glycol 3350 17 Gram(s) Oral daily  senna 2 Tablet(s) Oral at bedtime  sodium bicarbonate 1300 milliGRAM(s) Oral three times a day                        8.1    6.27  )-----------( 163      ( 04 Dec 2023 07:06 )             26.1       12-04    143  |  109<H>  |  44<H>  ----------------------------<  98  3.5   |  22  |  5.02<H>    Ca    8.4      04 Dec 2023 07:06  Phos  2.6     12-04  Mg     1.9     12-04    TPro  4.9<L>  /  Alb  2.7<L>  /  TBili  0.7  /  DBili  x   /  AST  11  /  ALT  13  /  AlkPhos  36<L>  12-03    T(C): 36.5 (12-04-23 @ 05:34), Max: 36.6 (12-03-23 @ 20:59)  HR: 75 (12-04-23 @ 05:34) (44 - 120)  BP: 125/70 (12-04-23 @ 05:34) (120/68 - 173/56)  RR: 18 (12-04-23 @ 05:34) (16 - 18)  SpO2: 97% (12-04-23 @ 05:34) (95% - 99%)  Wt(kg): --    I&O's Summary    03 Dec 2023 07:01  -  04 Dec 2023 07:00  --------------------------------------------------------  IN: 2030 mL / OUT: 0 mL / NET: 2030 mL    Appearance: frail elderly woman in no acute distress, awake	  HEENT:   Normal oral mucosa, PERRL, EOMI	  Lymphatic: No lymphadenopathy , no edema  Cardiovascular: Normal S1 S2, No JVD, No murmurs , Peripheral pulses palpable 2+ bilaterally  Respiratory: Lungs clear to auscultation, normal effort 	  Gastrointestinal:  Soft, Non-tender, + BS	  Skin: No rashes, No ecchymoses, No cyanosis, warm to touch  Musculoskeletal: Normal range of motion, normal strength  Psychiatry:  Mood & affect appropriate    TELEMETRY: sinus bradycardia 50-59bpm.  narrow QRS	 . paroxysmal AFib/RVR overnight  ECG:  sinus bradycardia 50s.  Echo:  < from: TTE W or WO Ultrasound Enhancing Agent (11.29.23 @ 12:42) >  CONCLUSIONS:      1. Technically difficult image quality.   2. Left ventricular cavity is severely dilated. Left ventricular wall thickness is normal. Left ventricular systolic function is moderately to severely decreased with an ejection fraction of 39 % by Lomeli's method of disks. Regional wall motion abnormalities consistent with ischemic heart disease.   3. Multiple segmental abnormalities exist. See findings.   4. Moderate aortic regurgitation.    ________________________________________________________________________________________  FINDINGS:     Left Ventricle:  After obtainingconsent, Definity ultrasound enhancing agent was given for enhanced left ventricular opacification and improved delineation of the left ventricular endocardial borders. The left ventricular cavity is severely dilated. Left ventricular wall thickness is normal. Left ventricular systolic function is moderately to severely decreased with a calculated ejection fraction of 39 % by the Lomeli's biplane method of disks. There are regional wall motion abnormalities consistent with ischemic heart disease. Impaired relaxation with normal wall motion. There is no evidence of a left ventricular thrombus.  LV Wall Scoring: The apical septal segment, apical lateral segment, apical  inferior segment, and apex are aneurysmal. The mid anteroseptal segment, mid  inferoseptal segment, and mid inferior segment are akinetic. The basal and mid  anterolateral wall is hypokinetic. All remaining scored segments are normal.     Right Ventricle:  The right ventricular cavity is normal in size and reduced systolic function. Tricuspid annular plane systolic excursion (TAPSE) is 1.3 cm (normal >=1.7 cm).     Left Atrium:  The left atrium is moderately dilated with an indexed volume of 30.69 ml/m².     Right Atrium:  The right atrium is normal in size with anindexed volume of 12.65 ml/m².     Interatrial Septum:  The interatrial septum appears intact.     Aortic Valve:  There is moderate calcification of the aortic valve leaflets. There is mild aortic stenosis. There is moderate aortic regurgitation. AI VMax is 4.59 m/s. AI pressure half time is 642 msec. AI slope is 2.14 m/s².     Mitral Valve:  Structurally normal mitral valve with normal leaflet excursion. There is calcification of the mitral valve annulus. There is trace mitral regurgitation.     Tricuspid Valve:  Structurally normal tricuspid valve with normal leaflet excursion. There is mild tricuspid regurgitation. Estimated pulmonary artery systolic pressure is 27 mmHg, consistent with normal pulmonary artery pressure.     Pulmonic Valve:  Structurally normal pulmonic valve with normal leaflet excursion.     Aorta:  The aortic annulus and aortic root appear normal in size.     Pericardium:  No pericardial effusion seen.     Systemic Veins:  The inferior vena cava is normal in size (normal <2.1cm) with normal inspiratory collapse (normal >50%) consistent with normal right atrial pressure (~3, range 0-5mmHg).    < end of copied text >    Cath: Prior LAD PCI.  LCx PCI in 2010.	     ASSESSMENT/PLAN: Ms Thurman is a pleasant 89y Female admitted w/ failure to thrive.  Question of fall vs fainting outside of hospital.  Has chronic systolic CHF due to ischemic/infarct mediated cardiomyopathy, with LVEF now in 35-40% range, and anterior/septal/apical/inferoseptal aneurysm.  Telemetry shows sinus bradycardia 50s, and she is unable to tolerate beta blockers due to this low resting heartrate.  Blood pressure is adequate at rest, but orthostatically very sensitive, and she becomes symptomatically lightheaded.  New diagnosis identified- paroxysmal AFib. This goes with her already mentioned sinus node dysfunction.  Apparently asymptomatic.  Would usually recommend anticoagulation for stroke prevention (her [id number] is >5), however she has LESLIE on CKD (with GFR<15 at this time), and is anemic.    Need to determine goals of care beyond code status (DNR/DNI)  At this time not offering a permanent pacemaker, unless we think it is going to significantly improve her functional status.      Awaiting PT eval re: safety w/ ambulation.  She is dependent on [facility] for ADL assistance at home.    Awaiting GI eval re: ability to progress her diet.   Recommend to hold AV ayaz blockers. Maintain telemetry. Correct K to 4-4.5, Mg to 2.    Will follow with you.    Georges Calderón M.D.  Cardiac Electrophysiology  [phone]   EP  Date of service 12/4  New dx of AFib/RVR overnight.  Patient apparently asymptomatic, not complaining of chest pain or palpitations.     acetaminophen     Tablet .. 650 milliGRAM(s) Oral every 6 hours PRN  aluminum hydroxide/magnesium hydroxide/simethicone Suspension 30 milliLiter(s) Oral every 4 hours PRN  atorvastatin 80 milliGRAM(s) Oral at bedtime  chlorhexidine 2% Cloths 1 Application(s) Topical daily  cholecalciferol 2000 Unit(s) Oral daily  dextrose 5% + sodium chloride 0.45% with potassium chloride 20 mEq/L 1000 milliLiter(s) IV Continuous <Continuous>  dextrose 5%. 1000 milliLiter(s) IV Continuous <Continuous>  dextrose 5%. 1000 milliLiter(s) IV Continuous <Continuous>  dextrose 50% Injectable 25 Gram(s) IV Push once  dextrose 50% Injectable 25 Gram(s) IV Push once  dextrose 50% Injectable 12.5 Gram(s) IV Push once  dextrose Oral Gel 15 Gram(s) Oral once PRN  escitalopram 10 milliGRAM(s) Oral daily  folic acid 1 milliGRAM(s) Oral daily  glucagon  Injectable 1 milliGRAM(s) IntraMuscular once  heparin   Injectable 5000 Unit(s) SubCutaneous every 12 hours  insulin lispro (ADMELOG) corrective regimen sliding scale   SubCutaneous at bedtime  insulin lispro (ADMELOG) corrective regimen sliding scale   SubCutaneous three times a day before meals  melatonin 6 milliGRAM(s) Oral at bedtime  ondansetron Injectable 4 milliGRAM(s) IV Push every 8 hours PRN  pantoprazole    Tablet 40 milliGRAM(s) Oral before breakfast  polyethylene glycol 3350 17 Gram(s) Oral daily  senna 2 Tablet(s) Oral at bedtime  sodium bicarbonate 1300 milliGRAM(s) Oral three times a day                        8.1    6.27  )-----------( 163      ( 04 Dec 2023 07:06 )             26.1       12-04    143  |  109<H>  |  44<H>  ----------------------------<  98  3.5   |  22  |  5.02<H>    Ca    8.4      04 Dec 2023 07:06  Phos  2.6     12-04  Mg     1.9     12-04    TPro  4.9<L>  /  Alb  2.7<L>  /  TBili  0.7  /  DBili  x   /  AST  11  /  ALT  13  /  AlkPhos  36<L>  12-03    T(C): 36.5 (12-04-23 @ 05:34), Max: 36.6 (12-03-23 @ 20:59)  HR: 75 (12-04-23 @ 05:34) (44 - 120)  BP: 125/70 (12-04-23 @ 05:34) (120/68 - 173/56)  RR: 18 (12-04-23 @ 05:34) (16 - 18)  SpO2: 97% (12-04-23 @ 05:34) (95% - 99%)  Wt(kg): --    I&O's Summary    03 Dec 2023 07:01  -  04 Dec 2023 07:00  --------------------------------------------------------  IN: 2030 mL / OUT: 0 mL / NET: 2030 mL    Appearance: frail elderly woman in no acute distress, awake	  HEENT:   Normal oral mucosa, PERRL, EOMI	  Lymphatic: No lymphadenopathy , no edema  Cardiovascular: Normal S1 S2, No JVD, No murmurs , Peripheral pulses palpable 2+ bilaterally  Respiratory: Lungs clear to auscultation, normal effort 	  Gastrointestinal:  Soft, Non-tender, + BS	  Skin: No rashes, No ecchymoses, No cyanosis, warm to touch  Musculoskeletal: Normal range of motion, normal strength  Psychiatry:  Mood & affect appropriate    TELEMETRY: sinus bradycardia 50-59bpm.  narrow QRS	 . paroxysmal AFib/RVR overnight  ECG:  sinus bradycardia 50s.  Echo:  < from: TTE W or WO Ultrasound Enhancing Agent (11.29.23 @ 12:42) >  CONCLUSIONS:      1. Technically difficult image quality.   2. Left ventricular cavity is severely dilated. Left ventricular wall thickness is normal. Left ventricular systolic function is moderately to severely decreased with an ejection fraction of 39 % by Lomeli's method of disks. Regional wall motion abnormalities consistent with ischemic heart disease.   3. Multiple segmental abnormalities exist. See findings.   4. Moderate aortic regurgitation.    ________________________________________________________________________________________  FINDINGS:     Left Ventricle:  After obtainingconsent, Definity ultrasound enhancing agent was given for enhanced left ventricular opacification and improved delineation of the left ventricular endocardial borders. The left ventricular cavity is severely dilated. Left ventricular wall thickness is normal. Left ventricular systolic function is moderately to severely decreased with a calculated ejection fraction of 39 % by the Lomeli's biplane method of disks. There are regional wall motion abnormalities consistent with ischemic heart disease. Impaired relaxation with normal wall motion. There is no evidence of a left ventricular thrombus.  LV Wall Scoring: The apical septal segment, apical lateral segment, apical  inferior segment, and apex are aneurysmal. The mid anteroseptal segment, mid  inferoseptal segment, and mid inferior segment are akinetic. The basal and mid  anterolateral wall is hypokinetic. All remaining scored segments are normal.     Right Ventricle:  The right ventricular cavity is normal in size and reduced systolic function. Tricuspid annular plane systolic excursion (TAPSE) is 1.3 cm (normal >=1.7 cm).     Left Atrium:  The left atrium is moderately dilated with an indexed volume of 30.69 ml/m².     Right Atrium:  The right atrium is normal in size with anindexed volume of 12.65 ml/m².     Interatrial Septum:  The interatrial septum appears intact.     Aortic Valve:  There is moderate calcification of the aortic valve leaflets. There is mild aortic stenosis. There is moderate aortic regurgitation. AI VMax is 4.59 m/s. AI pressure half time is 642 msec. AI slope is 2.14 m/s².     Mitral Valve:  Structurally normal mitral valve with normal leaflet excursion. There is calcification of the mitral valve annulus. There is trace mitral regurgitation.     Tricuspid Valve:  Structurally normal tricuspid valve with normal leaflet excursion. There is mild tricuspid regurgitation. Estimated pulmonary artery systolic pressure is 27 mmHg, consistent with normal pulmonary artery pressure.     Pulmonic Valve:  Structurally normal pulmonic valve with normal leaflet excursion.     Aorta:  The aortic annulus and aortic root appear normal in size.     Pericardium:  No pericardial effusion seen.     Systemic Veins:  The inferior vena cava is normal in size (normal <2.1cm) with normal inspiratory collapse (normal >50%) consistent with normal right atrial pressure (~3, range 0-5mmHg).    < end of copied text >    Cath: Prior LAD PCI.  LCx PCI in 2010.	     ASSESSMENT/PLAN: Ms Thurman is a pleasant 89y Female admitted w/ failure to thrive.  Question of fall vs fainting outside of hospital.  Has chronic systolic CHF due to ischemic/infarct mediated cardiomyopathy, with LVEF now in 35-40% range, and anterior/septal/apical/inferoseptal aneurysm.  Telemetry shows sinus bradycardia 50s, and she is unable to tolerate beta blockers due to this low resting heartrate.  Blood pressure is adequate at rest, but orthostatically very sensitive, and she becomes symptomatically lightheaded.  New diagnosis identified- paroxysmal AFib. This goes with her already mentioned sinus node dysfunction.  Apparently asymptomatic.  Would usually recommend anticoagulation for stroke prevention (her [id number] is >5), however she has LESLIE on CKD (with GFR<15 at this time), and is anemic.    Need to determine goals of care beyond code status (DNR/DNI)  At this time not offering a permanent pacemaker, unless we think it is going to significantly improve her functional status.      Awaiting PT eval re: safety w/ ambulation.  She is dependent on [facility] for ADL assistance at home.    Awaiting GI eval re: ability to progress her diet.   Recommend to hold AV ayaz blockers. Maintain telemetry. Correct K to 4-4.5, Mg to 2.    Will follow with you.    Georges Calderón M.D.  Cardiac Electrophysiology  [phone]

## 2023-12-04 NOTE — PROGRESS NOTE ADULT - PROBLEM SELECTOR PLAN 2
- on tele, sinus wilber 45-50s  - appreciate EP recs  - avoid AV ayaz blockers - on tele, sinus wilber 45-50s  - appreciate EP recs, no plans for PPM at this time  - avoid AV ayaz blockers

## 2023-12-04 NOTE — PROGRESS NOTE ADULT - PROBLEM SELECTOR PLAN 6
- peptic ulcer disease vs. pancreatitis vs. functional  - no findings on CT AP of pancreatitis, does not have classic history  - never had endoscopy to evaluate  - will trial protonix 40mg qd   - esophogram attempted 12/1 - unable to tolerate; did not participate and follow commands  - family would like to reattempt esophogram - maybe on 12/4?  - GI following. Will perform calorie count. Family interested in PEG, would require scope. Continue GOC discussions. - peptic ulcer disease vs. pancreatitis vs. functional  - no findings on CT AP of pancreatitis, does not have classic history  - never had endoscopy to evaluate  - will trial protonix 40mg qd   - esophogram attempted 12/1 - unable to tolerate; did not participate and follow commands  - family would like to reattempt esophogram - maybe on 12/6  - GI following. Will perform calorie count. Family interested in PEG, would require scope. Continue GOC discussions.

## 2023-12-04 NOTE — PROVIDER CONTACT NOTE (OTHER) - ACTION/TREATMENT ORDERED:
MD ok with giving 12.5mg dextrose IVP MD lobo with giving 12.5mg dextrose IVP; encourage PO intake; monitor HR, EP following and does not recommend PPM at this time

## 2023-12-04 NOTE — PROGRESS NOTE ADULT - SUBJECTIVE AND OBJECTIVE BOX
[facility] Division of Kidney Diseases & Hypertension  FOLLOW UP NOTE  --------------------------------------------------------------------------------  Chief Complaint:    24 hour events/subjective:    feels okay       PAST HISTORY  --------------------------------------------------------------------------------  No significant changes to PMH, PSH, FHx, SHx, unless otherwise noted    ALLERGIES & MEDICATIONS  --------------------------------------------------------------------------------  Allergies    aspirin (Anaphylaxis)  Celebrex (Rash)  penicillin (Anaphylaxis)    Intolerances      Standing Inpatient Medications  atorvastatin 80 milliGRAM(s) Oral at bedtime  chlorhexidine 2% Cloths 1 Application(s) Topical daily  cholecalciferol 2000 Unit(s) Oral daily  dextrose 5% + sodium chloride 0.45% with potassium chloride 20 mEq/L 1000 milliLiter(s) IV Continuous <Continuous>  dextrose 5%. 1000 milliLiter(s) IV Continuous <Continuous>  dextrose 5%. 1000 milliLiter(s) IV Continuous <Continuous>  dextrose 50% Injectable 25 Gram(s) IV Push once  dextrose 50% Injectable 25 Gram(s) IV Push once  dextrose 50% Injectable 12.5 Gram(s) IV Push once  escitalopram 10 milliGRAM(s) Oral daily  folic acid 1 milliGRAM(s) Oral daily  glucagon  Injectable 1 milliGRAM(s) IntraMuscular once  heparin   Injectable 5000 Unit(s) SubCutaneous every 12 hours  insulin lispro (ADMELOG) corrective regimen sliding scale   SubCutaneous at bedtime  insulin lispro (ADMELOG) corrective regimen sliding scale   SubCutaneous three times a day before meals  melatonin 6 milliGRAM(s) Oral at bedtime  pantoprazole    Tablet 40 milliGRAM(s) Oral before breakfast  polyethylene glycol 3350 17 Gram(s) Oral daily  senna 2 Tablet(s) Oral at bedtime  sodium bicarbonate 1300 milliGRAM(s) Oral three times a day    PRN Inpatient Medications  acetaminophen     Tablet .. 650 milliGRAM(s) Oral every 6 hours PRN  aluminum hydroxide/magnesium hydroxide/simethicone Suspension 30 milliLiter(s) Oral every 4 hours PRN  dextrose Oral Gel 15 Gram(s) Oral once PRN  ondansetron Injectable 4 milliGRAM(s) IV Push every 8 hours PRN      VITALS/PHYSICAL EXAM  --------------------------------------------------------------------------------  T(C): 36.3 (12-04-23 @ 12:27), Max: 36.6 (12-03-23 @ 20:59)  HR: 41 (12-04-23 @ 12:35) (41 - 120)  BP: 171/61 (12-04-23 @ 12:27) (120/68 - 173/56)  RR: 18 (12-04-23 @ 12:27) (16 - 18)  SpO2: 94% (12-04-23 @ 12:27) (94% - 97%)  Wt(kg): --        12-03-23 @ 07:01  -  12-04-23 @ 07:00  --------------------------------------------------------  IN: 2030 mL / OUT: 0 mL / NET: 2030 mL    12-04-23 @ 07:01  -  12-04-23 @ 13:48  --------------------------------------------------------  IN: 0 mL / OUT: 300 mL / NET: -300 mL      Physical Exam:  	Gen: NAD  	HEENT: supple neck, clear oropharynx  	Pulm: CTA B/L  	CV: RRR, S1S2; no rub  	Abd: soft, nontender/nondistended  	UE: Warm, no edema; no asterixis  	LE: Warm, no edema  	  LABS/STUDIES  --------------------------------------------------------------------------------              8.1    6.27  >-----------<  163      [12-04-23 @ 07:06]              26.1     143  |  109  |  44  ----------------------------<  98      [12-04-23 @ 07:06]  3.5   |  22  |  5.02        Ca     8.4     [12-04-23 @ 07:06]      Mg     1.9     [12-04-23 @ 07:06]      Phos  2.6     [12-04-23 @ 07:06]    TPro  4.9  /  Alb  2.7  /  TBili  0.7  /  DBili  x   /  AST  11  /  ALT  13  /  AlkPhos  36  [12-03-23 @ 06:12]          Creatinine Trend:  SCr 5.02 [12-04 @ 07:06]  SCr 5.63 [12-03 @ 06:12]  SCr 5.95 [12-02 @ 10:52]  SCr 6.15 [12-01 @ 11:33]  SCr 6.22 [11-30 @ 06:41]    Urinalysis - [12-04-23 @ 07:06]      Color  / Appearance  / SG  / pH       Gluc 98 / Ketone   / Bili  / Urobili        Blood  / Protein  / Leuk Est  / Nitrite       RBC  / WBC  / Hyaline  / Gran  / Sq Epi  / Non Sq Epi  / Bacteria       Iron 30, TIBC 266, %sat 11      [12-01-23 @ 11:33]  Ferritin 42      [12-01-23 @ 11:33]       [facility] Division of Kidney Diseases & Hypertension  FOLLOW UP NOTE  --------------------------------------------------------------------------------  Chief Complaint:    24 hour events/subjective:    feels okay       PAST HISTORY  --------------------------------------------------------------------------------  No significant changes to PMH, PSH, FHx, SHx, unless otherwise noted    ALLERGIES & MEDICATIONS  --------------------------------------------------------------------------------  Allergies    aspirin (Anaphylaxis)  Celebrex (Rash)  penicillin (Anaphylaxis)    Intolerances      Standing Inpatient Medications  atorvastatin 80 milliGRAM(s) Oral at bedtime  chlorhexidine 2% Cloths 1 Application(s) Topical daily  cholecalciferol 2000 Unit(s) Oral daily  dextrose 5% + sodium chloride 0.45% with potassium chloride 20 mEq/L 1000 milliLiter(s) IV Continuous <Continuous>  dextrose 5%. 1000 milliLiter(s) IV Continuous <Continuous>  dextrose 5%. 1000 milliLiter(s) IV Continuous <Continuous>  dextrose 50% Injectable 25 Gram(s) IV Push once  dextrose 50% Injectable 25 Gram(s) IV Push once  dextrose 50% Injectable 12.5 Gram(s) IV Push once  escitalopram 10 milliGRAM(s) Oral daily  folic acid 1 milliGRAM(s) Oral daily  glucagon  Injectable 1 milliGRAM(s) IntraMuscular once  heparin   Injectable 5000 Unit(s) SubCutaneous every 12 hours  insulin lispro (ADMELOG) corrective regimen sliding scale   SubCutaneous at bedtime  insulin lispro (ADMELOG) corrective regimen sliding scale   SubCutaneous three times a day before meals  melatonin 6 milliGRAM(s) Oral at bedtime  pantoprazole    Tablet 40 milliGRAM(s) Oral before breakfast  polyethylene glycol 3350 17 Gram(s) Oral daily  senna 2 Tablet(s) Oral at bedtime  sodium bicarbonate 1300 milliGRAM(s) Oral three times a day    PRN Inpatient Medications  acetaminophen     Tablet .. 650 milliGRAM(s) Oral every 6 hours PRN  aluminum hydroxide/magnesium hydroxide/simethicone Suspension 30 milliLiter(s) Oral every 4 hours PRN  dextrose Oral Gel 15 Gram(s) Oral once PRN  ondansetron Injectable 4 milliGRAM(s) IV Push every 8 hours PRN      VITALS/PHYSICAL EXAM  --------------------------------------------------------------------------------  T(C): 36.3 (12-04-23 @ 12:27), Max: 36.6 (12-03-23 @ 20:59)  HR: 41 (12-04-23 @ 12:35) (41 - 120)  BP: 171/61 (12-04-23 @ 12:27) (120/68 - 173/56)  RR: 18 (12-04-23 @ 12:27) (16 - 18)  SpO2: 94% (12-04-23 @ 12:27) (94% - 97%)  Wt(kg): --        12-03-23 @ 07:01  -  12-04-23 @ 07:00  --------------------------------------------------------  IN: 2030 mL / OUT: 0 mL / NET: 2030 mL    12-04-23 @ 07:01  -  12-04-23 @ 13:48  --------------------------------------------------------  IN: 0 mL / OUT: 300 mL / NET: -300 mL      Physical Exam:  	Gen: NAD  	HEENT: supple neck, clear oropharynx  	Pulm: CTA B/L  	CV: RRR, S1S2; no rub  	Abd: soft, nontender/nondistended  	UE: Warm, no edema; no asterixis  	LE: Warm, no edema  	  LABS/STUDIES  --------------------------------------------------------------------------------              8.1    6.27  >-----------<  163      [12-04-23 @ 07:06]              26.1     143  |  109  |  44  ----------------------------<  98      [12-04-23 @ 07:06]  3.5   |  22  |  5.02        Ca     8.4     [12-04-23 @ 07:06]      Mg     1.9     [12-04-23 @ 07:06]      Phos  2.6     [12-04-23 @ 07:06]    TPro  4.9  /  Alb  2.7  /  TBili  0.7  /  DBili  x   /  AST  11  /  ALT  13  /  AlkPhos  36  [12-03-23 @ 06:12]          Creatinine Trend:  SCr 5.02 [12-04 @ 07:06]  SCr 5.63 [12-03 @ 06:12]  SCr 5.95 [12-02 @ 10:52]  SCr 6.15 [12-01 @ 11:33]  SCr 6.22 [11-30 @ 06:41]    Urinalysis - [12-04-23 @ 07:06]      Color  / Appearance  / SG  / pH       Gluc 98 / Ketone   / Bili  / Urobili        Blood  / Protein  / Leuk Est  / Nitrite       RBC  / WBC  / Hyaline  / Gran  / Sq Epi  / Non Sq Epi  / Bacteria       Iron 30, TIBC 266, %sat 11      [12-01-23 @ 11:33]  Ferritin 42      [12-01-23 @ 11:33]

## 2023-12-04 NOTE — PROGRESS NOTE ADULT - PROBLEM SELECTOR PLAN 10
- home regimen: Plavix 75mg qd, rosuvastatin 40mg qd, Zetia 10mg qd  - Zetia is nonformulary  - intertherapeutic interchange of rosuvastatin 40mg to lipitor 80mg qd    - Hold plavix (last dose 11/30) given possibility of PEG placement

## 2023-12-04 NOTE — PROGRESS NOTE ADULT - PROBLEM SELECTOR PLAN 5
- pt eating less 2/2 reported epigastric pain/nausea  - also suspect there is an element of progressing dementia  - fluid resuscitation  - encourage PO intake, honor dietary preferences - pt eating less 2/2 reported epigastric pain/nausea  - also suspect there is an element of progressing dementia  - fluid resuscitation  - encourage PO intake, honor dietary preferences  - Awaiting esophagram with family, plan for 12/6

## 2023-12-04 NOTE — PROGRESS NOTE ADULT - ATTENDING COMMENTS
Patient is a 89 year old female, with PMH of advanced dementia, HTN, HLD, DM2, anemia, CAD (s/p LAD and LCx stent 2010), HFrEF (EF 40-45% 2021), h/o TIA no residual deficits, h/o recurrent UTI presents w/ syncope and fall w/o trauma in the setting of 1 month of poor PO intake, nausea, vomiting, epigastric pain.  likely combination from prerenal (poor PO intake), ATN from hypotension, and obstructive component     # paroxysmal a fib/tachy- wilber  appreciate ep recs  not a candidate for PPM  LESLIE  - Cr down trending. encourage PO intake  - electrolytes WNL, normal respiratory status   - monitor BMP Mg Phos daily   - nephrology following, no urgent need for HD   - morales removed 11/30 - passed TOV    #Failure to Thrive  - poor PO intake at home and inpatient.   - CT A/P no acute intraabdominal abnormality   - family interested in PEG   - patient unable to participate in MBS F/u GI Monday for plans egd to rule out obstructive etiology (plavix was held since 12/1, needs to be held 5 days prior to PEG)  - f/u chad

## 2023-12-04 NOTE — PROGRESS NOTE ADULT - ASSESSMENT
LESLIE ATN -    hypotension, volume depletion and retention  Renal function slowly improving  Conservative management     natasha rodriguez  nephrology attending   please contact me on TEAMS   Office- [phone]     LESLIE ATN -    hypotension, volume depletion and retention  Renal function slowly improving  Conservative management     natasha rodriguez  nephrology attending   please contact me on TEAMS   Office- [phone]

## 2023-12-04 NOTE — PROGRESS NOTE ADULT - PROBLEM SELECTOR PLAN 1
- Differential for syncope includes orthostatic hypotension vs. cardiac (arrythmia, valvular disease) vs. neuro  - Pt has h/o HFrEF, so increased risk of arrythmia  - Favor orthostatic hypotension as cause as pt has positive orthostatics (103/46 --> 65/45)  - Sinus wilber on tele, HR increases with standing vitals. Unclear if causing   - TTE with LVEF 39%.   - continue to monitor on tele  - appreciate cards and EP recs - Differential for syncope includes orthostatic hypotension vs. cardiac (arrythmia, valvular disease) vs. neuro  - Pt has h/o HFrEF, so increased risk of arrythmia  - Favor orthostatic hypotension as cause as pt has positive orthostatics (103/46 --> 65/45)  - Sinus wilber on tele, HR increases with standing vitals. Unclear if causing   - TTE with LVEF 39%.   - continue to monitor on tele  - appreciate cards and EP recs, no plans for PPM at this time

## 2023-12-04 NOTE — PROGRESS NOTE ADULT - SUBJECTIVE AND OBJECTIVE BOX
PROGRESS NOTE:   Authored by Edgard Kim MD  Internal Medicine      Patient is a 89y old  Female who presents with a chief complaint of fall, LESLIE (03 Dec 2023 14:58)      SUBJECTIVE / OVERNIGHT EVENTS: NAEO, patient seen and examined at bedside    MEDICATIONS  (STANDING):  atorvastatin 80 milliGRAM(s) Oral at bedtime  chlorhexidine 2% Cloths 1 Application(s) Topical daily  cholecalciferol 2000 Unit(s) Oral daily  dextrose 5% + sodium chloride 0.45% with potassium chloride 20 mEq/L 1000 milliLiter(s) (50 mL/Hr) IV Continuous <Continuous>  dextrose 5%. 1000 milliLiter(s) (50 mL/Hr) IV Continuous <Continuous>  dextrose 5%. 1000 milliLiter(s) (100 mL/Hr) IV Continuous <Continuous>  dextrose 50% Injectable 25 Gram(s) IV Push once  dextrose 50% Injectable 25 Gram(s) IV Push once  dextrose 50% Injectable 12.5 Gram(s) IV Push once  escitalopram 10 milliGRAM(s) Oral daily  folic acid 1 milliGRAM(s) Oral daily  glucagon  Injectable 1 milliGRAM(s) IntraMuscular once  heparin   Injectable 5000 Unit(s) SubCutaneous every 12 hours  insulin lispro (ADMELOG) corrective regimen sliding scale   SubCutaneous three times a day before meals  insulin lispro (ADMELOG) corrective regimen sliding scale   SubCutaneous at bedtime  melatonin 6 milliGRAM(s) Oral at bedtime  pantoprazole    Tablet 40 milliGRAM(s) Oral before breakfast  polyethylene glycol 3350 17 Gram(s) Oral daily  senna 2 Tablet(s) Oral at bedtime  sodium bicarbonate 1300 milliGRAM(s) Oral three times a day    MEDICATIONS  (PRN):  acetaminophen     Tablet .. 650 milliGRAM(s) Oral every 6 hours PRN Temp greater or equal to 38C (100.4F), Mild Pain (1 - 3)  aluminum hydroxide/magnesium hydroxide/simethicone Suspension 30 milliLiter(s) Oral every 4 hours PRN Dyspepsia  dextrose Oral Gel 15 Gram(s) Oral once PRN Blood Glucose LESS THAN 70 milliGRAM(s)/deciliter  ondansetron Injectable 4 milliGRAM(s) IV Push every 8 hours PRN Nausea and/or Vomiting      CAPILLARY BLOOD GLUCOSE      POCT Blood Glucose.: 155 mg/dL (03 Dec 2023 21:38)  POCT Blood Glucose.: 109 mg/dL (03 Dec 2023 16:27)  POCT Blood Glucose.: 103 mg/dL (03 Dec 2023 12:12)  POCT Blood Glucose.: 86 mg/dL (03 Dec 2023 07:26)    I&O's Summary    02 Dec 2023 07:01  -  03 Dec 2023 07:00  --------------------------------------------------------  IN: 470 mL / OUT: 300 mL / NET: 170 mL    03 Dec 2023 07:01  -  04 Dec 2023 06:53  --------------------------------------------------------  IN: 1430 mL / OUT: 0 mL / NET: 1430 mL        PHYSICAL EXAM:  Vital Signs Last 24 Hrs  T(C): 36.5 (04 Dec 2023 05:34), Max: 36.6 (03 Dec 2023 20:59)  T(F): 97.7 (04 Dec 2023 05:34), Max: 97.8 (03 Dec 2023 20:59)  HR: 75 (04 Dec 2023 05:34) (44 - 120)  BP: 125/70 (04 Dec 2023 05:34) (120/68 - 173/56)  BP(mean): --  RR: 18 (04 Dec 2023 05:34) (16 - 18)  SpO2: 97% (04 Dec 2023 05:34) (95% - 99%)    Parameters below as of 04 Dec 2023 05:34  Patient On (Oxygen Delivery Method): room air      CONSTITUTIONAL: Well-groomed, in no apparent distress  EYES: No conjunctival or scleral injection, non-icteric;   ENMT: No external nasal lesions; MMM  NECK: Trachea midline without palpable neck mass; thyroid not enlarged and non-tender  RESPIRATORY: Breathing comfortably; no dullness to percussion; lungs CTA without wheeze/rhonchi/rales  CARDIOVASCULAR: +S1S2, RRR, no M/G/R; pedal pulses full and symmetric; no lower extremity edema  GASTROINTESTINAL: No palpable masses or tenderness, +BS throughout, no rebound/guarding; no hepatosplenomegaly; no hernia palpated  LYMPHATIC: No cervical LAD or tenderness  SKIN: No rashes or ulcers noted  NEUROLOGIC: CN II-XII intact; sensation intact in LEs b/l to light touch  PSYCHIATRIC: A+O x 3; mood and affect appropriate; appropriate insight and judgment    LABS:                        7.7    5.83  )-----------( 165      ( 03 Dec 2023 06:11 )             24.6     12-03    146<H>  |  109<H>  |  49<H>  ----------------------------<  81  3.0<L>   |  23  |  5.63<H>    Ca    8.5      03 Dec 2023 06:12  Phos  3.3     12-03  Mg     2.0     12-03    TPro  4.9<L>  /  Alb  2.7<L>  /  TBili  0.7  /  DBili  x   /  AST  11  /  ALT  13  /  AlkPhos  36<L>  12-03          Urinalysis Basic - ( 03 Dec 2023 06:12 )    Color: x / Appearance: x / SG: x / pH: x  Gluc: 81 mg/dL / Ketone: x  / Bili: x / Urobili: x   Blood: x / Protein: x / Nitrite: x   Leuk Esterase: x / RBC: x / WBC x   Sq Epi: x / Non Sq Epi: x / Bacteria: x          RADIOLOGY & ADDITIONAL TESTS:  Results Reviewed:   Imaging Personally Reviewed:  Electrocardiogram Personally Reviewed:    COORDINATION OF CARE:  Care Discussed with Consultants/Other Providers [Y/N]:  Prior or Outpatient Records Reviewed [Y/N]:   PROGRESS NOTE:   Authored by Edgard Kim MD  Internal Medicine      Patient is a 89y old  Female who presents with a chief complaint of fall, LESLIE (03 Dec 2023 14:58)      SUBJECTIVE / OVERNIGHT EVENTS:   Sustained Atrial fibrillaiton overnight. Initially given Lopressor 2.5 mg IV. Afib remain sustained. Given another Lopressor 5 mg IV.      Patient seen and examined at bedside    MEDICATIONS  (STANDING):  atorvastatin 80 milliGRAM(s) Oral at bedtime  chlorhexidine 2% Cloths 1 Application(s) Topical daily  cholecalciferol 2000 Unit(s) Oral daily  dextrose 5% + sodium chloride 0.45% with potassium chloride 20 mEq/L 1000 milliLiter(s) (50 mL/Hr) IV Continuous <Continuous>  dextrose 5%. 1000 milliLiter(s) (50 mL/Hr) IV Continuous <Continuous>  dextrose 5%. 1000 milliLiter(s) (100 mL/Hr) IV Continuous <Continuous>  dextrose 50% Injectable 25 Gram(s) IV Push once  dextrose 50% Injectable 25 Gram(s) IV Push once  dextrose 50% Injectable 12.5 Gram(s) IV Push once  escitalopram 10 milliGRAM(s) Oral daily  folic acid 1 milliGRAM(s) Oral daily  glucagon  Injectable 1 milliGRAM(s) IntraMuscular once  heparin   Injectable 5000 Unit(s) SubCutaneous every 12 hours  insulin lispro (ADMELOG) corrective regimen sliding scale   SubCutaneous three times a day before meals  insulin lispro (ADMELOG) corrective regimen sliding scale   SubCutaneous at bedtime  melatonin 6 milliGRAM(s) Oral at bedtime  pantoprazole    Tablet 40 milliGRAM(s) Oral before breakfast  polyethylene glycol 3350 17 Gram(s) Oral daily  senna 2 Tablet(s) Oral at bedtime  sodium bicarbonate 1300 milliGRAM(s) Oral three times a day    MEDICATIONS  (PRN):  acetaminophen     Tablet .. 650 milliGRAM(s) Oral every 6 hours PRN Temp greater or equal to 38C (100.4F), Mild Pain (1 - 3)  aluminum hydroxide/magnesium hydroxide/simethicone Suspension 30 milliLiter(s) Oral every 4 hours PRN Dyspepsia  dextrose Oral Gel 15 Gram(s) Oral once PRN Blood Glucose LESS THAN 70 milliGRAM(s)/deciliter  ondansetron Injectable 4 milliGRAM(s) IV Push every 8 hours PRN Nausea and/or Vomiting      CAPILLARY BLOOD GLUCOSE      POCT Blood Glucose.: 155 mg/dL (03 Dec 2023 21:38)  POCT Blood Glucose.: 109 mg/dL (03 Dec 2023 16:27)  POCT Blood Glucose.: 103 mg/dL (03 Dec 2023 12:12)  POCT Blood Glucose.: 86 mg/dL (03 Dec 2023 07:26)    I&O's Summary    02 Dec 2023 07:01  -  03 Dec 2023 07:00  --------------------------------------------------------  IN: 470 mL / OUT: 300 mL / NET: 170 mL    03 Dec 2023 07:01  -  04 Dec 2023 06:53  --------------------------------------------------------  IN: 1430 mL / OUT: 0 mL / NET: 1430 mL        PHYSICAL EXAM:  Vital Signs Last 24 Hrs  T(C): 36.5 (04 Dec 2023 05:34), Max: 36.6 (03 Dec 2023 20:59)  T(F): 97.7 (04 Dec 2023 05:34), Max: 97.8 (03 Dec 2023 20:59)  HR: 75 (04 Dec 2023 05:34) (44 - 120)  BP: 125/70 (04 Dec 2023 05:34) (120/68 - 173/56)  BP(mean): --  RR: 18 (04 Dec 2023 05:34) (16 - 18)  SpO2: 97% (04 Dec 2023 05:34) (95% - 99%)    Parameters below as of 04 Dec 2023 05:34  Patient On (Oxygen Delivery Method): room air      CONSTITUTIONAL: Well-groomed, in no apparent distress  EYES: No conjunctival or scleral injection, non-icteric;   ENMT: No external nasal lesions; MMM  NECK: Trachea midline without palpable neck mass; thyroid not enlarged and non-tender  RESPIRATORY: Breathing comfortably; no dullness to percussion; lungs CTA without wheeze/rhonchi/rales  CARDIOVASCULAR: +S1S2, RRR, no M/G/R; pedal pulses full and symmetric; no lower extremity edema  GASTROINTESTINAL: No palpable masses or tenderness, +BS throughout, no rebound/guarding; no hepatosplenomegaly; no hernia palpated  LYMPHATIC: No cervical LAD or tenderness  SKIN: No rashes or ulcers noted  NEUROLOGIC: CN II-XII intact; sensation intact in LEs b/l to light touch  PSYCHIATRIC: A+O x 3; mood and affect appropriate; appropriate insight and judgment    LABS:                        7.7    5.83  )-----------( 165      ( 03 Dec 2023 06:11 )             24.6     12-03    146<H>  |  109<H>  |  49<H>  ----------------------------<  81  3.0<L>   |  23  |  5.63<H>    Ca    8.5      03 Dec 2023 06:12  Phos  3.3     12-03  Mg     2.0     12-03    TPro  4.9<L>  /  Alb  2.7<L>  /  TBili  0.7  /  DBili  x   /  AST  11  /  ALT  13  /  AlkPhos  36<L>  12-03          Urinalysis Basic - ( 03 Dec 2023 06:12 )    Color: x / Appearance: x / SG: x / pH: x  Gluc: 81 mg/dL / Ketone: x  / Bili: x / Urobili: x   Blood: x / Protein: x / Nitrite: x   Leuk Esterase: x / RBC: x / WBC x   Sq Epi: x / Non Sq Epi: x / Bacteria: x          RADIOLOGY & ADDITIONAL TESTS:  Results Reviewed:   Imaging Personally Reviewed:  Electrocardiogram Personally Reviewed:    COORDINATION OF CARE:  Care Discussed with Consultants/Other Providers [Y/N]:  Prior or Outpatient Records Reviewed [Y/N]:   PROGRESS NOTE:   Authored by Edgard Kim MD  Internal Medicine      Patient is a 89y old  Female who presents with a chief complaint of fall, LESLIE (03 Dec 2023 14:58)      SUBJECTIVE / OVERNIGHT EVENTS:   Sustained Atrial fibrillaiton overnight. Initially given Lopressor 2.5 mg IV. Afib remain sustained. Given another Lopressor 5 mg IV.      Patient seen and examined at bedside    MEDICATIONS  (STANDING):  atorvastatin 80 milliGRAM(s) Oral at bedtime  chlorhexidine 2% Cloths 1 Application(s) Topical daily  cholecalciferol 2000 Unit(s) Oral daily  dextrose 5% + sodium chloride 0.45% with potassium chloride 20 mEq/L 1000 milliLiter(s) (50 mL/Hr) IV Continuous <Continuous>  dextrose 5%. 1000 milliLiter(s) (50 mL/Hr) IV Continuous <Continuous>  dextrose 5%. 1000 milliLiter(s) (100 mL/Hr) IV Continuous <Continuous>  dextrose 50% Injectable 25 Gram(s) IV Push once  dextrose 50% Injectable 25 Gram(s) IV Push once  dextrose 50% Injectable 12.5 Gram(s) IV Push once  escitalopram 10 milliGRAM(s) Oral daily  folic acid 1 milliGRAM(s) Oral daily  glucagon  Injectable 1 milliGRAM(s) IntraMuscular once  heparin   Injectable 5000 Unit(s) SubCutaneous every 12 hours  insulin lispro (ADMELOG) corrective regimen sliding scale   SubCutaneous three times a day before meals  insulin lispro (ADMELOG) corrective regimen sliding scale   SubCutaneous at bedtime  melatonin 6 milliGRAM(s) Oral at bedtime  pantoprazole    Tablet 40 milliGRAM(s) Oral before breakfast  polyethylene glycol 3350 17 Gram(s) Oral daily  senna 2 Tablet(s) Oral at bedtime  sodium bicarbonate 1300 milliGRAM(s) Oral three times a day    MEDICATIONS  (PRN):  acetaminophen     Tablet .. 650 milliGRAM(s) Oral every 6 hours PRN Temp greater or equal to 38C (100.4F), Mild Pain (1 - 3)  aluminum hydroxide/magnesium hydroxide/simethicone Suspension 30 milliLiter(s) Oral every 4 hours PRN Dyspepsia  dextrose Oral Gel 15 Gram(s) Oral once PRN Blood Glucose LESS THAN 70 milliGRAM(s)/deciliter  ondansetron Injectable 4 milliGRAM(s) IV Push every 8 hours PRN Nausea and/or Vomiting      CAPILLARY BLOOD GLUCOSE      POCT Blood Glucose.: 155 mg/dL (03 Dec 2023 21:38)  POCT Blood Glucose.: 109 mg/dL (03 Dec 2023 16:27)  POCT Blood Glucose.: 103 mg/dL (03 Dec 2023 12:12)  POCT Blood Glucose.: 86 mg/dL (03 Dec 2023 07:26)    I&O's Summary    02 Dec 2023 07:01  -  03 Dec 2023 07:00  --------------------------------------------------------  IN: 470 mL / OUT: 300 mL / NET: 170 mL    03 Dec 2023 07:01  -  04 Dec 2023 06:53  --------------------------------------------------------  IN: 1430 mL / OUT: 0 mL / NET: 1430 mL        PHYSICAL EXAM:  Vital Signs Last 24 Hrs  T(C): 36.5 (04 Dec 2023 05:34), Max: 36.6 (03 Dec 2023 20:59)  T(F): 97.7 (04 Dec 2023 05:34), Max: 97.8 (03 Dec 2023 20:59)  HR: 75 (04 Dec 2023 05:34) (44 - 120)  BP: 125/70 (04 Dec 2023 05:34) (120/68 - 173/56)  BP(mean): --  RR: 18 (04 Dec 2023 05:34) (16 - 18)  SpO2: 97% (04 Dec 2023 05:34) (95% - 99%)    Parameters below as of 04 Dec 2023 05:34  Patient On (Oxygen Delivery Method): room air      CONSTITUTIONAL: Well-groomed, in no apparent distress  EYES: No conjunctival or scleral injection, non-icteric;   ENMT: No external nasal lesions; MMM  NECK: Trachea midline without palpable neck mass; thyroid not enlarged and non-tender  RESPIRATORY: Breathing comfortably; no dullness to percussion; lungs CTA without wheeze/rhonchi/rales  CARDIOVASCULAR: +S1S2, RRR, no M/G/R; pedal pulses full and symmetric; no lower extremity edema  GASTROINTESTINAL: No palpable masses or tenderness, +BS throughout, no rebound/guarding; no hepatosplenomegaly; no hernia palpated  LYMPHATIC: No cervical LAD or tenderness  SKIN: No rashes or ulcers noted  NEUROLOGIC: CN II-XII intact; sensation intact in LEs b/l to light touch  PSYCHIATRIC: A+O x 2;     LABS:                        7.7    5.83  )-----------( 165      ( 03 Dec 2023 06:11 )             24.6     12-03    146<H>  |  109<H>  |  49<H>  ----------------------------<  81  3.0<L>   |  23  |  5.63<H>    Ca    8.5      03 Dec 2023 06:12  Phos  3.3     12-03  Mg     2.0     12-03    TPro  4.9<L>  /  Alb  2.7<L>  /  TBili  0.7  /  DBili  x   /  AST  11  /  ALT  13  /  AlkPhos  36<L>  12-03          Urinalysis Basic - ( 03 Dec 2023 06:12 )    Color: x / Appearance: x / SG: x / pH: x  Gluc: 81 mg/dL / Ketone: x  / Bili: x / Urobili: x   Blood: x / Protein: x / Nitrite: x   Leuk Esterase: x / RBC: x / WBC x   Sq Epi: x / Non Sq Epi: x / Bacteria: x          RADIOLOGY & ADDITIONAL TESTS:  Results Reviewed:   Imaging Personally Reviewed:  Electrocardiogram Personally Reviewed:    COORDINATION OF CARE:  Care Discussed with Consultants/Other Providers [Y/N]:  Prior or Outpatient Records Reviewed [Y/N]:   PROGRESS NOTE:   Authored by Edgard Kim MD  Internal Medicine      Patient is a 89y old  Female who presents with a chief complaint of fall, LESLIE (03 Dec 2023 14:58)      SUBJECTIVE / OVERNIGHT EVENTS:   Sustained Atrial fibrillaiton overnight. Initially given Lopressor 2.5 mg IV. Afib remain sustained. Given another Lopressor 5 mg IV.      Patient seen and examined at bedside  No complaints. No aware why she is in the hospital.     MEDICATIONS  (STANDING):  atorvastatin 80 milliGRAM(s) Oral at bedtime  chlorhexidine 2% Cloths 1 Application(s) Topical daily  cholecalciferol 2000 Unit(s) Oral daily  dextrose 5% + sodium chloride 0.45% with potassium chloride 20 mEq/L 1000 milliLiter(s) (50 mL/Hr) IV Continuous <Continuous>  dextrose 5%. 1000 milliLiter(s) (50 mL/Hr) IV Continuous <Continuous>  dextrose 5%. 1000 milliLiter(s) (100 mL/Hr) IV Continuous <Continuous>  dextrose 50% Injectable 25 Gram(s) IV Push once  dextrose 50% Injectable 25 Gram(s) IV Push once  dextrose 50% Injectable 12.5 Gram(s) IV Push once  escitalopram 10 milliGRAM(s) Oral daily  folic acid 1 milliGRAM(s) Oral daily  glucagon  Injectable 1 milliGRAM(s) IntraMuscular once  heparin   Injectable 5000 Unit(s) SubCutaneous every 12 hours  insulin lispro (ADMELOG) corrective regimen sliding scale   SubCutaneous three times a day before meals  insulin lispro (ADMELOG) corrective regimen sliding scale   SubCutaneous at bedtime  melatonin 6 milliGRAM(s) Oral at bedtime  pantoprazole    Tablet 40 milliGRAM(s) Oral before breakfast  polyethylene glycol 3350 17 Gram(s) Oral daily  senna 2 Tablet(s) Oral at bedtime  sodium bicarbonate 1300 milliGRAM(s) Oral three times a day    MEDICATIONS  (PRN):  acetaminophen     Tablet .. 650 milliGRAM(s) Oral every 6 hours PRN Temp greater or equal to 38C (100.4F), Mild Pain (1 - 3)  aluminum hydroxide/magnesium hydroxide/simethicone Suspension 30 milliLiter(s) Oral every 4 hours PRN Dyspepsia  dextrose Oral Gel 15 Gram(s) Oral once PRN Blood Glucose LESS THAN 70 milliGRAM(s)/deciliter  ondansetron Injectable 4 milliGRAM(s) IV Push every 8 hours PRN Nausea and/or Vomiting      CAPILLARY BLOOD GLUCOSE      POCT Blood Glucose.: 155 mg/dL (03 Dec 2023 21:38)  POCT Blood Glucose.: 109 mg/dL (03 Dec 2023 16:27)  POCT Blood Glucose.: 103 mg/dL (03 Dec 2023 12:12)  POCT Blood Glucose.: 86 mg/dL (03 Dec 2023 07:26)    I&O's Summary    02 Dec 2023 07:01  -  03 Dec 2023 07:00  --------------------------------------------------------  IN: 470 mL / OUT: 300 mL / NET: 170 mL    03 Dec 2023 07:01  -  04 Dec 2023 06:53  --------------------------------------------------------  IN: 1430 mL / OUT: 0 mL / NET: 1430 mL        PHYSICAL EXAM:  Vital Signs Last 24 Hrs  T(C): 36.5 (04 Dec 2023 05:34), Max: 36.6 (03 Dec 2023 20:59)  T(F): 97.7 (04 Dec 2023 05:34), Max: 97.8 (03 Dec 2023 20:59)  HR: 75 (04 Dec 2023 05:34) (44 - 120)  BP: 125/70 (04 Dec 2023 05:34) (120/68 - 173/56)  BP(mean): --  RR: 18 (04 Dec 2023 05:34) (16 - 18)  SpO2: 97% (04 Dec 2023 05:34) (95% - 99%)    Parameters below as of 04 Dec 2023 05:34  Patient On (Oxygen Delivery Method): room air      CONSTITUTIONAL: Well-groomed, in no apparent distress  EYES: No conjunctival or scleral injection, non-icteric;   ENMT: No external nasal lesions; MMM  NECK: Trachea midline without palpable neck mass; thyroid not enlarged and non-tender  RESPIRATORY: Breathing comfortably; no dullness to percussion; lungs CTA without wheeze/rhonchi/rales  CARDIOVASCULAR: +S1S2, RRR, no M/G/R; pedal pulses full and symmetric; no lower extremity edema  GASTROINTESTINAL: No palpable masses or tenderness, +BS throughout, no rebound/guarding; no hepatosplenomegaly; no hernia palpated  LYMPHATIC: No cervical LAD or tenderness  SKIN: No rashes or ulcers noted  NEUROLOGIC: CN II-XII intact; sensation intact in LEs b/l to light touch  PSYCHIATRIC: A+O x 2;     LABS:                        7.7    5.83  )-----------( 165      ( 03 Dec 2023 06:11 )             24.6     12-03    146<H>  |  109<H>  |  49<H>  ----------------------------<  81  3.0<L>   |  23  |  5.63<H>    Ca    8.5      03 Dec 2023 06:12  Phos  3.3     12-03  Mg     2.0     12-03    TPro  4.9<L>  /  Alb  2.7<L>  /  TBili  0.7  /  DBili  x   /  AST  11  /  ALT  13  /  AlkPhos  36<L>  12-03          Urinalysis Basic - ( 03 Dec 2023 06:12 )    Color: x / Appearance: x / SG: x / pH: x  Gluc: 81 mg/dL / Ketone: x  / Bili: x / Urobili: x   Blood: x / Protein: x / Nitrite: x   Leuk Esterase: x / RBC: x / WBC x   Sq Epi: x / Non Sq Epi: x / Bacteria: x        COORDINATION OF CARE:  Care Discussed with Consultants/Other Providers [Y/N]: Yes  Prior or Outpatient Records Reviewed [Y/N]: Yes

## 2023-12-04 NOTE — PROGRESS NOTE ADULT - PROBLEM SELECTOR PLAN 8
- w/ agitation  - seems to be FAST Stage 6? No official staging outpt noted  - hold home nanzeric in the setting of orthostatic hypotension

## 2023-12-04 NOTE — PROGRESS NOTE ADULT - PROBLEM SELECTOR PLAN 3
- SCr on admission 7. Baseline SCr is around 1  - Appears to be both prerenal and obstructive. Likely an ATN component as well given hypotension. Slowly improving  - Cornell removed 11/30 and pt has been spontaneously voiding  - SCr continues to improve

## 2023-12-04 NOTE — PROGRESS NOTE ADULT - ASSESSMENT
89 y/o F pmhx advanced dementia, HTN, HLD, DM2, anemia, CAD (s/p LAD and LCx stent 2010), HFrEF (EF 40-45% 2021), h/o TIA no residual deficits, h/o recurrent UTI presents w/ syncope and fall w/o trauma in the setting of 1 month of poor PO intake, nausea, vomiting, epigastric pain. C/f failure to thrive i/s/o ?PUD? vs. dementia. Syncope likely orthostatic in nature 89 y/o F pmhx advanced dementia, HTN, HLD, DM2, anemia, CAD (s/p LAD and LCx stent 2010), HFrEF (EF 40-45% 2021), h/o TIA no residual deficits, h/o recurrent UTI presents w/ syncope and fall w/o trauma in the setting of 1 month of poor PO intake, nausea, vomiting, epigastric pain. C/f failure to thrive i/s/o ?PUD? vs. dementia. Syncope likely orthostatic in nature

## 2023-12-04 NOTE — CHART NOTE - NSCHARTNOTEFT_GEN_A_CORE
89 y/o F pmhx advanced dementia, HTN, HLD, DM2, anemia, CAD (s/p LAD and LCx stent 2010), HFrEF (EF 40-45% 2021), h/o TIA no residual deficits, h/o recurrent UTI presents w/ syncope and fall w/o trauma in the setting of 1 month of poor PO intake, nausea, vomiting, epigastric pain. C/f failure to thrive i/s/o ?PUD? vs. dementia. Syncope likely orthostatic in nature.    11/26 - Provider contact note and chart note; Multiple episodes of agitation, not redirectable, administered Zyprexa and placed on 1:1. Pt AOx1. Pt agitated, restless, pulling on IV and Cornell. Unable to reorient. 1:1 initiated for patient safety and safety of others.    11/27 pt seen by this service for bedside swallow evaluation. Diet deferred to team given level of agitation and poor participation.   11/29 pt seen for re-evaluation of swallow function as requested by MD Dawn d/t improved mentation. Esophogram currently ordered, however as per d/w MD, on hold at this time pending swallow evaluation. Recommended for soft-bite sized/thin liquids. Of note, evaluation limited d/t c/o nausea. Suspect poor PO intake d/t progression of dementia vs. true dysphagia. Above d/w nahed Thurman regarding results of evaluation and MD Dawn. Chung declined dysphagia PTA. All questions answered. At this time, instrumental assessment unlikely to  given low c/f dysphagia and poor participation in setting of progression of cognitive deficits. Additionally, nahed addressed c/f for pt's ability to participate in esophogram given pt's current mentation. Discussed this with MD Dawn that based on participation during swallow evaluation, mentation may be a barrier to successfully complete GI testing.   12/1 Esophogram attempted - unable to tolerate; did not participate and follow commands  - family would like to reattempt esophogram - maybe on 12/4?  - GI following. Will perform calorie count. Family interested in PEG, would require scope. Continue GOC discussions.    Attempted to see pt today for diet monitor, however pt declined to participate and/or consume any of breakfast tray despite max encouragement. Endorses some nausea but additionally stating, "I just don't want to eat". As per RN, continues w/ poor PO intake, however no reported intolerance.     D/w MD Kim, persistent limited evaluations w/ low c/f oropharyngeal dysphagia. No recent chest imaging or reports of intolerance when consuming PO. Family currently considering PEG. Intervention from this service no longer clinically indicated. Will sign off. Reconsult as medically appropriate.     Impression: Pt p/w grossly adequate oropharyngeal swallow for limited textures of soft-bite sized solids and thin liquids. Suspect poor PO intake d/t progression of dementia vs. true dysphagia.     Recommendations:   1) Soft-bite sized/thin liquids  2) Strict aspiration and reflux precautions!   3) Monitor for s/s aspiration/laryngeal penetration. If noted:  D/C p.o. intake, provide non-oral nutrition/hydration/meds, and contact this service @ [phone]    Sindy Gillespie CCC-SLP   Prefer teams or [id number] 89 y/o F pmhx advanced dementia, HTN, HLD, DM2, anemia, CAD (s/p LAD and LCx stent 2010), HFrEF (EF 40-45% 2021), h/o TIA no residual deficits, h/o recurrent UTI presents w/ syncope and fall w/o trauma in the setting of 1 month of poor PO intake, nausea, vomiting, epigastric pain. C/f failure to thrive i/s/o ?PUD? vs. dementia. Syncope likely orthostatic in nature.    11/26 - Provider contact note and chart note; Multiple episodes of agitation, not redirectable, administered Zyprexa and placed on 1:1. Pt AOx1. Pt agitated, restless, pulling on IV and Cornell. Unable to reorient. 1:1 initiated for patient safety and safety of others.    11/27 pt seen by this service for bedside swallow evaluation. Diet deferred to team given level of agitation and poor participation.   11/29 pt seen for re-evaluation of swallow function as requested by MD Dawn d/t improved mentation. Esophogram currently ordered, however as per d/w MD, on hold at this time pending swallow evaluation. Recommended for soft-bite sized/thin liquids. Of note, evaluation limited d/t c/o nausea. Suspect poor PO intake d/t progression of dementia vs. true dysphagia. Above d/w nahed Thurman regarding results of evaluation and MD Dawn. Chung declined dysphagia PTA. All questions answered. At this time, instrumental assessment unlikely to  given low c/f dysphagia and poor participation in setting of progression of cognitive deficits. Additionally, nahed addressed c/f for pt's ability to participate in esophogram given pt's current mentation. Discussed this with MD Dawn that based on participation during swallow evaluation, mentation may be a barrier to successfully complete GI testing.   12/1 Esophogram attempted - unable to tolerate; did not participate and follow commands  - family would like to reattempt esophogram - maybe on 12/4?  - GI following. Will perform calorie count. Family interested in PEG, would require scope. Continue GOC discussions.    Attempted to see pt today for diet monitor, however pt declined to participate and/or consume any of breakfast tray despite max encouragement. Endorses some nausea but additionally stating, "I just don't want to eat". As per RN, continues w/ poor PO intake, however no reported intolerance.     D/w MD Kim, persistent limited evaluations w/ low c/f oropharyngeal dysphagia. No recent chest imaging or reports of intolerance when consuming PO. Family currently considering PEG. Intervention from this service no longer clinically indicated. Will sign off. Reconsult as medically appropriate.     Impression: Pt p/w grossly adequate oropharyngeal swallow for limited textures of soft-bite sized solids and thin liquids. Suspect poor PO intake d/t progression of dementia vs. true dysphagia.     Recommendations:   1) Soft-bite sized/thin liquids  2) Strict aspiration and reflux precautions!   3) Monitor for s/s aspiration/laryngeal penetration. If noted:  D/C p.o. intake, provide non-oral nutrition/hydration/meds, and contact this service @ [phone]    Sindy Gillespie CCC-SLP   Prefer teams or [id number]

## 2023-12-04 NOTE — PROGRESS NOTE ADULT - SUBJECTIVE AND OBJECTIVE BOX
DATE OF SERVICE: 12-04-23    Appears comfortable, unable to obtain Ros    Review of Systems:   Constitutional: [ ] fevers, [ ] chills.   Skin: [ ] dry skin. [ ] rashes.  Psychiatric: [ ] depression, [ ] anxiety.   Gastrointestinal: [ ] BRBPR, [ ] melena.   Neurological: [ ] confusion. [ ] seizures. [ ] shuffling gait.   Ears,Nose,Mouth and Throat: [ ] ear pain [ ] sore throat.   Eyes: [ ] diplopia.   Respiratory: [ ] hemoptysis. [ ] shortness of breath  Cardiovascular: See HPI above  Hematologic/Lymphatic: [ ] anemia. [ ] painful nodes. [ ] prolonged bleeding.   Genitourinary: [ ] hematuria. [ ] flank pain.   Endocrine: [ ] significant change in weight. [ ] intolerance to heat and cold.     Review of systems [ ] otherwise negative, [x ] otherwise unable to obtain    FH: no family history of sudden cardiac death in first degree relatives    SH: [ ] tobacco, [ ] alcohol, [ ] drugs    acetaminophen     Tablet .. 650 milliGRAM(s) Oral every 6 hours PRN  aluminum hydroxide/magnesium hydroxide/simethicone Suspension 30 milliLiter(s) Oral every 4 hours PRN  atorvastatin 80 milliGRAM(s) Oral at bedtime  chlorhexidine 2% Cloths 1 Application(s) Topical daily  cholecalciferol 2000 Unit(s) Oral daily  dextrose 5% + sodium chloride 0.45% with potassium chloride 20 mEq/L 1000 milliLiter(s) IV Continuous <Continuous>  escitalopram 10 milliGRAM(s) Oral daily  folic acid 1 milliGRAM(s) Oral daily  glucagon  Injectable 1 milliGRAM(s) IntraMuscular once  heparin   Injectable 2000 Unit(s) IV Push every 6 hours PRN  heparin   Injectable 4000 Unit(s) IV Push every 6 hours PRN  heparin   Injectable 4000 Unit(s) IV Push once  heparin  Infusion.  Unit(s)/Hr IV Continuous <Continuous>  insulin lispro (ADMELOG) corrective regimen sliding scale   SubCutaneous three times a day before meals  insulin lispro (ADMELOG) corrective regimen sliding scale   SubCutaneous at bedtime  melatonin 6 milliGRAM(s) Oral at bedtime  ondansetron Injectable 4 milliGRAM(s) IV Push every 8 hours PRN  pantoprazole    Tablet 40 milliGRAM(s) Oral before breakfast  polyethylene glycol 3350 17 Gram(s) Oral daily  senna 2 Tablet(s) Oral at bedtime  sodium bicarbonate 1300 milliGRAM(s) Oral three times a day                            8.1    6.27  )-----------( 163      ( 04 Dec 2023 07:06 )             26.1     143  |  109<H>  |  44<H>  ----------------------------<  98  3.5   |  22  |  5.02<H>    Ca    8.4      04 Dec 2023 07:06  Phos  2.6     12-04  Mg     1.9     12-04    TPro  4.9<L>  /  Alb  2.7<L>  /  TBili  0.7  /  DBili  x   /  AST  11  /  ALT  13  /  AlkPhos  36<L>  12-03    T(C): 36.3 (12-04-23 @ 12:27), Max: 36.6 (12-03-23 @ 20:59)  HR: 41 (12-04-23 @ 12:35) (41 - 120)  BP: 171/61 (12-04-23 @ 12:27) (120/68 - 173/56)  RR: 18 (12-04-23 @ 12:27) (16 - 18)  SpO2: 94% (12-04-23 @ 12:27) (94% - 97%)  Wt(kg): --    I&O's Summary    03 Dec 2023 07:01  -  04 Dec 2023 07:00  --------------------------------------------------------  IN: 2030 mL / OUT: 0 mL / NET: 2030 mL    04 Dec 2023 07:01  -  04 Dec 2023 15:04  --------------------------------------------------------  IN: 0 mL / OUT: 300 mL / NET: -300 mL      Gen: Appears well in NAD  HEENT:  (-)icterus (-)pallor  CV: N S1 S2 1/6 MANASA (+)2 Pulses B/l  Resp:  Clear to ausculatation B/L, normal effort  GI: (+) BS Soft, NT, ND  Lymph:  (-)Edema, (-)obvious lymphadenopathy  Skin: Warm to touch, Normal turgor  Psych: Pleasantly confused     TELEMETRY: SB/SR 40-60s	      RADIOLOGY:         CXR: < from: Xray Chest 1 View- PORTABLE-Urgent (11.26.23 @ 02:59) >  IMPRESSION:  Slightly elevated right hemidiaphragm. Underinflated but otherwise clear   lungs. No pleural effusions or pneumothorax.    Stable cardiac and mediastinal silhouettes including aortic   calcifications and convex fullness of superior mediastinal/paratracheal   soft tissue contour which could be related to engorged/tortuous   brachiocephalic vasculature.    Generalized osteopenia.    --- End of Report ---    < end of copied text >      ASSESSMENT/PLAN: Patient is a 89 y/o Female with PMH of advanced dementia, HTN, HLD, DM2, anemia, CAD (s/p LAD and LCx stent 2010), HFrEF (EF 40-45% 2021), h/o TIA no residual deficits, h/o recurrent UTI who presented with syncope and fall w/o trauma in the setting of 1 month of poor PO intake, nausea, vomiting, epigastric pain. Found to have LESLIE, sinus bradycardia and orthostatic hypotension. Cardiology consulted for further evaluation.    #Syncope  - Suspect due to orthostatic hypotension  - TTE noted with EF 39%, mod AI  - Monitor telemetry however appears asymptomatic in terms of sinus bradycardia    #Orthostatic Hypotension  - Agree with holding all antihypertensives/GDMT  - Avoid midodrine and florinef given CHF  - Continue compression stockings and monitor for improvement    #Sinus Bradycardia  - Appears asymptomatic at rest  - EP consult with Dr. Calderón appreciated    #New PAF  --given anemia and LESLIE, poor candidate for AC    #LESLIE  - Management per renal    #CAD  - Continue Plavix and Lipitor    #GOC  --DNR/DNI       DATE OF SERVICE: 12-04-23    Appears comfortable, unable to obtain Ros    Review of Systems:   Constitutional: [ ] fevers, [ ] chills.   Skin: [ ] dry skin. [ ] rashes.  Psychiatric: [ ] depression, [ ] anxiety.   Gastrointestinal: [ ] BRBPR, [ ] melena.   Neurological: [ ] confusion. [ ] seizures. [ ] shuffling gait.   Ears,Nose,Mouth and Throat: [ ] ear pain [ ] sore throat.   Eyes: [ ] diplopia.   Respiratory: [ ] hemoptysis. [ ] shortness of breath  Cardiovascular: See HPI above  Hematologic/Lymphatic: [ ] anemia. [ ] painful nodes. [ ] prolonged bleeding.   Genitourinary: [ ] hematuria. [ ] flank pain.   Endocrine: [ ] significant change in weight. [ ] intolerance to heat and cold.     Review of systems [ ] otherwise negative, [x ] otherwise unable to obtain    FH: no family history of sudden cardiac death in first degree relatives    SH: [ ] tobacco, [ ] alcohol, [ ] drugs    acetaminophen     Tablet .. 650 milliGRAM(s) Oral every 6 hours PRN  aluminum hydroxide/magnesium hydroxide/simethicone Suspension 30 milliLiter(s) Oral every 4 hours PRN  atorvastatin 80 milliGRAM(s) Oral at bedtime  chlorhexidine 2% Cloths 1 Application(s) Topical daily  cholecalciferol 2000 Unit(s) Oral daily  dextrose 5% + sodium chloride 0.45% with potassium chloride 20 mEq/L 1000 milliLiter(s) IV Continuous <Continuous>  escitalopram 10 milliGRAM(s) Oral daily  folic acid 1 milliGRAM(s) Oral daily  glucagon  Injectable 1 milliGRAM(s) IntraMuscular once  heparin   Injectable 2000 Unit(s) IV Push every 6 hours PRN  heparin   Injectable 4000 Unit(s) IV Push every 6 hours PRN  heparin   Injectable 4000 Unit(s) IV Push once  heparin  Infusion.  Unit(s)/Hr IV Continuous <Continuous>  insulin lispro (ADMELOG) corrective regimen sliding scale   SubCutaneous three times a day before meals  insulin lispro (ADMELOG) corrective regimen sliding scale   SubCutaneous at bedtime  melatonin 6 milliGRAM(s) Oral at bedtime  ondansetron Injectable 4 milliGRAM(s) IV Push every 8 hours PRN  pantoprazole    Tablet 40 milliGRAM(s) Oral before breakfast  polyethylene glycol 3350 17 Gram(s) Oral daily  senna 2 Tablet(s) Oral at bedtime  sodium bicarbonate 1300 milliGRAM(s) Oral three times a day                            8.1    6.27  )-----------( 163      ( 04 Dec 2023 07:06 )             26.1     143  |  109<H>  |  44<H>  ----------------------------<  98  3.5   |  22  |  5.02<H>    Ca    8.4      04 Dec 2023 07:06  Phos  2.6     12-04  Mg     1.9     12-04    TPro  4.9<L>  /  Alb  2.7<L>  /  TBili  0.7  /  DBili  x   /  AST  11  /  ALT  13  /  AlkPhos  36<L>  12-03    T(C): 36.3 (12-04-23 @ 12:27), Max: 36.6 (12-03-23 @ 20:59)  HR: 41 (12-04-23 @ 12:35) (41 - 120)  BP: 171/61 (12-04-23 @ 12:27) (120/68 - 173/56)  RR: 18 (12-04-23 @ 12:27) (16 - 18)  SpO2: 94% (12-04-23 @ 12:27) (94% - 97%)  Wt(kg): --    I&O's Summary    03 Dec 2023 07:01  -  04 Dec 2023 07:00  --------------------------------------------------------  IN: 2030 mL / OUT: 0 mL / NET: 2030 mL    04 Dec 2023 07:01  -  04 Dec 2023 15:04  --------------------------------------------------------  IN: 0 mL / OUT: 300 mL / NET: -300 mL      Gen: Appears well in NAD  HEENT:  (-)icterus (-)pallor  CV: N S1 S2 1/6 MANASA (+)2 Pulses B/l  Resp:  Clear to ausculatation B/L, normal effort  GI: (+) BS Soft, NT, ND  Lymph:  (-)Edema, (-)obvious lymphadenopathy  Skin: Warm to touch, Normal turgor  Psych: Pleasantly confused     TELEMETRY: SB/SR 40-60s	      RADIOLOGY:         CXR: < from: Xray Chest 1 View- PORTABLE-Urgent (11.26.23 @ 02:59) >  IMPRESSION:  Slightly elevated right hemidiaphragm. Underinflated but otherwise clear   lungs. No pleural effusions or pneumothorax.    Stable cardiac and mediastinal silhouettes including aortic   calcifications and convex fullness of superior mediastinal/paratracheal   soft tissue contour which could be related to engorged/tortuous   brachiocephalic vasculature.    Generalized osteopenia.    --- End of Report ---    < end of copied text >      ASSESSMENT/PLAN: Patient is a 89 y/o Female with PMH of advanced dementia, HTN, HLD, DM2, anemia, CAD (s/p LAD and LCx stent 2010), HFrEF (EF 40-45% 2021), h/o TIA no residual deficits, h/o recurrent UTI who presented with syncope and fall w/o trauma in the setting of 1 month of poor PO intake, nausea, vomiting, epigastric pain. Found to have LESLIE, sinus bradycardia and orthostatic hypotension. Cardiology consulted for further evaluation.    #Syncope  - Suspect due to orthostatic hypotension  - TTE noted with EF 39%, mod AI  - Monitor telemetry however appears asymptomatic in terms of sinus bradycardia    #Orthostatic Hypotension  - Agree with holding all antihypertensives/GDMT  - Avoid midodrine and florinef given CHF  - Continue compression stockings and monitor for improvement    #Sinus Bradycardia  - Appears asymptomatic at rest  - EP consult with Dr. Calderón appreciated    #New PAF  --given anemia and LESLIE, poor candidate for AC    #LESLIE  - Management per renal    #CAD  - Continue Plavix and Lipitor    #GOC  --DNR/DNI

## 2023-12-04 NOTE — PROGRESS NOTE ADULT - PROBLEM SELECTOR PLAN 4
- likely d/t poor PO intake and poor free water intake  - pt also likely volume depleted  - will given D5 1/2NS +20mEq K instead of D5W as maintenance fluids to both correct hypernatrmia and give some volume and to replete K 12/3

## 2023-12-04 NOTE — PROGRESS NOTE ADULT - ASSESSMENT
Agree with above assessment and plan as outlined above.    - EP f/u appreciated    Gideon Mcknight MD, [facility]  BEEPER [phone]   Agree with above assessment and plan as outlined above.    - EP f/u appreciated    Gideon Mcknight MD, [facility]  BEEPER [phone]

## 2023-12-05 LAB
ALBUMIN SERPL ELPH-MCNC: 3 G/DL — LOW (ref 3.3–5)
ALBUMIN SERPL ELPH-MCNC: 3 G/DL — LOW (ref 3.3–5)
ALP SERPL-CCNC: 39 U/L — LOW (ref 40–120)
ALP SERPL-CCNC: 39 U/L — LOW (ref 40–120)
ALT FLD-CCNC: 15 U/L — SIGNIFICANT CHANGE UP (ref 10–45)
ALT FLD-CCNC: 15 U/L — SIGNIFICANT CHANGE UP (ref 10–45)
ANION GAP SERPL CALC-SCNC: 12 MMOL/L — SIGNIFICANT CHANGE UP (ref 5–17)
ANION GAP SERPL CALC-SCNC: 12 MMOL/L — SIGNIFICANT CHANGE UP (ref 5–17)
APTT BLD: >200 SEC — CRITICAL HIGH (ref 24.5–35.6)
AST SERPL-CCNC: 23 U/L — SIGNIFICANT CHANGE UP (ref 10–40)
AST SERPL-CCNC: 23 U/L — SIGNIFICANT CHANGE UP (ref 10–40)
BASOPHILS # BLD AUTO: 0.01 K/UL — SIGNIFICANT CHANGE UP (ref 0–0.2)
BASOPHILS # BLD AUTO: 0.01 K/UL — SIGNIFICANT CHANGE UP (ref 0–0.2)
BASOPHILS NFR BLD AUTO: 0.1 % — SIGNIFICANT CHANGE UP (ref 0–2)
BASOPHILS NFR BLD AUTO: 0.1 % — SIGNIFICANT CHANGE UP (ref 0–2)
BILIRUB SERPL-MCNC: 0.8 MG/DL — SIGNIFICANT CHANGE UP (ref 0.2–1.2)
BILIRUB SERPL-MCNC: 0.8 MG/DL — SIGNIFICANT CHANGE UP (ref 0.2–1.2)
BUN SERPL-MCNC: 43 MG/DL — HIGH (ref 7–23)
BUN SERPL-MCNC: 43 MG/DL — HIGH (ref 7–23)
CALCIUM SERPL-MCNC: 8.7 MG/DL — SIGNIFICANT CHANGE UP (ref 8.4–10.5)
CALCIUM SERPL-MCNC: 8.7 MG/DL — SIGNIFICANT CHANGE UP (ref 8.4–10.5)
CHLORIDE SERPL-SCNC: 107 MMOL/L — SIGNIFICANT CHANGE UP (ref 96–108)
CHLORIDE SERPL-SCNC: 107 MMOL/L — SIGNIFICANT CHANGE UP (ref 96–108)
CO2 SERPL-SCNC: 22 MMOL/L — SIGNIFICANT CHANGE UP (ref 22–31)
CO2 SERPL-SCNC: 22 MMOL/L — SIGNIFICANT CHANGE UP (ref 22–31)
CREAT SERPL-MCNC: 4.88 MG/DL — HIGH (ref 0.5–1.3)
CREAT SERPL-MCNC: 4.88 MG/DL — HIGH (ref 0.5–1.3)
EGFR: 8 ML/MIN/1.73M2 — LOW
EGFR: 8 ML/MIN/1.73M2 — LOW
EOSINOPHIL # BLD AUTO: 0.35 K/UL — SIGNIFICANT CHANGE UP (ref 0–0.5)
EOSINOPHIL # BLD AUTO: 0.35 K/UL — SIGNIFICANT CHANGE UP (ref 0–0.5)
EOSINOPHIL NFR BLD AUTO: 4.2 % — SIGNIFICANT CHANGE UP (ref 0–6)
EOSINOPHIL NFR BLD AUTO: 4.2 % — SIGNIFICANT CHANGE UP (ref 0–6)
GLUCOSE BLDC GLUCOMTR-MCNC: 103 MG/DL — HIGH (ref 70–99)
GLUCOSE BLDC GLUCOMTR-MCNC: 103 MG/DL — HIGH (ref 70–99)
GLUCOSE BLDC GLUCOMTR-MCNC: 82 MG/DL — SIGNIFICANT CHANGE UP (ref 70–99)
GLUCOSE BLDC GLUCOMTR-MCNC: 82 MG/DL — SIGNIFICANT CHANGE UP (ref 70–99)
GLUCOSE BLDC GLUCOMTR-MCNC: 89 MG/DL — SIGNIFICANT CHANGE UP (ref 70–99)
GLUCOSE BLDC GLUCOMTR-MCNC: 89 MG/DL — SIGNIFICANT CHANGE UP (ref 70–99)
GLUCOSE BLDC GLUCOMTR-MCNC: 90 MG/DL — SIGNIFICANT CHANGE UP (ref 70–99)
GLUCOSE BLDC GLUCOMTR-MCNC: 90 MG/DL — SIGNIFICANT CHANGE UP (ref 70–99)
GLUCOSE BLDC GLUCOMTR-MCNC: 95 MG/DL — SIGNIFICANT CHANGE UP (ref 70–99)
GLUCOSE BLDC GLUCOMTR-MCNC: 95 MG/DL — SIGNIFICANT CHANGE UP (ref 70–99)
GLUCOSE SERPL-MCNC: 78 MG/DL — SIGNIFICANT CHANGE UP (ref 70–99)
GLUCOSE SERPL-MCNC: 78 MG/DL — SIGNIFICANT CHANGE UP (ref 70–99)
HCT VFR BLD CALC: 28.8 % — LOW (ref 34.5–45)
HCT VFR BLD CALC: 28.8 % — LOW (ref 34.5–45)
HGB BLD-MCNC: 8.9 G/DL — LOW (ref 11.5–15.5)
HGB BLD-MCNC: 8.9 G/DL — LOW (ref 11.5–15.5)
IMM GRANULOCYTES NFR BLD AUTO: 1 % — HIGH (ref 0–0.9)
IMM GRANULOCYTES NFR BLD AUTO: 1 % — HIGH (ref 0–0.9)
LYMPHOCYTES # BLD AUTO: 1.26 K/UL — SIGNIFICANT CHANGE UP (ref 1–3.3)
LYMPHOCYTES # BLD AUTO: 1.26 K/UL — SIGNIFICANT CHANGE UP (ref 1–3.3)
LYMPHOCYTES # BLD AUTO: 15.3 % — SIGNIFICANT CHANGE UP (ref 13–44)
LYMPHOCYTES # BLD AUTO: 15.3 % — SIGNIFICANT CHANGE UP (ref 13–44)
MAGNESIUM SERPL-MCNC: 2 MG/DL — SIGNIFICANT CHANGE UP (ref 1.6–2.6)
MAGNESIUM SERPL-MCNC: 2 MG/DL — SIGNIFICANT CHANGE UP (ref 1.6–2.6)
MCHC RBC-ENTMCNC: 25.7 PG — LOW (ref 27–34)
MCHC RBC-ENTMCNC: 25.7 PG — LOW (ref 27–34)
MCHC RBC-ENTMCNC: 30.9 GM/DL — LOW (ref 32–36)
MCHC RBC-ENTMCNC: 30.9 GM/DL — LOW (ref 32–36)
MCV RBC AUTO: 83.2 FL — SIGNIFICANT CHANGE UP (ref 80–100)
MCV RBC AUTO: 83.2 FL — SIGNIFICANT CHANGE UP (ref 80–100)
MONOCYTES # BLD AUTO: 0.44 K/UL — SIGNIFICANT CHANGE UP (ref 0–0.9)
MONOCYTES # BLD AUTO: 0.44 K/UL — SIGNIFICANT CHANGE UP (ref 0–0.9)
MONOCYTES NFR BLD AUTO: 5.3 % — SIGNIFICANT CHANGE UP (ref 2–14)
MONOCYTES NFR BLD AUTO: 5.3 % — SIGNIFICANT CHANGE UP (ref 2–14)
NEUTROPHILS # BLD AUTO: 6.11 K/UL — SIGNIFICANT CHANGE UP (ref 1.8–7.4)
NEUTROPHILS # BLD AUTO: 6.11 K/UL — SIGNIFICANT CHANGE UP (ref 1.8–7.4)
NEUTROPHILS NFR BLD AUTO: 74.1 % — SIGNIFICANT CHANGE UP (ref 43–77)
NEUTROPHILS NFR BLD AUTO: 74.1 % — SIGNIFICANT CHANGE UP (ref 43–77)
NRBC # BLD: 0 /100 WBCS — SIGNIFICANT CHANGE UP (ref 0–0)
NRBC # BLD: 0 /100 WBCS — SIGNIFICANT CHANGE UP (ref 0–0)
PHOSPHATE SERPL-MCNC: 3.2 MG/DL — SIGNIFICANT CHANGE UP (ref 2.5–4.5)
PHOSPHATE SERPL-MCNC: 3.2 MG/DL — SIGNIFICANT CHANGE UP (ref 2.5–4.5)
PLATELET # BLD AUTO: 163 K/UL — SIGNIFICANT CHANGE UP (ref 150–400)
PLATELET # BLD AUTO: 163 K/UL — SIGNIFICANT CHANGE UP (ref 150–400)
POTASSIUM SERPL-MCNC: 4 MMOL/L — SIGNIFICANT CHANGE UP (ref 3.5–5.3)
POTASSIUM SERPL-MCNC: 4 MMOL/L — SIGNIFICANT CHANGE UP (ref 3.5–5.3)
POTASSIUM SERPL-SCNC: 4 MMOL/L — SIGNIFICANT CHANGE UP (ref 3.5–5.3)
POTASSIUM SERPL-SCNC: 4 MMOL/L — SIGNIFICANT CHANGE UP (ref 3.5–5.3)
PROT SERPL-MCNC: 5.3 G/DL — LOW (ref 6–8.3)
PROT SERPL-MCNC: 5.3 G/DL — LOW (ref 6–8.3)
RBC # BLD: 3.46 M/UL — LOW (ref 3.8–5.2)
RBC # BLD: 3.46 M/UL — LOW (ref 3.8–5.2)
RBC # FLD: 15.7 % — HIGH (ref 10.3–14.5)
RBC # FLD: 15.7 % — HIGH (ref 10.3–14.5)
SODIUM SERPL-SCNC: 141 MMOL/L — SIGNIFICANT CHANGE UP (ref 135–145)
SODIUM SERPL-SCNC: 141 MMOL/L — SIGNIFICANT CHANGE UP (ref 135–145)
WBC # BLD: 8.25 K/UL — SIGNIFICANT CHANGE UP (ref 3.8–10.5)
WBC # BLD: 8.25 K/UL — SIGNIFICANT CHANGE UP (ref 3.8–10.5)
WBC # FLD AUTO: 8.25 K/UL — SIGNIFICANT CHANGE UP (ref 3.8–10.5)
WBC # FLD AUTO: 8.25 K/UL — SIGNIFICANT CHANGE UP (ref 3.8–10.5)

## 2023-12-05 PROCEDURE — 99233 SBSQ HOSP IP/OBS HIGH 50: CPT | Mod: GC

## 2023-12-05 PROCEDURE — 99232 SBSQ HOSP IP/OBS MODERATE 35: CPT | Mod: GC

## 2023-12-05 RX ADMIN — HEPARIN SODIUM 0 UNIT(S)/HR: 5000 INJECTION INTRAVENOUS; SUBCUTANEOUS at 11:22

## 2023-12-05 RX ADMIN — ATORVASTATIN CALCIUM 80 MILLIGRAM(S): 80 TABLET, FILM COATED ORAL at 23:16

## 2023-12-05 RX ADMIN — ESCITALOPRAM OXALATE 10 MILLIGRAM(S): 10 TABLET, FILM COATED ORAL at 12:11

## 2023-12-05 RX ADMIN — CHLORHEXIDINE GLUCONATE 1 APPLICATION(S): 213 SOLUTION TOPICAL at 12:05

## 2023-12-05 RX ADMIN — HEPARIN SODIUM 800 UNIT(S)/HR: 5000 INJECTION INTRAVENOUS; SUBCUTANEOUS at 02:15

## 2023-12-05 RX ADMIN — Medication 2000 UNIT(S): at 12:11

## 2023-12-05 RX ADMIN — Medication 1300 MILLIGRAM(S): at 05:24

## 2023-12-05 RX ADMIN — HEPARIN SODIUM 0 UNIT(S)/HR: 5000 INJECTION INTRAVENOUS; SUBCUTANEOUS at 01:07

## 2023-12-05 RX ADMIN — POLYETHYLENE GLYCOL 3350 17 GRAM(S): 17 POWDER, FOR SOLUTION ORAL at 12:14

## 2023-12-05 RX ADMIN — HEPARIN SODIUM 600 UNIT(S)/HR: 5000 INJECTION INTRAVENOUS; SUBCUTANEOUS at 17:36

## 2023-12-05 RX ADMIN — HEPARIN SODIUM 600 UNIT(S)/HR: 5000 INJECTION INTRAVENOUS; SUBCUTANEOUS at 12:24

## 2023-12-05 RX ADMIN — HEPARIN SODIUM 0 UNIT(S)/HR: 5000 INJECTION INTRAVENOUS; SUBCUTANEOUS at 20:20

## 2023-12-05 RX ADMIN — Medication 1300 MILLIGRAM(S): at 13:57

## 2023-12-05 RX ADMIN — Medication 1 MILLIGRAM(S): at 12:11

## 2023-12-05 RX ADMIN — PANTOPRAZOLE SODIUM 40 MILLIGRAM(S): 20 TABLET, DELAYED RELEASE ORAL at 05:23

## 2023-12-05 RX ADMIN — Medication 1300 MILLIGRAM(S): at 23:17

## 2023-12-05 RX ADMIN — HEPARIN SODIUM 400 UNIT(S)/HR: 5000 INJECTION INTRAVENOUS; SUBCUTANEOUS at 21:35

## 2023-12-05 NOTE — PROGRESS NOTE ADULT - SUBJECTIVE AND OBJECTIVE BOX
EP  Date of service 12/5  New dx of AFib/RVR on 12/4.    Patient apparently asymptomatic, not complaining of chest pain or palpitations.     acetaminophen     Tablet .. 650 milliGRAM(s) Oral every 6 hours PRN  aluminum hydroxide/magnesium hydroxide/simethicone Suspension 30 milliLiter(s) Oral every 4 hours PRN  atorvastatin 80 milliGRAM(s) Oral at bedtime  chlorhexidine 2% Cloths 1 Application(s) Topical daily  cholecalciferol 2000 Unit(s) Oral daily  dextrose 5% + sodium chloride 0.45% with potassium chloride 20 mEq/L 1000 milliLiter(s) IV Continuous <Continuous>  dextrose 5%. 1000 milliLiter(s) IV Continuous <Continuous>  dextrose 5%. 1000 milliLiter(s) IV Continuous <Continuous>  dextrose 50% Injectable 12.5 Gram(s) IV Push once  dextrose 50% Injectable 25 Gram(s) IV Push once  dextrose 50% Injectable 25 Gram(s) IV Push once  dextrose Oral Gel 15 Gram(s) Oral once PRN  escitalopram 10 milliGRAM(s) Oral daily  folic acid 1 milliGRAM(s) Oral daily  glucagon  Injectable 1 milliGRAM(s) IntraMuscular once  heparin   Injectable 2000 Unit(s) IV Push every 6 hours PRN  heparin   Injectable 4000 Unit(s) IV Push every 6 hours PRN  heparin  Infusion.  Unit(s)/Hr IV Continuous <Continuous>  insulin lispro (ADMELOG) corrective regimen sliding scale   SubCutaneous at bedtime  insulin lispro (ADMELOG) corrective regimen sliding scale   SubCutaneous three times a day before meals  melatonin 6 milliGRAM(s) Oral at bedtime  ondansetron Injectable 4 milliGRAM(s) IV Push every 8 hours PRN  pantoprazole    Tablet 40 milliGRAM(s) Oral before breakfast  polyethylene glycol 3350 17 Gram(s) Oral daily  senna 2 Tablet(s) Oral at bedtime  sodium bicarbonate 1300 milliGRAM(s) Oral three times a day                            8.9    8.25  )-----------( 163      ( 05 Dec 2023 07:03 )             28.8     12-05    141  |  107  |  43<H>  ----------------------------<  78  4.0   |  22  |  4.88<H>    Ca    8.7      05 Dec 2023 07:19  Phos  3.2     12-05  Mg     2.0     12-05    TPro  5.3<L>  /  Alb  3.0<L>  /  TBili  0.8  /  DBili  x   /  AST  23  /  ALT  15  /  AlkPhos  39<L>  12-05    T(C): 36.2 (12-05-23 @ 12:49), Max: 36.4 (12-04-23 @ 21:33)  HR: 47 (12-05-23 @ 12:49) (47 - 60)  BP: 181/60 (12-05-23 @ 12:49) (165/60 - 181/60)  RR: 18 (12-05-23 @ 12:49) (18 - 18)  SpO2: 99% (12-05-23 @ 12:49) (95% - 99%)  Wt(kg): --    I&O's Summary    04 Dec 2023 07:01  -  05 Dec 2023 07:00  --------------------------------------------------------  IN: 200 mL / OUT: 300 mL / NET: -100 mL    Appearance: frail elderly woman in no acute distress, awake	  HEENT:   Normal oral mucosa, PERRL, EOMI	  Lymphatic: No lymphadenopathy , no edema  Cardiovascular: Normal S1 S2, No JVD, No murmurs , Peripheral pulses palpable 2+ bilaterally  Respiratory: Lungs clear to auscultation, normal effort 	  Gastrointestinal:  Soft, Non-tender, + BS	  Skin: No rashes, No ecchymoses, No cyanosis, warm to touch  Musculoskeletal: Normal range of motion, normal strength  Psychiatry:  Mood & affect appropriate    TELEMETRY: sinus bradycardia 50-59bpm.  narrow QRS	 . paroxysmal AFib/RVR overnight  ECG:  sinus bradycardia 50s.  Echo:  < from: TTE W or WO Ultrasound Enhancing Agent (11.29.23 @ 12:42) >  CONCLUSIONS:      1. Technically difficult image quality.   2. Left ventricular cavity is severely dilated. Left ventricular wall thickness is normal. Left ventricular systolic function is moderately to severely decreased with an ejection fraction of 39 % by Lomeli's method of disks. Regional wall motion abnormalities consistent with ischemic heart disease.   3. Multiple segmental abnormalities exist. See findings.   4. Moderate aortic regurgitation.    ________________________________________________________________________________________  FINDINGS:     Left Ventricle:  After obtainingconsent, Definity ultrasound enhancing agent was given for enhanced left ventricular opacification and improved delineation of the left ventricular endocardial borders. The left ventricular cavity is severely dilated. Left ventricular wall thickness is normal. Left ventricular systolic function is moderately to severely decreased with a calculated ejection fraction of 39 % by the Lomeli's biplane method of disks. There are regional wall motion abnormalities consistent with ischemic heart disease. Impaired relaxation with normal wall motion. There is no evidence of a left ventricular thrombus.  LV Wall Scoring: The apical septal segment, apical lateral segment, apical  inferior segment, and apex are aneurysmal. The mid anteroseptal segment, mid  inferoseptal segment, and mid inferior segment are akinetic. The basal and mid  anterolateral wall is hypokinetic. All remaining scored segments are normal.     Right Ventricle:  The right ventricular cavity is normal in size and reduced systolic function. Tricuspid annular plane systolic excursion (TAPSE) is 1.3 cm (normal >=1.7 cm).     Left Atrium:  The left atrium is moderately dilated with an indexed volume of 30.69 ml/m².     Right Atrium:  The right atrium is normal in size with anindexed volume of 12.65 ml/m².     Interatrial Septum:  The interatrial septum appears intact.     Aortic Valve:  There is moderate calcification of the aortic valve leaflets. There is mild aortic stenosis. There is moderate aortic regurgitation. AI VMax is 4.59 m/s. AI pressure half time is 642 msec. AI slope is 2.14 m/s².     Mitral Valve:  Structurally normal mitral valve with normal leaflet excursion. There is calcification of the mitral valve annulus. There is trace mitral regurgitation.     Tricuspid Valve:  Structurally normal tricuspid valve with normal leaflet excursion. There is mild tricuspid regurgitation. Estimated pulmonary artery systolic pressure is 27 mmHg, consistent with normal pulmonary artery pressure.     Pulmonic Valve:  Structurally normal pulmonic valve with normal leaflet excursion.     Aorta:  The aortic annulus and aortic root appear normal in size.     Pericardium:  No pericardial effusion seen.     Systemic Veins:  The inferior vena cava is normal in size (normal <2.1cm) with normal inspiratory collapse (normal >50%) consistent with normal right atrial pressure (~3, range 0-5mmHg).    < end of copied text >    Cath: Prior LAD PCI.  LCx PCI in 2010.	     ASSESSMENT/PLAN: Ms Thurman is a pleasant 89y Female admitted w/ failure to thrive.  Question of fall vs fainting outside of hospital.  Has chronic systolic CHF due to ischemic/infarct mediated cardiomyopathy, with LVEF now in 35-40% range, and anterior/septal/apical/inferoseptal aneurysm.  Telemetry shows sinus bradycardia 50s, and she is unable to tolerate beta blockers due to this low resting heartrate.  Blood pressure is adequate at rest, but orthostatically very sensitive, and she becomes symptomatically lightheaded.  New diagnosis identified- paroxysmal AFib. This goes with her already mentioned sinus node dysfunction.  Apparently asymptomatic.  Would usually recommend anticoagulation for stroke prevention (her [id number] is >5), however she has LESLIE on CKD (with GFR<15 at this time), and is anemic.    Need to determine goals of care beyond code status (DNR/DNI)  At this time not offering a permanent pacemaker, unless we think it is going to significantly improve her functional status.      Awaiting PT eval re: safety w/ ambulation.  She is dependent on [facility] for ADL assistance at home.    Awaiting GI eval re: ability to progress her diet.   Recommend to hold AV ayaz blockers. Maintain telemetry. Correct K to 4-4.5, Mg to 2.    Will follow with you.    Georges Calderón M.D.  Cardiac Electrophysiology  [phone]   EP  Date of service 12/5  New dx of AFib/RVR on 12/4.    Patient apparently asymptomatic, not complaining of chest pain or palpitations.     acetaminophen     Tablet .. 650 milliGRAM(s) Oral every 6 hours PRN  aluminum hydroxide/magnesium hydroxide/simethicone Suspension 30 milliLiter(s) Oral every 4 hours PRN  atorvastatin 80 milliGRAM(s) Oral at bedtime  chlorhexidine 2% Cloths 1 Application(s) Topical daily  cholecalciferol 2000 Unit(s) Oral daily  dextrose 5% + sodium chloride 0.45% with potassium chloride 20 mEq/L 1000 milliLiter(s) IV Continuous <Continuous>  dextrose 5%. 1000 milliLiter(s) IV Continuous <Continuous>  dextrose 5%. 1000 milliLiter(s) IV Continuous <Continuous>  dextrose 50% Injectable 12.5 Gram(s) IV Push once  dextrose 50% Injectable 25 Gram(s) IV Push once  dextrose 50% Injectable 25 Gram(s) IV Push once  dextrose Oral Gel 15 Gram(s) Oral once PRN  escitalopram 10 milliGRAM(s) Oral daily  folic acid 1 milliGRAM(s) Oral daily  glucagon  Injectable 1 milliGRAM(s) IntraMuscular once  heparin   Injectable 2000 Unit(s) IV Push every 6 hours PRN  heparin   Injectable 4000 Unit(s) IV Push every 6 hours PRN  heparin  Infusion.  Unit(s)/Hr IV Continuous <Continuous>  insulin lispro (ADMELOG) corrective regimen sliding scale   SubCutaneous at bedtime  insulin lispro (ADMELOG) corrective regimen sliding scale   SubCutaneous three times a day before meals  melatonin 6 milliGRAM(s) Oral at bedtime  ondansetron Injectable 4 milliGRAM(s) IV Push every 8 hours PRN  pantoprazole    Tablet 40 milliGRAM(s) Oral before breakfast  polyethylene glycol 3350 17 Gram(s) Oral daily  senna 2 Tablet(s) Oral at bedtime  sodium bicarbonate 1300 milliGRAM(s) Oral three times a day                            8.9    8.25  )-----------( 163      ( 05 Dec 2023 07:03 )             28.8     12-05    141  |  107  |  43<H>  ----------------------------<  78  4.0   |  22  |  4.88<H>    Ca    8.7      05 Dec 2023 07:19  Phos  3.2     12-05  Mg     2.0     12-05    TPro  5.3<L>  /  Alb  3.0<L>  /  TBili  0.8  /  DBili  x   /  AST  23  /  ALT  15  /  AlkPhos  39<L>  12-05    T(C): 36.2 (12-05-23 @ 12:49), Max: 36.4 (12-04-23 @ 21:33)  HR: 47 (12-05-23 @ 12:49) (47 - 60)  BP: 181/60 (12-05-23 @ 12:49) (165/60 - 181/60)  RR: 18 (12-05-23 @ 12:49) (18 - 18)  SpO2: 99% (12-05-23 @ 12:49) (95% - 99%)  Wt(kg): --    I&O's Summary    04 Dec 2023 07:01  -  05 Dec 2023 07:00  --------------------------------------------------------  IN: 200 mL / OUT: 300 mL / NET: -100 mL    Appearance: frail elderly woman in no acute distress, awake	  HEENT:   Normal oral mucosa, PERRL, EOMI	  Lymphatic: No lymphadenopathy , no edema  Cardiovascular: Normal S1 S2, No JVD, No murmurs , Peripheral pulses palpable 2+ bilaterally  Respiratory: Lungs clear to auscultation, normal effort 	  Gastrointestinal:  Soft, Non-tender, + BS	  Skin: No rashes, No ecchymoses, No cyanosis, warm to touch  Musculoskeletal: Normal range of motion, normal strength  Psychiatry:  Mood & affect appropriate    TELEMETRY: sinus bradycardia 50-59bpm.  narrow QRS	 . paroxysmal AFib/RVR overnight  ECG:  sinus bradycardia 50s.  Echo:  < from: TTE W or WO Ultrasound Enhancing Agent (11.29.23 @ 12:42) >  CONCLUSIONS:      1. Technically difficult image quality.   2. Left ventricular cavity is severely dilated. Left ventricular wall thickness is normal. Left ventricular systolic function is moderately to severely decreased with an ejection fraction of 39 % by Lomeli's method of disks. Regional wall motion abnormalities consistent with ischemic heart disease.   3. Multiple segmental abnormalities exist. See findings.   4. Moderate aortic regurgitation.    ________________________________________________________________________________________  FINDINGS:     Left Ventricle:  After obtainingconsent, Definity ultrasound enhancing agent was given for enhanced left ventricular opacification and improved delineation of the left ventricular endocardial borders. The left ventricular cavity is severely dilated. Left ventricular wall thickness is normal. Left ventricular systolic function is moderately to severely decreased with a calculated ejection fraction of 39 % by the Lomeli's biplane method of disks. There are regional wall motion abnormalities consistent with ischemic heart disease. Impaired relaxation with normal wall motion. There is no evidence of a left ventricular thrombus.  LV Wall Scoring: The apical septal segment, apical lateral segment, apical  inferior segment, and apex are aneurysmal. The mid anteroseptal segment, mid  inferoseptal segment, and mid inferior segment are akinetic. The basal and mid  anterolateral wall is hypokinetic. All remaining scored segments are normal.     Right Ventricle:  The right ventricular cavity is normal in size and reduced systolic function. Tricuspid annular plane systolic excursion (TAPSE) is 1.3 cm (normal >=1.7 cm).     Left Atrium:  The left atrium is moderately dilated with an indexed volume of 30.69 ml/m².     Right Atrium:  The right atrium is normal in size with anindexed volume of 12.65 ml/m².     Interatrial Septum:  The interatrial septum appears intact.     Aortic Valve:  There is moderate calcification of the aortic valve leaflets. There is mild aortic stenosis. There is moderate aortic regurgitation. AI VMax is 4.59 m/s. AI pressure half time is 642 msec. AI slope is 2.14 m/s².     Mitral Valve:  Structurally normal mitral valve with normal leaflet excursion. There is calcification of the mitral valve annulus. There is trace mitral regurgitation.     Tricuspid Valve:  Structurally normal tricuspid valve with normal leaflet excursion. There is mild tricuspid regurgitation. Estimated pulmonary artery systolic pressure is 27 mmHg, consistent with normal pulmonary artery pressure.     Pulmonic Valve:  Structurally normal pulmonic valve with normal leaflet excursion.     Aorta:  The aortic annulus and aortic root appear normal in size.     Pericardium:  No pericardial effusion seen.     Systemic Veins:  The inferior vena cava is normal in size (normal <2.1cm) with normal inspiratory collapse (normal >50%) consistent with normal right atrial pressure (~3, range 0-5mmHg).    < end of copied text >    Cath: Prior LAD PCI.  LCx PCI in 2010.	     ASSESSMENT/PLAN: Ms Thurman is a pleasant 89y Female admitted w/ failure to thrive.  Question of fall vs fainting outside of hospital.  Has chronic systolic CHF due to ischemic/infarct mediated cardiomyopathy, with LVEF now in 35-40% range, and anterior/septal/apical/inferoseptal aneurysm.  Telemetry shows sinus bradycardia 50s, and she is unable to tolerate beta blockers due to this low resting heartrate.  Blood pressure is adequate at rest, but orthostatically very sensitive, and she becomes symptomatically lightheaded.  New diagnosis identified- paroxysmal AFib. This goes with her already mentioned sinus node dysfunction.  Apparently asymptomatic.  Would usually recommend anticoagulation for stroke prevention (her [id number] is >5), however she has LESLIE on CKD (with GFR<15 at this time), and is anemic.    Need to determine goals of care beyond code status (DNR/DNI)  At this time not offering a permanent pacemaker, unless we think it is going to significantly improve her functional status.      Awaiting PT eval re: safety w/ ambulation.  She is dependent on [facility] for ADL assistance at home.    Awaiting GI eval re: ability to progress her diet.   Recommend to hold AV ayaz blockers. Maintain telemetry. Correct K to 4-4.5, Mg to 2.    Will follow with you.    Georges Calderón M.D.  Cardiac Electrophysiology  [phone]

## 2023-12-05 NOTE — PROGRESS NOTE ADULT - SUBJECTIVE AND OBJECTIVE BOX
Chief Complaint:  Patient is a 89y old  Female who presents with a chief complaint of fall, LESLIE (05 Dec 2023 07:51)    Interval Events:   no events overnight; vital signs stable    Allergies:  aspirin (Anaphylaxis)  Celebrex (Rash)  penicillin (Anaphylaxis)        Hospital Medications:  acetaminophen     Tablet .. 650 milliGRAM(s) Oral every 6 hours PRN  aluminum hydroxide/magnesium hydroxide/simethicone Suspension 30 milliLiter(s) Oral every 4 hours PRN  atorvastatin 80 milliGRAM(s) Oral at bedtime  chlorhexidine 2% Cloths 1 Application(s) Topical daily  cholecalciferol 2000 Unit(s) Oral daily  dextrose 5% + sodium chloride 0.45% with potassium chloride 20 mEq/L 1000 milliLiter(s) IV Continuous <Continuous>  dextrose 5%. 1000 milliLiter(s) IV Continuous <Continuous>  dextrose 5%. 1000 milliLiter(s) IV Continuous <Continuous>  dextrose 50% Injectable 25 Gram(s) IV Push once  dextrose 50% Injectable 25 Gram(s) IV Push once  dextrose 50% Injectable 12.5 Gram(s) IV Push once  dextrose Oral Gel 15 Gram(s) Oral once PRN  escitalopram 10 milliGRAM(s) Oral daily  folic acid 1 milliGRAM(s) Oral daily  glucagon  Injectable 1 milliGRAM(s) IntraMuscular once  heparin   Injectable 4000 Unit(s) IV Push every 6 hours PRN  heparin   Injectable 2000 Unit(s) IV Push every 6 hours PRN  heparin  Infusion.  Unit(s)/Hr IV Continuous <Continuous>  insulin lispro (ADMELOG) corrective regimen sliding scale   SubCutaneous at bedtime  insulin lispro (ADMELOG) corrective regimen sliding scale   SubCutaneous three times a day before meals  melatonin 6 milliGRAM(s) Oral at bedtime  ondansetron Injectable 4 milliGRAM(s) IV Push every 8 hours PRN  pantoprazole    Tablet 40 milliGRAM(s) Oral before breakfast  polyethylene glycol 3350 17 Gram(s) Oral daily  senna 2 Tablet(s) Oral at bedtime  sodium bicarbonate 1300 milliGRAM(s) Oral three times a day      PMHX/PSHX:  Hypertension    Diabetes Mellitus, Type 2    History of Cholecystectomy    Hyperlipidemia    Dementia    CAD (coronary artery disease)    History of TIAs    History of TIA (transient ischemic attack)    Anemia    HFrEF (heart failure with reduced ejection fraction)    History of Cholecystectomy    S/P hip replacement, right    S/P hip replacement, left        Family history:  No pertinent family history in first degree relatives        PHYSICAL EXAM:   Vital Signs:  Vital Signs Last 24 Hrs  T(C): 36.4 (05 Dec 2023 04:10), Max: 36.4 (04 Dec 2023 21:33)  T(F): 97.5 (05 Dec 2023 04:10), Max: 97.6 (04 Dec 2023 21:33)  HR: 60 (05 Dec 2023 04:10) (41 - 88)  BP: 165/60 (05 Dec 2023 04:10) (165/60 - 176/62)  BP(mean): --  RR: 18 (05 Dec 2023 04:10) (18 - 18)  SpO2: 95% (05 Dec 2023 04:10) (94% - 95%)    Parameters below as of 05 Dec 2023 04:10  Patient On (Oxygen Delivery Method): room air      Daily     Daily     GENERAL:  No acute distress  HEENT:  no scleral icterus  CHEST:  no accessory muscle use  HEART:  Regular rate and rhythm  ABDOMEN:  Soft, non-tender, non-distended  EXTREMITIES:  No edema  SKIN:  No rash/ecchymoses  NEURO:  Alert and oriented x 1 to person    LABS:                        8.9    8.25  )-----------( 163      ( 05 Dec 2023 07:03 )             28.8     Mean Cell Volume: 83.2 fl (12-05-23 @ 07:03)    12-05    141  |  107  |  43<H>  ----------------------------<  78  4.0   |  22  |  4.88<H>    Ca    8.7      05 Dec 2023 07:19  Phos  3.2     12-05  Mg     2.0     12-05    TPro  5.3<L>  /  Alb  3.0<L>  /  TBili  0.8  /  DBili  x   /  AST  23  /  ALT  15  /  AlkPhos  39<L>  12-05    LIVER FUNCTIONS - ( 05 Dec 2023 07:19 )  Alb: 3.0 g/dL / Pro: 5.3 g/dL / ALK PHOS: 39 U/L / ALT: 15 U/L / AST: 23 U/L / GGT: x           PTT - ( 05 Dec 2023 09:18 )  PTT:>200.0 sec  Urinalysis Basic - ( 05 Dec 2023 07:19 )    Color: x / Appearance: x / SG: x / pH: x  Gluc: 78 mg/dL / Ketone: x  / Bili: x / Urobili: x   Blood: x / Protein: x / Nitrite: x   Leuk Esterase: x / RBC: x / WBC x   Sq Epi: x / Non Sq Epi: x / Bacteria: x                              8.9    8.25  )-----------( 163      ( 05 Dec 2023 07:03 )             28.8                         9.4    8.07  )-----------( 158      ( 04 Dec 2023 23:02 )             30.3                         8.1    6.27  )-----------( 163      ( 04 Dec 2023 07:06 )             26.1                         7.7    5.83  )-----------( 165      ( 03 Dec 2023 06:11 )             24.6

## 2023-12-05 NOTE — PROGRESS NOTE ADULT - PROBLEM SELECTOR PLAN 5
- pt eating less 2/2 reported epigastric pain/nausea  - also suspect there is an element of progressing dementia  - fluid resuscitation  - encourage PO intake, honor dietary preferences  - Awaiting esophagram with family, plan for 12/6

## 2023-12-05 NOTE — PROGRESS NOTE ADULT - PROBLEM SELECTOR PLAN 2
- on tele, sinus wilber 45-50s  - appreciate EP recs, no plans for PPM at this time  - avoid AV ayaz blockers

## 2023-12-05 NOTE — CHART NOTE - NSCHARTNOTEFT_GEN_A_CORE
Discussed with primary team. Palliative consultation cancelled at this time  Please reconsult if needs arise.    [phone] Discussed with primary team. Palliative consultation cancelled at this time  Please reconsult if needs arise.    [phone]

## 2023-12-05 NOTE — PROGRESS NOTE ADULT - SUBJECTIVE AND OBJECTIVE BOX
DATE OF SERVICE: 12-05-23    Appears comfortable, unable to obtain Ros    Review of Systems:   Constitutional: [ ] fevers, [ ] chills.   Skin: [ ] dry skin. [ ] rashes.  Psychiatric: [ ] depression, [ ] anxiety.   Gastrointestinal: [ ] BRBPR, [ ] melena.   Neurological: [ ] confusion. [ ] seizures. [ ] shuffling gait.   Ears,Nose,Mouth and Throat: [ ] ear pain [ ] sore throat.   Eyes: [ ] diplopia.   Respiratory: [ ] hemoptysis. [ ] shortness of breath  Cardiovascular: See HPI above  Hematologic/Lymphatic: [ ] anemia. [ ] painful nodes. [ ] prolonged bleeding.   Genitourinary: [ ] hematuria. [ ] flank pain.   Endocrine: [ ] significant change in weight. [ ] intolerance to heat and cold.     Review of systems [ x] otherwise negative, [ ] otherwise unable to obtain    FH: no family history of sudden cardiac death in first degree relatives    SH: [ ] tobacco, [ ] alcohol, [ ] drugs    acetaminophen     Tablet .. 650 milliGRAM(s) Oral every 6 hours PRN  aluminum hydroxide/magnesium hydroxide/simethicone Suspension 30 milliLiter(s) Oral every 4 hours PRN  atorvastatin 80 milliGRAM(s) Oral at bedtime  chlorhexidine 2% Cloths 1 Application(s) Topical daily  cholecalciferol 2000 Unit(s) Oral daily  dextrose 5% + sodium chloride 0.45% with potassium chloride 20 mEq/L 1000 milliLiter(s) IV Continuous <Continuous>  dextrose 5%. 1000 milliLiter(s) IV Continuous <Continuous>  dextrose 5%. 1000 milliLiter(s) IV Continuous <Continuous>  dextrose 50% Injectable 12.5 Gram(s) IV Push once  dextrose 50% Injectable 25 Gram(s) IV Push once  dextrose 50% Injectable 25 Gram(s) IV Push once  dextrose Oral Gel 15 Gram(s) Oral once PRN  escitalopram 10 milliGRAM(s) Oral daily  folic acid 1 milliGRAM(s) Oral daily  glucagon  Injectable 1 milliGRAM(s) IntraMuscular once  heparin   Injectable 4000 Unit(s) IV Push every 6 hours PRN  heparin   Injectable 2000 Unit(s) IV Push every 6 hours PRN  heparin  Infusion.  Unit(s)/Hr IV Continuous <Continuous>  insulin lispro (ADMELOG) corrective regimen sliding scale   SubCutaneous at bedtime  insulin lispro (ADMELOG) corrective regimen sliding scale   SubCutaneous three times a day before meals  melatonin 6 milliGRAM(s) Oral at bedtime  ondansetron Injectable 4 milliGRAM(s) IV Push every 8 hours PRN  pantoprazole    Tablet 40 milliGRAM(s) Oral before breakfast  polyethylene glycol 3350 17 Gram(s) Oral daily  senna 2 Tablet(s) Oral at bedtime  sodium bicarbonate 1300 milliGRAM(s) Oral three times a day                            8.9    8.25  )-----------( 163      ( 05 Dec 2023 07:03 )             28.8       12-05    141  |  107  |  43<H>  ----------------------------<  78  4.0   |  22  |  4.88<H>    Ca    8.7      05 Dec 2023 07:19  Phos  3.2     12-05  Mg     2.0     12-05    TPro  5.3<L>  /  Alb  3.0<L>  /  TBili  0.8  /  DBili  x   /  AST  23  /  ALT  15  /  AlkPhos  39<L>  12-05      T(C): 36.2 (12-05-23 @ 12:49), Max: 36.4 (12-04-23 @ 21:33)  HR: 45 (12-05-23 @ 13:06) (45 - 60)  BP: 172/55 (12-05-23 @ 13:06) (165/60 - 181/60)  RR: 18 (12-05-23 @ 12:49) (18 - 18)  SpO2: 99% (12-05-23 @ 12:49) (95% - 99%)  Wt(kg): --    I&O's Summary    04 Dec 2023 07:01  -  05 Dec 2023 07:00  --------------------------------------------------------  IN: 200 mL / OUT: 300 mL / NET: -100 mL      Gen: Appears well in NAD  HEENT:  (-)icterus (-)pallor  CV: N S1 S2 1/6 MANASA (+)2 Pulses B/l  Resp:  Clear to ausculatation B/L, normal effort  GI: (+) BS Soft, NT, ND  Lymph:  (-)Edema, (-)obvious lymphadenopathy  Skin: Warm to touch, Normal turgor  Psych: Pleasantly confused     TELEMETRY: SB/SR 40-60s	      RADIOLOGY:         CXR: < from: Xray Chest 1 View- PORTABLE-Urgent (11.26.23 @ 02:59) >  IMPRESSION:  Slightly elevated right hemidiaphragm. Underinflated but otherwise clear   lungs. No pleural effusions or pneumothorax.    Stable cardiac and mediastinal silhouettes including aortic   calcifications and convex fullness of superior mediastinal/paratracheal   soft tissue contour which could be related to engorged/tortuous   brachiocephalic vasculature.    Generalized osteopenia.    --- End of Report ---    < end of copied text >      ASSESSMENT/PLAN: Patient is a 89 y/o Female with PMH of advanced dementia, HTN, HLD, DM2, anemia, CAD (s/p LAD and LCx stent 2010), HFrEF (EF 40-45% 2021), h/o TIA no residual deficits, h/o recurrent UTI who presented with syncope and fall w/o trauma in the setting of 1 month of poor PO intake, nausea, vomiting, epigastric pain. Found to have LESLIE, sinus bradycardia and orthostatic hypotension. Cardiology consulted for further evaluation.    #Syncope  - Suspect due to orthostatic hypotension  - TTE noted with EF 39%, mod AI  - Monitor telemetry however appears asymptomatic in terms of sinus bradycardia    #Orthostatic Hypotension  - Agree with holding all antihypertensives/GDMT  - Avoid midodrine and florinef given CHF  - Continue compression stockings and monitor for improvement    #Sinus Bradycardia  - Appears asymptomatic at rest  - EP consult with Dr. Calderón appreciated, no PPM recommended at this time unless its felt to improve her functional status    #New PAF  --given anemia and LESLIE, poor candidate for AC    #LESLIE  - Management per renal    #CAD  - Continue Plavix and Lipitor    #GOC  --DNR/DNI       DATE OF SERVICE: 12-05-23    Appears comfortable, unable to obtain Ros    Review of Systems:   Constitutional: [ ] fevers, [ ] chills.   Skin: [ ] dry skin. [ ] rashes.  Psychiatric: [ ] depression, [ ] anxiety.   Gastrointestinal: [ ] BRBPR, [ ] melena.   Neurological: [ ] confusion. [ ] seizures. [ ] shuffling gait.   Ears,Nose,Mouth and Throat: [ ] ear pain [ ] sore throat.   Eyes: [ ] diplopia.   Respiratory: [ ] hemoptysis. [ ] shortness of breath  Cardiovascular: See HPI above  Hematologic/Lymphatic: [ ] anemia. [ ] painful nodes. [ ] prolonged bleeding.   Genitourinary: [ ] hematuria. [ ] flank pain.   Endocrine: [ ] significant change in weight. [ ] intolerance to heat and cold.     Review of systems [ x] otherwise negative, [ ] otherwise unable to obtain    FH: no family history of sudden cardiac death in first degree relatives    SH: [ ] tobacco, [ ] alcohol, [ ] drugs    acetaminophen     Tablet .. 650 milliGRAM(s) Oral every 6 hours PRN  aluminum hydroxide/magnesium hydroxide/simethicone Suspension 30 milliLiter(s) Oral every 4 hours PRN  atorvastatin 80 milliGRAM(s) Oral at bedtime  chlorhexidine 2% Cloths 1 Application(s) Topical daily  cholecalciferol 2000 Unit(s) Oral daily  dextrose 5% + sodium chloride 0.45% with potassium chloride 20 mEq/L 1000 milliLiter(s) IV Continuous <Continuous>  dextrose 5%. 1000 milliLiter(s) IV Continuous <Continuous>  dextrose 5%. 1000 milliLiter(s) IV Continuous <Continuous>  dextrose 50% Injectable 12.5 Gram(s) IV Push once  dextrose 50% Injectable 25 Gram(s) IV Push once  dextrose 50% Injectable 25 Gram(s) IV Push once  dextrose Oral Gel 15 Gram(s) Oral once PRN  escitalopram 10 milliGRAM(s) Oral daily  folic acid 1 milliGRAM(s) Oral daily  glucagon  Injectable 1 milliGRAM(s) IntraMuscular once  heparin   Injectable 4000 Unit(s) IV Push every 6 hours PRN  heparin   Injectable 2000 Unit(s) IV Push every 6 hours PRN  heparin  Infusion.  Unit(s)/Hr IV Continuous <Continuous>  insulin lispro (ADMELOG) corrective regimen sliding scale   SubCutaneous at bedtime  insulin lispro (ADMELOG) corrective regimen sliding scale   SubCutaneous three times a day before meals  melatonin 6 milliGRAM(s) Oral at bedtime  ondansetron Injectable 4 milliGRAM(s) IV Push every 8 hours PRN  pantoprazole    Tablet 40 milliGRAM(s) Oral before breakfast  polyethylene glycol 3350 17 Gram(s) Oral daily  senna 2 Tablet(s) Oral at bedtime  sodium bicarbonate 1300 milliGRAM(s) Oral three times a day                            8.9    8.25  )-----------( 163      ( 05 Dec 2023 07:03 )             28.8       12-05    141  |  107  |  43<H>  ----------------------------<  78  4.0   |  22  |  4.88<H>    Ca    8.7      05 Dec 2023 07:19  Phos  3.2     12-05  Mg     2.0     12-05    TPro  5.3<L>  /  Alb  3.0<L>  /  TBili  0.8  /  DBili  x   /  AST  23  /  ALT  15  /  AlkPhos  39<L>  12-05      T(C): 36.2 (12-05-23 @ 12:49), Max: 36.4 (12-04-23 @ 21:33)  HR: 45 (12-05-23 @ 13:06) (45 - 60)  BP: 172/55 (12-05-23 @ 13:06) (165/60 - 181/60)  RR: 18 (12-05-23 @ 12:49) (18 - 18)  SpO2: 99% (12-05-23 @ 12:49) (95% - 99%)  Wt(kg): --    I&O's Summary    04 Dec 2023 07:01  -  05 Dec 2023 07:00  --------------------------------------------------------  IN: 200 mL / OUT: 300 mL / NET: -100 mL      Gen: Appears well in NAD  HEENT:  (-)icterus (-)pallor  CV: N S1 S2 1/6 MANASA (+)2 Pulses B/l  Resp:  Clear to ausculatation B/L, normal effort  GI: (+) BS Soft, NT, ND  Lymph:  (-)Edema, (-)obvious lymphadenopathy  Skin: Warm to touch, Normal turgor  Psych: Pleasantly confused     TELEMETRY: SB/SR 40-60s	      RADIOLOGY:         CXR: < from: Xray Chest 1 View- PORTABLE-Urgent (11.26.23 @ 02:59) >  IMPRESSION:  Slightly elevated right hemidiaphragm. Underinflated but otherwise clear   lungs. No pleural effusions or pneumothorax.    Stable cardiac and mediastinal silhouettes including aortic   calcifications and convex fullness of superior mediastinal/paratracheal   soft tissue contour which could be related to engorged/tortuous   brachiocephalic vasculature.    Generalized osteopenia.    --- End of Report ---    < end of copied text >      ASSESSMENT/PLAN: Patient is a 89 y/o Female with PMH of advanced dementia, HTN, HLD, DM2, anemia, CAD (s/p LAD and LCx stent 2010), HFrEF (EF 40-45% 2021), h/o TIA no residual deficits, h/o recurrent UTI who presented with syncope and fall w/o trauma in the setting of 1 month of poor PO intake, nausea, vomiting, epigastric pain. Found to have LESLIE, sinus bradycardia and orthostatic hypotension. Cardiology consulted for further evaluation.    #Syncope  - Suspect due to orthostatic hypotension  - TTE noted with EF 39%, mod AI  - Monitor telemetry however appears asymptomatic in terms of sinus bradycardia    #Orthostatic Hypotension  - Agree with holding all antihypertensives/GDMT  - Avoid midodrine and florinef given CHF  - Continue compression stockings and monitor for improvement    #Sinus Bradycardia  - Appears asymptomatic at rest  - EP consult with Dr. Calderón appreciated, no PPM recommended at this time unless its felt to improve her functional status    #New PAF  --given anemia and LESLIE, poor candidate for AC    #LESLIE  - Management per renal    #CAD  - Continue Plavix and Lipitor    #GOC  --DNR/DNI

## 2023-12-05 NOTE — PROGRESS NOTE ADULT - ATTENDING COMMENTS
Patient is a 89 year old female, with PMH of advanced dementia, HTN, HLD, DM2, anemia, CAD (s/p LAD and LCx stent 2010), HFrEF (EF 40-45% 2021), h/o TIA no residual deficits, h/o recurrent UTI presents w/ syncope and fall w/o trauma in the setting of 1 month of poor PO intake, nausea, vomiting, epigastric pain.likely combination from prerenal (poor PO intake), ATN from hypotension, and obstructive component     # paroxysmal a fib/tachy- wilber  appreciate ep recs  not a candidate for PPM  on hep gtt for now pending GOC given CHADSVASC      #LESLIE/ATN  - Cr down trending. encourage PO intake  - electrolytes WNL, normal respiratory status   - monitor BMP Mg Phos daily   - nephrology following, no urgent need for HD   - morales removed 11/30 - passed TOV    #Failure to Thrive  - poor PO intake at home and inpatient.   - CT A/P no acute intraabdominal abnormality   - family interested in PEG   - patient unable to participate in MBS F/u for plans egd to rule out obstructive etiology (plavix was held since 12/1, needs to be held 5 days prior to PEG)  -calorie count failed    called son who did not have time at the moment to chat, called daughter left  to discuss GOC

## 2023-12-05 NOTE — CHART NOTE - NSCHARTNOTEFT_GEN_A_CORE
Brief Interim Note  Patient seen for: calorie count consult     Current Diet:  Diet, NPO after Midnight:      NPO Start Date: 05-Dec-2023,   NPO Start Time: 23:59 (12-05-23)    PO Intake:  calorie count 12/4-6 in room; pending results; RD will assess upon completion    Interventions in Place:  - Continue oral nutrition supplements luis fischer  - Defer diet/fluid consistencies to medical team/SLP recommendations.     Recommendations:   - Continue current diet regimen ( Defer diet/fluid consistencies to medical team/SLP recommendations/ GOC); and oral nutrition supplements  - Calorie count 12/4-6 in room; pending results; RD will assess upon completion  - Will continue to monitor PO intake, weight, labs, skin, GI status, diet.   - Nutrition care plan to remain consistent with pt goals of care    RD remains available to review diet education and adjust diet recommendations as needed.   Kari David, MS, RD, CDN ([employer])

## 2023-12-05 NOTE — PROGRESS NOTE ADULT - PROBLEM SELECTOR PLAN 1
- Differential for syncope includes orthostatic hypotension vs. cardiac (arrythmia, valvular disease) vs. neuro  - Pt has h/o HFrEF, so increased risk of arrythmia  - Favor orthostatic hypotension as cause as pt has positive orthostatics (103/46 --> 65/45)  - Sinus wilber on tele, HR increases with standing vitals. Unclear if causing   - TTE with LVEF 39%.   - continue to monitor on tele  - appreciate cards and EP recs, no plans for PPM at this time

## 2023-12-05 NOTE — PROGRESS NOTE ADULT - ATTENDING COMMENTS
natasha rodriguez  nephrology attending   please contact me on TEAMS   Office- [phone] natasha rodriguez  nephrology attending   please contact me on TEAMS   Office- [phone]

## 2023-12-05 NOTE — PROGRESS NOTE ADULT - PROBLEM SELECTOR PLAN 1
LESLIE/ATN 2/2 hypotension, volume depletion and retention. Baseline Cr ~1; admission Cr 7.0 (11/26), peaked to 7.14 (11/27) and Scr elevated/improved to 4.88. Renal function slowly improving, c/w conservative management. Monitor labs and urine output. Avoid nephrotoxins. Dose medications as per eGFR.    Final recommendations pending attending signature.  If you have any questions, please feel free to contact me  Kiana Encarnacion  Nephrology Fellow  [place]/Page [phone]  (After 5pm or on weekends please page the on-call fellow) LESLIE/ATN 2/2 hypotension, volume depletion and retention. Baseline Cr ~1; admission Cr 7.0 (11/26), peaked to 7.14 (11/27) and Scr elevated/improved to 4.88. Renal function slowly improving, c/w conservative management. Monitor labs and urine output. Avoid nephrotoxins. Dose medications as per eGFR.    Final recommendations pending attending signature.  If you have any questions, please feel free to contact me  Kiana Encarnacion  Nephrology Fellow  [employer]/Page [phone]  (After 5pm or on weekends please page the on-call fellow) LESLIE/ATN 2/2 hypotension, volume depletion and retention. Baseline Cr ~1; admission Cr 7.0 (11/26), peaked to 7.14 (11/27) and Scr elevated/improved to 4.88. Renal function slowly improving, c/w conservative management. Monitor labs and urine output. Avoid nephrotoxins. Dose medications as per eGFR.

## 2023-12-05 NOTE — PROGRESS NOTE ADULT - PROBLEM SELECTOR PLAN 6
- peptic ulcer disease vs. pancreatitis vs. functional  - no findings on CT AP of pancreatitis, does not have classic history  - never had endoscopy to evaluate  - will trial protonix 40mg qd   - esophogram attempted 12/1 - unable to tolerate; did not participate and follow commands  - family would like to reattempt esophogram - maybe on 12/6  - GI following. Will perform calorie count. Family interested in PEG, would require scope. Continue GOC discussions.

## 2023-12-05 NOTE — PROGRESS NOTE ADULT - SUBJECTIVE AND OBJECTIVE BOX
PROGRESS NOTE:   Authored by Edgard Kim MD  Internal Medicine      Patient is a 89y old  Female who presents with a chief complaint of fall, LESLIE (04 Dec 2023 15:00)      SUBJECTIVE / OVERNIGHT EVENTS: NAEO, patient seen and examined at bedside    MEDICATIONS  (STANDING):  atorvastatin 80 milliGRAM(s) Oral at bedtime  chlorhexidine 2% Cloths 1 Application(s) Topical daily  cholecalciferol 2000 Unit(s) Oral daily  dextrose 5% + sodium chloride 0.45% with potassium chloride 20 mEq/L 1000 milliLiter(s) (50 mL/Hr) IV Continuous <Continuous>  dextrose 5%. 1000 milliLiter(s) (50 mL/Hr) IV Continuous <Continuous>  dextrose 5%. 1000 milliLiter(s) (100 mL/Hr) IV Continuous <Continuous>  dextrose 50% Injectable 25 Gram(s) IV Push once  dextrose 50% Injectable 25 Gram(s) IV Push once  dextrose 50% Injectable 12.5 Gram(s) IV Push once  escitalopram 10 milliGRAM(s) Oral daily  folic acid 1 milliGRAM(s) Oral daily  glucagon  Injectable 1 milliGRAM(s) IntraMuscular once  heparin  Infusion.  Unit(s)/Hr (10 mL/Hr) IV Continuous <Continuous>  insulin lispro (ADMELOG) corrective regimen sliding scale   SubCutaneous three times a day before meals  insulin lispro (ADMELOG) corrective regimen sliding scale   SubCutaneous at bedtime  melatonin 6 milliGRAM(s) Oral at bedtime  pantoprazole    Tablet 40 milliGRAM(s) Oral before breakfast  polyethylene glycol 3350 17 Gram(s) Oral daily  senna 2 Tablet(s) Oral at bedtime  sodium bicarbonate 1300 milliGRAM(s) Oral three times a day    MEDICATIONS  (PRN):  acetaminophen     Tablet .. 650 milliGRAM(s) Oral every 6 hours PRN Temp greater or equal to 38C (100.4F), Mild Pain (1 - 3)  aluminum hydroxide/magnesium hydroxide/simethicone Suspension 30 milliLiter(s) Oral every 4 hours PRN Dyspepsia  dextrose Oral Gel 15 Gram(s) Oral once PRN Blood Glucose LESS THAN 70 milliGRAM(s)/deciliter  heparin   Injectable 2000 Unit(s) IV Push every 6 hours PRN For aPTT between 40 - 57  heparin   Injectable 4000 Unit(s) IV Push every 6 hours PRN For aPTT less than 40  ondansetron Injectable 4 milliGRAM(s) IV Push every 8 hours PRN Nausea and/or Vomiting      CAPILLARY BLOOD GLUCOSE      POCT Blood Glucose.: 85 mg/dL (04 Dec 2023 23:36)  POCT Blood Glucose.: 75 mg/dL (04 Dec 2023 21:19)  POCT Blood Glucose.: 82 mg/dL (04 Dec 2023 16:48)  POCT Blood Glucose.: 173 mg/dL (04 Dec 2023 13:36)  POCT Blood Glucose.: 76 mg/dL (04 Dec 2023 12:41)  POCT Blood Glucose.: 114 mg/dL (04 Dec 2023 08:02)    I&O's Summary    03 Dec 2023 07:01  -  04 Dec 2023 07:00  --------------------------------------------------------  IN: 2030 mL / OUT: 0 mL / NET: 2030 mL    04 Dec 2023 07:01  -  05 Dec 2023 06:51  --------------------------------------------------------  IN: 200 mL / OUT: 300 mL / NET: -100 mL        PHYSICAL EXAM:  Vital Signs Last 24 Hrs  T(C): 36.4 (05 Dec 2023 04:10), Max: 36.4 (04 Dec 2023 21:33)  T(F): 97.5 (05 Dec 2023 04:10), Max: 97.6 (04 Dec 2023 21:33)  HR: 60 (05 Dec 2023 04:10) (41 - 88)  BP: 165/60 (05 Dec 2023 04:10) (165/60 - 176/62)  BP(mean): --  RR: 18 (05 Dec 2023 04:10) (18 - 18)  SpO2: 95% (05 Dec 2023 04:10) (94% - 95%)    Parameters below as of 05 Dec 2023 04:10  Patient On (Oxygen Delivery Method): room air      CONSTITUTIONAL: Well-groomed, in no apparent distress  EYES: No conjunctival or scleral injection, non-icteric;   ENMT: No external nasal lesions; MMM  NECK: Trachea midline without palpable neck mass; thyroid not enlarged and non-tender  RESPIRATORY: Breathing comfortably; no dullness to percussion; lungs CTA without wheeze/rhonchi/rales  CARDIOVASCULAR: +S1S2, RRR, no M/G/R; pedal pulses full and symmetric; no lower extremity edema  GASTROINTESTINAL: No palpable masses or tenderness, +BS throughout, no rebound/guarding; no hepatosplenomegaly; no hernia palpated  LYMPHATIC: No cervical LAD or tenderness  SKIN: No rashes or ulcers noted  NEUROLOGIC: CN II-XII intact; sensation intact in LEs b/l to light touch  PSYCHIATRIC: A+O x 3; mood and affect appropriate; appropriate insight and judgment    LABS:                        9.4    8.07  )-----------( 158      ( 04 Dec 2023 23:02 )             30.3     12-04    143  |  109<H>  |  44<H>  ----------------------------<  98  3.5   |  22  |  5.02<H>    Ca    8.4      04 Dec 2023 07:06  Phos  2.6     12-04  Mg     1.9     12-04      PTT - ( 04 Dec 2023 23:02 )  PTT:>200.0 sec      Urinalysis Basic - ( 04 Dec 2023 07:06 )    Color: x / Appearance: x / SG: x / pH: x  Gluc: 98 mg/dL / Ketone: x  / Bili: x / Urobili: x   Blood: x / Protein: x / Nitrite: x   Leuk Esterase: x / RBC: x / WBC x   Sq Epi: x / Non Sq Epi: x / Bacteria: x      COORDINATION OF CARE:  Care Discussed with Consultants/Other Providers [Y/N]: Yes  Prior or Outpatient Records Reviewed [Y/N]: Yes

## 2023-12-05 NOTE — PROGRESS NOTE ADULT - SUBJECTIVE AND OBJECTIVE BOX
[facility] Division of Kidney Diseases & Hypertension  FOLLOW UP NOTE  [phone]--------------------------------------------------------------------------------  Chief Complaint:Acute renal failure    24 hour events/subjective: pt seen and examined at bedside. reports no acute complaints.    PAST HISTORY  --------------------------------------------------------------------------------  No significant changes to PMH, PSH, FHx, SHx, unless otherwise noted    ALLERGIES & MEDICATIONS  --------------------------------------------------------------------------------  Allergies  aspirin (Anaphylaxis)  Celebrex (Rash)  penicillin (Anaphylaxis)  Intolerances  Standing Inpatient Medications  atorvastatin 80 milliGRAM(s) Oral at bedtime  chlorhexidine 2% Cloths 1 Application(s) Topical daily  cholecalciferol 2000 Unit(s) Oral daily  dextrose 5% + sodium chloride 0.45% with potassium chloride 20 mEq/L 1000 milliLiter(s) IV Continuous <Continuous>  dextrose 5%. 1000 milliLiter(s) IV Continuous <Continuous>  dextrose 5%. 1000 milliLiter(s) IV Continuous <Continuous>  dextrose 50% Injectable 12.5 Gram(s) IV Push once  dextrose 50% Injectable 25 Gram(s) IV Push once  dextrose 50% Injectable 25 Gram(s) IV Push once  escitalopram 10 milliGRAM(s) Oral daily  folic acid 1 milliGRAM(s) Oral daily  glucagon  Injectable 1 milliGRAM(s) IntraMuscular once  heparin  Infusion.  Unit(s)/Hr IV Continuous <Continuous>  insulin lispro (ADMELOG) corrective regimen sliding scale   SubCutaneous at bedtime  insulin lispro (ADMELOG) corrective regimen sliding scale   SubCutaneous three times a day before meals  melatonin 6 milliGRAM(s) Oral at bedtime  pantoprazole    Tablet 40 milliGRAM(s) Oral before breakfast  polyethylene glycol 3350 17 Gram(s) Oral daily  senna 2 Tablet(s) Oral at bedtime  sodium bicarbonate 1300 milliGRAM(s) Oral three times a day    PRN Inpatient Medications  acetaminophen     Tablet .. 650 milliGRAM(s) Oral every 6 hours PRN  aluminum hydroxide/magnesium hydroxide/simethicone Suspension 30 milliLiter(s) Oral every 4 hours PRN  dextrose Oral Gel 15 Gram(s) Oral once PRN  heparin   Injectable 2000 Unit(s) IV Push every 6 hours PRN  heparin   Injectable 4000 Unit(s) IV Push every 6 hours PRN  ondansetron Injectable 4 milliGRAM(s) IV Push every 8 hours PRN    REVIEW OF SYSTEMS  --------------------------------------------------------------------------------  Gen: no fever  Respiratory: no sob  CV: no cp  GI: no pain, no n/v  : no complaints  MSK: no pain    VITALS/PHYSICAL EXAM  --------------------------------------------------------------------------------  T(C): 36.2 (12-05-23 @ 12:49), Max: 36.4 (12-04-23 @ 21:33)  HR: 47 (12-05-23 @ 12:49) (47 - 60)  BP: 181/60 (12-05-23 @ 12:49) (165/60 - 181/60)  RR: 18 (12-05-23 @ 12:49) (18 - 18)  SpO2: 99% (12-05-23 @ 12:49) (95% - 99%)  Wt(kg): --    12-04-23 @ 07:01  -  12-05-23 @ 07:00  --------------------------------------------------------  IN: 200 mL / OUT: 300 mL / NET: -100 mL    Physical Exam:  	Gen: NAD  	HEENT: supple neck, clear oropharynx  	Pulm: CTA B/L  	CV: RRR, S1S2; no rub  	Abd: soft, nontender/nondistended  	UE: Warm, no edema; no asterixis  	LE: Warm, no edema    LABS/STUDIES  --------------------------------------------------------------------------------              8.9    8.25  >-----------<  163      [12-05-23 @ 07:03]              28.8     141  |  107  |  43  ----------------------------<  78      [12-05-23 @ 07:19]  4.0   |  22  |  4.88        Ca     8.7     [12-05-23 @ 07:19]      Mg     2.0     [12-05-23 @ 07:19]      Phos  3.2     [12-05-23 @ 07:19]    TPro  5.3  /  Alb  3.0  /  TBili  0.8  /  DBili  x   /  AST  23  /  ALT  15  /  AlkPhos  39  [12-05-23 @ 07:19]    PTT: >200.0      [12-05-23 @ 09:18]    Creatinine Trend:  SCr 4.88 [12-05 @ 07:19]  SCr 5.02 [12-04 @ 07:06]  SCr 5.63 [12-03 @ 06:12]  SCr 5.95 [12-02 @ 10:52]  SCr 6.15 [12-01 @ 11:33]    Iron 30, TIBC 266, %sat 11      [12-01-23 @ 11:33]  Ferritin 42      [12-01-23 @ 11:33]   [facility] Division of Kidney Diseases & Hypertension  FOLLOW UP NOTE  [phone]--------------------------------------------------------------------------------  Chief Complaint:Acute renal failure    24 hour events/subjective: pt seen and examined at bedside. reports no acute complaints.    PAST HISTORY  --------------------------------------------------------------------------------  No significant changes to PMH, PSH, FHx, SHx, unless otherwise noted    ALLERGIES & MEDICATIONS  --------------------------------------------------------------------------------  Allergies  aspirin (Anaphylaxis)  Celebrex (Rash)  penicillin (Anaphylaxis)  Intolerances  Standing Inpatient Medications  atorvastatin 80 milliGRAM(s) Oral at bedtime  chlorhexidine 2% Cloths 1 Application(s) Topical daily  cholecalciferol 2000 Unit(s) Oral daily  dextrose 5% + sodium chloride 0.45% with potassium chloride 20 mEq/L 1000 milliLiter(s) IV Continuous <Continuous>  dextrose 5%. 1000 milliLiter(s) IV Continuous <Continuous>  dextrose 5%. 1000 milliLiter(s) IV Continuous <Continuous>  dextrose 50% Injectable 12.5 Gram(s) IV Push once  dextrose 50% Injectable 25 Gram(s) IV Push once  dextrose 50% Injectable 25 Gram(s) IV Push once  escitalopram 10 milliGRAM(s) Oral daily  folic acid 1 milliGRAM(s) Oral daily  glucagon  Injectable 1 milliGRAM(s) IntraMuscular once  heparin  Infusion.  Unit(s)/Hr IV Continuous <Continuous>  insulin lispro (ADMELOG) corrective regimen sliding scale   SubCutaneous at bedtime  insulin lispro (ADMELOG) corrective regimen sliding scale   SubCutaneous three times a day before meals  melatonin 6 milliGRAM(s) Oral at bedtime  pantoprazole    Tablet 40 milliGRAM(s) Oral before breakfast  polyethylene glycol 3350 17 Gram(s) Oral daily  senna 2 Tablet(s) Oral at bedtime  sodium bicarbonate 1300 milliGRAM(s) Oral three times a day    PRN Inpatient Medications  acetaminophen     Tablet .. 650 milliGRAM(s) Oral every 6 hours PRN  aluminum hydroxide/magnesium hydroxide/simethicone Suspension 30 milliLiter(s) Oral every 4 hours PRN  dextrose Oral Gel 15 Gram(s) Oral once PRN  heparin   Injectable 2000 Unit(s) IV Push every 6 hours PRN  heparin   Injectable 4000 Unit(s) IV Push every 6 hours PRN  ondansetron Injectable 4 milliGRAM(s) IV Push every 8 hours PRN    REVIEW OF SYSTEMS  --------------------------------------------------------------------------------  Gen: no fever  Respiratory: no sob  CV: no cp  GI: no pain, no n/v  : no complaints  MSK: no pain    VITALS/PHYSICAL EXAM  --------------------------------------------------------------------------------  T(C): 36.2 (12-05-23 @ 12:49), Max: 36.4 (12-04-23 @ 21:33)  HR: 47 (12-05-23 @ 12:49) (47 - 60)  BP: 181/60 (12-05-23 @ 12:49) (165/60 - 181/60)  RR: 18 (12-05-23 @ 12:49) (18 - 18)  SpO2: 99% (12-05-23 @ 12:49) (95% - 99%)  Wt(kg): --    12-04-23 @ 07:01  -  12-05-23 @ 07:00  --------------------------------------------------------  IN: 200 mL / OUT: 300 mL / NET: -100 mL    Physical Exam:  	Gen: NAD  	HEENT: supple neck, clear oropharynx  	Pulm: CTA B/L  	CV: RRR, S1S2; no rub  	Abd: soft, nontender/nondistended  	UE: Warm, no edema; no asterixis  	LE: Warm, no edema    LABS/STUDIES  --------------------------------------------------------------------------------              8.9    8.25  >-----------<  163      [12-05-23 @ 07:03]              28.8     141  |  107  |  43  ----------------------------<  78      [12-05-23 @ 07:19]  4.0   |  22  |  4.88        Ca     8.7     [12-05-23 @ 07:19]      Mg     2.0     [12-05-23 @ 07:19]      Phos  3.2     [12-05-23 @ 07:19]    TPro  5.3  /  Alb  3.0  /  TBili  0.8  /  DBili  x   /  AST  23  /  ALT  15  /  AlkPhos  39  [12-05-23 @ 07:19]    PTT: >200.0      [12-05-23 @ 09:18]    Creatinine Trend:  SCr 4.88 [12-05 @ 07:19]  SCr 5.02 [12-04 @ 07:06]  SCr 5.63 [12-03 @ 06:12]  SCr 5.95 [12-02 @ 10:52]  SCr 6.15 [12-01 @ 11:33]    Iron 30, TIBC 266, %sat 11      [12-01-23 @ 11:33]  Ferritin 42      [12-01-23 @ 11:33]

## 2023-12-05 NOTE — PROGRESS NOTE ADULT - PROBLEM SELECTOR PLAN 2
Pt with h/o HTN, BP trend elevated. Please resume home norvasc 5mg qd. Monitor labs     Final recommendations pending attending signature.  If you have any questions, please feel free to contact me  Kiana Encarnacion  Nephrology Fellow  [employer]/Page [phone]  (After 5pm or on weekends please page the on-call fellow) Pt with h/o HTN, BP trend elevated. Please resume home norvasc 5mg qd. Monitor labs     Final recommendations pending attending signature.  If you have any questions, please feel free to contact me  Kiana Encarnacion  Nephrology Fellow  [employer]/Page [phone]  (After 5pm or on weekends please page the on-call fellow)

## 2023-12-05 NOTE — PROGRESS NOTE ADULT - ASSESSMENT
89 y/o F pmhx advanced dementia, DM2, anemia, CKD, CAD (s/p LAD and LCx stent 2010), HFrEF (EF 40-45% 2021), h/o TIA no residual deficits, h/o recurrent UTI presented w/ syncope and fall suspected to be orthostatic in addition to a 1 month hx of poor PO intake with reports of dysphagia prior to presentation    Impression  #PEG evaluation/poor PO intake  #?esophageal dysphagia symptoms prior to presentation, although pt denies at this time  - evaluated by S&S; recommended for soft bite-sized/thin liquids; however, pt with poor PO intake; seen by S&S and continues to have poor PO intake  - currently ordered for X-Ray esophagram    #syncope, suspected orthostatic in nature  #CAD s/p stent; off Plavix since 11/30    Recommendations  - attempted to discuss with daughter regarding PEG placement but unable to reach; will attempt again later today  - if family amenable for PEG, can plan for placement tomorrow  - tentatively make NPO after midnight  - 5AM labs  - hold heparin gtt at 0500 (if amenable for PEG)  - off Plavix since 11/30  - low caloric intake    Note and recommendations are incomplete until reviewed and attested by attending.    Douglas Levi MD  GI/Hepatology Fellow, PGY4  Teams preferred (7AM to 5PM); after 5PM, call GI fellow on call    NEW CONSULTS:  Please email [email or web address] 89 y/o F pmhx advanced dementia, DM2, anemia, CKD, CAD (s/p LAD and LCx stent 2010), HFrEF (EF 40-45% 2021), h/o TIA no residual deficits, h/o recurrent UTI presented w/ syncope and fall suspected to be orthostatic in addition to a 1 month hx of poor PO intake with reports of dysphagia prior to presentation    Impression  #PEG evaluation/poor PO intake  #?esophageal dysphagia symptoms prior to presentation, although pt denies at this time  - evaluated by S&S; recommended for soft bite-sized/thin liquids; however, pt with poor PO intake; seen by S&S and continues to have poor PO intake  - currently ordered for X-Ray esophagram    #syncope, suspected orthostatic in nature  #CAD s/p stent; off Plavix since 11/30    Recommendations  - attempted to discuss with daughter regarding PEG placement but unable to reach; will attempt again later today  - if family amenable for PEG, can plan for placement tomorrow  - tentatively make NPO after midnight  - 5AM labs  - hold heparin gtt at 0500 (if amenable for PEG)  - off Plavix since 11/30  - low caloric intake    Note and recommendations are incomplete until reviewed and attested by attending.    Douglas Levi MD  GI/Hepatology Fellow, PGY4  Teams preferred (7AM to 5PM); after 5PM, call GI fellow on call    NEW CONSULTS:  Please email [email or web address]

## 2023-12-06 LAB
ALBUMIN SERPL ELPH-MCNC: 2.9 G/DL — LOW (ref 3.3–5)
ALBUMIN SERPL ELPH-MCNC: 2.9 G/DL — LOW (ref 3.3–5)
ALP SERPL-CCNC: 38 U/L — LOW (ref 40–120)
ALP SERPL-CCNC: 38 U/L — LOW (ref 40–120)
ALT FLD-CCNC: 15 U/L — SIGNIFICANT CHANGE UP (ref 10–45)
ALT FLD-CCNC: 15 U/L — SIGNIFICANT CHANGE UP (ref 10–45)
ANION GAP SERPL CALC-SCNC: 13 MMOL/L — SIGNIFICANT CHANGE UP (ref 5–17)
ANION GAP SERPL CALC-SCNC: 13 MMOL/L — SIGNIFICANT CHANGE UP (ref 5–17)
APTT BLD: 195 SEC — CRITICAL HIGH (ref 24.5–35.6)
APTT BLD: 195 SEC — CRITICAL HIGH (ref 24.5–35.6)
APTT BLD: >200 SEC — CRITICAL HIGH (ref 24.5–35.6)
APTT BLD: >200 SEC — CRITICAL HIGH (ref 24.5–35.6)
AST SERPL-CCNC: 20 U/L — SIGNIFICANT CHANGE UP (ref 10–40)
AST SERPL-CCNC: 20 U/L — SIGNIFICANT CHANGE UP (ref 10–40)
BASOPHILS # BLD AUTO: 0 K/UL — SIGNIFICANT CHANGE UP (ref 0–0.2)
BASOPHILS # BLD AUTO: 0 K/UL — SIGNIFICANT CHANGE UP (ref 0–0.2)
BASOPHILS NFR BLD AUTO: 0 % — SIGNIFICANT CHANGE UP (ref 0–2)
BASOPHILS NFR BLD AUTO: 0 % — SIGNIFICANT CHANGE UP (ref 0–2)
BILIRUB SERPL-MCNC: 0.7 MG/DL — SIGNIFICANT CHANGE UP (ref 0.2–1.2)
BILIRUB SERPL-MCNC: 0.7 MG/DL — SIGNIFICANT CHANGE UP (ref 0.2–1.2)
BLD GP AB SCN SERPL QL: NEGATIVE — SIGNIFICANT CHANGE UP
BLD GP AB SCN SERPL QL: NEGATIVE — SIGNIFICANT CHANGE UP
BUN SERPL-MCNC: 39 MG/DL — HIGH (ref 7–23)
BUN SERPL-MCNC: 39 MG/DL — HIGH (ref 7–23)
CALCIUM SERPL-MCNC: 9 MG/DL — SIGNIFICANT CHANGE UP (ref 8.4–10.5)
CALCIUM SERPL-MCNC: 9 MG/DL — SIGNIFICANT CHANGE UP (ref 8.4–10.5)
CHLORIDE SERPL-SCNC: 106 MMOL/L — SIGNIFICANT CHANGE UP (ref 96–108)
CHLORIDE SERPL-SCNC: 106 MMOL/L — SIGNIFICANT CHANGE UP (ref 96–108)
CO2 SERPL-SCNC: 24 MMOL/L — SIGNIFICANT CHANGE UP (ref 22–31)
CO2 SERPL-SCNC: 24 MMOL/L — SIGNIFICANT CHANGE UP (ref 22–31)
CREAT SERPL-MCNC: 4.3 MG/DL — HIGH (ref 0.5–1.3)
CREAT SERPL-MCNC: 4.3 MG/DL — HIGH (ref 0.5–1.3)
EGFR: 9 ML/MIN/1.73M2 — LOW
EGFR: 9 ML/MIN/1.73M2 — LOW
EOSINOPHIL # BLD AUTO: 0.13 K/UL — SIGNIFICANT CHANGE UP (ref 0–0.5)
EOSINOPHIL # BLD AUTO: 0.13 K/UL — SIGNIFICANT CHANGE UP (ref 0–0.5)
EOSINOPHIL NFR BLD AUTO: 1.3 % — SIGNIFICANT CHANGE UP (ref 0–6)
EOSINOPHIL NFR BLD AUTO: 1.3 % — SIGNIFICANT CHANGE UP (ref 0–6)
GLUCOSE BLDC GLUCOMTR-MCNC: 74 MG/DL — SIGNIFICANT CHANGE UP (ref 70–99)
GLUCOSE BLDC GLUCOMTR-MCNC: 74 MG/DL — SIGNIFICANT CHANGE UP (ref 70–99)
GLUCOSE BLDC GLUCOMTR-MCNC: 79 MG/DL — SIGNIFICANT CHANGE UP (ref 70–99)
GLUCOSE BLDC GLUCOMTR-MCNC: 79 MG/DL — SIGNIFICANT CHANGE UP (ref 70–99)
GLUCOSE BLDC GLUCOMTR-MCNC: 86 MG/DL — SIGNIFICANT CHANGE UP (ref 70–99)
GLUCOSE BLDC GLUCOMTR-MCNC: 86 MG/DL — SIGNIFICANT CHANGE UP (ref 70–99)
GLUCOSE SERPL-MCNC: 58 MG/DL — LOW (ref 70–99)
GLUCOSE SERPL-MCNC: 58 MG/DL — LOW (ref 70–99)
HCT VFR BLD CALC: 28.5 % — LOW (ref 34.5–45)
HCT VFR BLD CALC: 28.5 % — LOW (ref 34.5–45)
HCT VFR BLD CALC: 29 % — LOW (ref 34.5–45)
HCT VFR BLD CALC: 29 % — LOW (ref 34.5–45)
HGB BLD-MCNC: 8.8 G/DL — LOW (ref 11.5–15.5)
IMM GRANULOCYTES NFR BLD AUTO: 0.7 % — SIGNIFICANT CHANGE UP (ref 0–0.9)
IMM GRANULOCYTES NFR BLD AUTO: 0.7 % — SIGNIFICANT CHANGE UP (ref 0–0.9)
INR BLD: 1.18 RATIO — SIGNIFICANT CHANGE UP (ref 0.85–1.18)
INR BLD: 1.18 RATIO — SIGNIFICANT CHANGE UP (ref 0.85–1.18)
LYMPHOCYTES # BLD AUTO: 0.77 K/UL — LOW (ref 1–3.3)
LYMPHOCYTES # BLD AUTO: 0.77 K/UL — LOW (ref 1–3.3)
LYMPHOCYTES # BLD AUTO: 7.7 % — LOW (ref 13–44)
LYMPHOCYTES # BLD AUTO: 7.7 % — LOW (ref 13–44)
MAGNESIUM SERPL-MCNC: 1.8 MG/DL — SIGNIFICANT CHANGE UP (ref 1.6–2.6)
MAGNESIUM SERPL-MCNC: 1.8 MG/DL — SIGNIFICANT CHANGE UP (ref 1.6–2.6)
MCHC RBC-ENTMCNC: 25.4 PG — LOW (ref 27–34)
MCHC RBC-ENTMCNC: 25.4 PG — LOW (ref 27–34)
MCHC RBC-ENTMCNC: 25.8 PG — LOW (ref 27–34)
MCHC RBC-ENTMCNC: 25.8 PG — LOW (ref 27–34)
MCHC RBC-ENTMCNC: 30.3 GM/DL — LOW (ref 32–36)
MCHC RBC-ENTMCNC: 30.3 GM/DL — LOW (ref 32–36)
MCHC RBC-ENTMCNC: 30.9 GM/DL — LOW (ref 32–36)
MCHC RBC-ENTMCNC: 30.9 GM/DL — LOW (ref 32–36)
MCV RBC AUTO: 83.6 FL — SIGNIFICANT CHANGE UP (ref 80–100)
MCV RBC AUTO: 83.6 FL — SIGNIFICANT CHANGE UP (ref 80–100)
MCV RBC AUTO: 83.8 FL — SIGNIFICANT CHANGE UP (ref 80–100)
MCV RBC AUTO: 83.8 FL — SIGNIFICANT CHANGE UP (ref 80–100)
MONOCYTES # BLD AUTO: 0.31 K/UL — SIGNIFICANT CHANGE UP (ref 0–0.9)
MONOCYTES # BLD AUTO: 0.31 K/UL — SIGNIFICANT CHANGE UP (ref 0–0.9)
MONOCYTES NFR BLD AUTO: 3.1 % — SIGNIFICANT CHANGE UP (ref 2–14)
MONOCYTES NFR BLD AUTO: 3.1 % — SIGNIFICANT CHANGE UP (ref 2–14)
NEUTROPHILS # BLD AUTO: 8.75 K/UL — HIGH (ref 1.8–7.4)
NEUTROPHILS # BLD AUTO: 8.75 K/UL — HIGH (ref 1.8–7.4)
NEUTROPHILS NFR BLD AUTO: 87.2 % — HIGH (ref 43–77)
NEUTROPHILS NFR BLD AUTO: 87.2 % — HIGH (ref 43–77)
NRBC # BLD: 0 /100 WBCS — SIGNIFICANT CHANGE UP (ref 0–0)
PHOSPHATE SERPL-MCNC: 3.3 MG/DL — SIGNIFICANT CHANGE UP (ref 2.5–4.5)
PHOSPHATE SERPL-MCNC: 3.3 MG/DL — SIGNIFICANT CHANGE UP (ref 2.5–4.5)
PLATELET # BLD AUTO: 161 K/UL — SIGNIFICANT CHANGE UP (ref 150–400)
POTASSIUM SERPL-MCNC: 3.6 MMOL/L — SIGNIFICANT CHANGE UP (ref 3.5–5.3)
POTASSIUM SERPL-MCNC: 3.6 MMOL/L — SIGNIFICANT CHANGE UP (ref 3.5–5.3)
POTASSIUM SERPL-SCNC: 3.6 MMOL/L — SIGNIFICANT CHANGE UP (ref 3.5–5.3)
POTASSIUM SERPL-SCNC: 3.6 MMOL/L — SIGNIFICANT CHANGE UP (ref 3.5–5.3)
PROT SERPL-MCNC: 5.3 G/DL — LOW (ref 6–8.3)
PROT SERPL-MCNC: 5.3 G/DL — LOW (ref 6–8.3)
PROTHROM AB SERPL-ACNC: 12.9 SEC — SIGNIFICANT CHANGE UP (ref 9.5–13)
PROTHROM AB SERPL-ACNC: 12.9 SEC — SIGNIFICANT CHANGE UP (ref 9.5–13)
RBC # BLD: 3.41 M/UL — LOW (ref 3.8–5.2)
RBC # BLD: 3.41 M/UL — LOW (ref 3.8–5.2)
RBC # BLD: 3.46 M/UL — LOW (ref 3.8–5.2)
RBC # BLD: 3.46 M/UL — LOW (ref 3.8–5.2)
RBC # FLD: 15.6 % — HIGH (ref 10.3–14.5)
RH IG SCN BLD-IMP: POSITIVE — SIGNIFICANT CHANGE UP
RH IG SCN BLD-IMP: POSITIVE — SIGNIFICANT CHANGE UP
SODIUM SERPL-SCNC: 143 MMOL/L — SIGNIFICANT CHANGE UP (ref 135–145)
SODIUM SERPL-SCNC: 143 MMOL/L — SIGNIFICANT CHANGE UP (ref 135–145)
WBC # BLD: 10.03 K/UL — SIGNIFICANT CHANGE UP (ref 3.8–10.5)
WBC # BLD: 10.03 K/UL — SIGNIFICANT CHANGE UP (ref 3.8–10.5)
WBC # BLD: 10.38 K/UL — SIGNIFICANT CHANGE UP (ref 3.8–10.5)
WBC # BLD: 10.38 K/UL — SIGNIFICANT CHANGE UP (ref 3.8–10.5)
WBC # FLD AUTO: 10.03 K/UL — SIGNIFICANT CHANGE UP (ref 3.8–10.5)
WBC # FLD AUTO: 10.03 K/UL — SIGNIFICANT CHANGE UP (ref 3.8–10.5)
WBC # FLD AUTO: 10.38 K/UL — SIGNIFICANT CHANGE UP (ref 3.8–10.5)
WBC # FLD AUTO: 10.38 K/UL — SIGNIFICANT CHANGE UP (ref 3.8–10.5)

## 2023-12-06 PROCEDURE — 43246 EGD PLACE GASTROSTOMY TUBE: CPT | Mod: GC

## 2023-12-06 PROCEDURE — 99232 SBSQ HOSP IP/OBS MODERATE 35: CPT | Mod: GC

## 2023-12-06 DEVICE — PEG KT SAFETY 20FR: Type: IMPLANTABLE DEVICE | Status: FUNCTIONAL

## 2023-12-06 RX ORDER — AMLODIPINE BESYLATE 2.5 MG/1
5 TABLET ORAL DAILY
Refills: 0 | Status: DISCONTINUED | OUTPATIENT
Start: 2023-12-06 | End: 2023-12-07

## 2023-12-06 RX ORDER — DEXTROSE 50 % IN WATER 50 %
25 SYRINGE (ML) INTRAVENOUS ONCE
Refills: 0 | Status: DISCONTINUED | OUTPATIENT
Start: 2023-12-06 | End: 2023-12-06

## 2023-12-06 RX ORDER — DEXTROSE 50 % IN WATER 50 %
12.5 SYRINGE (ML) INTRAVENOUS ONCE
Refills: 0 | Status: COMPLETED | OUTPATIENT
Start: 2023-12-06 | End: 2023-12-06

## 2023-12-06 RX ORDER — SODIUM CHLORIDE 9 MG/ML
1000 INJECTION, SOLUTION INTRAVENOUS
Refills: 0 | Status: DISCONTINUED | OUTPATIENT
Start: 2023-12-06 | End: 2023-12-07

## 2023-12-06 RX ORDER — INFLUENZA VIRUS VACCINE 15; 15; 15; 15 UG/.5ML; UG/.5ML; UG/.5ML; UG/.5ML
0.7 SUSPENSION INTRAMUSCULAR ONCE
Refills: 0 | Status: DISCONTINUED | OUTPATIENT
Start: 2023-12-06 | End: 2023-12-12

## 2023-12-06 RX ORDER — DEXTROSE 50 % IN WATER 50 %
50 SYRINGE (ML) INTRAVENOUS ONCE
Refills: 0 | Status: DISCONTINUED | OUTPATIENT
Start: 2023-12-06 | End: 2023-12-06

## 2023-12-06 RX ADMIN — SODIUM CHLORIDE 50 MILLILITER(S): 9 INJECTION, SOLUTION INTRAVENOUS at 10:44

## 2023-12-06 RX ADMIN — Medication 6 MILLIGRAM(S): at 22:13

## 2023-12-06 RX ADMIN — PANTOPRAZOLE SODIUM 40 MILLIGRAM(S): 20 TABLET, DELAYED RELEASE ORAL at 06:41

## 2023-12-06 RX ADMIN — CHLORHEXIDINE GLUCONATE 1 APPLICATION(S): 213 SOLUTION TOPICAL at 11:52

## 2023-12-06 RX ADMIN — Medication 1 MILLIGRAM(S): at 11:54

## 2023-12-06 RX ADMIN — ATORVASTATIN CALCIUM 80 MILLIGRAM(S): 80 TABLET, FILM COATED ORAL at 22:13

## 2023-12-06 RX ADMIN — Medication 1300 MILLIGRAM(S): at 22:13

## 2023-12-06 RX ADMIN — ESCITALOPRAM OXALATE 10 MILLIGRAM(S): 10 TABLET, FILM COATED ORAL at 11:54

## 2023-12-06 RX ADMIN — SENNA PLUS 2 TABLET(S): 8.6 TABLET ORAL at 22:13

## 2023-12-06 RX ADMIN — Medication 650 MILLIGRAM(S): at 22:48

## 2023-12-06 RX ADMIN — Medication 2000 UNIT(S): at 11:54

## 2023-12-06 RX ADMIN — Medication 1300 MILLIGRAM(S): at 06:38

## 2023-12-06 RX ADMIN — Medication 100 MILLIGRAM(S): at 22:14

## 2023-12-06 RX ADMIN — Medication 650 MILLIGRAM(S): at 22:13

## 2023-12-06 RX ADMIN — AMLODIPINE BESYLATE 5 MILLIGRAM(S): 2.5 TABLET ORAL at 18:22

## 2023-12-06 NOTE — PROGRESS NOTE ADULT - ATTENDING COMMENTS
Patient is a 89 year old female, with PMH of advanced dementia, HTN, HLD, DM2, anemia, CAD (s/p LAD and LCx stent 2010), HFrEF (EF 40-45% 2021), h/o TIA no residual deficits, h/o recurrent UTI presents w/ syncope and fall w/o trauma in the setting of 1 month of poor PO intake, nausea, vomiting, epigastric pain.likely combination from prerenal (poor PO intake), ATN from hypotension, and obstructive component     # paroxysmal a fib/tachy- wilber  appreciate ep recs  not a candidate for PPM  on hep gtt for now pending GOC given CHADSVASC      #LESLIE/ATN  - Cr down trending. encourage PO intake  - electrolytes WNL, normal respiratory status   - monitor BMP Mg Phos daily   - nephrology following, no urgent need for HD   - morales removed 11/30 - passed TOV    #Failure to Thrive  - poor PO intake at home and inpatient.   - CT A/P no acute intraabdominal abnormality   - family interested in PEG; plan for placement today by GI  - calorie count failed    ongoing GOC, dnr/dni trial of NIV

## 2023-12-06 NOTE — PRE-ANESTHESIA EVALUATION ADULT - NSANTHOSAYNRD_GEN_A_CORE
No. DURAN screening performed.  STOP BANG Legend: 0-2 = LOW Risk; 3-4 = INTERMEDIATE Risk; 5-8 = HIGH Risk

## 2023-12-06 NOTE — PROGRESS NOTE ADULT - PROBLEM SELECTOR PLAN 12
DVT ppx: HSQ  Diet: regular, Kosher  Dispo: pending medical improvement, PT eval DVT ppx: held AC for PEG placement. To discuss with family utility of further AC.   Diet: regular, Kosher  Dispo: pending medical improvement, PT radha

## 2023-12-06 NOTE — PROGRESS NOTE ADULT - SUBJECTIVE AND OBJECTIVE BOX
PROGRESS NOTE:   Authored by Edgard Kim MD  Internal Medicine      Patient is a 89y old  Female who presents with a chief complaint of fall, LESLIE (05 Dec 2023 15:47)      SUBJECTIVE / OVERNIGHT EVENTS: NAEO, patient seen and examined at bedside    MEDICATIONS  (STANDING):  atorvastatin 80 milliGRAM(s) Oral at bedtime  chlorhexidine 2% Cloths 1 Application(s) Topical daily  cholecalciferol 2000 Unit(s) Oral daily  dextrose 5% + sodium chloride 0.45% with potassium chloride 20 mEq/L 1000 milliLiter(s) (50 mL/Hr) IV Continuous <Continuous>  dextrose 5%. 1000 milliLiter(s) (50 mL/Hr) IV Continuous <Continuous>  dextrose 5%. 1000 milliLiter(s) (100 mL/Hr) IV Continuous <Continuous>  dextrose 50% Injectable 25 Gram(s) IV Push once  dextrose 50% Injectable 25 Gram(s) IV Push once  dextrose 50% Injectable 12.5 Gram(s) IV Push once  escitalopram 10 milliGRAM(s) Oral daily  folic acid 1 milliGRAM(s) Oral daily  glucagon  Injectable 1 milliGRAM(s) IntraMuscular once  insulin lispro (ADMELOG) corrective regimen sliding scale   SubCutaneous at bedtime  insulin lispro (ADMELOG) corrective regimen sliding scale   SubCutaneous three times a day before meals  melatonin 6 milliGRAM(s) Oral at bedtime  pantoprazole    Tablet 40 milliGRAM(s) Oral before breakfast  polyethylene glycol 3350 17 Gram(s) Oral daily  senna 2 Tablet(s) Oral at bedtime  sodium bicarbonate 1300 milliGRAM(s) Oral three times a day    MEDICATIONS  (PRN):  acetaminophen     Tablet .. 650 milliGRAM(s) Oral every 6 hours PRN Temp greater or equal to 38C (100.4F), Mild Pain (1 - 3)  aluminum hydroxide/magnesium hydroxide/simethicone Suspension 30 milliLiter(s) Oral every 4 hours PRN Dyspepsia  dextrose Oral Gel 15 Gram(s) Oral once PRN Blood Glucose LESS THAN 70 milliGRAM(s)/deciliter  heparin   Injectable 4000 Unit(s) IV Push every 6 hours PRN For aPTT less than 40  heparin   Injectable 2000 Unit(s) IV Push every 6 hours PRN For aPTT between 40 - 57  ondansetron Injectable 4 milliGRAM(s) IV Push every 8 hours PRN Nausea and/or Vomiting      CAPILLARY BLOOD GLUCOSE      POCT Blood Glucose.: 103 mg/dL (05 Dec 2023 23:44)  POCT Blood Glucose.: 82 mg/dL (05 Dec 2023 21:22)  POCT Blood Glucose.: 95 mg/dL (05 Dec 2023 16:37)  POCT Blood Glucose.: 89 mg/dL (05 Dec 2023 12:05)  POCT Blood Glucose.: 90 mg/dL (05 Dec 2023 08:21)    I&O's Summary    04 Dec 2023 07:01  -  05 Dec 2023 07:00  --------------------------------------------------------  IN: 200 mL / OUT: 300 mL / NET: -100 mL        PHYSICAL EXAM:  Vital Signs Last 24 Hrs  T(C): 36.3 (06 Dec 2023 05:18), Max: 36.3 (05 Dec 2023 21:08)  T(F): 97.3 (06 Dec 2023 05:18), Max: 97.4 (05 Dec 2023 21:08)  HR: 49 (06 Dec 2023 05:18) (45 - 54)  BP: 169/65 (06 Dec 2023 05:18) (168/65 - 182/66)  BP(mean): --  RR: 18 (06 Dec 2023 05:18) (18 - 18)  SpO2: 95% (06 Dec 2023 05:18) (95% - 99%)    Parameters below as of 06 Dec 2023 05:18  Patient On (Oxygen Delivery Method): room air      CONSTITUTIONAL: Well-groomed, in no apparent distress  EYES: No conjunctival or scleral injection, non-icteric;   ENMT: No external nasal lesions; MMM  NECK: Trachea midline without palpable neck mass; thyroid not enlarged and non-tender  RESPIRATORY: Breathing comfortably; no dullness to percussion; lungs CTA without wheeze/rhonchi/rales  CARDIOVASCULAR: +S1S2, RRR, no M/G/R; pedal pulses full and symmetric; no lower extremity edema  GASTROINTESTINAL: No palpable masses or tenderness, +BS throughout, no rebound/guarding; no hepatosplenomegaly; no hernia palpated  LYMPHATIC: No cervical LAD or tenderness  SKIN: No rashes or ulcers noted  NEUROLOGIC: CN II-XII intact; sensation intact in LEs b/l to light touch  PSYCHIATRIC: A+O x 2; mood and affect appropriate; appropriate insight and judgment    LABS:                        8.9    8.25  )-----------( 163      ( 05 Dec 2023 07:03 )             28.8     12-05    141  |  107  |  43<H>  ----------------------------<  78  4.0   |  22  |  4.88<H>    Ca    8.7      05 Dec 2023 07:19  Phos  3.2     12-05  Mg     2.0     12-05    TPro  5.3<L>  /  Alb  3.0<L>  /  TBili  0.8  /  DBili  x   /  AST  23  /  ALT  15  /  AlkPhos  39<L>  12-05    PTT - ( 06 Dec 2023 03:59 )  PTT:195.0 sec      Urinalysis Basic - ( 05 Dec 2023 07:19 )    Color: x / Appearance: x / SG: x / pH: x  Gluc: 78 mg/dL / Ketone: x  / Bili: x / Urobili: x   Blood: x / Protein: x / Nitrite: x   Leuk Esterase: x / RBC: x / WBC x   Sq Epi: x / Non Sq Epi: x / Bacteria: x      COORDINATION OF CARE:  Care Discussed with Consultants/Other Providers [Y/N]: Yes  Prior or Outpatient Records Reviewed [Y/N]: Yes   PROGRESS NOTE:   Authored by Edgard Kim MD  Internal Medicine      Patient is a 89y old  Female who presents with a chief complaint of fall, LESLIE (05 Dec 2023 15:47)      SUBJECTIVE / OVERNIGHT EVENTS:     NAEO  Patient seen and examined at bedside  No active complaints. Minimal food intake.     MEDICATIONS  (STANDING):  atorvastatin 80 milliGRAM(s) Oral at bedtime  chlorhexidine 2% Cloths 1 Application(s) Topical daily  cholecalciferol 2000 Unit(s) Oral daily  dextrose 5% + sodium chloride 0.45% with potassium chloride 20 mEq/L 1000 milliLiter(s) (50 mL/Hr) IV Continuous <Continuous>  dextrose 5%. 1000 milliLiter(s) (50 mL/Hr) IV Continuous <Continuous>  dextrose 5%. 1000 milliLiter(s) (100 mL/Hr) IV Continuous <Continuous>  dextrose 50% Injectable 25 Gram(s) IV Push once  dextrose 50% Injectable 25 Gram(s) IV Push once  dextrose 50% Injectable 12.5 Gram(s) IV Push once  escitalopram 10 milliGRAM(s) Oral daily  folic acid 1 milliGRAM(s) Oral daily  glucagon  Injectable 1 milliGRAM(s) IntraMuscular once  insulin lispro (ADMELOG) corrective regimen sliding scale   SubCutaneous at bedtime  insulin lispro (ADMELOG) corrective regimen sliding scale   SubCutaneous three times a day before meals  melatonin 6 milliGRAM(s) Oral at bedtime  pantoprazole    Tablet 40 milliGRAM(s) Oral before breakfast  polyethylene glycol 3350 17 Gram(s) Oral daily  senna 2 Tablet(s) Oral at bedtime  sodium bicarbonate 1300 milliGRAM(s) Oral three times a day    MEDICATIONS  (PRN):  acetaminophen     Tablet .. 650 milliGRAM(s) Oral every 6 hours PRN Temp greater or equal to 38C (100.4F), Mild Pain (1 - 3)  aluminum hydroxide/magnesium hydroxide/simethicone Suspension 30 milliLiter(s) Oral every 4 hours PRN Dyspepsia  dextrose Oral Gel 15 Gram(s) Oral once PRN Blood Glucose LESS THAN 70 milliGRAM(s)/deciliter  heparin   Injectable 4000 Unit(s) IV Push every 6 hours PRN For aPTT less than 40  heparin   Injectable 2000 Unit(s) IV Push every 6 hours PRN For aPTT between 40 - 57  ondansetron Injectable 4 milliGRAM(s) IV Push every 8 hours PRN Nausea and/or Vomiting      CAPILLARY BLOOD GLUCOSE      POCT Blood Glucose.: 103 mg/dL (05 Dec 2023 23:44)  POCT Blood Glucose.: 82 mg/dL (05 Dec 2023 21:22)  POCT Blood Glucose.: 95 mg/dL (05 Dec 2023 16:37)  POCT Blood Glucose.: 89 mg/dL (05 Dec 2023 12:05)  POCT Blood Glucose.: 90 mg/dL (05 Dec 2023 08:21)    I&O's Summary    04 Dec 2023 07:01  -  05 Dec 2023 07:00  --------------------------------------------------------  IN: 200 mL / OUT: 300 mL / NET: -100 mL        PHYSICAL EXAM:  Vital Signs Last 24 Hrs  T(C): 36.3 (06 Dec 2023 05:18), Max: 36.3 (05 Dec 2023 21:08)  T(F): 97.3 (06 Dec 2023 05:18), Max: 97.4 (05 Dec 2023 21:08)  HR: 49 (06 Dec 2023 05:18) (45 - 54)  BP: 169/65 (06 Dec 2023 05:18) (168/65 - 182/66)  BP(mean): --  RR: 18 (06 Dec 2023 05:18) (18 - 18)  SpO2: 95% (06 Dec 2023 05:18) (95% - 99%)    Parameters below as of 06 Dec 2023 05:18  Patient On (Oxygen Delivery Method): room air      CONSTITUTIONAL: Well-groomed, in no apparent distress  EYES: No conjunctival or scleral injection, non-icteric;   ENMT: No external nasal lesions; MMM  NECK: Trachea midline without palpable neck mass; thyroid not enlarged and non-tender  RESPIRATORY: Breathing comfortably; no dullness to percussion; lungs CTA without wheeze/rhonchi/rales  CARDIOVASCULAR: +S1S2, RRR, no M/G/R; pedal pulses full and symmetric; no lower extremity edema  GASTROINTESTINAL: No palpable masses or tenderness, +BS throughout, no rebound/guarding; no hepatosplenomegaly; no hernia palpated  LYMPHATIC: No cervical LAD or tenderness  SKIN: No rashes or ulcers noted  NEUROLOGIC: CN II-XII intact; sensation intact in LEs b/l to light touch  PSYCHIATRIC: A+O x 2; mood and affect appropriate; appropriate insight and judgment    LABS:                        8.9    8.25  )-----------( 163      ( 05 Dec 2023 07:03 )             28.8     12-05    141  |  107  |  43<H>  ----------------------------<  78  4.0   |  22  |  4.88<H>    Ca    8.7      05 Dec 2023 07:19  Phos  3.2     12-05  Mg     2.0     12-05    TPro  5.3<L>  /  Alb  3.0<L>  /  TBili  0.8  /  DBili  x   /  AST  23  /  ALT  15  /  AlkPhos  39<L>  12-05    PTT - ( 06 Dec 2023 03:59 )  PTT:195.0 sec      Urinalysis Basic - ( 05 Dec 2023 07:19 )    Color: x / Appearance: x / SG: x / pH: x  Gluc: 78 mg/dL / Ketone: x  / Bili: x / Urobili: x   Blood: x / Protein: x / Nitrite: x   Leuk Esterase: x / RBC: x / WBC x   Sq Epi: x / Non Sq Epi: x / Bacteria: x      COORDINATION OF CARE:  Care Discussed with Consultants/Other Providers [Y/N]: Yes  Prior or Outpatient Records Reviewed [Y/N]: Yes

## 2023-12-06 NOTE — PROGRESS NOTE ADULT - PROBLEM SELECTOR PROBLEM 9
Neurosurgery  History & Physical    SUBJECTIVE:     Chief Complaint: acoustic neuroma    History of Present Illness:  Miriam Mendez is a 43 y.o. female who presents for follow up of acoustic neuroma incidentally found during workup of staph infection/facial cellulitis. She initially presented to the ED for facial cellulitis on 2/4 and CT was ordered to evaluate for orbital cellulitis. 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  Patient referred to neurosurgery clinic for further evaluation.    As of 2/21, she reports right sided hearing loss, tinnitus, vertigo-like symptoms, and numbness to forehead, cheek, ear, and mandibular area. She has been suffering with these symptoms for 1.5 years. Reports intermittent headaches on the right side. Denies seizure activity. Reports mild memory loss symptoms. She cannot remember where she puts objects. She is unable to fine basic objects like her toothpaste and toothbrush. Reports tinnitus to right side as well.    Review of patient's allergies indicates:   Allergen Reactions    Sulfa (sulfonamide antibiotics)      Other reaction(s): Hives       Current Outpatient Medications   Medication Sig Dispense Refill    clonazePAM (KLONOPIN) 1 MG tablet Take 1 mg by mouth 2 (two) times daily as needed for Anxiety.      dextroamphetamine-amphetamine 30 mg Tab 1 tablet po in the morning and 1/2 tab in the afternoon      FLUoxetine 40 MG capsule Take 40 mg by mouth nightly.      mupirocin (BACTROBAN) 2 % ointment Apply 1 g topically 3 (three) times daily.      omeprazole 20 mg TbEC Take by mouth 2 (two) times a day.      zolpidem (AMBIEN) 10 mg Tab Take 10 mg by mouth nightly as needed.       No current facility-administered medications for this visit.       Past Medical History:   Diagnosis Date    ADD (attention deficit disorder with hyperactivity)     psych eval 3/10    Anxiety     Bronchitis     RAD /bronchitis episodes  "   Cellulitis     face    Depression     undergoing psychotherapy    Easy bruising 2014    Fibromyalgia     History of ITP 2014    Iron deficiency anemia 2014    Morbid obesity     improved with s/p gastric bypass    Pica 2014    Pseudotumor cerebri syndrome     Thrombocytopenia      Past Surgical History:   Procedure Laterality Date     SECTION      CHOLECYSTECTOMY      GASTRIC BYPASS       Family History     Problem Relation (Age of Onset)    Breast cancer Mother (57)    Cancer Mother (57), Father, Paternal Grandmother, Paternal Grandfather    Hypertension Mother        Social History     Socioeconomic History    Marital status:    Tobacco Use    Smoking status: Never Smoker    Smokeless tobacco: Never Used   Substance and Sexual Activity    Alcohol use: Yes    Drug use: No    Sexual activity: Yes     Partners: Male     Birth control/protection: None       Review of Systems  Positive per HPI, otherwise a pertinent ROS was performed and was negative.        OBJECTIVE:     Vital Signs  Temp: 98.3 °F (36.8 °C)  Pulse: 78  BP: 124/87  SpO2: 100 %  Pain Score: 0-No pain  Height: 5' 6" (167.6 cm)  Weight: 81.6 kg (180 lb)  Body mass index is 29.05 kg/m².      Neurosurgery Physical Exam  General: well developed, well nourished, no distress.   Head: normocephalic, atraumatic  Neck: No tracheal deviation. No palpable masses. Full ROM.   Neurologic: Alert and oriented. Thought content appropriate.  Mental Status: Awake, Alert, Oriented x 4  Language: No aphasia  Speech: No dysarthria  Cranial nerves: face symmetric, tongue midline, CN II-XII grossly intact.   Eyes: pupils equal, round, EOMI.   Pulmonary: normal respirations, no signs of respiratory distress  Abdomen: soft, non-distended, not tender to palpation    Sensory: decreased sensation to right side of face including ear.  intact to light touch throughout  Motor Strength: Moves all extremities spontaneously with " good tone.  Grossly full strength upper and lower extremities. No abnormal movements seen.     Vascular: No LE edema.   Skin: Skin is warm, dry and intact.    Reflexes:   Clonus: Negative    Cerebellar:   Finger-to-nose: intact bilaterally   Pronator drift: absent bilaterally  Gait: Stable      Diagnostic Results:  MRI 2/5/2022 independently reviewed by myself revealing right vestibular schwannoma.    ASSESSMENT/PLAN:     Miriam Mendez is a 43 y.o. female who presents to the clinic for incidentally found acoustic neuroma.     We discussed her hearing loss, tinnitus, and vertigo symptoms correlate with MRI findings of right sided acoustic neuroma. I would like her to follow up with Dr. Rizo for surgical discussion.    She is in agreement with the plan and voiced understanding. She is encouraged to call the clinic with any questions, concerns, or adverse clinical change.       Gerda Smith PA-C  Neurosurgery  [facility]       Diabetes mellitus, type 2

## 2023-12-06 NOTE — PROGRESS NOTE ADULT - PROBLEM SELECTOR PLAN 11
- home regimen: coreg 3.125 BID, valsartan-HCTZ 80-12.5 qd, aldactone 50mg qd  - hold given orthostatic hypotensoin - home regimen: coreg 3.125 BID, valsartan-HCTZ 80-12.5 qd, aldactone 50mg qd  - hold given orthostatic hypotension

## 2023-12-06 NOTE — PROGRESS NOTE ADULT - SUBJECTIVE AND OBJECTIVE BOX
C A R D I O L O G Y  **********************************     DATE OF SERVICE: 12-06-23    Patient denies chest pain or shortness of breath.   Review of systems otherwise negative.  	  MEDICATIONS:  MEDICATIONS  (STANDING):  atorvastatin 80 milliGRAM(s) Oral at bedtime  chlorhexidine 2% Cloths 1 Application(s) Topical daily  cholecalciferol 2000 Unit(s) Oral daily  dextrose 5% + sodium chloride 0.45% with potassium chloride 20 mEq/L 1000 milliLiter(s) (50 mL/Hr) IV Continuous <Continuous>  dextrose 5%. 1000 milliLiter(s) (50 mL/Hr) IV Continuous <Continuous>  dextrose 5%. 1000 milliLiter(s) (50 mL/Hr) IV Continuous <Continuous>  dextrose 5%. 1000 milliLiter(s) (100 mL/Hr) IV Continuous <Continuous>  dextrose 50% Injectable 12.5 Gram(s) IV Push once  dextrose 50% Injectable 25 Gram(s) IV Push once  dextrose 50% Injectable 25 Gram(s) IV Push once  escitalopram 10 milliGRAM(s) Oral daily  folic acid 1 milliGRAM(s) Oral daily  glucagon  Injectable 1 milliGRAM(s) IntraMuscular once  influenza  Vaccine (HIGH DOSE) 0.7 milliLiter(s) IntraMuscular once  insulin lispro (ADMELOG) corrective regimen sliding scale   SubCutaneous three times a day before meals  insulin lispro (ADMELOG) corrective regimen sliding scale   SubCutaneous at bedtime  melatonin 6 milliGRAM(s) Oral at bedtime  pantoprazole    Tablet 40 milliGRAM(s) Oral before breakfast  polyethylene glycol 3350 17 Gram(s) Oral daily  senna 2 Tablet(s) Oral at bedtime  sodium bicarbonate 1300 milliGRAM(s) Oral three times a day      LABS:	 	    CARDIAC MARKERS:                                8.8    10.03 )-----------( 161      ( 06 Dec 2023 07:14 )             29.0     Hemoglobin: 8.8 g/dL (12-06 @ 07:14)  Hemoglobin: 8.8 g/dL (12-06 @ 07:13)  Hemoglobin: 8.9 g/dL (12-05 @ 07:03)  Hemoglobin: 9.4 g/dL (12-04 @ 23:02)  Hemoglobin: 8.1 g/dL (12-04 @ 07:06)      12-06    143  |  106  |  39<H>  ----------------------------<  58<L>  3.6   |  24  |  4.30<H>    Ca    9.0      06 Dec 2023 07:12  Phos  3.3     12-06  Mg     1.8     12-06    TPro  5.3<L>  /  Alb  2.9<L>  /  TBili  0.7  /  DBili  x   /  AST  20  /  ALT  15  /  AlkPhos  38<L>  12-06    Creatinine Trend: 4.30<--, 4.88<--, 5.02<--, 5.63<--, 5.95<--, 6.15<--    COAGS:   PT/INR - ( 06 Dec 2023 07:15 )   PT: 12.9 sec;   INR: 1.18 ratio         PTT - ( 06 Dec 2023 07:15 )  PTT:>200.0 sec    proBNP:   Lipid Profile:   HgA1c:   TSH:       PHYSICAL EXAM:  T(C): 36.7 (12-06-23 @ 14:46), Max: 36.7 (12-06-23 @ 14:09)  HR: 55 (12-06-23 @ 14:46) (45 - 55)  BP: 184/77 (12-06-23 @ 14:46) (168/65 - 184/77)  RR: 20 (12-06-23 @ 14:46) (18 - 20)  SpO2: 100% (12-06-23 @ 14:46) (95% - 100%)  Wt(kg): --  I&O's Summary    Height (cm): 152.4 (12-06 @ 14:46)  Weight (kg): 54.4 (12-06 @ 14:46)  BMI (kg/m2): 23.4 (12-06 @ 14:46)  BSA (m2): 1.5 (12-06 @ 14:46)    Gen: NAD  HEENT:  (-)icterus (-)pallor  CV: N S1 S2 1/6 MANASA (+)2 Pulses B/l  Resp:  Clear to auscultation B/L, normal effort  GI: (+) BS Soft, NT, ND  Lymph:  (-)Edema, (-)obvious lymphadenopathy  Skin: Warm to touch, Normal turgor  Psych: Appropriate mood and affect      TELEMETRY: SB 40-50s	      RADIOLOGY:         CXR: < from: Xray Chest 1 View- PORTABLE-Urgent (11.26.23 @ 02:59) >  IMPRESSION:  Slightly elevated right hemidiaphragm. Underinflated but otherwise clear   lungs. No pleural effusions or pneumothorax.    Stable cardiac and mediastinal silhouettes including aortic   calcifications and convex fullness of superior mediastinal/paratracheal   soft tissue contour which could be related to engorged/tortuous   brachiocephalic vasculature.    Generalized osteopenia.    --- End of Report ---    < end of copied text >      ASSESSMENT/PLAN: Patient is a 89 y/o Female with PMH of advanced dementia, HTN, HLD, DM2, anemia, CAD (s/p LAD and LCx stent 2010), HFrEF (EF 40-45% 2021), h/o TIA no residual deficits, h/o recurrent UTI who presented with syncope and fall w/o trauma in the setting of 1 month of poor PO intake, nausea, vomiting, epigastric pain. Found to have LESLIE, sinus bradycardia and orthostatic hypotension. Cardiology consulted for further evaluation.    #Syncope  - Suspect due to orthostatic hypotension  - TTE noted with EF 39%, mod AI  - Monitor telemetry however appears asymptomatic in terms of sinus bradycardia    #Orthostatic Hypotension  - Agree with holding all antihypertensives/GDMT  - Avoid midodrine and florinef given CHF  - Continue compression stockings and monitor for improvement    #Sinus Bradycardia  - Appears asymptomatic at rest  - EP eval appreciated - no need for PPM as she's asymptomatic    #New PAF  - EP eval noted - recommend AC if no contraindication and within GOC    #LESLIE  - Management per renal    #CAD  - Plavix held for PEG  - Continue Lipitor    #GOC  - DNR/DNI    #Failure to Thrive  - GI eval noted - pending PEG  - Based on patient's RCRI score of 5 (DM, CAD, CHF, TIA, Cr>2) would consider her non-modifiable high cardiac risk for planned procedures. However, patient is optimized from CV perspective to proceed. No evidence of clinical HF or unstable cardiac syndromes. Unable to tolerate beta blocker given bradycardia and orthostatic hypotension.    - No further inpatient cardiac w/u planned    ERROL Carrillo-C  Pager: [phone]     C A R D I O L O G Y  **********************************     DATE OF SERVICE: 12-06-23    Patient denies chest pain or shortness of breath.   Review of systems otherwise negative.  	  MEDICATIONS:  MEDICATIONS  (STANDING):  atorvastatin 80 milliGRAM(s) Oral at bedtime  chlorhexidine 2% Cloths 1 Application(s) Topical daily  cholecalciferol 2000 Unit(s) Oral daily  dextrose 5% + sodium chloride 0.45% with potassium chloride 20 mEq/L 1000 milliLiter(s) (50 mL/Hr) IV Continuous <Continuous>  dextrose 5%. 1000 milliLiter(s) (50 mL/Hr) IV Continuous <Continuous>  dextrose 5%. 1000 milliLiter(s) (50 mL/Hr) IV Continuous <Continuous>  dextrose 5%. 1000 milliLiter(s) (100 mL/Hr) IV Continuous <Continuous>  dextrose 50% Injectable 12.5 Gram(s) IV Push once  dextrose 50% Injectable 25 Gram(s) IV Push once  dextrose 50% Injectable 25 Gram(s) IV Push once  escitalopram 10 milliGRAM(s) Oral daily  folic acid 1 milliGRAM(s) Oral daily  glucagon  Injectable 1 milliGRAM(s) IntraMuscular once  influenza  Vaccine (HIGH DOSE) 0.7 milliLiter(s) IntraMuscular once  insulin lispro (ADMELOG) corrective regimen sliding scale   SubCutaneous three times a day before meals  insulin lispro (ADMELOG) corrective regimen sliding scale   SubCutaneous at bedtime  melatonin 6 milliGRAM(s) Oral at bedtime  pantoprazole    Tablet 40 milliGRAM(s) Oral before breakfast  polyethylene glycol 3350 17 Gram(s) Oral daily  senna 2 Tablet(s) Oral at bedtime  sodium bicarbonate 1300 milliGRAM(s) Oral three times a day      LABS:	 	    CARDIAC MARKERS:                                8.8    10.03 )-----------( 161      ( 06 Dec 2023 07:14 )             29.0     Hemoglobin: 8.8 g/dL (12-06 @ 07:14)  Hemoglobin: 8.8 g/dL (12-06 @ 07:13)  Hemoglobin: 8.9 g/dL (12-05 @ 07:03)  Hemoglobin: 9.4 g/dL (12-04 @ 23:02)  Hemoglobin: 8.1 g/dL (12-04 @ 07:06)      12-06    143  |  106  |  39<H>  ----------------------------<  58<L>  3.6   |  24  |  4.30<H>    Ca    9.0      06 Dec 2023 07:12  Phos  3.3     12-06  Mg     1.8     12-06    TPro  5.3<L>  /  Alb  2.9<L>  /  TBili  0.7  /  DBili  x   /  AST  20  /  ALT  15  /  AlkPhos  38<L>  12-06    Creatinine Trend: 4.30<--, 4.88<--, 5.02<--, 5.63<--, 5.95<--, 6.15<--    COAGS:   PT/INR - ( 06 Dec 2023 07:15 )   PT: 12.9 sec;   INR: 1.18 ratio         PTT - ( 06 Dec 2023 07:15 )  PTT:>200.0 sec    proBNP:   Lipid Profile:   HgA1c:   TSH:       PHYSICAL EXAM:  T(C): 36.7 (12-06-23 @ 14:46), Max: 36.7 (12-06-23 @ 14:09)  HR: 55 (12-06-23 @ 14:46) (45 - 55)  BP: 184/77 (12-06-23 @ 14:46) (168/65 - 184/77)  RR: 20 (12-06-23 @ 14:46) (18 - 20)  SpO2: 100% (12-06-23 @ 14:46) (95% - 100%)  Wt(kg): --  I&O's Summary    Height (cm): 152.4 (12-06 @ 14:46)  Weight (kg): 54.4 (12-06 @ 14:46)  BMI (kg/m2): 23.4 (12-06 @ 14:46)  BSA (m2): 1.5 (12-06 @ 14:46)    Gen: NAD  HEENT:  (-)icterus (-)pallor  CV: N S1 S2 1/6 MANASA (+)2 Pulses B/l  Resp:  Clear to auscultation B/L, normal effort  GI: (+) BS Soft, NT, ND  Lymph:  (-)Edema, (-)obvious lymphadenopathy  Skin: Warm to touch, Normal turgor  Psych: Appropriate mood and affect      TELEMETRY: SB 40-50s	      RADIOLOGY:         CXR: < from: Xray Chest 1 View- PORTABLE-Urgent (11.26.23 @ 02:59) >  IMPRESSION:  Slightly elevated right hemidiaphragm. Underinflated but otherwise clear   lungs. No pleural effusions or pneumothorax.    Stable cardiac and mediastinal silhouettes including aortic   calcifications and convex fullness of superior mediastinal/paratracheal   soft tissue contour which could be related to engorged/tortuous   brachiocephalic vasculature.    Generalized osteopenia.    --- End of Report ---    < end of copied text >      ASSESSMENT/PLAN: Patient is a 89 y/o Female with PMH of advanced dementia, HTN, HLD, DM2, anemia, CAD (s/p LAD and LCx stent 2010), HFrEF (EF 40-45% 2021), h/o TIA no residual deficits, h/o recurrent UTI who presented with syncope and fall w/o trauma in the setting of 1 month of poor PO intake, nausea, vomiting, epigastric pain. Found to have LESLIE, sinus bradycardia and orthostatic hypotension. Cardiology consulted for further evaluation.    #Syncope  - Suspect due to orthostatic hypotension  - TTE noted with EF 39%, mod AI  - Monitor telemetry however appears asymptomatic in terms of sinus bradycardia    #Orthostatic Hypotension  - Agree with holding all antihypertensives/GDMT  - Avoid midodrine and florinef given CHF  - Continue compression stockings and monitor for improvement    #Sinus Bradycardia  - Appears asymptomatic at rest  - EP eval appreciated - no need for PPM as she's asymptomatic    #New PAF  - EP eval noted - recommend AC if no contraindication and within GOC    #LESLIE  - Management per renal    #CAD  - Plavix held for PEG  - Continue Lipitor    #GOC  - DNR/DNI    #Failure to Thrive  - GI eval noted - pending PEG  - Based on patient's RCRI score of 5 (DM, CAD, CHF, TIA, Cr>2) would consider her non-modifiable high cardiac risk for planned procedures. However, patient is optimized from CV perspective to proceed. No evidence of clinical HF or unstable cardiac syndromes. Unable to tolerate beta blocker given bradycardia and orthostatic hypotension.    - No further inpatient cardiac w/u planned    ERROL Carrillo-C  Pager: [phone]

## 2023-12-06 NOTE — PRE PROCEDURE NOTE - PRE PROCEDURE EVALUATION
Pre-Endoscopy Evaluation    Attending Physician:  Dr. Joseph    Procedure: PEG    Indication for Procedure & Pertinent History: See [employer] chart    PAST MEDICAL & SURGICAL HISTORY:  Hypertension      Diabetes Mellitus, Type 2      Hyperlipidemia      Dementia      CAD (coronary artery disease)      History of TIA (transient ischemic attack)      Anemia      HFrEF (heart failure with reduced ejection fraction)      History of Cholecystectomy      S/P hip replacement, left          Allergies    aspirin (Anaphylaxis)  Celebrex (Rash)  penicillin (Anaphylaxis)    Intolerances        Medications: MEDICATIONS  (STANDING):  atorvastatin 80 milliGRAM(s) Oral at bedtime  chlorhexidine 2% Cloths 1 Application(s) Topical daily  cholecalciferol 2000 Unit(s) Oral daily  dextrose 5% + sodium chloride 0.45% with potassium chloride 20 mEq/L 1000 milliLiter(s) (50 mL/Hr) IV Continuous <Continuous>  dextrose 5%. 1000 milliLiter(s) (50 mL/Hr) IV Continuous <Continuous>  dextrose 5%. 1000 milliLiter(s) (50 mL/Hr) IV Continuous <Continuous>  dextrose 5%. 1000 milliLiter(s) (100 mL/Hr) IV Continuous <Continuous>  dextrose 50% Injectable 12.5 Gram(s) IV Push once  dextrose 50% Injectable 25 Gram(s) IV Push once  dextrose 50% Injectable 25 Gram(s) IV Push once  escitalopram 10 milliGRAM(s) Oral daily  folic acid 1 milliGRAM(s) Oral daily  glucagon  Injectable 1 milliGRAM(s) IntraMuscular once  influenza  Vaccine (HIGH DOSE) 0.7 milliLiter(s) IntraMuscular once  insulin lispro (ADMELOG) corrective regimen sliding scale   SubCutaneous three times a day before meals  insulin lispro (ADMELOG) corrective regimen sliding scale   SubCutaneous at bedtime  melatonin 6 milliGRAM(s) Oral at bedtime  pantoprazole    Tablet 40 milliGRAM(s) Oral before breakfast  polyethylene glycol 3350 17 Gram(s) Oral daily  senna 2 Tablet(s) Oral at bedtime  sodium bicarbonate 1300 milliGRAM(s) Oral three times a day    MEDICATIONS  (PRN):  acetaminophen     Tablet .. 650 milliGRAM(s) Oral every 6 hours PRN Temp greater or equal to 38C (100.4F), Mild Pain (1 - 3)  aluminum hydroxide/magnesium hydroxide/simethicone Suspension 30 milliLiter(s) Oral every 4 hours PRN Dyspepsia  dextrose Oral Gel 15 Gram(s) Oral once PRN Blood Glucose LESS THAN 70 milliGRAM(s)/deciliter  heparin   Injectable 2000 Unit(s) IV Push every 6 hours PRN For aPTT between 40 - 57  heparin   Injectable 4000 Unit(s) IV Push every 6 hours PRN For aPTT less than 40  ondansetron Injectable 4 milliGRAM(s) IV Push every 8 hours PRN Nausea and/or Vomiting      Pertinent lab data:                        8.8    10.03 )-----------( 161      ( 06 Dec 2023 07:14 )             29.0     12-06    143  |  106  |  39<H>  ----------------------------<  58<L>  3.6   |  24  |  4.30<H>    Ca    9.0      06 Dec 2023 07:12  Phos  3.3     12-06  Mg     1.8     12-06    TPro  5.3<L>  /  Alb  2.9<L>  /  TBili  0.7  /  DBili  x   /  AST  20  /  ALT  15  /  AlkPhos  38<L>  12-06    PT/INR - ( 06 Dec 2023 07:15 )   PT: 12.9 sec;   INR: 1.18 ratio         PTT - ( 06 Dec 2023 07:15 )  PTT:>200.0 sec            Physical Examination:  See Physical Exam in H&P and/or Progress notes.    Comments:    ASA Class: I []  II []  III [X]  IV []    The patient is a suitable candidate for the planned procedure unless box checked [ ]  No, explain:

## 2023-12-06 NOTE — PROGRESS NOTE ADULT - PROBLEM SELECTOR PLAN 5
- pt eating less 2/2 reported epigastric pain/nausea  - also suspect there is an element of progressing dementia  - fluid resuscitation  - encourage PO intake, honor dietary preferences  - PEG tube placement today possible esophagram in future

## 2023-12-06 NOTE — PATIENT PROFILE ADULT - FALL HARM RISK - HARM RISK INTERVENTIONS
Assistance with ambulation/Assistance OOB with selected safe patient handling equipment/Communicate Risk of Fall with Harm to all staff/Discuss with provider need for PT consult/Monitor gait and stability/Provide patient with walking aids - walker, cane, crutches/Reinforce activity limits and safety measures with patient and family/Tailored Fall Risk Interventions/Visual Cue: Yellow wristband and red socks/Bed in lowest position, wheels locked, appropriate side rails in place/Call bell, personal items and telephone in reach/Instruct patient to call for assistance before getting out of bed or chair/Non-slip footwear when patient is out of bed/[place] to call system/Physically safe environment - no spills, clutter or unnecessary equipment/Purposeful Proactive Rounding/Room/bathroom lighting operational, light cord in reach Assistance with ambulation/Assistance OOB with selected safe patient handling equipment/Communicate Risk of Fall with Harm to all staff/Discuss with provider need for PT consult/Monitor gait and stability/Provide patient with walking aids - walker, cane, crutches/Reinforce activity limits and safety measures with patient and family/Tailored Fall Risk Interventions/Visual Cue: Yellow wristband and red socks/Bed in lowest position, wheels locked, appropriate side rails in place/Call bell, personal items and telephone in reach/Instruct patient to call for assistance before getting out of bed or chair/Non-slip footwear when patient is out of bed/[place] to call system/Physically safe environment - no spills, clutter or unnecessary equipment/Purposeful Proactive Rounding/Room/bathroom lighting operational, light cord in reach

## 2023-12-06 NOTE — PROGRESS NOTE ADULT - ASSESSMENT
Agree with above assessment and plan as outlined above.    - optimized from a cardiac prospective for PEG    Gideon Mcknight MD, [facility]  BEEPER [phone]   Agree with above assessment and plan as outlined above.    - optimized from a cardiac prospective for PEG    Gideon Mcknight MD, [facility]  BEEPER [phone]

## 2023-12-06 NOTE — PROGRESS NOTE ADULT - SUBJECTIVE AND OBJECTIVE BOX
EP ATTENDING    tele: NSR with periods of sinus bradycardia, no malignant events    she denies palpitations syncope, nor angina, dyspnea improving    DATE OF SERVICE - 12-06-23    Review of Systems:   Constitutional: [ ] fevers, [ ] chills.   Skin: [ ] dry skin. [ ] rashes.  Psychiatric: [ ] depression, [ ] anxiety.   Gastrointestinal: [ ] BRBPR, [ ] melena.   Neurological: [ ] confusion. [ ] seizures. [ ] shuffling gait.   Ears,Nose,Mouth and Throat: [ ] ear pain [ ] sore throat.   Eyes: [ ] diplopia.   Respiratory: [ ] hemoptysis. [ ] shortness of breath  Cardiovascular: See HPI above  Hematologic/Lymphatic: [ ] anemia. [ ] painful nodes. [ ] prolonged bleeding.   Genitourinary: [ ] hematuria. [ ] flank pain.   Endocrine: [ ] significant change in weight. [ ] intolerance to heat and cold.     Review of systems [ x] otherwise negative, [ ] otherwise unable to obtain    FH: no family history of sudden cardiac death in first degree relatives    SH: [ ] tobacco, [ ] alcohol, [ ] drugs    acetaminophen     Tablet .. 650 milliGRAM(s) Oral every 6 hours PRN  aluminum hydroxide/magnesium hydroxide/simethicone Suspension 30 milliLiter(s) Oral every 4 hours PRN  atorvastatin 80 milliGRAM(s) Oral at bedtime  chlorhexidine 2% Cloths 1 Application(s) Topical daily  cholecalciferol 2000 Unit(s) Oral daily  dextrose 5% + sodium chloride 0.45% with potassium chloride 20 mEq/L 1000 milliLiter(s) IV Continuous <Continuous>  dextrose 5%. 1000 milliLiter(s) IV Continuous <Continuous>  dextrose 5%. 1000 milliLiter(s) IV Continuous <Continuous>  dextrose 5%. 1000 milliLiter(s) IV Continuous <Continuous>  dextrose 50% Injectable 12.5 Gram(s) IV Push once  dextrose 50% Injectable 25 Gram(s) IV Push once  dextrose 50% Injectable 25 Gram(s) IV Push once  dextrose Oral Gel 15 Gram(s) Oral once PRN  escitalopram 10 milliGRAM(s) Oral daily  folic acid 1 milliGRAM(s) Oral daily  glucagon  Injectable 1 milliGRAM(s) IntraMuscular once  heparin   Injectable 4000 Unit(s) IV Push every 6 hours PRN  heparin   Injectable 2000 Unit(s) IV Push every 6 hours PRN  influenza  Vaccine (HIGH DOSE) 0.7 milliLiter(s) IntraMuscular once  insulin lispro (ADMELOG) corrective regimen sliding scale   SubCutaneous at bedtime  insulin lispro (ADMELOG) corrective regimen sliding scale   SubCutaneous three times a day before meals  melatonin 6 milliGRAM(s) Oral at bedtime  ondansetron Injectable 4 milliGRAM(s) IV Push every 8 hours PRN  pantoprazole    Tablet 40 milliGRAM(s) Oral before breakfast  polyethylene glycol 3350 17 Gram(s) Oral daily  senna 2 Tablet(s) Oral at bedtime  sodium bicarbonate 1300 milliGRAM(s) Oral three times a day                            8.8    10.03 )-----------( 161      ( 06 Dec 2023 07:14 )             29.0       12-06    143  |  106  |  39<H>  ----------------------------<  58<L>  3.6   |  24  |  4.30<H>    Ca    9.0      06 Dec 2023 07:12  Phos  3.3     12-06  Mg     1.8     12-06    TPro  5.3<L>  /  Alb  2.9<L>  /  TBili  0.7  /  DBili  x   /  AST  20  /  ALT  15  /  AlkPhos  38<L>  12-06            T(C): 36.3 (12-06-23 @ 05:18), Max: 36.3 (12-05-23 @ 21:08)  HR: 49 (12-06-23 @ 05:18) (45 - 54)  BP: 169/65 (12-06-23 @ 05:18) (168/65 - 182/66)  RR: 18 (12-06-23 @ 05:18) (18 - 18)  SpO2: 95% (12-06-23 @ 05:18) (95% - 99%)  Wt(kg): --    I&O's Summary      Head: Normocephalic and atraumatic.   Neck: No JVD. No bruits. Supple. Does not appear to be enlarged.   Cardiovascular: + S1,S2 ; RRR Soft systolic murmur at the left lower sternal border. No rubs noted.    Lungs: CTA b/l. No rhonchi, rales or wheezes.   Abdomen: + BS, soft. Non tender. Non distended. No rebound. No guarding.   Extremities: No clubbing/cyanosis/edema.   Neurologic: Moves all four extremities. Full range of motion.   Skin: Warm and moist. The patient's skin has normal elasticity and good skin turgor.   Musculoskeletal: Normal range of motion, normal strength      Cath: LCx PCI in 2010.	  Echo: LVEF 40%      A/P) 89 y/o female PMH hypertension, hyperlipidemia, diabetes, CAD s/p PCI 2010 now with LVEF 40%, advanced CKD a/w FTT. EP called for sinus bradycardia and periods of PAF. She remains off aspirin due to an allergy    -continue lipitor for hyperlipidemia with CAD  -given her elevated chads-vasc score please continue lifelong a/c  -f/u palliative care  -no need for PPM as she's asymptomatic from her sinus bradycardia and PAF  -no need for ICD given LVEF > 35%  -given her elevated RCRI score she is high risk for intermediate risk surgery (PEG) but is otherwise optimized from an EP perspective      Lacey Jacobson M.D., [facility]  Cardiac Electrophysiology  [facility]  [address] [place] [address], [address] [place] [address]  [email or web address] - [facility] Information    office [phone]  pager [phone]   EP ATTENDING    tele: NSR with periods of sinus bradycardia, no malignant events    she denies palpitations syncope, nor angina, dyspnea improving    DATE OF SERVICE - 12-06-23    Review of Systems:   Constitutional: [ ] fevers, [ ] chills.   Skin: [ ] dry skin. [ ] rashes.  Psychiatric: [ ] depression, [ ] anxiety.   Gastrointestinal: [ ] BRBPR, [ ] melena.   Neurological: [ ] confusion. [ ] seizures. [ ] shuffling gait.   Ears,Nose,Mouth and Throat: [ ] ear pain [ ] sore throat.   Eyes: [ ] diplopia.   Respiratory: [ ] hemoptysis. [ ] shortness of breath  Cardiovascular: See HPI above  Hematologic/Lymphatic: [ ] anemia. [ ] painful nodes. [ ] prolonged bleeding.   Genitourinary: [ ] hematuria. [ ] flank pain.   Endocrine: [ ] significant change in weight. [ ] intolerance to heat and cold.     Review of systems [ x] otherwise negative, [ ] otherwise unable to obtain    FH: no family history of sudden cardiac death in first degree relatives    SH: [ ] tobacco, [ ] alcohol, [ ] drugs    acetaminophen     Tablet .. 650 milliGRAM(s) Oral every 6 hours PRN  aluminum hydroxide/magnesium hydroxide/simethicone Suspension 30 milliLiter(s) Oral every 4 hours PRN  atorvastatin 80 milliGRAM(s) Oral at bedtime  chlorhexidine 2% Cloths 1 Application(s) Topical daily  cholecalciferol 2000 Unit(s) Oral daily  dextrose 5% + sodium chloride 0.45% with potassium chloride 20 mEq/L 1000 milliLiter(s) IV Continuous <Continuous>  dextrose 5%. 1000 milliLiter(s) IV Continuous <Continuous>  dextrose 5%. 1000 milliLiter(s) IV Continuous <Continuous>  dextrose 5%. 1000 milliLiter(s) IV Continuous <Continuous>  dextrose 50% Injectable 12.5 Gram(s) IV Push once  dextrose 50% Injectable 25 Gram(s) IV Push once  dextrose 50% Injectable 25 Gram(s) IV Push once  dextrose Oral Gel 15 Gram(s) Oral once PRN  escitalopram 10 milliGRAM(s) Oral daily  folic acid 1 milliGRAM(s) Oral daily  glucagon  Injectable 1 milliGRAM(s) IntraMuscular once  heparin   Injectable 4000 Unit(s) IV Push every 6 hours PRN  heparin   Injectable 2000 Unit(s) IV Push every 6 hours PRN  influenza  Vaccine (HIGH DOSE) 0.7 milliLiter(s) IntraMuscular once  insulin lispro (ADMELOG) corrective regimen sliding scale   SubCutaneous at bedtime  insulin lispro (ADMELOG) corrective regimen sliding scale   SubCutaneous three times a day before meals  melatonin 6 milliGRAM(s) Oral at bedtime  ondansetron Injectable 4 milliGRAM(s) IV Push every 8 hours PRN  pantoprazole    Tablet 40 milliGRAM(s) Oral before breakfast  polyethylene glycol 3350 17 Gram(s) Oral daily  senna 2 Tablet(s) Oral at bedtime  sodium bicarbonate 1300 milliGRAM(s) Oral three times a day                            8.8    10.03 )-----------( 161      ( 06 Dec 2023 07:14 )             29.0       12-06    143  |  106  |  39<H>  ----------------------------<  58<L>  3.6   |  24  |  4.30<H>    Ca    9.0      06 Dec 2023 07:12  Phos  3.3     12-06  Mg     1.8     12-06    TPro  5.3<L>  /  Alb  2.9<L>  /  TBili  0.7  /  DBili  x   /  AST  20  /  ALT  15  /  AlkPhos  38<L>  12-06            T(C): 36.3 (12-06-23 @ 05:18), Max: 36.3 (12-05-23 @ 21:08)  HR: 49 (12-06-23 @ 05:18) (45 - 54)  BP: 169/65 (12-06-23 @ 05:18) (168/65 - 182/66)  RR: 18 (12-06-23 @ 05:18) (18 - 18)  SpO2: 95% (12-06-23 @ 05:18) (95% - 99%)  Wt(kg): --    I&O's Summary      Head: Normocephalic and atraumatic.   Neck: No JVD. No bruits. Supple. Does not appear to be enlarged.   Cardiovascular: + S1,S2 ; RRR Soft systolic murmur at the left lower sternal border. No rubs noted.    Lungs: CTA b/l. No rhonchi, rales or wheezes.   Abdomen: + BS, soft. Non tender. Non distended. No rebound. No guarding.   Extremities: No clubbing/cyanosis/edema.   Neurologic: Moves all four extremities. Full range of motion.   Skin: Warm and moist. The patient's skin has normal elasticity and good skin turgor.   Musculoskeletal: Normal range of motion, normal strength      Cath: LCx PCI in 2010.	  Echo: LVEF 40%      A/P) 89 y/o female PMH hypertension, hyperlipidemia, diabetes, CAD s/p PCI 2010 now with LVEF 40%, advanced CKD a/w FTT. EP called for sinus bradycardia and periods of PAF. She remains off aspirin due to an allergy    -continue lipitor for hyperlipidemia with CAD  -given her elevated chads-vasc score please continue lifelong a/c  -f/u palliative care  -no need for PPM as she's asymptomatic from her sinus bradycardia and PAF  -no need for ICD given LVEF > 35%  -given her elevated RCRI score she is high risk for intermediate risk surgery (PEG) but is otherwise optimized from an EP perspective      Lacey Jacobson M.D., [facility]  Cardiac Electrophysiology  [facility]  [address] [place] [address], [address] [place] [address]  [email or web address]    office [phone]  pager [phone]

## 2023-12-06 NOTE — PROGRESS NOTE ADULT - ASSESSMENT
89 y/o F pmhx advanced dementia, HTN, HLD, DM2, anemia, CAD (s/p LAD and LCx stent 2010), HFrEF (EF 40-45% 2021), h/o TIA no residual deficits, h/o recurrent UTI presents w/ syncope and fall w/o trauma in the setting of 1 month of poor PO intake, nausea, vomiting, epigastric pain. C/f failure to thrive i/s/o ?PUD? vs. dementia. Syncope likely orthostatic in nature 89 y/o F pmhx advanced dementia, HTN, HLD, DM2, anemia, CAD (s/p LAD and LCx stent 2010), HFrEF (EF 40-45% 2021), h/o TIA no residual deficits, h/o recurrent UTI presents w/ syncope and fall w/o trauma in the setting of 1 month of poor PO intake, nausea, vomiting, epigastric pain. C/f failure to thrive i/s/o ?PUD? vs. dementia. Syncope likely orthostatic in nature 89 y/o F pmhx advanced dementia, HTN, HLD, DM2, anemia, CAD (s/p LAD and LCx stent 2010), HFrEF (EF 40-45% 2021), h/o TIA no residual deficits, h/o recurrent UTI presents w/ syncope and fall w/o trauma in the setting of 1 month of poor PO intake, nausea, vomiting, epigastric pain. C/f failure to thrive i/s/o ?PUD? vs. dementia. Syncope likely orthostatic in nature. Plans for PEG tube placement.

## 2023-12-07 LAB
ALBUMIN SERPL ELPH-MCNC: 2.9 G/DL — LOW (ref 3.3–5)
ALBUMIN SERPL ELPH-MCNC: 2.9 G/DL — LOW (ref 3.3–5)
ALP SERPL-CCNC: 40 U/L — SIGNIFICANT CHANGE UP (ref 40–120)
ALP SERPL-CCNC: 40 U/L — SIGNIFICANT CHANGE UP (ref 40–120)
ALT FLD-CCNC: 12 U/L — SIGNIFICANT CHANGE UP (ref 10–45)
ALT FLD-CCNC: 12 U/L — SIGNIFICANT CHANGE UP (ref 10–45)
ANION GAP SERPL CALC-SCNC: 14 MMOL/L — SIGNIFICANT CHANGE UP (ref 5–17)
ANION GAP SERPL CALC-SCNC: 14 MMOL/L — SIGNIFICANT CHANGE UP (ref 5–17)
AST SERPL-CCNC: 23 U/L — SIGNIFICANT CHANGE UP (ref 10–40)
AST SERPL-CCNC: 23 U/L — SIGNIFICANT CHANGE UP (ref 10–40)
BASOPHILS # BLD AUTO: 0 K/UL — SIGNIFICANT CHANGE UP (ref 0–0.2)
BASOPHILS # BLD AUTO: 0 K/UL — SIGNIFICANT CHANGE UP (ref 0–0.2)
BASOPHILS NFR BLD AUTO: 0 % — SIGNIFICANT CHANGE UP (ref 0–2)
BASOPHILS NFR BLD AUTO: 0 % — SIGNIFICANT CHANGE UP (ref 0–2)
BILIRUB SERPL-MCNC: 0.6 MG/DL — SIGNIFICANT CHANGE UP (ref 0.2–1.2)
BILIRUB SERPL-MCNC: 0.6 MG/DL — SIGNIFICANT CHANGE UP (ref 0.2–1.2)
BUN SERPL-MCNC: 36 MG/DL — HIGH (ref 7–23)
BUN SERPL-MCNC: 36 MG/DL — HIGH (ref 7–23)
CALCIUM SERPL-MCNC: 8.3 MG/DL — LOW (ref 8.4–10.5)
CALCIUM SERPL-MCNC: 8.3 MG/DL — LOW (ref 8.4–10.5)
CHLORIDE SERPL-SCNC: 105 MMOL/L — SIGNIFICANT CHANGE UP (ref 96–108)
CHLORIDE SERPL-SCNC: 105 MMOL/L — SIGNIFICANT CHANGE UP (ref 96–108)
CO2 SERPL-SCNC: 23 MMOL/L — SIGNIFICANT CHANGE UP (ref 22–31)
CO2 SERPL-SCNC: 23 MMOL/L — SIGNIFICANT CHANGE UP (ref 22–31)
CREAT SERPL-MCNC: 3.69 MG/DL — HIGH (ref 0.5–1.3)
CREAT SERPL-MCNC: 3.69 MG/DL — HIGH (ref 0.5–1.3)
EGFR: 11 ML/MIN/1.73M2 — LOW
EGFR: 11 ML/MIN/1.73M2 — LOW
EOSINOPHIL # BLD AUTO: 0.01 K/UL — SIGNIFICANT CHANGE UP (ref 0–0.5)
EOSINOPHIL # BLD AUTO: 0.01 K/UL — SIGNIFICANT CHANGE UP (ref 0–0.5)
EOSINOPHIL NFR BLD AUTO: 0.1 % — SIGNIFICANT CHANGE UP (ref 0–6)
EOSINOPHIL NFR BLD AUTO: 0.1 % — SIGNIFICANT CHANGE UP (ref 0–6)
GLUCOSE BLDC GLUCOMTR-MCNC: 104 MG/DL — HIGH (ref 70–99)
GLUCOSE BLDC GLUCOMTR-MCNC: 104 MG/DL — HIGH (ref 70–99)
GLUCOSE BLDC GLUCOMTR-MCNC: 71 MG/DL — SIGNIFICANT CHANGE UP (ref 70–99)
GLUCOSE BLDC GLUCOMTR-MCNC: 71 MG/DL — SIGNIFICANT CHANGE UP (ref 70–99)
GLUCOSE BLDC GLUCOMTR-MCNC: 84 MG/DL — SIGNIFICANT CHANGE UP (ref 70–99)
GLUCOSE BLDC GLUCOMTR-MCNC: 84 MG/DL — SIGNIFICANT CHANGE UP (ref 70–99)
GLUCOSE BLDC GLUCOMTR-MCNC: 97 MG/DL — SIGNIFICANT CHANGE UP (ref 70–99)
GLUCOSE BLDC GLUCOMTR-MCNC: 97 MG/DL — SIGNIFICANT CHANGE UP (ref 70–99)
GLUCOSE SERPL-MCNC: 80 MG/DL — SIGNIFICANT CHANGE UP (ref 70–99)
GLUCOSE SERPL-MCNC: 80 MG/DL — SIGNIFICANT CHANGE UP (ref 70–99)
HCT VFR BLD CALC: 26.1 % — LOW (ref 34.5–45)
HCT VFR BLD CALC: 26.1 % — LOW (ref 34.5–45)
HGB BLD-MCNC: 8.4 G/DL — LOW (ref 11.5–15.5)
HGB BLD-MCNC: 8.4 G/DL — LOW (ref 11.5–15.5)
IMM GRANULOCYTES NFR BLD AUTO: 0.9 % — SIGNIFICANT CHANGE UP (ref 0–0.9)
IMM GRANULOCYTES NFR BLD AUTO: 0.9 % — SIGNIFICANT CHANGE UP (ref 0–0.9)
LYMPHOCYTES # BLD AUTO: 0.31 K/UL — LOW (ref 1–3.3)
LYMPHOCYTES # BLD AUTO: 0.31 K/UL — LOW (ref 1–3.3)
LYMPHOCYTES # BLD AUTO: 2.9 % — LOW (ref 13–44)
LYMPHOCYTES # BLD AUTO: 2.9 % — LOW (ref 13–44)
MAGNESIUM SERPL-MCNC: 1.6 MG/DL — SIGNIFICANT CHANGE UP (ref 1.6–2.6)
MAGNESIUM SERPL-MCNC: 1.6 MG/DL — SIGNIFICANT CHANGE UP (ref 1.6–2.6)
MCHC RBC-ENTMCNC: 26.2 PG — LOW (ref 27–34)
MCHC RBC-ENTMCNC: 26.2 PG — LOW (ref 27–34)
MCHC RBC-ENTMCNC: 32.2 GM/DL — SIGNIFICANT CHANGE UP (ref 32–36)
MCHC RBC-ENTMCNC: 32.2 GM/DL — SIGNIFICANT CHANGE UP (ref 32–36)
MCV RBC AUTO: 81.3 FL — SIGNIFICANT CHANGE UP (ref 80–100)
MCV RBC AUTO: 81.3 FL — SIGNIFICANT CHANGE UP (ref 80–100)
MONOCYTES # BLD AUTO: 0.27 K/UL — SIGNIFICANT CHANGE UP (ref 0–0.9)
MONOCYTES # BLD AUTO: 0.27 K/UL — SIGNIFICANT CHANGE UP (ref 0–0.9)
MONOCYTES NFR BLD AUTO: 2.5 % — SIGNIFICANT CHANGE UP (ref 2–14)
MONOCYTES NFR BLD AUTO: 2.5 % — SIGNIFICANT CHANGE UP (ref 2–14)
NEUTROPHILS # BLD AUTO: 10.03 K/UL — HIGH (ref 1.8–7.4)
NEUTROPHILS # BLD AUTO: 10.03 K/UL — HIGH (ref 1.8–7.4)
NEUTROPHILS NFR BLD AUTO: 93.6 % — HIGH (ref 43–77)
NEUTROPHILS NFR BLD AUTO: 93.6 % — HIGH (ref 43–77)
NRBC # BLD: 0 /100 WBCS — SIGNIFICANT CHANGE UP (ref 0–0)
NRBC # BLD: 0 /100 WBCS — SIGNIFICANT CHANGE UP (ref 0–0)
PHOSPHATE SERPL-MCNC: 4 MG/DL — SIGNIFICANT CHANGE UP (ref 2.5–4.5)
PHOSPHATE SERPL-MCNC: 4 MG/DL — SIGNIFICANT CHANGE UP (ref 2.5–4.5)
PLATELET # BLD AUTO: 139 K/UL — LOW (ref 150–400)
PLATELET # BLD AUTO: 139 K/UL — LOW (ref 150–400)
POTASSIUM SERPL-MCNC: 3.1 MMOL/L — LOW (ref 3.5–5.3)
POTASSIUM SERPL-MCNC: 3.1 MMOL/L — LOW (ref 3.5–5.3)
POTASSIUM SERPL-SCNC: 3.1 MMOL/L — LOW (ref 3.5–5.3)
POTASSIUM SERPL-SCNC: 3.1 MMOL/L — LOW (ref 3.5–5.3)
PROT SERPL-MCNC: 5.1 G/DL — LOW (ref 6–8.3)
PROT SERPL-MCNC: 5.1 G/DL — LOW (ref 6–8.3)
RBC # BLD: 3.21 M/UL — LOW (ref 3.8–5.2)
RBC # BLD: 3.21 M/UL — LOW (ref 3.8–5.2)
RBC # FLD: 15.2 % — HIGH (ref 10.3–14.5)
RBC # FLD: 15.2 % — HIGH (ref 10.3–14.5)
SARS-COV-2 RNA SPEC QL NAA+PROBE: SIGNIFICANT CHANGE UP
SARS-COV-2 RNA SPEC QL NAA+PROBE: SIGNIFICANT CHANGE UP
SODIUM SERPL-SCNC: 142 MMOL/L — SIGNIFICANT CHANGE UP (ref 135–145)
SODIUM SERPL-SCNC: 142 MMOL/L — SIGNIFICANT CHANGE UP (ref 135–145)
WBC # BLD: 10.72 K/UL — HIGH (ref 3.8–10.5)
WBC # BLD: 10.72 K/UL — HIGH (ref 3.8–10.5)
WBC # FLD AUTO: 10.72 K/UL — HIGH (ref 3.8–10.5)
WBC # FLD AUTO: 10.72 K/UL — HIGH (ref 3.8–10.5)

## 2023-12-07 PROCEDURE — 99232 SBSQ HOSP IP/OBS MODERATE 35: CPT | Mod: GC

## 2023-12-07 RX ORDER — NIFEDIPINE 30 MG
30 TABLET, EXTENDED RELEASE 24 HR ORAL DAILY
Refills: 0 | Status: DISCONTINUED | OUTPATIENT
Start: 2023-12-07 | End: 2023-12-07

## 2023-12-07 RX ORDER — PANTOPRAZOLE SODIUM 20 MG/1
40 TABLET, DELAYED RELEASE ORAL
Refills: 0 | Status: DISCONTINUED | OUTPATIENT
Start: 2023-12-07 | End: 2023-12-12

## 2023-12-07 RX ORDER — LANOLIN ALCOHOL/MO/W.PET/CERES
6 CREAM (GRAM) TOPICAL AT BEDTIME
Refills: 0 | Status: DISCONTINUED | OUTPATIENT
Start: 2023-12-07 | End: 2023-12-12

## 2023-12-07 RX ORDER — FOLIC ACID 0.8 MG
1 TABLET ORAL DAILY
Refills: 0 | Status: DISCONTINUED | OUTPATIENT
Start: 2023-12-07 | End: 2023-12-12

## 2023-12-07 RX ORDER — POTASSIUM CHLORIDE 20 MEQ
10 PACKET (EA) ORAL
Refills: 0 | Status: COMPLETED | OUTPATIENT
Start: 2023-12-07 | End: 2023-12-07

## 2023-12-07 RX ORDER — HYDRALAZINE HCL 50 MG
25 TABLET ORAL
Refills: 0 | Status: DISCONTINUED | OUTPATIENT
Start: 2023-12-07 | End: 2023-12-07

## 2023-12-07 RX ORDER — SODIUM BICARBONATE 1 MEQ/ML
1300 SYRINGE (ML) INTRAVENOUS THREE TIMES A DAY
Refills: 0 | Status: DISCONTINUED | OUTPATIENT
Start: 2023-12-07 | End: 2023-12-12

## 2023-12-07 RX ORDER — ESCITALOPRAM OXALATE 10 MG/1
10 TABLET, FILM COATED ORAL DAILY
Refills: 0 | Status: DISCONTINUED | OUTPATIENT
Start: 2023-12-07 | End: 2023-12-12

## 2023-12-07 RX ORDER — LANOLIN ALCOHOL/MO/W.PET/CERES
6 CREAM (GRAM) TOPICAL AT BEDTIME
Refills: 0 | Status: DISCONTINUED | OUTPATIENT
Start: 2023-12-07 | End: 2023-12-07

## 2023-12-07 RX ORDER — SENNA PLUS 8.6 MG/1
15 TABLET ORAL ONCE
Refills: 0 | Status: COMPLETED | OUTPATIENT
Start: 2023-12-07 | End: 2023-12-07

## 2023-12-07 RX ORDER — SODIUM CHLORIDE 9 MG/ML
1000 INJECTION, SOLUTION INTRAVENOUS
Refills: 0 | Status: DISCONTINUED | OUTPATIENT
Start: 2023-12-07 | End: 2023-12-07

## 2023-12-07 RX ORDER — PANTOPRAZOLE SODIUM 20 MG/1
40 TABLET, DELAYED RELEASE ORAL
Refills: 0 | Status: DISCONTINUED | OUTPATIENT
Start: 2023-12-07 | End: 2023-12-07

## 2023-12-07 RX ORDER — SENNA PLUS 8.6 MG/1
2 TABLET ORAL AT BEDTIME
Refills: 0 | Status: DISCONTINUED | OUTPATIENT
Start: 2023-12-07 | End: 2023-12-07

## 2023-12-07 RX ORDER — CHOLECALCIFEROL (VITAMIN D3) 125 MCG
2000 CAPSULE ORAL DAILY
Refills: 0 | Status: DISCONTINUED | OUTPATIENT
Start: 2023-12-07 | End: 2023-12-12

## 2023-12-07 RX ORDER — POLYETHYLENE GLYCOL 3350 17 G/17G
17 POWDER, FOR SOLUTION ORAL DAILY
Refills: 0 | Status: DISCONTINUED | OUTPATIENT
Start: 2023-12-07 | End: 2023-12-12

## 2023-12-07 RX ORDER — ATORVASTATIN CALCIUM 80 MG/1
80 TABLET, FILM COATED ORAL AT BEDTIME
Refills: 0 | Status: DISCONTINUED | OUTPATIENT
Start: 2023-12-07 | End: 2023-12-12

## 2023-12-07 RX ORDER — AMLODIPINE BESYLATE 2.5 MG/1
5 TABLET ORAL DAILY
Refills: 0 | Status: DISCONTINUED | OUTPATIENT
Start: 2023-12-08 | End: 2023-12-12

## 2023-12-07 RX ORDER — AMLODIPINE BESYLATE 2.5 MG/1
5 TABLET ORAL DAILY
Refills: 0 | Status: DISCONTINUED | OUTPATIENT
Start: 2023-12-07 | End: 2023-12-07

## 2023-12-07 RX ADMIN — Medication 1300 MILLIGRAM(S): at 22:15

## 2023-12-07 RX ADMIN — PANTOPRAZOLE SODIUM 40 MILLIGRAM(S): 20 TABLET, DELAYED RELEASE ORAL at 05:49

## 2023-12-07 RX ADMIN — POLYETHYLENE GLYCOL 3350 17 GRAM(S): 17 POWDER, FOR SOLUTION ORAL at 11:54

## 2023-12-07 RX ADMIN — ESCITALOPRAM OXALATE 10 MILLIGRAM(S): 10 TABLET, FILM COATED ORAL at 11:54

## 2023-12-07 RX ADMIN — ATORVASTATIN CALCIUM 80 MILLIGRAM(S): 80 TABLET, FILM COATED ORAL at 22:15

## 2023-12-07 RX ADMIN — SENNA PLUS 15 MILLILITER(S): 8.6 TABLET ORAL at 11:53

## 2023-12-07 RX ADMIN — AMLODIPINE BESYLATE 5 MILLIGRAM(S): 2.5 TABLET ORAL at 05:48

## 2023-12-07 RX ADMIN — SODIUM CHLORIDE 50 MILLILITER(S): 9 INJECTION, SOLUTION INTRAVENOUS at 08:53

## 2023-12-07 RX ADMIN — CHLORHEXIDINE GLUCONATE 1 APPLICATION(S): 213 SOLUTION TOPICAL at 11:53

## 2023-12-07 RX ADMIN — Medication 6 MILLIGRAM(S): at 22:15

## 2023-12-07 RX ADMIN — Medication 100 MILLIEQUIVALENT(S): at 15:47

## 2023-12-07 RX ADMIN — Medication 100 MILLIEQUIVALENT(S): at 17:19

## 2023-12-07 RX ADMIN — Medication 2000 UNIT(S): at 11:54

## 2023-12-07 RX ADMIN — Medication 100 MILLIEQUIVALENT(S): at 18:24

## 2023-12-07 RX ADMIN — Medication 1300 MILLIGRAM(S): at 05:48

## 2023-12-07 RX ADMIN — Medication 1300 MILLIGRAM(S): at 13:18

## 2023-12-07 RX ADMIN — Medication 1 MILLIGRAM(S): at 11:54

## 2023-12-07 NOTE — PROGRESS NOTE ADULT - SUBJECTIVE AND OBJECTIVE BOX
EP ATTENDING    tele: NSR with periods of sinus bradycardia, no malignant events  s/p PEG tube.    no overnight issues.  DATE OF SERVICE - 12-07-23    Review of Systems:   Constitutional: [ ] fevers, [ ] chills.   Skin: [ ] dry skin. [ ] rashes.  Psychiatric: [ ] depression, [ ] anxiety.   Gastrointestinal: [ ] BRBPR, [ ] melena.   Neurological: [ ] confusion. [ ] seizures. [ ] shuffling gait.   Ears,Nose,Mouth and Throat: [ ] ear pain [ ] sore throat.   Eyes: [ ] diplopia.   Respiratory: [ ] hemoptysis. [ ] shortness of breath  Cardiovascular: See HPI above  Hematologic/Lymphatic: [ ] anemia. [ ] painful nodes. [ ] prolonged bleeding.   Genitourinary: [ ] hematuria. [ ] flank pain.   Endocrine: [ ] significant change in weight. [ ] intolerance to heat and cold.     Review of systems [ x] otherwise negative, [ ] otherwise unable to obtain    FH: no family history of sudden cardiac death in first degree relatives    SH: [ ] tobacco, [ ] alcohol, [ ] drugs    acetaminophen     Tablet .. 650 milliGRAM(s) Oral every 6 hours PRN  aluminum hydroxide/magnesium hydroxide/simethicone Suspension 30 milliLiter(s) Oral every 4 hours PRN  amLODIPine   Tablet 5 milliGRAM(s) Oral daily  atorvastatin 80 milliGRAM(s) Oral at bedtime  chlorhexidine 2% Cloths 1 Application(s) Topical daily  cholecalciferol 2000 Unit(s) Oral daily  dextrose 5% + sodium chloride 0.45% with potassium chloride 20 mEq/L 1000 milliLiter(s) IV Continuous <Continuous>  dextrose 5%. 1000 milliLiter(s) IV Continuous <Continuous>  dextrose 5%. 1000 milliLiter(s) IV Continuous <Continuous>  dextrose 5%. 1000 milliLiter(s) IV Continuous <Continuous>  dextrose 50% Injectable 12.5 Gram(s) IV Push once  dextrose 50% Injectable 25 Gram(s) IV Push once  dextrose 50% Injectable 25 Gram(s) IV Push once  dextrose Oral Gel 15 Gram(s) Oral once PRN  escitalopram 10 milliGRAM(s) Oral daily  folic acid 1 milliGRAM(s) Oral daily  glucagon  Injectable 1 milliGRAM(s) IntraMuscular once  influenza  Vaccine (HIGH DOSE) 0.7 milliLiter(s) IntraMuscular once  insulin lispro (ADMELOG) corrective regimen sliding scale   SubCutaneous at bedtime  insulin lispro (ADMELOG) corrective regimen sliding scale   SubCutaneous three times a day before meals  melatonin Liquid 6 milliGRAM(s) Oral at bedtime  ondansetron Injectable 4 milliGRAM(s) IV Push every 8 hours PRN  pantoprazole   Suspension 40 milliGRAM(s) Oral before breakfast  polyethylene glycol 3350 17 Gram(s) Oral daily  potassium chloride  10 mEq/100 mL IVPB 10 milliEquivalent(s) IV Intermittent every 1 hour  sodium bicarbonate 1300 milliGRAM(s) Oral three times a day                        8.4    10.72 )-----------( 139      ( 07 Dec 2023 10:55 )             26.1       12-07    142  |  105  |  36<H>  ----------------------------<  80  3.1<L>   |  23  |  3.69<H>    Ca    8.3<L>      07 Dec 2023 10:55  Phos  4.0     12-07  Mg     1.6     12-07    TPro  5.1<L>  /  Alb  2.9<L>  /  TBili  0.6  /  DBili  x   /  AST  23  /  ALT  12  /  AlkPhos  40  12-07    T(C): 36.3 (12-07-23 @ 12:44), Max: 36.7 (12-06-23 @ 14:09)  HR: 60 (12-07-23 @ 12:44) (55 - 65)  BP: 171/51 (12-07-23 @ 12:44) (125/58 - 195/65)  RR: 18 (12-07-23 @ 12:44) (10 - 20)  SpO2: 100% (12-07-23 @ 12:44) (95% - 100%)  Wt(kg): --    I&O's Summary    Gen: frail elderly woman, oriented x 1.  Head: Normocephalic and atraumatic.   Neck: No JVD. No bruits. Supple. Does not appear to be enlarged.   Cardiovascular: + S1,S2 ; RRR Soft systolic murmur at the left lower sternal border. No rubs noted.    Lungs: CTA b/l. No rhonchi, rales or wheezes.   Abdomen: + BS, soft. Non tender. Non distended. No rebound. No guarding.   Extremities: No clubbing/cyanosis/edema.   Neurologic: Moves all four extremities. Full range of motion.   Skin: Warm and moist. The patient's skin has normal elasticity and good skin turgor.   Musculoskeletal: Normal range of motion, normal strength      Cath: LCx PCI in 2010.	  Echo: LVEF 40%      A/P) 89 y/o female PMH hypertension, hyperlipidemia, diabetes, CAD s/p PCI 2010 now with LVEF 40%, advanced CKD a/w FTT. EP called for sinus bradycardia and periods of PAF. She remains off aspirin due to an allergy    -continue lipitor for hyperlipidemia with CAD  -given her elevated chads-vasc score please continue lifelong a/c  -f/u palliative care  -no need for PPM as she's asymptomatic from her sinus bradycardia and PAF  -no need for ICD given LVEF > 35%    Georges Calderón M.D.  Cardiac Electrophysiology  [phone]     EP ATTENDING    tele: NSR with periods of sinus bradycardia, no malignant events  s/p PEG tube.    no overnight issues.  DATE OF SERVICE - 12-07-23    Review of Systems:   Constitutional: [ ] fevers, [ ] chills.   Skin: [ ] dry skin. [ ] rashes.  Psychiatric: [ ] depression, [ ] anxiety.   Gastrointestinal: [ ] BRBPR, [ ] melena.   Neurological: [ ] confusion. [ ] seizures. [ ] shuffling gait.   Ears,Nose,Mouth and Throat: [ ] ear pain [ ] sore throat.   Eyes: [ ] diplopia.   Respiratory: [ ] hemoptysis. [ ] shortness of breath  Cardiovascular: See HPI above  Hematologic/Lymphatic: [ ] anemia. [ ] painful nodes. [ ] prolonged bleeding.   Genitourinary: [ ] hematuria. [ ] flank pain.   Endocrine: [ ] significant change in weight. [ ] intolerance to heat and cold.     Review of systems [ x] otherwise negative, [ ] otherwise unable to obtain    FH: no family history of sudden cardiac death in first degree relatives    SH: [ ] tobacco, [ ] alcohol, [ ] drugs    acetaminophen     Tablet .. 650 milliGRAM(s) Oral every 6 hours PRN  aluminum hydroxide/magnesium hydroxide/simethicone Suspension 30 milliLiter(s) Oral every 4 hours PRN  amLODIPine   Tablet 5 milliGRAM(s) Oral daily  atorvastatin 80 milliGRAM(s) Oral at bedtime  chlorhexidine 2% Cloths 1 Application(s) Topical daily  cholecalciferol 2000 Unit(s) Oral daily  dextrose 5% + sodium chloride 0.45% with potassium chloride 20 mEq/L 1000 milliLiter(s) IV Continuous <Continuous>  dextrose 5%. 1000 milliLiter(s) IV Continuous <Continuous>  dextrose 5%. 1000 milliLiter(s) IV Continuous <Continuous>  dextrose 5%. 1000 milliLiter(s) IV Continuous <Continuous>  dextrose 50% Injectable 12.5 Gram(s) IV Push once  dextrose 50% Injectable 25 Gram(s) IV Push once  dextrose 50% Injectable 25 Gram(s) IV Push once  dextrose Oral Gel 15 Gram(s) Oral once PRN  escitalopram 10 milliGRAM(s) Oral daily  folic acid 1 milliGRAM(s) Oral daily  glucagon  Injectable 1 milliGRAM(s) IntraMuscular once  influenza  Vaccine (HIGH DOSE) 0.7 milliLiter(s) IntraMuscular once  insulin lispro (ADMELOG) corrective regimen sliding scale   SubCutaneous at bedtime  insulin lispro (ADMELOG) corrective regimen sliding scale   SubCutaneous three times a day before meals  melatonin Liquid 6 milliGRAM(s) Oral at bedtime  ondansetron Injectable 4 milliGRAM(s) IV Push every 8 hours PRN  pantoprazole   Suspension 40 milliGRAM(s) Oral before breakfast  polyethylene glycol 3350 17 Gram(s) Oral daily  potassium chloride  10 mEq/100 mL IVPB 10 milliEquivalent(s) IV Intermittent every 1 hour  sodium bicarbonate 1300 milliGRAM(s) Oral three times a day                        8.4    10.72 )-----------( 139      ( 07 Dec 2023 10:55 )             26.1       12-07    142  |  105  |  36<H>  ----------------------------<  80  3.1<L>   |  23  |  3.69<H>    Ca    8.3<L>      07 Dec 2023 10:55  Phos  4.0     12-07  Mg     1.6     12-07    TPro  5.1<L>  /  Alb  2.9<L>  /  TBili  0.6  /  DBili  x   /  AST  23  /  ALT  12  /  AlkPhos  40  12-07    T(C): 36.3 (12-07-23 @ 12:44), Max: 36.7 (12-06-23 @ 14:09)  HR: 60 (12-07-23 @ 12:44) (55 - 65)  BP: 171/51 (12-07-23 @ 12:44) (125/58 - 195/65)  RR: 18 (12-07-23 @ 12:44) (10 - 20)  SpO2: 100% (12-07-23 @ 12:44) (95% - 100%)  Wt(kg): --    I&O's Summary    Gen: frail elderly woman, oriented x 1.  Head: Normocephalic and atraumatic.   Neck: No JVD. No bruits. Supple. Does not appear to be enlarged.   Cardiovascular: + S1,S2 ; RRR Soft systolic murmur at the left lower sternal border. No rubs noted.    Lungs: CTA b/l. No rhonchi, rales or wheezes.   Abdomen: + BS, soft. Non tender. Non distended. No rebound. No guarding.   Extremities: No clubbing/cyanosis/edema.   Neurologic: Moves all four extremities. Full range of motion.   Skin: Warm and moist. The patient's skin has normal elasticity and good skin turgor.   Musculoskeletal: Normal range of motion, normal strength      Cath: LCx PCI in 2010.	  Echo: LVEF 40%      A/P) 89 y/o female PMH hypertension, hyperlipidemia, diabetes, CAD s/p PCI 2010 now with LVEF 40%, advanced CKD a/w FTT. EP called for sinus bradycardia and periods of PAF. She remains off aspirin due to an allergy    -continue lipitor for hyperlipidemia with CAD  -given her elevated chads-vasc score please continue lifelong a/c  -f/u palliative care  -no need for PPM as she's asymptomatic from her sinus bradycardia and PAF  -no need for ICD given LVEF > 35%    Georges Calderón M.D.  Cardiac Electrophysiology  [phone]

## 2023-12-07 NOTE — PROGRESS NOTE ADULT - SUBJECTIVE AND OBJECTIVE BOX
C A R D I O L O G Y  **********************************     DATE OF SERVICE: 12-07-23    Patient denies chest pain or shortness of breath.   Review of symptoms otherwise negative.    MEDICATIONS:  acetaminophen     Tablet .. 650 milliGRAM(s) Oral every 6 hours PRN  aluminum hydroxide/magnesium hydroxide/simethicone Suspension 30 milliLiter(s) Oral every 4 hours PRN  atorvastatin 80 milliGRAM(s) Oral at bedtime  chlorhexidine 2% Cloths 1 Application(s) Topical daily  cholecalciferol 2000 Unit(s) Oral daily  dextrose 5%. 1000 milliLiter(s) IV Continuous <Continuous>  dextrose 5%. 1000 milliLiter(s) IV Continuous <Continuous>  dextrose 50% Injectable 12.5 Gram(s) IV Push once  dextrose 50% Injectable 25 Gram(s) IV Push once  dextrose 50% Injectable 25 Gram(s) IV Push once  dextrose Oral Gel 15 Gram(s) Oral once PRN  escitalopram 10 milliGRAM(s) Oral daily  folic acid 1 milliGRAM(s) Oral daily  glucagon  Injectable 1 milliGRAM(s) IntraMuscular once  influenza  Vaccine (HIGH DOSE) 0.7 milliLiter(s) IntraMuscular once  insulin lispro (ADMELOG) corrective regimen sliding scale   SubCutaneous three times a day before meals  insulin lispro (ADMELOG) corrective regimen sliding scale   SubCutaneous at bedtime  melatonin Liquid 6 milliGRAM(s) Oral at bedtime  NIFEdipine XL 30 milliGRAM(s) Oral daily  ondansetron Injectable 4 milliGRAM(s) IV Push every 8 hours PRN  pantoprazole   Suspension 40 milliGRAM(s) Oral before breakfast  polyethylene glycol 3350 17 Gram(s) Oral daily  potassium chloride  10 mEq/100 mL IVPB 10 milliEquivalent(s) IV Intermittent every 1 hour  sodium bicarbonate 1300 milliGRAM(s) Oral three times a day      LABS:                        8.4    10.72 )-----------( 139      ( 07 Dec 2023 10:55 )             26.1       Hemoglobin: 8.4 g/dL (12-07 @ 10:55)  Hemoglobin: 8.8 g/dL (12-06 @ 07:14)  Hemoglobin: 8.8 g/dL (12-06 @ 07:13)  Hemoglobin: 8.9 g/dL (12-05 @ 07:03)  Hemoglobin: 9.4 g/dL (12-04 @ 23:02)      12-07    142  |  105  |  36<H>  ----------------------------<  80  3.1<L>   |  23  |  3.69<H>    Ca    8.3<L>      07 Dec 2023 10:55  Phos  4.0     12-07  Mg     1.6     12-07    TPro  5.1<L>  /  Alb  2.9<L>  /  TBili  0.6  /  DBili  x   /  AST  23  /  ALT  12  /  AlkPhos  40  12-07    Creatinine Trend: 3.69<--, 4.30<--, 4.88<--, 5.02<--, 5.63<--, 5.95<--    COAGS:           PHYSICAL EXAM:  T(C): 36.3 (12-07-23 @ 12:44), Max: 36.4 (12-06-23 @ 20:23)  HR: 60 (12-07-23 @ 12:44) (55 - 65)  BP: 171/51 (12-07-23 @ 12:44) (125/58 - 195/65)  RR: 18 (12-07-23 @ 12:44) (10 - 18)  SpO2: 100% (12-07-23 @ 12:44) (95% - 100%)  Wt(kg): --    I&O's Summary      Gen: NAD  HEENT:  (-)icterus (-)pallor  CV: N S1 S2 1/6 MANASA (+)2 Pulses B/l  Resp:  Clear to auscultation B/L, normal effort  GI: (+) BS Soft, NT, ND  Lymph:  (-)Edema, (-)obvious lymphadenopathy  Skin: Warm to touch, Normal turgor  Psych: Appropriate mood and affect      TELEMETRY: SB/SR 50-60    RADIOLOGY:         CXR: < from: Xray Chest 1 View- PORTABLE-Urgent (11.26.23 @ 02:59) >  IMPRESSION:  Slightly elevated right hemidiaphragm. Underinflated but otherwise clear   lungs. No pleural effusions or pneumothorax.    Stable cardiac and mediastinal silhouettes including aortic   calcifications and convex fullness of superior mediastinal/paratracheal   soft tissue contour which could be related to engorged/tortuous   brachiocephalic vasculature.    Generalized osteopenia.    --- End of Report ---    < end of copied text >      ASSESSMENT/PLAN: Patient is a 89 y/o Female with PMH of advanced dementia, HTN, HLD, DM2, anemia, CAD (s/p LAD and LCx stent 2010), HFrEF (EF 40-45% 2021), h/o TIA no residual deficits, h/o recurrent UTI who presented with syncope and fall w/o trauma in the setting of 1 month of poor PO intake, nausea, vomiting, epigastric pain. Found to have LESLIE, sinus bradycardia and orthostatic hypotension. Cardiology consulted for further evaluation.    #Syncope  - Suspect due to orthostatic hypotension  - TTE noted with EF 39%, mod AI  - Monitor telemetry however appears asymptomatic in terms of sinus bradycardia    #Orthostatic Hypotension  - Holding home antihypertensives/GDMT  - Avoid midodrine and florinef given CHF  - Continue compression stockings and monitor for improvement    #HTN  - Will need to allow some hypertension (SBP 160s) given orthostatic hypotension  - Start Procardia XL 30mg daily given persistent HTN -190s    #Sinus Bradycardia  - Appears asymptomatic at rest  - EP eval appreciated - no need for PPM as she's asymptomatic    #New PAF  - EP eval noted - recommend AC if no contraindication and within GOC. Family opted for no AC given risks per med discussion    #LESLIE  - Management per renal    #CAD  - Plavix held for PEG - restart when ok with GI  - Continue Lipitor    #GOC  - DNR/DNI    #Failure to Thrive  - GI eval noted - s/p PEG on 12/6  - Tolerated procedure well from CV perspective    - No further inpatient cardiac w/u planned    Rainer Wallace PA-C  Pager: [phone]     C A R D I O L O G Y  **********************************     DATE OF SERVICE: 12-07-23    Patient denies chest pain or shortness of breath.   Review of symptoms otherwise negative.    MEDICATIONS:  acetaminophen     Tablet .. 650 milliGRAM(s) Oral every 6 hours PRN  aluminum hydroxide/magnesium hydroxide/simethicone Suspension 30 milliLiter(s) Oral every 4 hours PRN  atorvastatin 80 milliGRAM(s) Oral at bedtime  chlorhexidine 2% Cloths 1 Application(s) Topical daily  cholecalciferol 2000 Unit(s) Oral daily  dextrose 5%. 1000 milliLiter(s) IV Continuous <Continuous>  dextrose 5%. 1000 milliLiter(s) IV Continuous <Continuous>  dextrose 50% Injectable 12.5 Gram(s) IV Push once  dextrose 50% Injectable 25 Gram(s) IV Push once  dextrose 50% Injectable 25 Gram(s) IV Push once  dextrose Oral Gel 15 Gram(s) Oral once PRN  escitalopram 10 milliGRAM(s) Oral daily  folic acid 1 milliGRAM(s) Oral daily  glucagon  Injectable 1 milliGRAM(s) IntraMuscular once  influenza  Vaccine (HIGH DOSE) 0.7 milliLiter(s) IntraMuscular once  insulin lispro (ADMELOG) corrective regimen sliding scale   SubCutaneous three times a day before meals  insulin lispro (ADMELOG) corrective regimen sliding scale   SubCutaneous at bedtime  melatonin Liquid 6 milliGRAM(s) Oral at bedtime  NIFEdipine XL 30 milliGRAM(s) Oral daily  ondansetron Injectable 4 milliGRAM(s) IV Push every 8 hours PRN  pantoprazole   Suspension 40 milliGRAM(s) Oral before breakfast  polyethylene glycol 3350 17 Gram(s) Oral daily  potassium chloride  10 mEq/100 mL IVPB 10 milliEquivalent(s) IV Intermittent every 1 hour  sodium bicarbonate 1300 milliGRAM(s) Oral three times a day      LABS:                        8.4    10.72 )-----------( 139      ( 07 Dec 2023 10:55 )             26.1       Hemoglobin: 8.4 g/dL (12-07 @ 10:55)  Hemoglobin: 8.8 g/dL (12-06 @ 07:14)  Hemoglobin: 8.8 g/dL (12-06 @ 07:13)  Hemoglobin: 8.9 g/dL (12-05 @ 07:03)  Hemoglobin: 9.4 g/dL (12-04 @ 23:02)      12-07    142  |  105  |  36<H>  ----------------------------<  80  3.1<L>   |  23  |  3.69<H>    Ca    8.3<L>      07 Dec 2023 10:55  Phos  4.0     12-07  Mg     1.6     12-07    TPro  5.1<L>  /  Alb  2.9<L>  /  TBili  0.6  /  DBili  x   /  AST  23  /  ALT  12  /  AlkPhos  40  12-07    Creatinine Trend: 3.69<--, 4.30<--, 4.88<--, 5.02<--, 5.63<--, 5.95<--    COAGS:           PHYSICAL EXAM:  T(C): 36.3 (12-07-23 @ 12:44), Max: 36.4 (12-06-23 @ 20:23)  HR: 60 (12-07-23 @ 12:44) (55 - 65)  BP: 171/51 (12-07-23 @ 12:44) (125/58 - 195/65)  RR: 18 (12-07-23 @ 12:44) (10 - 18)  SpO2: 100% (12-07-23 @ 12:44) (95% - 100%)  Wt(kg): --    I&O's Summary      Gen: NAD  HEENT:  (-)icterus (-)pallor  CV: N S1 S2 1/6 MANASA (+)2 Pulses B/l  Resp:  Clear to auscultation B/L, normal effort  GI: (+) BS Soft, NT, ND  Lymph:  (-)Edema, (-)obvious lymphadenopathy  Skin: Warm to touch, Normal turgor  Psych: Appropriate mood and affect      TELEMETRY: SB/SR 50-60    RADIOLOGY:         CXR: < from: Xray Chest 1 View- PORTABLE-Urgent (11.26.23 @ 02:59) >  IMPRESSION:  Slightly elevated right hemidiaphragm. Underinflated but otherwise clear   lungs. No pleural effusions or pneumothorax.    Stable cardiac and mediastinal silhouettes including aortic   calcifications and convex fullness of superior mediastinal/paratracheal   soft tissue contour which could be related to engorged/tortuous   brachiocephalic vasculature.    Generalized osteopenia.    --- End of Report ---    < end of copied text >      ASSESSMENT/PLAN: Patient is a 89 y/o Female with PMH of advanced dementia, HTN, HLD, DM2, anemia, CAD (s/p LAD and LCx stent 2010), HFrEF (EF 40-45% 2021), h/o TIA no residual deficits, h/o recurrent UTI who presented with syncope and fall w/o trauma in the setting of 1 month of poor PO intake, nausea, vomiting, epigastric pain. Found to have LESLIE, sinus bradycardia and orthostatic hypotension. Cardiology consulted for further evaluation.    #Syncope  - Suspect due to orthostatic hypotension  - TTE noted with EF 39%, mod AI  - Monitor telemetry however appears asymptomatic in terms of sinus bradycardia    #Orthostatic Hypotension  - Holding home antihypertensives/GDMT  - Avoid midodrine and florinef given CHF  - Continue compression stockings and monitor for improvement    #HTN  - Will need to allow some hypertension (SBP 160s) given orthostatic hypotension  - Start Procardia XL 30mg daily given persistent HTN -190s    #Sinus Bradycardia  - Appears asymptomatic at rest  - EP eval appreciated - no need for PPM as she's asymptomatic    #New PAF  - EP eval noted - recommend AC if no contraindication and within GOC. Family opted for no AC given risks per med discussion    #LESLIE  - Management per renal    #CAD  - Plavix held for PEG - restart when ok with GI  - Continue Lipitor    #GOC  - DNR/DNI    #Failure to Thrive  - GI eval noted - s/p PEG on 12/6  - Tolerated procedure well from CV perspective    - No further inpatient cardiac w/u planned    Rainer Wallace PA-C  Pager: [phone]     C A R D I O L O G Y  **********************************     DATE OF SERVICE: 12-07-23    Patient denies chest pain or shortness of breath.   Review of symptoms otherwise negative.    MEDICATIONS:  acetaminophen     Tablet .. 650 milliGRAM(s) Oral every 6 hours PRN  aluminum hydroxide/magnesium hydroxide/simethicone Suspension 30 milliLiter(s) Oral every 4 hours PRN  atorvastatin 80 milliGRAM(s) Oral at bedtime  chlorhexidine 2% Cloths 1 Application(s) Topical daily  cholecalciferol 2000 Unit(s) Oral daily  dextrose 5%. 1000 milliLiter(s) IV Continuous <Continuous>  dextrose 5%. 1000 milliLiter(s) IV Continuous <Continuous>  dextrose 50% Injectable 12.5 Gram(s) IV Push once  dextrose 50% Injectable 25 Gram(s) IV Push once  dextrose 50% Injectable 25 Gram(s) IV Push once  dextrose Oral Gel 15 Gram(s) Oral once PRN  escitalopram 10 milliGRAM(s) Oral daily  folic acid 1 milliGRAM(s) Oral daily  glucagon  Injectable 1 milliGRAM(s) IntraMuscular once  influenza  Vaccine (HIGH DOSE) 0.7 milliLiter(s) IntraMuscular once  insulin lispro (ADMELOG) corrective regimen sliding scale   SubCutaneous three times a day before meals  insulin lispro (ADMELOG) corrective regimen sliding scale   SubCutaneous at bedtime  melatonin Liquid 6 milliGRAM(s) Oral at bedtime  NIFEdipine XL 30 milliGRAM(s) Oral daily  ondansetron Injectable 4 milliGRAM(s) IV Push every 8 hours PRN  pantoprazole   Suspension 40 milliGRAM(s) Oral before breakfast  polyethylene glycol 3350 17 Gram(s) Oral daily  potassium chloride  10 mEq/100 mL IVPB 10 milliEquivalent(s) IV Intermittent every 1 hour  sodium bicarbonate 1300 milliGRAM(s) Oral three times a day      LABS:                        8.4    10.72 )-----------( 139      ( 07 Dec 2023 10:55 )             26.1       Hemoglobin: 8.4 g/dL (12-07 @ 10:55)  Hemoglobin: 8.8 g/dL (12-06 @ 07:14)  Hemoglobin: 8.8 g/dL (12-06 @ 07:13)  Hemoglobin: 8.9 g/dL (12-05 @ 07:03)  Hemoglobin: 9.4 g/dL (12-04 @ 23:02)      12-07    142  |  105  |  36<H>  ----------------------------<  80  3.1<L>   |  23  |  3.69<H>    Ca    8.3<L>      07 Dec 2023 10:55  Phos  4.0     12-07  Mg     1.6     12-07    TPro  5.1<L>  /  Alb  2.9<L>  /  TBili  0.6  /  DBili  x   /  AST  23  /  ALT  12  /  AlkPhos  40  12-07    Creatinine Trend: 3.69<--, 4.30<--, 4.88<--, 5.02<--, 5.63<--, 5.95<--    COAGS:           PHYSICAL EXAM:  T(C): 36.3 (12-07-23 @ 12:44), Max: 36.4 (12-06-23 @ 20:23)  HR: 60 (12-07-23 @ 12:44) (55 - 65)  BP: 171/51 (12-07-23 @ 12:44) (125/58 - 195/65)  RR: 18 (12-07-23 @ 12:44) (10 - 18)  SpO2: 100% (12-07-23 @ 12:44) (95% - 100%)  Wt(kg): --    I&O's Summary      Gen: NAD  HEENT:  (-)icterus (-)pallor  CV: N S1 S2 1/6 MANASA (+)2 Pulses B/l  Resp:  Clear to auscultation B/L, normal effort  GI: (+) BS Soft, NT, ND  Lymph:  (-)Edema, (-)obvious lymphadenopathy  Skin: Warm to touch, Normal turgor  Psych: Appropriate mood and affect      TELEMETRY: SB/SR 50-60    RADIOLOGY:         CXR: < from: Xray Chest 1 View- PORTABLE-Urgent (11.26.23 @ 02:59) >  IMPRESSION:  Slightly elevated right hemidiaphragm. Underinflated but otherwise clear   lungs. No pleural effusions or pneumothorax.    Stable cardiac and mediastinal silhouettes including aortic   calcifications and convex fullness of superior mediastinal/paratracheal   soft tissue contour which could be related to engorged/tortuous   brachiocephalic vasculature.    Generalized osteopenia.    --- End of Report ---    < end of copied text >      ASSESSMENT/PLAN: Patient is a 89 y/o Female with PMH of advanced dementia, HTN, HLD, DM2, anemia, CAD (s/p LAD and LCx stent 2010), HFrEF (EF 40-45% 2021), h/o TIA no residual deficits, h/o recurrent UTI who presented with syncope and fall w/o trauma in the setting of 1 month of poor PO intake, nausea, vomiting, epigastric pain. Found to have LESLIE, sinus bradycardia and orthostatic hypotension. Cardiology consulted for further evaluation.    #Syncope  - Suspect due to orthostatic hypotension  - TTE noted with EF 39%, mod AI  - Monitor telemetry however appears asymptomatic in terms of sinus bradycardia    #Orthostatic Hypotension  - Holding home antihypertensives/GDMT  - Avoid midodrine and florinef given CHF  - Continue compression stockings and monitor for improvement    #HTN  - Will need to allow some hypertension (SBP 160s) given orthostatic hypotension  - Start Amlodipine 5mg daily given persistent HTN -190s    #Sinus Bradycardia  - Appears asymptomatic at rest  - EP eval appreciated - no need for PPM as she's asymptomatic    #New PAF  - EP eval noted - recommend AC if no contraindication and within GOC. Family opted for no AC given risks per med discussion    #LESLIE  - Management per renal    #CAD  - Plavix held for PEG - restart when ok with GI  - Continue Lipitor    #GOC  - DNR/DNI    #Failure to Thrive  - GI eval noted - s/p PEG on 12/6  - Tolerated procedure well from CV perspective    - No further inpatient cardiac w/u planned    Rainer Wallace PA-C  Pager: [phone]     C A R D I O L O G Y  **********************************     DATE OF SERVICE: 12-07-23    Patient denies chest pain or shortness of breath.   Review of symptoms otherwise negative.    MEDICATIONS:  acetaminophen     Tablet .. 650 milliGRAM(s) Oral every 6 hours PRN  aluminum hydroxide/magnesium hydroxide/simethicone Suspension 30 milliLiter(s) Oral every 4 hours PRN  atorvastatin 80 milliGRAM(s) Oral at bedtime  chlorhexidine 2% Cloths 1 Application(s) Topical daily  cholecalciferol 2000 Unit(s) Oral daily  dextrose 5%. 1000 milliLiter(s) IV Continuous <Continuous>  dextrose 5%. 1000 milliLiter(s) IV Continuous <Continuous>  dextrose 50% Injectable 12.5 Gram(s) IV Push once  dextrose 50% Injectable 25 Gram(s) IV Push once  dextrose 50% Injectable 25 Gram(s) IV Push once  dextrose Oral Gel 15 Gram(s) Oral once PRN  escitalopram 10 milliGRAM(s) Oral daily  folic acid 1 milliGRAM(s) Oral daily  glucagon  Injectable 1 milliGRAM(s) IntraMuscular once  influenza  Vaccine (HIGH DOSE) 0.7 milliLiter(s) IntraMuscular once  insulin lispro (ADMELOG) corrective regimen sliding scale   SubCutaneous three times a day before meals  insulin lispro (ADMELOG) corrective regimen sliding scale   SubCutaneous at bedtime  melatonin Liquid 6 milliGRAM(s) Oral at bedtime  NIFEdipine XL 30 milliGRAM(s) Oral daily  ondansetron Injectable 4 milliGRAM(s) IV Push every 8 hours PRN  pantoprazole   Suspension 40 milliGRAM(s) Oral before breakfast  polyethylene glycol 3350 17 Gram(s) Oral daily  potassium chloride  10 mEq/100 mL IVPB 10 milliEquivalent(s) IV Intermittent every 1 hour  sodium bicarbonate 1300 milliGRAM(s) Oral three times a day      LABS:                        8.4    10.72 )-----------( 139      ( 07 Dec 2023 10:55 )             26.1       Hemoglobin: 8.4 g/dL (12-07 @ 10:55)  Hemoglobin: 8.8 g/dL (12-06 @ 07:14)  Hemoglobin: 8.8 g/dL (12-06 @ 07:13)  Hemoglobin: 8.9 g/dL (12-05 @ 07:03)  Hemoglobin: 9.4 g/dL (12-04 @ 23:02)      12-07    142  |  105  |  36<H>  ----------------------------<  80  3.1<L>   |  23  |  3.69<H>    Ca    8.3<L>      07 Dec 2023 10:55  Phos  4.0     12-07  Mg     1.6     12-07    TPro  5.1<L>  /  Alb  2.9<L>  /  TBili  0.6  /  DBili  x   /  AST  23  /  ALT  12  /  AlkPhos  40  12-07    Creatinine Trend: 3.69<--, 4.30<--, 4.88<--, 5.02<--, 5.63<--, 5.95<--    COAGS:           PHYSICAL EXAM:  T(C): 36.3 (12-07-23 @ 12:44), Max: 36.4 (12-06-23 @ 20:23)  HR: 60 (12-07-23 @ 12:44) (55 - 65)  BP: 171/51 (12-07-23 @ 12:44) (125/58 - 195/65)  RR: 18 (12-07-23 @ 12:44) (10 - 18)  SpO2: 100% (12-07-23 @ 12:44) (95% - 100%)  Wt(kg): --    I&O's Summary      Gen: NAD  HEENT:  (-)icterus (-)pallor  CV: N S1 S2 1/6 MANASA (+)2 Pulses B/l  Resp:  Clear to auscultation B/L, normal effort  GI: (+) BS Soft, NT, ND  Lymph:  (-)Edema, (-)obvious lymphadenopathy  Skin: Warm to touch, Normal turgor  Psych: Appropriate mood and affect      TELEMETRY: SB/SR 50-60    RADIOLOGY:         CXR: < from: Xray Chest 1 View- PORTABLE-Urgent (11.26.23 @ 02:59) >  IMPRESSION:  Slightly elevated right hemidiaphragm. Underinflated but otherwise clear   lungs. No pleural effusions or pneumothorax.    Stable cardiac and mediastinal silhouettes including aortic   calcifications and convex fullness of superior mediastinal/paratracheal   soft tissue contour which could be related to engorged/tortuous   brachiocephalic vasculature.    Generalized osteopenia.    --- End of Report ---    < end of copied text >      ASSESSMENT/PLAN: Patient is a 89 y/o Female with PMH of advanced dementia, HTN, HLD, DM2, anemia, CAD (s/p LAD and LCx stent 2010), HFrEF (EF 40-45% 2021), h/o TIA no residual deficits, h/o recurrent UTI who presented with syncope and fall w/o trauma in the setting of 1 month of poor PO intake, nausea, vomiting, epigastric pain. Found to have LESLIE, sinus bradycardia and orthostatic hypotension. Cardiology consulted for further evaluation.    #Syncope  - Suspect due to orthostatic hypotension  - TTE noted with EF 39%, mod AI  - Monitor telemetry however appears asymptomatic in terms of sinus bradycardia    #Orthostatic Hypotension  - Holding home antihypertensives/GDMT  - Avoid midodrine and florinef given CHF  - Continue compression stockings and monitor for improvement    #HTN  - Will need to allow some hypertension (SBP 160s) given orthostatic hypotension  - Start Amlodipine 5mg daily given persistent HTN -190s    #Sinus Bradycardia  - Appears asymptomatic at rest  - EP eval appreciated - no need for PPM as she's asymptomatic    #New PAF  - EP eval noted - recommend AC if no contraindication and within GOC. Family opted for no AC given risks per med discussion    #LESLIE  - Management per renal    #CAD  - Plavix held for PEG - restart when ok with GI  - Continue Lipitor    #GOC  - DNR/DNI    #Failure to Thrive  - GI eval noted - s/p PEG on 12/6  - Tolerated procedure well from CV perspective    - No further inpatient cardiac w/u planned    Rainer Wallace PA-C  Pager: [phone]

## 2023-12-07 NOTE — PROGRESS NOTE ADULT - ASSESSMENT
89 y/o F pmhx advanced dementia, DM2, anemia, CKD, CAD (s/p LAD and LCx stent 2010), HFrEF (EF 40-45% 2021), h/o TIA no residual deficits, h/o recurrent UTI presented w/ syncope and fall suspected to be orthostatic in addition to a 1 month hx of poor PO intake with reports of dysphagia prior to presentation    Impression  #PEG evaluation/poor PO intake  #?esophageal dysphagia symptoms prior to presentation, although pt denies at this time  - evaluated by S&S; recommended for soft bite-sized/thin liquids; however, pt with poor PO intake; seen by S&S and continues to have poor PO intake  - s/p PEG 12/6; esophagus normal    #syncope, suspected orthostatic in nature  #CAD s/p stent; off Plavix since 11/30    Recommendations  -can resume AC after 48h from PEG placement  -may administer enteral feeds via PEG today   -Nutrition consult for recommendations on enteral feeds  -keep bumper clean and dry, would avoid gauze between the PEG bumper and skin  -maintain aspiration precautions and elevate head of bed during feeds  -close observation to prevent PEG dislodgement, would recommend abdominal binder for protection/prevention of PEG dislodgment  -eventual Speech and Swallow evaluation as an outpatient to evaluate for swallowing; would not be a candidate for PEG removal until at least 6-8 weeks post placement    Note and recommendations are incomplete until reviewed and attested by attending.    Douglas Levi MD  GI/Hepatology Fellow, PGY4  Teams preferred (7AM to 5PM); after 5PM, call GI fellow on call    NEW CONSULTS:  Please email [email or web address] 89 y/o F pmhx advanced dementia, DM2, anemia, CKD, CAD (s/p LAD and LCx stent 2010), HFrEF (EF 40-45% 2021), h/o TIA no residual deficits, h/o recurrent UTI presented w/ syncope and fall suspected to be orthostatic in addition to a 1 month hx of poor PO intake with reports of dysphagia prior to presentation    Impression  #PEG evaluation/poor PO intake  #?esophageal dysphagia symptoms prior to presentation, although pt denies at this time  - evaluated by S&S; recommended for soft bite-sized/thin liquids; however, pt with poor PO intake; seen by S&S and continues to have poor PO intake  - s/p PEG 12/6; esophagus normal    #syncope, suspected orthostatic in nature  #CAD s/p stent; off Plavix since 11/30    Recommendations  -can resume AC after 48h from PEG placement  -may administer enteral feeds via PEG today   -Nutrition consult for recommendations on enteral feeds  -keep bumper clean and dry, would avoid gauze between the PEG bumper and skin  -maintain aspiration precautions and elevate head of bed during feeds  -close observation to prevent PEG dislodgement, would recommend abdominal binder for protection/prevention of PEG dislodgment  -eventual Speech and Swallow evaluation as an outpatient to evaluate for swallowing; would not be a candidate for PEG removal until at least 6-8 weeks post placement    Note and recommendations are incomplete until reviewed and attested by attending.    Douglas Levi MD  GI/Hepatology Fellow, PGY4  Teams preferred (7AM to 5PM); after 5PM, call GI fellow on call    NEW CONSULTS:  Please email [email or web address]

## 2023-12-07 NOTE — PROGRESS NOTE ADULT - SUBJECTIVE AND OBJECTIVE BOX
Chief Complaint:  Patient is a 89y old  Female who presents with a chief complaint of fall, LESLIE (07 Dec 2023 07:23)    Interval Events:   no events overnight; vitals stable    Allergies:  aspirin (Anaphylaxis)  Celebrex (Rash)  penicillin (Anaphylaxis)    Hospital Medications:  acetaminophen     Tablet .. 650 milliGRAM(s) Oral every 6 hours PRN  aluminum hydroxide/magnesium hydroxide/simethicone Suspension 30 milliLiter(s) Oral every 4 hours PRN  amLODIPine   Tablet 5 milliGRAM(s) Oral daily  atorvastatin 80 milliGRAM(s) Oral at bedtime  chlorhexidine 2% Cloths 1 Application(s) Topical daily  cholecalciferol 2000 Unit(s) Oral daily  dextrose 5% + sodium chloride 0.45% with potassium chloride 20 mEq/L 1000 milliLiter(s) IV Continuous <Continuous>  dextrose 5%. 1000 milliLiter(s) IV Continuous <Continuous>  dextrose 5%. 1000 milliLiter(s) IV Continuous <Continuous>  dextrose 5%. 1000 milliLiter(s) IV Continuous <Continuous>  dextrose 5%. 1000 milliLiter(s) IV Continuous <Continuous>  dextrose 50% Injectable 12.5 Gram(s) IV Push once  dextrose 50% Injectable 25 Gram(s) IV Push once  dextrose 50% Injectable 25 Gram(s) IV Push once  dextrose Oral Gel 15 Gram(s) Oral once PRN  escitalopram 10 milliGRAM(s) Oral daily  folic acid 1 milliGRAM(s) Oral daily  glucagon  Injectable 1 milliGRAM(s) IntraMuscular once  influenza  Vaccine (HIGH DOSE) 0.7 milliLiter(s) IntraMuscular once  insulin lispro (ADMELOG) corrective regimen sliding scale   SubCutaneous three times a day before meals  insulin lispro (ADMELOG) corrective regimen sliding scale   SubCutaneous at bedtime  melatonin Liquid 6 milliGRAM(s) Oral at bedtime  ondansetron Injectable 4 milliGRAM(s) IV Push every 8 hours PRN  pantoprazole   Suspension 40 milliGRAM(s) Oral before breakfast  polyethylene glycol 3350 17 Gram(s) Oral daily  senna Syrup 15 milliLiter(s) Oral once  sodium bicarbonate 1300 milliGRAM(s) Oral three times a day      PMHX/PSHX:  Hypertension    Diabetes Mellitus, Type 2    History of Cholecystectomy    Hyperlipidemia    Dementia    CAD (coronary artery disease)    History of TIAs    History of TIA (transient ischemic attack)    Anemia    HFrEF (heart failure with reduced ejection fraction)    History of Cholecystectomy    S/P hip replacement, right    S/P hip replacement, left        Family history:  No pertinent family history in first degree relatives        PHYSICAL EXAM:   Vital Signs:  Vital Signs Last 24 Hrs  T(C): 36.4 (07 Dec 2023 05:13), Max: 36.7 (06 Dec 2023 14:09)  T(F): 97.6 (07 Dec 2023 05:13), Max: 98 (06 Dec 2023 14:09)  HR: 57 (07 Dec 2023 05:13) (50 - 65)  BP: 158/63 (07 Dec 2023 05:13) (125/58 - 195/65)  BP(mean): 65 (06 Dec 2023 14:46) (65 - 65)  RR: 18 (07 Dec 2023 05:13) (10 - 20)  SpO2: 97% (07 Dec 2023 05:13) (95% - 100%)    Parameters below as of 07 Dec 2023 05:13  Patient On (Oxygen Delivery Method): room air      Daily Height in cm: 152.4 (06 Dec 2023 14:46)    Daily Weight in k.2 (07 Dec 2023 09:30)    GENERAL:  No acute distress  HEENT:  no scleral icterus  CHEST:  no accessory muscle use  HEART:  Regular rate and rhythm  ABDOMEN:  Soft, non-tender, non-distended; PEG in place without drainage  EXTREMITIES:  No edema  SKIN:  No rash/ecchymoses  NEURO:  Alert and oriented x 2    LABS:                        8.4    10.72 )-----------( 139      ( 07 Dec 2023 10:55 )             26.1     Mean Cell Volume: 81.3 fl (- @ 10:55)    12    142  |  105  |  36<H>  ----------------------------<  80  3.1<L>   |  23  |  3.69<H>    Ca    8.3<L>      07 Dec 2023 10:55  Phos  4.0       Mg     1.6         TPro  5.1<L>  /  Alb  2.9<L>  /  TBili  0.6  /  DBili  x   /  AST  23  /  ALT  12  /  AlkPhos  40  12    LIVER FUNCTIONS - ( 07 Dec 2023 10:55 )  Alb: 2.9 g/dL / Pro: 5.1 g/dL / ALK PHOS: 40 U/L / ALT: 12 U/L / AST: 23 U/L / GGT: x           PT/INR - ( 06 Dec 2023 07:15 )   PT: 12.9 sec;   INR: 1.18 ratio         PTT - ( 06 Dec 2023 07:15 )  PTT:>200.0 sec  Urinalysis Basic - ( 07 Dec 2023 10:55 )    Color: x / Appearance: x / SG: x / pH: x  Gluc: 80 mg/dL / Ketone: x  / Bili: x / Urobili: x   Blood: x / Protein: x / Nitrite: x   Leuk Esterase: x / RBC: x / WBC x   Sq Epi: x / Non Sq Epi: x / Bacteria: x                              8.4    10.72 )-----------( 139      ( 07 Dec 2023 10:55 )             26.1                         8.8    10.03 )-----------( 161      ( 06 Dec 2023 07:14 )             29.0                         8.8    10.38 )-----------( 161      ( 06 Dec 2023 07:13 )             28.5                         8.9    8.25  )-----------( 163      ( 05 Dec 2023 07:03 )             28.8                         9.4    8.07  )-----------( 158      ( 04 Dec 2023 23:02 )             30.3

## 2023-12-07 NOTE — PROGRESS NOTE ADULT - PROBLEM SELECTOR PLAN 12
DVT ppx: held AC for PEG placement. To discuss with family utility of further AC.   Diet: regular, Kosher  Dispo: pending medical improvement, PT radha

## 2023-12-07 NOTE — PROGRESS NOTE ADULT - PROBLEM SELECTOR PLAN 5
- pt eating less 2/2 reported epigastric pain/nausea  - also suspect there is an element of progressing dementia  - fluid resuscitation  - encourage PO intake, honor dietary preferences  - PEG tube placed on 12/06 - pt eating less 2/2 reported epigastric pain/nausea  - also suspect there is an element of progressing dementia  - fluid resuscitation  - encourage PO intake, honor dietary preferences  - PEG tube placed on 12/06 tube feeds to be initiated on 12/07 after nutrition recs

## 2023-12-07 NOTE — PROGRESS NOTE ADULT - PROBLEM SELECTOR PLAN 10
- home regimen: Plavix 75mg qd, rosuvastatin 40mg qd, Zetia 10mg qd  - Zetia is nonformulary  - intertherapeutic interchange of rosuvastatin 40mg to lipitor 80mg qd    - Hold plavix (last dose 11/30) given possibility of PEG placement - home regimen: coreg 3.125 BID, valsartan-HCTZ 80-12.5 qd, aldactone 50mg qd  - hold given orthostatic hypotension  - Amlodpine 5 mg daily started (patient on Amlodipine 5 mg BID at home)

## 2023-12-07 NOTE — PROGRESS NOTE ADULT - SUBJECTIVE AND OBJECTIVE BOX
PROGRESS NOTE:   Authored by Edgard Kim MD  Internal Medicine      Patient is a 89y old  Female who presents with a chief complaint of fall, LESLIE (06 Dec 2023 14:55)      SUBJECTIVE / OVERNIGHT EVENTS: NAEO, patient seen and examined at bedside    MEDICATIONS  (STANDING):  amLODIPine   Tablet 5 milliGRAM(s) Oral daily  atorvastatin 80 milliGRAM(s) Oral at bedtime  chlorhexidine 2% Cloths 1 Application(s) Topical daily  cholecalciferol 2000 Unit(s) Oral daily  dextrose 5% + sodium chloride 0.45% with potassium chloride 20 mEq/L 1000 milliLiter(s) (50 mL/Hr) IV Continuous <Continuous>  dextrose 5%. 1000 milliLiter(s) (50 mL/Hr) IV Continuous <Continuous>  dextrose 5%. 1000 milliLiter(s) (50 mL/Hr) IV Continuous <Continuous>  dextrose 5%. 1000 milliLiter(s) (100 mL/Hr) IV Continuous <Continuous>  dextrose 50% Injectable 12.5 Gram(s) IV Push once  dextrose 50% Injectable 25 Gram(s) IV Push once  dextrose 50% Injectable 25 Gram(s) IV Push once  escitalopram 10 milliGRAM(s) Oral daily  folic acid 1 milliGRAM(s) Oral daily  glucagon  Injectable 1 milliGRAM(s) IntraMuscular once  influenza  Vaccine (HIGH DOSE) 0.7 milliLiter(s) IntraMuscular once  insulin lispro (ADMELOG) corrective regimen sliding scale   SubCutaneous three times a day before meals  insulin lispro (ADMELOG) corrective regimen sliding scale   SubCutaneous at bedtime  melatonin 6 milliGRAM(s) Oral at bedtime  pantoprazole    Tablet 40 milliGRAM(s) Oral before breakfast  polyethylene glycol 3350 17 Gram(s) Oral daily  senna 2 Tablet(s) Oral at bedtime  sodium bicarbonate 1300 milliGRAM(s) Oral three times a day    MEDICATIONS  (PRN):  acetaminophen     Tablet .. 650 milliGRAM(s) Oral every 6 hours PRN Temp greater or equal to 38C (100.4F), Mild Pain (1 - 3)  aluminum hydroxide/magnesium hydroxide/simethicone Suspension 30 milliLiter(s) Oral every 4 hours PRN Dyspepsia  dextrose Oral Gel 15 Gram(s) Oral once PRN Blood Glucose LESS THAN 70 milliGRAM(s)/deciliter  heparin   Injectable 4000 Unit(s) IV Push every 6 hours PRN For aPTT less than 40  heparin   Injectable 2000 Unit(s) IV Push every 6 hours PRN For aPTT between 40 - 57  ondansetron Injectable 4 milliGRAM(s) IV Push every 8 hours PRN Nausea and/or Vomiting      CAPILLARY BLOOD GLUCOSE      POCT Blood Glucose.: 74 mg/dL (06 Dec 2023 21:25)  POCT Blood Glucose.: 86 mg/dL (06 Dec 2023 12:14)  POCT Blood Glucose.: 79 mg/dL (06 Dec 2023 08:10)    I&O's Summary      PHYSICAL EXAM:  Vital Signs Last 24 Hrs  T(C): 36.4 (07 Dec 2023 05:13), Max: 36.7 (06 Dec 2023 14:09)  T(F): 97.6 (07 Dec 2023 05:13), Max: 98 (06 Dec 2023 14:09)  HR: 57 (07 Dec 2023 05:13) (50 - 65)  BP: 158/63 (07 Dec 2023 05:13) (125/58 - 195/65)  BP(mean): 65 (06 Dec 2023 14:46) (65 - 65)  RR: 18 (07 Dec 2023 05:13) (10 - 20)  SpO2: 97% (07 Dec 2023 05:13) (95% - 100%)    Parameters below as of 07 Dec 2023 05:13  Patient On (Oxygen Delivery Method): room air      CONSTITUTIONAL: Well-groomed, in no apparent distress  EYES: No conjunctival or scleral injection, non-icteric;   ENMT: No external nasal lesions; MMM  NECK: Trachea midline without palpable neck mass; thyroid not enlarged and non-tender  RESPIRATORY: Breathing comfortably; no dullness to percussion; lungs CTA without wheeze/rhonchi/rales  CARDIOVASCULAR: +S1S2, RRR, no M/G/R; pedal pulses full and symmetric; no lower extremity edema  GASTROINTESTINAL: No palpable masses or tenderness, +BS throughout, no rebound/guarding; no hepatosplenomegaly; no hernia palpated, (+) PEG in place, no overlying erythema  LYMPHATIC: No cervical LAD or tenderness  SKIN: No rashes or ulcers noted  NEUROLOGIC: CN II-XII intact; sensation intact in LEs b/l to light touch  PSYCHIATRIC: A+O x 2; mood and affect appropriate; appropriate insight and judgment    LABS:                        8.8    10.03 )-----------( 161      ( 06 Dec 2023 07:14 )             29.0     12-06    143  |  106  |  39<H>  ----------------------------<  58<L>  3.6   |  24  |  4.30<H>    Ca    9.0      06 Dec 2023 07:12  Phos  3.3     12-06  Mg     1.8     12-06    TPro  5.3<L>  /  Alb  2.9<L>  /  TBili  0.7  /  DBili  x   /  AST  20  /  ALT  15  /  AlkPhos  38<L>  12-06    PT/INR - ( 06 Dec 2023 07:15 )   PT: 12.9 sec;   INR: 1.18 ratio         PTT - ( 06 Dec 2023 07:15 )  PTT:>200.0 sec      Urinalysis Basic - ( 06 Dec 2023 07:12 )    Color: x / Appearance: x / SG: x / pH: x  Gluc: 58 mg/dL / Ketone: x  / Bili: x / Urobili: x   Blood: x / Protein: x / Nitrite: x   Leuk Esterase: x / RBC: x / WBC x   Sq Epi: x / Non Sq Epi: x / Bacteria: x          RADIOLOGY & ADDITIONAL TESTS:  Results Reviewed:   Imaging Personally Reviewed:  Electrocardiogram Personally Reviewed:    COORDINATION OF CARE:  Care Discussed with Consultants/Other Providers [Y/N]: Yes  Prior or Outpatient Records Reviewed [Y/N]: Yes   PROGRESS NOTE:   Authored by Edgard Kim MD  Internal Medicine      Patient is a 89y old  Female who presents with a chief complaint of fall, LESLIE (06 Dec 2023 14:55)      SUBJECTIVE / OVERNIGHT EVENTS:     NAEO, patient seen and examined at bedside  No complaints     MEDICATIONS  (STANDING):  amLODIPine   Tablet 5 milliGRAM(s) Oral daily  atorvastatin 80 milliGRAM(s) Oral at bedtime  chlorhexidine 2% Cloths 1 Application(s) Topical daily  cholecalciferol 2000 Unit(s) Oral daily  dextrose 5% + sodium chloride 0.45% with potassium chloride 20 mEq/L 1000 milliLiter(s) (50 mL/Hr) IV Continuous <Continuous>  dextrose 5%. 1000 milliLiter(s) (50 mL/Hr) IV Continuous <Continuous>  dextrose 5%. 1000 milliLiter(s) (50 mL/Hr) IV Continuous <Continuous>  dextrose 5%. 1000 milliLiter(s) (100 mL/Hr) IV Continuous <Continuous>  dextrose 50% Injectable 12.5 Gram(s) IV Push once  dextrose 50% Injectable 25 Gram(s) IV Push once  dextrose 50% Injectable 25 Gram(s) IV Push once  escitalopram 10 milliGRAM(s) Oral daily  folic acid 1 milliGRAM(s) Oral daily  glucagon  Injectable 1 milliGRAM(s) IntraMuscular once  influenza  Vaccine (HIGH DOSE) 0.7 milliLiter(s) IntraMuscular once  insulin lispro (ADMELOG) corrective regimen sliding scale   SubCutaneous three times a day before meals  insulin lispro (ADMELOG) corrective regimen sliding scale   SubCutaneous at bedtime  melatonin 6 milliGRAM(s) Oral at bedtime  pantoprazole    Tablet 40 milliGRAM(s) Oral before breakfast  polyethylene glycol 3350 17 Gram(s) Oral daily  senna 2 Tablet(s) Oral at bedtime  sodium bicarbonate 1300 milliGRAM(s) Oral three times a day    MEDICATIONS  (PRN):  acetaminophen     Tablet .. 650 milliGRAM(s) Oral every 6 hours PRN Temp greater or equal to 38C (100.4F), Mild Pain (1 - 3)  aluminum hydroxide/magnesium hydroxide/simethicone Suspension 30 milliLiter(s) Oral every 4 hours PRN Dyspepsia  dextrose Oral Gel 15 Gram(s) Oral once PRN Blood Glucose LESS THAN 70 milliGRAM(s)/deciliter  heparin   Injectable 4000 Unit(s) IV Push every 6 hours PRN For aPTT less than 40  heparin   Injectable 2000 Unit(s) IV Push every 6 hours PRN For aPTT between 40 - 57  ondansetron Injectable 4 milliGRAM(s) IV Push every 8 hours PRN Nausea and/or Vomiting      CAPILLARY BLOOD GLUCOSE      POCT Blood Glucose.: 74 mg/dL (06 Dec 2023 21:25)  POCT Blood Glucose.: 86 mg/dL (06 Dec 2023 12:14)  POCT Blood Glucose.: 79 mg/dL (06 Dec 2023 08:10)    I&O's Summary      PHYSICAL EXAM:  Vital Signs Last 24 Hrs  T(C): 36.4 (07 Dec 2023 05:13), Max: 36.7 (06 Dec 2023 14:09)  T(F): 97.6 (07 Dec 2023 05:13), Max: 98 (06 Dec 2023 14:09)  HR: 57 (07 Dec 2023 05:13) (50 - 65)  BP: 158/63 (07 Dec 2023 05:13) (125/58 - 195/65)  BP(mean): 65 (06 Dec 2023 14:46) (65 - 65)  RR: 18 (07 Dec 2023 05:13) (10 - 20)  SpO2: 97% (07 Dec 2023 05:13) (95% - 100%)    Parameters below as of 07 Dec 2023 05:13  Patient On (Oxygen Delivery Method): room air      CONSTITUTIONAL: Well-groomed, in no apparent distress  EYES: No conjunctival or scleral injection, non-icteric;   ENMT: No external nasal lesions; MMM  NECK: Trachea midline without palpable neck mass; thyroid not enlarged and non-tender  RESPIRATORY: Breathing comfortably; no dullness to percussion; lungs CTA without wheeze/rhonchi/rales  CARDIOVASCULAR: +S1S2, RRR, no M/G/R; pedal pulses full and symmetric; no lower extremity edema  GASTROINTESTINAL: No palpable masses or tenderness, +BS throughout, no rebound/guarding; no hepatosplenomegaly; no hernia palpated, (+) PEG in place, no overlying erythema  LYMPHATIC: No cervical LAD or tenderness  SKIN: No rashes or ulcers noted  NEUROLOGIC: CN II-XII intact; sensation intact in LEs b/l to light touch  PSYCHIATRIC: A+O x 2; mood and affect appropriate; appropriate insight and judgment    LABS:                        8.8    10.03 )-----------( 161      ( 06 Dec 2023 07:14 )             29.0     12-06    143  |  106  |  39<H>  ----------------------------<  58<L>  3.6   |  24  |  4.30<H>    Ca    9.0      06 Dec 2023 07:12  Phos  3.3     12-06  Mg     1.8     12-06    TPro  5.3<L>  /  Alb  2.9<L>  /  TBili  0.7  /  DBili  x   /  AST  20  /  ALT  15  /  AlkPhos  38<L>  12-06    PT/INR - ( 06 Dec 2023 07:15 )   PT: 12.9 sec;   INR: 1.18 ratio         PTT - ( 06 Dec 2023 07:15 )  PTT:>200.0 sec      Urinalysis Basic - ( 06 Dec 2023 07:12 )    Color: x / Appearance: x / SG: x / pH: x  Gluc: 58 mg/dL / Ketone: x  / Bili: x / Urobili: x   Blood: x / Protein: x / Nitrite: x   Leuk Esterase: x / RBC: x / WBC x   Sq Epi: x / Non Sq Epi: x / Bacteria: x      COORDINATION OF CARE:  Care Discussed with Consultants/Other Providers [Y/N]: Yes  Prior or Outpatient Records Reviewed [Y/N]: Yes

## 2023-12-07 NOTE — PROGRESS NOTE ADULT - PROBLEM SELECTOR PLAN 6
- peptic ulcer disease vs. pancreatitis vs. functional  - no findings on CT AP of pancreatitis, does not have classic history  - never had endoscopy to evaluate  - will trial protonix 40mg qd   - esophogram attempted 12/1 - unable to tolerate; did not participate and follow commands  - family would like to reattempt esophogram - maybe on 12/6  - GI following. Will perform calorie count. Family interested in PEG, would require scope. Continue GOC discussions. - home regimen: coreg 3.125 BID, valsartan-HCTZ 80-12.5 qd, aldactone 50mg qd  - hold antihypertensives d/t orthostatic hypotension

## 2023-12-07 NOTE — PROGRESS NOTE ADULT - PROBLEM SELECTOR PLAN 8
- w/ agitation  - seems to be FAST Stage 6? No official staging outpt noted  - hold home nanzeric in the setting of orthostatic hypotension - controlled w/ diet  - A1c 6.3%  - FS/low ISS  - hypoglycemia protocol in place given poor PO intake

## 2023-12-07 NOTE — PROGRESS NOTE ADULT - ASSESSMENT
89 y/o F pmhx advanced dementia, HTN, HLD, DM2, anemia, CAD (s/p LAD and LCx stent 2010), HFrEF (EF 40-45% 2021), h/o TIA no residual deficits, h/o recurrent UTI presents w/ syncope and fall w/o trauma in the setting of 1 month of poor PO intake, nausea, vomiting, epigastric pain. C/f failure to thrive i/s/o ?PUD? vs. dementia. Syncope likely orthostatic in nature. Plans for PEG tube placement.  89 y/o F pmhx advanced dementia, HTN, HLD, DM2, anemia, CAD (s/p LAD and LCx stent 2010), HFrEF (EF 40-45% 2021), h/o TIA no residual deficits, h/o recurrent UTI presents w/ syncope and fall w/o trauma in the setting of 1 month of poor PO intake, nausea, vomiting, epigastric pain. C/f failure to thrive i/s/o ?PUD? vs. dementia. Syncope likely orthostatic in nature. Plans for PEG tube placement.  89 y/o F pmhx advanced dementia, HTN, HLD, DM2, anemia, CAD (s/p LAD and LCx stent 2010), HFrEF (EF 40-45% 2021), h/o TIA no residual deficits, h/o recurrent UTI presents w/ syncope and fall w/o trauma in the setting of 1 month of poor PO intake, nausea, vomiting, epigastric pain. C/f failure to thrive i/s/o ?PUD? vs. dementia. Syncope likely orthostatic in nature. PEG tube placed by GI due to failure to thrive.

## 2023-12-07 NOTE — PROGRESS NOTE ADULT - PROBLEM SELECTOR PLAN 9
- controlled w/ diet  - A1c 6.3%  - FS/low ISS  - hypoglycemia protocol in place given poor PO intake - home regimen: Plavix 75mg qd, rosuvastatin 40mg qd, Zetia 10mg qd  - Zetia is nonformulary  - intertherapeutic interchange of rosuvastatin 40mg to lipitor 80mg qd    - Hold plavix (last dose 11/30) given possibility of PEG placement

## 2023-12-07 NOTE — PROGRESS NOTE ADULT - ATTENDING COMMENTS
Patient is a 89 year old female, with PMH of advanced dementia, HTN, HLD, DM2, anemia, CAD (s/p LAD and LCx stent 2010), HFrEF (EF 40-45% 2021), h/o TIA no residual deficits, h/o recurrent UTI presents w/ syncope and fall w/o trauma in the setting of 1 month of poor PO intake, nausea, vomiting, epigastric pain.likely combination from prerenal (poor PO intake), ATN from hypotension, and obstructive component     # paroxysmal a fib/tachy- wilber  appreciate ep recs  not a candidate for PPM  d/w family risks v benefits of AC; they opt not to do AC given age and risk of falls  hold off on AV ayaz blocking agents    #Hypertension  uncontrolled  start hydralazine 25mg BID with hold parameters       #LESLIE/ATN  - Cr down trending. encourage PO intake; improving  - electrolytes WNL, normal respiratory status   - monitor BMP Mg Phos daily   - nephrology following, no urgent need for HD   - morales removed 11/30 - passed TOV    #Failure to Thrive  - poor PO intake at home and inpatient.   - CT A/P no acute intraabdominal abnormality   -s/p PEG placement; 12/6- nutrition c/s  - opt MBS  - calorie count failed     dnr/dni trial of NIV

## 2023-12-07 NOTE — PROGRESS NOTE ADULT - PROBLEM SELECTOR PLAN 7
- home regimen: coreg 3.125 BID, valsartan-HCTZ 80-12.5 qd, aldactone 50mg qd  - hold antihypertensives d/t orthostatic hypotension - w/ agitation  - seems to be FAST Stage 6? No official staging outpt noted  - hold home nanzeric in the setting of orthostatic hypotension

## 2023-12-07 NOTE — PROGRESS NOTE ADULT - PROBLEM SELECTOR PLAN 11
- home regimen: coreg 3.125 BID, valsartan-HCTZ 80-12.5 qd, aldactone 50mg qd  - hold given orthostatic hypotension  - Amlodpine 5 mg daily started (patient on Amlodipine 5 mg BID at home) DVT ppx: held AC for PEG placement. To discuss with family utility of further AC.   Diet: pending recs drom nutrition for tube feeds   Dispo: pending medical improvement, PT radha

## 2023-12-07 NOTE — CHART NOTE - NSCHARTNOTEFT_GEN_A_CORE
Brief Interim Note  Patient seen for: calorie count follow up; TF consult     Current Diet:  Diet, NPO after Midnight:      NPO Start Date: 05-Dec-2023,   NPO Start Time: 23:59 (12-05-23)    PO Intake:  calorie count 12/4-6 Follow Up Note  Patient seen for: calorie count follow up; TF consult     Current Diet:  Diet, NPO with Tube Feed:   Tube Feeding Modality: Gastrostomy  Jevity 1.5 Satnam (JEVITY1.5RTH)  Total Volume for 24 Hours (mL): 240  Continuous  Starting Tube Feed Rate {mL per Hour}: 5  Increase Tube Feed Rate by (mL): 5     Every 4 hours  Until Goal Tube Feed Rate (mL per Hour): 10  Tube Feed Duration (in Hours): 24  Tube Feed Start Time: 13:00 (12-07-23)    PO Intake:  calorie count 12/4-6  per calorie count pt refused to eat; and consumed 50% juice at lunch (12/4,12/5) and once at dinner 25% juice (12/5); meeting <25% EER /day     - RN denies pt with noted vomiting, diarrhea, or constipation at this time   - Last BM: 12/6 as per flowsheets; not currently ordered for bowel regimen (miralax)    MEDICATIONS  (STANDING):  atorvastatin 80 milliGRAM(s) Oral at bedtime  chlorhexidine 2% Cloths 1 Application(s) Topical daily  cholecalciferol 2000 Unit(s) Oral daily  dextrose 5%. 1000 milliLiter(s) (50 mL/Hr) IV Continuous <Continuous>  dextrose 5%. 1000 milliLiter(s) (100 mL/Hr) IV Continuous <Continuous>  dextrose 50% Injectable 12.5 Gram(s) IV Push once  dextrose 50% Injectable 25 Gram(s) IV Push once  dextrose 50% Injectable 25 Gram(s) IV Push once  escitalopram 10 milliGRAM(s) Oral daily  folic acid 1 milliGRAM(s) Oral daily  glucagon  Injectable 1 milliGRAM(s) IntraMuscular once  hydrALAZINE 25 milliGRAM(s) Oral two times a day  influenza  Vaccine (HIGH DOSE) 0.7 milliLiter(s) IntraMuscular once  insulin lispro (ADMELOG) corrective regimen sliding scale   SubCutaneous at bedtime  insulin lispro (ADMELOG) corrective regimen sliding scale   SubCutaneous three times a day before meals  melatonin Liquid 6 milliGRAM(s) Oral at bedtime  pantoprazole   Suspension 40 milliGRAM(s) Oral before breakfast  polyethylene glycol 3350 17 Gram(s) Oral daily  potassium chloride  10 mEq/100 mL IVPB 10 milliEquivalent(s) IV Intermittent every 1 hour  sodium bicarbonate 1300 milliGRAM(s) Oral three times a day    MEDICATIONS  (PRN):  acetaminophen     Tablet .. 650 milliGRAM(s) Oral every 6 hours PRN Temp greater or equal to 38C (100.4F), Mild Pain (1 - 3)  aluminum hydroxide/magnesium hydroxide/simethicone Suspension 30 milliLiter(s) Oral every 4 hours PRN Dyspepsia  dextrose Oral Gel 15 Gram(s) Oral once PRN Blood Glucose LESS THAN 70 milliGRAM(s)/deciliter  ondansetron Injectable 4 milliGRAM(s) IV Push every 8 hours PRN Nausea and/or Vomiting    12-07 @ 10:55: Na 142, BUN 36<H>, Cr 3.69<H>, BG 80, K+ 3.1<L>, Phos 4.0, Mg 1.6, Alk Phos 40, ALT/SGPT 12, AST/SGOT 23, HbA1c --    Skin: no noted pressure injuries as per flowsheets   Edema: +1 left ankle; right ankle as per flowsheets     Estimated Needs:  9023-3411 kcal/day (23-28 kcal/kg/day)  59.84-70.72 g/pro/kg (1.1-1.3 g/pro/kg/day)  based on dosing wt 119.9 pounds     Previous Nutrition Diagnosis: Moderate protein calorie malnutrition in context of acute illness  Ongoing [x]     New Nutrition Diagnosis: no    Education: education not warranted/appropriate at this time     Recommendations:   - Consider glucerna 1.5 start at 10 ml/hr increase by 10 ml/hr until goal 40 ml/hr x24 hr to provide: 960 ml, 1440 kcal, 79 g/pro, 729 ml free water. defer water flushes to team.  Monitor GI tolerance. Maintain aspiration precautions, HOB >45 degrees during feeds and for 1 hour after.  RD to remain available to adjust EN formulary, volume/rate PRN.  - Will continue to monitor intake, weight, labs, skin, GI status   - Nutrition care plan to remain consistent with pt goals of care    RD remains available   Kari David, MS, RD, CDN (Teams) Follow Up Note  Patient seen for: calorie count follow up; TF consult     Current Diet:  Diet, NPO with Tube Feed:   Tube Feeding Modality: Gastrostomy  Jevity 1.5 Satnam (JEVITY1.5RTH)  Total Volume for 24 Hours (mL): 240  Continuous  Starting Tube Feed Rate {mL per Hour}: 5  Increase Tube Feed Rate by (mL): 5     Every 4 hours  Until Goal Tube Feed Rate (mL per Hour): 10  Tube Feed Duration (in Hours): 24  Tube Feed Start Time: 13:00 (12-07-23)    PO Intake:  calorie count 12/4-6  per calorie count pt refused to eat; and consumed 50% juice at lunch (12/4,12/5) and once at dinner 25% juice (12/5); meeting <25% EER /day     - RN denies pt with noted vomiting, diarrhea, or constipation at this time   - Last BM: 12/6 as per flowsheets; not currently ordered for bowel regimen (miralax)    MEDICATIONS  (STANDING):  atorvastatin 80 milliGRAM(s) Oral at bedtime  chlorhexidine 2% Cloths 1 Application(s) Topical daily  cholecalciferol 2000 Unit(s) Oral daily  dextrose 5%. 1000 milliLiter(s) (50 mL/Hr) IV Continuous <Continuous>  dextrose 5%. 1000 milliLiter(s) (100 mL/Hr) IV Continuous <Continuous>  dextrose 50% Injectable 12.5 Gram(s) IV Push once  dextrose 50% Injectable 25 Gram(s) IV Push once  dextrose 50% Injectable 25 Gram(s) IV Push once  escitalopram 10 milliGRAM(s) Oral daily  folic acid 1 milliGRAM(s) Oral daily  glucagon  Injectable 1 milliGRAM(s) IntraMuscular once  hydrALAZINE 25 milliGRAM(s) Oral two times a day  influenza  Vaccine (HIGH DOSE) 0.7 milliLiter(s) IntraMuscular once  insulin lispro (ADMELOG) corrective regimen sliding scale   SubCutaneous at bedtime  insulin lispro (ADMELOG) corrective regimen sliding scale   SubCutaneous three times a day before meals  melatonin Liquid 6 milliGRAM(s) Oral at bedtime  pantoprazole   Suspension 40 milliGRAM(s) Oral before breakfast  polyethylene glycol 3350 17 Gram(s) Oral daily  potassium chloride  10 mEq/100 mL IVPB 10 milliEquivalent(s) IV Intermittent every 1 hour  sodium bicarbonate 1300 milliGRAM(s) Oral three times a day    MEDICATIONS  (PRN):  acetaminophen     Tablet .. 650 milliGRAM(s) Oral every 6 hours PRN Temp greater or equal to 38C (100.4F), Mild Pain (1 - 3)  aluminum hydroxide/magnesium hydroxide/simethicone Suspension 30 milliLiter(s) Oral every 4 hours PRN Dyspepsia  dextrose Oral Gel 15 Gram(s) Oral once PRN Blood Glucose LESS THAN 70 milliGRAM(s)/deciliter  ondansetron Injectable 4 milliGRAM(s) IV Push every 8 hours PRN Nausea and/or Vomiting    12-07 @ 10:55: Na 142, BUN 36<H>, Cr 3.69<H>, BG 80, K+ 3.1<L>, Phos 4.0, Mg 1.6, Alk Phos 40, ALT/SGPT 12, AST/SGOT 23, HbA1c --    Skin: no noted pressure injuries as per flowsheets   Edema: +1 left ankle; right ankle as per flowsheets     Estimated Needs:  3644-1849 kcal/day (23-28 kcal/kg/day)  59.84-70.72 g/pro/kg (1.1-1.3 g/pro/kg/day)  based on dosing wt 119.9 pounds     Previous Nutrition Diagnosis: Moderate protein calorie malnutrition in context of acute illness  Ongoing [x]     New Nutrition Diagnosis: no    Education: education not warranted/appropriate at this time     Recommendations:   - Consider glucerna 1.5 start at 10 ml/hr increase by 10 ml/hr until goal 40 ml/hr x24 hr to provide: 960 ml, 1440 kcal, 79 g/pro, 729 ml free water. defer water flushes to team.  Monitor GI tolerance. Maintain aspiration precautions, HOB >45 degrees during feeds and for 1 hour after.  RD to remain available to adjust EN formulary, volume/rate PRN.  - Will continue to monitor intake, weight, labs, skin, GI status   - Nutrition care plan to remain consistent with pt goals of care    RD remains available   Kari David, MS, RD, CDN (Teams) Follow Up Note  Patient seen for: calorie count follow up; TF consult     Current Diet:  Diet, NPO with Tube Feed:   Tube Feeding Modality: Gastrostomy  Jevity 1.5 Satnam (JEVITY1.5RTH)  Total Volume for 24 Hours (mL): 240  Continuous  Starting Tube Feed Rate {mL per Hour}: 5  Increase Tube Feed Rate by (mL): 5     Every 4 hours  Until Goal Tube Feed Rate (mL per Hour): 10  Tube Feed Duration (in Hours): 24  Tube Feed Start Time: 13:00 (12-07-23)    PO Intake:  calorie count 12/4-6  per calorie count pt refused to eat; and consumed 50% juice at lunch (12/4,12/5) and once at dinner 25% juice (12/5); meeting <25% EER /day     - RN denies pt with noted vomiting, diarrhea, or constipation at this time   - Last BM: 12/6 as per flowsheets; not currently ordered for bowel regimen (miralax)    MEDICATIONS  (STANDING):  atorvastatin 80 milliGRAM(s) Oral at bedtime  chlorhexidine 2% Cloths 1 Application(s) Topical daily  cholecalciferol 2000 Unit(s) Oral daily  dextrose 5%. 1000 milliLiter(s) (50 mL/Hr) IV Continuous <Continuous>  dextrose 5%. 1000 milliLiter(s) (100 mL/Hr) IV Continuous <Continuous>  dextrose 50% Injectable 12.5 Gram(s) IV Push once  dextrose 50% Injectable 25 Gram(s) IV Push once  dextrose 50% Injectable 25 Gram(s) IV Push once  escitalopram 10 milliGRAM(s) Oral daily  folic acid 1 milliGRAM(s) Oral daily  glucagon  Injectable 1 milliGRAM(s) IntraMuscular once  hydrALAZINE 25 milliGRAM(s) Oral two times a day  influenza  Vaccine (HIGH DOSE) 0.7 milliLiter(s) IntraMuscular once  insulin lispro (ADMELOG) corrective regimen sliding scale   SubCutaneous at bedtime  insulin lispro (ADMELOG) corrective regimen sliding scale   SubCutaneous three times a day before meals  melatonin Liquid 6 milliGRAM(s) Oral at bedtime  pantoprazole   Suspension 40 milliGRAM(s) Oral before breakfast  polyethylene glycol 3350 17 Gram(s) Oral daily  potassium chloride  10 mEq/100 mL IVPB 10 milliEquivalent(s) IV Intermittent every 1 hour  sodium bicarbonate 1300 milliGRAM(s) Oral three times a day    MEDICATIONS  (PRN):  acetaminophen     Tablet .. 650 milliGRAM(s) Oral every 6 hours PRN Temp greater or equal to 38C (100.4F), Mild Pain (1 - 3)  aluminum hydroxide/magnesium hydroxide/simethicone Suspension 30 milliLiter(s) Oral every 4 hours PRN Dyspepsia  dextrose Oral Gel 15 Gram(s) Oral once PRN Blood Glucose LESS THAN 70 milliGRAM(s)/deciliter  ondansetron Injectable 4 milliGRAM(s) IV Push every 8 hours PRN Nausea and/or Vomiting    12-07 @ 10:55: Na 142, BUN 36<H>, Cr 3.69<H>, BG 80, K+ 3.1<L>, Phos 4.0, Mg 1.6, Alk Phos 40, ALT/SGPT 12, AST/SGOT 23, HbA1c --    Skin: no noted pressure injuries as per flowsheets   Edema: +1 left ankle; right ankle as per flowsheets     Estimated Needs:  2867-1041 kcal/day (23-28 kcal/kg/day)  59.84-70.72 g/pro/kg (1.1-1.3 g/pro/kg/day)  based on dosing wt 119.9 pounds     Previous Nutrition Diagnosis: Moderate protein calorie malnutrition in context of acute illness  Ongoing [x]     New Nutrition Diagnosis: no    Education: education not warranted/appropriate at this time     Recommendations:   - Consider glucerna 1.5 start at 10 ml/hr increase by 10 ml/hr q8hr until goal 40 ml/hr x24 hr to provide: 960 ml, 1440 kcal, 79 g/pro, 729 ml free water. defer water flushes to team.  Monitor GI tolerance. Maintain aspiration precautions, HOB >45 degrees during feeds and for 1 hour after.  RD to remain available to adjust EN formulary, volume/rate PRN.  - Will continue to monitor intake, weight, labs, skin, GI status   - Nutrition care plan to remain consistent with pt goals of care    RD remains available   Kari David, MS, RD, CDN (Teams) Follow Up Note  Patient seen for: calorie count follow up; TF consult     Current Diet:  Diet, NPO with Tube Feed:   Tube Feeding Modality: Gastrostomy  Jevity 1.5 Satnam (JEVITY1.5RTH)  Total Volume for 24 Hours (mL): 240  Continuous  Starting Tube Feed Rate {mL per Hour}: 5  Increase Tube Feed Rate by (mL): 5     Every 4 hours  Until Goal Tube Feed Rate (mL per Hour): 10  Tube Feed Duration (in Hours): 24  Tube Feed Start Time: 13:00 (12-07-23)    PO Intake:  calorie count 12/4-6  per calorie count pt refused to eat; and consumed 50% juice at lunch (12/4,12/5) and once at dinner 25% juice (12/5); meeting <25% EER /day     - RN denies pt with noted vomiting, diarrhea, or constipation at this time   - Last BM: 12/6 as per flowsheets; not currently ordered for bowel regimen (miralax)    MEDICATIONS  (STANDING):  atorvastatin 80 milliGRAM(s) Oral at bedtime  chlorhexidine 2% Cloths 1 Application(s) Topical daily  cholecalciferol 2000 Unit(s) Oral daily  dextrose 5%. 1000 milliLiter(s) (50 mL/Hr) IV Continuous <Continuous>  dextrose 5%. 1000 milliLiter(s) (100 mL/Hr) IV Continuous <Continuous>  dextrose 50% Injectable 12.5 Gram(s) IV Push once  dextrose 50% Injectable 25 Gram(s) IV Push once  dextrose 50% Injectable 25 Gram(s) IV Push once  escitalopram 10 milliGRAM(s) Oral daily  folic acid 1 milliGRAM(s) Oral daily  glucagon  Injectable 1 milliGRAM(s) IntraMuscular once  hydrALAZINE 25 milliGRAM(s) Oral two times a day  influenza  Vaccine (HIGH DOSE) 0.7 milliLiter(s) IntraMuscular once  insulin lispro (ADMELOG) corrective regimen sliding scale   SubCutaneous at bedtime  insulin lispro (ADMELOG) corrective regimen sliding scale   SubCutaneous three times a day before meals  melatonin Liquid 6 milliGRAM(s) Oral at bedtime  pantoprazole   Suspension 40 milliGRAM(s) Oral before breakfast  polyethylene glycol 3350 17 Gram(s) Oral daily  potassium chloride  10 mEq/100 mL IVPB 10 milliEquivalent(s) IV Intermittent every 1 hour  sodium bicarbonate 1300 milliGRAM(s) Oral three times a day    MEDICATIONS  (PRN):  acetaminophen     Tablet .. 650 milliGRAM(s) Oral every 6 hours PRN Temp greater or equal to 38C (100.4F), Mild Pain (1 - 3)  aluminum hydroxide/magnesium hydroxide/simethicone Suspension 30 milliLiter(s) Oral every 4 hours PRN Dyspepsia  dextrose Oral Gel 15 Gram(s) Oral once PRN Blood Glucose LESS THAN 70 milliGRAM(s)/deciliter  ondansetron Injectable 4 milliGRAM(s) IV Push every 8 hours PRN Nausea and/or Vomiting    12-07 @ 10:55: Na 142, BUN 36<H>, Cr 3.69<H>, BG 80, K+ 3.1<L>, Phos 4.0, Mg 1.6, Alk Phos 40, ALT/SGPT 12, AST/SGOT 23, HbA1c --    Skin: no noted pressure injuries as per flowsheets   Edema: +1 left ankle; right ankle as per flowsheets     Estimated Needs:  0997-5211 kcal/day (23-28 kcal/kg/day)  59.84-70.72 g/pro/kg (1.1-1.3 g/pro/kg/day)  based on dosing wt 119.9 pounds     Previous Nutrition Diagnosis: Moderate protein calorie malnutrition in context of acute illness  Ongoing [x]     New Nutrition Diagnosis: no    Education: education not warranted/appropriate at this time     Recommendations:   - Consider glucerna 1.5 start at 10 ml/hr increase by 10 ml/hr q8hr until goal 40 ml/hr x24 hr to provide: 960 ml, 1440 kcal, 79 g/pro, 729 ml free water. defer water flushes to team.  Monitor GI tolerance. Maintain aspiration precautions, HOB >45 degrees during feeds and for 1 hour after.  RD to remain available to adjust EN formulary, volume/rate PRN.  - Will continue to monitor intake, weight, labs, skin, GI status   - Nutrition care plan to remain consistent with pt goals of care    RD remains available   Kari David, MS, RD, CDN (Teams) Follow Up Note  Patient seen for: calorie count follow up; TF consult     Current Diet:  Diet, NPO with Tube Feed:   Tube Feeding Modality: Gastrostomy  Jevity 1.5 Satnam (JEVITY1.5RTH)  Total Volume for 24 Hours (mL): 240  Continuous  Starting Tube Feed Rate {mL per Hour}: 5  Increase Tube Feed Rate by (mL): 5     Every 4 hours  Until Goal Tube Feed Rate (mL per Hour): 10  Tube Feed Duration (in Hours): 24  Tube Feed Start Time: 13:00 (12-07-23)    PO Intake:  calorie count 12/4-6  per calorie count pt refused to eat; and consumed 50% juice at lunch (12/4,12/5) and once at dinner 25% juice (12/5); meeting <25% EER /day     - RN denies pt with noted vomiting, diarrhea, or constipation at this time   - Last BM: 12/6 as per flowsheets; not currently ordered for bowel regimen (miralax)    MEDICATIONS  (STANDING):  atorvastatin 80 milliGRAM(s) Oral at bedtime  chlorhexidine 2% Cloths 1 Application(s) Topical daily  cholecalciferol 2000 Unit(s) Oral daily  dextrose 5%. 1000 milliLiter(s) (50 mL/Hr) IV Continuous <Continuous>  dextrose 5%. 1000 milliLiter(s) (100 mL/Hr) IV Continuous <Continuous>  dextrose 50% Injectable 12.5 Gram(s) IV Push once  dextrose 50% Injectable 25 Gram(s) IV Push once  dextrose 50% Injectable 25 Gram(s) IV Push once  escitalopram 10 milliGRAM(s) Oral daily  folic acid 1 milliGRAM(s) Oral daily  glucagon  Injectable 1 milliGRAM(s) IntraMuscular once  hydrALAZINE 25 milliGRAM(s) Oral two times a day  influenza  Vaccine (HIGH DOSE) 0.7 milliLiter(s) IntraMuscular once  insulin lispro (ADMELOG) corrective regimen sliding scale   SubCutaneous at bedtime  insulin lispro (ADMELOG) corrective regimen sliding scale   SubCutaneous three times a day before meals  melatonin Liquid 6 milliGRAM(s) Oral at bedtime  pantoprazole   Suspension 40 milliGRAM(s) Oral before breakfast  polyethylene glycol 3350 17 Gram(s) Oral daily  potassium chloride  10 mEq/100 mL IVPB 10 milliEquivalent(s) IV Intermittent every 1 hour  sodium bicarbonate 1300 milliGRAM(s) Oral three times a day    MEDICATIONS  (PRN):  acetaminophen     Tablet .. 650 milliGRAM(s) Oral every 6 hours PRN Temp greater or equal to 38C (100.4F), Mild Pain (1 - 3)  aluminum hydroxide/magnesium hydroxide/simethicone Suspension 30 milliLiter(s) Oral every 4 hours PRN Dyspepsia  dextrose Oral Gel 15 Gram(s) Oral once PRN Blood Glucose LESS THAN 70 milliGRAM(s)/deciliter  ondansetron Injectable 4 milliGRAM(s) IV Push every 8 hours PRN Nausea and/or Vomiting    12-07 @ 10:55: Na 142, BUN 36<H>, Cr 3.69<H>, BG 80, K+ 3.1<L>, Phos 4.0, Mg 1.6, Alk Phos 40, ALT/SGPT 12, AST/SGOT 23, HbA1c --    Skin: no noted pressure injuries as per flowsheets   Edema: +1 left ankle; right ankle as per flowsheets     Estimated Needs:  5914-3687 kcal/day (23-28 kcal/kg/day)  59.84-70.72 g/pro/kg (1.1-1.3 g/pro/kg/day)  based on dosing wt 119.9 pounds     Previous Nutrition Diagnosis: Moderate protein calorie malnutrition in context of acute illness  Ongoing [x]     New Nutrition Diagnosis: no    Education: education not warranted/appropriate at this time     Recommendations:   - Consider glucerna 1.5 start at 10 ml/hr increase by 10 ml/hr q8hr until goal 40 ml/hr x24 hr to provide: 960 ml, 1440 kcal (27 kcal/kg), 79 g/pro (1.5 g/pro/kg), 729 ml free water. defer water flushes to team.  Monitor GI tolerance. Maintain aspiration precautions, HOB >45 degrees during feeds and for 1 hour after.  RD to remain available to adjust EN formulary, volume/rate PRN.  - Will continue to monitor intake, weight, labs, skin, GI status   - Nutrition care plan to remain consistent with pt goals of care    RD remains available   Kari David, MS, RD, CDN (Teams) Follow Up Note  Patient seen for: calorie count follow up; TF consult     Current Diet:  Diet, NPO with Tube Feed:   Tube Feeding Modality: Gastrostomy  Jevity 1.5 Satnam (JEVITY1.5RTH)  Total Volume for 24 Hours (mL): 240  Continuous  Starting Tube Feed Rate {mL per Hour}: 5  Increase Tube Feed Rate by (mL): 5     Every 4 hours  Until Goal Tube Feed Rate (mL per Hour): 10  Tube Feed Duration (in Hours): 24  Tube Feed Start Time: 13:00 (12-07-23)    PO Intake:  calorie count 12/4-6  per calorie count pt refused to eat; and consumed 50% juice at lunch (12/4,12/5) and once at dinner 25% juice (12/5); meeting <25% EER /day     - RN denies pt with noted vomiting, diarrhea, or constipation at this time   - Last BM: 12/6 as per flowsheets; not currently ordered for bowel regimen (miralax)    MEDICATIONS  (STANDING):  atorvastatin 80 milliGRAM(s) Oral at bedtime  chlorhexidine 2% Cloths 1 Application(s) Topical daily  cholecalciferol 2000 Unit(s) Oral daily  dextrose 5%. 1000 milliLiter(s) (50 mL/Hr) IV Continuous <Continuous>  dextrose 5%. 1000 milliLiter(s) (100 mL/Hr) IV Continuous <Continuous>  dextrose 50% Injectable 12.5 Gram(s) IV Push once  dextrose 50% Injectable 25 Gram(s) IV Push once  dextrose 50% Injectable 25 Gram(s) IV Push once  escitalopram 10 milliGRAM(s) Oral daily  folic acid 1 milliGRAM(s) Oral daily  glucagon  Injectable 1 milliGRAM(s) IntraMuscular once  hydrALAZINE 25 milliGRAM(s) Oral two times a day  influenza  Vaccine (HIGH DOSE) 0.7 milliLiter(s) IntraMuscular once  insulin lispro (ADMELOG) corrective regimen sliding scale   SubCutaneous at bedtime  insulin lispro (ADMELOG) corrective regimen sliding scale   SubCutaneous three times a day before meals  melatonin Liquid 6 milliGRAM(s) Oral at bedtime  pantoprazole   Suspension 40 milliGRAM(s) Oral before breakfast  polyethylene glycol 3350 17 Gram(s) Oral daily  potassium chloride  10 mEq/100 mL IVPB 10 milliEquivalent(s) IV Intermittent every 1 hour  sodium bicarbonate 1300 milliGRAM(s) Oral three times a day    MEDICATIONS  (PRN):  acetaminophen     Tablet .. 650 milliGRAM(s) Oral every 6 hours PRN Temp greater or equal to 38C (100.4F), Mild Pain (1 - 3)  aluminum hydroxide/magnesium hydroxide/simethicone Suspension 30 milliLiter(s) Oral every 4 hours PRN Dyspepsia  dextrose Oral Gel 15 Gram(s) Oral once PRN Blood Glucose LESS THAN 70 milliGRAM(s)/deciliter  ondansetron Injectable 4 milliGRAM(s) IV Push every 8 hours PRN Nausea and/or Vomiting    12-07 @ 10:55: Na 142, BUN 36<H>, Cr 3.69<H>, BG 80, K+ 3.1<L>, Phos 4.0, Mg 1.6, Alk Phos 40, ALT/SGPT 12, AST/SGOT 23, HbA1c --    Skin: no noted pressure injuries as per flowsheets   Edema: +1 left ankle; right ankle as per flowsheets     Estimated Needs:  0370-3195 kcal/day (23-28 kcal/kg/day)  59.84-70.72 g/pro/kg (1.1-1.3 g/pro/kg/day)  based on dosing wt 119.9 pounds     Previous Nutrition Diagnosis: Moderate protein calorie malnutrition in context of acute illness  Ongoing [x]     New Nutrition Diagnosis: no    Education: education not warranted/appropriate at this time     Recommendations:   - Consider glucerna 1.5 start at 10 ml/hr increase by 10 ml/hr q8hr until goal 40 ml/hr x24 hr to provide: 960 ml, 1440 kcal (27 kcal/kg), 79 g/pro (1.5 g/pro/kg), 729 ml free water. defer water flushes to team.  Monitor GI tolerance. Maintain aspiration precautions, HOB >45 degrees during feeds and for 1 hour after.  RD to remain available to adjust EN formulary, volume/rate PRN.  - Will continue to monitor intake, weight, labs, skin, GI status   - Nutrition care plan to remain consistent with pt goals of care    RD remains available   Kari David, MS, RD, CDN (Teams)

## 2023-12-08 LAB
ANION GAP SERPL CALC-SCNC: 10 MMOL/L — SIGNIFICANT CHANGE UP (ref 5–17)
ANION GAP SERPL CALC-SCNC: 10 MMOL/L — SIGNIFICANT CHANGE UP (ref 5–17)
BUN SERPL-MCNC: 36 MG/DL — HIGH (ref 7–23)
BUN SERPL-MCNC: 36 MG/DL — HIGH (ref 7–23)
CALCIUM SERPL-MCNC: 8.6 MG/DL — SIGNIFICANT CHANGE UP (ref 8.4–10.5)
CALCIUM SERPL-MCNC: 8.6 MG/DL — SIGNIFICANT CHANGE UP (ref 8.4–10.5)
CHLORIDE SERPL-SCNC: 107 MMOL/L — SIGNIFICANT CHANGE UP (ref 96–108)
CHLORIDE SERPL-SCNC: 107 MMOL/L — SIGNIFICANT CHANGE UP (ref 96–108)
CO2 SERPL-SCNC: 20 MMOL/L — LOW (ref 22–31)
CO2 SERPL-SCNC: 20 MMOL/L — LOW (ref 22–31)
CREAT SERPL-MCNC: 3.31 MG/DL — HIGH (ref 0.5–1.3)
CREAT SERPL-MCNC: 3.31 MG/DL — HIGH (ref 0.5–1.3)
EGFR: 13 ML/MIN/1.73M2 — LOW
EGFR: 13 ML/MIN/1.73M2 — LOW
GLUCOSE BLDC GLUCOMTR-MCNC: 106 MG/DL — HIGH (ref 70–99)
GLUCOSE BLDC GLUCOMTR-MCNC: 106 MG/DL — HIGH (ref 70–99)
GLUCOSE BLDC GLUCOMTR-MCNC: 109 MG/DL — HIGH (ref 70–99)
GLUCOSE BLDC GLUCOMTR-MCNC: 109 MG/DL — HIGH (ref 70–99)
GLUCOSE BLDC GLUCOMTR-MCNC: 113 MG/DL — HIGH (ref 70–99)
GLUCOSE BLDC GLUCOMTR-MCNC: 113 MG/DL — HIGH (ref 70–99)
GLUCOSE BLDC GLUCOMTR-MCNC: 133 MG/DL — HIGH (ref 70–99)
GLUCOSE BLDC GLUCOMTR-MCNC: 133 MG/DL — HIGH (ref 70–99)
GLUCOSE BLDC GLUCOMTR-MCNC: 143 MG/DL — HIGH (ref 70–99)
GLUCOSE BLDC GLUCOMTR-MCNC: 143 MG/DL — HIGH (ref 70–99)
GLUCOSE SERPL-MCNC: 121 MG/DL — HIGH (ref 70–99)
GLUCOSE SERPL-MCNC: 121 MG/DL — HIGH (ref 70–99)
HCT VFR BLD CALC: 25.6 % — LOW (ref 34.5–45)
HCT VFR BLD CALC: 25.6 % — LOW (ref 34.5–45)
HGB BLD-MCNC: 8.3 G/DL — LOW (ref 11.5–15.5)
HGB BLD-MCNC: 8.3 G/DL — LOW (ref 11.5–15.5)
MAGNESIUM SERPL-MCNC: 1.7 MG/DL — SIGNIFICANT CHANGE UP (ref 1.6–2.6)
MAGNESIUM SERPL-MCNC: 1.7 MG/DL — SIGNIFICANT CHANGE UP (ref 1.6–2.6)
MCHC RBC-ENTMCNC: 26.3 PG — LOW (ref 27–34)
MCHC RBC-ENTMCNC: 26.3 PG — LOW (ref 27–34)
MCHC RBC-ENTMCNC: 32.4 GM/DL — SIGNIFICANT CHANGE UP (ref 32–36)
MCHC RBC-ENTMCNC: 32.4 GM/DL — SIGNIFICANT CHANGE UP (ref 32–36)
MCV RBC AUTO: 81 FL — SIGNIFICANT CHANGE UP (ref 80–100)
MCV RBC AUTO: 81 FL — SIGNIFICANT CHANGE UP (ref 80–100)
NRBC # BLD: 0 /100 WBCS — SIGNIFICANT CHANGE UP (ref 0–0)
NRBC # BLD: 0 /100 WBCS — SIGNIFICANT CHANGE UP (ref 0–0)
PHOSPHATE SERPL-MCNC: 2.9 MG/DL — SIGNIFICANT CHANGE UP (ref 2.5–4.5)
PHOSPHATE SERPL-MCNC: 2.9 MG/DL — SIGNIFICANT CHANGE UP (ref 2.5–4.5)
PLATELET # BLD AUTO: 110 K/UL — LOW (ref 150–400)
PLATELET # BLD AUTO: 110 K/UL — LOW (ref 150–400)
POTASSIUM SERPL-MCNC: 3.5 MMOL/L — SIGNIFICANT CHANGE UP (ref 3.5–5.3)
POTASSIUM SERPL-MCNC: 3.5 MMOL/L — SIGNIFICANT CHANGE UP (ref 3.5–5.3)
POTASSIUM SERPL-SCNC: 3.5 MMOL/L — SIGNIFICANT CHANGE UP (ref 3.5–5.3)
POTASSIUM SERPL-SCNC: 3.5 MMOL/L — SIGNIFICANT CHANGE UP (ref 3.5–5.3)
RBC # BLD: 3.16 M/UL — LOW (ref 3.8–5.2)
RBC # BLD: 3.16 M/UL — LOW (ref 3.8–5.2)
RBC # FLD: 15.6 % — HIGH (ref 10.3–14.5)
RBC # FLD: 15.6 % — HIGH (ref 10.3–14.5)
SODIUM SERPL-SCNC: 137 MMOL/L — SIGNIFICANT CHANGE UP (ref 135–145)
SODIUM SERPL-SCNC: 137 MMOL/L — SIGNIFICANT CHANGE UP (ref 135–145)
WBC # BLD: 9.39 K/UL — SIGNIFICANT CHANGE UP (ref 3.8–10.5)
WBC # BLD: 9.39 K/UL — SIGNIFICANT CHANGE UP (ref 3.8–10.5)
WBC # FLD AUTO: 9.39 K/UL — SIGNIFICANT CHANGE UP (ref 3.8–10.5)
WBC # FLD AUTO: 9.39 K/UL — SIGNIFICANT CHANGE UP (ref 3.8–10.5)

## 2023-12-08 PROCEDURE — 99232 SBSQ HOSP IP/OBS MODERATE 35: CPT | Mod: GC

## 2023-12-08 PROCEDURE — 99233 SBSQ HOSP IP/OBS HIGH 50: CPT

## 2023-12-08 RX ORDER — CLOPIDOGREL BISULFATE 75 MG/1
75 TABLET, FILM COATED ORAL DAILY
Refills: 0 | Status: DISCONTINUED | OUTPATIENT
Start: 2023-12-09 | End: 2023-12-12

## 2023-12-08 RX ADMIN — PANTOPRAZOLE SODIUM 40 MILLIGRAM(S): 20 TABLET, DELAYED RELEASE ORAL at 05:41

## 2023-12-08 RX ADMIN — Medication 1300 MILLIGRAM(S): at 05:38

## 2023-12-08 RX ADMIN — POLYETHYLENE GLYCOL 3350 17 GRAM(S): 17 POWDER, FOR SOLUTION ORAL at 11:25

## 2023-12-08 RX ADMIN — Medication 2000 UNIT(S): at 11:26

## 2023-12-08 RX ADMIN — Medication 6 MILLIGRAM(S): at 22:00

## 2023-12-08 RX ADMIN — CHLORHEXIDINE GLUCONATE 1 APPLICATION(S): 213 SOLUTION TOPICAL at 11:41

## 2023-12-08 RX ADMIN — AMLODIPINE BESYLATE 5 MILLIGRAM(S): 2.5 TABLET ORAL at 05:38

## 2023-12-08 RX ADMIN — Medication 1300 MILLIGRAM(S): at 11:25

## 2023-12-08 RX ADMIN — Medication 1300 MILLIGRAM(S): at 22:00

## 2023-12-08 RX ADMIN — Medication 1 MILLIGRAM(S): at 11:26

## 2023-12-08 RX ADMIN — ESCITALOPRAM OXALATE 10 MILLIGRAM(S): 10 TABLET, FILM COATED ORAL at 11:26

## 2023-12-08 RX ADMIN — ATORVASTATIN CALCIUM 80 MILLIGRAM(S): 80 TABLET, FILM COATED ORAL at 22:00

## 2023-12-08 NOTE — PROGRESS NOTE ADULT - ASSESSMENT
89 y/o F pmhx advanced dementia, HTN, HLD, DM2, anemia, CAD (s/p LAD and LCx stent 2010), HFrEF (EF 40-45% 2021), h/o TIA no residual deficits, h/o recurrent UTI presents w/ syncope and fall w/o trauma in the setting of 1 month of poor PO intake, nausea, vomiting, epigastric pain. C/f failure to thrive i/s/o ?PUD? vs. dementia. Syncope likely orthostatic in nature. PEG tube placed by GI due to failure to thrive.

## 2023-12-08 NOTE — PROGRESS NOTE ADULT - PROBLEM SELECTOR PLAN 2
Pt with h/o HTN, BP trend elevated. Please increase home norvasc to 10mg qd. Monitor VS    Final recommendations pending attending signature.  If you have any questions, please feel free to contact me  Kiana Encarnacion  Nephrology Fellow  [place]/Page [phone]  (After 5pm or on weekends please page the on-call fellow) Pt with h/o HTN, BP trend elevated. Please increase home norvasc to 10mg qd. Monitor VS    Final recommendations pending attending signature.  If you have any questions, please feel free to contact me  Kiana Encarnacion  Nephrology Fellow  [place]/Page [phone]  (After 5pm or on weekends please page the on-call fellow)

## 2023-12-08 NOTE — PROGRESS NOTE ADULT - PROBLEM SELECTOR PLAN 11
DVT ppx: held AC for PEG placement. no further A/C per previous discussion with family   Diet: uptitrating tube feeds  Dispo: pending medical improvement, rosette

## 2023-12-08 NOTE — PROGRESS NOTE ADULT - PROBLEM SELECTOR PLAN 10
- home regimen: coreg 3.125 BID, valsartan-HCTZ 80-12.5 qd, aldactone 50mg qd  - hold given orthostatic hypotension  - Amlodpine 5 mg daily started (patient on Amlodipine 5 mg BID at home)

## 2023-12-08 NOTE — PROGRESS NOTE ADULT - PROBLEM SELECTOR PLAN 1
LESLIE/ATN 2/2 hypotension, volume depletion and retention. Baseline Cr ~1; admission Cr 7.0 (11/26), peaked to 7.14 (11/27) and Scr elevated/improved to 3.69. Labs pending today. Renal function slowly improving, c/w conservative management. s/p PEG 12/6, GI and nutrition following. Monitor labs and urine output. Avoid nephrotoxins. Dose medications as per eGFR.

## 2023-12-08 NOTE — PROGRESS NOTE ADULT - PROBLEM SELECTOR PLAN 9
- home regimen: Plavix 75mg qd, rosuvastatin 40mg qd, Zetia 10mg qd  - Zetia is nonformulary  - intertherapeutic interchange of rosuvastatin 40mg to lipitor 80mg qd    - Held plavix (last dose 11/30), hold for 48 hours after PEG placement, to resume tomorrow

## 2023-12-08 NOTE — PROGRESS NOTE ADULT - SUBJECTIVE AND OBJECTIVE BOX
Karlene Bravo MD  Division of Hospital Medicine  Reachable on MS Teams    PROGRESS NOTE:     Patient is a 89y old  Female who presents with a chief complaint of fall, LESLIE (08 Dec 2023 12:52)      SUBJECTIVE / OVERNIGHT EVENTS: No acute events overnight, seen this AM, resting in bed, no complaints. Denies chest pain, shortness of breath, abdominal pain.    ADDITIONAL REVIEW OF SYSTEMS:    MEDICATIONS  (STANDING):  amLODIPine   Tablet 5 milliGRAM(s) Oral daily  atorvastatin 80 milliGRAM(s) Oral at bedtime  chlorhexidine 2% Cloths 1 Application(s) Topical daily  cholecalciferol 2000 Unit(s) Oral daily  dextrose 5%. 1000 milliLiter(s) (50 mL/Hr) IV Continuous <Continuous>  dextrose 5%. 1000 milliLiter(s) (100 mL/Hr) IV Continuous <Continuous>  dextrose 50% Injectable 12.5 Gram(s) IV Push once  dextrose 50% Injectable 25 Gram(s) IV Push once  dextrose 50% Injectable 25 Gram(s) IV Push once  escitalopram 10 milliGRAM(s) Oral daily  folic acid 1 milliGRAM(s) Oral daily  glucagon  Injectable 1 milliGRAM(s) IntraMuscular once  influenza  Vaccine (HIGH DOSE) 0.7 milliLiter(s) IntraMuscular once  insulin lispro (ADMELOG) corrective regimen sliding scale   SubCutaneous at bedtime  insulin lispro (ADMELOG) corrective regimen sliding scale   SubCutaneous three times a day before meals  melatonin Liquid 6 milliGRAM(s) Oral at bedtime  pantoprazole   Suspension 40 milliGRAM(s) Oral before breakfast  polyethylene glycol 3350 17 Gram(s) Oral daily  sodium bicarbonate 1300 milliGRAM(s) Oral three times a day    MEDICATIONS  (PRN):  acetaminophen     Tablet .. 650 milliGRAM(s) Oral every 6 hours PRN Temp greater or equal to 38C (100.4F), Mild Pain (1 - 3)  aluminum hydroxide/magnesium hydroxide/simethicone Suspension 30 milliLiter(s) Oral every 4 hours PRN Dyspepsia  dextrose Oral Gel 15 Gram(s) Oral once PRN Blood Glucose LESS THAN 70 milliGRAM(s)/deciliter  ondansetron Injectable 4 milliGRAM(s) IV Push every 8 hours PRN Nausea and/or Vomiting      CAPILLARY BLOOD GLUCOSE      POCT Blood Glucose.: 133 mg/dL (08 Dec 2023 11:29)  POCT Blood Glucose.: 109 mg/dL (08 Dec 2023 07:38)  POCT Blood Glucose.: 97 mg/dL (07 Dec 2023 21:39)  POCT Blood Glucose.: 84 mg/dL (07 Dec 2023 16:29)    I&O's Summary    07 Dec 2023 07:01  -  08 Dec 2023 07:00  --------------------------------------------------------  IN: 600 mL / OUT: 0 mL / NET: 600 mL        PHYSICAL EXAM:  Vital Signs Last 24 Hrs  T(C): 36.3 (08 Dec 2023 11:01), Max: 36.5 (07 Dec 2023 21:33)  T(F): 97.3 (08 Dec 2023 11:01), Max: 97.7 (07 Dec 2023 21:33)  HR: 62 (08 Dec 2023 11:01) (62 - 65)  BP: 146/64 (08 Dec 2023 11:01) (146/64 - 165/64)  BP(mean): --  RR: 18 (08 Dec 2023 11:01) (18 - 18)  SpO2: 99% (08 Dec 2023 11:01) (97% - 99%)    Parameters below as of 08 Dec 2023 11:01  Patient On (Oxygen Delivery Method): room air        CONSTITUTIONAL: NAD, well-developed  RESPIRATORY: Normal respiratory effort; lungs are clear to auscultation bilaterally  CARDIOVASCULAR: Regular rate and rhythm, normal S1 and S2, no murmur/rub/gallop; No lower extremity edema; Peripheral pulses are 2+ bilaterally  ABDOMEN: Nontender to palpation, normoactive bowel sounds, no rebound/guarding; No hepatosplenomegaly, PEG in place with abdominal binder  MUSCLOSKELETAL: no clubbing or cyanosis of digits; no joint swelling or tenderness to palpation  PSYCH: A+O to person, place, affect appropriate    LABS:                        8.3    9.39  )-----------( 110      ( 08 Dec 2023 14:14 )             25.6     12-08    137  |  107  |  36<H>  ----------------------------<  121<H>  3.5   |  20<L>  |  3.31<H>    Ca    8.6      08 Dec 2023 11:58  Phos  2.9     12-08  Mg     1.7     12-08    TPro  5.1<L>  /  Alb  2.9<L>  /  TBili  0.6  /  DBili  x   /  AST  23  /  ALT  12  /  AlkPhos  40  12-07          Urinalysis Basic - ( 08 Dec 2023 11:58 )    Color: x / Appearance: x / SG: x / pH: x  Gluc: 121 mg/dL / Ketone: x  / Bili: x / Urobili: x   Blood: x / Protein: x / Nitrite: x   Leuk Esterase: x / RBC: x / WBC x   Sq Epi: x / Non Sq Epi: x / Bacteria: x          RADIOLOGY & ADDITIONAL TESTS:  Results Reviewed:   Imaging Personally Reviewed:  Electrocardiogram Personally Reviewed:    COORDINATION OF CARE:  Care Discussed with Consultants/Other Providers [Y/N]:  Prior or Outpatient Records Reviewed [Y/N]:

## 2023-12-08 NOTE — PROGRESS NOTE ADULT - SUBJECTIVE AND OBJECTIVE BOX
OK. Do a PA then.   [facility] Division of Kidney Diseases & Hypertension  FOLLOW UP NOTE  [phone]--------------------------------------------------------------------------------  Chief Complaint:Acute renal failure    24 hour events/subjective:   s/p PEG 12/6  Pt seen and evaluated bedside this morning. Reports feeling well, endorses no complaints. Denies any headaches, nausea, vomiting, fevers/chills, chest pain, palpitations, SOB, abdominal pain, and leg swelling.    PAST HISTORY  --------------------------------------------------------------------------------  No significant changes to PMH, PSH, FHx, SHx, unless otherwise noted    ALLERGIES & MEDICATIONS  --------------------------------------------------------------------------------  Allergies  aspirin (Anaphylaxis)  Celebrex (Rash)  penicillin (Anaphylaxis)  Intolerances  Standing Inpatient Medications  amLODIPine   Tablet 5 milliGRAM(s) Oral daily  atorvastatin 80 milliGRAM(s) Oral at bedtime  chlorhexidine 2% Cloths 1 Application(s) Topical daily  cholecalciferol 2000 Unit(s) Oral daily  dextrose 5%. 1000 milliLiter(s) IV Continuous <Continuous>  dextrose 5%. 1000 milliLiter(s) IV Continuous <Continuous>  dextrose 50% Injectable 12.5 Gram(s) IV Push once  dextrose 50% Injectable 25 Gram(s) IV Push once  dextrose 50% Injectable 25 Gram(s) IV Push once  escitalopram 10 milliGRAM(s) Oral daily  folic acid 1 milliGRAM(s) Oral daily  glucagon  Injectable 1 milliGRAM(s) IntraMuscular once  influenza  Vaccine (HIGH DOSE) 0.7 milliLiter(s) IntraMuscular once  insulin lispro (ADMELOG) corrective regimen sliding scale   SubCutaneous three times a day before meals  insulin lispro (ADMELOG) corrective regimen sliding scale   SubCutaneous at bedtime  melatonin Liquid 6 milliGRAM(s) Oral at bedtime  pantoprazole   Suspension 40 milliGRAM(s) Oral before breakfast  polyethylene glycol 3350 17 Gram(s) Oral daily  sodium bicarbonate 1300 milliGRAM(s) Oral three times a day    PRN Inpatient Medications  acetaminophen     Tablet .. 650 milliGRAM(s) Oral every 6 hours PRN  aluminum hydroxide/magnesium hydroxide/simethicone Suspension 30 milliLiter(s) Oral every 4 hours PRN  dextrose Oral Gel 15 Gram(s) Oral once PRN  ondansetron Injectable 4 milliGRAM(s) IV Push every 8 hours PRN    REVIEW OF SYSTEMS  --------------------------------------------------------------------------------  as above    VITALS/PHYSICAL EXAM  --------------------------------------------------------------------------------  T(C): 36.3 (12-08-23 @ 11:01), Max: 36.5 (12-07-23 @ 21:33)  HR: 62 (12-08-23 @ 11:01) (60 - 65)  BP: 146/64 (12-08-23 @ 11:01) (146/64 - 171/51)  RR: 18 (12-08-23 @ 11:01) (18 - 18)  SpO2: 99% (12-08-23 @ 11:01) (97% - 100%)  Wt(kg): --  Height (cm): 152.4 (12-06-23 @ 14:46)  Weight (kg): 54.4 (12-06-23 @ 14:46)  BMI (kg/m2): 23.4 (12-06-23 @ 14:46)  BSA (m2): 1.5 (12-06-23 @ 14:46)    12-07-23 @ 07:01  -  12-08-23 @ 07:00  --------------------------------------------------------  IN: 600 mL / OUT: 0 mL / NET: 600 mL    Physical Exam:  	Gen: NAD  	HEENT: supple neck, clear oropharynx  	Pulm: CTA B/L  	CV: RRR, S1S2; no rub  	Abd: soft, nontender/nondistended  	UE: Warm, no edema; no asterixis  	LE: Warm, no edema    LABS/STUDIES  --------------------------------------------------------------------------------              8.4    10.72 >-----------<  139      [12-07-23 @ 10:55]              26.1     142  |  105  |  36  ----------------------------<  80      [12-07-23 @ 10:55]  3.1   |  23  |  3.69        Ca     8.3     [12-07-23 @ 10:55]      Mg     1.6     [12-07-23 @ 10:55]      Phos  4.0     [12-07-23 @ 10:55]    TPro  5.1  /  Alb  2.9  /  TBili  0.6  /  DBili  x   /  AST  23  /  ALT  12  /  AlkPhos  40  [12-07-23 @ 10:55]    Creatinine Trend:  SCr 3.69 [12-07 @ 10:55]  SCr 4.30 [12-06 @ 07:12]  SCr 4.88 [12-05 @ 07:19]  SCr 5.02 [12-04 @ 07:06]  SCr 5.63 [12-03 @ 06:12]   [facility] Division of Kidney Diseases & Hypertension  FOLLOW UP NOTE  [phone]--------------------------------------------------------------------------------  Chief Complaint:Acute renal failure    24 hour events/subjective:   s/p PEG 12/6  Pt seen and evaluated bedside this morning. Reports feeling well, endorses no complaints. Denies any headaches, nausea, vomiting, fevers/chills, chest pain, palpitations, SOB, abdominal pain, and leg swelling.    PAST HISTORY  --------------------------------------------------------------------------------  No significant changes to PMH, PSH, FHx, SHx, unless otherwise noted    ALLERGIES & MEDICATIONS  --------------------------------------------------------------------------------  Allergies  aspirin (Anaphylaxis)  Celebrex (Rash)  penicillin (Anaphylaxis)  Intolerances  Standing Inpatient Medications  amLODIPine   Tablet 5 milliGRAM(s) Oral daily  atorvastatin 80 milliGRAM(s) Oral at bedtime  chlorhexidine 2% Cloths 1 Application(s) Topical daily  cholecalciferol 2000 Unit(s) Oral daily  dextrose 5%. 1000 milliLiter(s) IV Continuous <Continuous>  dextrose 5%. 1000 milliLiter(s) IV Continuous <Continuous>  dextrose 50% Injectable 12.5 Gram(s) IV Push once  dextrose 50% Injectable 25 Gram(s) IV Push once  dextrose 50% Injectable 25 Gram(s) IV Push once  escitalopram 10 milliGRAM(s) Oral daily  folic acid 1 milliGRAM(s) Oral daily  glucagon  Injectable 1 milliGRAM(s) IntraMuscular once  influenza  Vaccine (HIGH DOSE) 0.7 milliLiter(s) IntraMuscular once  insulin lispro (ADMELOG) corrective regimen sliding scale   SubCutaneous three times a day before meals  insulin lispro (ADMELOG) corrective regimen sliding scale   SubCutaneous at bedtime  melatonin Liquid 6 milliGRAM(s) Oral at bedtime  pantoprazole   Suspension 40 milliGRAM(s) Oral before breakfast  polyethylene glycol 3350 17 Gram(s) Oral daily  sodium bicarbonate 1300 milliGRAM(s) Oral three times a day    PRN Inpatient Medications  acetaminophen     Tablet .. 650 milliGRAM(s) Oral every 6 hours PRN  aluminum hydroxide/magnesium hydroxide/simethicone Suspension 30 milliLiter(s) Oral every 4 hours PRN  dextrose Oral Gel 15 Gram(s) Oral once PRN  ondansetron Injectable 4 milliGRAM(s) IV Push every 8 hours PRN    REVIEW OF SYSTEMS  --------------------------------------------------------------------------------  as above    VITALS/PHYSICAL EXAM  --------------------------------------------------------------------------------  T(C): 36.3 (12-08-23 @ 11:01), Max: 36.5 (12-07-23 @ 21:33)  HR: 62 (12-08-23 @ 11:01) (60 - 65)  BP: 146/64 (12-08-23 @ 11:01) (146/64 - 171/51)  RR: 18 (12-08-23 @ 11:01) (18 - 18)  SpO2: 99% (12-08-23 @ 11:01) (97% - 100%)  Wt(kg): --  Height (cm): 152.4 (12-06-23 @ 14:46)  Weight (kg): 54.4 (12-06-23 @ 14:46)  BMI (kg/m2): 23.4 (12-06-23 @ 14:46)  BSA (m2): 1.5 (12-06-23 @ 14:46)    12-07-23 @ 07:01  -  12-08-23 @ 07:00  --------------------------------------------------------  IN: 600 mL / OUT: 0 mL / NET: 600 mL    Physical Exam:  	Gen: NAD  	HEENT: supple neck, clear oropharynx  	Pulm: CTA B/L  	CV: RRR, S1S2; no rub  	Abd: soft, nontender/nondistended  	UE: Warm, no edema; no asterixis  	LE: Warm, no edema    LABS/STUDIES  --------------------------------------------------------------------------------              8.4    10.72 >-----------<  139      [12-07-23 @ 10:55]              26.1     142  |  105  |  36  ----------------------------<  80      [12-07-23 @ 10:55]  3.1   |  23  |  3.69        Ca     8.3     [12-07-23 @ 10:55]      Mg     1.6     [12-07-23 @ 10:55]      Phos  4.0     [12-07-23 @ 10:55]    TPro  5.1  /  Alb  2.9  /  TBili  0.6  /  DBili  x   /  AST  23  /  ALT  12  /  AlkPhos  40  [12-07-23 @ 10:55]    Creatinine Trend:  SCr 3.69 [12-07 @ 10:55]  SCr 4.30 [12-06 @ 07:12]  SCr 4.88 [12-05 @ 07:19]  SCr 5.02 [12-04 @ 07:06]  SCr 5.63 [12-03 @ 06:12]

## 2023-12-08 NOTE — PROGRESS NOTE ADULT - PROBLEM SELECTOR PLAN 5
- pt eating less 2/2 reported epigastric pain/nausea  - also suspect there is an element of progressing dementia  - fluid resuscitation  - encourage PO intake, honor dietary preferences  - PEG tube placed on 12/06 tube feeds initiated on 12/07 after nutrition recs, tolerated trickle feeds, will uptitrate over the weekend

## 2023-12-08 NOTE — PROGRESS NOTE ADULT - SUBJECTIVE AND OBJECTIVE BOX
EP ATTENDING    tele: NSR, periods of sinus wilber, no malignant events    DATE OF SERVICE - 12-08-23     Review of Systems:   Constitutional: [ ] fevers, [ ] chills.   Skin: [ ] dry skin. [ ] rashes.  Psychiatric: [ ] depression, [ ] anxiety.   Gastrointestinal: [ ] BRBPR, [ ] melena.   Neurological: [ ] confusion. [ ] seizures. [ ] shuffling gait.   Ears,Nose,Mouth and Throat: [ ] ear pain [ ] sore throat.   Eyes: [ ] diplopia.   Respiratory: [ ] hemoptysis. [ ] shortness of breath  Cardiovascular: See HPI above  Hematologic/Lymphatic: [ ] anemia. [ ] painful nodes. [ ] prolonged bleeding.   Genitourinary: [ ] hematuria. [ ] flank pain.   Endocrine: [ ] significant change in weight. [ ] intolerance to heat and cold.     Review of systems [ ] otherwise negative, [ ] otherwise unable to obtain    FH: no family history of sudden cardiac death in first degree relatives    SH: [ ] tobacco, [ ] alcohol, [ ] drugs    acetaminophen     Tablet .. 650 milliGRAM(s) Oral every 6 hours PRN  aluminum hydroxide/magnesium hydroxide/simethicone Suspension 30 milliLiter(s) Oral every 4 hours PRN  amLODIPine   Tablet 5 milliGRAM(s) Oral daily  atorvastatin 80 milliGRAM(s) Oral at bedtime  chlorhexidine 2% Cloths 1 Application(s) Topical daily  cholecalciferol 2000 Unit(s) Oral daily  dextrose 5%. 1000 milliLiter(s) IV Continuous <Continuous>  dextrose 5%. 1000 milliLiter(s) IV Continuous <Continuous>  dextrose 50% Injectable 12.5 Gram(s) IV Push once  dextrose 50% Injectable 25 Gram(s) IV Push once  dextrose 50% Injectable 25 Gram(s) IV Push once  dextrose Oral Gel 15 Gram(s) Oral once PRN  escitalopram 10 milliGRAM(s) Oral daily  folic acid 1 milliGRAM(s) Oral daily  glucagon  Injectable 1 milliGRAM(s) IntraMuscular once  influenza  Vaccine (HIGH DOSE) 0.7 milliLiter(s) IntraMuscular once  insulin lispro (ADMELOG) corrective regimen sliding scale   SubCutaneous three times a day before meals  insulin lispro (ADMELOG) corrective regimen sliding scale   SubCutaneous at bedtime  melatonin Liquid 6 milliGRAM(s) Oral at bedtime  ondansetron Injectable 4 milliGRAM(s) IV Push every 8 hours PRN  pantoprazole   Suspension 40 milliGRAM(s) Oral before breakfast  polyethylene glycol 3350 17 Gram(s) Oral daily  sodium bicarbonate 1300 milliGRAM(s) Oral three times a day                            8.4    10.72 )-----------( 139      ( 07 Dec 2023 10:55 )             26.1       12-07    142  |  105  |  36<H>  ----------------------------<  80  3.1<L>   |  23  |  3.69<H>    Ca    8.3<L>      07 Dec 2023 10:55  Phos  4.0     12-07  Mg     1.6     12-07    TPro  5.1<L>  /  Alb  2.9<L>  /  TBili  0.6  /  DBili  x   /  AST  23  /  ALT  12  /  AlkPhos  40  12-07            T(C): 36.3 (12-08-23 @ 04:51), Max: 36.5 (12-07-23 @ 21:33)  HR: 62 (12-08-23 @ 04:51) (60 - 65)  BP: 163/74 (12-08-23 @ 04:51) (163/74 - 171/51)  RR: 18 (12-08-23 @ 04:51) (18 - 18)  SpO2: 97% (12-08-23 @ 04:51) (97% - 100%)  Wt(kg): --    I&O's Summary    07 Dec 2023 07:01  -  08 Dec 2023 07:00  --------------------------------------------------------  IN: 600 mL / OUT: 0 mL / NET: 600 mL        cath: LCx PCI in 2010.	  Echo: LVEF 40%      A/P) 89 y/o female PMH hypertension, hyperlipidemia, diabetes, CAD s/p PCI 2010 now with LVEF 40%, advanced CKD a/w FTT. EP called for sinus bradycardia and periods of PAF. She remains off aspirin due to an allergy    -continue lipitor for hyperlipidemia with CAD  -continue norvasc for hypertension  -given her elevated chads-vasc score please continue lifelong a/c  -f/u palliative care  -no need for PPM as she's asymptomatic from her sinus bradycardia and PAF  -no need for ICD given LVEF > 35%  -no further inpatient EP testing expected      Lacey Jacobson M.D., [facility], [place]  Cardiac Electrophysiology  [facility]  [address] [place] [address], -[address] [place] [address]  [email or web address]    office [phone]  pager [phone]   EP ATTENDING    tele: NSR, periods of sinus wilber, no malignant events    DATE OF SERVICE - 12-08-23     Review of Systems:   Constitutional: [ ] fevers, [ ] chills.   Skin: [ ] dry skin. [ ] rashes.  Psychiatric: [ ] depression, [ ] anxiety.   Gastrointestinal: [ ] BRBPR, [ ] melena.   Neurological: [ ] confusion. [ ] seizures. [ ] shuffling gait.   Ears,Nose,Mouth and Throat: [ ] ear pain [ ] sore throat.   Eyes: [ ] diplopia.   Respiratory: [ ] hemoptysis. [ ] shortness of breath  Cardiovascular: See HPI above  Hematologic/Lymphatic: [ ] anemia. [ ] painful nodes. [ ] prolonged bleeding.   Genitourinary: [ ] hematuria. [ ] flank pain.   Endocrine: [ ] significant change in weight. [ ] intolerance to heat and cold.     Review of systems [ ] otherwise negative, [ ] otherwise unable to obtain    FH: no family history of sudden cardiac death in first degree relatives    SH: [ ] tobacco, [ ] alcohol, [ ] drugs    acetaminophen     Tablet .. 650 milliGRAM(s) Oral every 6 hours PRN  aluminum hydroxide/magnesium hydroxide/simethicone Suspension 30 milliLiter(s) Oral every 4 hours PRN  amLODIPine   Tablet 5 milliGRAM(s) Oral daily  atorvastatin 80 milliGRAM(s) Oral at bedtime  chlorhexidine 2% Cloths 1 Application(s) Topical daily  cholecalciferol 2000 Unit(s) Oral daily  dextrose 5%. 1000 milliLiter(s) IV Continuous <Continuous>  dextrose 5%. 1000 milliLiter(s) IV Continuous <Continuous>  dextrose 50% Injectable 12.5 Gram(s) IV Push once  dextrose 50% Injectable 25 Gram(s) IV Push once  dextrose 50% Injectable 25 Gram(s) IV Push once  dextrose Oral Gel 15 Gram(s) Oral once PRN  escitalopram 10 milliGRAM(s) Oral daily  folic acid 1 milliGRAM(s) Oral daily  glucagon  Injectable 1 milliGRAM(s) IntraMuscular once  influenza  Vaccine (HIGH DOSE) 0.7 milliLiter(s) IntraMuscular once  insulin lispro (ADMELOG) corrective regimen sliding scale   SubCutaneous three times a day before meals  insulin lispro (ADMELOG) corrective regimen sliding scale   SubCutaneous at bedtime  melatonin Liquid 6 milliGRAM(s) Oral at bedtime  ondansetron Injectable 4 milliGRAM(s) IV Push every 8 hours PRN  pantoprazole   Suspension 40 milliGRAM(s) Oral before breakfast  polyethylene glycol 3350 17 Gram(s) Oral daily  sodium bicarbonate 1300 milliGRAM(s) Oral three times a day                            8.4    10.72 )-----------( 139      ( 07 Dec 2023 10:55 )             26.1       12-07    142  |  105  |  36<H>  ----------------------------<  80  3.1<L>   |  23  |  3.69<H>    Ca    8.3<L>      07 Dec 2023 10:55  Phos  4.0     12-07  Mg     1.6     12-07    TPro  5.1<L>  /  Alb  2.9<L>  /  TBili  0.6  /  DBili  x   /  AST  23  /  ALT  12  /  AlkPhos  40  12-07            T(C): 36.3 (12-08-23 @ 04:51), Max: 36.5 (12-07-23 @ 21:33)  HR: 62 (12-08-23 @ 04:51) (60 - 65)  BP: 163/74 (12-08-23 @ 04:51) (163/74 - 171/51)  RR: 18 (12-08-23 @ 04:51) (18 - 18)  SpO2: 97% (12-08-23 @ 04:51) (97% - 100%)  Wt(kg): --    I&O's Summary    07 Dec 2023 07:01  -  08 Dec 2023 07:00  --------------------------------------------------------  IN: 600 mL / OUT: 0 mL / NET: 600 mL        cath: LCx PCI in 2010.	  Echo: LVEF 40%      A/P) 89 y/o female PMH hypertension, hyperlipidemia, diabetes, CAD s/p PCI 2010 now with LVEF 40%, advanced CKD a/w FTT. EP called for sinus bradycardia and periods of PAF. She remains off aspirin due to an allergy    -continue lipitor for hyperlipidemia with CAD  -continue norvasc for hypertension  -given her elevated chads-vasc score please continue lifelong a/c  -f/u palliative care  -no need for PPM as she's asymptomatic from her sinus bradycardia and PAF  -no need for ICD given LVEF > 35%  -no further inpatient EP testing expected      Lacey Jacobson M.D., [facility]  Cardiac Electrophysiology  [facility]  [address] [place] [address], -[address] [place] [address]  [email or web address]    office [phone]  pager [phone]

## 2023-12-08 NOTE — PROGRESS NOTE ADULT - PROBLEM SELECTOR PLAN 1
- Differential for syncope includes orthostatic hypotension vs. cardiac (arrythmia, valvular disease) vs. neuro  - Pt has h/o HFrEF, so increased risk of arrythmia  - Favor orthostatic hypotension as cause as pt has positive orthostatics (103/46 --> 65/45), repeat orthos ordered  - Sinus wilber on tele, HR increases with standing vitals. Unclear if causing   - TTE with LVEF 39%.   - continue to monitor on tele: sinus 60-70  - appreciate cards and EP recs, no plans for PPM at this time

## 2023-12-08 NOTE — PROGRESS NOTE ADULT - SUBJECTIVE AND OBJECTIVE BOX
C A R [facility] I [facility]  **********************************     DATE OF SERVICE: 12-08-23    Patient denies chest pain or shortness of breath.   Review of symptoms otherwise negative.    MEDICATIONS:  acetaminophen     Tablet .. 650 milliGRAM(s) Oral every 6 hours PRN  aluminum hydroxide/magnesium hydroxide/simethicone Suspension 30 milliLiter(s) Oral every 4 hours PRN  amLODIPine   Tablet 5 milliGRAM(s) Oral daily  atorvastatin 80 milliGRAM(s) Oral at bedtime  chlorhexidine 2% Cloths 1 Application(s) Topical daily  cholecalciferol 2000 Unit(s) Oral daily  dextrose 5%. 1000 milliLiter(s) IV Continuous <Continuous>  dextrose 5%. 1000 milliLiter(s) IV Continuous <Continuous>  dextrose 50% Injectable 12.5 Gram(s) IV Push once  dextrose 50% Injectable 25 Gram(s) IV Push once  dextrose 50% Injectable 25 Gram(s) IV Push once  dextrose Oral Gel 15 Gram(s) Oral once PRN  escitalopram 10 milliGRAM(s) Oral daily  folic acid 1 milliGRAM(s) Oral daily  glucagon  Injectable 1 milliGRAM(s) IntraMuscular once  influenza  Vaccine (HIGH DOSE) 0.7 milliLiter(s) IntraMuscular once  insulin lispro (ADMELOG) corrective regimen sliding scale   SubCutaneous at bedtime  insulin lispro (ADMELOG) corrective regimen sliding scale   SubCutaneous three times a day before meals  melatonin Liquid 6 milliGRAM(s) Oral at bedtime  ondansetron Injectable 4 milliGRAM(s) IV Push every 8 hours PRN  pantoprazole   Suspension 40 milliGRAM(s) Oral before breakfast  polyethylene glycol 3350 17 Gram(s) Oral daily  sodium bicarbonate 1300 milliGRAM(s) Oral three times a day      LABS:                        8.4    10.72 )-----------( 139      ( 07 Dec 2023 10:55 )             26.1       Hemoglobin: 8.4 g/dL (12-07 @ 10:55)  Hemoglobin: 8.8 g/dL (12-06 @ 07:14)  Hemoglobin: 8.8 g/dL (12-06 @ 07:13)  Hemoglobin: 8.9 g/dL (12-05 @ 07:03)  Hemoglobin: 9.4 g/dL (12-04 @ 23:02)      12-08    137  |  107  |  36<H>  ----------------------------<  121<H>  3.5   |  20<L>  |  3.31<H>    Ca    8.6      08 Dec 2023 11:58  Phos  2.9     12-08  Mg     1.7     12-08    TPro  5.1<L>  /  Alb  2.9<L>  /  TBili  0.6  /  DBili  x   /  AST  23  /  ALT  12  /  AlkPhos  40  12-07    Creatinine Trend: 3.31<--, 3.69<--, 4.30<--, 4.88<--, 5.02<--, 5.63<--    COAGS:           PHYSICAL EXAM:  T(C): 36.3 (12-08-23 @ 11:01), Max: 36.5 (12-07-23 @ 21:33)  HR: 62 (12-08-23 @ 11:01) (62 - 65)  BP: 146/64 (12-08-23 @ 11:01) (146/64 - 165/64)  RR: 18 (12-08-23 @ 11:01) (18 - 18)  SpO2: 99% (12-08-23 @ 11:01) (97% - 99%)  Wt(kg): --    I&O's Summary    07 Dec 2023 07:01  -  08 Dec 2023 07:00  --------------------------------------------------------  IN: 600 mL / OUT: 0 mL / NET: 600 mL        Gen: NAD  HEENT:  (-)icterus (-)pallor  CV: N S1 S2 1/6 MANASA (+)2 Pulses B/l  Resp:  Clear to auscultation B/L, normal effort  GI: (+) BS Soft, NT, ND  Lymph:  (-)Edema, (-)obvious lymphadenopathy  Skin: Warm to touch, Normal turgor  Psych: Appropriate mood and affect      TELEMETRY: SB/SR 50-60s    RADIOLOGY:         CXR: < from: Xray Chest 1 View- PORTABLE-Urgent (11.26.23 @ 02:59) >  IMPRESSION:  Slightly elevated right hemidiaphragm. Underinflated but otherwise clear   lungs. No pleural effusions or pneumothorax.    Stable cardiac and mediastinal silhouettes including aortic   calcifications and convex fullness of superior mediastinal/paratracheal   soft tissue contour which could be related to engorged/tortuous   brachiocephalic vasculature.    Generalized osteopenia.    --- End of Report ---    < end of copied text >      ASSESSMENT/PLAN: Patient is a 89 y/o Female with PMH of advanced dementia, HTN, HLD, DM2, anemia, CAD (s/p LAD and LCx stent 2010), HFrEF (EF 40-45% 2021), h/o TIA no residual deficits, h/o recurrent UTI who presented with syncope and fall w/o trauma in the setting of 1 month of poor PO intake, nausea, vomiting, epigastric pain. Found to have LESLIE, sinus bradycardia and orthostatic hypotension. Cardiology consulted for further evaluation.    #Syncope  - Suspect due to orthostatic hypotension  - TTE noted with EF 39%, mod AI  - Monitor telemetry however appears asymptomatic in terms of sinus bradycardia    #Orthostatic Hypotension  - Holding home antihypertensives/GDMT  - Avoid midodrine and florinef given CHF  - Continue compression stockings and monitor for improvement    #HTN  - Will need to allow some hypertension (SBP 160s) given orthostatic hypotension  - Continue Amlodipine 5mg daily, BP improving    #Sinus Bradycardia  - Appears asymptomatic at rest  - EP eval appreciated - no need for PPM as she's asymptomatic    #New PAF  - EP eval noted - recommend AC if no contraindication and within GOC. Family opted for no AC given risks per med discussion    #LESLIE  - Management per renal    #CAD  - Plavix held for PEG - restart when ok with GI  - Continue Lipitor    #GOC  - DNR/DNI    #Failure to Thrive  - GI eval noted - s/p PEG on 12/6  - Tolerated procedure well from CV perspective    - No further inpatient cardiac w/u planned    Rainer Wallace PA-C  Pager: [phone]     C A R [facility]  **********************************     DATE OF SERVICE: 12-08-23    Patient denies chest pain or shortness of breath.   Review of symptoms otherwise negative.    MEDICATIONS:  acetaminophen     Tablet .. 650 milliGRAM(s) Oral every 6 hours PRN  aluminum hydroxide/magnesium hydroxide/simethicone Suspension 30 milliLiter(s) Oral every 4 hours PRN  amLODIPine   Tablet 5 milliGRAM(s) Oral daily  atorvastatin 80 milliGRAM(s) Oral at bedtime  chlorhexidine 2% Cloths 1 Application(s) Topical daily  cholecalciferol 2000 Unit(s) Oral daily  dextrose 5%. 1000 milliLiter(s) IV Continuous <Continuous>  dextrose 5%. 1000 milliLiter(s) IV Continuous <Continuous>  dextrose 50% Injectable 12.5 Gram(s) IV Push once  dextrose 50% Injectable 25 Gram(s) IV Push once  dextrose 50% Injectable 25 Gram(s) IV Push once  dextrose Oral Gel 15 Gram(s) Oral once PRN  escitalopram 10 milliGRAM(s) Oral daily  folic acid 1 milliGRAM(s) Oral daily  glucagon  Injectable 1 milliGRAM(s) IntraMuscular once  influenza  Vaccine (HIGH DOSE) 0.7 milliLiter(s) IntraMuscular once  insulin lispro (ADMELOG) corrective regimen sliding scale   SubCutaneous at bedtime  insulin lispro (ADMELOG) corrective regimen sliding scale   SubCutaneous three times a day before meals  melatonin Liquid 6 milliGRAM(s) Oral at bedtime  ondansetron Injectable 4 milliGRAM(s) IV Push every 8 hours PRN  pantoprazole   Suspension 40 milliGRAM(s) Oral before breakfast  polyethylene glycol 3350 17 Gram(s) Oral daily  sodium bicarbonate 1300 milliGRAM(s) Oral three times a day      LABS:                        8.4    10.72 )-----------( 139      ( 07 Dec 2023 10:55 )             26.1       Hemoglobin: 8.4 g/dL (12-07 @ 10:55)  Hemoglobin: 8.8 g/dL (12-06 @ 07:14)  Hemoglobin: 8.8 g/dL (12-06 @ 07:13)  Hemoglobin: 8.9 g/dL (12-05 @ 07:03)  Hemoglobin: 9.4 g/dL (12-04 @ 23:02)      12-08    137  |  107  |  36<H>  ----------------------------<  121<H>  3.5   |  20<L>  |  3.31<H>    Ca    8.6      08 Dec 2023 11:58  Phos  2.9     12-08  Mg     1.7     12-08    TPro  5.1<L>  /  Alb  2.9<L>  /  TBili  0.6  /  DBili  x   /  AST  23  /  ALT  12  /  AlkPhos  40  12-07    Creatinine Trend: 3.31<--, 3.69<--, 4.30<--, 4.88<--, 5.02<--, 5.63<--    COAGS:           PHYSICAL EXAM:  T(C): 36.3 (12-08-23 @ 11:01), Max: 36.5 (12-07-23 @ 21:33)  HR: 62 (12-08-23 @ 11:01) (62 - 65)  BP: 146/64 (12-08-23 @ 11:01) (146/64 - 165/64)  RR: 18 (12-08-23 @ 11:01) (18 - 18)  SpO2: 99% (12-08-23 @ 11:01) (97% - 99%)  Wt(kg): --    I&O's Summary    07 Dec 2023 07:01  -  08 Dec 2023 07:00  --------------------------------------------------------  IN: 600 mL / OUT: 0 mL / NET: 600 mL        Gen: NAD  HEENT:  (-)icterus (-)pallor  CV: N S1 S2 1/6 MANASA (+)2 Pulses B/l  Resp:  Clear to auscultation B/L, normal effort  GI: (+) BS Soft, NT, ND  Lymph:  (-)Edema, (-)obvious lymphadenopathy  Skin: Warm to touch, Normal turgor  Psych: Appropriate mood and affect      TELEMETRY: SB/SR 50-60s    RADIOLOGY:         CXR: < from: Xray Chest 1 View- PORTABLE-Urgent (11.26.23 @ 02:59) >  IMPRESSION:  Slightly elevated right hemidiaphragm. Underinflated but otherwise clear   lungs. No pleural effusions or pneumothorax.    Stable cardiac and mediastinal silhouettes including aortic   calcifications and convex fullness of superior mediastinal/paratracheal   soft tissue contour which could be related to engorged/tortuous   brachiocephalic vasculature.    Generalized osteopenia.    --- End of Report ---    < end of copied text >      ASSESSMENT/PLAN: Patient is a 89 y/o Female with PMH of advanced dementia, HTN, HLD, DM2, anemia, CAD (s/p LAD and LCx stent 2010), HFrEF (EF 40-45% 2021), h/o TIA no residual deficits, h/o recurrent UTI who presented with syncope and fall w/o trauma in the setting of 1 month of poor PO intake, nausea, vomiting, epigastric pain. Found to have LESLIE, sinus bradycardia and orthostatic hypotension. Cardiology consulted for further evaluation.    #Syncope  - Suspect due to orthostatic hypotension  - TTE noted with EF 39%, mod AI  - Monitor telemetry however appears asymptomatic in terms of sinus bradycardia    #Orthostatic Hypotension  - Holding home antihypertensives/GDMT  - Avoid midodrine and florinef given CHF  - Continue compression stockings and monitor for improvement    #HTN  - Will need to allow some hypertension (SBP 160s) given orthostatic hypotension  - Continue Amlodipine 5mg daily, BP improving    #Sinus Bradycardia  - Appears asymptomatic at rest  - EP eval appreciated - no need for PPM as she's asymptomatic    #New PAF  - EP eval noted - recommend AC if no contraindication and within GOC. Family opted for no AC given risks per med discussion    #LESLIE  - Management per renal    #CAD  - Plavix held for PEG - restart when ok with GI  - Continue Lipitor    #GOC  - DNR/DNI    #Failure to Thrive  - GI eval noted - s/p PEG on 12/6  - Tolerated procedure well from CV perspective    - No further inpatient cardiac w/u planned    Rainer Wallace PA-C  Pager: [phone]

## 2023-12-08 NOTE — PROGRESS NOTE ADULT - PROBLEM SELECTOR PLAN 2
- improved, tele 55-60 overnight  - appreciate EP recs, no plans for PPM at this time  - avoid AV ayaz blockers

## 2023-12-08 NOTE — PROGRESS NOTE ADULT - ATTENDING COMMENTS
natasha rodriguez  nephrology attending   please contact me on TEAMS   Office- [phone] natasha rodriguez  nephrology attending   please contact me on TEAMS   Office- [phone]

## 2023-12-09 LAB
ANION GAP SERPL CALC-SCNC: 16 MMOL/L — SIGNIFICANT CHANGE UP (ref 5–17)
ANION GAP SERPL CALC-SCNC: 16 MMOL/L — SIGNIFICANT CHANGE UP (ref 5–17)
BUN SERPL-MCNC: 29 MG/DL — HIGH (ref 7–23)
BUN SERPL-MCNC: 29 MG/DL — HIGH (ref 7–23)
CALCIUM SERPL-MCNC: 8.6 MG/DL — SIGNIFICANT CHANGE UP (ref 8.4–10.5)
CALCIUM SERPL-MCNC: 8.6 MG/DL — SIGNIFICANT CHANGE UP (ref 8.4–10.5)
CHLORIDE SERPL-SCNC: 105 MMOL/L — SIGNIFICANT CHANGE UP (ref 96–108)
CHLORIDE SERPL-SCNC: 105 MMOL/L — SIGNIFICANT CHANGE UP (ref 96–108)
CO2 SERPL-SCNC: 23 MMOL/L — SIGNIFICANT CHANGE UP (ref 22–31)
CO2 SERPL-SCNC: 23 MMOL/L — SIGNIFICANT CHANGE UP (ref 22–31)
CREAT SERPL-MCNC: 3.19 MG/DL — HIGH (ref 0.5–1.3)
CREAT SERPL-MCNC: 3.19 MG/DL — HIGH (ref 0.5–1.3)
EGFR: 13 ML/MIN/1.73M2 — LOW
EGFR: 13 ML/MIN/1.73M2 — LOW
GLUCOSE BLDC GLUCOMTR-MCNC: 103 MG/DL — HIGH (ref 70–99)
GLUCOSE BLDC GLUCOMTR-MCNC: 103 MG/DL — HIGH (ref 70–99)
GLUCOSE BLDC GLUCOMTR-MCNC: 113 MG/DL — HIGH (ref 70–99)
GLUCOSE BLDC GLUCOMTR-MCNC: 113 MG/DL — HIGH (ref 70–99)
GLUCOSE BLDC GLUCOMTR-MCNC: 122 MG/DL — HIGH (ref 70–99)
GLUCOSE BLDC GLUCOMTR-MCNC: 122 MG/DL — HIGH (ref 70–99)
GLUCOSE BLDC GLUCOMTR-MCNC: 144 MG/DL — HIGH (ref 70–99)
GLUCOSE BLDC GLUCOMTR-MCNC: 144 MG/DL — HIGH (ref 70–99)
GLUCOSE BLDC GLUCOMTR-MCNC: 148 MG/DL — HIGH (ref 70–99)
GLUCOSE BLDC GLUCOMTR-MCNC: 148 MG/DL — HIGH (ref 70–99)
GLUCOSE SERPL-MCNC: 89 MG/DL — SIGNIFICANT CHANGE UP (ref 70–99)
GLUCOSE SERPL-MCNC: 89 MG/DL — SIGNIFICANT CHANGE UP (ref 70–99)
HCT VFR BLD CALC: 27.7 % — LOW (ref 34.5–45)
HCT VFR BLD CALC: 27.7 % — LOW (ref 34.5–45)
HGB BLD-MCNC: 8.9 G/DL — LOW (ref 11.5–15.5)
HGB BLD-MCNC: 8.9 G/DL — LOW (ref 11.5–15.5)
MAGNESIUM SERPL-MCNC: 1.7 MG/DL — SIGNIFICANT CHANGE UP (ref 1.6–2.6)
MAGNESIUM SERPL-MCNC: 1.7 MG/DL — SIGNIFICANT CHANGE UP (ref 1.6–2.6)
MCHC RBC-ENTMCNC: 26 PG — LOW (ref 27–34)
MCHC RBC-ENTMCNC: 26 PG — LOW (ref 27–34)
MCHC RBC-ENTMCNC: 32.1 GM/DL — SIGNIFICANT CHANGE UP (ref 32–36)
MCHC RBC-ENTMCNC: 32.1 GM/DL — SIGNIFICANT CHANGE UP (ref 32–36)
MCV RBC AUTO: 81 FL — SIGNIFICANT CHANGE UP (ref 80–100)
MCV RBC AUTO: 81 FL — SIGNIFICANT CHANGE UP (ref 80–100)
NRBC # BLD: 0 /100 WBCS — SIGNIFICANT CHANGE UP (ref 0–0)
NRBC # BLD: 0 /100 WBCS — SIGNIFICANT CHANGE UP (ref 0–0)
PHOSPHATE SERPL-MCNC: 2.4 MG/DL — LOW (ref 2.5–4.5)
PHOSPHATE SERPL-MCNC: 2.4 MG/DL — LOW (ref 2.5–4.5)
PLATELET # BLD AUTO: 121 K/UL — LOW (ref 150–400)
PLATELET # BLD AUTO: 121 K/UL — LOW (ref 150–400)
POTASSIUM SERPL-MCNC: 3 MMOL/L — LOW (ref 3.5–5.3)
POTASSIUM SERPL-MCNC: 3 MMOL/L — LOW (ref 3.5–5.3)
POTASSIUM SERPL-SCNC: 3 MMOL/L — LOW (ref 3.5–5.3)
POTASSIUM SERPL-SCNC: 3 MMOL/L — LOW (ref 3.5–5.3)
RBC # BLD: 3.42 M/UL — LOW (ref 3.8–5.2)
RBC # BLD: 3.42 M/UL — LOW (ref 3.8–5.2)
RBC # FLD: 15.9 % — HIGH (ref 10.3–14.5)
RBC # FLD: 15.9 % — HIGH (ref 10.3–14.5)
SODIUM SERPL-SCNC: 144 MMOL/L — SIGNIFICANT CHANGE UP (ref 135–145)
SODIUM SERPL-SCNC: 144 MMOL/L — SIGNIFICANT CHANGE UP (ref 135–145)
WBC # BLD: 9.66 K/UL — SIGNIFICANT CHANGE UP (ref 3.8–10.5)
WBC # BLD: 9.66 K/UL — SIGNIFICANT CHANGE UP (ref 3.8–10.5)
WBC # FLD AUTO: 9.66 K/UL — SIGNIFICANT CHANGE UP (ref 3.8–10.5)
WBC # FLD AUTO: 9.66 K/UL — SIGNIFICANT CHANGE UP (ref 3.8–10.5)

## 2023-12-09 PROCEDURE — 99232 SBSQ HOSP IP/OBS MODERATE 35: CPT

## 2023-12-09 RX ORDER — POTASSIUM CHLORIDE 20 MEQ
40 PACKET (EA) ORAL ONCE
Refills: 0 | Status: COMPLETED | OUTPATIENT
Start: 2023-12-09 | End: 2023-12-09

## 2023-12-09 RX ORDER — INSULIN LISPRO 100/ML
VIAL (ML) SUBCUTANEOUS EVERY 6 HOURS
Refills: 0 | Status: DISCONTINUED | OUTPATIENT
Start: 2023-12-09 | End: 2023-12-12

## 2023-12-09 RX ORDER — POTASSIUM PHOSPHATE, MONOBASIC POTASSIUM PHOSPHATE, DIBASIC 236; 224 MG/ML; MG/ML
15 INJECTION, SOLUTION INTRAVENOUS ONCE
Refills: 0 | Status: COMPLETED | OUTPATIENT
Start: 2023-12-09 | End: 2023-12-09

## 2023-12-09 RX ADMIN — Medication 1300 MILLIGRAM(S): at 13:20

## 2023-12-09 RX ADMIN — Medication 40 MILLIEQUIVALENT(S): at 21:23

## 2023-12-09 RX ADMIN — Medication 1300 MILLIGRAM(S): at 21:22

## 2023-12-09 RX ADMIN — ESCITALOPRAM OXALATE 10 MILLIGRAM(S): 10 TABLET, FILM COATED ORAL at 11:57

## 2023-12-09 RX ADMIN — Medication 2000 UNIT(S): at 11:57

## 2023-12-09 RX ADMIN — ATORVASTATIN CALCIUM 80 MILLIGRAM(S): 80 TABLET, FILM COATED ORAL at 21:22

## 2023-12-09 RX ADMIN — CLOPIDOGREL BISULFATE 75 MILLIGRAM(S): 75 TABLET, FILM COATED ORAL at 11:57

## 2023-12-09 RX ADMIN — POTASSIUM PHOSPHATE, MONOBASIC POTASSIUM PHOSPHATE, DIBASIC 62.5 MILLIMOLE(S): 236; 224 INJECTION, SOLUTION INTRAVENOUS at 21:23

## 2023-12-09 RX ADMIN — Medication 1 MILLIGRAM(S): at 11:57

## 2023-12-09 RX ADMIN — Medication 1300 MILLIGRAM(S): at 06:11

## 2023-12-09 RX ADMIN — CHLORHEXIDINE GLUCONATE 1 APPLICATION(S): 213 SOLUTION TOPICAL at 11:58

## 2023-12-09 RX ADMIN — AMLODIPINE BESYLATE 5 MILLIGRAM(S): 2.5 TABLET ORAL at 06:11

## 2023-12-09 RX ADMIN — Medication 650 MILLIGRAM(S): at 13:21

## 2023-12-09 RX ADMIN — Medication 650 MILLIGRAM(S): at 14:21

## 2023-12-09 RX ADMIN — PANTOPRAZOLE SODIUM 40 MILLIGRAM(S): 20 TABLET, DELAYED RELEASE ORAL at 06:12

## 2023-12-09 RX ADMIN — Medication 6 MILLIGRAM(S): at 21:41

## 2023-12-09 RX ADMIN — POLYETHYLENE GLYCOL 3350 17 GRAM(S): 17 POWDER, FOR SOLUTION ORAL at 11:57

## 2023-12-09 NOTE — PROGRESS NOTE ADULT - SUBJECTIVE AND OBJECTIVE BOX
C A R [facility] I [facility] O [facility]  **********************************     DATE OF SERVICE: 12-09-23    No overnight events      MEDICATIONS:  acetaminophen     Tablet .. 650 milliGRAM(s) Oral every 6 hours PRN  aluminum hydroxide/magnesium hydroxide/simethicone Suspension 30 milliLiter(s) Oral every 4 hours PRN  amLODIPine   Tablet 5 milliGRAM(s) Oral daily  atorvastatin 80 milliGRAM(s) Oral at bedtime  chlorhexidine 2% Cloths 1 Application(s) Topical daily  cholecalciferol 2000 Unit(s) Oral daily  clopidogrel Tablet 75 milliGRAM(s) Oral daily  dextrose 5%. 1000 milliLiter(s) IV Continuous <Continuous>  dextrose 5%. 1000 milliLiter(s) IV Continuous <Continuous>  dextrose 50% Injectable 12.5 Gram(s) IV Push once  dextrose 50% Injectable 25 Gram(s) IV Push once  dextrose 50% Injectable 25 Gram(s) IV Push once  dextrose Oral Gel 15 Gram(s) Oral once PRN  escitalopram 10 milliGRAM(s) Oral daily  folic acid 1 milliGRAM(s) Oral daily  glucagon  Injectable 1 milliGRAM(s) IntraMuscular once  influenza  Vaccine (HIGH DOSE) 0.7 milliLiter(s) IntraMuscular once  insulin lispro (ADMELOG) corrective regimen sliding scale   SubCutaneous three times a day before meals  insulin lispro (ADMELOG) corrective regimen sliding scale   SubCutaneous at bedtime  melatonin Liquid 6 milliGRAM(s) Oral at bedtime  ondansetron Injectable 4 milliGRAM(s) IV Push every 8 hours PRN  pantoprazole   Suspension 40 milliGRAM(s) Oral before breakfast  polyethylene glycol 3350 17 Gram(s) Oral daily  sodium bicarbonate 1300 milliGRAM(s) Oral three times a day      LABS:                        8.9    9.66  )-----------( 121      ( 09 Dec 2023 06:14 )             27.7       Hemoglobin: 8.9 g/dL (12-09 @ 06:14)  Hemoglobin: 8.3 g/dL (12-08 @ 14:14)  Hemoglobin: 8.4 g/dL (12-07 @ 10:55)  Hemoglobin: 8.8 g/dL (12-06 @ 07:14)  Hemoglobin: 8.8 g/dL (12-06 @ 07:13)      12-09    144  |  105  |  29<H>  ----------------------------<  89  3.0<L>   |  23  |  3.19<H>    Ca    8.6      09 Dec 2023 06:14  Phos  2.4     12-09  Mg     1.7     12-09      Creatinine Trend: 3.19<--, 3.31<--, 3.69<--, 4.30<--, 4.88<--, 5.02<--    COAGS:           PHYSICAL EXAM:  T(C): 36.6 (12-09-23 @ 05:05), Max: 36.6 (12-09-23 @ 05:05)  HR: 60 (12-09-23 @ 05:05) (60 - 64)  BP: 147/71 (12-09-23 @ 05:05) (147/71 - 175/63)  RR: 18 (12-09-23 @ 05:05) (18 - 18)  SpO2: 99% (12-09-23 @ 05:05) (97% - 99%)  Wt(kg): --    I&O's Summary    08 Dec 2023 07:01  -  09 Dec 2023 07:00  --------------------------------------------------------  IN: 320 mL / OUT: 0 mL / NET: 320 mL          Gen: NAD  HEENT:  (-)icterus (-)pallor  CV: N S1 S2 1/6 MANASA (+)2 Pulses B/l  Resp:  Clear to auscultation B/L, normal effort  GI: (+) BS Soft, NT, ND  Lymph:  (-)Edema, (-)obvious lymphadenopathy  Skin: Warm to touch, Normal turgor  Psych: Appropriate mood and affect      TELEMETRY: SB/SR 50-60s    RADIOLOGY:         CXR: < from: Xray Chest 1 View- PORTABLE-Urgent (11.26.23 @ 02:59) >  IMPRESSION:  Slightly elevated right hemidiaphragm. Underinflated but otherwise clear   lungs. No pleural effusions or pneumothorax.    Stable cardiac and mediastinal silhouettes including aortic   calcifications and convex fullness of superior mediastinal/paratracheal   soft tissue contour which could be related to engorged/tortuous   brachiocephalic vasculature.    Generalized osteopenia.    --- End of Report ---    < end of copied text >      ASSESSMENT/PLAN: Patient is a 89 y/o Female with PMH of advanced dementia, HTN, HLD, DM2, anemia, CAD (s/p LAD and LCx stent 2010), HFrEF (EF 40-45% 2021), h/o TIA no residual deficits, h/o recurrent UTI who presented with syncope and fall w/o trauma in the setting of 1 month of poor PO intake, nausea, vomiting, epigastric pain. Found to have LESLIE, sinus bradycardia and orthostatic hypotension. Cardiology consulted for further evaluation.    #Syncope  - Suspect due to orthostatic hypotension  - TTE noted with EF 39%, mod AI  - Monitor telemetry however appears asymptomatic in terms of sinus bradycardia    #Orthostatic Hypotension  - Holding home antihypertensives/GDMT  - Avoid midodrine and florinef given CHF  - Continue compression stockings and monitor for improvement    #HTN  - Will need to allow some hypertension (SBP 160s) given orthostatic hypotension  - Continue Amlodipine 5mg daily, BP improving    #Sinus Bradycardia  - Appears asymptomatic at rest  - EP eval appreciated - no need for PPM as she's asymptomatic    #New PAF  - EP eval noted - recommend AC if no contraindication and within GOC. Family opted for no AC given risks per med discussion    #LESLIE  - Management per renal    #CAD  - Plavix held for PEG - restart when ok with GI  - Continue Lipitor    #GOC  - DNR/DNI    #Failure to Thrive  - GI eval noted - s/p PEG on 12/6  - Tolerated procedure well from CV perspective    - No further inpatient cardiac w/u planned    Brent Garcia MD  Pager: [phone]    C A R D I O [facility] O G [facility]  **********************************     DATE OF SERVICE: 12-09-23    No overnight events      MEDICATIONS:  acetaminophen     Tablet .. 650 milliGRAM(s) Oral every 6 hours PRN  aluminum hydroxide/magnesium hydroxide/simethicone Suspension 30 milliLiter(s) Oral every 4 hours PRN  amLODIPine   Tablet 5 milliGRAM(s) Oral daily  atorvastatin 80 milliGRAM(s) Oral at bedtime  chlorhexidine 2% Cloths 1 Application(s) Topical daily  cholecalciferol 2000 Unit(s) Oral daily  clopidogrel Tablet 75 milliGRAM(s) Oral daily  dextrose 5%. 1000 milliLiter(s) IV Continuous <Continuous>  dextrose 5%. 1000 milliLiter(s) IV Continuous <Continuous>  dextrose 50% Injectable 12.5 Gram(s) IV Push once  dextrose 50% Injectable 25 Gram(s) IV Push once  dextrose 50% Injectable 25 Gram(s) IV Push once  dextrose Oral Gel 15 Gram(s) Oral once PRN  escitalopram 10 milliGRAM(s) Oral daily  folic acid 1 milliGRAM(s) Oral daily  glucagon  Injectable 1 milliGRAM(s) IntraMuscular once  influenza  Vaccine (HIGH DOSE) 0.7 milliLiter(s) IntraMuscular once  insulin lispro (ADMELOG) corrective regimen sliding scale   SubCutaneous three times a day before meals  insulin lispro (ADMELOG) corrective regimen sliding scale   SubCutaneous at bedtime  melatonin Liquid 6 milliGRAM(s) Oral at bedtime  ondansetron Injectable 4 milliGRAM(s) IV Push every 8 hours PRN  pantoprazole   Suspension 40 milliGRAM(s) Oral before breakfast  polyethylene glycol 3350 17 Gram(s) Oral daily  sodium bicarbonate 1300 milliGRAM(s) Oral three times a day      LABS:                        8.9    9.66  )-----------( 121      ( 09 Dec 2023 06:14 )             27.7       Hemoglobin: 8.9 g/dL (12-09 @ 06:14)  Hemoglobin: 8.3 g/dL (12-08 @ 14:14)  Hemoglobin: 8.4 g/dL (12-07 @ 10:55)  Hemoglobin: 8.8 g/dL (12-06 @ 07:14)  Hemoglobin: 8.8 g/dL (12-06 @ 07:13)      12-09    144  |  105  |  29<H>  ----------------------------<  89  3.0<L>   |  23  |  3.19<H>    Ca    8.6      09 Dec 2023 06:14  Phos  2.4     12-09  Mg     1.7     12-09      Creatinine Trend: 3.19<--, 3.31<--, 3.69<--, 4.30<--, 4.88<--, 5.02<--    COAGS:           PHYSICAL EXAM:  T(C): 36.6 (12-09-23 @ 05:05), Max: 36.6 (12-09-23 @ 05:05)  HR: 60 (12-09-23 @ 05:05) (60 - 64)  BP: 147/71 (12-09-23 @ 05:05) (147/71 - 175/63)  RR: 18 (12-09-23 @ 05:05) (18 - 18)  SpO2: 99% (12-09-23 @ 05:05) (97% - 99%)  Wt(kg): --    I&O's Summary    08 Dec 2023 07:01  -  09 Dec 2023 07:00  --------------------------------------------------------  IN: 320 mL / OUT: 0 mL / NET: 320 mL          Gen: NAD  HEENT:  (-)icterus (-)pallor  CV: N S1 S2 1/6 MANASA (+)2 Pulses B/l  Resp:  Clear to auscultation B/L, normal effort  GI: (+) BS Soft, NT, ND  Lymph:  (-)Edema, (-)obvious lymphadenopathy  Skin: Warm to touch, Normal turgor  Psych: Appropriate mood and affect      TELEMETRY: SB/SR 50-60s    RADIOLOGY:         CXR: < from: Xray Chest 1 View- PORTABLE-Urgent (11.26.23 @ 02:59) >  IMPRESSION:  Slightly elevated right hemidiaphragm. Underinflated but otherwise clear   lungs. No pleural effusions or pneumothorax.    Stable cardiac and mediastinal silhouettes including aortic   calcifications and convex fullness of superior mediastinal/paratracheal   soft tissue contour which could be related to engorged/tortuous   brachiocephalic vasculature.    Generalized osteopenia.    --- End of Report ---    < end of copied text >      ASSESSMENT/PLAN: Patient is a 89 y/o Female with PMH of advanced dementia, HTN, HLD, DM2, anemia, CAD (s/p LAD and LCx stent 2010), HFrEF (EF 40-45% 2021), h/o TIA no residual deficits, h/o recurrent UTI who presented with syncope and fall w/o trauma in the setting of 1 month of poor PO intake, nausea, vomiting, epigastric pain. Found to have LESLIE, sinus bradycardia and orthostatic hypotension. Cardiology consulted for further evaluation.    #Syncope  - Suspect due to orthostatic hypotension  - TTE noted with EF 39%, mod AI  - Monitor telemetry however appears asymptomatic in terms of sinus bradycardia    #Orthostatic Hypotension  - Holding home antihypertensives/GDMT  - Avoid midodrine and florinef given CHF  - Continue compression stockings and monitor for improvement    #HTN  - Will need to allow some hypertension (SBP 160s) given orthostatic hypotension  - Continue Amlodipine 5mg daily, BP improving    #Sinus Bradycardia  - Appears asymptomatic at rest  - EP eval appreciated - no need for PPM as she's asymptomatic    #New PAF  - EP eval noted - recommend AC if no contraindication and within GOC. Family opted for no AC given risks per med discussion    #LESLIE  - Management per renal    #CAD  - Plavix held for PEG - restart when ok with GI  - Continue Lipitor    #GOC  - DNR/DNI    #Failure to Thrive  - GI eval noted - s/p PEG on 12/6  - Tolerated procedure well from CV perspective    - No further inpatient cardiac w/u planned    Brent Garcia MD  Pager: [phone]

## 2023-12-09 NOTE — PROGRESS NOTE ADULT - SUBJECTIVE AND OBJECTIVE BOX
[facility] Division of Hospital Medicine  Eleni Campbell MD  Available via MS Teams      SUBJECTIVE / OVERNIGHT EVENTS: No acute events overnight. Pt says she doesn't feel good but does not know why    ADDITIONAL REVIEW OF SYSTEMS: ROS reviewed and found to be negative    MEDICATIONS  (STANDING):  amLODIPine   Tablet 5 milliGRAM(s) Oral daily  atorvastatin 80 milliGRAM(s) Oral at bedtime  chlorhexidine 2% Cloths 1 Application(s) Topical daily  cholecalciferol 2000 Unit(s) Oral daily  clopidogrel Tablet 75 milliGRAM(s) Oral daily  dextrose 5%. 1000 milliLiter(s) (50 mL/Hr) IV Continuous <Continuous>  dextrose 5%. 1000 milliLiter(s) (100 mL/Hr) IV Continuous <Continuous>  dextrose 50% Injectable 12.5 Gram(s) IV Push once  dextrose 50% Injectable 25 Gram(s) IV Push once  dextrose 50% Injectable 25 Gram(s) IV Push once  escitalopram 10 milliGRAM(s) Oral daily  folic acid 1 milliGRAM(s) Oral daily  glucagon  Injectable 1 milliGRAM(s) IntraMuscular once  influenza  Vaccine (HIGH DOSE) 0.7 milliLiter(s) IntraMuscular once  insulin lispro (ADMELOG) corrective regimen sliding scale   SubCutaneous three times a day before meals  insulin lispro (ADMELOG) corrective regimen sliding scale   SubCutaneous at bedtime  melatonin Liquid 6 milliGRAM(s) Oral at bedtime  pantoprazole   Suspension 40 milliGRAM(s) Oral before breakfast  polyethylene glycol 3350 17 Gram(s) Oral daily  sodium bicarbonate 1300 milliGRAM(s) Oral three times a day    MEDICATIONS  (PRN):  acetaminophen     Tablet .. 650 milliGRAM(s) Oral every 6 hours PRN Temp greater or equal to 38C (100.4F), Mild Pain (1 - 3)  aluminum hydroxide/magnesium hydroxide/simethicone Suspension 30 milliLiter(s) Oral every 4 hours PRN Dyspepsia  dextrose Oral Gel 15 Gram(s) Oral once PRN Blood Glucose LESS THAN 70 milliGRAM(s)/deciliter  ondansetron Injectable 4 milliGRAM(s) IV Push every 8 hours PRN Nausea and/or Vomiting      I&O's Summary    08 Dec 2023 07:01  -  09 Dec 2023 07:00  --------------------------------------------------------  IN: 320 mL / OUT: 0 mL / NET: 320 mL        PHYSICAL EXAM:  Vital Signs Last 24 Hrs  T(C): 36.7 (09 Dec 2023 12:07), Max: 36.7 (09 Dec 2023 12:07)  T(F): 98.1 (09 Dec 2023 12:07), Max: 98.1 (09 Dec 2023 12:07)  HR: 66 (09 Dec 2023 12:07) (60 - 66)  BP: 116/58 (09 Dec 2023 12:07) (116/58 - 175/63)  BP(mean): --  RR: 18 (09 Dec 2023 12:07) (18 - 18)  SpO2: 98% (09 Dec 2023 12:07) (97% - 99%)    Parameters below as of 09 Dec 2023 12:07  Patient On (Oxygen Delivery Method): room air    CONSTITUTIONAL: NAD, well-developed  RESPIRATORY: Normal respiratory effort; lungs are clear to auscultation bilaterally  CARDIOVASCULAR: Regular rate and rhythm, normal S1 and S2, no murmur/rub/gallop; No lower extremity edema; Peripheral pulses are 2+ bilaterally  ABDOMEN: Nontender to palpation, normoactive bowel sounds, no rebound/guarding; No hepatosplenomegaly, PEG in place with abdominal binder  MUSCLOSKELETAL: no clubbing or cyanosis of digits; no joint swelling or tenderness to palpation  PSYCH: A+O to person, place, affect appropriate      LABS:                        8.9    9.66  )-----------( 121      ( 09 Dec 2023 06:14 )             27.7     12-09    144  |  105  |  29<H>  ----------------------------<  89  3.0<L>   |  23  |  3.19<H>    Ca    8.6      09 Dec 2023 06:14  Phos  2.4     12-09  Mg     1.7     12-09            Urinalysis Basic - ( 09 Dec 2023 06:14 )    COVID-19 PCR: NotDetec (07 Dec 2023 13:44)         [facility] Division of Hospital Medicine  Eleni Campbell MD  Available via MS Teams      SUBJECTIVE / OVERNIGHT EVENTS: No acute events overnight. Pt says she doesn't feel good but does not know why    ADDITIONAL REVIEW OF SYSTEMS: ROS reviewed and found to be negative    MEDICATIONS  (STANDING):  amLODIPine   Tablet 5 milliGRAM(s) Oral daily  atorvastatin 80 milliGRAM(s) Oral at bedtime  chlorhexidine 2% Cloths 1 Application(s) Topical daily  cholecalciferol 2000 Unit(s) Oral daily  clopidogrel Tablet 75 milliGRAM(s) Oral daily  dextrose 5%. 1000 milliLiter(s) (50 mL/Hr) IV Continuous <Continuous>  dextrose 5%. 1000 milliLiter(s) (100 mL/Hr) IV Continuous <Continuous>  dextrose 50% Injectable 12.5 Gram(s) IV Push once  dextrose 50% Injectable 25 Gram(s) IV Push once  dextrose 50% Injectable 25 Gram(s) IV Push once  escitalopram 10 milliGRAM(s) Oral daily  folic acid 1 milliGRAM(s) Oral daily  glucagon  Injectable 1 milliGRAM(s) IntraMuscular once  influenza  Vaccine (HIGH DOSE) 0.7 milliLiter(s) IntraMuscular once  insulin lispro (ADMELOG) corrective regimen sliding scale   SubCutaneous three times a day before meals  insulin lispro (ADMELOG) corrective regimen sliding scale   SubCutaneous at bedtime  melatonin Liquid 6 milliGRAM(s) Oral at bedtime  pantoprazole   Suspension 40 milliGRAM(s) Oral before breakfast  polyethylene glycol 3350 17 Gram(s) Oral daily  sodium bicarbonate 1300 milliGRAM(s) Oral three times a day    MEDICATIONS  (PRN):  acetaminophen     Tablet .. 650 milliGRAM(s) Oral every 6 hours PRN Temp greater or equal to 38C (100.4F), Mild Pain (1 - 3)  aluminum hydroxide/magnesium hydroxide/simethicone Suspension 30 milliLiter(s) Oral every 4 hours PRN Dyspepsia  dextrose Oral Gel 15 Gram(s) Oral once PRN Blood Glucose LESS THAN 70 milliGRAM(s)/deciliter  ondansetron Injectable 4 milliGRAM(s) IV Push every 8 hours PRN Nausea and/or Vomiting      I&O's Summary    08 Dec 2023 07:01  -  09 Dec 2023 07:00  --------------------------------------------------------  IN: 320 mL / OUT: 0 mL / NET: 320 mL        PHYSICAL EXAM:  Vital Signs Last 24 Hrs  T(C): 36.7 (09 Dec 2023 12:07), Max: 36.7 (09 Dec 2023 12:07)  T(F): 98.1 (09 Dec 2023 12:07), Max: 98.1 (09 Dec 2023 12:07)  HR: 66 (09 Dec 2023 12:07) (60 - 66)  BP: 116/58 (09 Dec 2023 12:07) (116/58 - 175/63)  BP(mean): --  RR: 18 (09 Dec 2023 12:07) (18 - 18)  SpO2: 98% (09 Dec 2023 12:07) (97% - 99%)    Parameters below as of 09 Dec 2023 12:07  Patient On (Oxygen Delivery Method): room air    CONSTITUTIONAL: NAD, well-developed  RESPIRATORY: Normal respiratory effort; lungs are clear to auscultation bilaterally  CARDIOVASCULAR: Regular rate and rhythm, normal S1 and S2, no murmur/rub/gallop; No lower extremity edema; Peripheral pulses are 2+ bilaterally  ABDOMEN: Nontender to palpation, normoactive bowel sounds, no rebound/guarding; No hepatosplenomegaly, PEG in place with abdominal binder  MUSCLOSKELETAL: no clubbing or cyanosis of digits; no joint swelling or tenderness to palpation  PSYCH: A+O to person, place, affect appropriate      LABS:                        8.9    9.66  )-----------( 121      ( 09 Dec 2023 06:14 )             27.7     12-09    144  |  105  |  29<H>  ----------------------------<  89  3.0<L>   |  23  |  3.19<H>    Ca    8.6      09 Dec 2023 06:14  Phos  2.4     12-09  Mg     1.7     12-09            Urinalysis Basic - ( 09 Dec 2023 06:14 )    COVID-19 PCR: NotDetec (07 Dec 2023 13:44)

## 2023-12-09 NOTE — PROGRESS NOTE ADULT - PROBLEM SELECTOR PLAN 10
- home regimen: coreg 3.125 BID, valsartan-HCTZ 80-12.5 qd, aldactone 50mg qd  - hold given orthostatic hypotension  - Amlodipine 5 mg daily started (patient on Amlodipine 5 mg BID at home)

## 2023-12-09 NOTE — PROGRESS NOTE ADULT - ASSESSMENT
89 y/o F pmhx advanced dementia, HTN, HLD, DM2, anemia, CAD (s/p LAD and LCx stent 2010), HFrEF (EF 40-45% 2021), h/o TIA no residual deficits, h/o recurrent UTI presents w/ syncope and fall w/o trauma in the setting of 1 month of poor PO intake, nausea, vomiting, epigastric pain. C/f failure to thrive i/s/o ?PUD? vs. dementia. Syncope likely orthostatic in nature. PEG tube placed by GI due to failure to thrive.  89 y/o F pmhx advanced dementia, HTN, HLD, DM2, anemia, CAD (s/p LAD and LCx stent 2010), HFrEF (EF 40-45% 2021), h/o TIA no residual deficits, h/o recurrent UTI presents w/ syncope and fall w/o trauma in the setting of 1 month of poor PO intake, nausea, vomiting, epigastric pain. C/f failure to thrive i/s/o ?PUD? vs. dementia. Syncope likely orthostatic in nature. PEG tube placed by GI due to failure to thrive.

## 2023-12-09 NOTE — PROGRESS NOTE ADULT - PROBLEM SELECTOR PLAN 1
- Differential for syncope includes orthostatic hypotension vs. cardiac (arrythmia, valvular disease) vs. neuro  - Pt has h/o HFrEF, so increased risk of arrythmia  - Favor orthostatic hypotension as cause as pt has positive orthostatics (103/46 --> 65/45), repeat orthos ordered negative on evening 12/8  - Sinus wilber on tele, HR increases with standing vitals. Unclear if causing   - TTE with LVEF 39%.   - continue to monitor on tele: sinus 60-70  - appreciate cards and EP recs, no plans for PPM at this time

## 2023-12-09 NOTE — PROGRESS NOTE ADULT - PROBLEM SELECTOR PLAN 5
- pt eating less 2/2 reported epigastric pain/nausea  - also suspect there is an element of progressing dementia  - fluid resuscitation  - encourage PO intake, honor dietary preferences  - PEG tube placed on 12/06 tube feeds initiated on 12/07 after nutrition recs, tolerated trickle feeds, will uptitrate over the weekend, at 30cc per hour increase q8h

## 2023-12-10 LAB
ANION GAP SERPL CALC-SCNC: 8 MMOL/L — SIGNIFICANT CHANGE UP (ref 5–17)
ANION GAP SERPL CALC-SCNC: 8 MMOL/L — SIGNIFICANT CHANGE UP (ref 5–17)
BUN SERPL-MCNC: 44 MG/DL — HIGH (ref 7–23)
BUN SERPL-MCNC: 44 MG/DL — HIGH (ref 7–23)
CALCIUM SERPL-MCNC: 8.4 MG/DL — SIGNIFICANT CHANGE UP (ref 8.4–10.5)
CALCIUM SERPL-MCNC: 8.4 MG/DL — SIGNIFICANT CHANGE UP (ref 8.4–10.5)
CHLORIDE SERPL-SCNC: 109 MMOL/L — HIGH (ref 96–108)
CHLORIDE SERPL-SCNC: 109 MMOL/L — HIGH (ref 96–108)
CO2 SERPL-SCNC: 28 MMOL/L — SIGNIFICANT CHANGE UP (ref 22–31)
CO2 SERPL-SCNC: 28 MMOL/L — SIGNIFICANT CHANGE UP (ref 22–31)
CREAT SERPL-MCNC: 2.99 MG/DL — HIGH (ref 0.5–1.3)
CREAT SERPL-MCNC: 2.99 MG/DL — HIGH (ref 0.5–1.3)
EGFR: 14 ML/MIN/1.73M2 — LOW
EGFR: 14 ML/MIN/1.73M2 — LOW
GLUCOSE BLDC GLUCOMTR-MCNC: 115 MG/DL — HIGH (ref 70–99)
GLUCOSE BLDC GLUCOMTR-MCNC: 115 MG/DL — HIGH (ref 70–99)
GLUCOSE BLDC GLUCOMTR-MCNC: 132 MG/DL — HIGH (ref 70–99)
GLUCOSE BLDC GLUCOMTR-MCNC: 132 MG/DL — HIGH (ref 70–99)
GLUCOSE BLDC GLUCOMTR-MCNC: 137 MG/DL — HIGH (ref 70–99)
GLUCOSE BLDC GLUCOMTR-MCNC: 137 MG/DL — HIGH (ref 70–99)
GLUCOSE BLDC GLUCOMTR-MCNC: 140 MG/DL — HIGH (ref 70–99)
GLUCOSE BLDC GLUCOMTR-MCNC: 140 MG/DL — HIGH (ref 70–99)
GLUCOSE BLDC GLUCOMTR-MCNC: 154 MG/DL — HIGH (ref 70–99)
GLUCOSE BLDC GLUCOMTR-MCNC: 154 MG/DL — HIGH (ref 70–99)
GLUCOSE BLDC GLUCOMTR-MCNC: 156 MG/DL — HIGH (ref 70–99)
GLUCOSE BLDC GLUCOMTR-MCNC: 156 MG/DL — HIGH (ref 70–99)
GLUCOSE SERPL-MCNC: 130 MG/DL — HIGH (ref 70–99)
GLUCOSE SERPL-MCNC: 130 MG/DL — HIGH (ref 70–99)
MAGNESIUM SERPL-MCNC: 1.7 MG/DL — SIGNIFICANT CHANGE UP (ref 1.6–2.6)
MAGNESIUM SERPL-MCNC: 1.7 MG/DL — SIGNIFICANT CHANGE UP (ref 1.6–2.6)
PHOSPHATE SERPL-MCNC: 3.2 MG/DL — SIGNIFICANT CHANGE UP (ref 2.5–4.5)
PHOSPHATE SERPL-MCNC: 3.2 MG/DL — SIGNIFICANT CHANGE UP (ref 2.5–4.5)
POTASSIUM SERPL-MCNC: 4.2 MMOL/L — SIGNIFICANT CHANGE UP (ref 3.5–5.3)
POTASSIUM SERPL-MCNC: 4.2 MMOL/L — SIGNIFICANT CHANGE UP (ref 3.5–5.3)
POTASSIUM SERPL-SCNC: 4.2 MMOL/L — SIGNIFICANT CHANGE UP (ref 3.5–5.3)
POTASSIUM SERPL-SCNC: 4.2 MMOL/L — SIGNIFICANT CHANGE UP (ref 3.5–5.3)
SODIUM SERPL-SCNC: 145 MMOL/L — SIGNIFICANT CHANGE UP (ref 135–145)
SODIUM SERPL-SCNC: 145 MMOL/L — SIGNIFICANT CHANGE UP (ref 135–145)

## 2023-12-10 PROCEDURE — 99232 SBSQ HOSP IP/OBS MODERATE 35: CPT

## 2023-12-10 RX ADMIN — Medication 650 MILLIGRAM(S): at 11:07

## 2023-12-10 RX ADMIN — Medication 6 MILLIGRAM(S): at 21:42

## 2023-12-10 RX ADMIN — POLYETHYLENE GLYCOL 3350 17 GRAM(S): 17 POWDER, FOR SOLUTION ORAL at 11:07

## 2023-12-10 RX ADMIN — PANTOPRAZOLE SODIUM 40 MILLIGRAM(S): 20 TABLET, DELAYED RELEASE ORAL at 06:00

## 2023-12-10 RX ADMIN — CLOPIDOGREL BISULFATE 75 MILLIGRAM(S): 75 TABLET, FILM COATED ORAL at 11:06

## 2023-12-10 RX ADMIN — Medication 1300 MILLIGRAM(S): at 06:00

## 2023-12-10 RX ADMIN — AMLODIPINE BESYLATE 5 MILLIGRAM(S): 2.5 TABLET ORAL at 06:00

## 2023-12-10 RX ADMIN — ESCITALOPRAM OXALATE 10 MILLIGRAM(S): 10 TABLET, FILM COATED ORAL at 11:07

## 2023-12-10 RX ADMIN — Medication 1300 MILLIGRAM(S): at 14:02

## 2023-12-10 RX ADMIN — Medication 1: at 20:06

## 2023-12-10 RX ADMIN — Medication 2000 UNIT(S): at 11:06

## 2023-12-10 RX ADMIN — CHLORHEXIDINE GLUCONATE 1 APPLICATION(S): 213 SOLUTION TOPICAL at 11:06

## 2023-12-10 RX ADMIN — Medication 650 MILLIGRAM(S): at 12:07

## 2023-12-10 RX ADMIN — ATORVASTATIN CALCIUM 80 MILLIGRAM(S): 80 TABLET, FILM COATED ORAL at 21:34

## 2023-12-10 RX ADMIN — Medication 1 MILLIGRAM(S): at 11:07

## 2023-12-10 RX ADMIN — Medication 1300 MILLIGRAM(S): at 21:34

## 2023-12-10 NOTE — PROGRESS NOTE ADULT - PROBLEM SELECTOR PLAN 8
Patient Instructions by Ezar Biswas MD at 06/12/18 11:20 AM     Author:  Ezra Biswas MD Service:  (none) Author Type:  Physician     Filed:  06/12/18 11:21 AM Encounter Date:  6/12/2018 Status:  Signed     :  Ezra Biswas MD (Physician)            PATIENT INFORMATION   .    Follow-Up  -- You have been referred to:Physical Therapy   Physical Therapy Location     [facility]   [address]  [address] [address] [facility]  [address] [address],  [place]  [address] [address]  [address] [address]  [address]          [phone]            · Occupational Therapy (Hand/Upper extremity therapy) is offered at the [facility]  · Please dress according to treatment area (ex: knee treatment needs to wear shorts)  · Co-payments are due at the time of appointment  · To ensure your visit goes as smooth as possible, please check your insurance benefits for coverage of physical therapy and bring a copy of your insurance card with you.    . Please call if you have not heard from that department in 3 days.     Additional Educational Resources:  For additional resources regarding your symptoms, diagnosis, or further health information, please visit the Health Resources section on [email or web address] or the Online Health Resources section in [email or web address].          Revision History        User Key Date/Time User Provider Type Action    > [N/A] 06/12/18 11:21 AM Ezra Biswas MD Physician Sign             - controlled w/ diet  - A1c 6.3%  - FS/low ISS  - hypoglycemia protocol in place given poor PO intake

## 2023-12-10 NOTE — PROGRESS NOTE ADULT - PROBLEM SELECTOR PLAN 9
- home regimen: Plavix 75mg qd, rosuvastatin 40mg qd, Zetia 10mg qd  - Zetia is nonformulary  - intertherapeutic interchange of rosuvastatin 40mg to lipitor 80mg qd    - Held Plavix (last dose 11/30), hold for 48 hours after PEG placement, resumed

## 2023-12-10 NOTE — PROGRESS NOTE ADULT - SUBJECTIVE AND OBJECTIVE BOX
[facility]  **********************************     DATE OF SERVICE: 12-10-23    Patient denies chest pain or shortness of breath.   Review of symptoms otherwise negative.    MEDICATIONS:  acetaminophen     Tablet .. 650 milliGRAM(s) Oral every 6 hours PRN  aluminum hydroxide/magnesium hydroxide/simethicone Suspension 30 milliLiter(s) Oral every 4 hours PRN  amLODIPine   Tablet 5 milliGRAM(s) Oral daily  atorvastatin 80 milliGRAM(s) Oral at bedtime  chlorhexidine 2% Cloths 1 Application(s) Topical daily  cholecalciferol 2000 Unit(s) Oral daily  clopidogrel Tablet 75 milliGRAM(s) Oral daily  dextrose 5%. 1000 milliLiter(s) IV Continuous <Continuous>  dextrose 5%. 1000 milliLiter(s) IV Continuous <Continuous>  dextrose 50% Injectable 25 Gram(s) IV Push once  dextrose 50% Injectable 25 Gram(s) IV Push once  dextrose 50% Injectable 12.5 Gram(s) IV Push once  dextrose Oral Gel 15 Gram(s) Oral once PRN  escitalopram 10 milliGRAM(s) Oral daily  folic acid 1 milliGRAM(s) Oral daily  glucagon  Injectable 1 milliGRAM(s) IntraMuscular once  influenza  Vaccine (HIGH DOSE) 0.7 milliLiter(s) IntraMuscular once  insulin lispro (ADMELOG) corrective regimen sliding scale   SubCutaneous every 6 hours  melatonin Liquid 6 milliGRAM(s) Oral at bedtime  ondansetron Injectable 4 milliGRAM(s) IV Push every 8 hours PRN  pantoprazole   Suspension 40 milliGRAM(s) Oral before breakfast  polyethylene glycol 3350 17 Gram(s) Oral daily  sodium bicarbonate 1300 milliGRAM(s) Oral three times a day      LABS:                        8.9    9.66  )-----------( 121      ( 09 Dec 2023 06:14 )             27.7       Hemoglobin: 8.9 g/dL (12-09 @ 06:14)  Hemoglobin: 8.3 g/dL (12-08 @ 14:14)  Hemoglobin: 8.4 g/dL (12-07 @ 10:55)  Hemoglobin: 8.8 g/dL (12-06 @ 07:14)  Hemoglobin: 8.8 g/dL (12-06 @ 07:13)      12-10    145  |  109<H>  |  44<H>  ----------------------------<  130<H>  4.2   |  28  |  2.99<H>    Ca    8.4      10 Dec 2023 06:18  Phos  3.2     12-10  Mg     1.7     12-10      Creatinine Trend: 2.99<--, 3.19<--, 3.31<--, 3.69<--, 4.30<--, 4.88<--    PHYSICAL EXAM:  T(C): 36.6 (12-10-23 @ 04:21), Max: 36.9 (12-09-23 @ 21:17)  HR: 71 (12-10-23 @ 04:21) (61 - 71)  BP: 146/55 (12-10-23 @ 04:21) (109/66 - 146/66)  RR: 18 (12-10-23 @ 04:21) (18 - 18)  SpO2: 96% (12-10-23 @ 04:21) (96% - 98%)  Wt(kg): --    I&O's Summary    09 Dec 2023 07:01  -  10 Dec 2023 07:00  --------------------------------------------------------  IN: 480 mL / OUT: 0 mL / NET: 480 mL      Gen: NAD  HEENT:  (-)icterus (-)pallor  CV: N S1 S2 1/6 MANASA (+)2 Pulses B/l  Resp:  Clear to auscultation B/L, normal effort  GI: (+) BS Soft, NT, ND  Lymph:  (-)Edema, (-)obvious lymphadenopathy  Skin: Warm to touch, Normal turgor  Psych: Appropriate mood and affect      TELEMETRY: SB/SR 50-60s    RADIOLOGY:         CXR: < from: Xray Chest 1 View- PORTABLE-Urgent (11.26.23 @ 02:59) >  IMPRESSION:  Slightly elevated right hemidiaphragm. Underinflated but otherwise clear   lungs. No pleural effusions or pneumothorax.    Stable cardiac and mediastinal silhouettes including aortic   calcifications and convex fullness of superior mediastinal/paratracheal   soft tissue contour which could be related to engorged/tortuous   brachiocephalic vasculature.    Generalized osteopenia.    --- End of Report ---    < end of copied text >      ASSESSMENT/PLAN: Patient is a 89 y/o Female with PMH of advanced dementia, HTN, HLD, DM2, anemia, CAD (s/p LAD and LCx stent 2010), HFrEF (EF 40-45% 2021), h/o TIA no residual deficits, h/o recurrent UTI who presented with syncope and fall w/o trauma in the setting of 1 month of poor PO intake, nausea, vomiting, epigastric pain. Found to have LESLIE, sinus bradycardia and orthostatic hypotension. Cardiology consulted for further evaluation.    #Syncope  - Suspect due to orthostatic hypotension  - TTE noted with EF 39%, mod AI  - Monitor telemetry however appears asymptomatic in terms of sinus bradycardia    #Orthostatic Hypotension  - Holding home antihypertensives/GDMT  - Avoid midodrine and florinef given CHF  - Continue compression stockings and monitor for improvement    #HTN  - Will need to allow some hypertension (SBP 160s) given orthostatic hypotension  - Continue Amlodipine 5mg daily, BP improving    #Sinus Bradycardia  - Appears asymptomatic at rest  - EP eval appreciated - no need for PPM as she's asymptomatic    #New PAF  - EP eval noted - recommend AC if no contraindication and within GOC. Family opted for no AC given risks per med discussion    #LESLIE  - Management per renal    #CAD  - Plavix held for PEG - restart when ok with GI  - Continue Lipitor    #GOC  - DNR/DNI    #Failure to Thrive  - GI eval noted - s/p PEG on 12/6  - Tolerated procedure well from CV perspective    - No further inpatient cardiac w/u planned    Brent Garcai MD  Pager: [phone]    [facility]  **********************************     DATE OF SERVICE: 12-10-23    Patient denies chest pain or shortness of breath.   Review of symptoms otherwise negative.    MEDICATIONS:  acetaminophen     Tablet .. 650 milliGRAM(s) Oral every 6 hours PRN  aluminum hydroxide/magnesium hydroxide/simethicone Suspension 30 milliLiter(s) Oral every 4 hours PRN  amLODIPine   Tablet 5 milliGRAM(s) Oral daily  atorvastatin 80 milliGRAM(s) Oral at bedtime  chlorhexidine 2% Cloths 1 Application(s) Topical daily  cholecalciferol 2000 Unit(s) Oral daily  clopidogrel Tablet 75 milliGRAM(s) Oral daily  dextrose 5%. 1000 milliLiter(s) IV Continuous <Continuous>  dextrose 5%. 1000 milliLiter(s) IV Continuous <Continuous>  dextrose 50% Injectable 25 Gram(s) IV Push once  dextrose 50% Injectable 25 Gram(s) IV Push once  dextrose 50% Injectable 12.5 Gram(s) IV Push once  dextrose Oral Gel 15 Gram(s) Oral once PRN  escitalopram 10 milliGRAM(s) Oral daily  folic acid 1 milliGRAM(s) Oral daily  glucagon  Injectable 1 milliGRAM(s) IntraMuscular once  influenza  Vaccine (HIGH DOSE) 0.7 milliLiter(s) IntraMuscular once  insulin lispro (ADMELOG) corrective regimen sliding scale   SubCutaneous every 6 hours  melatonin Liquid 6 milliGRAM(s) Oral at bedtime  ondansetron Injectable 4 milliGRAM(s) IV Push every 8 hours PRN  pantoprazole   Suspension 40 milliGRAM(s) Oral before breakfast  polyethylene glycol 3350 17 Gram(s) Oral daily  sodium bicarbonate 1300 milliGRAM(s) Oral three times a day      LABS:                        8.9    9.66  )-----------( 121      ( 09 Dec 2023 06:14 )             27.7       Hemoglobin: 8.9 g/dL (12-09 @ 06:14)  Hemoglobin: 8.3 g/dL (12-08 @ 14:14)  Hemoglobin: 8.4 g/dL (12-07 @ 10:55)  Hemoglobin: 8.8 g/dL (12-06 @ 07:14)  Hemoglobin: 8.8 g/dL (12-06 @ 07:13)      12-10    145  |  109<H>  |  44<H>  ----------------------------<  130<H>  4.2   |  28  |  2.99<H>    Ca    8.4      10 Dec 2023 06:18  Phos  3.2     12-10  Mg     1.7     12-10      Creatinine Trend: 2.99<--, 3.19<--, 3.31<--, 3.69<--, 4.30<--, 4.88<--    PHYSICAL EXAM:  T(C): 36.6 (12-10-23 @ 04:21), Max: 36.9 (12-09-23 @ 21:17)  HR: 71 (12-10-23 @ 04:21) (61 - 71)  BP: 146/55 (12-10-23 @ 04:21) (109/66 - 146/66)  RR: 18 (12-10-23 @ 04:21) (18 - 18)  SpO2: 96% (12-10-23 @ 04:21) (96% - 98%)  Wt(kg): --    I&O's Summary    09 Dec 2023 07:01  -  10 Dec 2023 07:00  --------------------------------------------------------  IN: 480 mL / OUT: 0 mL / NET: 480 mL      Gen: NAD  HEENT:  (-)icterus (-)pallor  CV: N S1 S2 1/6 MANASA (+)2 Pulses B/l  Resp:  Clear to auscultation B/L, normal effort  GI: (+) BS Soft, NT, ND  Lymph:  (-)Edema, (-)obvious lymphadenopathy  Skin: Warm to touch, Normal turgor  Psych: Appropriate mood and affect      TELEMETRY: SB/SR 50-60s    RADIOLOGY:         CXR: < from: Xray Chest 1 View- PORTABLE-Urgent (11.26.23 @ 02:59) >  IMPRESSION:  Slightly elevated right hemidiaphragm. Underinflated but otherwise clear   lungs. No pleural effusions or pneumothorax.    Stable cardiac and mediastinal silhouettes including aortic   calcifications and convex fullness of superior mediastinal/paratracheal   soft tissue contour which could be related to engorged/tortuous   brachiocephalic vasculature.    Generalized osteopenia.    --- End of Report ---    < end of copied text >      ASSESSMENT/PLAN: Patient is a 89 y/o Female with PMH of advanced dementia, HTN, HLD, DM2, anemia, CAD (s/p LAD and LCx stent 2010), HFrEF (EF 40-45% 2021), h/o TIA no residual deficits, h/o recurrent UTI who presented with syncope and fall w/o trauma in the setting of 1 month of poor PO intake, nausea, vomiting, epigastric pain. Found to have LESLIE, sinus bradycardia and orthostatic hypotension. Cardiology consulted for further evaluation.    #Syncope  - Suspect due to orthostatic hypotension  - TTE noted with EF 39%, mod AI  - Monitor telemetry however appears asymptomatic in terms of sinus bradycardia    #Orthostatic Hypotension  - Holding home antihypertensives/GDMT  - Avoid midodrine and florinef given CHF  - Continue compression stockings and monitor for improvement    #HTN  - Will need to allow some hypertension (SBP 160s) given orthostatic hypotension  - Continue Amlodipine 5mg daily, BP improving    #Sinus Bradycardia  - Appears asymptomatic at rest  - EP eval appreciated - no need for PPM as she's asymptomatic    #New PAF  - EP eval noted - recommend AC if no contraindication and within GOC. Family opted for no AC given risks per med discussion    #LESLIE  - Management per renal    #CAD  - Plavix held for PEG - restart when ok with GI  - Continue Lipitor    #GOC  - DNR/DNI    #Failure to Thrive  - GI eval noted - s/p PEG on 12/6  - Tolerated procedure well from CV perspective    - No further inpatient cardiac w/u planned    Brent Garcia MD  Pager: [phone]

## 2023-12-10 NOTE — PROGRESS NOTE ADULT - ASSESSMENT
89 y/o F pmhx advanced dementia, HTN, HLD, DM2, anemia, CAD (s/p LAD and LCx stent 2010), HFrEF (EF 40-45% 2021), h/o TIA no residual deficits, h/o recurrent UTI presents w/ syncope and fall w/o trauma in the setting of 1 month of poor PO intake, nausea, vomiting, epigastric pain. C/f failure to thrive i/s/o ?PUD? vs. dementia. Syncope likely orthostatic in nature. PEG tube placed by GI due to failure to thrive.

## 2023-12-10 NOTE — PROGRESS NOTE ADULT - PROBLEM SELECTOR PLAN 5
- pt eating less 2/2 reported epigastric pain/nausea  - also suspect there is an element of progressing dementia  - fluid resuscitation  - encourage PO intake, honor dietary preferences  - PEG tube placed on 12/06 tube feeds initiated on 12/07 after nutrition recs, tolerated trickle feeds, will uptitrate over the weekend, at 30cc per hour increase q8h- currently at goal

## 2023-12-10 NOTE — PROGRESS NOTE ADULT - PROBLEM SELECTOR PLAN 11
DVT ppx: held AC for PEG placement. no further A/C per previous discussion with family   Diet: TF as above   Dispo: [id number]

## 2023-12-10 NOTE — PROGRESS NOTE ADULT - SUBJECTIVE AND OBJECTIVE BOX
[facility] Division of Hospital Medicine  Eleni Campbell MD  Available via MS Teams      SUBJECTIVE / OVERNIGHT EVENTS:    ADDITIONAL REVIEW OF SYSTEMS:    MEDICATIONS  (STANDING):  amLODIPine   Tablet 5 milliGRAM(s) Oral daily  atorvastatin 80 milliGRAM(s) Oral at bedtime  chlorhexidine 2% Cloths 1 Application(s) Topical daily  cholecalciferol 2000 Unit(s) Oral daily  clopidogrel Tablet 75 milliGRAM(s) Oral daily  dextrose 5%. 1000 milliLiter(s) (50 mL/Hr) IV Continuous <Continuous>  dextrose 5%. 1000 milliLiter(s) (100 mL/Hr) IV Continuous <Continuous>  dextrose 50% Injectable 12.5 Gram(s) IV Push once  dextrose 50% Injectable 25 Gram(s) IV Push once  dextrose 50% Injectable 25 Gram(s) IV Push once  escitalopram 10 milliGRAM(s) Oral daily  folic acid 1 milliGRAM(s) Oral daily  glucagon  Injectable 1 milliGRAM(s) IntraMuscular once  influenza  Vaccine (HIGH DOSE) 0.7 milliLiter(s) IntraMuscular once  insulin lispro (ADMELOG) corrective regimen sliding scale   SubCutaneous every 6 hours  melatonin Liquid 6 milliGRAM(s) Oral at bedtime  pantoprazole   Suspension 40 milliGRAM(s) Oral before breakfast  polyethylene glycol 3350 17 Gram(s) Oral daily  sodium bicarbonate 1300 milliGRAM(s) Oral three times a day    MEDICATIONS  (PRN):  acetaminophen     Tablet .. 650 milliGRAM(s) Oral every 6 hours PRN Temp greater or equal to 38C (100.4F), Mild Pain (1 - 3)  aluminum hydroxide/magnesium hydroxide/simethicone Suspension 30 milliLiter(s) Oral every 4 hours PRN Dyspepsia  dextrose Oral Gel 15 Gram(s) Oral once PRN Blood Glucose LESS THAN 70 milliGRAM(s)/deciliter  ondansetron Injectable 4 milliGRAM(s) IV Push every 8 hours PRN Nausea and/or Vomiting      I&O's Summary    09 Dec 2023 07:01  -  10 Dec 2023 07:00  --------------------------------------------------------  IN: 480 mL / OUT: 0 mL / NET: 480 mL        PHYSICAL EXAM:  Vital Signs Last 24 Hrs  T(C): 36.6 (10 Dec 2023 04:21), Max: 36.9 (09 Dec 2023 21:17)  T(F): 97.9 (10 Dec 2023 04:21), Max: 98.4 (09 Dec 2023 21:17)  HR: 71 (10 Dec 2023 04:21) (61 - 71)  BP: 146/55 (10 Dec 2023 04:21) (109/66 - 146/66)  BP(mean): --  RR: 18 (10 Dec 2023 04:21) (18 - 18)  SpO2: 96% (10 Dec 2023 04:21) (96% - 98%)    Parameters below as of 10 Dec 2023 04:21  Patient On (Oxygen Delivery Method): room air      CONSTITUTIONAL: NAD, well-developed  RESPIRATORY: Normal respiratory effort; lungs are clear to auscultation bilaterally  CARDIOVASCULAR: Regular rate and rhythm, normal S1 and S2, no murmur/rub/gallop; No lower extremity edema; Peripheral pulses are 2+ bilaterally  ABDOMEN: Nontender to palpation, normoactive bowel sounds, no rebound/guarding; No hepatosplenomegaly, PEG in place with abdominal binder  MUSCLOSKELETAL: no clubbing or cyanosis of digits; no joint swelling or tenderness to palpation  PSYCH: A+O to person, place, affect appropriate    LABS:                        8.9    9.66  )-----------( 121      ( 09 Dec 2023 06:14 )             27.7     12-10    145  |  109<H>  |  44<H>  ----------------------------<  130<H>  4.2   |  28  |  2.99<H>    Ca    8.4      10 Dec 2023 06:18  Phos  3.2     12-10  Mg     1.7     12-10            Urinalysis Basic - ( 10 Dec 2023 06:18 )    Color: x / Appearance: x / SG: x / pH: x  Gluc: 130 mg/dL / Ketone: x  / Bili: x / Urobili: x   Blood: x / Protein: x / Nitrite: x   Leuk Esterase: x / RBC: x / WBC x   Sq Epi: x / Non Sq Epi: x / Bacteria: x        COVID-19 PCR: NotDetec (07 Dec 2023 13:44)      RADIOLOGY & ADDITIONAL TESTS:  New Imaging Personally Reviewed Today:  New Electrocardiogram Personally Reviewed Today:  Other Results Reviewed Today:   Prior or Outpatient Records Reviewed Today with Summary:    COORDINATION OF CARE:  Consultant Communication and Details of Discussion (where applicable):     [facility] Division of Hospital Medicine  Eleni Campbell MD  Available via MS Teams      SUBJECTIVE / OVERNIGHT EVENTS:    ADDITIONAL REVIEW OF SYSTEMS:    MEDICATIONS  (STANDING):  amLODIPine   Tablet 5 milliGRAM(s) Oral daily  atorvastatin 80 milliGRAM(s) Oral at bedtime  chlorhexidine 2% Cloths 1 Application(s) Topical daily  cholecalciferol 2000 Unit(s) Oral daily  clopidogrel Tablet 75 milliGRAM(s) Oral daily  dextrose 5%. 1000 milliLiter(s) (50 mL/Hr) IV Continuous <Continuous>  dextrose 5%. 1000 milliLiter(s) (100 mL/Hr) IV Continuous <Continuous>  dextrose 50% Injectable 12.5 Gram(s) IV Push once  dextrose 50% Injectable 25 Gram(s) IV Push once  dextrose 50% Injectable 25 Gram(s) IV Push once  escitalopram 10 milliGRAM(s) Oral daily  folic acid 1 milliGRAM(s) Oral daily  glucagon  Injectable 1 milliGRAM(s) IntraMuscular once  influenza  Vaccine (HIGH DOSE) 0.7 milliLiter(s) IntraMuscular once  insulin lispro (ADMELOG) corrective regimen sliding scale   SubCutaneous every 6 hours  melatonin Liquid 6 milliGRAM(s) Oral at bedtime  pantoprazole   Suspension 40 milliGRAM(s) Oral before breakfast  polyethylene glycol 3350 17 Gram(s) Oral daily  sodium bicarbonate 1300 milliGRAM(s) Oral three times a day    MEDICATIONS  (PRN):  acetaminophen     Tablet .. 650 milliGRAM(s) Oral every 6 hours PRN Temp greater or equal to 38C (100.4F), Mild Pain (1 - 3)  aluminum hydroxide/magnesium hydroxide/simethicone Suspension 30 milliLiter(s) Oral every 4 hours PRN Dyspepsia  dextrose Oral Gel 15 Gram(s) Oral once PRN Blood Glucose LESS THAN 70 milliGRAM(s)/deciliter  ondansetron Injectable 4 milliGRAM(s) IV Push every 8 hours PRN Nausea and/or Vomiting      I&O's Summary    09 Dec 2023 07:01  -  10 Dec 2023 07:00  --------------------------------------------------------  IN: 480 mL / OUT: 0 mL / NET: 480 mL        PHYSICAL EXAM:  Vital Signs Last 24 Hrs  T(C): 36.6 (10 Dec 2023 04:21), Max: 36.9 (09 Dec 2023 21:17)  T(F): 97.9 (10 Dec 2023 04:21), Max: 98.4 (09 Dec 2023 21:17)  HR: 71 (10 Dec 2023 04:21) (61 - 71)  BP: 146/55 (10 Dec 2023 04:21) (109/66 - 146/66)  BP(mean): --  RR: 18 (10 Dec 2023 04:21) (18 - 18)  SpO2: 96% (10 Dec 2023 04:21) (96% - 98%)    Parameters below as of 10 Dec 2023 04:21  Patient On (Oxygen Delivery Method): room air      CONSTITUTIONAL: NAD, well-developed  RESPIRATORY: Normal respiratory effort; lungs are clear to auscultation bilaterally  CARDIOVASCULAR: Regular rate and rhythm, normal S1 and S2, no murmur/rub/gallop; No lower extremity edema; Peripheral pulses are 2+ bilaterally  ABDOMEN: Nontender to palpation, normoactive bowel sounds, no rebound/guarding; No hepatosplenomegaly, PEG in place with abdominal binder  MUSCLOSKELETAL: no clubbing or cyanosis of digits; no joint swelling or tenderness to palpation  PSYCH: A+O to person, place, affect appropriate    LABS:                        8.9    9.66  )-----------( 121      ( 09 Dec 2023 06:14 )             27.7     12-10    145  |  109<H>  |  44<H>  ----------------------------<  130<H>  4.2   |  28  |  2.99<H>    Ca    8.4      10 Dec 2023 06:18  Phos  3.2     12-10  Mg     1.7     12-10            Urinalysis Basic - ( 10 Dec 2023 06:18 )    Color: x / Appearance: x / SG: x / pH: x  Gluc: 130 mg/dL / Ketone: x  / Bili: x / Urobili: x   Blood: x / Protein: x / Nitrite: x   Leuk Esterase: x / RBC: x / WBC x   Sq Epi: x / Non Sq Epi: x / Bacteria: x        COVID-19 PCR: NotDetec (07 Dec 2023 13:44)      RADIOLOGY & ADDITIONAL TESTS:  New Imaging Personally Reviewed Today:  New Electrocardiogram Personally Reviewed Today:  Other Results Reviewed Today:   Prior or Outpatient Records Reviewed Today with Summary:    COORDINATION OF CARE:  Consultant Communication and Details of Discussion (where applicable):

## 2023-12-11 LAB
GLUCOSE BLDC GLUCOMTR-MCNC: 121 MG/DL — HIGH (ref 70–99)
GLUCOSE BLDC GLUCOMTR-MCNC: 121 MG/DL — HIGH (ref 70–99)
GLUCOSE BLDC GLUCOMTR-MCNC: 129 MG/DL — HIGH (ref 70–99)
GLUCOSE BLDC GLUCOMTR-MCNC: 129 MG/DL — HIGH (ref 70–99)
GLUCOSE BLDC GLUCOMTR-MCNC: 164 MG/DL — HIGH (ref 70–99)
GLUCOSE BLDC GLUCOMTR-MCNC: 164 MG/DL — HIGH (ref 70–99)

## 2023-12-11 PROCEDURE — 99232 SBSQ HOSP IP/OBS MODERATE 35: CPT

## 2023-12-11 RX ADMIN — Medication 6 MILLIGRAM(S): at 22:35

## 2023-12-11 RX ADMIN — CHLORHEXIDINE GLUCONATE 1 APPLICATION(S): 213 SOLUTION TOPICAL at 11:27

## 2023-12-11 RX ADMIN — Medication 1300 MILLIGRAM(S): at 22:30

## 2023-12-11 RX ADMIN — Medication 1 MILLIGRAM(S): at 11:28

## 2023-12-11 RX ADMIN — CLOPIDOGREL BISULFATE 75 MILLIGRAM(S): 75 TABLET, FILM COATED ORAL at 11:27

## 2023-12-11 RX ADMIN — ATORVASTATIN CALCIUM 80 MILLIGRAM(S): 80 TABLET, FILM COATED ORAL at 22:30

## 2023-12-11 RX ADMIN — Medication 2000 UNIT(S): at 11:27

## 2023-12-11 RX ADMIN — ESCITALOPRAM OXALATE 10 MILLIGRAM(S): 10 TABLET, FILM COATED ORAL at 11:28

## 2023-12-11 RX ADMIN — PANTOPRAZOLE SODIUM 40 MILLIGRAM(S): 20 TABLET, DELAYED RELEASE ORAL at 06:19

## 2023-12-11 RX ADMIN — Medication 1300 MILLIGRAM(S): at 13:42

## 2023-12-11 RX ADMIN — Medication 650 MILLIGRAM(S): at 20:28

## 2023-12-11 RX ADMIN — AMLODIPINE BESYLATE 5 MILLIGRAM(S): 2.5 TABLET ORAL at 06:27

## 2023-12-11 RX ADMIN — Medication 1300 MILLIGRAM(S): at 06:15

## 2023-12-11 RX ADMIN — Medication 1: at 11:45

## 2023-12-11 RX ADMIN — Medication 650 MILLIGRAM(S): at 21:35

## 2023-12-11 NOTE — PROVIDER CONTACT NOTE (OTHER) - ASSESSMENT
Patient A&Ox1. No complaints or indicators of chest pain, palpitations, SOB, or dizziness. Patient vital signs stable. no acute events; SB on tele monitoring; on RA. patient rested calm all night but refused PO AM meds. Provider stated that he believes she may become agitated because she was previously really agitated. Ok'd to D/C enhanced supervision if RN and floor team think its okay. I spoke with nurse manager Abbe and informed her that we can D/C enhanced supervision but that patient may need to be placed back on it depending on how she does throughout the day
Pt. A&O x1-2, VSS except high BP and low HR at times. patient has no symptoms.
Patient A&Ox1. No complaints or indicators of chest pain, palpitations, SOB, or dizziness. Patient vital signs stable. no acute events;
pt refuses to eat, pushes food away
Pt AOx1. Pt aggitated, restless, pulling on IV and Cornell. Unable to reorient. 1:1 initited for patient safety and safety of others.
Pt is awake and alert, lethargic, restless when awake and uncooperative, denies any symptoms. HR dropped to 44 as per tele, not sustaining. NSB 45-55 on tele otherwise. Pt sleeps most of the time. Pt stated "I don't want to take meds". Multiple attempts made, pt keep refusing
Patient is A&Ox2. Patient denies chest pain, SOB, and lightheadedness.
tele alerted RN pt has converted to afib 100-150s. upon RN assessment, pt a&ox2, denies chest pain, palpitations or sob. /77, , O2 96% on ra, afebrile
Patient A&Ox1. No complaints or indicators of chest pain, palpitations, SOB, or dizziness. Patient vital signs stable. no acute events;
Patient's VSS: /60, Temp: 98.4, HR: 50, O2 97%. Patient is A&Ox2, she was just woken up and was alarmed when woken up.
Patient A&Ox1.  No complaints or indicators of chest pain, palpitations, SOB, or dizziness. Patient is bradycardic on tele, Patient vital signs otherwise stable. Patient refusing to take medications
Pt is awake and alert, lethargic, restless when awake and uncooperative, denies any symptoms. HR dropped to 44 as per tele, not sustaining. NSB 45-55 on tele otherwise. Pt sleeps most of the time
Pt. A&O X1-2, blood seen under midline dressing. No symptoms.
Pt AAOx2. VSS on RA; denies CP, SOB.
Patient is A&Ox2. Patient denies headache, blurry vision, palpitations, dizziness.

## 2023-12-11 NOTE — PROGRESS NOTE ADULT - PROBLEM SELECTOR PLAN 1
- Differential for syncope includes orthostatic hypotension vs. cardiac (arrythmia, valvular disease) vs. neuro  - Pt has h/o HFrEF, so increased risk of arrythmia  - +orthostatic hypotension though reportedly improved now.   - Sinus wilber on tele, HR increases with standing vitals.    - TTE with LVEF 39%.   - continue to monitor on tele: sinus 60-70  - appreciate cards and EP recs, no plans for PPM at this time

## 2023-12-11 NOTE — PROGRESS NOTE ADULT - ASSESSMENT
89 y/o F pmhx advanced dementia, HTN, HLD, DM2, anemia, CAD (s/p LAD and LCx stent 2010), HFrEF (EF 40-45% 2021), h/o TIA no residual deficits, h/o recurrent UTI presents w/ syncope and fall w/o trauma in the setting of 1 month of poor PO intake, nausea, vomiting, epigastric pain. C/f failure to thrive i/s/o ?PUD? vs. dementia. Syncope likely orthostatic in nature. PEG tube placed by GI due to failure to thrive.  89 y/o F pmhx advanced dementia, HTN, HLD, DM2, anemia, CAD (s/p LAD and LCx stent 2010), HFrEF (EF 40-45% 2021), h/o TIA no residual deficits, h/o recurrent UTI presents w/ syncope and fall w/o trauma in the setting of 1 month of poor PO intake, nausea, vomiting, epigastric pain. C/f failure to thrive i/s/o ?PUD? vs. dementia. Syncope likely orthostatic in nature. PEG tube placed by GI due to failure to thrive.

## 2023-12-11 NOTE — PROGRESS NOTE ADULT - PROBLEM SELECTOR PLAN 5
- pt eating less 2/2 reported epigastric pain/nausea  - also suspect there is an element of progressing dementia  - encourage PO intake, honor dietary preferences  - PEG tube placed on 12/06 tube feeds initiated on 12/07 after nutrition recs, tolerated trickle feeds- at goal

## 2023-12-11 NOTE — PROVIDER CONTACT NOTE (OTHER) - BACKGROUND
Patient admitted for Acute renal failure
Pt admitted for Acute renal failure
89 y/o F pmhx advanced dementia, HTN, HLD, DM2, anemia, CAD (s/p LAD and LCx stent 2010), HFrEF (EF 40-45% 2021), h/o TIA no residual deficits, h/o recurrent UTI presents w/ syncope and fal
Patient admitted with Acute Renal Failure
acute renal failure
acute renal failure
Patient admitted acute renal failure
acute renal failure
89 y/o F pmhx advanced dementia, HTN, HLD, DM2, anemia, CAD (s/p LAD and LCx stent 2010), HFrEF (EF 40-45% 2021), h/o TIA no residual deficits, h/o recurrent UTI presents w/ syncope and fall
Acute renal failure
Pt diagnosed with acute renal failure
pt admitted for acute renal failure. hx of advanced dementia, htn, hld, tia, HF. a&ox2, has been sinus wilber on tele monitor
Patient came in for syncope, dx with acute renal failure.
Pt. admitted with Acute Renal Failure
acute renal failure, bradycardia

## 2023-12-11 NOTE — PROGRESS NOTE ADULT - SUBJECTIVE AND OBJECTIVE BOX
[facility] Division of Hospital Medicine  Diamante Madrid MD  Pager (M-F, 0H-7T): [phone]  Other Times:  [phone]    Patient is a 89y old  Female who presents with a chief complaint of fall, LESLIE (11 Dec 2023 11:18)      SUBJECTIVE / OVERNIGHT EVENTS:  alert and awake . resposnive but somewhat confused. AAOx2 . no acute overnight issues.   Per nursing staff some watery BM since starting on TF. afebrile . no abd pain or N/V    ADDITIONAL REVIEW OF SYSTEMS: otherwise neg    MEDICATIONS  (STANDING):  amLODIPine   Tablet 5 milliGRAM(s) Oral daily  atorvastatin 80 milliGRAM(s) Oral at bedtime  chlorhexidine 2% Cloths 1 Application(s) Topical daily  cholecalciferol 2000 Unit(s) Oral daily  clopidogrel Tablet 75 milliGRAM(s) Oral daily  dextrose 5%. 1000 milliLiter(s) (50 mL/Hr) IV Continuous <Continuous>  dextrose 5%. 1000 milliLiter(s) (100 mL/Hr) IV Continuous <Continuous>  dextrose 50% Injectable 12.5 Gram(s) IV Push once  dextrose 50% Injectable 25 Gram(s) IV Push once  dextrose 50% Injectable 25 Gram(s) IV Push once  escitalopram 10 milliGRAM(s) Oral daily  folic acid 1 milliGRAM(s) Oral daily  glucagon  Injectable 1 milliGRAM(s) IntraMuscular once  influenza  Vaccine (HIGH DOSE) 0.7 milliLiter(s) IntraMuscular once  insulin lispro (ADMELOG) corrective regimen sliding scale   SubCutaneous every 6 hours  melatonin Liquid 6 milliGRAM(s) Oral at bedtime  pantoprazole   Suspension 40 milliGRAM(s) Oral before breakfast  polyethylene glycol 3350 17 Gram(s) Oral daily  sodium bicarbonate 1300 milliGRAM(s) Oral three times a day    MEDICATIONS  (PRN):  acetaminophen     Tablet .. 650 milliGRAM(s) Oral every 6 hours PRN Temp greater or equal to 38C (100.4F), Mild Pain (1 - 3)  aluminum hydroxide/magnesium hydroxide/simethicone Suspension 30 milliLiter(s) Oral every 4 hours PRN Dyspepsia  dextrose Oral Gel 15 Gram(s) Oral once PRN Blood Glucose LESS THAN 70 milliGRAM(s)/deciliter  ondansetron Injectable 4 milliGRAM(s) IV Push every 8 hours PRN Nausea and/or Vomiting      CAPILLARY BLOOD GLUCOSE      POCT Blood Glucose.: 164 mg/dL (11 Dec 2023 11:35)  POCT Blood Glucose.: 129 mg/dL (11 Dec 2023 06:14)  POCT Blood Glucose.: 132 mg/dL (10 Dec 2023 23:46)  POCT Blood Glucose.: 154 mg/dL (10 Dec 2023 20:04)    I&O's Summary      PHYSICAL EXAM:  Vital Signs Last 24 Hrs  T(C): 36.8 (11 Dec 2023 10:50), Max: 36.8 (10 Dec 2023 16:29)  T(F): 98.2 (11 Dec 2023 10:50), Max: 98.2 (10 Dec 2023 16:29)  HR: 74 (11 Dec 2023 10:50) (71 - 80)  BP: 127/64 (11 Dec 2023 10:50) (122/74 - 173/68)  BP(mean): --  RR: 18 (11 Dec 2023 10:50) (17 - 18)  SpO2: 98% (11 Dec 2023 10:50) (97% - 98%)    Parameters below as of 11 Dec 2023 10:50  Patient On (Oxygen Delivery Method): room air        CONSTITUTIONAL: NAD   EYES:  conjunctiva and sclera clear  ENMT: Moist oral mucosa  NECK: Supple, no palpable masses; no JVD  RESPIRATORY: Normal respiratory effort; lungs are clear to auscultation bilaterally  CARDIOVASCULAR: Regular rate and rhythm, systolic murmur ; trace lower extremity edema  ABDOMEN: Nontender to palpation, normoactive bowel sounds, no rebound/guarding  MUSCULOSKELETAL:  no clubbing or cyanosis of digits; no joint swelling or tenderness to palpation  PSYCH: A+O to person, place ; affect appropriate  SKIN: No rashes; no palpable lesions    LABS:    12-10    145  |  109<H>  |  44<H>  ----------------------------<  130<H>  4.2   |  28  |  2.99<H>    Ca    8.4      10 Dec 2023 06:18  Phos  3.2     12-10  Mg     1.7     12-10            Urinalysis Basic - ( 10 Dec 2023 06:18 )    Color: x / Appearance: x / SG: x / pH: x  Gluc: 130 mg/dL / Ketone: x  / Bili: x / Urobili: x   Blood: x / Protein: x / Nitrite: x   Leuk Esterase: x / RBC: x / WBC x   Sq Epi: x / Non Sq Epi: x / Bacteria: x          RADIOLOGY & ADDITIONAL TESTS:  Results Reviewed:   Imaging Personally Reviewed:  Electrocardiogram Personally Reviewed:    COORDINATION OF CARE:  Care Discussed with Consultants/Other Providers [Y/N]:  Prior or Outpatient Records Reviewed [Y/N]:   [facility] Division of Hospital Medicine  Diamante Madrid MD  Pager (M-F, 2C-3L): [phone]  Other Times:  [phone]    Patient is a 89y old  Female who presents with a chief complaint of fall, LESLIE (11 Dec 2023 11:18)      SUBJECTIVE / OVERNIGHT EVENTS:  alert and awake . resposnive but somewhat confused. AAOx2 . no acute overnight issues.   Per nursing staff some watery BM since starting on TF. afebrile . no abd pain or N/V    ADDITIONAL REVIEW OF SYSTEMS: otherwise neg    MEDICATIONS  (STANDING):  amLODIPine   Tablet 5 milliGRAM(s) Oral daily  atorvastatin 80 milliGRAM(s) Oral at bedtime  chlorhexidine 2% Cloths 1 Application(s) Topical daily  cholecalciferol 2000 Unit(s) Oral daily  clopidogrel Tablet 75 milliGRAM(s) Oral daily  dextrose 5%. 1000 milliLiter(s) (50 mL/Hr) IV Continuous <Continuous>  dextrose 5%. 1000 milliLiter(s) (100 mL/Hr) IV Continuous <Continuous>  dextrose 50% Injectable 12.5 Gram(s) IV Push once  dextrose 50% Injectable 25 Gram(s) IV Push once  dextrose 50% Injectable 25 Gram(s) IV Push once  escitalopram 10 milliGRAM(s) Oral daily  folic acid 1 milliGRAM(s) Oral daily  glucagon  Injectable 1 milliGRAM(s) IntraMuscular once  influenza  Vaccine (HIGH DOSE) 0.7 milliLiter(s) IntraMuscular once  insulin lispro (ADMELOG) corrective regimen sliding scale   SubCutaneous every 6 hours  melatonin Liquid 6 milliGRAM(s) Oral at bedtime  pantoprazole   Suspension 40 milliGRAM(s) Oral before breakfast  polyethylene glycol 3350 17 Gram(s) Oral daily  sodium bicarbonate 1300 milliGRAM(s) Oral three times a day    MEDICATIONS  (PRN):  acetaminophen     Tablet .. 650 milliGRAM(s) Oral every 6 hours PRN Temp greater or equal to 38C (100.4F), Mild Pain (1 - 3)  aluminum hydroxide/magnesium hydroxide/simethicone Suspension 30 milliLiter(s) Oral every 4 hours PRN Dyspepsia  dextrose Oral Gel 15 Gram(s) Oral once PRN Blood Glucose LESS THAN 70 milliGRAM(s)/deciliter  ondansetron Injectable 4 milliGRAM(s) IV Push every 8 hours PRN Nausea and/or Vomiting      CAPILLARY BLOOD GLUCOSE      POCT Blood Glucose.: 164 mg/dL (11 Dec 2023 11:35)  POCT Blood Glucose.: 129 mg/dL (11 Dec 2023 06:14)  POCT Blood Glucose.: 132 mg/dL (10 Dec 2023 23:46)  POCT Blood Glucose.: 154 mg/dL (10 Dec 2023 20:04)    I&O's Summary      PHYSICAL EXAM:  Vital Signs Last 24 Hrs  T(C): 36.8 (11 Dec 2023 10:50), Max: 36.8 (10 Dec 2023 16:29)  T(F): 98.2 (11 Dec 2023 10:50), Max: 98.2 (10 Dec 2023 16:29)  HR: 74 (11 Dec 2023 10:50) (71 - 80)  BP: 127/64 (11 Dec 2023 10:50) (122/74 - 173/68)  BP(mean): --  RR: 18 (11 Dec 2023 10:50) (17 - 18)  SpO2: 98% (11 Dec 2023 10:50) (97% - 98%)    Parameters below as of 11 Dec 2023 10:50  Patient On (Oxygen Delivery Method): room air        CONSTITUTIONAL: NAD   EYES:  conjunctiva and sclera clear  ENMT: Moist oral mucosa  NECK: Supple, no palpable masses; no JVD  RESPIRATORY: Normal respiratory effort; lungs are clear to auscultation bilaterally  CARDIOVASCULAR: Regular rate and rhythm, systolic murmur ; trace lower extremity edema  ABDOMEN: Nontender to palpation, normoactive bowel sounds, no rebound/guarding  MUSCULOSKELETAL:  no clubbing or cyanosis of digits; no joint swelling or tenderness to palpation  PSYCH: A+O to person, place ; affect appropriate  SKIN: No rashes; no palpable lesions    LABS:    12-10    145  |  109<H>  |  44<H>  ----------------------------<  130<H>  4.2   |  28  |  2.99<H>    Ca    8.4      10 Dec 2023 06:18  Phos  3.2     12-10  Mg     1.7     12-10            Urinalysis Basic - ( 10 Dec 2023 06:18 )    Color: x / Appearance: x / SG: x / pH: x  Gluc: 130 mg/dL / Ketone: x  / Bili: x / Urobili: x   Blood: x / Protein: x / Nitrite: x   Leuk Esterase: x / RBC: x / WBC x   Sq Epi: x / Non Sq Epi: x / Bacteria: x          RADIOLOGY & ADDITIONAL TESTS:  Results Reviewed:   Imaging Personally Reviewed:  Electrocardiogram Personally Reviewed:    COORDINATION OF CARE:  Care Discussed with Consultants/Other Providers [Y/N]:  Prior or Outpatient Records Reviewed [Y/N]:

## 2023-12-11 NOTE — PROVIDER CONTACT NOTE (OTHER) - DATE AND TIME:
05-Dec-2023 17:46
28-Nov-2023 06:05
Statements below were documented by Darvin Wayne, [facility]  for this patient visit was Ziggy Nicole. Returned to clinic with Mother for PPD reading , was 0 mm negative. Documented on PPD form and school physical copies made and original given back to Mother.  Darvin Wayne RN
11-Dec-2023 14:30
27-Nov-2023 13:24
29-Nov-2023 22:27
03-Dec-2023 21:21
06-Dec-2023 01:35
26-Nov-2023 14:20
27-Nov-2023 10:00
27-Nov-2023 21:24
28-Nov-2023 07:01
05-Dec-2023 13:12
28-Nov-2023 21:43
04-Dec-2023 12:50
06-Dec-2023 05:09

## 2023-12-11 NOTE — PROVIDER CONTACT NOTE (OTHER) - SITUATION
FS 76, pt refusing to eat
Patient had a PAT for 4.5 Seconds, HR went up to 140
pt converted from SB 40-50s to afib 100-150s on tele
Patient took PM melatonin and sodium bicarb but then c/o of nausea and denied Lipitor
HR dropped in 44s frequently, sustained 45-55
Patient with High /63.
Pt aggitated, restless, pulling on IV and Cornell.
patient refusing all PM medications
patient refusing AM PO meds
/66
Patient is on enhanced supervision, Contacting provider to ask if we can discontinue enhanced supervision
/55
Pt. bleeding under midline dressing
Pt had a run of PAT for 4.0sec HR up to 120. Not the first time.

## 2023-12-11 NOTE — PROGRESS NOTE ADULT - SUBJECTIVE AND OBJECTIVE BOX
C A R D I O L O G Y  **********************************     DATE OF SERVICE: 12-11-23    Patient denies chest pain, dizziness, or shortness of breath.   Review of symptoms otherwise negative.    MEDICATIONS:  acetaminophen     Tablet .. 650 milliGRAM(s) Oral every 6 hours PRN  aluminum hydroxide/magnesium hydroxide/simethicone Suspension 30 milliLiter(s) Oral every 4 hours PRN  amLODIPine   Tablet 5 milliGRAM(s) Oral daily  atorvastatin 80 milliGRAM(s) Oral at bedtime  chlorhexidine 2% Cloths 1 Application(s) Topical daily  cholecalciferol 2000 Unit(s) Oral daily  clopidogrel Tablet 75 milliGRAM(s) Oral daily  dextrose 5%. 1000 milliLiter(s) IV Continuous <Continuous>  dextrose 5%. 1000 milliLiter(s) IV Continuous <Continuous>  dextrose 50% Injectable 12.5 Gram(s) IV Push once  dextrose 50% Injectable 25 Gram(s) IV Push once  dextrose 50% Injectable 25 Gram(s) IV Push once  dextrose Oral Gel 15 Gram(s) Oral once PRN  escitalopram 10 milliGRAM(s) Oral daily  folic acid 1 milliGRAM(s) Oral daily  glucagon  Injectable 1 milliGRAM(s) IntraMuscular once  influenza  Vaccine (HIGH DOSE) 0.7 milliLiter(s) IntraMuscular once  insulin lispro (ADMELOG) corrective regimen sliding scale   SubCutaneous every 6 hours  melatonin Liquid 6 milliGRAM(s) Oral at bedtime  ondansetron Injectable 4 milliGRAM(s) IV Push every 8 hours PRN  pantoprazole   Suspension 40 milliGRAM(s) Oral before breakfast  polyethylene glycol 3350 17 Gram(s) Oral daily  sodium bicarbonate 1300 milliGRAM(s) Oral three times a day      LABS:      Hemoglobin: 8.9 g/dL (12-09 @ 06:14)  Hemoglobin: 8.3 g/dL (12-08 @ 14:14)  Hemoglobin: 8.4 g/dL (12-07 @ 10:55)      12-10    145  |  109<H>  |  44<H>  ----------------------------<  130<H>  4.2   |  28  |  2.99<H>    Ca    8.4      10 Dec 2023 06:18  Phos  3.2     12-10  Mg     1.7     12-10      Creatinine Trend: 2.99<--, 3.19<--, 3.31<--, 3.69<--, 4.30<--, 4.88<--    COAGS:           PHYSICAL EXAM:  T(C): 36.8 (12-11-23 @ 10:50), Max: 36.8 (12-10-23 @ 16:29)  HR: 74 (12-11-23 @ 10:50) (71 - 80)  BP: 127/64 (12-11-23 @ 10:50) (122/74 - 173/68)  RR: 18 (12-11-23 @ 10:50) (17 - 18)  SpO2: 98% (12-11-23 @ 10:50) (97% - 98%)  Wt(kg): --    I&O's Summary    Gen: NAD  HEENT:  (-)icterus (-)pallor  CV: N S1 S2 1/6 MANASA (+)2 Pulses B/l  Resp:  Clear to auscultation B/L, normal effort  GI: (+) BS Soft, NT, ND  Lymph:  (-)Edema, (-)obvious lymphadenopathy  Skin: Warm to touch, Normal turgor  Psych: Appropriate mood and affect      TELEMETRY: SR 60-80s    RADIOLOGY:         CXR: < from: Xray Chest 1 View- PORTABLE-Urgent (11.26.23 @ 02:59) >  IMPRESSION:  Slightly elevated right hemidiaphragm. Underinflated but otherwise clear   lungs. No pleural effusions or pneumothorax.    Stable cardiac and mediastinal silhouettes including aortic   calcifications and convex fullness of superior mediastinal/paratracheal   soft tissue contour which could be related to engorged/tortuous   brachiocephalic vasculature.    Generalized osteopenia.    --- End of Report ---    < end of copied text >      ASSESSMENT/PLAN: Patient is a 89 y/o Female with PMH of advanced dementia, HTN, HLD, DM2, anemia, CAD (s/p LAD and LCx stent 2010), HFrEF (EF 40-45% 2021), h/o TIA no residual deficits, h/o recurrent UTI who presented with syncope and fall w/o trauma in the setting of 1 month of poor PO intake, nausea, vomiting, epigastric pain. Found to have LESLIE, sinus bradycardia and orthostatic hypotension. Cardiology consulted for further evaluation.    #Syncope  - Suspect due to orthostatic hypotension  - TTE noted with EF 39%, mod AI  - Monitor telemetry however appears asymptomatic in terms of sinus bradycardia    #Orthostatic Hypotension  - Holding most of her antihypertensives/GDMT  - Avoid midodrine and florinef given CHF  - Continue compression stockings and monitor for improvement    #HTN  - Will need to allow some hypertension (SBP 160s) given orthostatic hypotension  - Continue Amlodipine 5mg daily    #Sinus Bradycardia  - Appears asymptomatic at rest  - EP eval appreciated - no need for PPM as she's asymptomatic    #New PAF  - EP eval noted - recommend AC if no contraindication and within GOC. Family opted for no AC given risks per med discussion    #LESLIE  - Management per renal    #CAD  - Plavix held for PEG - restart when ok with GI  - Continue Lipitor    #GOC  - DNR/DNI    #Failure to Thrive  - GI eval noted - s/p PEG on 12/6  - Tolerated procedure well from CV perspective    - No further inpatient cardiac w/u planned    Rainer Wallace PA-C  Pager: [phone]     C A R D I O L O G Y  **********************************     DATE OF SERVICE: 12-11-23    Patient denies chest pain, dizziness, or shortness of breath.   Review of symptoms otherwise negative.    MEDICATIONS:  acetaminophen     Tablet .. 650 milliGRAM(s) Oral every 6 hours PRN  aluminum hydroxide/magnesium hydroxide/simethicone Suspension 30 milliLiter(s) Oral every 4 hours PRN  amLODIPine   Tablet 5 milliGRAM(s) Oral daily  atorvastatin 80 milliGRAM(s) Oral at bedtime  chlorhexidine 2% Cloths 1 Application(s) Topical daily  cholecalciferol 2000 Unit(s) Oral daily  clopidogrel Tablet 75 milliGRAM(s) Oral daily  dextrose 5%. 1000 milliLiter(s) IV Continuous <Continuous>  dextrose 5%. 1000 milliLiter(s) IV Continuous <Continuous>  dextrose 50% Injectable 12.5 Gram(s) IV Push once  dextrose 50% Injectable 25 Gram(s) IV Push once  dextrose 50% Injectable 25 Gram(s) IV Push once  dextrose Oral Gel 15 Gram(s) Oral once PRN  escitalopram 10 milliGRAM(s) Oral daily  folic acid 1 milliGRAM(s) Oral daily  glucagon  Injectable 1 milliGRAM(s) IntraMuscular once  influenza  Vaccine (HIGH DOSE) 0.7 milliLiter(s) IntraMuscular once  insulin lispro (ADMELOG) corrective regimen sliding scale   SubCutaneous every 6 hours  melatonin Liquid 6 milliGRAM(s) Oral at bedtime  ondansetron Injectable 4 milliGRAM(s) IV Push every 8 hours PRN  pantoprazole   Suspension 40 milliGRAM(s) Oral before breakfast  polyethylene glycol 3350 17 Gram(s) Oral daily  sodium bicarbonate 1300 milliGRAM(s) Oral three times a day      LABS:      Hemoglobin: 8.9 g/dL (12-09 @ 06:14)  Hemoglobin: 8.3 g/dL (12-08 @ 14:14)  Hemoglobin: 8.4 g/dL (12-07 @ 10:55)      12-10    145  |  109<H>  |  44<H>  ----------------------------<  130<H>  4.2   |  28  |  2.99<H>    Ca    8.4      10 Dec 2023 06:18  Phos  3.2     12-10  Mg     1.7     12-10      Creatinine Trend: 2.99<--, 3.19<--, 3.31<--, 3.69<--, 4.30<--, 4.88<--    COAGS:           PHYSICAL EXAM:  T(C): 36.8 (12-11-23 @ 10:50), Max: 36.8 (12-10-23 @ 16:29)  HR: 74 (12-11-23 @ 10:50) (71 - 80)  BP: 127/64 (12-11-23 @ 10:50) (122/74 - 173/68)  RR: 18 (12-11-23 @ 10:50) (17 - 18)  SpO2: 98% (12-11-23 @ 10:50) (97% - 98%)  Wt(kg): --    I&O's Summary    Gen: NAD  HEENT:  (-)icterus (-)pallor  CV: N S1 S2 1/6 MANASA (+)2 Pulses B/l  Resp:  Clear to auscultation B/L, normal effort  GI: (+) BS Soft, NT, ND  Lymph:  (-)Edema, (-)obvious lymphadenopathy  Skin: Warm to touch, Normal turgor  Psych: Appropriate mood and affect      TELEMETRY: SR 60-80s    RADIOLOGY:         CXR: < from: Xray Chest 1 View- PORTABLE-Urgent (11.26.23 @ 02:59) >  IMPRESSION:  Slightly elevated right hemidiaphragm. Underinflated but otherwise clear   lungs. No pleural effusions or pneumothorax.    Stable cardiac and mediastinal silhouettes including aortic   calcifications and convex fullness of superior mediastinal/paratracheal   soft tissue contour which could be related to engorged/tortuous   brachiocephalic vasculature.    Generalized osteopenia.    --- End of Report ---    < end of copied text >      ASSESSMENT/PLAN: Patient is a 89 y/o Female with PMH of advanced dementia, HTN, HLD, DM2, anemia, CAD (s/p LAD and LCx stent 2010), HFrEF (EF 40-45% 2021), h/o TIA no residual deficits, h/o recurrent UTI who presented with syncope and fall w/o trauma in the setting of 1 month of poor PO intake, nausea, vomiting, epigastric pain. Found to have LESLIE, sinus bradycardia and orthostatic hypotension. Cardiology consulted for further evaluation.    #Syncope  - Suspect due to orthostatic hypotension  - TTE noted with EF 39%, mod AI  - Monitor telemetry however appears asymptomatic in terms of sinus bradycardia    #Orthostatic Hypotension  - Holding most of her antihypertensives/GDMT  - Avoid midodrine and florinef given CHF  - Continue compression stockings and monitor for improvement    #HTN  - Will need to allow some hypertension (SBP 160s) given orthostatic hypotension  - Continue Amlodipine 5mg daily    #Sinus Bradycardia  - Appears asymptomatic at rest  - EP eval appreciated - no need for PPM as she's asymptomatic    #New PAF  - EP eval noted - recommend AC if no contraindication and within GOC. Family opted for no AC given risks per med discussion    #LESLIE  - Management per renal    #CAD  - Plavix held for PEG - restart when ok with GI  - Continue Lipitor    #GOC  - DNR/DNI    #Failure to Thrive  - GI eval noted - s/p PEG on 12/6  - Tolerated procedure well from CV perspective    - No further inpatient cardiac w/u planned    Rainer Wallace PA-C  Pager: [phone]

## 2023-12-11 NOTE — PROVIDER CONTACT NOTE (OTHER) - NAME OF MD/NP/PA/DO NOTIFIED:
López Cross MD
López Cross MD
Theron Porras
Theron Porras
Gurpreet Whitaker
Gurpreet Whitaker
MD Tiesha Whitten
Arelis Millan
Teams Tay Dawn
CHRIS Dawn
Gurpreet Whitaker
Gurpreet Whitaker
lalito UGARTE
MD Edgard Kim
Resident Theron Porras

## 2023-12-11 NOTE — PROGRESS NOTE ADULT - PROBLEM SELECTOR PLAN 6
- home regimen: coreg 3.125 BID, valsartan-HCTZ 80-12.5 qd, aldactone 50mg qd  - hold antihypertensives d/t orthostatic hypotension  Bp remains acceptable range despite off BP meds.

## 2023-12-11 NOTE — PROGRESS NOTE ADULT - PROBLEM SELECTOR PLAN 9
- home regimen: Plavix 75mg qd, rosuvastatin 40mg qd, Zetia 10mg qd, back on plavix  - Zetia is nonformulary  - intertherapeutic interchange of rosuvastatin 40mg to lipitor 80mg qd

## 2023-12-11 NOTE — PROGRESS NOTE ADULT - SUBJECTIVE AND OBJECTIVE BOX
EP ATTENDING    tele: NSR, periods of sinus wilber, no malignant events    DATE OF SERVICE - 12-11-23     Review of Systems:   Constitutional: [ ] fevers, [ ] chills.   Skin: [ ] dry skin. [ ] rashes.  Psychiatric: [ ] depression, [ ] anxiety.   Gastrointestinal: [ ] BRBPR, [ ] melena.   Neurological: [ ] confusion. [ ] seizures. [ ] shuffling gait.   Ears,Nose,Mouth and Throat: [ ] ear pain [ ] sore throat.   Eyes: [ ] diplopia.   Respiratory: [ ] hemoptysis. [ ] shortness of breath  Cardiovascular: See HPI above  Hematologic/Lymphatic: [ ] anemia. [ ] painful nodes. [ ] prolonged bleeding.   Genitourinary: [ ] hematuria. [ ] flank pain.   Endocrine: [ ] significant change in weight. [ ] intolerance to heat and cold.     Review of systems [ ] otherwise negative, [ ] otherwise unable to obtain    FH: no family history of sudden cardiac death in first degree relatives    SH: [ ] tobacco, [ ] alcohol, [ ] drugs    acetaminophen     Tablet .. 650 milliGRAM(s) Oral every 6 hours PRN  aluminum hydroxide/magnesium hydroxide/simethicone Suspension 30 milliLiter(s) Oral every 4 hours PRN  amLODIPine   Tablet 5 milliGRAM(s) Oral daily  atorvastatin 80 milliGRAM(s) Oral at bedtime  chlorhexidine 2% Cloths 1 Application(s) Topical daily  cholecalciferol 2000 Unit(s) Oral daily  clopidogrel Tablet 75 milliGRAM(s) Oral daily  dextrose 5%. 1000 milliLiter(s) IV Continuous <Continuous>  dextrose 5%. 1000 milliLiter(s) IV Continuous <Continuous>  dextrose 50% Injectable 12.5 Gram(s) IV Push once  dextrose 50% Injectable 25 Gram(s) IV Push once  dextrose 50% Injectable 25 Gram(s) IV Push once  dextrose Oral Gel 15 Gram(s) Oral once PRN  escitalopram 10 milliGRAM(s) Oral daily  folic acid 1 milliGRAM(s) Oral daily  glucagon  Injectable 1 milliGRAM(s) IntraMuscular once  influenza  Vaccine (HIGH DOSE) 0.7 milliLiter(s) IntraMuscular once  insulin lispro (ADMELOG) corrective regimen sliding scale   SubCutaneous every 6 hours  melatonin Liquid 6 milliGRAM(s) Oral at bedtime  ondansetron Injectable 4 milliGRAM(s) IV Push every 8 hours PRN  pantoprazole   Suspension 40 milliGRAM(s) Oral before breakfast  polyethylene glycol 3350 17 Gram(s) Oral daily  sodium bicarbonate 1300 milliGRAM(s) Oral three times a day      12-10    145  |  109<H>  |  44<H>  ----------------------------<  130<H>  4.2   |  28  |  2.99<H>    Ca    8.4      10 Dec 2023 06:18  Phos  3.2     12-10  Mg     1.7     12-10      T(C): 36.8 (12-11-23 @ 10:50), Max: 36.8 (12-10-23 @ 12:00)  HR: 74 (12-11-23 @ 10:50) (69 - 80)  BP: 127/64 (12-11-23 @ 10:50) (122/74 - 173/68)  RR: 18 (12-11-23 @ 10:50) (17 - 18)  SpO2: 98% (12-11-23 @ 10:50) (96% - 98%)  Wt(kg): --    I&O's Summary    cath: LCx PCI in 2010.	  Echo: LVEF 40%      A/P) 89 y/o female PMH hypertension, hyperlipidemia, diabetes, CAD s/p PCI 2010 now with LVEF 40%, advanced CKD a/w FTT. EP called for sinus bradycardia and periods of PAF. She remains off aspirin due to an allergy    -continue lipitor for hyperlipidemia with CAD  -continue norvasc for hypertension  -given her elevated chads-vasc score please continue lifelong a/c  -f/u palliative care  -no need for PPM as she's asymptomatic from her sinus bradycardia and PAF  -no need for ICD given LVEF > 35%  -no further inpatient EP testing expected    Georges Calderón M.D.  Cardiac Electrophysiology  [phone] EP ATTENDING    tele: NSR, periods of sinus wilber, no malignant events    DATE OF SERVICE - 12-11-23     Review of Systems:   Constitutional: [ ] fevers, [ ] chills.   Skin: [ ] dry skin. [ ] rashes.  Psychiatric: [ ] depression, [ ] anxiety.   Gastrointestinal: [ ] BRBPR, [ ] melena.   Neurological: [ ] confusion. [ ] seizures. [ ] shuffling gait.   Ears,Nose,Mouth and Throat: [ ] ear pain [ ] sore throat.   Eyes: [ ] diplopia.   Respiratory: [ ] hemoptysis. [ ] shortness of breath  Cardiovascular: See HPI above  Hematologic/Lymphatic: [ ] anemia. [ ] painful nodes. [ ] prolonged bleeding.   Genitourinary: [ ] hematuria. [ ] flank pain.   Endocrine: [ ] significant change in weight. [ ] intolerance to heat and cold.     Review of systems [ ] otherwise negative, [ ] otherwise unable to obtain    FH: no family history of sudden cardiac death in first degree relatives    SH: [ ] tobacco, [ ] alcohol, [ ] drugs    acetaminophen     Tablet .. 650 milliGRAM(s) Oral every 6 hours PRN  aluminum hydroxide/magnesium hydroxide/simethicone Suspension 30 milliLiter(s) Oral every 4 hours PRN  amLODIPine   Tablet 5 milliGRAM(s) Oral daily  atorvastatin 80 milliGRAM(s) Oral at bedtime  chlorhexidine 2% Cloths 1 Application(s) Topical daily  cholecalciferol 2000 Unit(s) Oral daily  clopidogrel Tablet 75 milliGRAM(s) Oral daily  dextrose 5%. 1000 milliLiter(s) IV Continuous <Continuous>  dextrose 5%. 1000 milliLiter(s) IV Continuous <Continuous>  dextrose 50% Injectable 12.5 Gram(s) IV Push once  dextrose 50% Injectable 25 Gram(s) IV Push once  dextrose 50% Injectable 25 Gram(s) IV Push once  dextrose Oral Gel 15 Gram(s) Oral once PRN  escitalopram 10 milliGRAM(s) Oral daily  folic acid 1 milliGRAM(s) Oral daily  glucagon  Injectable 1 milliGRAM(s) IntraMuscular once  influenza  Vaccine (HIGH DOSE) 0.7 milliLiter(s) IntraMuscular once  insulin lispro (ADMELOG) corrective regimen sliding scale   SubCutaneous every 6 hours  melatonin Liquid 6 milliGRAM(s) Oral at bedtime  ondansetron Injectable 4 milliGRAM(s) IV Push every 8 hours PRN  pantoprazole   Suspension 40 milliGRAM(s) Oral before breakfast  polyethylene glycol 3350 17 Gram(s) Oral daily  sodium bicarbonate 1300 milliGRAM(s) Oral three times a day      12-10    145  |  109<H>  |  44<H>  ----------------------------<  130<H>  4.2   |  28  |  2.99<H>    Ca    8.4      10 Dec 2023 06:18  Phos  3.2     12-10  Mg     1.7     12-10      T(C): 36.8 (12-11-23 @ 10:50), Max: 36.8 (12-10-23 @ 12:00)  HR: 74 (12-11-23 @ 10:50) (69 - 80)  BP: 127/64 (12-11-23 @ 10:50) (122/74 - 173/68)  RR: 18 (12-11-23 @ 10:50) (17 - 18)  SpO2: 98% (12-11-23 @ 10:50) (96% - 98%)  Wt(kg): --    I&O's Summary    cath: LCx PCI in 2010.	  Echo: LVEF 40%      A/P) 89 y/o female PMH hypertension, hyperlipidemia, diabetes, CAD s/p PCI 2010 now with LVEF 40%, advanced CKD a/w FTT. EP called for sinus bradycardia and periods of PAF. She remains off aspirin due to an allergy    -continue lipitor for hyperlipidemia with CAD  -continue norvasc for hypertension  -given her elevated chads-vasc score please continue lifelong a/c  -f/u palliative care  -no need for PPM as she's asymptomatic from her sinus bradycardia and PAF  -no need for ICD given LVEF > 35%  -no further inpatient EP testing expected    Georges Calderón M.D.  Cardiac Electrophysiology  [phone]

## 2023-12-11 NOTE — PROVIDER CONTACT NOTE (OTHER) - RECOMMENDATIONS
Notify provider
MD informed
Notify Provider.
R 2 pads on pt ?
notify provider
Notify Provider
notify provider. beta blocker?
BP medications?
Notify provider
BP medications?

## 2023-12-12 ENCOUNTER — TRANSCRIPTION ENCOUNTER (OUTPATIENT)
Age: 88
End: 2023-12-12

## 2023-12-12 VITALS
OXYGEN SATURATION: 97 % | SYSTOLIC BLOOD PRESSURE: 141 MMHG | DIASTOLIC BLOOD PRESSURE: 68 MMHG | RESPIRATION RATE: 18 BRPM | HEART RATE: 86 BPM | TEMPERATURE: 98 F

## 2023-12-12 LAB
GLUCOSE BLDC GLUCOMTR-MCNC: 140 MG/DL — HIGH (ref 70–99)
GLUCOSE BLDC GLUCOMTR-MCNC: 140 MG/DL — HIGH (ref 70–99)
GLUCOSE BLDC GLUCOMTR-MCNC: 164 MG/DL — HIGH (ref 70–99)
GLUCOSE BLDC GLUCOMTR-MCNC: 164 MG/DL — HIGH (ref 70–99)
GLUCOSE BLDC GLUCOMTR-MCNC: 174 MG/DL — HIGH (ref 70–99)
GLUCOSE BLDC GLUCOMTR-MCNC: 174 MG/DL — HIGH (ref 70–99)
GLUCOSE BLDC GLUCOMTR-MCNC: 192 MG/DL — HIGH (ref 70–99)
GLUCOSE BLDC GLUCOMTR-MCNC: 192 MG/DL — HIGH (ref 70–99)

## 2023-12-12 PROCEDURE — 85027 COMPLETE CBC AUTOMATED: CPT

## 2023-12-12 PROCEDURE — 87086 URINE CULTURE/COLONY COUNT: CPT

## 2023-12-12 PROCEDURE — 99285 EMERGENCY DEPT VISIT HI MDM: CPT

## 2023-12-12 PROCEDURE — C1894: CPT

## 2023-12-12 PROCEDURE — 82803 BLOOD GASES ANY COMBINATION: CPT

## 2023-12-12 PROCEDURE — 71045 X-RAY EXAM CHEST 1 VIEW: CPT

## 2023-12-12 PROCEDURE — 86850 RBC ANTIBODY SCREEN: CPT

## 2023-12-12 PROCEDURE — 82962 GLUCOSE BLOOD TEST: CPT

## 2023-12-12 PROCEDURE — 80048 BASIC METABOLIC PNL TOTAL CA: CPT

## 2023-12-12 PROCEDURE — 85610 PROTHROMBIN TIME: CPT

## 2023-12-12 PROCEDURE — 85018 HEMOGLOBIN: CPT

## 2023-12-12 PROCEDURE — 84466 ASSAY OF TRANSFERRIN: CPT

## 2023-12-12 PROCEDURE — 97110 THERAPEUTIC EXERCISES: CPT

## 2023-12-12 PROCEDURE — 84443 ASSAY THYROID STIM HORMONE: CPT

## 2023-12-12 PROCEDURE — 84156 ASSAY OF PROTEIN URINE: CPT

## 2023-12-12 PROCEDURE — 84100 ASSAY OF PHOSPHORUS: CPT

## 2023-12-12 PROCEDURE — 87641 MR-STAPH DNA AMP PROBE: CPT

## 2023-12-12 PROCEDURE — 81001 URINALYSIS AUTO W/SCOPE: CPT

## 2023-12-12 PROCEDURE — 97161 PT EVAL LOW COMPLEX 20 MIN: CPT

## 2023-12-12 PROCEDURE — 82607 VITAMIN B-12: CPT

## 2023-12-12 PROCEDURE — 92526 ORAL FUNCTION THERAPY: CPT

## 2023-12-12 PROCEDURE — 86901 BLOOD TYPING SEROLOGIC RH(D): CPT

## 2023-12-12 PROCEDURE — 83735 ASSAY OF MAGNESIUM: CPT

## 2023-12-12 PROCEDURE — 87635 SARS-COV-2 COVID-19 AMP PRB: CPT

## 2023-12-12 PROCEDURE — 83036 HEMOGLOBIN GLYCOSYLATED A1C: CPT

## 2023-12-12 PROCEDURE — 85730 THROMBOPLASTIN TIME PARTIAL: CPT

## 2023-12-12 PROCEDURE — 93005 ELECTROCARDIOGRAM TRACING: CPT

## 2023-12-12 PROCEDURE — 36569 INSJ PICC 5 YR+ W/O IMAGING: CPT

## 2023-12-12 PROCEDURE — 82435 ASSAY OF BLOOD CHLORIDE: CPT

## 2023-12-12 PROCEDURE — 82728 ASSAY OF FERRITIN: CPT

## 2023-12-12 PROCEDURE — 82947 ASSAY GLUCOSE BLOOD QUANT: CPT

## 2023-12-12 PROCEDURE — 83605 ASSAY OF LACTIC ACID: CPT

## 2023-12-12 PROCEDURE — 85025 COMPLETE CBC W/AUTO DIFF WBC: CPT

## 2023-12-12 PROCEDURE — 99239 HOSP IP/OBS DSCHRG MGMT >30: CPT

## 2023-12-12 PROCEDURE — 97116 GAIT TRAINING THERAPY: CPT

## 2023-12-12 PROCEDURE — L8699: CPT

## 2023-12-12 PROCEDURE — 82533 TOTAL CORTISOL: CPT

## 2023-12-12 PROCEDURE — 83935 ASSAY OF URINE OSMOLALITY: CPT

## 2023-12-12 PROCEDURE — 87640 STAPH A DNA AMP PROBE: CPT

## 2023-12-12 PROCEDURE — 84540 ASSAY OF URINE/UREA-N: CPT

## 2023-12-12 PROCEDURE — C8929: CPT

## 2023-12-12 PROCEDURE — 87637 SARSCOV2&INF A&B&RSV AMP PRB: CPT

## 2023-12-12 PROCEDURE — 82550 ASSAY OF CK (CPK): CPT

## 2023-12-12 PROCEDURE — C1751: CPT

## 2023-12-12 PROCEDURE — 83540 ASSAY OF IRON: CPT

## 2023-12-12 PROCEDURE — 84133 ASSAY OF URINE POTASSIUM: CPT

## 2023-12-12 PROCEDURE — 84300 ASSAY OF URINE SODIUM: CPT

## 2023-12-12 PROCEDURE — 92610 EVALUATE SWALLOWING FUNCTION: CPT

## 2023-12-12 PROCEDURE — 82330 ASSAY OF CALCIUM: CPT

## 2023-12-12 PROCEDURE — 80053 COMPREHEN METABOLIC PANEL: CPT

## 2023-12-12 PROCEDURE — 82570 ASSAY OF URINE CREATININE: CPT

## 2023-12-12 PROCEDURE — 36415 COLL VENOUS BLD VENIPUNCTURE: CPT

## 2023-12-12 PROCEDURE — 84132 ASSAY OF SERUM POTASSIUM: CPT

## 2023-12-12 PROCEDURE — 86900 BLOOD TYPING SEROLOGIC ABO: CPT

## 2023-12-12 PROCEDURE — 83550 IRON BINDING TEST: CPT

## 2023-12-12 PROCEDURE — 84295 ASSAY OF SERUM SODIUM: CPT

## 2023-12-12 PROCEDURE — 82746 ASSAY OF FOLIC ACID SERUM: CPT

## 2023-12-12 PROCEDURE — 76775 US EXAM ABDO BACK WALL LIM: CPT

## 2023-12-12 PROCEDURE — 85014 HEMATOCRIT: CPT

## 2023-12-12 RX ORDER — OXYCODONE HYDROCHLORIDE 5 MG/1
5 TABLET ORAL
Refills: 0 | Status: DISCONTINUED | OUTPATIENT
Start: 2023-12-12 | End: 2023-12-12

## 2023-12-12 RX ORDER — ROSUVASTATIN CALCIUM 5 MG/1
1 TABLET ORAL
Qty: 0 | Refills: 0 | DISCHARGE

## 2023-12-12 RX ORDER — ATORVASTATIN CALCIUM 80 MG/1
1 TABLET, FILM COATED ORAL
Qty: 0 | Refills: 0 | DISCHARGE
Start: 2023-12-12

## 2023-12-12 RX ORDER — CLOPIDOGREL BISULFATE 75 MG/1
1 TABLET, FILM COATED ORAL
Qty: 0 | Refills: 0 | DISCHARGE
Start: 2023-12-12

## 2023-12-12 RX ORDER — CHOLECALCIFEROL (VITAMIN D3) 125 MCG
2000 CAPSULE ORAL
Qty: 0 | Refills: 0 | DISCHARGE
Start: 2023-12-12

## 2023-12-12 RX ORDER — SODIUM BICARBONATE 1 MEQ/ML
2 SYRINGE (ML) INTRAVENOUS
Qty: 0 | Refills: 0 | DISCHARGE
Start: 2023-12-12

## 2023-12-12 RX ORDER — LANOLIN ALCOHOL/MO/W.PET/CERES
24 CREAM (GRAM) TOPICAL
Qty: 0 | Refills: 0 | DISCHARGE
Start: 2023-12-12

## 2023-12-12 RX ORDER — ESCITALOPRAM OXALATE 10 MG/1
1 TABLET, FILM COATED ORAL
Qty: 0 | Refills: 0 | DISCHARGE
Start: 2023-12-12

## 2023-12-12 RX ORDER — FOLIC ACID 0.8 MG
1 TABLET ORAL
Qty: 0 | Refills: 0 | DISCHARGE
Start: 2023-12-12

## 2023-12-12 RX ORDER — OXYCODONE HYDROCHLORIDE 5 MG/1
1 TABLET ORAL
Qty: 0 | Refills: 0 | DISCHARGE
Start: 2023-12-12

## 2023-12-12 RX ORDER — MEMANTINE HYDROCHLORIDE AND DONEPEZIL HYDROCHLORIDE 21; 10 MG/1; MG/1
1 CAPSULE ORAL
Qty: 0 | Refills: 0 | DISCHARGE

## 2023-12-12 RX ORDER — AMLODIPINE BESYLATE 2.5 MG/1
10 TABLET ORAL DAILY
Refills: 0 | Status: DISCONTINUED | OUTPATIENT
Start: 2023-12-12 | End: 2023-12-12

## 2023-12-12 RX ORDER — CHOLECALCIFEROL (VITAMIN D3) 125 MCG
1 CAPSULE ORAL
Qty: 0 | Refills: 0 | DISCHARGE

## 2023-12-12 RX ORDER — PANTOPRAZOLE SODIUM 20 MG/1
0 TABLET, DELAYED RELEASE ORAL
Qty: 0 | Refills: 0 | DISCHARGE
Start: 2023-12-12

## 2023-12-12 RX ORDER — OXYCODONE HYDROCHLORIDE 5 MG/1
5 TABLET ORAL ONCE
Refills: 0 | Status: DISCONTINUED | OUTPATIENT
Start: 2023-12-12 | End: 2023-12-12

## 2023-12-12 RX ORDER — AMLODIPINE BESYLATE 2.5 MG/1
1 TABLET ORAL
Refills: 0 | DISCHARGE

## 2023-12-12 RX ORDER — POLYETHYLENE GLYCOL 3350 17 G/17G
17 POWDER, FOR SOLUTION ORAL
Qty: 0 | Refills: 0 | DISCHARGE
Start: 2023-12-12

## 2023-12-12 RX ORDER — AMLODIPINE BESYLATE 2.5 MG/1
1 TABLET ORAL
Qty: 0 | Refills: 0 | DISCHARGE
Start: 2023-12-12

## 2023-12-12 RX ORDER — EZETIMIBE 10 MG/1
1 TABLET ORAL
Refills: 0 | DISCHARGE

## 2023-12-12 RX ORDER — FOLIC ACID 0.8 MG
1 TABLET ORAL
Qty: 0 | Refills: 0 | DISCHARGE

## 2023-12-12 RX ADMIN — Medication 1: at 16:55

## 2023-12-12 RX ADMIN — AMLODIPINE BESYLATE 5 MILLIGRAM(S): 2.5 TABLET ORAL at 06:13

## 2023-12-12 RX ADMIN — CHLORHEXIDINE GLUCONATE 1 APPLICATION(S): 213 SOLUTION TOPICAL at 12:56

## 2023-12-12 RX ADMIN — PANTOPRAZOLE SODIUM 40 MILLIGRAM(S): 20 TABLET, DELAYED RELEASE ORAL at 06:13

## 2023-12-12 RX ADMIN — Medication 1300 MILLIGRAM(S): at 13:27

## 2023-12-12 RX ADMIN — Medication 1: at 07:00

## 2023-12-12 RX ADMIN — Medication 650 MILLIGRAM(S): at 06:14

## 2023-12-12 RX ADMIN — CLOPIDOGREL BISULFATE 75 MILLIGRAM(S): 75 TABLET, FILM COATED ORAL at 12:10

## 2023-12-12 RX ADMIN — OXYCODONE HYDROCHLORIDE 5 MILLIGRAM(S): 5 TABLET ORAL at 16:22

## 2023-12-12 RX ADMIN — Medication 1300 MILLIGRAM(S): at 06:13

## 2023-12-12 RX ADMIN — OXYCODONE HYDROCHLORIDE 5 MILLIGRAM(S): 5 TABLET ORAL at 09:30

## 2023-12-12 RX ADMIN — Medication 2000 UNIT(S): at 12:10

## 2023-12-12 RX ADMIN — OXYCODONE HYDROCHLORIDE 5 MILLIGRAM(S): 5 TABLET ORAL at 15:51

## 2023-12-12 RX ADMIN — OXYCODONE HYDROCHLORIDE 5 MILLIGRAM(S): 5 TABLET ORAL at 08:49

## 2023-12-12 RX ADMIN — ESCITALOPRAM OXALATE 10 MILLIGRAM(S): 10 TABLET, FILM COATED ORAL at 12:10

## 2023-12-12 RX ADMIN — Medication 1 MILLIGRAM(S): at 12:10

## 2023-12-12 RX ADMIN — Medication 1: at 12:55

## 2023-12-12 NOTE — PROGRESS NOTE ADULT - SUBJECTIVE AND OBJECTIVE BOX
[facility] Division of Hospital Medicine  Diamante Madrid MD  Pager (M-F, 8T-0H): [phone]  Other Times:  [phone]    Patient is a 89y old  Female who presents with a chief complaint of fall, LESLIE (12 Dec 2023 13:12)      SUBJECTIVE / OVERNIGHT EVENTS:  c/o pain around PEG site. no bleeding. less freq BM   afebrile overnight run of PAT now resolved     ADDITIONAL REVIEW OF SYSTEMS: otherwise neg    MEDICATIONS  (STANDING):  amLODIPine   Tablet 10 milliGRAM(s) Oral daily  atorvastatin 80 milliGRAM(s) Oral at bedtime  chlorhexidine 2% Cloths 1 Application(s) Topical daily  cholecalciferol 2000 Unit(s) Oral daily  clopidogrel Tablet 75 milliGRAM(s) Oral daily  dextrose 5%. 1000 milliLiter(s) (50 mL/Hr) IV Continuous <Continuous>  dextrose 5%. 1000 milliLiter(s) (100 mL/Hr) IV Continuous <Continuous>  dextrose 50% Injectable 12.5 Gram(s) IV Push once  dextrose 50% Injectable 25 Gram(s) IV Push once  dextrose 50% Injectable 25 Gram(s) IV Push once  escitalopram 10 milliGRAM(s) Oral daily  folic acid 1 milliGRAM(s) Oral daily  glucagon  Injectable 1 milliGRAM(s) IntraMuscular once  influenza  Vaccine (HIGH DOSE) 0.7 milliLiter(s) IntraMuscular once  insulin lispro (ADMELOG) corrective regimen sliding scale   SubCutaneous every 6 hours  melatonin Liquid 6 milliGRAM(s) Oral at bedtime  pantoprazole   Suspension 40 milliGRAM(s) Oral before breakfast  polyethylene glycol 3350 17 Gram(s) Oral daily  sodium bicarbonate 1300 milliGRAM(s) Oral three times a day    MEDICATIONS  (PRN):  acetaminophen     Tablet .. 650 milliGRAM(s) Oral every 6 hours PRN Temp greater or equal to 38C (100.4F), Mild Pain (1 - 3)  aluminum hydroxide/magnesium hydroxide/simethicone Suspension 30 milliLiter(s) Oral every 4 hours PRN Dyspepsia  dextrose Oral Gel 15 Gram(s) Oral once PRN Blood Glucose LESS THAN 70 milliGRAM(s)/deciliter  ondansetron Injectable 4 milliGRAM(s) IV Push every 8 hours PRN Nausea and/or Vomiting      CAPILLARY BLOOD GLUCOSE      POCT Blood Glucose.: 192 mg/dL (12 Dec 2023 12:22)  POCT Blood Glucose.: 164 mg/dL (12 Dec 2023 06:56)  POCT Blood Glucose.: 140 mg/dL (12 Dec 2023 00:47)  POCT Blood Glucose.: 121 mg/dL (11 Dec 2023 17:35)    I&O's Summary    11 Dec 2023 07:01  -  12 Dec 2023 07:00  --------------------------------------------------------  IN: 480 mL / OUT: 0 mL / NET: 480 mL        PHYSICAL EXAM:  Vital Signs Last 24 Hrs  T(C): 36.3 (12 Dec 2023 12:43), Max: 36.6 (11 Dec 2023 14:30)  T(F): 97.3 (12 Dec 2023 12:43), Max: 97.9 (11 Dec 2023 14:30)  HR: 72 (12 Dec 2023 13:40) (72 - 76)  BP: 151/67 (12 Dec 2023 13:40) (127/67 - 186/69)  BP(mean): --  RR: 18 (12 Dec 2023 12:43) (18 - 18)  SpO2: 100% (12 Dec 2023 12:43) (97% - 100%)    Parameters below as of 12 Dec 2023 12:43  Patient On (Oxygen Delivery Method): room air        CONSTITUTIONAL: NAD, disshevelled   EYES:  conjunctiva and sclera clear  ENMT: Moist oral mucosa  NECK: Supple, no palpable masses; no JVD  RESPIRATORY: Normal respiratory effort; lungs are clear to auscultation bilaterally  CARDIOVASCULAR: Regular rate and rhythm, normal S1 and S2, no murmur/rub/gallop; No lower extremity edema  ABDOMEN: tenderness around peg site no bleeding.  normoactive bowel sounds, no rebound/guarding  MUSCULOSKELETAL:  no clubbing or cyanosis of digits; no joint swelling or tenderness to palpation  PSYCH: A+O to person, place; affect appropriate  SKIN: No rashes; no palpable lesions    LABS:                      RADIOLOGY & ADDITIONAL TESTS:  Results Reviewed:   Imaging Personally Reviewed:  Electrocardiogram Personally Reviewed:    COORDINATION OF CARE:  Care Discussed with Consultants/Other Providers [Y/N]:  Prior or Outpatient Records Reviewed [Y/N]:   [facility] Division of Hospital Medicine  Diamante Madrid MD  Pager (M-F, 6W-7M): [phone]  Other Times:  [phone]    Patient is a 89y old  Female who presents with a chief complaint of fall, LESLIE (12 Dec 2023 13:12)      SUBJECTIVE / OVERNIGHT EVENTS:  c/o pain around PEG site. no bleeding. less freq BM   afebrile overnight run of PAT now resolved     ADDITIONAL REVIEW OF SYSTEMS: otherwise neg    MEDICATIONS  (STANDING):  amLODIPine   Tablet 10 milliGRAM(s) Oral daily  atorvastatin 80 milliGRAM(s) Oral at bedtime  chlorhexidine 2% Cloths 1 Application(s) Topical daily  cholecalciferol 2000 Unit(s) Oral daily  clopidogrel Tablet 75 milliGRAM(s) Oral daily  dextrose 5%. 1000 milliLiter(s) (50 mL/Hr) IV Continuous <Continuous>  dextrose 5%. 1000 milliLiter(s) (100 mL/Hr) IV Continuous <Continuous>  dextrose 50% Injectable 12.5 Gram(s) IV Push once  dextrose 50% Injectable 25 Gram(s) IV Push once  dextrose 50% Injectable 25 Gram(s) IV Push once  escitalopram 10 milliGRAM(s) Oral daily  folic acid 1 milliGRAM(s) Oral daily  glucagon  Injectable 1 milliGRAM(s) IntraMuscular once  influenza  Vaccine (HIGH DOSE) 0.7 milliLiter(s) IntraMuscular once  insulin lispro (ADMELOG) corrective regimen sliding scale   SubCutaneous every 6 hours  melatonin Liquid 6 milliGRAM(s) Oral at bedtime  pantoprazole   Suspension 40 milliGRAM(s) Oral before breakfast  polyethylene glycol 3350 17 Gram(s) Oral daily  sodium bicarbonate 1300 milliGRAM(s) Oral three times a day    MEDICATIONS  (PRN):  acetaminophen     Tablet .. 650 milliGRAM(s) Oral every 6 hours PRN Temp greater or equal to 38C (100.4F), Mild Pain (1 - 3)  aluminum hydroxide/magnesium hydroxide/simethicone Suspension 30 milliLiter(s) Oral every 4 hours PRN Dyspepsia  dextrose Oral Gel 15 Gram(s) Oral once PRN Blood Glucose LESS THAN 70 milliGRAM(s)/deciliter  ondansetron Injectable 4 milliGRAM(s) IV Push every 8 hours PRN Nausea and/or Vomiting      CAPILLARY BLOOD GLUCOSE      POCT Blood Glucose.: 192 mg/dL (12 Dec 2023 12:22)  POCT Blood Glucose.: 164 mg/dL (12 Dec 2023 06:56)  POCT Blood Glucose.: 140 mg/dL (12 Dec 2023 00:47)  POCT Blood Glucose.: 121 mg/dL (11 Dec 2023 17:35)    I&O's Summary    11 Dec 2023 07:01  -  12 Dec 2023 07:00  --------------------------------------------------------  IN: 480 mL / OUT: 0 mL / NET: 480 mL        PHYSICAL EXAM:  Vital Signs Last 24 Hrs  T(C): 36.3 (12 Dec 2023 12:43), Max: 36.6 (11 Dec 2023 14:30)  T(F): 97.3 (12 Dec 2023 12:43), Max: 97.9 (11 Dec 2023 14:30)  HR: 72 (12 Dec 2023 13:40) (72 - 76)  BP: 151/67 (12 Dec 2023 13:40) (127/67 - 186/69)  BP(mean): --  RR: 18 (12 Dec 2023 12:43) (18 - 18)  SpO2: 100% (12 Dec 2023 12:43) (97% - 100%)    Parameters below as of 12 Dec 2023 12:43  Patient On (Oxygen Delivery Method): room air        CONSTITUTIONAL: NAD, disshevelled   EYES:  conjunctiva and sclera clear  ENMT: Moist oral mucosa  NECK: Supple, no palpable masses; no JVD  RESPIRATORY: Normal respiratory effort; lungs are clear to auscultation bilaterally  CARDIOVASCULAR: Regular rate and rhythm, normal S1 and S2, no murmur/rub/gallop; No lower extremity edema  ABDOMEN: tenderness around peg site no bleeding.  normoactive bowel sounds, no rebound/guarding  MUSCULOSKELETAL:  no clubbing or cyanosis of digits; no joint swelling or tenderness to palpation  PSYCH: A+O to person, place; affect appropriate  SKIN: No rashes; no palpable lesions    LABS:                      RADIOLOGY & ADDITIONAL TESTS:  Results Reviewed:   Imaging Personally Reviewed:  Electrocardiogram Personally Reviewed:    COORDINATION OF CARE:  Care Discussed with Consultants/Other Providers [Y/N]:  Prior or Outpatient Records Reviewed [Y/N]:

## 2023-12-12 NOTE — PROGRESS NOTE ADULT - PROBLEM SELECTOR PLAN 10
- home regimen: coreg 3.125 BID, valsartan-HCTZ 80-12.5 qd, aldactone 50mg qd  - hold given orthostatic hypotension  - inc Amlodipine 10 mg daily (patient on Amlodipine 5 mg BID at home)

## 2023-12-12 NOTE — PROGRESS NOTE ADULT - PROVIDER SPECIALTY LIST ADULT
Cardiology
Electrophysiology
Nephrology
Nephrology
Cardiology
Electrophysiology
Electrophysiology
Gastroenterology
Internal Medicine
Nephrology
Cardiology
Electrophysiology
Gastroenterology
Nephrology
Cardiology
Electrophysiology
Internal Medicine
Nephrology
Internal Medicine
Hospitalist

## 2023-12-12 NOTE — PROGRESS NOTE ADULT - PROBLEM SELECTOR PLAN 5
- pt eating less 2/2 reported epigastric pain/nausea  - also suspect there is an element of progressing dementia  - encourage PO intake, honor dietary preferences  - PEG tube placed on 12/06 tube feeds initiated on 12/07 after nutrition recs, tolerated trickle feeds- at goal  pain control

## 2023-12-12 NOTE — DISCHARGE NOTE NURSING/CASE MANAGEMENT/SOCIAL WORK - NSDCVIVACCINE_GEN_ALL_CORE_FT
Tdap; 19-Jan-2023 00:14; Abigail Guillen (RN); [employer]; [id number] (Exp. Date: 08-Dec-2024); IntraMuscular; Deltoid Right.; 0.5 milliLiter(s); VIS (VIS Published: 09-May-2013, VIS Presented: 19-Jan-2023);    Tdap; 19-Jan-2023 00:14; Abigail Guillen (RN); [employer]; [id number] (Exp. Date: 08-Dec-2024); IntraMuscular; Deltoid Right.; 0.5 milliLiter(s); VIS (VIS Published: 09-May-2013, VIS Presented: 19-Jan-2023);

## 2023-12-12 NOTE — CHART NOTE - NSCHARTNOTESELECT_GEN_ALL_CORE
Event Note
Event Note
Nutrition Services
Nutrition Services
Event Note
Speech and Swallow
GAP team/Event Note
Nutrition Services
Speech and Swallow

## 2023-12-12 NOTE — PROGRESS NOTE ADULT - SUBJECTIVE AND OBJECTIVE BOX
C A R D I O L O G Y  **********************************    DATE OF SERVICE: 12-12-23    Patient denies chest pain or shortness of breath.   Review of symptoms otherwise negative.    MEDICATIONS:  acetaminophen     Tablet .. 650 milliGRAM(s) Oral every 6 hours PRN  aluminum hydroxide/magnesium hydroxide/simethicone Suspension 30 milliLiter(s) Oral every 4 hours PRN  amLODIPine   Tablet 10 milliGRAM(s) Oral daily  atorvastatin 80 milliGRAM(s) Oral at bedtime  chlorhexidine 2% Cloths 1 Application(s) Topical daily  cholecalciferol 2000 Unit(s) Oral daily  clopidogrel Tablet 75 milliGRAM(s) Oral daily  dextrose 5%. 1000 milliLiter(s) IV Continuous <Continuous>  dextrose 5%. 1000 milliLiter(s) IV Continuous <Continuous>  dextrose 50% Injectable 12.5 Gram(s) IV Push once  dextrose 50% Injectable 25 Gram(s) IV Push once  dextrose 50% Injectable 25 Gram(s) IV Push once  dextrose Oral Gel 15 Gram(s) Oral once PRN  escitalopram 10 milliGRAM(s) Oral daily  folic acid 1 milliGRAM(s) Oral daily  glucagon  Injectable 1 milliGRAM(s) IntraMuscular once  influenza  Vaccine (HIGH DOSE) 0.7 milliLiter(s) IntraMuscular once  insulin lispro (ADMELOG) corrective regimen sliding scale   SubCutaneous every 6 hours  melatonin Liquid 6 milliGRAM(s) Oral at bedtime  ondansetron Injectable 4 milliGRAM(s) IV Push every 8 hours PRN  pantoprazole   Suspension 40 milliGRAM(s) Oral before breakfast  polyethylene glycol 3350 17 Gram(s) Oral daily  sodium bicarbonate 1300 milliGRAM(s) Oral three times a day      LABS:      Hemoglobin: 8.9 g/dL (12-09 @ 06:14)  Hemoglobin: 8.3 g/dL (12-08 @ 14:14)            Creatinine Trend: 2.99<--, 3.19<--, 3.31<--, 3.69<--, 4.30<--, 4.88<--    COAGS:           PHYSICAL EXAM:  T(C): 36.3 (12-12-23 @ 12:43), Max: 36.6 (12-11-23 @ 14:30)  HR: 73 (12-12-23 @ 12:43) (73 - 76)  BP: 186/69 (12-12-23 @ 12:43) (127/67 - 186/69)  RR: 18 (12-12-23 @ 12:43) (18 - 18)  SpO2: 100% (12-12-23 @ 12:43) (97% - 100%)  Wt(kg): --    I&O's Summary    11 Dec 2023 07:01  -  12 Dec 2023 07:00  --------------------------------------------------------  IN: 480 mL / OUT: 0 mL / NET: 480 mL      Gen: NAD  HEENT:  (-)icterus (-)pallor  CV: N S1 S2 1/6 MANASA (+)2 Pulses B/l  Resp:  Clear to auscultation B/L, normal effort  GI: (+) BS Soft, NT, ND  Lymph:  (-)Edema, (-)obvious lymphadenopathy  Skin: Warm to touch, Normal turgor  Psych: Appropriate mood and affect      TELEMETRY: SR 70-80    RADIOLOGY:         CXR: < from: Xray Chest 1 View- PORTABLE-Urgent (11.26.23 @ 02:59) >  IMPRESSION:  Slightly elevated right hemidiaphragm. Underinflated but otherwise clear   lungs. No pleural effusions or pneumothorax.    Stable cardiac and mediastinal silhouettes including aortic   calcifications and convex fullness of superior mediastinal/paratracheal   soft tissue contour which could be related to engorged/tortuous   brachiocephalic vasculature.    Generalized osteopenia.    --- End of Report ---    < end of copied text >      ASSESSMENT/PLAN: Patient is a 89 y/o Female with PMH of advanced dementia, HTN, HLD, DM2, anemia, CAD (s/p LAD and LCx stent 2010), HFrEF (EF 40-45% 2021), h/o TIA no residual deficits, h/o recurrent UTI who presented with syncope and fall w/o trauma in the setting of 1 month of poor PO intake, nausea, vomiting, epigastric pain. Found to have LESLIE, sinus bradycardia and orthostatic hypotension. Cardiology consulted for further evaluation.    #Syncope  - Suspect due to orthostatic hypotension  - TTE noted with EF 39%, mod AI  - Monitor telemetry however appears asymptomatic in terms of sinus bradycardia    #Orthostatic Hypotension  - Holding most of her antihypertensives/GDMT  - Avoid midodrine and florinef given CHF  - Continue compression stockings and monitor for improvement    #HTN  - Will need to allow some hypertension (SBP 160s) given orthostatic hypotension  - Continue Amlodipine 5mg daily    #Sinus Bradycardia  - Appears asymptomatic at rest  - EP eval appreciated - no need for PPM as she's asymptomatic    #New PAF  - EP eval noted - recommend AC if no contraindication and within GOC. Family opted for no AC given risks per med discussion    #LESLIE  - Management per renal    #CAD  - Continue Plavix and Lipitor    #GOC  - DNR/DNI    #Failure to Thrive  - GI eval noted - s/p PEG on 12/6  - Tolerated procedure well from CV perspective    - No further inpatient cardiac w/u planned    Rainer Wallace PA-C  Pager: [phone]     C A R D I O L O G Y  **********************************    DATE OF SERVICE: 12-12-23    Patient denies chest pain or shortness of breath.   Review of symptoms otherwise negative.    MEDICATIONS:  acetaminophen     Tablet .. 650 milliGRAM(s) Oral every 6 hours PRN  aluminum hydroxide/magnesium hydroxide/simethicone Suspension 30 milliLiter(s) Oral every 4 hours PRN  amLODIPine   Tablet 10 milliGRAM(s) Oral daily  atorvastatin 80 milliGRAM(s) Oral at bedtime  chlorhexidine 2% Cloths 1 Application(s) Topical daily  cholecalciferol 2000 Unit(s) Oral daily  clopidogrel Tablet 75 milliGRAM(s) Oral daily  dextrose 5%. 1000 milliLiter(s) IV Continuous <Continuous>  dextrose 5%. 1000 milliLiter(s) IV Continuous <Continuous>  dextrose 50% Injectable 12.5 Gram(s) IV Push once  dextrose 50% Injectable 25 Gram(s) IV Push once  dextrose 50% Injectable 25 Gram(s) IV Push once  dextrose Oral Gel 15 Gram(s) Oral once PRN  escitalopram 10 milliGRAM(s) Oral daily  folic acid 1 milliGRAM(s) Oral daily  glucagon  Injectable 1 milliGRAM(s) IntraMuscular once  influenza  Vaccine (HIGH DOSE) 0.7 milliLiter(s) IntraMuscular once  insulin lispro (ADMELOG) corrective regimen sliding scale   SubCutaneous every 6 hours  melatonin Liquid 6 milliGRAM(s) Oral at bedtime  ondansetron Injectable 4 milliGRAM(s) IV Push every 8 hours PRN  pantoprazole   Suspension 40 milliGRAM(s) Oral before breakfast  polyethylene glycol 3350 17 Gram(s) Oral daily  sodium bicarbonate 1300 milliGRAM(s) Oral three times a day      LABS:      Hemoglobin: 8.9 g/dL (12-09 @ 06:14)  Hemoglobin: 8.3 g/dL (12-08 @ 14:14)            Creatinine Trend: 2.99<--, 3.19<--, 3.31<--, 3.69<--, 4.30<--, 4.88<--    COAGS:           PHYSICAL EXAM:  T(C): 36.3 (12-12-23 @ 12:43), Max: 36.6 (12-11-23 @ 14:30)  HR: 73 (12-12-23 @ 12:43) (73 - 76)  BP: 186/69 (12-12-23 @ 12:43) (127/67 - 186/69)  RR: 18 (12-12-23 @ 12:43) (18 - 18)  SpO2: 100% (12-12-23 @ 12:43) (97% - 100%)  Wt(kg): --    I&O's Summary    11 Dec 2023 07:01  -  12 Dec 2023 07:00  --------------------------------------------------------  IN: 480 mL / OUT: 0 mL / NET: 480 mL      Gen: NAD  HEENT:  (-)icterus (-)pallor  CV: N S1 S2 1/6 MANASA (+)2 Pulses B/l  Resp:  Clear to auscultation B/L, normal effort  GI: (+) BS Soft, NT, ND  Lymph:  (-)Edema, (-)obvious lymphadenopathy  Skin: Warm to touch, Normal turgor  Psych: Appropriate mood and affect      TELEMETRY: SR 70-80    RADIOLOGY:         CXR: < from: Xray Chest 1 View- PORTABLE-Urgent (11.26.23 @ 02:59) >  IMPRESSION:  Slightly elevated right hemidiaphragm. Underinflated but otherwise clear   lungs. No pleural effusions or pneumothorax.    Stable cardiac and mediastinal silhouettes including aortic   calcifications and convex fullness of superior mediastinal/paratracheal   soft tissue contour which could be related to engorged/tortuous   brachiocephalic vasculature.    Generalized osteopenia.    --- End of Report ---    < end of copied text >      ASSESSMENT/PLAN: Patient is a 89 y/o Female with PMH of advanced dementia, HTN, HLD, DM2, anemia, CAD (s/p LAD and LCx stent 2010), HFrEF (EF 40-45% 2021), h/o TIA no residual deficits, h/o recurrent UTI who presented with syncope and fall w/o trauma in the setting of 1 month of poor PO intake, nausea, vomiting, epigastric pain. Found to have LESLIE, sinus bradycardia and orthostatic hypotension. Cardiology consulted for further evaluation.    #Syncope  - Suspect due to orthostatic hypotension  - TTE noted with EF 39%, mod AI  - Monitor telemetry however appears asymptomatic in terms of sinus bradycardia    #Orthostatic Hypotension  - Holding most of her antihypertensives/GDMT  - Avoid midodrine and florinef given CHF  - Continue compression stockings and monitor for improvement    #HTN  - Will need to allow some hypertension (SBP 160s) given orthostatic hypotension  - Continue Amlodipine 5mg daily    #Sinus Bradycardia  - Appears asymptomatic at rest  - EP eval appreciated - no need for PPM as she's asymptomatic    #New PAF  - EP eval noted - recommend AC if no contraindication and within GOC. Family opted for no AC given risks per med discussion    #LESLIE  - Management per renal    #CAD  - Continue Plavix and Lipitor    #GOC  - DNR/DNI    #Failure to Thrive  - GI eval noted - s/p PEG on 12/6  - Tolerated procedure well from CV perspective    - No further inpatient cardiac w/u planned    Rainer Wallace PA-C  Pager: [phone]

## 2023-12-12 NOTE — PROGRESS NOTE ADULT - PROBLEM SELECTOR PROBLEM 10
Jolie Barba came to office for a PFT. The patients chief complaint is very severe COPD. She demonstrates an FEV1 of 0.54 L with an FVC of 1.30 liters. She demonstrates a severe obstructive lung defect. She shows no significant response to bronchodilators. Overall, her lung function has remained stable over the past year. I will make no changes to her current bronchodilator therapy. These results were discussed with her directly.   All questions answered HTN (hypertension)
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
## 2023-12-12 NOTE — DISCHARGE NOTE NURSING/CASE MANAGEMENT/SOCIAL WORK - NSDCPEFALRISK_GEN_ALL_CORE
For information on Fall & Injury Prevention, visit: [email or web address] OR  [email or web address] OR  https://www.cdc.gov/steadi/patient.html For information on Fall & Injury Prevention, visit: [email or web address] OR  [email or web address] OR  https://www.cdc.gov/steadi/patient.html no

## 2023-12-12 NOTE — DISCHARGE NOTE NURSING/CASE MANAGEMENT/SOCIAL WORK - PATIENT PORTAL LINK FT
You can access the FollowMyHealth Patient Portal offered by [facility] by registering at the following website: [email or web address]. By joining [employer]’s FollowMyHealth portal, you will also be able to view your health information using other applications (apps) compatible with our system. You can access the FollowMyHealth Patient Portal offered by [facility] by registering at the following website: [email or web address]. By joining [employer]’s FollowMyHealth portal, you will also be able to view your health information using other applications (apps) compatible with our system.

## 2023-12-12 NOTE — PROGRESS NOTE ADULT - REASON FOR ADMISSION
fall, LESLIE

## 2023-12-12 NOTE — PROGRESS NOTE ADULT - NUTRITIONAL ASSESSMENT
This patient has been assessed with a concern for Malnutrition and has been determined to have a diagnosis/diagnoses of Moderate protein-calorie malnutrition.    This patient is being managed with:   Diet Soft and Bite Sized-  Kosher  Entered: Dec  3 2023  8:16AM  
This patient has been assessed with a concern for Malnutrition and has been determined to have a diagnosis/diagnoses of Moderate protein-calorie malnutrition.    This patient is being managed with:   Diet NPO after Midnight-     NPO Start Date: 05-Dec-2023   NPO Start Time: 23:59  Entered: Dec  5 2023  3:36PM    Diet Soft and Bite Sized-  Kosher  Entered: Dec  3 2023  8:16AM  
This patient has been assessed with a concern for Malnutrition and has been determined to have a diagnosis/diagnoses of Moderate protein-calorie malnutrition.    This patient is being managed with:   Diet NPO with Tube Feed-  Tube Feeding Modality: Gastrostomy  Glucerna 1.5 Satnam (GLUCERNA1.5RTH)  Total Volume for 24 Hours (mL): 960  Continuous  Starting Tube Feed Rate {mL per Hour}: 10  Increase Tube Feed Rate by (mL): 10     Every 8 hours  Until Goal Tube Feed Rate (mL per Hour): 40  Tube Feed Duration (in Hours): 24  Tube Feed Start Time: 15:00  Entered: Dec  7 2023  3:26PM  
This patient has been assessed with a concern for Malnutrition and has been determined to have a diagnosis/diagnoses of Moderate protein-calorie malnutrition.    This patient is being managed with:   Diet Soft and Bite Sized-  Kosher  Entered: Dec  3 2023  8:16AM  
This patient has been assessed with a concern for Malnutrition and has been determined to have a diagnosis/diagnoses of Moderate protein-calorie malnutrition.    This patient is being managed with:   Diet NPO with Tube Feed-  Tube Feeding Modality: Gastrostomy  Glucerna 1.5 Satnam (GLUCERNA1.5RTH)  Total Volume for 24 Hours (mL): 960  Continuous  Starting Tube Feed Rate {mL per Hour}: 10  Increase Tube Feed Rate by (mL): 10     Every 8 hours  Until Goal Tube Feed Rate (mL per Hour): 40  Tube Feed Duration (in Hours): 24  Tube Feed Start Time: 15:00  Entered: Dec  7 2023  3:26PM  
This patient has been assessed with a concern for Malnutrition and has been determined to have a diagnosis/diagnoses of Moderate protein-calorie malnutrition.    This patient is being managed with:   Diet NPO with Tube Feed-  Tube Feeding Modality: Gastrostomy  Glucerna 1.5 Satnam (GLUCERNA1.5RTH)  Total Volume for 24 Hours (mL): 960  Continuous  Starting Tube Feed Rate {mL per Hour}: 10  Increase Tube Feed Rate by (mL): 10     Every 8 hours  Until Goal Tube Feed Rate (mL per Hour): 40  Tube Feed Duration (in Hours): 24  Tube Feed Start Time: 15:00  Entered: Dec  8 2023  2:33PM    Diet NPO with Tube Feed-  Tube Feeding Modality: Gastrostomy  Glucerna 1.5 Satnam (GLUCERNA1.5RTH)  Total Volume for 24 Hours (mL): 960  Continuous  Starting Tube Feed Rate {mL per Hour}: 10  Increase Tube Feed Rate by (mL): 10     Every 8 hours  Until Goal Tube Feed Rate (mL per Hour): 40  Tube Feed Duration (in Hours): 24  Tube Feed Start Time: 15:00  Entered: Dec  7 2023  3:26PM    The following pending diet order is being considered for treatment of Moderate protein-calorie malnutrition:null
This patient has been assessed with a concern for Malnutrition and has been determined to have a diagnosis/diagnoses of Moderate protein-calorie malnutrition.    This patient is being managed with:   Diet Soft and Bite Sized-  Kosher  Entered: Dec  3 2023  8:16AM  
This patient has been assessed with a concern for Malnutrition and has been determined to have a diagnosis/diagnoses of Moderate protein-calorie malnutrition.    This patient is being managed with:   Diet NPO after Midnight-     NPO Start Date: 05-Dec-2023   NPO Start Time: 23:59  Entered: Dec  5 2023  3:36PM    Diet Soft and Bite Sized-  Kosher  Entered: Dec  3 2023  8:16AM  
This patient has been assessed with a concern for Malnutrition and has been determined to have a diagnosis/diagnoses of Moderate protein-calorie malnutrition.    This patient is being managed with:   Diet Soft and Bite Sized-  No Concentrated Potassium  Kosher  Entered: Nov 29 2023 10:04AM

## 2023-12-12 NOTE — PROGRESS NOTE ADULT - SUBJECTIVE AND OBJECTIVE BOX
EP ATTENDING    tele: NSR, periods of sinus wilber, no malignant events, short bursts of PAT.  DATE OF SERVICE - 12-12-23     Review of Systems:   Constitutional: [ ] fevers, [ ] chills.   Skin: [ ] dry skin. [ ] rashes.  Psychiatric: [ ] depression, [ ] anxiety.   Gastrointestinal: [ ] BRBPR, [ ] melena.   Neurological: [ ] confusion. [ ] seizures. [ ] shuffling gait.   Ears,Nose,Mouth and Throat: [ ] ear pain [ ] sore throat.   Eyes: [ ] diplopia.   Respiratory: [ ] hemoptysis. [ ] shortness of breath  Cardiovascular: See HPI above  Hematologic/Lymphatic: [ ] anemia. [ ] painful nodes. [ ] prolonged bleeding.   Genitourinary: [ ] hematuria. [ ] flank pain.   Endocrine: [ ] significant change in weight. [ ] intolerance to heat and cold.     Review of systems [ ] otherwise negative, [ ] otherwise unable to obtain    FH: no family history of sudden cardiac death in first degree relatives    SH: [ ] tobacco, [ ] alcohol, [ ] drugs    acetaminophen     Tablet .. 650 milliGRAM(s) Oral every 6 hours PRN  aluminum hydroxide/magnesium hydroxide/simethicone Suspension 30 milliLiter(s) Oral every 4 hours PRN  amLODIPine   Tablet 10 milliGRAM(s) Oral daily  atorvastatin 80 milliGRAM(s) Oral at bedtime  chlorhexidine 2% Cloths 1 Application(s) Topical daily  cholecalciferol 2000 Unit(s) Oral daily  clopidogrel Tablet 75 milliGRAM(s) Oral daily  dextrose 5%. 1000 milliLiter(s) IV Continuous <Continuous>  dextrose 5%. 1000 milliLiter(s) IV Continuous <Continuous>  dextrose 50% Injectable 12.5 Gram(s) IV Push once  dextrose 50% Injectable 25 Gram(s) IV Push once  dextrose 50% Injectable 25 Gram(s) IV Push once  dextrose Oral Gel 15 Gram(s) Oral once PRN  escitalopram 10 milliGRAM(s) Oral daily  folic acid 1 milliGRAM(s) Oral daily  glucagon  Injectable 1 milliGRAM(s) IntraMuscular once  influenza  Vaccine (HIGH DOSE) 0.7 milliLiter(s) IntraMuscular once  insulin lispro (ADMELOG) corrective regimen sliding scale   SubCutaneous every 6 hours  melatonin Liquid 6 milliGRAM(s) Oral at bedtime  ondansetron Injectable 4 milliGRAM(s) IV Push every 8 hours PRN  pantoprazole   Suspension 40 milliGRAM(s) Oral before breakfast  polyethylene glycol 3350 17 Gram(s) Oral daily  sodium bicarbonate 1300 milliGRAM(s) Oral three times a day    T(C): 36.3 (12-12-23 @ 04:42), Max: 36.6 (12-11-23 @ 14:30)  HR: 73 (12-12-23 @ 04:42) (73 - 76)  BP: 139/59 (12-12-23 @ 04:42) (127/67 - 145/66)  RR: 18 (12-12-23 @ 04:42) (18 - 18)  SpO2: 97% (12-12-23 @ 04:42) (97% - 100%)  Wt(kg): --    I&O's Summary    11 Dec 2023 07:01  -  12 Dec 2023 07:00  --------------------------------------------------------  IN: 480 mL / OUT: 0 mL / NET: 480 mL    cath: LCx PCI in 2010.	  Echo: LVEF 40%      A/P) 89 y/o female PMH hypertension, hyperlipidemia, diabetes, CAD s/p PCI 2010 now with LVEF 40%, advanced CKD a/w FTT. EP called for sinus bradycardia and periods of PAF. She remains off aspirin due to an allergy    -continue lipitor for hyperlipidemia with CAD  -continue norvasc for hypertension  -given her elevated chads-vasc score please continue lifelong a/c  -f/u palliative care  -no need for PPM as she's asymptomatic from her sinus bradycardia and PAF  -no need for ICD given LVEF > 35%  -no further inpatient EP testing expected    Georges Calderón M.D.  Cardiac Electrophysiology  [phone] EP ATTENDING    tele: NSR, periods of sinus wilber, no malignant events, short bursts of PAT.  DATE OF SERVICE - 12-12-23     Review of Systems:   Constitutional: [ ] fevers, [ ] chills.   Skin: [ ] dry skin. [ ] rashes.  Psychiatric: [ ] depression, [ ] anxiety.   Gastrointestinal: [ ] BRBPR, [ ] melena.   Neurological: [ ] confusion. [ ] seizures. [ ] shuffling gait.   Ears,Nose,Mouth and Throat: [ ] ear pain [ ] sore throat.   Eyes: [ ] diplopia.   Respiratory: [ ] hemoptysis. [ ] shortness of breath  Cardiovascular: See HPI above  Hematologic/Lymphatic: [ ] anemia. [ ] painful nodes. [ ] prolonged bleeding.   Genitourinary: [ ] hematuria. [ ] flank pain.   Endocrine: [ ] significant change in weight. [ ] intolerance to heat and cold.     Review of systems [ ] otherwise negative, [ ] otherwise unable to obtain    FH: no family history of sudden cardiac death in first degree relatives    SH: [ ] tobacco, [ ] alcohol, [ ] drugs    acetaminophen     Tablet .. 650 milliGRAM(s) Oral every 6 hours PRN  aluminum hydroxide/magnesium hydroxide/simethicone Suspension 30 milliLiter(s) Oral every 4 hours PRN  amLODIPine   Tablet 10 milliGRAM(s) Oral daily  atorvastatin 80 milliGRAM(s) Oral at bedtime  chlorhexidine 2% Cloths 1 Application(s) Topical daily  cholecalciferol 2000 Unit(s) Oral daily  clopidogrel Tablet 75 milliGRAM(s) Oral daily  dextrose 5%. 1000 milliLiter(s) IV Continuous <Continuous>  dextrose 5%. 1000 milliLiter(s) IV Continuous <Continuous>  dextrose 50% Injectable 12.5 Gram(s) IV Push once  dextrose 50% Injectable 25 Gram(s) IV Push once  dextrose 50% Injectable 25 Gram(s) IV Push once  dextrose Oral Gel 15 Gram(s) Oral once PRN  escitalopram 10 milliGRAM(s) Oral daily  folic acid 1 milliGRAM(s) Oral daily  glucagon  Injectable 1 milliGRAM(s) IntraMuscular once  influenza  Vaccine (HIGH DOSE) 0.7 milliLiter(s) IntraMuscular once  insulin lispro (ADMELOG) corrective regimen sliding scale   SubCutaneous every 6 hours  melatonin Liquid 6 milliGRAM(s) Oral at bedtime  ondansetron Injectable 4 milliGRAM(s) IV Push every 8 hours PRN  pantoprazole   Suspension 40 milliGRAM(s) Oral before breakfast  polyethylene glycol 3350 17 Gram(s) Oral daily  sodium bicarbonate 1300 milliGRAM(s) Oral three times a day    T(C): 36.3 (12-12-23 @ 04:42), Max: 36.6 (12-11-23 @ 14:30)  HR: 73 (12-12-23 @ 04:42) (73 - 76)  BP: 139/59 (12-12-23 @ 04:42) (127/67 - 145/66)  RR: 18 (12-12-23 @ 04:42) (18 - 18)  SpO2: 97% (12-12-23 @ 04:42) (97% - 100%)  Wt(kg): --    I&O's Summary    11 Dec 2023 07:01  -  12 Dec 2023 07:00  --------------------------------------------------------  IN: 480 mL / OUT: 0 mL / NET: 480 mL    cath: LCx PCI in 2010.	  Echo: LVEF 40%      A/P) 89 y/o female PMH hypertension, hyperlipidemia, diabetes, CAD s/p PCI 2010 now with LVEF 40%, advanced CKD a/w FTT. EP called for sinus bradycardia and periods of PAF. She remains off aspirin due to an allergy    -continue lipitor for hyperlipidemia with CAD  -continue norvasc for hypertension  -given her elevated chads-vasc score please continue lifelong a/c  -f/u palliative care  -no need for PPM as she's asymptomatic from her sinus bradycardia and PAF  -no need for ICD given LVEF > 35%  -no further inpatient EP testing expected    Georges Calderón M.D.  Cardiac Electrophysiology  [phone]

## 2023-12-12 NOTE — PROGRESS NOTE ADULT - TIME BILLING
time spent reviewing prior charts, meds, discussing plan with patient and family= 51 minutes
- Ordering, reviewing, and interpreting labs, testing  - Independently obtaining a review of systems and performing a physical exam  - Reviewing consultant documentation  - Counselling and educating patient and family regarding interpretation of aforementioned items and plan of care.
- Ordering, reviewing, and interpreting labs, testing, and imaging.  - Independently obtaining a review of systems and performing a physical exam  - Reviewing consultant documentation/recommendations in addition to discussing plan of care with consultants.  - Counselling and educating patient and family regarding interpretation of aforementioned items and plan of care.
- Ordering, reviewing, and interpreting labs, testing, and imaging.  - Independently obtaining a review of systems and performing a physical exam  - Reviewing consultant documentation/recommendations in addition to discussing plan of care with consultants.  - Counselling and educating patient and family regarding interpretation of aforementioned items and plan of care.

## 2023-12-12 NOTE — PROGRESS NOTE ADULT - NSPROGADDITIONALINFOA_GEN_ALL_CORE
Reached out to son Gianni- discussed pt's tube feeding being at goal and plans for discharge in coordination wKhadijah HAMILTON to [facility]
d/w ACP    d/c planning now to [facility] . TF at goal.   d/c time 45 mins
d/w ACP    d/c planning now to [facility] . TF at goal.   d/c time 45 mins
Plan d/w ACP Lakisha son updated over the phone
Attempted to reach son x2 vie phoen Abe, but went directly to .

## 2023-12-12 NOTE — CHART NOTE - NSCHARTNOTEFT_GEN_A_CORE
Follow Up Note  Patient seen for: TF and malnutrition follow up     Information obtained from pt, electronic medical record .      Current Diet:  Diet, NPO with Tube Feed:   Tube Feeding Modality: Gastrostomy  Glucerna 1.5 Satnam (GLUCERNA1.5RTH)  Total Volume for 24 Hours (mL): 960  Continuous  Starting Tube Feed Rate {mL per Hour}: 10  Increase Tube Feed Rate by (mL): 10     Every 8 hours  Until Goal Tube Feed Rate (mL per Hour): 40  Tube Feed Duration (in Hours): 24  Tube Feed Start Time: 15:00 (12-07-23)    PO Intake:  observed at goal glucerna 1.5 @40 ml/hr    GI:  - Last BM: 12/11 as per flowsheets; currently ordered for bowel regimen (miralax)    MEDICATIONS  (STANDING):  amLODIPine   Tablet 10 milliGRAM(s) Oral daily  atorvastatin 80 milliGRAM(s) Oral at bedtime  chlorhexidine 2% Cloths 1 Application(s) Topical daily  cholecalciferol 2000 Unit(s) Oral daily  clopidogrel Tablet 75 milliGRAM(s) Oral daily  dextrose 5%. 1000 milliLiter(s) (50 mL/Hr) IV Continuous <Continuous>  dextrose 5%. 1000 milliLiter(s) (100 mL/Hr) IV Continuous <Continuous>  dextrose 50% Injectable 12.5 Gram(s) IV Push once  dextrose 50% Injectable 25 Gram(s) IV Push once  dextrose 50% Injectable 25 Gram(s) IV Push once  escitalopram 10 milliGRAM(s) Oral daily  folic acid 1 milliGRAM(s) Oral daily  glucagon  Injectable 1 milliGRAM(s) IntraMuscular once  influenza  Vaccine (HIGH DOSE) 0.7 milliLiter(s) IntraMuscular once  insulin lispro (ADMELOG) corrective regimen sliding scale   SubCutaneous every 6 hours  melatonin Liquid 6 milliGRAM(s) Oral at bedtime  pantoprazole   Suspension 40 milliGRAM(s) Oral before breakfast  polyethylene glycol 3350 17 Gram(s) Oral daily  sodium bicarbonate 1300 milliGRAM(s) Oral three times a day    MEDICATIONS  (PRN):  acetaminophen     Tablet .. 650 milliGRAM(s) Oral every 6 hours PRN Temp greater or equal to 38C (100.4F), Mild Pain (1 - 3)  aluminum hydroxide/magnesium hydroxide/simethicone Suspension 30 milliLiter(s) Oral every 4 hours PRN Dyspepsia  dextrose Oral Gel 15 Gram(s) Oral once PRN Blood Glucose LESS THAN 70 milliGRAM(s)/deciliter  ondansetron Injectable 4 milliGRAM(s) IV Push every 8 hours PRN Nausea and/or Vomiting    12-07 @ 10:55: Na 142, BUN 36<H>, Cr 3.69<H>, BG 80, K+ 3.1<L>, Phos 4.0, Mg 1.6, Alk Phos 40, ALT/SGPT 12, AST/SGOT 23, HbA1c --    Skin: no noted pressure injuries as per flowsheets   Edema: +1 left ankle; right ankle as per flowsheets     Estimated Needs:  4547-1974 kcal/day (23-28 kcal/kg/day)  59.84-70.72 g/pro/kg (1.1-1.3 g/pro/kg/day)  based on dosing wt 119.9 pounds     Previous Nutrition Diagnosis: Moderate protein calorie malnutrition in context of acute illness  Ongoing [x]     New Nutrition Diagnosis: no    Education: education not warranted/appropriate at this time     Recommendations:   - Continue glucerna 1.5 start at 10 ml/hr increase by 10 ml/hr q8hr until goal 40 ml/hr x24 hr to provide: 960 ml, 1440 kcal (27 kcal/kg), 79 g/pro (1.5 g/pro/kg), 729 ml free water. defer water flushes to team.  Monitor GI tolerance. Maintain aspiration precautions, HOB >45 degrees during feeds and for 1 hour after.  RD to remain available to adjust EN formulary, volume/rate PRN.  - Will continue to monitor intake, weight, labs, skin, GI status   - Nutrition care plan to remain consistent with pt goals of care    RD remains available   Kari David, MS, RD, CDN (Teams) Follow Up Note  Patient seen for: TF and malnutrition follow up     Information obtained from pt, electronic medical record .      Current Diet:  Diet, NPO with Tube Feed:   Tube Feeding Modality: Gastrostomy  Glucerna 1.5 Satnam (GLUCERNA1.5RTH)  Total Volume for 24 Hours (mL): 960  Continuous  Starting Tube Feed Rate {mL per Hour}: 10  Increase Tube Feed Rate by (mL): 10     Every 8 hours  Until Goal Tube Feed Rate (mL per Hour): 40  Tube Feed Duration (in Hours): 24  Tube Feed Start Time: 15:00 (12-07-23)    PO Intake:  observed at goal glucerna 1.5 @40 ml/hr    GI:  - Last BM: 12/11 as per flowsheets; currently ordered for bowel regimen (miralax)    MEDICATIONS  (STANDING):  amLODIPine   Tablet 10 milliGRAM(s) Oral daily  atorvastatin 80 milliGRAM(s) Oral at bedtime  chlorhexidine 2% Cloths 1 Application(s) Topical daily  cholecalciferol 2000 Unit(s) Oral daily  clopidogrel Tablet 75 milliGRAM(s) Oral daily  dextrose 5%. 1000 milliLiter(s) (50 mL/Hr) IV Continuous <Continuous>  dextrose 5%. 1000 milliLiter(s) (100 mL/Hr) IV Continuous <Continuous>  dextrose 50% Injectable 12.5 Gram(s) IV Push once  dextrose 50% Injectable 25 Gram(s) IV Push once  dextrose 50% Injectable 25 Gram(s) IV Push once  escitalopram 10 milliGRAM(s) Oral daily  folic acid 1 milliGRAM(s) Oral daily  glucagon  Injectable 1 milliGRAM(s) IntraMuscular once  influenza  Vaccine (HIGH DOSE) 0.7 milliLiter(s) IntraMuscular once  insulin lispro (ADMELOG) corrective regimen sliding scale   SubCutaneous every 6 hours  melatonin Liquid 6 milliGRAM(s) Oral at bedtime  pantoprazole   Suspension 40 milliGRAM(s) Oral before breakfast  polyethylene glycol 3350 17 Gram(s) Oral daily  sodium bicarbonate 1300 milliGRAM(s) Oral three times a day    MEDICATIONS  (PRN):  acetaminophen     Tablet .. 650 milliGRAM(s) Oral every 6 hours PRN Temp greater or equal to 38C (100.4F), Mild Pain (1 - 3)  aluminum hydroxide/magnesium hydroxide/simethicone Suspension 30 milliLiter(s) Oral every 4 hours PRN Dyspepsia  dextrose Oral Gel 15 Gram(s) Oral once PRN Blood Glucose LESS THAN 70 milliGRAM(s)/deciliter  ondansetron Injectable 4 milliGRAM(s) IV Push every 8 hours PRN Nausea and/or Vomiting    12-07 @ 10:55: Na 142, BUN 36<H>, Cr 3.69<H>, BG 80, K+ 3.1<L>, Phos 4.0, Mg 1.6, Alk Phos 40, ALT/SGPT 12, AST/SGOT 23, HbA1c --    Skin: no noted pressure injuries as per flowsheets   Edema: +1 left ankle; right ankle as per flowsheets     Estimated Needs:  8535-8778 kcal/day (23-28 kcal/kg/day)  59.84-70.72 g/pro/kg (1.1-1.3 g/pro/kg/day)  based on dosing wt 119.9 pounds     Previous Nutrition Diagnosis: Moderate protein calorie malnutrition in context of acute illness  Ongoing [x]     New Nutrition Diagnosis: no    Education: education not warranted/appropriate at this time     Recommendations:   - Continue glucerna 1.5 start at 10 ml/hr increase by 10 ml/hr q8hr until goal 40 ml/hr x24 hr to provide: 960 ml, 1440 kcal (27 kcal/kg), 79 g/pro (1.5 g/pro/kg), 729 ml free water. defer water flushes to team.  Monitor GI tolerance. Maintain aspiration precautions, HOB >45 degrees during feeds and for 1 hour after.  RD to remain available to adjust EN formulary, volume/rate PRN.  - Will continue to monitor intake, weight, labs, skin, GI status   - Nutrition care plan to remain consistent with pt goals of care    RD remains available   Kari David, MS, RD, CDN (Teams)

## 2023-12-12 NOTE — PROGRESS NOTE ADULT - PROBLEM SELECTOR PROBLEM 1
LESLIE (acute kidney injury)
Syncope due to orthostatic hypotension
LESLIE (acute kidney injury)
LESLIE (acute kidney injury)
Syncope due to orthostatic hypotension
LESLIE (acute kidney injury)
Syncope due to orthostatic hypotension

## 2023-12-12 NOTE — PROGRESS NOTE ADULT - PROBLEM/PLAN-10
DISPLAY PLAN FREE TEXT
Yes
DISPLAY PLAN FREE TEXT

## 2023-12-15 ENCOUNTER — INPATIENT (INPATIENT)
Facility: HOSPITAL | Age: 88
LOS: 13 days | Discharge: NURSING, REHAB OR LONG-TERM CARE | DRG: 393 | End: 2023-12-29
Attending: STUDENT IN AN ORGANIZED HEALTH CARE EDUCATION/TRAINING PROGRAM | Admitting: STUDENT IN AN ORGANIZED HEALTH CARE EDUCATION/TRAINING PROGRAM
Payer: MEDICARE

## 2023-12-15 VITALS
DIASTOLIC BLOOD PRESSURE: 72 MMHG | RESPIRATION RATE: 18 BRPM | OXYGEN SATURATION: 100 % | HEART RATE: 75 BPM | HEIGHT: 60 IN | TEMPERATURE: 98 F | SYSTOLIC BLOOD PRESSURE: 163 MMHG

## 2023-12-15 DIAGNOSIS — Z96.642 PRESENCE OF LEFT ARTIFICIAL HIP JOINT: Chronic | ICD-10-CM

## 2023-12-15 PROCEDURE — 71045 X-RAY EXAM CHEST 1 VIEW: CPT | Mod: 26

## 2023-12-15 PROCEDURE — 99285 EMERGENCY DEPT VISIT HI MDM: CPT

## 2023-12-15 RX ORDER — ACETAMINOPHEN 500 MG
1000 TABLET ORAL ONCE
Refills: 0 | Status: COMPLETED | OUTPATIENT
Start: 2023-12-15 | End: 2023-12-15

## 2023-12-15 RX ADMIN — Medication 400 MILLIGRAM(S): at 23:31

## 2023-12-15 NOTE — ED ADULT NURSE NOTE - HISTORY OF COVID-19 VACCINATION
Spoke with patient. She voiced understanding.  Form out for Dr. Saad Hurst to sign Vaccine status unknown

## 2023-12-15 NOTE — ED ADULT NURSE NOTE - NSFALLHARMRISKINTERV_ED_ALL_ED
Assistance OOB with selected safe patient handling equipment if applicable/Assistance with ambulation/Communicate risk of Fall with Harm to all staff, patient, and family/Monitor gait and stability/Provide visual cue: red socks, yellow wristband, yellow gown, etc/Reinforce activity limits and safety measures with patient and family/Bed in lowest position, wheels locked, appropriate side rails in place/Call bell, personal items and telephone in reach/Instruct patient to call for assistance before getting out of bed/chair/stretcher/Non-slip footwear applied when patient is off stretcher/[place] to call system/Physically safe environment - no spills, clutter or unnecessary equipment/Purposeful Proactive Rounding/Room/bathroom lighting operational, light cord in reach Assistance OOB with selected safe patient handling equipment if applicable/Assistance with ambulation/Communicate risk of Fall with Harm to all staff, patient, and family/Monitor gait and stability/Provide visual cue: red socks, yellow wristband, yellow gown, etc/Reinforce activity limits and safety measures with patient and family/Bed in lowest position, wheels locked, appropriate side rails in place/Call bell, personal items and telephone in reach/Instruct patient to call for assistance before getting out of bed/chair/stretcher/Non-slip footwear applied when patient is off stretcher/[place] to call system/Physically safe environment - no spills, clutter or unnecessary equipment/Purposeful Proactive Rounding/Room/bathroom lighting operational, light cord in reach

## 2023-12-15 NOTE — ED ADULT NURSE NOTE - OBJECTIVE STATEMENT
PT is a 89yo f coming from [facility] c/o PEG tube dislodgement. As per EMS, pt accidently dislodged her G tube while at the nursing home and needed it put back in. Upon arrival, pt is moaning and screaming "I don't know" when asked LOC questions. PT is a 89yo f coming from [facility] c/o PEG tube dislodgement. As per EMS, pt accidently dislodged her G tube while at the nursing home and needed it put back in. Upon arrival, pt is moaning and screaming "I don't know" when asked LOC questions. PT is a 89yo f coming from [facility] c/o PEG tube dislodgement. As per EMS, pt accidently dislodged her PEG tube while at the nursing home and needed it put back in. Upon arrival, pt is moaning and screaming "I don't know" when asked LOC questions, pt uncooperative when asked questions, PEG tube sit visualized. PMH of CHF, dementia, anemia, HTN, HLD, CAD, DM. PT A,Ox0, nonambulatory at baseline. Respirations even and unlabored, abd soft, nondistended and nontender, PEG tube site visualized, skin warm, dry, POOLE. Stretcher locked in lowest position, appropriate side rails up for safety, pt instructed to call for RN if anything needed. PT is a 89yo f coming from [facility] c/o PEG tube dislodgement. As per EMS, pt accidently dislodged her PEG tube while at the nursing home and needed it put back in. Upon arrival, pt is moaning and screaming "I don't know" when asked LOC questions, pt uncooperative when asked questions, PEG tube sit visualized. PMH of CHF, dementia, anemia, HTN, HLD, CAD, DM. PT A,Ox0, nonambulatory at baseline. Respirations even and unlabored, abd soft, nondistended and nontender, PEG tube site visualized, skin warm, dry, POOLE. Stretcher locked in lowest position, appropriate side rails up for safety, pt instructed to call for RN if anything needed.

## 2023-12-16 DIAGNOSIS — F32.9 MAJOR DEPRESSIVE DISORDER, SINGLE EPISODE, UNSPECIFIED: ICD-10-CM

## 2023-12-16 DIAGNOSIS — N18.4 CHRONIC KIDNEY DISEASE, STAGE 4 (SEVERE): ICD-10-CM

## 2023-12-16 DIAGNOSIS — I25.10 ATHEROSCLEROTIC HEART DISEASE OF NATIVE CORONARY ARTERY WITHOUT ANGINA PECTORIS: ICD-10-CM

## 2023-12-16 DIAGNOSIS — K94.23 GASTROSTOMY MALFUNCTION: ICD-10-CM

## 2023-12-16 DIAGNOSIS — I10 ESSENTIAL (PRIMARY) HYPERTENSION: ICD-10-CM

## 2023-12-16 DIAGNOSIS — D63.8 ANEMIA IN OTHER CHRONIC DISEASES CLASSIFIED ELSEWHERE: ICD-10-CM

## 2023-12-16 PROBLEM — Z86.73 PERSONAL HISTORY OF TRANSIENT ISCHEMIC ATTACK (TIA), AND CEREBRAL INFARCTION WITHOUT RESIDUAL DEFICITS: Chronic | Status: ACTIVE | Noted: 2023-11-26

## 2023-12-16 PROBLEM — I50.20 UNSPECIFIED SYSTOLIC (CONGESTIVE) HEART FAILURE: Chronic | Status: ACTIVE | Noted: 2023-11-26

## 2023-12-16 PROBLEM — D64.9 ANEMIA, UNSPECIFIED: Chronic | Status: ACTIVE | Noted: 2023-11-26

## 2023-12-16 LAB
A1C WITH ESTIMATED AVERAGE GLUCOSE RESULT: 6.3 % — HIGH (ref 4–5.6)
A1C WITH ESTIMATED AVERAGE GLUCOSE RESULT: 6.3 % — HIGH (ref 4–5.6)
ALBUMIN SERPL ELPH-MCNC: 3 G/DL — LOW (ref 3.3–5)
ALBUMIN SERPL ELPH-MCNC: 3 G/DL — LOW (ref 3.3–5)
ALBUMIN SERPL ELPH-MCNC: 3.2 G/DL — LOW (ref 3.3–5)
ALBUMIN SERPL ELPH-MCNC: 3.2 G/DL — LOW (ref 3.3–5)
ALP SERPL-CCNC: 168 U/L — HIGH (ref 40–120)
ALP SERPL-CCNC: 168 U/L — HIGH (ref 40–120)
ALP SERPL-CCNC: 175 U/L — HIGH (ref 40–120)
ALP SERPL-CCNC: 175 U/L — HIGH (ref 40–120)
ALT FLD-CCNC: 24 U/L — SIGNIFICANT CHANGE UP (ref 10–45)
ALT FLD-CCNC: 24 U/L — SIGNIFICANT CHANGE UP (ref 10–45)
ALT FLD-CCNC: 29 U/L — SIGNIFICANT CHANGE UP (ref 10–45)
ALT FLD-CCNC: 29 U/L — SIGNIFICANT CHANGE UP (ref 10–45)
ANION GAP SERPL CALC-SCNC: 12 MMOL/L — SIGNIFICANT CHANGE UP (ref 5–17)
ANION GAP SERPL CALC-SCNC: 12 MMOL/L — SIGNIFICANT CHANGE UP (ref 5–17)
ANION GAP SERPL CALC-SCNC: 14 MMOL/L — SIGNIFICANT CHANGE UP (ref 5–17)
ANION GAP SERPL CALC-SCNC: 14 MMOL/L — SIGNIFICANT CHANGE UP (ref 5–17)
ANION GAP SERPL CALC-SCNC: 8 MMOL/L — SIGNIFICANT CHANGE UP (ref 5–17)
ANION GAP SERPL CALC-SCNC: 8 MMOL/L — SIGNIFICANT CHANGE UP (ref 5–17)
AST SERPL-CCNC: 39 U/L — SIGNIFICANT CHANGE UP (ref 10–40)
AST SERPL-CCNC: 39 U/L — SIGNIFICANT CHANGE UP (ref 10–40)
AST SERPL-CCNC: 46 U/L — HIGH (ref 10–40)
AST SERPL-CCNC: 46 U/L — HIGH (ref 10–40)
BASOPHILS # BLD AUTO: 0.03 K/UL — SIGNIFICANT CHANGE UP (ref 0–0.2)
BASOPHILS # BLD AUTO: 0.03 K/UL — SIGNIFICANT CHANGE UP (ref 0–0.2)
BASOPHILS NFR BLD AUTO: 0.4 % — SIGNIFICANT CHANGE UP (ref 0–2)
BASOPHILS NFR BLD AUTO: 0.4 % — SIGNIFICANT CHANGE UP (ref 0–2)
BILIRUB SERPL-MCNC: 0.7 MG/DL — SIGNIFICANT CHANGE UP (ref 0.2–1.2)
BILIRUB SERPL-MCNC: 0.7 MG/DL — SIGNIFICANT CHANGE UP (ref 0.2–1.2)
BILIRUB SERPL-MCNC: 0.8 MG/DL — SIGNIFICANT CHANGE UP (ref 0.2–1.2)
BILIRUB SERPL-MCNC: 0.8 MG/DL — SIGNIFICANT CHANGE UP (ref 0.2–1.2)
BUN SERPL-MCNC: 44 MG/DL — HIGH (ref 7–23)
BUN SERPL-MCNC: 44 MG/DL — HIGH (ref 7–23)
BUN SERPL-MCNC: 46 MG/DL — HIGH (ref 7–23)
BUN SERPL-MCNC: 46 MG/DL — HIGH (ref 7–23)
BUN SERPL-MCNC: 50 MG/DL — HIGH (ref 7–23)
BUN SERPL-MCNC: 50 MG/DL — HIGH (ref 7–23)
CALCIUM SERPL-MCNC: 8.4 MG/DL — SIGNIFICANT CHANGE UP (ref 8.4–10.5)
CALCIUM SERPL-MCNC: 8.4 MG/DL — SIGNIFICANT CHANGE UP (ref 8.4–10.5)
CALCIUM SERPL-MCNC: 9.4 MG/DL — SIGNIFICANT CHANGE UP (ref 8.4–10.5)
CALCIUM SERPL-MCNC: 9.4 MG/DL — SIGNIFICANT CHANGE UP (ref 8.4–10.5)
CALCIUM SERPL-MCNC: 9.6 MG/DL — SIGNIFICANT CHANGE UP (ref 8.4–10.5)
CALCIUM SERPL-MCNC: 9.6 MG/DL — SIGNIFICANT CHANGE UP (ref 8.4–10.5)
CHLORIDE SERPL-SCNC: 104 MMOL/L — SIGNIFICANT CHANGE UP (ref 96–108)
CHLORIDE SERPL-SCNC: 110 MMOL/L — HIGH (ref 96–108)
CHLORIDE SERPL-SCNC: 110 MMOL/L — HIGH (ref 96–108)
CHOLEST SERPL-MCNC: 133 MG/DL — SIGNIFICANT CHANGE UP
CHOLEST SERPL-MCNC: 133 MG/DL — SIGNIFICANT CHANGE UP
CO2 SERPL-SCNC: 24 MMOL/L — SIGNIFICANT CHANGE UP (ref 22–31)
CO2 SERPL-SCNC: 24 MMOL/L — SIGNIFICANT CHANGE UP (ref 22–31)
CO2 SERPL-SCNC: 27 MMOL/L — SIGNIFICANT CHANGE UP (ref 22–31)
CREAT SERPL-MCNC: 2.12 MG/DL — HIGH (ref 0.5–1.3)
CREAT SERPL-MCNC: 2.12 MG/DL — HIGH (ref 0.5–1.3)
CREAT SERPL-MCNC: 2.21 MG/DL — HIGH (ref 0.5–1.3)
CREAT SERPL-MCNC: 2.21 MG/DL — HIGH (ref 0.5–1.3)
CREAT SERPL-MCNC: 2.28 MG/DL — HIGH (ref 0.5–1.3)
CREAT SERPL-MCNC: 2.28 MG/DL — HIGH (ref 0.5–1.3)
EGFR: 20 ML/MIN/1.73M2 — LOW
EGFR: 20 ML/MIN/1.73M2 — LOW
EGFR: 21 ML/MIN/1.73M2 — LOW
EGFR: 21 ML/MIN/1.73M2 — LOW
EGFR: 22 ML/MIN/1.73M2 — LOW
EGFR: 22 ML/MIN/1.73M2 — LOW
EOSINOPHIL # BLD AUTO: 0.09 K/UL — SIGNIFICANT CHANGE UP (ref 0–0.5)
EOSINOPHIL # BLD AUTO: 0.09 K/UL — SIGNIFICANT CHANGE UP (ref 0–0.5)
EOSINOPHIL NFR BLD AUTO: 1.3 % — SIGNIFICANT CHANGE UP (ref 0–6)
EOSINOPHIL NFR BLD AUTO: 1.3 % — SIGNIFICANT CHANGE UP (ref 0–6)
ESTIMATED AVERAGE GLUCOSE: 134 MG/DL — HIGH (ref 68–114)
ESTIMATED AVERAGE GLUCOSE: 134 MG/DL — HIGH (ref 68–114)
GLUCOSE BLDC GLUCOMTR-MCNC: 132 MG/DL — HIGH (ref 70–99)
GLUCOSE BLDC GLUCOMTR-MCNC: 132 MG/DL — HIGH (ref 70–99)
GLUCOSE BLDC GLUCOMTR-MCNC: 138 MG/DL — HIGH (ref 70–99)
GLUCOSE BLDC GLUCOMTR-MCNC: 138 MG/DL — HIGH (ref 70–99)
GLUCOSE BLDC GLUCOMTR-MCNC: 149 MG/DL — HIGH (ref 70–99)
GLUCOSE BLDC GLUCOMTR-MCNC: 149 MG/DL — HIGH (ref 70–99)
GLUCOSE SERPL-MCNC: 111 MG/DL — HIGH (ref 70–99)
GLUCOSE SERPL-MCNC: 111 MG/DL — HIGH (ref 70–99)
GLUCOSE SERPL-MCNC: 117 MG/DL — HIGH (ref 70–99)
GLUCOSE SERPL-MCNC: 117 MG/DL — HIGH (ref 70–99)
GLUCOSE SERPL-MCNC: 125 MG/DL — HIGH (ref 70–99)
GLUCOSE SERPL-MCNC: 125 MG/DL — HIGH (ref 70–99)
HCT VFR BLD CALC: 25.4 % — LOW (ref 34.5–45)
HCT VFR BLD CALC: 25.4 % — LOW (ref 34.5–45)
HCT VFR BLD CALC: 27.5 % — LOW (ref 34.5–45)
HCT VFR BLD CALC: 27.5 % — LOW (ref 34.5–45)
HDLC SERPL-MCNC: 50 MG/DL — LOW
HDLC SERPL-MCNC: 50 MG/DL — LOW
HGB BLD-MCNC: 7.8 G/DL — LOW (ref 11.5–15.5)
HGB BLD-MCNC: 7.8 G/DL — LOW (ref 11.5–15.5)
HGB BLD-MCNC: 8.4 G/DL — LOW (ref 11.5–15.5)
HGB BLD-MCNC: 8.4 G/DL — LOW (ref 11.5–15.5)
IMM GRANULOCYTES NFR BLD AUTO: 1.1 % — HIGH (ref 0–0.9)
IMM GRANULOCYTES NFR BLD AUTO: 1.1 % — HIGH (ref 0–0.9)
LIPID PNL WITH DIRECT LDL SERPL: 65 MG/DL — SIGNIFICANT CHANGE UP
LIPID PNL WITH DIRECT LDL SERPL: 65 MG/DL — SIGNIFICANT CHANGE UP
LYMPHOCYTES # BLD AUTO: 0.99 K/UL — LOW (ref 1–3.3)
LYMPHOCYTES # BLD AUTO: 0.99 K/UL — LOW (ref 1–3.3)
LYMPHOCYTES # BLD AUTO: 14 % — SIGNIFICANT CHANGE UP (ref 13–44)
LYMPHOCYTES # BLD AUTO: 14 % — SIGNIFICANT CHANGE UP (ref 13–44)
MCHC RBC-ENTMCNC: 26.2 PG — LOW (ref 27–34)
MCHC RBC-ENTMCNC: 26.2 PG — LOW (ref 27–34)
MCHC RBC-ENTMCNC: 26.3 PG — LOW (ref 27–34)
MCHC RBC-ENTMCNC: 26.3 PG — LOW (ref 27–34)
MCHC RBC-ENTMCNC: 30.5 GM/DL — LOW (ref 32–36)
MCHC RBC-ENTMCNC: 30.5 GM/DL — LOW (ref 32–36)
MCHC RBC-ENTMCNC: 30.7 GM/DL — LOW (ref 32–36)
MCHC RBC-ENTMCNC: 30.7 GM/DL — LOW (ref 32–36)
MCV RBC AUTO: 85.5 FL — SIGNIFICANT CHANGE UP (ref 80–100)
MCV RBC AUTO: 85.5 FL — SIGNIFICANT CHANGE UP (ref 80–100)
MCV RBC AUTO: 85.7 FL — SIGNIFICANT CHANGE UP (ref 80–100)
MCV RBC AUTO: 85.7 FL — SIGNIFICANT CHANGE UP (ref 80–100)
MONOCYTES # BLD AUTO: 0.5 K/UL — SIGNIFICANT CHANGE UP (ref 0–0.9)
MONOCYTES # BLD AUTO: 0.5 K/UL — SIGNIFICANT CHANGE UP (ref 0–0.9)
MONOCYTES NFR BLD AUTO: 7.1 % — SIGNIFICANT CHANGE UP (ref 2–14)
MONOCYTES NFR BLD AUTO: 7.1 % — SIGNIFICANT CHANGE UP (ref 2–14)
NEUTROPHILS # BLD AUTO: 5.36 K/UL — SIGNIFICANT CHANGE UP (ref 1.8–7.4)
NEUTROPHILS # BLD AUTO: 5.36 K/UL — SIGNIFICANT CHANGE UP (ref 1.8–7.4)
NEUTROPHILS NFR BLD AUTO: 76.1 % — SIGNIFICANT CHANGE UP (ref 43–77)
NEUTROPHILS NFR BLD AUTO: 76.1 % — SIGNIFICANT CHANGE UP (ref 43–77)
NON HDL CHOLESTEROL: 83 MG/DL — SIGNIFICANT CHANGE UP
NON HDL CHOLESTEROL: 83 MG/DL — SIGNIFICANT CHANGE UP
NRBC # BLD: 0 /100 WBCS — SIGNIFICANT CHANGE UP (ref 0–0)
PLATELET # BLD AUTO: 151 K/UL — SIGNIFICANT CHANGE UP (ref 150–400)
PLATELET # BLD AUTO: 151 K/UL — SIGNIFICANT CHANGE UP (ref 150–400)
PLATELET # BLD AUTO: 177 K/UL — SIGNIFICANT CHANGE UP (ref 150–400)
PLATELET # BLD AUTO: 177 K/UL — SIGNIFICANT CHANGE UP (ref 150–400)
POTASSIUM SERPL-MCNC: 3 MMOL/L — LOW (ref 3.5–5.3)
POTASSIUM SERPL-MCNC: 3 MMOL/L — LOW (ref 3.5–5.3)
POTASSIUM SERPL-MCNC: 3.2 MMOL/L — LOW (ref 3.5–5.3)
POTASSIUM SERPL-MCNC: 3.2 MMOL/L — LOW (ref 3.5–5.3)
POTASSIUM SERPL-MCNC: 4.2 MMOL/L — SIGNIFICANT CHANGE UP (ref 3.5–5.3)
POTASSIUM SERPL-MCNC: 4.2 MMOL/L — SIGNIFICANT CHANGE UP (ref 3.5–5.3)
POTASSIUM SERPL-SCNC: 3 MMOL/L — LOW (ref 3.5–5.3)
POTASSIUM SERPL-SCNC: 3 MMOL/L — LOW (ref 3.5–5.3)
POTASSIUM SERPL-SCNC: 3.2 MMOL/L — LOW (ref 3.5–5.3)
POTASSIUM SERPL-SCNC: 3.2 MMOL/L — LOW (ref 3.5–5.3)
POTASSIUM SERPL-SCNC: 4.2 MMOL/L — SIGNIFICANT CHANGE UP (ref 3.5–5.3)
POTASSIUM SERPL-SCNC: 4.2 MMOL/L — SIGNIFICANT CHANGE UP (ref 3.5–5.3)
PROT SERPL-MCNC: 5.9 G/DL — LOW (ref 6–8.3)
PROT SERPL-MCNC: 5.9 G/DL — LOW (ref 6–8.3)
PROT SERPL-MCNC: 6.3 G/DL — SIGNIFICANT CHANGE UP (ref 6–8.3)
PROT SERPL-MCNC: 6.3 G/DL — SIGNIFICANT CHANGE UP (ref 6–8.3)
RBC # BLD: 2.97 M/UL — LOW (ref 3.8–5.2)
RBC # BLD: 2.97 M/UL — LOW (ref 3.8–5.2)
RBC # BLD: 3.21 M/UL — LOW (ref 3.8–5.2)
RBC # BLD: 3.21 M/UL — LOW (ref 3.8–5.2)
RBC # FLD: 17.8 % — HIGH (ref 10.3–14.5)
RBC # FLD: 17.8 % — HIGH (ref 10.3–14.5)
RBC # FLD: 17.9 % — HIGH (ref 10.3–14.5)
RBC # FLD: 17.9 % — HIGH (ref 10.3–14.5)
SODIUM SERPL-SCNC: 142 MMOL/L — SIGNIFICANT CHANGE UP (ref 135–145)
SODIUM SERPL-SCNC: 142 MMOL/L — SIGNIFICANT CHANGE UP (ref 135–145)
SODIUM SERPL-SCNC: 143 MMOL/L — SIGNIFICANT CHANGE UP (ref 135–145)
SODIUM SERPL-SCNC: 143 MMOL/L — SIGNIFICANT CHANGE UP (ref 135–145)
SODIUM SERPL-SCNC: 145 MMOL/L — SIGNIFICANT CHANGE UP (ref 135–145)
SODIUM SERPL-SCNC: 145 MMOL/L — SIGNIFICANT CHANGE UP (ref 135–145)
TRIGL SERPL-MCNC: 95 MG/DL — SIGNIFICANT CHANGE UP
TRIGL SERPL-MCNC: 95 MG/DL — SIGNIFICANT CHANGE UP
WBC # BLD: 7.05 K/UL — SIGNIFICANT CHANGE UP (ref 3.8–10.5)
WBC # BLD: 7.05 K/UL — SIGNIFICANT CHANGE UP (ref 3.8–10.5)
WBC # BLD: 7.21 K/UL — SIGNIFICANT CHANGE UP (ref 3.8–10.5)
WBC # BLD: 7.21 K/UL — SIGNIFICANT CHANGE UP (ref 3.8–10.5)
WBC # FLD AUTO: 7.05 K/UL — SIGNIFICANT CHANGE UP (ref 3.8–10.5)
WBC # FLD AUTO: 7.05 K/UL — SIGNIFICANT CHANGE UP (ref 3.8–10.5)
WBC # FLD AUTO: 7.21 K/UL — SIGNIFICANT CHANGE UP (ref 3.8–10.5)
WBC # FLD AUTO: 7.21 K/UL — SIGNIFICANT CHANGE UP (ref 3.8–10.5)

## 2023-12-16 PROCEDURE — 99222 1ST HOSP IP/OBS MODERATE 55: CPT | Mod: GC

## 2023-12-16 PROCEDURE — 99223 1ST HOSP IP/OBS HIGH 75: CPT

## 2023-12-16 RX ORDER — SODIUM CHLORIDE 9 MG/ML
1000 INJECTION, SOLUTION INTRAVENOUS
Refills: 0 | Status: DISCONTINUED | OUTPATIENT
Start: 2023-12-16 | End: 2023-12-18

## 2023-12-16 RX ORDER — CHLORHEXIDINE GLUCONATE 213 G/1000ML
1 SOLUTION TOPICAL DAILY
Refills: 0 | Status: DISCONTINUED | OUTPATIENT
Start: 2023-12-16 | End: 2023-12-29

## 2023-12-16 RX ADMIN — Medication 1000 MILLIGRAM(S): at 00:01

## 2023-12-16 RX ADMIN — SODIUM CHLORIDE 50 MILLILITER(S): 9 INJECTION, SOLUTION INTRAVENOUS at 04:01

## 2023-12-16 RX ADMIN — CHLORHEXIDINE GLUCONATE 1 APPLICATION(S): 213 SOLUTION TOPICAL at 11:21

## 2023-12-16 NOTE — H&P ADULT - PROBLEM SELECTOR PLAN 1
PEG removed from patient  - GI consulted via email to re-place PEG tube as they placed it originally. Unclear when last dose of plavix was  - Pt will be NPO for now, resume meds through PEG tube when replaced  - Swallow eval in AM  - Will provide PRN IV meds for pain, HTN, etc.

## 2023-12-16 NOTE — PROGRESS NOTE ADULT - SUBJECTIVE AND OBJECTIVE BOX
[facility] Division of Hospital Medicine  Jimbo Quevedo MD  Available via MS Teams    SUBJECTIVE / OVERNIGHT EVENTS: Patient seen and examined at bedside, resting comfortably and in no acute distress. Unable to patriciate in review of systems. Daughter at bedside, reports patient is in no distress.     MEDICATIONS  (STANDING):  chlorhexidine 2% Cloths 1 Application(s) Topical daily  dextrose 5% + sodium chloride 0.9%. 1000 milliLiter(s) (50 mL/Hr) IV Continuous <Continuous>    MEDICATIONS  (PRN):      I&O's Summary    16 Dec 2023 07:01  -  16 Dec 2023 12:39  --------------------------------------------------------  IN: 0 mL / OUT: 0 mL / NET: 0 mL        PHYSICAL EXAM:  Vital Signs Last 24 Hrs  T(C): 36.5 (16 Dec 2023 09:50), Max: 36.5 (16 Dec 2023 01:18)  T(F): 97.7 (16 Dec 2023 09:50), Max: 97.7 (16 Dec 2023 01:18)  HR: 66 (16 Dec 2023 09:50) (66 - 75)  BP: 153/65 (16 Dec 2023 09:50) (128/79 - 163/72)  BP(mean): --  RR: 18 (16 Dec 2023 09:50) (18 - 19)  SpO2: 97% (16 Dec 2023 09:50) (95% - 100%)    Parameters below as of 16 Dec 2023 09:50  Patient On (Oxygen Delivery Method): room air      CONSTITUTIONAL: NAD, well-groomed  EYES: PERRLA; conjunctiva and sclera clear  ENMT: Moist oral mucosa, no pharyngeal injection or exudates; normal dentition  NECK: Supple, no palpable masses; no thyromegaly  RESPIRATORY: Normal respiratory effort; lungs are clear to auscultation bilaterally  CARDIOVASCULAR: normal S1 and S2, no murmur/rub/gallop; No lower extremity edema  ABDOMEN: Nontender to palpation, normoactive bowel sounds, no rebound/guarding; No hepatosplenomegaly  MUSCULOSKELETAL:  no clubbing or cyanosis of digits; no joint swelling or tenderness to palpation  PSYCH: A+O to person only   NEUROLOGY: CN 2-12 are intact and symmetric; no gross sensory deficits   SKIN: No rashes; no palpable lesions    LABS:                        7.8    7.21  )-----------( 177      ( 16 Dec 2023 07:25 )             25.4     12-16    145  |  110<H>  |  44<H>  ----------------------------<  125<H>  3.0<L>   |  27  |  2.12<H>    Ca    8.4      16 Dec 2023 10:43    TPro  5.9<L>  /  Alb  3.0<L>  /  TBili  0.7  /  DBili  x   /  AST  46<H>  /  ALT  29  /  AlkPhos  168<H>  12-16    Urinalysis Basic - ( 16 Dec 2023 10:43 )    Color: x / Appearance: x / SG: x / pH: x  Gluc: 125 mg/dL / Ketone: x  / Bili: x / Urobili: x   Blood: x / Protein: x / Nitrite: x   Leuk Esterase: x / RBC: x / WBC x   Sq Epi: x / Non Sq Epi: x / Bacteria: x    COVID-19 PCR: Dinora[place]ec (07 Dec 2023 13:44) [facility] Division of Hospital Medicine  Jimbo Quevedo MD  Available via MS Teams    SUBJECTIVE / OVERNIGHT EVENTS: Patient seen and examined at bedside, resting comfortably and in no acute distress. Unable to patriciate in review of systems. Daughter at bedside, reports patient is in no distress.     MEDICATIONS  (STANDING):  chlorhexidine 2% Cloths 1 Application(s) Topical daily  dextrose 5% + sodium chloride 0.9%. 1000 milliLiter(s) (50 mL/Hr) IV Continuous <Continuous>    MEDICATIONS  (PRN):      I&O's Summary    16 Dec 2023 07:01  -  16 Dec 2023 12:39  --------------------------------------------------------  IN: 0 mL / OUT: 0 mL / NET: 0 mL        PHYSICAL EXAM:  Vital Signs Last 24 Hrs  T(C): 36.5 (16 Dec 2023 09:50), Max: 36.5 (16 Dec 2023 01:18)  T(F): 97.7 (16 Dec 2023 09:50), Max: 97.7 (16 Dec 2023 01:18)  HR: 66 (16 Dec 2023 09:50) (66 - 75)  BP: 153/65 (16 Dec 2023 09:50) (128/79 - 163/72)  BP(mean): --  RR: 18 (16 Dec 2023 09:50) (18 - 19)  SpO2: 97% (16 Dec 2023 09:50) (95% - 100%)    Parameters below as of 16 Dec 2023 09:50  Patient On (Oxygen Delivery Method): room air      CONSTITUTIONAL: NAD, well-groomed  EYES: PERRLA; conjunctiva and sclera clear  ENMT: Moist oral mucosa, no pharyngeal injection or exudates; normal dentition  NECK: Supple, no palpable masses; no thyromegaly  RESPIRATORY: Normal respiratory effort; lungs are clear to auscultation bilaterally  CARDIOVASCULAR: normal S1 and S2, no murmur/rub/gallop; No lower extremity edema  ABDOMEN: Nontender to palpation, normoactive bowel sounds, no rebound/guarding; No hepatosplenomegaly  MUSCULOSKELETAL:  no clubbing or cyanosis of digits; no joint swelling or tenderness to palpation  PSYCH: A+O to person only   NEUROLOGY: CN 2-12 are intact and symmetric; no gross sensory deficits   SKIN: No rashes; no palpable lesions    LABS:                        7.8    7.21  )-----------( 177      ( 16 Dec 2023 07:25 )             25.4     12-16    145  |  110<H>  |  44<H>  ----------------------------<  125<H>  3.0<L>   |  27  |  2.12<H>    Ca    8.4      16 Dec 2023 10:43    TPro  5.9<L>  /  Alb  3.0<L>  /  TBili  0.7  /  DBili  x   /  AST  46<H>  /  ALT  29  /  AlkPhos  168<H>  12-16    Urinalysis Basic - ( 16 Dec 2023 10:43 )    Color: x / Appearance: x / SG: x / pH: x  Gluc: 125 mg/dL / Ketone: x  / Bili: x / Urobili: x   Blood: x / Protein: x / Nitrite: x   Leuk Esterase: x / RBC: x / WBC x   Sq Epi: x / Non Sq Epi: x / Bacteria: x    COVID-19 PCR: Dinora[place]ec (07 Dec 2023 13:44)

## 2023-12-16 NOTE — PATIENT PROFILE ADULT - FALL HARM RISK - HARM RISK INTERVENTIONS
Assistance with ambulation/Assistance OOB with selected safe patient handling equipment/Communicate Risk of Fall with Harm to all staff/Discuss with provider need for PT consult/Monitor gait and stability/Provide patient with walking aids - walker, cane, crutches/Reinforce activity limits and safety measures with patient and family/Tailored Fall Risk Interventions/Visual Cue: Yellow wristband and red socks/Bed in lowest position, wheels locked, appropriate side rails in place/Call bell, personal items and telephone in reach/Instruct patient to call for assistance before getting out of bed or chair/Non-slip footwear when patient is out of bed/[place] to call system/Physically safe environment - no spills, clutter or unnecessary equipment/Purposeful Proactive Rounding/Room/bathroom lighting operational, light cord in reach Assistance with ambulation/Assistance OOB with selected safe patient handling equipment/Communicate Risk of Fall with Harm to all staff/Discuss with provider need for PT consult/Monitor gait and stability/Provide patient with walking aids - walker, cane, crutches/Reinforce activity limits and safety measures with patient and family/Tailored Fall Risk Interventions/Visual Cue: Yellow wristband and red socks/Bed in lowest position, wheels locked, appropriate side rails in place/Call bell, personal items and telephone in reach/Instruct patient to call for assistance before getting out of bed or chair/Non-slip footwear when patient is out of bed/[place] to call system/Physically safe environment - no spills, clutter or unnecessary equipment/Purposeful Proactive Rounding/Room/bathroom lighting operational, light cord in reach

## 2023-12-16 NOTE — PATIENT PROFILE ADULT - STATED REASON FOR ADMISSION
PEG tube dislodgement
Moderate to severe acute illness with or without fever. Delay administration of vaccination until patient has been afebrile or illness resolved for 24hrs

## 2023-12-16 NOTE — PROGRESS NOTE ADULT - ASSESSMENT
89F PMH CHF, dementia, CKD4, Anemia, HTN, HLD, CAD, DM, coming from [facility] due to PEG tube dislodgement.  89F PMH CHF, dementia, CKD4, Anemia, HTN, HLD, CAD, DM, coming from [facility] due to PEG tube dislodgement.

## 2023-12-16 NOTE — PATIENT PROFILE ADULT - PATIENT REPRESENTATIVE: ( YOU CAN CHOOSE ANY PERSON THAT CAN ASSIST YOU WITH YOUR HEALTH CARE PREFERENCES, DOES NOT HAVE TO BE A SPOUSE, IMMEDIATE FAMILY OR SIGNIFICANT OTHER/PARTNER)
[facility] DISCHARGE SUMMARY         Patient Name: Viry Dejesus  Sex: female            MRN:  [id number]  YOB: 2001      Age:  20 year old       Date of Admission:  8/18/2021   Date of Discharge: 8/19/2021     Attending Physician: Ric Zuleta MD  Discharging Physician: Ric Zuleta MD  PCP: Carine Cifuentes DO      Discharged to: Home  Condition at discharge: Stable  Code Status:   Code Status: Not on file      Final Diagnosis:  Abdominal pain  History of Erler Danlos syndrome  Depression and anxiety  History of mast cell activation  History of marijuana use      Hospital Course:  Patient is a 20-year-old with past medical history of multiple medical problems listed above came into the hospital for evaluation of abdominal pain and nausea and vomiting.  By the time she was admitted she felt better.  She reported having multiple endoscopies in the past at [facility] however the patient's mother requested reevaluation by GI.  The patient underwent repeat endoscopy today which appears to have been completely normal, biopsies were taken for additional evaluation.  The patient's diet has been advanced and she is anticipated to go home today    The patient was evaluated earlier today and independently examined. Care was coordinated with our NP.  Please refer to her note from today for additional details regarding today's visit    Pertinent Labs:    Recent Labs     08/18/21  0747   WBC 10.7   HGB 13.8   HCT 42.6   MCV 83.7      RBC 5.09       Recent Labs     08/18/21  0747   CALCIUM 9.6   CO2 21   BUN 11   CREATININE 0.66         Discharge medication list:     Summary of your Discharge Medications      Take these Medications      Details   cromolyn 100 MG/5ML solution  Commonly known as: GASTROCROM   Take 10 mLs by mouth 4 times daily.     cyproheptadine 4 MG tablet  Commonly known as: PERIACTIN   Take 4 mg by mouth 2 times daily.     dicyclomine 10 MG  capsule  Commonly known as: BENTYL   Take 2 capsules by mouth 4 times daily (before meals and nightly).     famotidine 20 MG tablet  Commonly known as: PEPCID   Take 20 mg by mouth 2 times daily.     fexofenadine 60 MG tablet  Commonly known as: ALLEGRA   Take 60 mg by mouth 2 times daily.     metoCLOPramide 5 MG tablet  Commonly known as: REGLAN   Take 1 tablet by mouth every 6 hours as needed for Nausea or Vomiting.     pantoprazole 40 MG tablet  Commonly known as: PROTONIX   Take 40 mg by mouth 2 times daily.               Follow up with: PCP in 1 wk   Yaritza Bingham MD  [address]  [address]  [facility] [address]  [phone]    Call in 1 week  for biopsy result    Carine Cifuentes,   [address]  [facility] [address] [phone]    In 1 week         Time: > 30 minutes spent for patient education, coordinating care with consultants and reviewing with , Social Work, Nurse, Patient, and/or Family as well preparing discharge paperwork.    Ric Zuleta MD  [phone]  8/19/2021       Please note the time of the note may not be an accurate indication of the time the patient was seen as notes sometimes get delayed due to acute patient care issues.    Speech recognition software was used in the preparation of this note, errors in transcription may be present. Please call/page if there are questions.     yes

## 2023-12-16 NOTE — H&P ADULT - PROBLEM SELECTOR PLAN 2
Pt with baseline Stage 4 CKD  - Renally dose meds when re-ordered  - Cw sodium bicarb after PEG placed

## 2023-12-16 NOTE — H&P ADULT - PROBLEM SELECTOR PLAN 3
Hgb of 8.4  - Likely multifactorial due to CKD and mild bleeding for PEG dislodgement  - Cw folate after PEG replaced

## 2023-12-16 NOTE — ED PROVIDER NOTE - CLINICAL SUMMARY MEDICAL DECISION MAKING FREE TEXT BOX
89yo F, hx of CHF, dementia, anemia, HTN, HLD, CAD, DM, coming from [facility] c/o PEG tube dislodgement. Vitals nonactionable. Physical exam significant for soft, PEG tube insertion site clean, unable to advance gastrostomy tube. Will admit to hospital for further management. 89yo F, hx of CHF, dementia, anemia, HTN, HLD, CAD, DM, coming from [facility] c/o PEG tube dislodgement. Vitals nonactionable. Physical exam significant for soft, PEG tube insertion site clean, unable to advance gastrostomy tube. Will admit to hospital for further management.

## 2023-12-16 NOTE — ED PROVIDER NOTE - ATTENDING CONTRIBUTION TO CARE
I performed a history and physical exam of the patient and discussed their management with the resident. I reviewed the resident's note and agree with the documented findings and plan of care.  Brianna Fuentes MD

## 2023-12-16 NOTE — ED PROVIDER NOTE - PHYSICAL EXAMINATION
Physical Exam:  Gen: NAD, AOx3, non-toxic appearing, able to ambulate without assistance  Head: NCAT  HEENT: EOMI, PEERLA, normal conjunctiva, tongue midline, oral mucosa moist  Lung: CTAB, no respiratory distress, no wheezes/rhonchi/rales B/L, speaking in full sentences  CV: RRR, no murmurs, rubs or gallops  Abd: soft, PEG tube insertion site clean, unable to advance gastrostomy  MSK: no visible deformities, ROM normal in UE/LE, no back pain  Neuro: No focal sensory or motor deficits  Skin: Warm, well perfused, no rash, no leg swelling  Psych: normal affect, calm

## 2023-12-16 NOTE — H&P ADULT - NSHPPHYSICALEXAM_GEN_ALL_CORE
Vital Signs Last 24 Hrs  T(C): 36.4 (16 Dec 2023 02:39), Max: 36.5 (16 Dec 2023 01:18)  T(F): 97.6 (16 Dec 2023 02:39), Max: 97.7 (16 Dec 2023 01:18)  HR: 72 (16 Dec 2023 02:39) (72 - 75)  BP: 148/64 (16 Dec 2023 02:39) (128/79 - 163/72)  BP(mean): --  RR: 18 (16 Dec 2023 02:39) (18 - 19)  SpO2: 95% (16 Dec 2023 02:39) (95% - 100%)    Parameters below as of 16 Dec 2023 02:39  Patient On (Oxygen Delivery Method): room air        CONSTITUTIONAL: No apparent distress  EYES: PERRLA and symmetric, EOMI, No conjunctival or scleral injection, non-icteric  ENMT: Oral mucosa with moist membranes.  NECK: Supple, symmetric. No JVD.  RESP: No respiratory distress, no use of accessory muscles; CTA b/l, no WRR  CV: RRR, +S1S2, no MRG  GI: Soft, NT, ND, no rebound, no guarding. Area of previous pPEG tube erythematous but not warm or tender, covered with gauze.  : No suprapubic tenderness. No CVA tenderness.  LYMPH: No cervical LAD or tenderness  MSK: No spinal tenderness.  EXTREMITIES: No pedal edema  SKIN: No rashes or ulcers noted  NEURO: Alert, responsive. No tremor.

## 2023-12-16 NOTE — H&P ADULT - ASSESSMENT
89yo F w/Hx of CHF, dementia, CKD S4, Anemia, HTN, HLD, CAD, DM, coming from [facility] due to PEG tube dislodgement. Pt accidently dislodged her PEG tube while at the nursing home. 89yo F w/Hx of CHF, dementia, CKD S4, Anemia, HTN, HLD, CAD, DM, coming from [facility] due to PEG tube dislodgement. Pt accidently dislodged her PEG tube while at the nursing home.

## 2023-12-16 NOTE — ED PROVIDER NOTE - OBJECTIVE STATEMENT
89yo F, hx of CHF, dementia, anemia, HTN, HLD, CAD, DM, coming from [facility] c/o PEG tube dislodgement. As per EMS, pt accidently dislodged her PEG tube while at the nursing home. Unwitnessed, occurred around 5pm. Came to ED to put back in. In abdominal pain. No fevers, discharge from site. 89yo F, hx of CHF, dementia, anemia, HTN, HLD, CAD, DM, coming from [facility] c/o PEG tube dislodgement. As per EMS, pt accidently dislodged her PEG tube while at the nursing home. Unwitnessed, occurred around 5pm. Came to ED to put back in. In abdominal pain. No fevers, discharge from site.

## 2023-12-16 NOTE — H&P ADULT - HISTORY OF PRESENT ILLNESS
Pt A&Ox2 which is baseline. Poor historian. Hx obtained via chart review.    89yo F w/Hx of CHF, dementia, CKD S4, Anemia, HTN, HLD, CAD, DM, coming from [facility] due to PEG tube dislodgement. Pt accidently dislodged her PEG tube while at the nursing home. Unwitnessed, occurred around 5pm. Patient was recently admitted on 11/26/23 and discharged on 12/12/23 due to LESLIE and during that hospital stay PEG tube was placed due to concern of patient not eating/having enough PO intake. Patient denies any abdominal pain or nausea. Does not recall what happened.  Pt A&Ox2 which is baseline. Poor historian. Hx obtained via chart review.    89yo F w/Hx of CHF, dementia, CKD S4, Anemia, HTN, HLD, CAD, DM, coming from [facility] due to PEG tube dislodgement. Pt accidently dislodged her PEG tube while at the nursing home. Unwitnessed, occurred around 5pm. Patient was recently admitted on 11/26/23 and discharged on 12/12/23 due to LESLIE and during that hospital stay PEG tube was placed due to concern of patient not eating/having enough PO intake. Patient denies any abdominal pain or nausea. Does not recall what happened.

## 2023-12-17 LAB
GLUCOSE BLDC GLUCOMTR-MCNC: 119 MG/DL — HIGH (ref 70–99)
GLUCOSE BLDC GLUCOMTR-MCNC: 119 MG/DL — HIGH (ref 70–99)
GLUCOSE BLDC GLUCOMTR-MCNC: 122 MG/DL — HIGH (ref 70–99)
GLUCOSE BLDC GLUCOMTR-MCNC: 122 MG/DL — HIGH (ref 70–99)
GLUCOSE BLDC GLUCOMTR-MCNC: 124 MG/DL — HIGH (ref 70–99)
GLUCOSE BLDC GLUCOMTR-MCNC: 124 MG/DL — HIGH (ref 70–99)
GLUCOSE BLDC GLUCOMTR-MCNC: 141 MG/DL — HIGH (ref 70–99)
GLUCOSE BLDC GLUCOMTR-MCNC: 141 MG/DL — HIGH (ref 70–99)

## 2023-12-17 PROCEDURE — 99232 SBSQ HOSP IP/OBS MODERATE 35: CPT

## 2023-12-17 RX ORDER — ACETAMINOPHEN 500 MG
1000 TABLET ORAL ONCE
Refills: 0 | Status: COMPLETED | OUTPATIENT
Start: 2023-12-17 | End: 2023-12-18

## 2023-12-17 RX ORDER — ACETAMINOPHEN 500 MG
1000 TABLET ORAL ONCE
Refills: 0 | Status: COMPLETED | OUTPATIENT
Start: 2023-12-17 | End: 2023-12-17

## 2023-12-17 RX ADMIN — Medication 400 MILLIGRAM(S): at 14:35

## 2023-12-17 RX ADMIN — Medication 1000 MILLIGRAM(S): at 14:50

## 2023-12-17 RX ADMIN — CHLORHEXIDINE GLUCONATE 1 APPLICATION(S): 213 SOLUTION TOPICAL at 13:05

## 2023-12-17 RX ADMIN — SODIUM CHLORIDE 50 MILLILITER(S): 9 INJECTION, SOLUTION INTRAVENOUS at 00:11

## 2023-12-17 NOTE — DIETITIAN INITIAL EVALUATION ADULT - ENTERAL
- Once PEG tube replaced, consider initiation of Jevity 1.5 in setting of adequately controlled diabetes at 10 ml/hr and advance as tolerated to goal rate of 40 ml/hr x 24 hours to provide 960 ml total volume, 1440 kcal, 61 gm protein and 729 ml free water. EN feeds at goal would meet 26 kcal/kg and 1.1 g/kg protein based on wt of 54.4 kG. Defer fluid needs to team.  -> RD remains available to adjust EN feeds PRN.

## 2023-12-17 NOTE — SWALLOW BEDSIDE ASSESSMENT ADULT - H & P REVIEW
PRASANTH REICH is a 89yo F w/Hx of CHF, dementia, CKD S4, Anemia, HTN, HLD, CAD, DM, coming from [facility] due to PEG tube dislodgement. Pt accidently dislodged her PEG tube while at the nursing home. Unwitnessed, occurred around 5pm. Patient was recently admitted on 11/26/23 and discharged on 12/12/23 due to LESLIE and during that hospital stay PEG tube was placed due to concern of patient not eating/having enough PO intake./yes PRASANTH REICH is a 89yo F w/Hx of CHF, dementia, CKD S4, Anemia, HTN, HLD, CAD, DM, coming from [facility] due to PEG tube dislodgement. Pt accidently dislodged her PEG tube while at the nursing home. Unwitnessed, occurred around 5pm. Patient was recently admitted on 11/26/23 and discharged on 12/12/23 due to LESLIE and during that hospital stay PEG tube was placed due to concern of patient not eating/having enough PO intake./yes

## 2023-12-17 NOTE — DIETITIAN INITIAL EVALUATION ADULT - OTHER CALCULATIONS
estimated energy & protein needs based on estimated weight of 54.4 kG with consideration for advanced age, CKD 4. Defer fluid needs to team.

## 2023-12-17 NOTE — DIETITIAN INITIAL EVALUATION ADULT - ENERGY INTAKE
NPO - pt currently NPO pending speech therapist evaluation  - Ordered for IVF: D5 + NaCl at 50 ml/hr while NPO

## 2023-12-17 NOTE — DIETITIAN INITIAL EVALUATION ADULT - PERTINENT LABORATORY DATA
12-16    145  |  110<H>  |  44<H>  ----------------------------<  125<H>  3.0<L>   |  27  |  2.12<H>    Ca    8.4      16 Dec 2023 10:43    TPro  5.9<L>  /  Alb  3.0<L>  /  TBili  0.7  /  DBili  x   /  AST  46<H>  /  ALT  29  /  AlkPhos  168<H>  12-16  POCT Blood Glucose.: 122 mg/dL (12-17-23 @ 12:14)  A1C with Estimated Average Glucose Result: 6.3 % (12-16-23 @ 07:25)  A1C with Estimated Average Glucose Result: 6.3 % (11-26-23 @ 08:48)

## 2023-12-17 NOTE — DIETITIAN INITIAL EVALUATION ADULT - REASON FOR ADMISSION
"89 y/o female PMH CHF, dementia, CKD4, Anemia, HTN, HLD, CAD, DM, coming from [facility] due to PEG tube dislodgement."   "89 y/o female PMH CHF, dementia, CKD4, Anemia, HTN, HLD, CAD, DM, coming from [facility] due to PEG tube dislodgement."

## 2023-12-17 NOTE — SWALLOW BEDSIDE ASSESSMENT ADULT - SWALLOW EVAL: DIAGNOSIS
Order received/appreciated; Pt known to this service from recent admission. Please see history for more information. D/w JSESICA Crum; requesting to keep NPO today and plan for evaluation on 12/18 (Monday) pending course/stability. Crissy Blackman MS CCC-SLP [employer]   extension [phone] Order received/appreciated; Pt known to this service from recent admission. Please see history for more information. D/w JESSICA Crum; requesting to keep NPO today and plan for evaluation on 12/18 (Monday) pending course/stability. Crissy Blackman MS CCC-SLP [employer] teams   extension [phone]

## 2023-12-17 NOTE — DIETITIAN INITIAL EVALUATION ADULT - ORAL INTAKE PTA/DIET HISTORY
Pt with dementia, unable to participate in RD interview secondary to cognitive status at this time. Per paper chart, pt was order for Jevity 1.5 1000 ml/day via PEG at nursing facility. EN feeds would provide 1500 kcal and 63 gm protein. No known food allergies per chart.

## 2023-12-17 NOTE — DIETITIAN INITIAL EVALUATION ADULT - ADD RECOMMEND
- Defer PO diet/texture/consistency to speech therapist/medical team  - Recommend obtaining current weight

## 2023-12-17 NOTE — PROGRESS NOTE ADULT - ASSESSMENT
89yo F w/Hx of CHF, dementia, CKD S4, Anemia, HTN, HLD, CAD, DM, coming from [facility] due to PEG tube dislodgement.    #PEG dislodged   Patient without peritoneal signs: she appears well and does not have lbs concerning for infection     Recommendations:  - Please keep patient NPO  - Will hold off on NGT with decompression given overall clinical stability   - continue to check for signs of peritonitis  - abx per primary team   - can plan to replace PEG next week.    Rand Crum MD  Gastroenterology/Hepatology Fellow, PGY-4  Please contact via TEAMS    NON-URGENT CONSULTS:  Please email [email or web address]  [email or web address]  AT NIGHT AND ON WEEKENDS:  Contact on-call GI fellow via answering service ([phone]) from 5pm-8am and on weekends/holidays  MONDAY-FRIDAY 8AM-5PM:  Pager# [phone]   89yo F w/Hx of CHF, dementia, CKD S4, Anemia, HTN, HLD, CAD, DM, coming from [facility] due to PEG tube dislodgement.    #PEG dislodged   Patient without peritoneal signs: she appears well and does not have lbs concerning for infection     Recommendations:  - Please keep patient NPO  - Will hold off on NGT with decompression given overall clinical stability   - continue to check for signs of peritonitis  - abx per primary team   - can plan to replace PEG next week.    Rand Crum MD  Gastroenterology/Hepatology Fellow, PGY-4  Please contact via TEAMS    NON-URGENT CONSULTS:  Please email [email or web address]  [email or web address]  AT NIGHT AND ON WEEKENDS:  Contact on-call GI fellow via answering service ([phone]) from 5pm-8am and on weekends/holidays  MONDAY-FRIDAY 8AM-5PM:  Pager# [phone]   89yo F w/Hx of CHF, dementia, CKD S4, Anemia, HTN, HLD, CAD, DM, coming from [facility] due to PEG tube dislodgement.    #PEG dislodged   Patient without peritoneal signs: she appears well and does not have lbs concerning for infection     Recommendations:  - Please keep patient NPO  - Will hold off on NGT with decompression given overall clinical stability   - continue to check for signs of peritonitis  - would consider surgery consult and CT A/P, especially if patient with abd pain or concern for peritonitis   - abx per primary team   - can plan to replace PEG next week.    Rand Crum MD  Gastroenterology/Hepatology Fellow, PGY-4  Please contact via TEAMS    NON-URGENT CONSULTS:  Please email [email or web address]  [email or web address]  AT NIGHT AND ON WEEKENDS:  Contact on-call GI fellow via answering service ([phone]) from 5pm-8am and on weekends/holidays  MONDAY-FRIDAY 8AM-5PM:  Pager# [phone]   89yo F w/Hx of CHF, dementia, CKD S4, Anemia, HTN, HLD, CAD, DM, coming from [facility] due to PEG tube dislodgement.    #PEG dislodged   Patient without peritoneal signs: she appears well and does not have lbs concerning for infection     Recommendations:  - Please keep patient NPO  - Will hold off on NGT with decompression given overall clinical stability   - continue to check for signs of peritonitis  - would consider surgery consult and CT A/P, especially if patient with abd pain or concern for peritonitis   - abx per primary team   - can plan to replace PEG next week.    Rand Crum MD  Gastroenterology/Hepatology Fellow, PGY-4  Please contact via TEAMS    NON-URGENT CONSULTS:  Please email [email or web address]  [email or web address]  AT NIGHT AND ON WEEKENDS:  Contact on-call GI fellow via answering service ([phone]) from 5pm-8am and on weekends/holidays  MONDAY-FRIDAY 8AM-5PM:  Pager# [phone]

## 2023-12-17 NOTE — DIETITIAN INITIAL EVALUATION ADULT - NSFNSNUTRCHEWSWALLOWFT_GEN_A_CORE
Pt has been followed by speech therapist since last admit. Upon d/c was on PEG tube feeds only. Noted plan to hold off on swallow evaluation until Monday, 12/18 per GI request.

## 2023-12-17 NOTE — PROGRESS NOTE ADULT - ASSESSMENT
89F PMH CHF, dementia, CKD4, Anemia, HTN, HLD, CAD, DM, coming from [facility] due to PEG tube dislodgement.  89F PMH CHF, dementia, CKD4, Anemia, HTN, HLD, CAD, DM, coming from [facility] due to PEG tube dislodgement.

## 2023-12-17 NOTE — DIETITIAN INITIAL EVALUATION ADULT - OTHER INFO
- Admit for PEG tube dislodgement; plan to replace PEG next week per 12/17 GI note  - CKD 4  - Hypokalemia noted 12/16  - Hx of diabetes; HbA1c 6.3% (12/16/23) indicative of adequate glycemic control; no diabetes agents ordered at this time and none noted per H&P

## 2023-12-17 NOTE — PROGRESS NOTE ADULT - SUBJECTIVE AND OBJECTIVE BOX
[facility] Division of Hospital Medicine  Jimbo Quevedo MD  Available via MS Teams    SUBJECTIVE / OVERNIGHT EVENTS: Patient seen and examined at bedside, resting comfortably and in no acute distress. Unable to participate in ROS due to underlying neurologic status.    MEDICATIONS  (STANDING):  chlorhexidine 2% Cloths 1 Application(s) Topical daily  dextrose 5% + sodium chloride 0.9%. 1000 milliLiter(s) (50 mL/Hr) IV Continuous <Continuous>    MEDICATIONS  (PRN):      I&O's Summary    16 Dec 2023 07:01  -  17 Dec 2023 07:00  --------------------------------------------------------  IN: 0 mL / OUT: 0 mL / NET: 0 mL        PHYSICAL EXAM:  Vital Signs Last 24 Hrs  T(C): 36.6 (17 Dec 2023 08:36), Max: 36.6 (17 Dec 2023 08:36)  T(F): 97.8 (17 Dec 2023 08:36), Max: 97.8 (17 Dec 2023 08:36)  HR: 67 (17 Dec 2023 08:36) (61 - 72)  BP: 157/75 (17 Dec 2023 08:36) (132/61 - 157/75)  RR: 18 (17 Dec 2023 08:36) (18 - 18)  SpO2: 97% (17 Dec 2023 08:36) (93% - 97%)    Parameters below as of 17 Dec 2023 08:36  Patient On (Oxygen Delivery Method): room air      CONSTITUTIONAL: NAD, well-groomed  EYES: PERRLA; conjunctiva and sclera clear  ENMT: Moist oral mucosa, no pharyngeal injection or exudates; normal dentition  NECK: Supple, no palpable masses; no thyromegaly  RESPIRATORY: Normal respiratory effort; lungs are clear to auscultation bilaterally  CARDIOVASCULAR: normal S1 and S2, no murmur/rub/gallop; No lower extremity edema  ABDOMEN: Nontender to palpation, normoactive bowel sounds, no rebound/guarding; No hepatosplenomegaly  MUSCULOSKELETAL:  no clubbing or cyanosis of digits; no joint swelling or tenderness to palpation  PSYCH: A+O to person only. Opens eyes and responds to name  NEUROLOGY: CN 2-12 are intact and symmetric; no gross sensory deficits   SKIN: No rashes; no palpable lesions    LABS:                        7.8    7.21  )-----------( 177      ( 16 Dec 2023 07:25 )             25.4     12-16    145  |  110<H>  |  44<H>  ----------------------------<  125<H>  3.0<L>   |  27  |  2.12<H>    Ca    8.4      16 Dec 2023 10:43    TPro  5.9<L>  /  Alb  3.0<L>  /  TBili  0.7  /  DBili  x   /  AST  46<H>  /  ALT  29  /  AlkPhos  168<H>  12-16    Urinalysis Basic - ( 16 Dec 2023 10:43 )    Color: x / Appearance: x / SG: x / pH: x  Gluc: 125 mg/dL / Ketone: x  / Bili: x / Urobili: x   Blood: x / Protein: x / Nitrite: x   Leuk Esterase: x / RBC: x / WBC x   Sq Epi: x / Non Sq Epi: x / Bacteria: x    COVID-19 PCR: NotDetec (07 Dec 2023 13:44) [facility] Division of Hospital Medicine  Jimbo Quevedo MD  Available via MS Teams    SUBJECTIVE / OVERNIGHT EVENTS: Patient seen and examined at bedside, resting comfortably and in no acute distress. Unable to participate in ROS due to underlying neurologic status.    MEDICATIONS  (STANDING):  chlorhexidine 2% Cloths 1 Application(s) Topical daily  dextrose 5% + sodium chloride 0.9%. 1000 milliLiter(s) (50 mL/Hr) IV Continuous <Continuous>    MEDICATIONS  (PRN):      I&O's Summary    16 Dec 2023 07:01  -  17 Dec 2023 07:00  --------------------------------------------------------  IN: 0 mL / OUT: 0 mL / NET: 0 mL        PHYSICAL EXAM:  Vital Signs Last 24 Hrs  T(C): 36.6 (17 Dec 2023 08:36), Max: 36.6 (17 Dec 2023 08:36)  T(F): 97.8 (17 Dec 2023 08:36), Max: 97.8 (17 Dec 2023 08:36)  HR: 67 (17 Dec 2023 08:36) (61 - 72)  BP: 157/75 (17 Dec 2023 08:36) (132/61 - 157/75)  RR: 18 (17 Dec 2023 08:36) (18 - 18)  SpO2: 97% (17 Dec 2023 08:36) (93% - 97%)    Parameters below as of 17 Dec 2023 08:36  Patient On (Oxygen Delivery Method): room air      CONSTITUTIONAL: NAD, well-groomed  EYES: PERRLA; conjunctiva and sclera clear  ENMT: Moist oral mucosa, no pharyngeal injection or exudates; normal dentition  NECK: Supple, no palpable masses; no thyromegaly  RESPIRATORY: Normal respiratory effort; lungs are clear to auscultation bilaterally  CARDIOVASCULAR: normal S1 and S2, no murmur/rub/gallop; No lower extremity edema  ABDOMEN: Nontender to palpation, normoactive bowel sounds, no rebound/guarding; No hepatosplenomegaly  MUSCULOSKELETAL:  no clubbing or cyanosis of digits; no joint swelling or tenderness to palpation  PSYCH: A+O to person only. Opens eyes and responds to name  NEUROLOGY: CN 2-12 are intact and symmetric; no gross sensory deficits   SKIN: No rashes; no palpable lesions    LABS:                        7.8    7.21  )-----------( 177      ( 16 Dec 2023 07:25 )             25.4     12-16    145  |  110<H>  |  44<H>  ----------------------------<  125<H>  3.0<L>   |  27  |  2.12<H>    Ca    8.4      16 Dec 2023 10:43    TPro  5.9<L>  /  Alb  3.0<L>  /  TBili  0.7  /  DBili  x   /  AST  46<H>  /  ALT  29  /  AlkPhos  168<H>  12-16    Urinalysis Basic - ( 16 Dec 2023 10:43 )    Color: x / Appearance: x / SG: x / pH: x  Gluc: 125 mg/dL / Ketone: x  / Bili: x / Urobili: x   Blood: x / Protein: x / Nitrite: x   Leuk Esterase: x / RBC: x / WBC x   Sq Epi: x / Non Sq Epi: x / Bacteria: x    COVID-19 PCR: NotDetec (07 Dec 2023 13:44)

## 2023-12-17 NOTE — PROGRESS NOTE ADULT - SUBJECTIVE AND OBJECTIVE BOX
Initial GI Consult    Patient is a 89y old  Female who presents with a chief complaint of PEG dislodged (16 Dec 2023 12:38)    HPI:    89yo F w/Hx of CHF, dementia, CKD S4, Anemia, HTN, HLD, CAD, DM, coming from [facility] due to PEG tube dislodgement. Pt accidently dislodged her PEG tube while at the nursing home. Unwitnessed, occurred around 5pm. Patient was recently admitted on 11/26/23 and discharged on 12/12/23 due to LESLIE and during that hospital stay PEG tube was placed due to concern of patient not eating/having enough PO intake. Patient denies any abdominal pain or nausea. Does not recall what happened.  (16 Dec 2023 03:03)    PMH/PSH:  PAST MEDICAL & SURGICAL HISTORY:  Hypertension      Diabetes Mellitus, Type 2      Hyperlipidemia      Dementia      CAD (coronary artery disease)      History of TIA (transient ischemic attack)      Anemia      HFrEF (heart failure with reduced ejection fraction)      History of Cholecystectomy      S/P hip replacement, left        FH:  FAMILY HISTORY:      MEDS:  MEDICATIONS  (STANDING):  chlorhexidine 2% Cloths 1 Application(s) Topical daily  dextrose 5% + sodium chloride 0.9%. 1000 milliLiter(s) (50 mL/Hr) IV Continuous <Continuous>    MEDICATIONS  (PRN):    Allergies    penicillin (Anaphylaxis)  Celebrex (Rash)  aspirin (Anaphylaxis)    Intolerances    ROS: No abd pain, n/v  ______________________________________________________________________  PHYSICAL EXAM:  T(C): 36.4 (12-17-23 @ 00:40), Max: 36.5 (12-16-23 @ 01:18)  HR: 71 (12-17-23 @ 00:40)  BP: 143/55 (12-17-23 @ 00:40)  RR: 18 (12-17-23 @ 00:40)  SpO2: 94% (12-17-23 @ 00:40)  Wt(kg): --    12-16 - 12-17  --------------------------------------------------------  IN:  Total IN: 0 mL    OUT:    Oral Fluid: 0 mL  Total OUT: 0 mL    Total NET: 0 mL          GEN: NAD, normocephalic  CVS: S1S2+  CHEST: clear to auscultation  ABD: soft , nontender, nondistended, bowel sounds present, area of PEG with incisions without drainage: no tenderness or pain: no erythema around site   EXTR: no cyanosis, no clubbing, no edema  NEURO: A&OX1  SKIN:  warm;  non icteric    ______________________________________________________________________  LABS:                        7.8    7.21  )-----------( 177      ( 16 Dec 2023 07:25 )             25.4     12-16    145  |  110<H>  |  44<H>  ----------------------------<  125<H>  3.0<L>   |  27  |  2.12<H>    Ca    8.4      16 Dec 2023 10:43    TPro  5.9<L>  /  Alb  3.0<L>  /  TBili  0.7  /  DBili  x   /  AST  46<H>  /  ALT  29  /  AlkPhos  168<H>  12-16    LIVER FUNCTIONS - ( 16 Dec 2023 07:20 )  Alb: 3.0 g/dL / Pro: 5.9 g/dL / ALK PHOS: 168 U/L / ALT: 29 U/L / AST: 46 U/L / GGT: x             ____________________________________________         Initial GI Consult    Patient is a 89y old  Female who presents with a chief complaint of PEG dislodged (16 Dec 2023 12:38)    HPI:    89yo F w/Hx of CHF, dementia, CKD S4, Anemia, HTN, HLD, CAD, DM, coming from [facility] due to PEG tube dislodgement. Pt accidently dislodged her PEG tube while at the nursing home. Unwitnessed, occurred around 5pm. Patient was recently admitted on 11/26/23 and discharged on 12/12/23 due to LESLIE and during that hospital stay PEG tube was placed due to concern of patient not eating/having enough PO intake. Patient denies any abdominal pain or nausea. Does not recall what happened.  (16 Dec 2023 03:03)    PMH/PSH:  PAST MEDICAL & SURGICAL HISTORY:  Hypertension      Diabetes Mellitus, Type 2      Hyperlipidemia      Dementia      CAD (coronary artery disease)      History of TIA (transient ischemic attack)      Anemia      HFrEF (heart failure with reduced ejection fraction)      History of Cholecystectomy      S/P hip replacement, left        FH:  FAMILY HISTORY:      MEDS:  MEDICATIONS  (STANDING):  chlorhexidine 2% Cloths 1 Application(s) Topical daily  dextrose 5% + sodium chloride 0.9%. 1000 milliLiter(s) (50 mL/Hr) IV Continuous <Continuous>    MEDICATIONS  (PRN):    Allergies    penicillin (Anaphylaxis)  Celebrex (Rash)  aspirin (Anaphylaxis)    Intolerances    ROS: No abd pain, n/v  ______________________________________________________________________  PHYSICAL EXAM:  T(C): 36.4 (12-17-23 @ 00:40), Max: 36.5 (12-16-23 @ 01:18)  HR: 71 (12-17-23 @ 00:40)  BP: 143/55 (12-17-23 @ 00:40)  RR: 18 (12-17-23 @ 00:40)  SpO2: 94% (12-17-23 @ 00:40)  Wt(kg): --    12-16 - 12-17  --------------------------------------------------------  IN:  Total IN: 0 mL    OUT:    Oral Fluid: 0 mL  Total OUT: 0 mL    Total NET: 0 mL          GEN: NAD, normocephalic  CVS: S1S2+  CHEST: clear to auscultation  ABD: soft , nontender, nondistended, bowel sounds present, area of PEG with incisions without drainage: no tenderness or pain: no erythema around site   EXTR: no cyanosis, no clubbing, no edema  NEURO: A&OX1  SKIN:  warm;  non icteric    ______________________________________________________________________  LABS:                        7.8    7.21  )-----------( 177      ( 16 Dec 2023 07:25 )             25.4     12-16    145  |  110<H>  |  44<H>  ----------------------------<  125<H>  3.0<L>   |  27  |  2.12<H>    Ca    8.4      16 Dec 2023 10:43    TPro  5.9<L>  /  Alb  3.0<L>  /  TBili  0.7  /  DBili  x   /  AST  46<H>  /  ALT  29  /  AlkPhos  168<H>  12-16    LIVER FUNCTIONS - ( 16 Dec 2023 07:20 )  Alb: 3.0 g/dL / Pro: 5.9 g/dL / ALK PHOS: 168 U/L / ALT: 29 U/L / AST: 46 U/L / GGT: x             ____________________________________________         Initial GI Consult    Patient is a 89y old  Female who presents with a chief complaint of PEG dislodged (16 Dec 2023 12:38)    HPI:    89yo F w/Hx of CHF, dementia, CKD S4, Anemia, HTN, HLD, CAD, DM, coming from [facility] due to PEG tube dislodgement. Pt accidently dislodged her PEG tube while at the nursing home. Unwitnessed, occurred around 5pm. Patient was recently admitted on 11/26/23 and discharged on 12/12/23 due to LESLIE and during that hospital stay PEG tube was placed due to concern of patient not eating/having enough PO intake. Does not recall what happened. She denies any abd pain, fevers and chills.     PMH/PSH:  PAST MEDICAL & SURGICAL HISTORY:  Hypertension      Diabetes Mellitus, Type 2      Hyperlipidemia      Dementia      CAD (coronary artery disease)      History of TIA (transient ischemic attack)      Anemia      HFrEF (heart failure with reduced ejection fraction)      History of Cholecystectomy      S/P hip replacement, left        FH:  FAMILY HISTORY:      MEDS:  MEDICATIONS  (STANDING):  chlorhexidine 2% Cloths 1 Application(s) Topical daily  dextrose 5% + sodium chloride 0.9%. 1000 milliLiter(s) (50 mL/Hr) IV Continuous <Continuous>    MEDICATIONS  (PRN):    Allergies    penicillin (Anaphylaxis)  Celebrex (Rash)  aspirin (Anaphylaxis)    Intolerances    ROS: No abd pain, n/v  ______________________________________________________________________  PHYSICAL EXAM:  T(C): 36.4 (12-17-23 @ 00:40), Max: 36.5 (12-16-23 @ 01:18)  HR: 71 (12-17-23 @ 00:40)  BP: 143/55 (12-17-23 @ 00:40)  RR: 18 (12-17-23 @ 00:40)  SpO2: 94% (12-17-23 @ 00:40)  Wt(kg): --    12-16 - 12-17  --------------------------------------------------------  IN:  Total IN: 0 mL    OUT:    Oral Fluid: 0 mL  Total OUT: 0 mL    Total NET: 0 mL          GEN: NAD, normocephalic  CVS: S1S2+  CHEST: clear to auscultation  ABD: soft , nontender, nondistended, bowel sounds present, area of PEG with incisions without drainage: no tenderness or pain: no erythema around site   EXTR: no cyanosis, no clubbing, no edema  NEURO: A&OX1  SKIN:  warm;  non icteric    ______________________________________________________________________  LABS:                        7.8    7.21  )-----------( 177      ( 16 Dec 2023 07:25 )             25.4     12-16    145  |  110<H>  |  44<H>  ----------------------------<  125<H>  3.0<L>   |  27  |  2.12<H>    Ca    8.4      16 Dec 2023 10:43    TPro  5.9<L>  /  Alb  3.0<L>  /  TBili  0.7  /  DBili  x   /  AST  46<H>  /  ALT  29  /  AlkPhos  168<H>  12-16    LIVER FUNCTIONS - ( 16 Dec 2023 07:20 )  Alb: 3.0 g/dL / Pro: 5.9 g/dL / ALK PHOS: 168 U/L / ALT: 29 U/L / AST: 46 U/L / GGT: x             ____________________________________________         Initial GI Consult    Patient is a 89y old  Female who presents with a chief complaint of PEG dislodged (16 Dec 2023 12:38)    HPI:    89yo F w/Hx of CHF, dementia, CKD S4, Anemia, HTN, HLD, CAD, DM, coming from [facility] due to PEG tube dislodgement. Pt accidently dislodged her PEG tube while at the nursing home. Unwitnessed, occurred around 5pm. Patient was recently admitted on 11/26/23 and discharged on 12/12/23 due to LESLIE and during that hospital stay PEG tube was placed due to concern of patient not eating/having enough PO intake. Does not recall what happened. She denies any abd pain, fevers and chills.     PMH/PSH:  PAST MEDICAL & SURGICAL HISTORY:  Hypertension      Diabetes Mellitus, Type 2      Hyperlipidemia      Dementia      CAD (coronary artery disease)      History of TIA (transient ischemic attack)      Anemia      HFrEF (heart failure with reduced ejection fraction)      History of Cholecystectomy      S/P hip replacement, left        FH:  FAMILY HISTORY:      MEDS:  MEDICATIONS  (STANDING):  chlorhexidine 2% Cloths 1 Application(s) Topical daily  dextrose 5% + sodium chloride 0.9%. 1000 milliLiter(s) (50 mL/Hr) IV Continuous <Continuous>    MEDICATIONS  (PRN):    Allergies    penicillin (Anaphylaxis)  Celebrex (Rash)  aspirin (Anaphylaxis)    Intolerances    ROS: No abd pain, n/v  ______________________________________________________________________  PHYSICAL EXAM:  T(C): 36.4 (12-17-23 @ 00:40), Max: 36.5 (12-16-23 @ 01:18)  HR: 71 (12-17-23 @ 00:40)  BP: 143/55 (12-17-23 @ 00:40)  RR: 18 (12-17-23 @ 00:40)  SpO2: 94% (12-17-23 @ 00:40)  Wt(kg): --    12-16 - 12-17  --------------------------------------------------------  IN:  Total IN: 0 mL    OUT:    Oral Fluid: 0 mL  Total OUT: 0 mL    Total NET: 0 mL          GEN: NAD, normocephalic  CVS: S1S2+  CHEST: clear to auscultation  ABD: soft , nontender, nondistended, bowel sounds present, area of PEG with incisions without drainage: no tenderness or pain: no erythema around site   EXTR: no cyanosis, no clubbing, no edema  NEURO: A&OX1  SKIN:  warm;  non icteric    ______________________________________________________________________  LABS:                        7.8    7.21  )-----------( 177      ( 16 Dec 2023 07:25 )             25.4     12-16    145  |  110<H>  |  44<H>  ----------------------------<  125<H>  3.0<L>   |  27  |  2.12<H>    Ca    8.4      16 Dec 2023 10:43    TPro  5.9<L>  /  Alb  3.0<L>  /  TBili  0.7  /  DBili  x   /  AST  46<H>  /  ALT  29  /  AlkPhos  168<H>  12-16    LIVER FUNCTIONS - ( 16 Dec 2023 07:20 )  Alb: 3.0 g/dL / Pro: 5.9 g/dL / ALK PHOS: 168 U/L / ALT: 29 U/L / AST: 46 U/L / GGT: x             ____________________________________________

## 2023-12-17 NOTE — DIETITIAN INITIAL EVALUATION ADULT - PERTINENT MEDS FT
MEDICATIONS  (STANDING):  chlorhexidine 2% Cloths 1 Application(s) Topical daily  dextrose 5% + sodium chloride 0.9%. 1000 milliLiter(s) (50 mL/Hr) IV Continuous <Continuous>    MEDICATIONS  (PRN):

## 2023-12-17 NOTE — DIETITIAN INITIAL EVALUATION ADULT - PHYSCIAL ASSESSMENT
No dosing weight in chart and pt sitting in chair upon RD visit. Unable to obtain current wt. Per RD note from last admit 12/2/23, dosing wt at time was 54.4. kG (11/26). No additional weights noted at this time. Will monitor trends as available.    IBW: 100 pounds

## 2023-12-18 DIAGNOSIS — E87.0 HYPEROSMOLALITY AND HYPERNATREMIA: ICD-10-CM

## 2023-12-18 DIAGNOSIS — N17.9 ACUTE KIDNEY FAILURE, UNSPECIFIED: ICD-10-CM

## 2023-12-18 LAB
ANION GAP SERPL CALC-SCNC: 14 MMOL/L — SIGNIFICANT CHANGE UP (ref 5–17)
ANION GAP SERPL CALC-SCNC: 14 MMOL/L — SIGNIFICANT CHANGE UP (ref 5–17)
BUN SERPL-MCNC: 25 MG/DL — HIGH (ref 7–23)
BUN SERPL-MCNC: 25 MG/DL — HIGH (ref 7–23)
CALCIUM SERPL-MCNC: 8.8 MG/DL — SIGNIFICANT CHANGE UP (ref 8.4–10.5)
CALCIUM SERPL-MCNC: 8.8 MG/DL — SIGNIFICANT CHANGE UP (ref 8.4–10.5)
CHLORIDE SERPL-SCNC: 115 MMOL/L — HIGH (ref 96–108)
CHLORIDE SERPL-SCNC: 115 MMOL/L — HIGH (ref 96–108)
CO2 SERPL-SCNC: 18 MMOL/L — LOW (ref 22–31)
CO2 SERPL-SCNC: 18 MMOL/L — LOW (ref 22–31)
CREAT SERPL-MCNC: 1.71 MG/DL — HIGH (ref 0.5–1.3)
CREAT SERPL-MCNC: 1.71 MG/DL — HIGH (ref 0.5–1.3)
EGFR: 28 ML/MIN/1.73M2 — LOW
EGFR: 28 ML/MIN/1.73M2 — LOW
GLUCOSE BLDC GLUCOMTR-MCNC: 106 MG/DL — HIGH (ref 70–99)
GLUCOSE BLDC GLUCOMTR-MCNC: 106 MG/DL — HIGH (ref 70–99)
GLUCOSE BLDC GLUCOMTR-MCNC: 111 MG/DL — HIGH (ref 70–99)
GLUCOSE BLDC GLUCOMTR-MCNC: 111 MG/DL — HIGH (ref 70–99)
GLUCOSE BLDC GLUCOMTR-MCNC: 130 MG/DL — HIGH (ref 70–99)
GLUCOSE BLDC GLUCOMTR-MCNC: 130 MG/DL — HIGH (ref 70–99)
GLUCOSE BLDC GLUCOMTR-MCNC: 136 MG/DL — HIGH (ref 70–99)
GLUCOSE BLDC GLUCOMTR-MCNC: 136 MG/DL — HIGH (ref 70–99)
GLUCOSE SERPL-MCNC: 69 MG/DL — LOW (ref 70–99)
GLUCOSE SERPL-MCNC: 69 MG/DL — LOW (ref 70–99)
HCT VFR BLD CALC: 24.8 % — LOW (ref 34.5–45)
HCT VFR BLD CALC: 24.8 % — LOW (ref 34.5–45)
HGB BLD-MCNC: 7.5 G/DL — LOW (ref 11.5–15.5)
HGB BLD-MCNC: 7.5 G/DL — LOW (ref 11.5–15.5)
MCHC RBC-ENTMCNC: 26.7 PG — LOW (ref 27–34)
MCHC RBC-ENTMCNC: 26.7 PG — LOW (ref 27–34)
MCHC RBC-ENTMCNC: 30.2 GM/DL — LOW (ref 32–36)
MCHC RBC-ENTMCNC: 30.2 GM/DL — LOW (ref 32–36)
MCV RBC AUTO: 88.3 FL — SIGNIFICANT CHANGE UP (ref 80–100)
MCV RBC AUTO: 88.3 FL — SIGNIFICANT CHANGE UP (ref 80–100)
NRBC # BLD: 0 /100 WBCS — SIGNIFICANT CHANGE UP (ref 0–0)
NRBC # BLD: 0 /100 WBCS — SIGNIFICANT CHANGE UP (ref 0–0)
PLATELET # BLD AUTO: 229 K/UL — SIGNIFICANT CHANGE UP (ref 150–400)
PLATELET # BLD AUTO: 229 K/UL — SIGNIFICANT CHANGE UP (ref 150–400)
POTASSIUM SERPL-MCNC: 4.2 MMOL/L — SIGNIFICANT CHANGE UP (ref 3.5–5.3)
POTASSIUM SERPL-MCNC: 4.2 MMOL/L — SIGNIFICANT CHANGE UP (ref 3.5–5.3)
POTASSIUM SERPL-SCNC: 4.2 MMOL/L — SIGNIFICANT CHANGE UP (ref 3.5–5.3)
POTASSIUM SERPL-SCNC: 4.2 MMOL/L — SIGNIFICANT CHANGE UP (ref 3.5–5.3)
RBC # BLD: 2.81 M/UL — LOW (ref 3.8–5.2)
RBC # BLD: 2.81 M/UL — LOW (ref 3.8–5.2)
RBC # FLD: 18 % — HIGH (ref 10.3–14.5)
RBC # FLD: 18 % — HIGH (ref 10.3–14.5)
SODIUM SERPL-SCNC: 147 MMOL/L — HIGH (ref 135–145)
SODIUM SERPL-SCNC: 147 MMOL/L — HIGH (ref 135–145)
WBC # BLD: 5.08 K/UL — SIGNIFICANT CHANGE UP (ref 3.8–10.5)
WBC # BLD: 5.08 K/UL — SIGNIFICANT CHANGE UP (ref 3.8–10.5)
WBC # FLD AUTO: 5.08 K/UL — SIGNIFICANT CHANGE UP (ref 3.8–10.5)
WBC # FLD AUTO: 5.08 K/UL — SIGNIFICANT CHANGE UP (ref 3.8–10.5)

## 2023-12-18 PROCEDURE — 99232 SBSQ HOSP IP/OBS MODERATE 35: CPT

## 2023-12-18 PROCEDURE — 99233 SBSQ HOSP IP/OBS HIGH 50: CPT

## 2023-12-18 RX ORDER — HALOPERIDOL DECANOATE 100 MG/ML
0.5 INJECTION INTRAMUSCULAR ONCE
Refills: 0 | Status: COMPLETED | OUTPATIENT
Start: 2023-12-18 | End: 2023-12-18

## 2023-12-18 RX ORDER — SODIUM CHLORIDE 9 MG/ML
1000 INJECTION, SOLUTION INTRAVENOUS
Refills: 0 | Status: DISCONTINUED | OUTPATIENT
Start: 2023-12-18 | End: 2023-12-19

## 2023-12-18 RX ADMIN — SODIUM CHLORIDE 50 MILLILITER(S): 9 INJECTION, SOLUTION INTRAVENOUS at 11:25

## 2023-12-18 RX ADMIN — HALOPERIDOL DECANOATE 0.5 MILLIGRAM(S): 100 INJECTION INTRAMUSCULAR at 04:07

## 2023-12-18 RX ADMIN — CHLORHEXIDINE GLUCONATE 1 APPLICATION(S): 213 SOLUTION TOPICAL at 11:32

## 2023-12-18 RX ADMIN — Medication 1000 MILLIGRAM(S): at 01:21

## 2023-12-18 RX ADMIN — Medication 400 MILLIGRAM(S): at 00:51

## 2023-12-18 NOTE — CONSULT NOTE ADULT - PROBLEM SELECTOR RECOMMENDATION 9
Pt with recovering LESLIE/ATN. Pt was discharged earlier this month and being followed by nephrology for LESLIE/ATN 2/2 hypotension, volume depletion and retention. Baseline Cr 1.72 note on 11/9/23. On admission Cr 7.0 (11/26), peaked to 7.14 (11/27) and Scr elevated/improved to 2.99 on discharge.     This admission pt presents with Scr 2.28 on 12/15 and improved to 1.71 today (12/18). Patient planned for CT with IV contrast to evaluate for infection prior to replacing PEG. If contrast can help improve diagnostic imaging and provide better therapy guidance then can proceed with the study. However, pt is high risk for developing renal injury. Recommend LR 75cc/hr x4hrs prior to contrast load and post contrast load. Monitor labs and STRICT urine output. Avoid nephrotoxins. Dose medications as per CrCl.    case discussed with on call attending   If you have any questions, please feel free to contact me  Kiana Encarnacion  Nephrology Fellow  [employer]/Page [phone]  (After 5pm or on weekends please page the on-call fellow) Pt with recovering LESLIE/ATN. Pt was discharged earlier this month and being followed by nephrology for LESLIE/ATN 2/2 hypotension, volume depletion and retention. Baseline Cr 1.72 note on 11/9/23. On admission Cr 7.0 (11/26), peaked to 7.14 (11/27) and Scr elevated/improved to 2.99 on discharge.     This admission pt presents with Scr 2.28 on 12/15 and improved to 1.71 today (12/18). Patient planned for CT with IV contrast to evaluate for infection prior to replacing PEG. If contrast can help improve diagnostic imaging and provide better therapy guidance then can proceed with the study. However, pt is high risk for developing renal injury. Recommend LR 75cc/hr x4hrs prior to contrast load and post contrast load. Monitor labs and STRICT urine output. Avoid nephrotoxins. Dose medications as per CrCl.    case discussed with on call attending   If you have any questions, please feel free to contact me  Kiana Encarnacion  Nephrology Fellow  [employer]/Page [phone]  (After 5pm or on weekends please page the on-call fellow)

## 2023-12-18 NOTE — CONSULT NOTE ADULT - ATTENDING COMMENTS
seen and evaluated this morning 12/19.  patient lying flat resting comfortably without respiratory symptoms.  patient is at risk for CASSANDRA agree with fluid recommendations as above.  will monitor BMP.

## 2023-12-18 NOTE — PROGRESS NOTE ADULT - ASSESSMENT
89yo F w/Hx of CHF, dementia, CKD S4, Anemia, HTN, HLD, CAD, DM, coming from [facility] due to PEG tube dislodgement.    #PEG dislodged   Patient without peritoneal signs: she appears well and does not have lbs concerning for infection     Recommendations:  - Can give PPI BID   - Please keep patient NPO  - Will hold off on NGT with decompression given overall clinical stability   - continue to check for signs of peritonitis  - would consider surgery consult and CT A/P if patient with abd pain or concern for peritonitis   - abx per primary team   - can plan to replace PEG sometime this week    Rand Crum MD  Gastroenterology/Hepatology Fellow, PGY-4  Please contact via TEAMS    NON-URGENT CONSULTS:  Please email [email or web address]  [email or web address]  AT NIGHT AND ON WEEKENDS:  Contact on-call GI fellow via answering service ([phone]) from 5pm-8am and on weekends/holidays  MONDAY-FRIDAY 8AM-5PM:  Pager# [phone]   89yo F w/Hx of CHF, dementia, CKD S4, Anemia, HTN, HLD, CAD, DM, coming from [facility] due to PEG tube dislodgement.    #PEG dislodged   Patient without peritoneal signs: she appears well and does not have lbs concerning for infection     Recommendations:  - Can give PPI BID   - Please keep patient NPO  - Will hold off on NGT with decompression given overall clinical stability   - continue to check for signs of peritonitis  - would consider surgery consult and CT A/P if patient with abd pain or concern for peritonitis   - abx per primary team   - can plan to replace PEG sometime this week    Rand Crum MD  Gastroenterology/Hepatology Fellow, PGY-4  Please contact via TEAMS    NON-URGENT CONSULTS:  Please email [email or web address]  [email or web address]  AT NIGHT AND ON WEEKENDS:  Contact on-call GI fellow via answering service ([phone]) from 5pm-8am and on weekends/holidays  MONDAY-FRIDAY 8AM-5PM:  Pager# [phone]   89yo F w/Hx of CHF, dementia, CKD S4, Anemia, HTN, HLD, CAD, DM, coming from [facility] due to PEG tube dislodgement.    #PEG dislodged   - exam 12/18 with purulent, foul-smelling drainage through PEG tract; no obvious abscess noted on exam    Recommendations:  - broad spectrum abx given purulent, foul-smelling drainage through PEG tract  - surgery consult and CT A/P  - PPI BID  - Please keep patient NPO  - Will hold off on NGT with decompression for now  - continue to check for signs of peritonitis    Rand Crum MD  Gastroenterology/Hepatology Fellow, PGY-4  Please contact via TEAMS    NON-URGENT CONSULTS:  Please email [email or web address]  AT NIGHT AND ON WEEKENDS:  Contact on-call GI fellow via answering service ([phone]) from 5pm-8am and on weekends/holidays  MONDAY-FRIDAY 8AM-5PM:  Pager# [phone]   89yo F w/Hx of CHF, dementia, CKD S4, Anemia, HTN, HLD, CAD, DM, coming from [facility] due to PEG tube dislodgement.    #PEG dislodged   - exam 12/18 with purulent, foul-smelling drainage through PEG tract; no obvious abscess noted on exam    Recommendations:  - broad spectrum abx given purulent, foul-smelling drainage through PEG tract  - surgery consult and CT A/P  - PPI BID  - Please keep patient NPO  - Will hold off on NGT with decompression for now  - continue to check for signs of peritonitis    Rand Crum MD  Gastroenterology/Hepatology Fellow, PGY-4  Please contact via TEAMS    NON-URGENT CONSULTS:  Please email [email or web address]  AT NIGHT AND ON WEEKENDS:  Contact on-call GI fellow via answering service ([phone]) from 5pm-8am and on weekends/holidays  MONDAY-FRIDAY 8AM-5PM:  Pager# [phone]   89yo F w/Hx of CHF, dementia, CKD S4, Anemia, HTN, HLD, CAD, DM, coming from [facility] due to PEG tube dislodgement.    #PEG dislodged   - exam 12/18 with purulent, foul-smelling drainage through PEG tract; no obvious abscess noted on exam    Recommendations:  - broad spectrum abx given purulent, foul-smelling drainage through PEG tract  - surgery consult and CT A/P  - PPI BID  - Please keep patient NPO  - Will hold off on NGT with decompression for now  - continue to check for signs of peritonitis  - discussed above with primary team    Rand Crum MD  Gastroenterology/Hepatology Fellow, PGY-4  Please contact via TEAMS    NON-URGENT CONSULTS:  Please email [email or web address]  [email or web address]  AT NIGHT AND ON WEEKENDS:  Contact on-call GI fellow via answering service ([phone]) from 5pm-8am and on weekends/holidays  MONDAY-FRIDAY 8AM-5PM:  Pager# [phone]   89yo F w/Hx of CHF, dementia, CKD S4, Anemia, HTN, HLD, CAD, DM, coming from [facility] due to PEG tube dislodgement.    #PEG dislodged   - exam 12/18 with purulent, foul-smelling drainage through PEG tract; no obvious abscess noted on exam    Recommendations:  - broad spectrum abx given purulent, foul-smelling drainage through PEG tract  - surgery consult and CT A/P  - PPI BID  - Please keep patient NPO  - Will hold off on NGT with decompression for now  - continue to check for signs of peritonitis  - discussed above with primary team    Rand Crum MD  Gastroenterology/Hepatology Fellow, PGY-4  Please contact via TEAMS    NON-URGENT CONSULTS:  Please email [email or web address]  [email or web address]  AT NIGHT AND ON WEEKENDS:  Contact on-call GI fellow via answering service ([phone]) from 5pm-8am and on weekends/holidays  MONDAY-FRIDAY 8AM-5PM:  Pager# [phone]

## 2023-12-18 NOTE — PHYSICAL THERAPY INITIAL EVALUATION ADULT - PERTINENT HX OF CURRENT PROBLEM, REHAB EVAL
89yoF PMHx CHF, dementia, anemia, HTN, HLD, CAD, DM; admitted from [facility] rehab facility presents with unwitnessed PEG tube dislodgement. 89yoF PMHx CHF, dementia, anemia, HTN, HLD, CAD, DM; admitted from [facility] rehab facility presents with unwitnessed PEG tube dislodgement. 89yoF PMHx CHF, dementia, anemia, HTN, HLD, CAD, DM; admitted from [facility] rehab facility presents with unwitnessed PEG tube dislodgement.

## 2023-12-18 NOTE — PROGRESS NOTE ADULT - ASSESSMENT
89F PMH CHF, dementia, CKD4, Anemia, HTN, HLD, CAD, DM, coming from [facility] due to PEG tube dislodgement, now pending replacement  89F PMH CHF, dementia, CKD4, Anemia, HTN, HLD, CAD, DM, coming from [facility] due to PEG tube dislodgement, now pending replacement

## 2023-12-18 NOTE — PROGRESS NOTE ADULT - SUBJECTIVE AND OBJECTIVE BOX
[facility] Division of Hospital Medicine  Jimbo Quevedo MD  Available via MS Teams    SUBJECTIVE / OVERNIGHT EVENTS: Patient seen and examined at bedside, resting comfortably and in no acute distress. Unable to participate in ROS due to dementia.     MEDICATIONS  (STANDING):  chlorhexidine 2% Cloths 1 Application(s) Topical daily  dextrose 5%. 1000 milliLiter(s) (50 mL/Hr) IV Continuous <Continuous>    MEDICATIONS  (PRN):      I&O's Summary    17 Dec 2023 07:01  -  18 Dec 2023 07:00  --------------------------------------------------------  IN: 550 mL / OUT: 0 mL / NET: 550 mL        PHYSICAL EXAM:  Vital Signs Last 24 Hrs  T(C): 36.4 (18 Dec 2023 09:35), Max: 36.6 (18 Dec 2023 00:36)  T(F): 97.5 (18 Dec 2023 09:35), Max: 97.9 (18 Dec 2023 00:36)  HR: 69 (18 Dec 2023 09:35) (67 - 72)  BP: 163/71 (18 Dec 2023 09:35) (146/74 - 163/71)  BP(mean): --  RR: 18 (18 Dec 2023 09:35) (16 - 18)  SpO2: 99% (18 Dec 2023 09:35) (99% - 99%)    Parameters below as of 18 Dec 2023 09:35  Patient On (Oxygen Delivery Method): room air      CONSTITUTIONAL: NAD, well-groomed  EYES: PERRLA; conjunctiva and sclera clear  ENMT: Moist oral mucosa, no pharyngeal injection or exudates; normal dentition  NECK: Supple, no palpable masses; no thyromegaly  RESPIRATORY: Normal respiratory effort; lungs are clear to auscultation bilaterally  CARDIOVASCULAR: normal S1 and S2, no murmur/rub/gallop; No lower extremity edema  ABDOMEN: Nontender to palpation, normoactive bowel sounds, no rebound/guarding; No hepatosplenomegaly  MUSCULOSKELETAL:  no clubbing or cyanosis of digits; no joint swelling or tenderness to palpation  PSYCH: A+O to person only   NEUROLOGY: CN 2-12 are intact and symmetric; no gross sensory deficits   SKIN: No rashes; no palpable lesions    LABS:                        7.5    5.08  )-----------( 229      ( 18 Dec 2023 08:26 )             24.8     12-18    147<H>  |  115<H>  |  25<H>  ----------------------------<  69<L>  4.2   |  18<L>  |  1.71<H>    Ca    8.8      18 Dec 2023 08:26            Urinalysis Basic - ( 18 Dec 2023 08:26 )    Color: x / Appearance: x / SG: x / pH: x  Gluc: 69 mg/dL / Ketone: x  / Bili: x / Urobili: x   Blood: x / Protein: x / Nitrite: x   Leuk Esterase: x / RBC: x / WBC x   Sq Epi: x / Non Sq Epi: x / Bacteria: x        COVID-19 PCR: Martina (07 Dec 2023 13:44) [facility] Division of Hospital Medicine  Jimbo Quevedo MD  Available via MS Teams    SUBJECTIVE / OVERNIGHT EVENTS: Patient seen and examined at bedside, resting comfortably and in no acute distress. Unable to participate in ROS due to dementia.     MEDICATIONS  (STANDING):  chlorhexidine 2% Cloths 1 Application(s) Topical daily  dextrose 5%. 1000 milliLiter(s) (50 mL/Hr) IV Continuous <Continuous>    MEDICATIONS  (PRN):      I&O's Summary    17 Dec 2023 07:01  -  18 Dec 2023 07:00  --------------------------------------------------------  IN: 550 mL / OUT: 0 mL / NET: 550 mL        PHYSICAL EXAM:  Vital Signs Last 24 Hrs  T(C): 36.4 (18 Dec 2023 09:35), Max: 36.6 (18 Dec 2023 00:36)  T(F): 97.5 (18 Dec 2023 09:35), Max: 97.9 (18 Dec 2023 00:36)  HR: 69 (18 Dec 2023 09:35) (67 - 72)  BP: 163/71 (18 Dec 2023 09:35) (146/74 - 163/71)  BP(mean): --  RR: 18 (18 Dec 2023 09:35) (16 - 18)  SpO2: 99% (18 Dec 2023 09:35) (99% - 99%)    Parameters below as of 18 Dec 2023 09:35  Patient On (Oxygen Delivery Method): room air      CONSTITUTIONAL: NAD, well-groomed  EYES: PERRLA; conjunctiva and sclera clear  ENMT: Moist oral mucosa, no pharyngeal injection or exudates; normal dentition  NECK: Supple, no palpable masses; no thyromegaly  RESPIRATORY: Normal respiratory effort; lungs are clear to auscultation bilaterally  CARDIOVASCULAR: normal S1 and S2, no murmur/rub/gallop; No lower extremity edema  ABDOMEN: Nontender to palpation, normoactive bowel sounds, no rebound/guarding; No hepatosplenomegaly  MUSCULOSKELETAL:  no clubbing or cyanosis of digits; no joint swelling or tenderness to palpation  PSYCH: A+O to person only   NEUROLOGY: CN 2-12 are intact and symmetric; no gross sensory deficits   SKIN: No rashes; no palpable lesions    LABS:                        7.5    5.08  )-----------( 229      ( 18 Dec 2023 08:26 )             24.8     12-18    147<H>  |  115<H>  |  25<H>  ----------------------------<  69<L>  4.2   |  18<L>  |  1.71<H>    Ca    8.8      18 Dec 2023 08:26            Urinalysis Basic - ( 18 Dec 2023 08:26 )    Color: x / Appearance: x / SG: x / pH: x  Gluc: 69 mg/dL / Ketone: x  / Bili: x / Urobili: x   Blood: x / Protein: x / Nitrite: x   Leuk Esterase: x / RBC: x / WBC x   Sq Epi: x / Non Sq Epi: x / Bacteria: x        COVID-19 PCR: Martina (07 Dec 2023 13:44)

## 2023-12-18 NOTE — PROGRESS NOTE ADULT - SUBJECTIVE AND OBJECTIVE BOX
Progress Note   Rand Hart PGY4- GI/Hep    SUBJECTIVE: Patient seen and examined at bedside.     OBJECTIVE:    VITAL SIGNS:  ICU Vital Signs Last 24 Hrs  T(C): 36.4 (18 Dec 2023 09:35), Max: 36.6 (18 Dec 2023 00:36)  T(F): 97.5 (18 Dec 2023 09:35), Max: 97.9 (18 Dec 2023 00:36)  HR: 69 (18 Dec 2023 09:35) (67 - 72)  BP: 163/71 (18 Dec 2023 09:35) (146/74 - 163/71)  BP(mean): --  ABP: --  ABP(mean): --  RR: 18 (18 Dec 2023 09:35) (16 - 18)  SpO2: 99% (18 Dec 2023 09:35) (99% - 99%)    O2 Parameters below as of 18 Dec 2023 09:35  Patient On (Oxygen Delivery Method): room air              12-17 @ 07:01  -  12-18 @ 07:00  --------------------------------------------------------  IN: 550 mL / OUT: 0 mL / NET: 550 mL        PHYSICAL EXAM:    General: NAD  HEENT: NC/AT; PERRLA  Neck: supple  Respiratory: CTA b/l  Cardiovascular: +S1/S2; RRR  Abdomen: soft, NT/ND; +BS x4  Extremities: WWP, 2+ peripheral pulses b/l  Skin: normal color and turgor; no rash  Neurological:    MEDICATIONS:  MEDICATIONS  (STANDING):  chlorhexidine 2% Cloths 1 Application(s) Topical daily  dextrose 5% + sodium chloride 0.9%. 1000 milliLiter(s) (50 mL/Hr) IV Continuous <Continuous>    MEDICATIONS  (PRN):      ALLERGIES:  Allergies    penicillin (Anaphylaxis)  Celebrex (Rash)  aspirin (Anaphylaxis)    Intolerances        LABS:                        7.5    5.08  )-----------( 229      ( 18 Dec 2023 08:26 )             24.8     12-18    147<H>  |  115<H>  |  25<H>  ----------------------------<  69<L>  4.2   |  18<L>  |  1.71<H>    Ca    8.8      18 Dec 2023 08:26        Urinalysis Basic - ( 18 Dec 2023 08:26 )    Color: x / Appearance: x / SG: x / pH: x  Gluc: 69 mg/dL / Ketone: x  / Bili: x / Urobili: x   Blood: x / Protein: x / Nitrite: x   Leuk Esterase: x / RBC: x / WBC x   Sq Epi: x / Non Sq Epi: x / Bacteria: x         Progress Note   Rand Hart PGY4- GI/Hep    SUBJECTIVE: Patient seen and examined at bedside.     OBJECTIVE:    VITAL SIGNS:  ICU Vital Signs Last 24 Hrs  T(C): 36.4 (18 Dec 2023 09:35), Max: 36.6 (18 Dec 2023 00:36)  T(F): 97.5 (18 Dec 2023 09:35), Max: 97.9 (18 Dec 2023 00:36)  HR: 69 (18 Dec 2023 09:35) (67 - 72)  BP: 163/71 (18 Dec 2023 09:35) (146/74 - 163/71)  BP(mean): --  ABP: --  ABP(mean): --  RR: 18 (18 Dec 2023 09:35) (16 - 18)  SpO2: 99% (18 Dec 2023 09:35) (99% - 99%)    O2 Parameters below as of 18 Dec 2023 09:35  Patient On (Oxygen Delivery Method): room air              12-17 @ 07:01  -  12-18 @ 07:00  --------------------------------------------------------  IN: 550 mL / OUT: 0 mL / NET: 550 mL        PHYSICAL EXAM:    General: NAD  HEENT: NC/AT; PERRLA  Neck: supple  Respiratory: CTA b/l  Cardiovascular: +S1/S2; RRR  Abdomen: soft, NT/ND; +BS x4, PEG tract noted with foul-smelling, purulent drainage; no obvious abscess noted  Extremities: WWP, 2+ peripheral pulses b/l  Skin: normal color and turgor; no rash  Neurological: awake, alert, following commands    MEDICATIONS:  MEDICATIONS  (STANDING):  chlorhexidine 2% Cloths 1 Application(s) Topical daily  dextrose 5% + sodium chloride 0.9%. 1000 milliLiter(s) (50 mL/Hr) IV Continuous <Continuous>    MEDICATIONS  (PRN):      ALLERGIES:  Allergies    penicillin (Anaphylaxis)  Celebrex (Rash)  aspirin (Anaphylaxis)    Intolerances        LABS:                        7.5    5.08  )-----------( 229      ( 18 Dec 2023 08:26 )             24.8     12-18    147<H>  |  115<H>  |  25<H>  ----------------------------<  69<L>  4.2   |  18<L>  |  1.71<H>    Ca    8.8      18 Dec 2023 08:26        Urinalysis Basic - ( 18 Dec 2023 08:26 )    Color: x / Appearance: x / SG: x / pH: x  Gluc: 69 mg/dL / Ketone: x  / Bili: x / Urobili: x   Blood: x / Protein: x / Nitrite: x   Leuk Esterase: x / RBC: x / WBC x   Sq Epi: x / Non Sq Epi: x / Bacteria: x

## 2023-12-18 NOTE — PHYSICAL THERAPY INITIAL EVALUATION ADULT - NSPTDISCHREC_GEN_A_CORE
to be determined following completion of functional eval for transfers and ambulation, anticipate [facility]

## 2023-12-18 NOTE — CONSULT NOTE ADULT - ASSESSMENT
89 y/o F pmhx advanced dementia, HTN, HLD, DM2, anemia, CAD (s/p LAD and LCx stent 2010), HFrEF (EF 40-45% 2021), h/o TIA no residual deficits, h/o recurrent UTI presents w/ disloged PEG. Nephrology consulted for evaluation prior to CT with contrast.  89 y/o F pmhx advanced dementia, HTN, HLD, DM2, anemia, CAD (s/p LAD and LCx stent 2010), HFrEF (EF 40-45% 2021), h/o TIA no residual deficits, h/o recurrent UTI presents w/ disloged PEG. Nephrology consulted for evaluation prior to CT with contrast.

## 2023-12-18 NOTE — PHYSICAL THERAPY INITIAL EVALUATION ADULT - TRANSFER TRAINING, PT EVAL
GOAL: Pt will perform ALL transfers with CG A x1, w/use of appropriate assistive device as needed, in 4 weeks.

## 2023-12-18 NOTE — CONSULT NOTE ADULT - SUBJECTIVE AND OBJECTIVE BOX
[facility] DIVISION OF KIDNEY DISEASES AND HYPERTENSION -- [phone]  -- INITIAL CONSULT NOTE  --------------------------------------------------------------------------------  HPI: 89 y/o F pmhx advanced dementia, HTN, HLD, DM2, anemia, CAD (s/p LAD and LCx stent 2010), HFrEF (EF 40-45% 2021), h/o TIA no residual deficits, h/o recurrent UTI presents w/ disloged PEG. Nephrology consulted for evaluation prior to CT with contrast. Patient seen and examined at bedside. Patient reports feeling well and denies any complaints. History limited 2/2 baseline dementia. Denies any headaches, nausea, vomiting, fevers/chills, chest pain, palpitations, SOB, abdominal pain, and leg swelling.    PAST HISTORY  --------------------------------------------------------------------------------  PAST MEDICAL & SURGICAL HISTORY:  Hypertension  Diabetes Mellitus, Type 2  Hyperlipidemia  Dementia  CAD (coronary artery disease)  History of TIA (transient ischemic attack)  Anemi  HFrEF (heart failure with reduced ejection fraction)  History of Cholecystectomy  S/P hip replacement, left    FAMILY HISTORY: n/a  PAST SOCIAL HISTORY: n/a    ALLERGIES & MEDICATIONS  --------------------------------------------------------------------------------  Allergies  pencillin (Anaphylaxis)  Celebrex (Rash)  aspirin (Anaphylaxis)  Intolerances  Standing Inpatient Medications  chlorhexidine 2% Cloths 1 Application(s) Topical daily  dextrose 5%. 1000 milliLiter(s) IV Continuous <Continuous>  PRN Inpatient Medications    REVIEW OF SYSTEMS  --------------------------------------------------------------------------------  Gen: No fevers/chills  Head/Eyes/Ears: No HA  Respiratory: No SOB  CV: No CP  GI: No abdominal pain, diarrhea, N/V  : No dysuria, hematuria  MSK: No edema  Heme: No easy bruising or bleeding  Psych: No significant depression    All other systems were reviewed and are negative, except as noted.    VITALS/PHYSICAL EXAM  --------------------------------------------------------------------------------  T(C): 36.6 (12-18-23 @ 16:26), Max: 36.6 (12-18-23 @ 00:36)  HR: 70 (12-18-23 @ 16:26) (69 - 72)  BP: 164/71 (12-18-23 @ 16:26) (153/53 - 170/68)  RR: 18 (12-18-23 @ 16:26) (16 - 18)  SpO2: 97% (12-18-23 @ 16:26) (97% - 99%)  Wt(kg): --    Weight (kg): 52 (12-17-23 @ 14:20)    12-17-23 @ 07:01  -  12-18-23 @ 07:00  --------------------------------------------------------  IN: 550 mL / OUT: 0 mL / NET: 550 mL    Physical Exam:  Gen: NAD  HEENT: MMM  Pulm: CTA B/L  CV: S1S2  Abd: Soft, +BS   Ext: trace LE edema B/L  Neuro: Awake  Skin: Warm and dry  	  LABS/STUDIES  --------------------------------------------------------------------------------              7.5    5.08  >-----------<  229      [12-18-23 @ 08:26]              24.8     147  |  115  |  25  ----------------------------<  69      [12-18-23 @ 08:26]  4.2   |  18  |  1.71        Ca     8.8     [12-18-23 @ 08:26]    Creatinine Trend:  SCr 1.71 [12-18 @ 08:26]  SCr 2.12 [12-16 @ 10:43]  SCr 2.21 [12-16 @ 07:20]  SCr 2.28 [12-15 @ 23:24]  SCr 2.99 [12-10 @ 06:18]    Iron 30, TIBC 266, %sat 11      [12-01-23 @ 11:33]  Ferritin 42      [12-01-23 @ 11:33]  TSH 4.33      [11-27-23 @ 12:59]  Lipid: chol 133, TG 95, HDL 50, LDL --      [12-16-23 @ 07:20]   [facility] DIVISION OF KIDNEY DISEASES AND HYPERTENSION -- [phone]  -- INITIAL CONSULT NOTE  --------------------------------------------------------------------------------  HPI: 89 y/o F pmhx advanced dementia, HTN, HLD, DM2, anemia, CAD (s/p LAD and LCx stent 2010), HFrEF (EF 40-45% 2021), h/o TIA no residual deficits, h/o recurrent UTI presents w/ disloged PEG. Nephrology consulted for evaluation prior to CT with contrast. Patient seen and examined at bedside. Patient reports feeling well and denies any complaints. History limited 2/2 baseline dementia. Denies any headaches, nausea, vomiting, fevers/chills, chest pain, palpitations, SOB, abdominal pain, and leg swelling.    PAST HISTORY  --------------------------------------------------------------------------------  PAST MEDICAL & SURGICAL HISTORY:  Hypertension  Diabetes Mellitus, Type 2  Hyperlipidemia  Dementia  CAD (coronary artery disease)  History of TIA (transient ischemic attack)  Anemi  HFrEF (heart failure with reduced ejection fraction)  History of Cholecystectomy  S/P hip replacement, left    FAMILY HISTORY: n/a  PAST SOCIAL HISTORY: n/a    ALLERGIES & MEDICATIONS  --------------------------------------------------------------------------------  Allergies  pencillin (Anaphylaxis)  Celebrex (Rash)  aspirin (Anaphylaxis)  Intolerances  Standing Inpatient Medications  chlorhexidine 2% Cloths 1 Application(s) Topical daily  dextrose 5%. 1000 milliLiter(s) IV Continuous <Continuous>  PRN Inpatient Medications    REVIEW OF SYSTEMS  --------------------------------------------------------------------------------  Gen: No fevers/chills  Head/Eyes/Ears: No HA  Respiratory: No SOB  CV: No CP  GI: No abdominal pain, diarrhea, N/V  : No dysuria, hematuria  MSK: No edema  Heme: No easy bruising or bleeding  Psych: No significant depression    All other systems were reviewed and are negative, except as noted.    VITALS/PHYSICAL EXAM  --------------------------------------------------------------------------------  T(C): 36.6 (12-18-23 @ 16:26), Max: 36.6 (12-18-23 @ 00:36)  HR: 70 (12-18-23 @ 16:26) (69 - 72)  BP: 164/71 (12-18-23 @ 16:26) (153/53 - 170/68)  RR: 18 (12-18-23 @ 16:26) (16 - 18)  SpO2: 97% (12-18-23 @ 16:26) (97% - 99%)  Wt(kg): --    Weight (kg): 52 (12-17-23 @ 14:20)    12-17-23 @ 07:01  -  12-18-23 @ 07:00  --------------------------------------------------------  IN: 550 mL / OUT: 0 mL / NET: 550 mL    Physical Exam:  Gen: NAD  HEENT: MMM  Pulm: CTA B/L  CV: S1S2  Abd: Soft, +BS   Ext: trace LE edema B/L  Neuro: Awake  Skin: Warm and dry  	  LABS/STUDIES  --------------------------------------------------------------------------------              7.5    5.08  >-----------<  229      [12-18-23 @ 08:26]              24.8     147  |  115  |  25  ----------------------------<  69      [12-18-23 @ 08:26]  4.2   |  18  |  1.71        Ca     8.8     [12-18-23 @ 08:26]    Creatinine Trend:  SCr 1.71 [12-18 @ 08:26]  SCr 2.12 [12-16 @ 10:43]  SCr 2.21 [12-16 @ 07:20]  SCr 2.28 [12-15 @ 23:24]  SCr 2.99 [12-10 @ 06:18]    Iron 30, TIBC 266, %sat 11      [12-01-23 @ 11:33]  Ferritin 42      [12-01-23 @ 11:33]  TSH 4.33      [11-27-23 @ 12:59]  Lipid: chol 133, TG 95, HDL 50, LDL --      [12-16-23 @ 07:20]

## 2023-12-19 LAB
ANION GAP SERPL CALC-SCNC: 12 MMOL/L — SIGNIFICANT CHANGE UP (ref 5–17)
ANION GAP SERPL CALC-SCNC: 9 MMOL/L — SIGNIFICANT CHANGE UP (ref 5–17)
ANION GAP SERPL CALC-SCNC: 9 MMOL/L — SIGNIFICANT CHANGE UP (ref 5–17)
BUN SERPL-MCNC: 14 MG/DL — SIGNIFICANT CHANGE UP (ref 7–23)
BUN SERPL-MCNC: 14 MG/DL — SIGNIFICANT CHANGE UP (ref 7–23)
BUN SERPL-MCNC: 16 MG/DL — SIGNIFICANT CHANGE UP (ref 7–23)
CALCIUM SERPL-MCNC: 8.1 MG/DL — LOW (ref 8.4–10.5)
CALCIUM SERPL-MCNC: 8.1 MG/DL — LOW (ref 8.4–10.5)
CALCIUM SERPL-MCNC: 8.5 MG/DL — SIGNIFICANT CHANGE UP (ref 8.4–10.5)
CHLORIDE SERPL-SCNC: 110 MMOL/L — HIGH (ref 96–108)
CHLORIDE SERPL-SCNC: 110 MMOL/L — HIGH (ref 96–108)
CHLORIDE SERPL-SCNC: 111 MMOL/L — HIGH (ref 96–108)
CHLORIDE SERPL-SCNC: 111 MMOL/L — HIGH (ref 96–108)
CHLORIDE SERPL-SCNC: 113 MMOL/L — HIGH (ref 96–108)
CHLORIDE SERPL-SCNC: 113 MMOL/L — HIGH (ref 96–108)
CO2 SERPL-SCNC: 20 MMOL/L — LOW (ref 22–31)
CO2 SERPL-SCNC: 20 MMOL/L — LOW (ref 22–31)
CO2 SERPL-SCNC: 21 MMOL/L — LOW (ref 22–31)
CO2 SERPL-SCNC: 21 MMOL/L — LOW (ref 22–31)
CO2 SERPL-SCNC: 22 MMOL/L — SIGNIFICANT CHANGE UP (ref 22–31)
CO2 SERPL-SCNC: 22 MMOL/L — SIGNIFICANT CHANGE UP (ref 22–31)
CREAT SERPL-MCNC: 1.44 MG/DL — HIGH (ref 0.5–1.3)
CREAT SERPL-MCNC: 1.44 MG/DL — HIGH (ref 0.5–1.3)
CREAT SERPL-MCNC: 1.58 MG/DL — HIGH (ref 0.5–1.3)
CREAT SERPL-MCNC: 1.58 MG/DL — HIGH (ref 0.5–1.3)
CREAT SERPL-MCNC: 1.59 MG/DL — HIGH (ref 0.5–1.3)
CREAT SERPL-MCNC: 1.59 MG/DL — HIGH (ref 0.5–1.3)
EGFR: 30 ML/MIN/1.73M2 — LOW
EGFR: 30 ML/MIN/1.73M2 — LOW
EGFR: 31 ML/MIN/1.73M2 — LOW
EGFR: 31 ML/MIN/1.73M2 — LOW
EGFR: 34 ML/MIN/1.73M2 — LOW
EGFR: 34 ML/MIN/1.73M2 — LOW
GLUCOSE BLDC GLUCOMTR-MCNC: 101 MG/DL — HIGH (ref 70–99)
GLUCOSE BLDC GLUCOMTR-MCNC: 101 MG/DL — HIGH (ref 70–99)
GLUCOSE BLDC GLUCOMTR-MCNC: 115 MG/DL — HIGH (ref 70–99)
GLUCOSE BLDC GLUCOMTR-MCNC: 115 MG/DL — HIGH (ref 70–99)
GLUCOSE BLDC GLUCOMTR-MCNC: 128 MG/DL — HIGH (ref 70–99)
GLUCOSE BLDC GLUCOMTR-MCNC: 128 MG/DL — HIGH (ref 70–99)
GLUCOSE BLDC GLUCOMTR-MCNC: 90 MG/DL — SIGNIFICANT CHANGE UP (ref 70–99)
GLUCOSE BLDC GLUCOMTR-MCNC: 90 MG/DL — SIGNIFICANT CHANGE UP (ref 70–99)
GLUCOSE SERPL-MCNC: 102 MG/DL — HIGH (ref 70–99)
GLUCOSE SERPL-MCNC: 102 MG/DL — HIGH (ref 70–99)
GLUCOSE SERPL-MCNC: 85 MG/DL — SIGNIFICANT CHANGE UP (ref 70–99)
GLUCOSE SERPL-MCNC: 85 MG/DL — SIGNIFICANT CHANGE UP (ref 70–99)
GLUCOSE SERPL-MCNC: 98 MG/DL — SIGNIFICANT CHANGE UP (ref 70–99)
GLUCOSE SERPL-MCNC: 98 MG/DL — SIGNIFICANT CHANGE UP (ref 70–99)
HCT VFR BLD CALC: 24.9 % — LOW (ref 34.5–45)
HCT VFR BLD CALC: 24.9 % — LOW (ref 34.5–45)
HGB BLD-MCNC: 7.3 G/DL — LOW (ref 11.5–15.5)
HGB BLD-MCNC: 7.3 G/DL — LOW (ref 11.5–15.5)
MAGNESIUM SERPL-MCNC: 1.6 MG/DL — SIGNIFICANT CHANGE UP (ref 1.6–2.6)
MAGNESIUM SERPL-MCNC: 1.6 MG/DL — SIGNIFICANT CHANGE UP (ref 1.6–2.6)
MCHC RBC-ENTMCNC: 26.3 PG — LOW (ref 27–34)
MCHC RBC-ENTMCNC: 26.3 PG — LOW (ref 27–34)
MCHC RBC-ENTMCNC: 29.3 GM/DL — LOW (ref 32–36)
MCHC RBC-ENTMCNC: 29.3 GM/DL — LOW (ref 32–36)
MCV RBC AUTO: 89.6 FL — SIGNIFICANT CHANGE UP (ref 80–100)
MCV RBC AUTO: 89.6 FL — SIGNIFICANT CHANGE UP (ref 80–100)
NRBC # BLD: 0 /100 WBCS — SIGNIFICANT CHANGE UP (ref 0–0)
NRBC # BLD: 0 /100 WBCS — SIGNIFICANT CHANGE UP (ref 0–0)
PHOSPHATE SERPL-MCNC: 2.4 MG/DL — LOW (ref 2.5–4.5)
PHOSPHATE SERPL-MCNC: 2.4 MG/DL — LOW (ref 2.5–4.5)
PLATELET # BLD AUTO: 181 K/UL — SIGNIFICANT CHANGE UP (ref 150–400)
PLATELET # BLD AUTO: 181 K/UL — SIGNIFICANT CHANGE UP (ref 150–400)
POTASSIUM SERPL-MCNC: 2.7 MMOL/L — CRITICAL LOW (ref 3.5–5.3)
POTASSIUM SERPL-MCNC: 2.7 MMOL/L — CRITICAL LOW (ref 3.5–5.3)
POTASSIUM SERPL-MCNC: 2.9 MMOL/L — CRITICAL LOW (ref 3.5–5.3)
POTASSIUM SERPL-MCNC: 2.9 MMOL/L — CRITICAL LOW (ref 3.5–5.3)
POTASSIUM SERPL-MCNC: 3 MMOL/L — LOW (ref 3.5–5.3)
POTASSIUM SERPL-MCNC: 3 MMOL/L — LOW (ref 3.5–5.3)
POTASSIUM SERPL-SCNC: 2.7 MMOL/L — CRITICAL LOW (ref 3.5–5.3)
POTASSIUM SERPL-SCNC: 2.7 MMOL/L — CRITICAL LOW (ref 3.5–5.3)
POTASSIUM SERPL-SCNC: 2.9 MMOL/L — CRITICAL LOW (ref 3.5–5.3)
POTASSIUM SERPL-SCNC: 2.9 MMOL/L — CRITICAL LOW (ref 3.5–5.3)
POTASSIUM SERPL-SCNC: 3 MMOL/L — LOW (ref 3.5–5.3)
POTASSIUM SERPL-SCNC: 3 MMOL/L — LOW (ref 3.5–5.3)
RBC # BLD: 2.78 M/UL — LOW (ref 3.8–5.2)
RBC # BLD: 2.78 M/UL — LOW (ref 3.8–5.2)
RBC # FLD: 18.3 % — HIGH (ref 10.3–14.5)
RBC # FLD: 18.3 % — HIGH (ref 10.3–14.5)
SODIUM SERPL-SCNC: 143 MMOL/L — SIGNIFICANT CHANGE UP (ref 135–145)
SODIUM SERPL-SCNC: 144 MMOL/L — SIGNIFICANT CHANGE UP (ref 135–145)
SODIUM SERPL-SCNC: 144 MMOL/L — SIGNIFICANT CHANGE UP (ref 135–145)
WBC # BLD: 4.59 K/UL — SIGNIFICANT CHANGE UP (ref 3.8–10.5)
WBC # BLD: 4.59 K/UL — SIGNIFICANT CHANGE UP (ref 3.8–10.5)
WBC # FLD AUTO: 4.59 K/UL — SIGNIFICANT CHANGE UP (ref 3.8–10.5)
WBC # FLD AUTO: 4.59 K/UL — SIGNIFICANT CHANGE UP (ref 3.8–10.5)

## 2023-12-19 PROCEDURE — 99233 SBSQ HOSP IP/OBS HIGH 50: CPT

## 2023-12-19 PROCEDURE — 99223 1ST HOSP IP/OBS HIGH 75: CPT

## 2023-12-19 PROCEDURE — 99232 SBSQ HOSP IP/OBS MODERATE 35: CPT

## 2023-12-19 PROCEDURE — 74177 CT ABD & PELVIS W/CONTRAST: CPT | Mod: 26

## 2023-12-19 RX ORDER — MAGNESIUM SULFATE 500 MG/ML
2 VIAL (ML) INJECTION ONCE
Refills: 0 | Status: COMPLETED | OUTPATIENT
Start: 2023-12-19 | End: 2023-12-19

## 2023-12-19 RX ORDER — PANTOPRAZOLE SODIUM 20 MG/1
40 TABLET, DELAYED RELEASE ORAL
Refills: 0 | Status: DISCONTINUED | OUTPATIENT
Start: 2023-12-19 | End: 2023-12-28

## 2023-12-19 RX ORDER — POTASSIUM PHOSPHATE, MONOBASIC POTASSIUM PHOSPHATE, DIBASIC 236; 224 MG/ML; MG/ML
15 INJECTION, SOLUTION INTRAVENOUS ONCE
Refills: 0 | Status: COMPLETED | OUTPATIENT
Start: 2023-12-19 | End: 2023-12-20

## 2023-12-19 RX ORDER — POTASSIUM CHLORIDE 20 MEQ
10 PACKET (EA) ORAL
Refills: 0 | Status: COMPLETED | OUTPATIENT
Start: 2023-12-19 | End: 2023-12-19

## 2023-12-19 RX ORDER — SODIUM CHLORIDE 9 MG/ML
1000 INJECTION, SOLUTION INTRAVENOUS
Refills: 0 | Status: DISCONTINUED | OUTPATIENT
Start: 2023-12-19 | End: 2023-12-20

## 2023-12-19 RX ORDER — SODIUM CHLORIDE 9 MG/ML
1000 INJECTION, SOLUTION INTRAVENOUS
Refills: 0 | Status: DISCONTINUED | OUTPATIENT
Start: 2023-12-19 | End: 2023-12-19

## 2023-12-19 RX ADMIN — Medication 25 GRAM(S): at 23:36

## 2023-12-19 RX ADMIN — Medication 100 MILLIEQUIVALENT(S): at 15:55

## 2023-12-19 RX ADMIN — Medication 100 MILLIEQUIVALENT(S): at 18:19

## 2023-12-19 RX ADMIN — PANTOPRAZOLE SODIUM 40 MILLIGRAM(S): 20 TABLET, DELAYED RELEASE ORAL at 18:08

## 2023-12-19 RX ADMIN — CHLORHEXIDINE GLUCONATE 1 APPLICATION(S): 213 SOLUTION TOPICAL at 11:32

## 2023-12-19 RX ADMIN — SODIUM CHLORIDE 75 MILLILITER(S): 9 INJECTION, SOLUTION INTRAVENOUS at 08:22

## 2023-12-19 RX ADMIN — Medication 100 MILLIEQUIVALENT(S): at 14:04

## 2023-12-19 RX ADMIN — PANTOPRAZOLE SODIUM 40 MILLIGRAM(S): 20 TABLET, DELAYED RELEASE ORAL at 05:45

## 2023-12-19 RX ADMIN — Medication 100 MILLIEQUIVALENT(S): at 23:37

## 2023-12-19 NOTE — PROGRESS NOTE ADULT - SUBJECTIVE AND OBJECTIVE BOX
Chief Complaint:  Patient is a 89y old  Female who presents with a chief complaint of PEG dislodged (19 Dec 2023 10:49)    Interval Events:   no events overnight; no abd pain, nausea, vomiting    Allergies:  penicillin (Anaphylaxis)  Celebrex (Rash)  aspirin (Anaphylaxis)        Hospital Medications:  chlorhexidine 2% Cloths 1 Application(s) Topical daily  lactated ringers. 1000 milliLiter(s) IV Continuous <Continuous>  pantoprazole  Injectable 40 milliGRAM(s) IV Push two times a day      PMHX/PSHX:  Hypertension    Diabetes Mellitus, Type 2    History of Cholecystectomy    Hyperlipidemia    Dementia    CAD (coronary artery disease)    History of TIAs    History of TIA (transient ischemic attack)    Anemia    HFrEF (heart failure with reduced ejection fraction)    History of Cholecystectomy    S/P hip replacement, right    S/P hip replacement, left        Family history:  No pertinent family history in first degree relatives        PHYSICAL EXAM:   Vital Signs:  Vital Signs Last 24 Hrs  T(C): 36.2 (19 Dec 2023 09:42), Max: 36.6 (18 Dec 2023 16:26)  T(F): 97.2 (19 Dec 2023 09:42), Max: 97.9 (18 Dec 2023 16:26)  HR: 67 (19 Dec 2023 09:42) (63 - 70)  BP: 169/67 (19 Dec 2023 09:42) (164/71 - 175/63)  BP(mean): --  RR: 18 (19 Dec 2023 09:42) (18 - 18)  SpO2: 99% (19 Dec 2023 09:42) (96% - 99%)    Parameters below as of 19 Dec 2023 09:42  Patient On (Oxygen Delivery Method): room air      Daily     Daily     GENERAL:  No acute distress  HEENT:  no scleral icterus  CHEST:  no accessory muscle use  HEART:  Regular rate and rhythm  ABDOMEN:  Soft, non-tender, non-distended; previous PEG site still with purulent drainage  EXTREMITIES:  No edema  SKIN:  No rash/ecchymoses  NEURO:  Alert and oriented x 3    LABS:                        7.3    4.59  )-----------( 181      ( 19 Dec 2023 09:43 )             24.9     Mean Cell Volume: 89.6 fl (12-19-23 @ 09:43)    12-19    143  |  111<H>  |  16  ----------------------------<  102<H>  2.7<LL>   |  20<L>  |  1.59<H>    Ca    8.5      19 Dec 2023 09:43          Urinalysis Basic - ( 19 Dec 2023 09:43 )    Color: x / Appearance: x / SG: x / pH: x  Gluc: 102 mg/dL / Ketone: x  / Bili: x / Urobili: x   Blood: x / Protein: x / Nitrite: x   Leuk Esterase: x / RBC: x / WBC x   Sq Epi: x / Non Sq Epi: x / Bacteria: x                              7.3    4.59  )-----------( 181      ( 19 Dec 2023 09:43 )             24.9                         7.5    5.08  )-----------( 229      ( 18 Dec 2023 08:26 )             24.8

## 2023-12-19 NOTE — H&P ADULT - PROBLEM SELECTOR PLAN 5
----- Message from Mario Moeller MD sent at 12/19/2023  1:41 PM CST -----  Please notify the patient of normal results.  Follow-up as planned, if not scheduled please have patient make an appointment.   Likely on Janumet  per prior admission, primary team to follow up with pharmacy   will start on HSS , titrate as needed   Monitor FS    f/u A1c

## 2023-12-19 NOTE — PROGRESS NOTE ADULT - ASSESSMENT
89F PMH CHF, dementia, CKD4, Anemia, HTN, HLD, CAD, DM, coming from [facility] due to PEG tube dislodgement, now pending replacement  89F PMH CHF, dementia, CKD4, Anemia, HTN, HLD, CAD, DM, coming from [facility] due to PEG tube dislodgement, now pending replacement

## 2023-12-19 NOTE — PROGRESS NOTE ADULT - ASSESSMENT
89yo F w/Hx of CHF, dementia, CKD S4, Anemia, HTN, HLD, CAD, DM, coming from [facility] due to PEG tube dislodgement.    #PEG dislodged   - placed 12/6; dislodged prior to admission 12/16  - exam with purulent, foul-smelling drainage through PEG tract; no obvious abscess noted on exam; non-peritoneal exam; currently not on abx  - pending CT A/P    Recommendations:  - follow up CT A/P scheduled for this morning; pending results, can consider broad spectrum abx  - PPI BID  - Please keep patient NPO  - continue to check for signs of peritonitis  - discussed above with primary team    Note and recommendations are incomplete until reviewed and attested by attending.    Douglas Levi MD  GI/Hepatology Fellow, PGY4  Teams preferred (7AM to 5PM); after 5PM, call GI fellow on call    NEW CONSULTS:  Please email [email or web address] F w/Hx of CHF, dementia, CKD S4, Anemia, HTN, HLD, CAD, DM, coming from [facility] due to PEG tube dislodgement.    #PEG dislodged   - placed 12/6; dislodged prior to admission 12/16  - exam with purulent, foul-smelling drainage through PEG tract; no obvious abscess noted on exam; non-peritoneal exam; currently not on abx  - pending CT A/P    Recommendations:  - follow up CT A/P scheduled for this morning; pending results, can consider broad spectrum abx  - PPI BID  - Please keep patient NPO  - continue to check for signs of peritonitis  - discussed above with primary team    Note and recommendations are incomplete until reviewed and attested by attending.    Douglas Levi MD  GI/Hepatology Fellow, PGY4  Teams preferred (7AM to 5PM); after 5PM, call GI fellow on call    NEW CONSULTS:  Please email [email or web address] 89yo F w/Hx of CHF, dementia, CKD S4, Anemia, HTN, HLD, CAD, DM, coming from [facility] due to PEG tube dislodgement.    #PEG dislodged   - placed 12/6; dislodged prior to admission 12/16  - exam with purulent, foul-smelling drainage through PEG tract; no obvious abscess noted on exam; non-peritoneal exam; currently not on abx  - pending CT A/P    Recommendations:  - follow up CT A/P scheduled for this morning; pending results, can consider broad spectrum abx and surgical evaluation  - PPI BID  - Please keep patient NPO  - continue to check for signs of peritonitis  - discussed above with primary team    Note and recommendations are incomplete until reviewed and attested by attending.    Douglas Levi MD  GI/Hepatology Fellow, PGY4  Teams preferred (7AM to 5PM); after 5PM, call GI fellow on call    NEW CONSULTS:  Please email [email or web address] OR [email or web address] 89yo F w/Hx of CHF, dementia, CKD S4, Anemia, HTN, HLD, CAD, DM, coming from [facility] due to PEG tube dislodgement.    #PEG dislodged   - placed 12/6; dislodged prior to admission 12/16  - exam with purulent, foul-smelling drainage through PEG tract; no obvious abscess noted on exam; non-peritoneal exam; currently not on abx  - pending CT A/P    Recommendations:  - follow up CT A/P scheduled for this morning; pending results, can consider broad spectrum abx and surgical evaluation  - PPI BID  - Please keep patient NPO  - continue to check for signs of peritonitis  - discussed above with primary team    Note and recommendations are incomplete until reviewed and attested by attending.    Douglas Levi MD  GI/Hepatology Fellow, PGY4  Teams preferred (7AM to 5PM); after 5PM, call GI fellow on call    NEW CONSULTS:  Please email [email or web address]

## 2023-12-19 NOTE — PROGRESS NOTE ADULT - SUBJECTIVE AND OBJECTIVE BOX
[facility] Division of Hospital Medicine  Jimbo Quevedo MD  Available via MS Teams    SUBJECTIVE / OVERNIGHT EVENTS: Patient seen and examined at bedside, resting and in no clear distress. Unable to participate in ROS due to current mental status.     MEDICATIONS  (STANDING):  chlorhexidine 2% Cloths 1 Application(s) Topical daily  lactated ringers. 1000 milliLiter(s) (75 mL/Hr) IV Continuous <Continuous>  pantoprazole  Injectable 40 milliGRAM(s) IV Push two times a day    MEDICATIONS  (PRN):      I&O's Summary      PHYSICAL EXAM:  Vital Signs Last 24 Hrs  T(C): 36.2 (19 Dec 2023 09:42), Max: 36.6 (18 Dec 2023 16:26)  T(F): 97.2 (19 Dec 2023 09:42), Max: 97.9 (18 Dec 2023 16:26)  HR: 67 (19 Dec 2023 09:42) (63 - 70)  BP: 169/67 (19 Dec 2023 09:42) (164/71 - 175/63)  BP(mean): --  RR: 18 (19 Dec 2023 09:42) (18 - 18)  SpO2: 99% (19 Dec 2023 09:42) (96% - 99%)    Parameters below as of 19 Dec 2023 09:42  Patient On (Oxygen Delivery Method): room air    CONSTITUTIONAL: NAD, well-groomed  EYES: PERRLA; conjunctiva and sclera clear  ENMT: Moist oral mucosa, no pharyngeal injection or exudates; normal dentition  NECK: Supple, no palpable masses; no thyromegaly  RESPIRATORY: Normal respiratory effort; lungs are clear to auscultation bilaterally  CARDIOVASCULAR: normal S1 and S2, no murmur/rub/gallop; No lower extremity edema  ABDOMEN: Nontender to palpation, normoactive bowel sounds, no rebound/guarding; No hepatosplenomegaly  MUSCULOSKELETAL:  no clubbing or cyanosis of digits; no joint swelling or tenderness to palpation  PSYCH: A+O to person, place, and time; affect appropriate  NEUROLOGY: CN 2-12 are intact and symmetric; no gross sensory deficits   SKIN: No rashes; no palpable lesions    LABS:             7.3    4.59  )-----------( 181      ( 19 Dec 2023 09:43 )             24.9     143  |  111<H>  |  16  ----------------------------<  102<H>  2.7<LL>   |  20<L>  |  1.59<H>    Ca    8.5      19 Dec 2023 09:43    Urinalysis Basic - ( 19 Dec 2023 09:43 )    Color: x / Appearance: x / SG: x / pH: x  Gluc: 102 mg/dL / Ketone: x  / Bili: x / Urobili: x   Blood: x / Protein: x / Nitrite: x   Leuk Esterase: x / RBC: x / WBC x   Sq Epi: x / Non Sq Epi: x / Bacteria: x    COVID-19 PCR: Martina (07 Dec 2023 13:44) [facility] Division of Hospital Medicine  Jimbo Quevedo MD  Available via MS Teams    SUBJECTIVE / OVERNIGHT EVENTS: Patient seen and examined at bedside, resting and in no clear distress. Unable to participate in ROS due to current mental status.     MEDICATIONS  (STANDING):  chlorhexidine 2% Cloths 1 Application(s) Topical daily  lactated ringers. 1000 milliLiter(s) (75 mL/Hr) IV Continuous <Continuous>  pantoprazole  Injectable 40 milliGRAM(s) IV Push two times a day    MEDICATIONS  (PRN):      I&O's Summary      PHYSICAL EXAM:  Vital Signs Last 24 Hrs  T(C): 36.2 (19 Dec 2023 09:42), Max: 36.6 (18 Dec 2023 16:26)  T(F): 97.2 (19 Dec 2023 09:42), Max: 97.9 (18 Dec 2023 16:26)  HR: 67 (19 Dec 2023 09:42) (63 - 70)  BP: 169/67 (19 Dec 2023 09:42) (164/71 - 175/63)  BP(mean): --  RR: 18 (19 Dec 2023 09:42) (18 - 18)  SpO2: 99% (19 Dec 2023 09:42) (96% - 99%)    Parameters below as of 19 Dec 2023 09:42  Patient On (Oxygen Delivery Method): room air    CONSTITUTIONAL: NAD, well-groomed  EYES: PERRLA; conjunctiva and sclera clear  ENMT: Moist oral mucosa, no pharyngeal injection or exudates; normal dentition  NECK: Supple, no palpable masses; no thyromegaly  RESPIRATORY: Normal respiratory effort; lungs are clear to auscultation bilaterally  CARDIOVASCULAR: normal S1 and S2, no murmur/rub/gallop; No lower extremity edema  ABDOMEN: Nontender to palpation, normoactive bowel sounds, no rebound/guarding; No hepatosplenomegaly  MUSCULOSKELETAL:  no clubbing or cyanosis of digits; no joint swelling or tenderness to palpation  PSYCH: A+O to person, place, and time; affect appropriate  NEUROLOGY: CN 2-12 are intact and symmetric; no gross sensory deficits   SKIN: No rashes; no palpable lesions    LABS:             7.3    4.59  )-----------( 181      ( 19 Dec 2023 09:43 )             24.9     143  |  111<H>  |  16  ----------------------------<  102<H>  2.7<LL>   |  20<L>  |  1.59<H>    Ca    8.5      19 Dec 2023 09:43    Urinalysis Basic - ( 19 Dec 2023 09:43 )    Color: x / Appearance: x / SG: x / pH: x  Gluc: 102 mg/dL / Ketone: x  / Bili: x / Urobili: x   Blood: x / Protein: x / Nitrite: x   Leuk Esterase: x / RBC: x / WBC x   Sq Epi: x / Non Sq Epi: x / Bacteria: x    COVID-19 PCR: Martina (07 Dec 2023 13:44)

## 2023-12-20 LAB
ANION GAP SERPL CALC-SCNC: 12 MMOL/L — SIGNIFICANT CHANGE UP (ref 5–17)
ANION GAP SERPL CALC-SCNC: 12 MMOL/L — SIGNIFICANT CHANGE UP (ref 5–17)
BUN SERPL-MCNC: 14 MG/DL — SIGNIFICANT CHANGE UP (ref 7–23)
BUN SERPL-MCNC: 14 MG/DL — SIGNIFICANT CHANGE UP (ref 7–23)
CALCIUM SERPL-MCNC: 8.4 MG/DL — SIGNIFICANT CHANGE UP (ref 8.4–10.5)
CALCIUM SERPL-MCNC: 8.4 MG/DL — SIGNIFICANT CHANGE UP (ref 8.4–10.5)
CHLORIDE SERPL-SCNC: 111 MMOL/L — HIGH (ref 96–108)
CHLORIDE SERPL-SCNC: 111 MMOL/L — HIGH (ref 96–108)
CO2 SERPL-SCNC: 16 MMOL/L — LOW (ref 22–31)
CO2 SERPL-SCNC: 16 MMOL/L — LOW (ref 22–31)
CREAT SERPL-MCNC: 1.38 MG/DL — HIGH (ref 0.5–1.3)
CREAT SERPL-MCNC: 1.38 MG/DL — HIGH (ref 0.5–1.3)
EGFR: 36 ML/MIN/1.73M2 — LOW
EGFR: 36 ML/MIN/1.73M2 — LOW
GLUCOSE BLDC GLUCOMTR-MCNC: 117 MG/DL — HIGH (ref 70–99)
GLUCOSE BLDC GLUCOMTR-MCNC: 117 MG/DL — HIGH (ref 70–99)
GLUCOSE BLDC GLUCOMTR-MCNC: 86 MG/DL — SIGNIFICANT CHANGE UP (ref 70–99)
GLUCOSE BLDC GLUCOMTR-MCNC: 86 MG/DL — SIGNIFICANT CHANGE UP (ref 70–99)
GLUCOSE BLDC GLUCOMTR-MCNC: 97 MG/DL — SIGNIFICANT CHANGE UP (ref 70–99)
GLUCOSE BLDC GLUCOMTR-MCNC: 97 MG/DL — SIGNIFICANT CHANGE UP (ref 70–99)
GLUCOSE SERPL-MCNC: 83 MG/DL — SIGNIFICANT CHANGE UP (ref 70–99)
GLUCOSE SERPL-MCNC: 83 MG/DL — SIGNIFICANT CHANGE UP (ref 70–99)
MAGNESIUM SERPL-MCNC: 2.2 MG/DL — SIGNIFICANT CHANGE UP (ref 1.6–2.6)
MAGNESIUM SERPL-MCNC: 2.2 MG/DL — SIGNIFICANT CHANGE UP (ref 1.6–2.6)
PHOSPHATE SERPL-MCNC: 2.5 MG/DL — SIGNIFICANT CHANGE UP (ref 2.5–4.5)
PHOSPHATE SERPL-MCNC: 2.5 MG/DL — SIGNIFICANT CHANGE UP (ref 2.5–4.5)
POTASSIUM SERPL-MCNC: 4.5 MMOL/L — SIGNIFICANT CHANGE UP (ref 3.5–5.3)
POTASSIUM SERPL-MCNC: 4.5 MMOL/L — SIGNIFICANT CHANGE UP (ref 3.5–5.3)
POTASSIUM SERPL-SCNC: 4.5 MMOL/L — SIGNIFICANT CHANGE UP (ref 3.5–5.3)
POTASSIUM SERPL-SCNC: 4.5 MMOL/L — SIGNIFICANT CHANGE UP (ref 3.5–5.3)
SODIUM SERPL-SCNC: 139 MMOL/L — SIGNIFICANT CHANGE UP (ref 135–145)
SODIUM SERPL-SCNC: 139 MMOL/L — SIGNIFICANT CHANGE UP (ref 135–145)

## 2023-12-20 PROCEDURE — 99233 SBSQ HOSP IP/OBS HIGH 50: CPT

## 2023-12-20 RX ORDER — METRONIDAZOLE 500 MG
500 TABLET ORAL EVERY 8 HOURS
Refills: 0 | Status: COMPLETED | OUTPATIENT
Start: 2023-12-20 | End: 2023-12-25

## 2023-12-20 RX ORDER — CIPROFLOXACIN LACTATE 400MG/40ML
400 VIAL (ML) INTRAVENOUS EVERY 12 HOURS
Refills: 0 | Status: DISCONTINUED | OUTPATIENT
Start: 2023-12-20 | End: 2023-12-25

## 2023-12-20 RX ADMIN — POTASSIUM PHOSPHATE, MONOBASIC POTASSIUM PHOSPHATE, DIBASIC 62.5 MILLIMOLE(S): 236; 224 INJECTION, SOLUTION INTRAVENOUS at 05:17

## 2023-12-20 RX ADMIN — PANTOPRAZOLE SODIUM 40 MILLIGRAM(S): 20 TABLET, DELAYED RELEASE ORAL at 17:53

## 2023-12-20 RX ADMIN — Medication 100 MILLIEQUIVALENT(S): at 01:59

## 2023-12-20 RX ADMIN — Medication 100 MILLIEQUIVALENT(S): at 03:23

## 2023-12-20 RX ADMIN — CHLORHEXIDINE GLUCONATE 1 APPLICATION(S): 213 SOLUTION TOPICAL at 11:33

## 2023-12-20 RX ADMIN — Medication 100 MILLIGRAM(S): at 21:37

## 2023-12-20 RX ADMIN — PANTOPRAZOLE SODIUM 40 MILLIGRAM(S): 20 TABLET, DELAYED RELEASE ORAL at 05:18

## 2023-12-20 RX ADMIN — SODIUM CHLORIDE 75 MILLILITER(S): 9 INJECTION, SOLUTION INTRAVENOUS at 07:47

## 2023-12-20 NOTE — PROGRESS NOTE ADULT - ASSESSMENT
89F PMH CHF, dementia, CKD4, Anemia, HTN, HLD, CAD, DM, coming from [facility] due to PEG tube dislodgement, now pending replacement  89F PMH CHF, dementia, CKD4, Anemia, HTN, HLD, CAD, DM, coming from [facility] due to PEG tube dislodgement, now pending replacement

## 2023-12-20 NOTE — PROGRESS NOTE ADULT - SUBJECTIVE AND OBJECTIVE BOX
[facility] Division of Hospital Medicine  Jimbo Quevedo MD  Available via MS Teams    SUBJECTIVE / OVERNIGHT EVENTS: Patient seen and examined at bedside, resting comfortably and in no acute distress. Denies chest pain, palpitations. Denies shortness of breath or cough. ROS otherwise noncontributory, though is limited by baseline mental status.     MEDICATIONS  (STANDING):  chlorhexidine 2% Cloths 1 Application(s) Topical daily  pantoprazole  Injectable 40 milliGRAM(s) IV Push two times a day    MEDICATIONS  (PRN):      I&O's Summary    19 Dec 2023 07:01  -  20 Dec 2023 07:00  --------------------------------------------------------  IN: 1550 mL / OUT: 0 mL / NET: 1550 mL        PHYSICAL EXAM:  Vital Signs Last 24 Hrs  T(C): 36.4 (20 Dec 2023 07:25), Max: 36.9 (20 Dec 2023 03:12)  T(F): 97.6 (20 Dec 2023 07:25), Max: 98.5 (20 Dec 2023 03:12)  HR: 74 (20 Dec 2023 07:25) (67 - 75)  BP: 121/60 (20 Dec 2023 07:25) (121/60 - 166/70)  RR: 18 (20 Dec 2023 07:25) (18 - 18)  SpO2: 98% (20 Dec 2023 07:25) (93% - 98%)    Parameters below as of 20 Dec 2023 07:25  Patient On (Oxygen Delivery Method): room air      CONSTITUTIONAL: NAD, well-groomed  EYES: PERRLA; conjunctiva and sclera clear  ENMT: Moist oral mucosa, no pharyngeal injection or exudates; normal dentition  NECK: Supple, no palpable masses; no thyromegaly  RESPIRATORY: Normal respiratory effort; lungs are clear to auscultation bilaterally  CARDIOVASCULAR: normal S1 and S2, no murmur/rub/gallop; No lower extremity edema  ABDOMEN: Nontender to palpation, normoactive bowel sounds, no rebound/guarding; No hepatosplenomegaly  MUSCULOSKELETAL:  no clubbing or cyanosis of digits; no joint swelling or tenderness to palpation  PSYCH: A+O to person only   NEUROLOGY: CN 2-12 are intact and symmetric; no gross sensory deficits   SKIN: No rashes; no palpable lesions    LABS:                        7.3    4.59  )-----------( 181      ( 19 Dec 2023 09:43 )             24.9     12-20    139  |  111<H>  |  14  ----------------------------<  83  4.5   |  16<L>  |  1.38<H>    Ca    8.4      20 Dec 2023 05:13  Phos  2.5     12-20  Mg     2.2     12-20            Urinalysis Basic - ( 20 Dec 2023 05:13 )    Color: x / Appearance: x / SG: x / pH: x  Gluc: 83 mg/dL / Ketone: x  / Bili: x / Urobili: x   Blood: x / Protein: x / Nitrite: x   Leuk Esterase: x / RBC: x / WBC x   Sq Epi: x / Non Sq Epi: x / Bacteria: x        COVID-19 PCR: Martina (07 Dec 2023 13:44) [facility] Division of Hospital Medicine  Jimbo Quevedo MD  Available via MS Teams    SUBJECTIVE / OVERNIGHT EVENTS: Patient seen and examined at bedside, resting comfortably and in no acute distress. Denies chest pain, palpitations. Denies shortness of breath or cough. ROS otherwise noncontributory, though is limited by baseline mental status.     MEDICATIONS  (STANDING):  chlorhexidine 2% Cloths 1 Application(s) Topical daily  pantoprazole  Injectable 40 milliGRAM(s) IV Push two times a day    MEDICATIONS  (PRN):      I&O's Summary    19 Dec 2023 07:01  -  20 Dec 2023 07:00  --------------------------------------------------------  IN: 1550 mL / OUT: 0 mL / NET: 1550 mL        PHYSICAL EXAM:  Vital Signs Last 24 Hrs  T(C): 36.4 (20 Dec 2023 07:25), Max: 36.9 (20 Dec 2023 03:12)  T(F): 97.6 (20 Dec 2023 07:25), Max: 98.5 (20 Dec 2023 03:12)  HR: 74 (20 Dec 2023 07:25) (67 - 75)  BP: 121/60 (20 Dec 2023 07:25) (121/60 - 166/70)  RR: 18 (20 Dec 2023 07:25) (18 - 18)  SpO2: 98% (20 Dec 2023 07:25) (93% - 98%)    Parameters below as of 20 Dec 2023 07:25  Patient On (Oxygen Delivery Method): room air      CONSTITUTIONAL: NAD, well-groomed  EYES: PERRLA; conjunctiva and sclera clear  ENMT: Moist oral mucosa, no pharyngeal injection or exudates; normal dentition  NECK: Supple, no palpable masses; no thyromegaly  RESPIRATORY: Normal respiratory effort; lungs are clear to auscultation bilaterally  CARDIOVASCULAR: normal S1 and S2, no murmur/rub/gallop; No lower extremity edema  ABDOMEN: Nontender to palpation, normoactive bowel sounds, no rebound/guarding; No hepatosplenomegaly  MUSCULOSKELETAL:  no clubbing or cyanosis of digits; no joint swelling or tenderness to palpation  PSYCH: A+O to person only   NEUROLOGY: CN 2-12 are intact and symmetric; no gross sensory deficits   SKIN: No rashes; no palpable lesions    LABS:                        7.3    4.59  )-----------( 181      ( 19 Dec 2023 09:43 )             24.9     12-20    139  |  111<H>  |  14  ----------------------------<  83  4.5   |  16<L>  |  1.38<H>    Ca    8.4      20 Dec 2023 05:13  Phos  2.5     12-20  Mg     2.2     12-20            Urinalysis Basic - ( 20 Dec 2023 05:13 )    Color: x / Appearance: x / SG: x / pH: x  Gluc: 83 mg/dL / Ketone: x  / Bili: x / Urobili: x   Blood: x / Protein: x / Nitrite: x   Leuk Esterase: x / RBC: x / WBC x   Sq Epi: x / Non Sq Epi: x / Bacteria: x        COVID-19 PCR: Martina (07 Dec 2023 13:44)

## 2023-12-20 NOTE — CHART NOTE - NSCHARTNOTEFT_GEN_A_CORE
Pt s/p CT with IV contrast 12/19. Scr improved to 1.38 today (12/20).     Nephrology will sign off.  If you have any questions, please feel free to contact me  Kiana Encarnacion  Nephrology Fellow  [employer]/Page [phone]  (After 5pm or on weekends please page the on-call fellow) Pt s/p CT with IV contrast 12/19. Scr improved to 1.38 today (12/20).     Nephrology will sign off.  If you have any questions, please feel free to contact me  Kiana Encarnacion  Nephrology Fellow  [employer]/Page [phone]  (After 5pm or on weekends please page the on-call fellow)

## 2023-12-20 NOTE — PROGRESS NOTE ADULT - SUBJECTIVE AND OBJECTIVE BOX
Interval Events:   No acute events overnight.        Hospital Medications:  chlorhexidine 2% Cloths 1 Application(s) Topical daily  pantoprazole  Injectable 40 milliGRAM(s) IV Push two times a day      PHYSICAL EXAM:   Vital Signs:  Vital Signs Last 24 Hrs  T(C): 36.4 (20 Dec 2023 07:25), Max: 36.9 (20 Dec 2023 03:12)  T(F): 97.6 (20 Dec 2023 07:25), Max: 98.5 (20 Dec 2023 03:12)  HR: 74 (20 Dec 2023 07:25) (74 - 75)  BP: 121/60 (20 Dec 2023 07:25) (121/60 - 153/68)  BP(mean): --  RR: 18 (20 Dec 2023 07:) (18 - 18)  SpO2: 98% (20 Dec 2023 07:25) (93% - 98%)    Parameters below as of 20 Dec 2023 07:25  Patient On (Oxygen Delivery Method): room air      Daily     Daily Weight in k.1 (20 Dec 2023 07:25)    GENERAL: no acute distress  NEURO: alert and confused  HEENT: NCAT, anicteric sclera  CHEST: no respiratory distress, no accessory muscle use  CARDIAC: regular rate, +S1/S2  ABDOMEN: soft, nontender, no rebound or guarding; PEG stoma with a small amount oatmeal-colored drainage, no bleeding. (+) erythema at adjacent skin  EXTREMITIES: warm, well perfused  SKIN: no active lesions noted    LABS: reviewed                        7.3    4.59  )-----------( 181      ( 19 Dec 2023 09:43 )             24.9     12-20    139  |  111<H>  |  14  ----------------------------<  83  4.5   |  16<L>  |  1.38<H>    Ca    8.4      20 Dec 2023 05:13  Phos  2.5     12-20  Mg     2.2     12-20      < from: CT Abdomen and Pelvis w/ IV Cont (23 @ 07:37) >  FINDINGS:  LOWER CHEST: Trace bilateral pleural effusions associated atelectasis.   Coronary artery calcifications.    LIVER: Within normal limits.  BILE DUCTS: Normal caliber.  GALLBLADDER: Cholecystectomy.  SPLEEN: Within normal limits.  PANCREAS: Within normal limits.  ADRENALS: Within normal limits.  KIDNEYS/URETERS: No hydronephrosis. Right upper pole renal cyst.    BLADDER: Within normal limits.  REPRODUCTIVE ORGANS: Uterus and bilateral adnexa within normal limits.    BOWEL: No bowel obstruction. Appendix is normal. Gastrostomy tube tract   extends from the distal body/proximal antrum to the skin surface. Small   amount of fluid along the tract.  PERITONEUM: Trace pelvic ascites. No drainable intra-abdominal fluid   collection.  VESSELS: Atherosclerotic changes.  RETROPERITONEUM/LYMPH NODES: No lymphadenopathy.  ABDOMINAL WALL: As above  BONES: Left hip ORIF. Grade 1 L4-5 anterolisthesis.    IMPRESSION:  Fluid-filled gastrostomy tube tract. No drainable intra-abdominalfluid   collection.          --- End of Report ---    < end of copied text >

## 2023-12-21 LAB
ANION GAP SERPL CALC-SCNC: 13 MMOL/L — SIGNIFICANT CHANGE UP (ref 5–17)
ANION GAP SERPL CALC-SCNC: 13 MMOL/L — SIGNIFICANT CHANGE UP (ref 5–17)
BUN SERPL-MCNC: 15 MG/DL — SIGNIFICANT CHANGE UP (ref 7–23)
BUN SERPL-MCNC: 15 MG/DL — SIGNIFICANT CHANGE UP (ref 7–23)
CALCIUM SERPL-MCNC: 8.8 MG/DL — SIGNIFICANT CHANGE UP (ref 8.4–10.5)
CALCIUM SERPL-MCNC: 8.8 MG/DL — SIGNIFICANT CHANGE UP (ref 8.4–10.5)
CHLORIDE SERPL-SCNC: 112 MMOL/L — HIGH (ref 96–108)
CHLORIDE SERPL-SCNC: 112 MMOL/L — HIGH (ref 96–108)
CO2 SERPL-SCNC: 19 MMOL/L — LOW (ref 22–31)
CO2 SERPL-SCNC: 19 MMOL/L — LOW (ref 22–31)
CREAT SERPL-MCNC: 1.74 MG/DL — HIGH (ref 0.5–1.3)
CREAT SERPL-MCNC: 1.74 MG/DL — HIGH (ref 0.5–1.3)
EGFR: 27 ML/MIN/1.73M2 — LOW
EGFR: 27 ML/MIN/1.73M2 — LOW
GLUCOSE BLDC GLUCOMTR-MCNC: 101 MG/DL — HIGH (ref 70–99)
GLUCOSE BLDC GLUCOMTR-MCNC: 101 MG/DL — HIGH (ref 70–99)
GLUCOSE BLDC GLUCOMTR-MCNC: 130 MG/DL — HIGH (ref 70–99)
GLUCOSE BLDC GLUCOMTR-MCNC: 130 MG/DL — HIGH (ref 70–99)
GLUCOSE BLDC GLUCOMTR-MCNC: 272 MG/DL — HIGH (ref 70–99)
GLUCOSE BLDC GLUCOMTR-MCNC: 272 MG/DL — HIGH (ref 70–99)
GLUCOSE BLDC GLUCOMTR-MCNC: 67 MG/DL — LOW (ref 70–99)
GLUCOSE BLDC GLUCOMTR-MCNC: 67 MG/DL — LOW (ref 70–99)
GLUCOSE BLDC GLUCOMTR-MCNC: 74 MG/DL — SIGNIFICANT CHANGE UP (ref 70–99)
GLUCOSE BLDC GLUCOMTR-MCNC: 74 MG/DL — SIGNIFICANT CHANGE UP (ref 70–99)
GLUCOSE BLDC GLUCOMTR-MCNC: 77 MG/DL — SIGNIFICANT CHANGE UP (ref 70–99)
GLUCOSE BLDC GLUCOMTR-MCNC: 77 MG/DL — SIGNIFICANT CHANGE UP (ref 70–99)
GLUCOSE BLDC GLUCOMTR-MCNC: 81 MG/DL — SIGNIFICANT CHANGE UP (ref 70–99)
GLUCOSE BLDC GLUCOMTR-MCNC: 81 MG/DL — SIGNIFICANT CHANGE UP (ref 70–99)
GLUCOSE SERPL-MCNC: 194 MG/DL — HIGH (ref 70–99)
GLUCOSE SERPL-MCNC: 194 MG/DL — HIGH (ref 70–99)
HCT VFR BLD CALC: 22.4 % — LOW (ref 34.5–45)
HCT VFR BLD CALC: 22.4 % — LOW (ref 34.5–45)
HGB BLD-MCNC: 7 G/DL — CRITICAL LOW (ref 11.5–15.5)
HGB BLD-MCNC: 7 G/DL — CRITICAL LOW (ref 11.5–15.5)
MCHC RBC-ENTMCNC: 26.8 PG — LOW (ref 27–34)
MCHC RBC-ENTMCNC: 26.8 PG — LOW (ref 27–34)
MCHC RBC-ENTMCNC: 31.3 GM/DL — LOW (ref 32–36)
MCHC RBC-ENTMCNC: 31.3 GM/DL — LOW (ref 32–36)
MCV RBC AUTO: 85.8 FL — SIGNIFICANT CHANGE UP (ref 80–100)
MCV RBC AUTO: 85.8 FL — SIGNIFICANT CHANGE UP (ref 80–100)
NRBC # BLD: 0 /100 WBCS — SIGNIFICANT CHANGE UP (ref 0–0)
NRBC # BLD: 0 /100 WBCS — SIGNIFICANT CHANGE UP (ref 0–0)
PLATELET # BLD AUTO: 211 K/UL — SIGNIFICANT CHANGE UP (ref 150–400)
PLATELET # BLD AUTO: 211 K/UL — SIGNIFICANT CHANGE UP (ref 150–400)
POTASSIUM SERPL-MCNC: 3.3 MMOL/L — LOW (ref 3.5–5.3)
POTASSIUM SERPL-MCNC: 3.3 MMOL/L — LOW (ref 3.5–5.3)
POTASSIUM SERPL-SCNC: 3.3 MMOL/L — LOW (ref 3.5–5.3)
POTASSIUM SERPL-SCNC: 3.3 MMOL/L — LOW (ref 3.5–5.3)
RBC # BLD: 2.61 M/UL — LOW (ref 3.8–5.2)
RBC # BLD: 2.61 M/UL — LOW (ref 3.8–5.2)
RBC # FLD: 19 % — HIGH (ref 10.3–14.5)
RBC # FLD: 19 % — HIGH (ref 10.3–14.5)
SODIUM SERPL-SCNC: 144 MMOL/L — SIGNIFICANT CHANGE UP (ref 135–145)
SODIUM SERPL-SCNC: 144 MMOL/L — SIGNIFICANT CHANGE UP (ref 135–145)
WBC # BLD: 5 K/UL — SIGNIFICANT CHANGE UP (ref 3.8–10.5)
WBC # BLD: 5 K/UL — SIGNIFICANT CHANGE UP (ref 3.8–10.5)
WBC # FLD AUTO: 5 K/UL — SIGNIFICANT CHANGE UP (ref 3.8–10.5)
WBC # FLD AUTO: 5 K/UL — SIGNIFICANT CHANGE UP (ref 3.8–10.5)

## 2023-12-21 PROCEDURE — 99233 SBSQ HOSP IP/OBS HIGH 50: CPT

## 2023-12-21 PROCEDURE — 99232 SBSQ HOSP IP/OBS MODERATE 35: CPT

## 2023-12-21 RX ORDER — DEXTROSE 50 % IN WATER 50 %
50 SYRINGE (ML) INTRAVENOUS ONCE
Refills: 0 | Status: COMPLETED | OUTPATIENT
Start: 2023-12-21 | End: 2023-12-21

## 2023-12-21 RX ORDER — HALOPERIDOL DECANOATE 100 MG/ML
2.5 INJECTION INTRAMUSCULAR ONCE
Refills: 0 | Status: COMPLETED | OUTPATIENT
Start: 2023-12-21 | End: 2023-12-21

## 2023-12-21 RX ORDER — POTASSIUM CHLORIDE 20 MEQ
10 PACKET (EA) ORAL
Refills: 0 | Status: COMPLETED | OUTPATIENT
Start: 2023-12-21 | End: 2023-12-21

## 2023-12-21 RX ORDER — SODIUM CHLORIDE 9 MG/ML
1000 INJECTION, SOLUTION INTRAVENOUS
Refills: 0 | Status: DISCONTINUED | OUTPATIENT
Start: 2023-12-21 | End: 2023-12-22

## 2023-12-21 RX ORDER — HALOPERIDOL DECANOATE 100 MG/ML
5 INJECTION INTRAMUSCULAR ONCE
Refills: 0 | Status: DISCONTINUED | OUTPATIENT
Start: 2023-12-21 | End: 2023-12-21

## 2023-12-21 RX ADMIN — CHLORHEXIDINE GLUCONATE 1 APPLICATION(S): 213 SOLUTION TOPICAL at 11:06

## 2023-12-21 RX ADMIN — Medication 100 MILLIGRAM(S): at 21:43

## 2023-12-21 RX ADMIN — Medication 100 MILLIEQUIVALENT(S): at 15:26

## 2023-12-21 RX ADMIN — HALOPERIDOL DECANOATE 2.5 MILLIGRAM(S): 100 INJECTION INTRAMUSCULAR at 05:16

## 2023-12-21 RX ADMIN — SODIUM CHLORIDE 50 MILLILITER(S): 9 INJECTION, SOLUTION INTRAVENOUS at 12:32

## 2023-12-21 RX ADMIN — Medication 200 MILLIGRAM(S): at 05:18

## 2023-12-21 RX ADMIN — PANTOPRAZOLE SODIUM 40 MILLIGRAM(S): 20 TABLET, DELAYED RELEASE ORAL at 17:49

## 2023-12-21 RX ADMIN — PANTOPRAZOLE SODIUM 40 MILLIGRAM(S): 20 TABLET, DELAYED RELEASE ORAL at 05:18

## 2023-12-21 RX ADMIN — Medication 200 MILLIGRAM(S): at 17:49

## 2023-12-21 RX ADMIN — Medication 100 MILLIGRAM(S): at 05:16

## 2023-12-21 RX ADMIN — Medication 100 MILLIEQUIVALENT(S): at 16:32

## 2023-12-21 RX ADMIN — Medication 100 MILLIGRAM(S): at 13:43

## 2023-12-21 RX ADMIN — Medication 50 MILLILITER(S): at 12:31

## 2023-12-21 NOTE — SWALLOW BEDSIDE ASSESSMENT ADULT - SWALLOW EVAL: RECOMMENDED DIET
Mildly thickened liquids; Unable to make texture recommendations due to pt refusal and limited PO trial on evaluation.

## 2023-12-21 NOTE — PROGRESS NOTE ADULT - ASSESSMENT
89F PMH CHF, dementia, CKD4, Anemia, HTN, HLD, CAD, DM, coming from [facility] due to PEG tube dislodgement. 89F PMH CHF, dementia, CKD4, Anemia, HTN, HLD, CAD, DM, coming from [facility] due to PEG tube dislodgement.

## 2023-12-21 NOTE — SWALLOW BEDSIDE ASSESSMENT ADULT - SPECIFY REASON(S)
Assess swallow mechanism to determine safety of initiation an oral diet.
Order received/appreciated. Evaluation ordered to determine current swallow function and least restrictive diet.

## 2023-12-21 NOTE — SWALLOW BEDSIDE ASSESSMENT ADULT - SLP GENERAL OBSERVATIONS
Patient received supine in bed, on room air, + IV, A&Ox3. Patient requiring verbal encouragement to participate in PO trials.

## 2023-12-21 NOTE — SWALLOW BEDSIDE ASSESSMENT ADULT - SWALLOW EVAL: DIAGNOSIS
89Y F w/Hx of CHF, dementia, CKD S4, Anemia, HTN, HLD, CAD, DM, from Nursing Home due to PEG tube dislodgement. Initial evaluation attempted on 12/17 deferred as GI requesting pt remain NPO. Today, bedside swallow evaluation requested by NP Lenka Jane to assess candidacy for PO diet. Pt presenting with evidence of an anna-pharyngeal dysphagia. Swallow mechanism notable for prolonged oral transit, delayed initiation of pharyngeal swallow, with reduced hyo-laryngeal elevation upon palpation. No overt s/s of laryngeal penetration/aspiration on trials of thickened liquids. Evaluation results limited as pt only agreeable to trials of thickened liquids refusing more than 1/2 tsp of puree and single small bite of easy to chew textures. As a result, this service unable to make texture recommendations. Clinician reviewed results with daughter and recommending GOC to discuss NGT vs. comfort feeds for nutrition/hydration/medication pending PEG re-placement. 89Y F w/Hx of CHF, dementia, CKD S4, Anemia, HTN, HLD, CAD, DM, from Nursing Home due to PEG tube dislodgement. Initial evaluation attempted on 12/17 deferred as GI requesting pt remain NPO. Today, bedside swallow evaluation requested by NP Lenka Jane to assess candidacy for PO diet. Pt presenting with evidence of an anna-pharyngeal dysphagia. Swallow mechanism notable for prolonged oral transit, delayed initiation of pharyngeal swallow, with reduced hyo-laryngeal elevation upon palpation. No overt s/s of laryngeal penetration/aspiration on trials of thickened liquids. Evaluation results limited as pt only agreeable to trials of thickened liquids refusing more than 1/2 tsp of puree and single small bite of easy to chew textures. As a result, this service unable to make texture recommendations. Clinician reviewed results with daughter and recommending GOC to discuss NGT vs. comfort feeds for nutrition/hydration/medication pending PEG re-placement. This service to sign off.

## 2023-12-21 NOTE — PROGRESS NOTE ADULT - SUBJECTIVE AND OBJECTIVE BOX
Interval Events:   No acute events overnight.     Hospital Medications:  chlorhexidine 2% Cloths 1 Application(s) Topical daily  ciprofloxacin   IVPB 400 milliGRAM(s) IV Intermittent every 12 hours  dextrose 5% + sodium chloride 0.9%. 1000 milliLiter(s) IV Continuous <Continuous>  metroNIDAZOLE  IVPB 500 milliGRAM(s) IV Intermittent every 8 hours  pantoprazole  Injectable 40 milliGRAM(s) IV Push two times a day  potassium chloride  10 mEq/100 mL IVPB 10 milliEquivalent(s) IV Intermittent every 1 hour      PHYSICAL EXAM:   Vital Signs:  Vital Signs Last 24 Hrs  T(C): 36.2 (21 Dec 2023 09:04), Max: 36.7 (20 Dec 2023 17:57)  T(F): 97.2 (21 Dec 2023 09:04), Max: 98 (20 Dec 2023 17:57)  HR: 77 (21 Dec 2023 09:04) (70 - 107)  BP: 139/58 (21 Dec 2023 09:04) (138/77 - 164/67)  BP(mean): --  RR: 18 (21 Dec 2023 09:04) (18 - 18)  SpO2: 100% (21 Dec 2023 09:04) (95% - 100%)    Parameters below as of 21 Dec 2023 09:04  Patient On (Oxygen Delivery Method): room air      Daily     Daily     GENERAL: no acute distress, anxious  NEURO: alert and confused  HEENT: NCAT, anicteric sclera  CHEST: no respiratory distress, no accessory muscle use  CARDIAC: regular rate, +S1/S2  ABDOMEN: soft, nontender, no rebound or guarding; PEG stoma with no spontaneous drainage; a small amount of pink fluid was expressed manually. Improved skin irritations   EXTREMITIES: warm, well perfused  SKIN: no active lesions noted    LABS: reviewed                        7.0    5.00  )-----------( 211      ( 21 Dec 2023 13:31 )             22.4     12-21    144  |  112<H>  |  15  ----------------------------<  194<H>  3.3<L>   |  19<L>  |  1.74<H>    Ca    8.8      21 Dec 2023 13:31  Phos  2.5     12-20  Mg     2.2     12-20

## 2023-12-21 NOTE — SWALLOW BEDSIDE ASSESSMENT ADULT - ASR SWALLOW REFERRAL
Palliative/GOC as family/patient would like to discuss comfort feeds vs. ngt for means of nutrition, hydration, medication pending PEG re-placement.

## 2023-12-21 NOTE — SWALLOW BEDSIDE ASSESSMENT ADULT - CONSISTENCIES ADMINISTERED
Patient reluctant to participate in PO trials agreeable to 1/2 tsp puree and single small bite of easy to chew textures. Due to reluctance to participate and limited amount of PO trials, this service unable to make safe texture recommendations. moderately thick/mildly thick

## 2023-12-21 NOTE — PROGRESS NOTE ADULT - ASSESSMENT
89yo F w/Hx of CHF, dementia, CKD S4, Anemia, HTN, HLD, CAD, DM, coming from [facility] due to PEG tube dislodgement.    #PEG dislodged   - placed 12/6; dislodged prior to admission 12/16  - Admission exam with purulent, foul-smelling drainage through PEG tract; no obvious abscess noted on exam; non-peritoneal exam; currently not on abx  - Drainage lessened but still present on 12/20  - Reviewed CT A/P with radiology: fluid collection is present along the previous PEG tract, but difficulty to characterize. May represent a fluid-filled tract vs. a phlegm. No air bubbles visualized in the track. Unclear if the track is still functional.    - Patient is non-toxic appearing  - Minimal to no drainage from PEG stoma. PEG tract is likely closed at this time  - SLP re-eval appreciated. Unable to clear for PO at this time given lack of participation.     Recommendations:  - PPI BID  - If able to feed, would recommend resume feeding (also serves as a test to see if there is increased leakage from the stoma)  - C/w empirical abx x 5 days due to concern for (+) phlegm.   - Unlikely to benefit from PEG re-insertion or endoscopic closure of PEG tract at this time given minimal drainage and likely closed, non-functional tract  - May plan for PEG replacement next week.     Note incomplete until finalized by attending signature/attestation.    Kenya Gaxiola  GI/Hepatology Fellow    MONDAY-FRIDAY 8AM-5PM:  Pager# [phone] ([facility]) or [phone] ([facility])    NON-URGENT CONSULTS:  Please email [email or web address] OR [email or web address]  AT NIGHT AND ON WEEKENDS:  Contact on-call GI fellow via answering service ([phone]) from 5pm-8am and on weekends/holidays   89yo F w/Hx of CHF, dementia, CKD S4, Anemia, HTN, HLD, CAD, DM, coming from [facility] due to PEG tube dislodgement.    #PEG dislodged   - placed 12/6; dislodged prior to admission 12/16  - Admission exam with purulent, foul-smelling drainage through PEG tract; no obvious abscess noted on exam; non-peritoneal exam; currently not on abx  - Drainage lessened but still present on 12/20  - Reviewed CT A/P with radiology: fluid collection is present along the previous PEG tract, but difficulty to characterize. May represent a fluid-filled tract vs. a phlegm. No air bubbles visualized in the track. Unclear if the track is still functional.    - Patient is non-toxic appearing  - Minimal to no drainage from PEG stoma. PEG tract is likely closed at this time  - SLP re-eval appreciated. Unable to clear for PO at this time given lack of participation.     Recommendations:  - PPI BID  - If able to feed, would recommend resume feeding (also serves as a test to see if there is increased leakage from the stoma)  - C/w empirical abx x 5 days due to concern for (+) phlegm.   - Unlikely to benefit from PEG re-insertion or endoscopic closure of PEG tract at this time given minimal drainage and likely closed, non-functional tract  - May plan for PEG replacement next week.     Note incomplete until finalized by attending signature/attestation.    Kenya Gaxiola  GI/Hepatology Fellow    MONDAY-FRIDAY 8AM-5PM:  Pager# [phone] ([facility]) or [phone] ([facility])    NON-URGENT CONSULTS:  Please email [email or web address]  AT NIGHT AND ON WEEKENDS:  Contact on-call GI fellow via answering service ([phone]) from 5pm-8am and on weekends/holidays

## 2023-12-21 NOTE — SWALLOW BEDSIDE ASSESSMENT ADULT - SWALLOW EVAL: CRITERIA FOR SKILLED INTERVENTION MET
Daughter at bedside requesting pleasure feeds. Discussed with NP Lenka Jane. Pending GOC discussion./not appropriate for swallowing intervention

## 2023-12-21 NOTE — SWALLOW BEDSIDE ASSESSMENT ADULT - COMMENTS
Continued history:   GI 12/17: seen for consult ; PEG dislodged ; Patient without peritoneal signs: she appears well and does not have lbs concerning for infection   Recommendations: Please keep patient NPO; Will hold off on NGT with decompression given overall clinical stability; can plan to replace PEG next week.        Speech & Swallow ; known to service PTA -- see below   -11/27 pt seen by this service for bedside swallow evaluation. Diet deferred to team given level of agitation and poor participation.   -11/29 pt seen for re-evaluation of swallow function as requested by MD Dawn d/t improved mentation. Esophogram currently ordered, however as per d/w MD, on hold at this time pending swallow evaluation. Recommended for soft-bite sized/thin liquids. Of note, evaluation limited d/t c/o nausea. Suspect poor PO intake d/t progression of dementia vs. true dysphagia. Above d/w nahed Thurman regarding results of evaluation and MD Dawn. Chung declined dysphagia PTA. All questions answered. At this time, instrumental assessment unlikely to  given low c/f dysphagia and poor participation in setting of progression of cognitive deficits. Additionally, nahed addressed c/f for pt's ability to participate in esophogram given pt's current mentation. Discussed this with MD Dawn that based on participation during swallow evaluation, mentation may be a barrier to successfully complete GI testing.   -12/1 Esophogram attempted - unable to tolerate; did not participate and follow commands  - family would like to reattempt esophogram - maybe on 12/4
GI 12/17: seen for consult ; PEG dislodged ; Patient without peritoneal signs: she appears well and does not have lbs concerning for infection. Recommendations: Please keep patient NPO; Will hold off on NGT with decompression given overall clinical stability; can plan to replace PEG next week.  12/18: GI exams with purulent, foul-smelling drainage through PEG tract; no obvious abscess noted on exam. Patient to remain NPO; hold off on NGT  12/20: GI follow up; Reviewed CT A/P with radiology: fluid collection is present along the previous PEG tract, but difficulty to characterize. May represent a fluid-filled tract vs. a phlegm. No air bubbles visualized in the track. Unclear if the track is still functional. Patient remaining NPO.   12/21- Possible PEG replacement next week; re-consult for s/s    Speech & Swallow (known to service)   11/27 pt seen by this service for bedside swallow evaluation. Diet deferred to team given level of agitation and poor participation.   11/29 pt seen for re-evaluation of swallow function as requested by MD Dawn d/t improved mentation. Esophogram currently ordered, however as per d/w MD, on hold at this time pending swallow evaluation. Recommended for soft-bite sized/thin liquids. Of note, evaluation limited d/t c/o nausea. Suspect poor PO intake d/t progression of dementia vs. true dysphagia. Above d/w nahed Thurman regarding results of evaluation and MD Dawn. Chung declined dysphagia PTA. All questions answered. At this time, instrumental assessment unlikely to  given low c/f dysphagia and poor participation in setting of progression of cognitive deficits. Additionally, nahed addressed c/f for pt's ability to participate in esophogram given pt's current mentation. Discussed this with MD Dawn that based on participation during swallow evaluation, mentation may be a barrier to successfully complete GI testing.   12/1 Esophogram attempted - unable to tolerate; did not participate and follow commands.

## 2023-12-21 NOTE — PROVIDER CONTACT NOTE (CRITICAL VALUE NOTIFICATION) - ASSESSMENT
No signs of acute distress.
Pt A&OX 1-2, VSS on RA. No distress noted
Pt A&OX1-2, VSS on RA. No distress noted

## 2023-12-21 NOTE — SWALLOW BEDSIDE ASSESSMENT ADULT - SLP PERTINENT HISTORY OF CURRENT PROBLEM
89yo F w/Hx of CHF, dementia, CKD S4, Anemia, HTN, HLD, CAD, DM, coming from [facility] due to PEG tube dislodgement. Pt accidently dislodged her PEG tube while at the nursing home. Unwitnessed, occurred around 5pm. Patient was recently admitted on 11/26/23 and discharged on 12/12/23 due to LESLIE and during that hospital stay PEG tube was placed due to concern of patient not eating/having enough PO intake. 89yo F w/Hx of CHF, dementia, CKD S4, Anemia, HTN, HLD, CAD, DM, coming from [facility] due to PEG tube dislodgement. Pt accidently dislodged her PEG tube while at the nursing home. Unwitnessed, occurred around 5pm. Patient was recently admitted on 11/26/23 and discharged on 12/12/23 due to LESLIE and during that hospital stay PEG tube was placed due to concern of patient not eating/having enough PO intake.

## 2023-12-21 NOTE — PROVIDER CONTACT NOTE (HYPOGLYCEMIA EVENT) - NS PROVIDER CONTACT BACKGROUND-HYPO
Age: 89y    Gender: Female    POCT Blood Glucose:  272 mg/dL (12-21-23 @ 12:55)  74 mg/dL (12-21-23 @ 12:16)  67 mg/dL (12-21-23 @ 12:02)  101 mg/dL (12-21-23 @ 05:51)  81 mg/dL (12-21-23 @ 01:18)  77 mg/dL (12-21-23 @ 01:17)  97 mg/dL (12-20-23 @ 18:06)      eMAR:dextrose 50% Injectable   50 milliLiter(s) IV Push (12-21-23 @ 12:31)

## 2023-12-21 NOTE — SWALLOW BEDSIDE ASSESSMENT ADULT - PHARYNGEAL PHASE
Delayed initiation of pharyngeal swallow with reduced hyo-laryngeal elevation noted upon palpation. No overt s/s of laryngeal penetration/aspiration on thickened liquids. Delayed initiation of pharyngeal swallow with reduced hyo-laryngeal elevation upon palpation. No overt s/s of laryngeal penetration/aspiration on accepted trials.

## 2023-12-21 NOTE — SWALLOW BEDSIDE ASSESSMENT ADULT - ADDITIONAL RECOMMENDATIONS
1. Pt/family/caregiver will demonstrate understanding and carryover of dysphagia management (safe swallow guidelines, compensatory strategies, dysphagia diet).  2.Pt will tolerate recommended diet with no overt, clinical s/s of aspiration.

## 2023-12-21 NOTE — PROVIDER CONTACT NOTE (CRITICAL VALUE NOTIFICATION) - BACKGROUND
Pt admitted for PEG malfunction with h/o HF, anemia, LESLIE, HTN, TIA
Pt dx gastrostomy malfunction, pmh: CHF, dementia, anemia, HTN, HLD
Pt admitted for PEG malfunction with h/o HF, anemia, TIA, CAD, LESLIE, dementia

## 2023-12-21 NOTE — PROGRESS NOTE ADULT - SUBJECTIVE AND OBJECTIVE BOX
[facility] Division of Hospital Medicine  Jimbo Quevedo MD  Available via MS Teams    SUBJECTIVE / OVERNIGHT EVENTS: Patient seen and examined at bedside, resting comfortably and in no acute distress. ROS limited due to baseline cognitive mental function.     MEDICATIONS  (STANDING):  chlorhexidine 2% Cloths 1 Application(s) Topical daily  ciprofloxacin   IVPB 400 milliGRAM(s) IV Intermittent every 12 hours  metroNIDAZOLE  IVPB 500 milliGRAM(s) IV Intermittent every 8 hours  pantoprazole  Injectable 40 milliGRAM(s) IV Push two times a day    MEDICATIONS  (PRN):      I&O's Summary      PHYSICAL EXAM:  Vital Signs Last 24 Hrs  T(C): 36.2 (21 Dec 2023 09:04), Max: 36.7 (20 Dec 2023 17:57)  T(F): 97.2 (21 Dec 2023 09:04), Max: 98 (20 Dec 2023 17:57)  HR: 77 (21 Dec 2023 09:04) (70 - 107)  BP: 139/58 (21 Dec 2023 09:04) (138/77 - 164/67)  BP(mean): --  RR: 18 (21 Dec 2023 09:04) (18 - 18)  SpO2: 100% (21 Dec 2023 09:04) (95% - 100%)    Parameters below as of 21 Dec 2023 09:04  Patient On (Oxygen Delivery Method): room air      CONSTITUTIONAL: NAD, well-groomed  EYES: PERRLA; conjunctiva and sclera clear  ENMT: Moist oral mucosa, no pharyngeal injection or exudates; normal dentition  NECK: Supple, no palpable masses; no thyromegaly  RESPIRATORY: Normal respiratory effort; lungs are clear to auscultation bilaterally  CARDIOVASCULAR: normal S1 and S2, no murmur/rub/gallop; No lower extremity edema  ABDOMEN: Nontender to palpation, normoactive bowel sounds, no rebound/guarding; No hepatosplenomegaly  MUSCULOSKELETAL:  no clubbing or cyanosis of digits; no joint swelling or tenderness to palpation  PSYCH: A+O to person only   NEUROLOGY: CN 2-12 are intact and symmetric; no gross sensory deficits   SKIN: No rashes; no palpable lesions    LABS:  139  |  111<H>  |  14  ----------------------------<  83  4.5   |  16<L>  |  1.38<H>    Ca    8.4      20 Dec 2023 05:13  Phos  2.5     12-20  Mg     2.2     12-20    Urinalysis Basic - ( 20 Dec 2023 05:13 )    Color: x / Appearance: x / SG: x / pH: x  Gluc: 83 mg/dL / Ketone: x  / Bili: x / Urobili: x   Blood: x / Protein: x / Nitrite: x   Leuk Esterase: x / RBC: x / WBC x   Sq Epi: x / Non Sq Epi: x / Bacteria: x        COVID-19 PCR: NotDetec (07 Dec 2023 13:44) [facility] Division of Hospital Medicine  Jimbo Quevedo MD  Available via MS Teams    SUBJECTIVE / OVERNIGHT EVENTS: Patient seen and examined at bedside, resting comfortably and in no acute distress. ROS limited due to baseline cognitive mental function.     MEDICATIONS  (STANDING):  chlorhexidine 2% Cloths 1 Application(s) Topical daily  ciprofloxacin   IVPB 400 milliGRAM(s) IV Intermittent every 12 hours  metroNIDAZOLE  IVPB 500 milliGRAM(s) IV Intermittent every 8 hours  pantoprazole  Injectable 40 milliGRAM(s) IV Push two times a day    MEDICATIONS  (PRN):      I&O's Summary      PHYSICAL EXAM:  Vital Signs Last 24 Hrs  T(C): 36.2 (21 Dec 2023 09:04), Max: 36.7 (20 Dec 2023 17:57)  T(F): 97.2 (21 Dec 2023 09:04), Max: 98 (20 Dec 2023 17:57)  HR: 77 (21 Dec 2023 09:04) (70 - 107)  BP: 139/58 (21 Dec 2023 09:04) (138/77 - 164/67)  BP(mean): --  RR: 18 (21 Dec 2023 09:04) (18 - 18)  SpO2: 100% (21 Dec 2023 09:04) (95% - 100%)    Parameters below as of 21 Dec 2023 09:04  Patient On (Oxygen Delivery Method): room air      CONSTITUTIONAL: NAD, well-groomed  EYES: PERRLA; conjunctiva and sclera clear  ENMT: Moist oral mucosa, no pharyngeal injection or exudates; normal dentition  NECK: Supple, no palpable masses; no thyromegaly  RESPIRATORY: Normal respiratory effort; lungs are clear to auscultation bilaterally  CARDIOVASCULAR: normal S1 and S2, no murmur/rub/gallop; No lower extremity edema  ABDOMEN: Nontender to palpation, normoactive bowel sounds, no rebound/guarding; No hepatosplenomegaly  MUSCULOSKELETAL:  no clubbing or cyanosis of digits; no joint swelling or tenderness to palpation  PSYCH: A+O to person only   NEUROLOGY: CN 2-12 are intact and symmetric; no gross sensory deficits   SKIN: No rashes; no palpable lesions    LABS:  139  |  111<H>  |  14  ----------------------------<  83  4.5   |  16<L>  |  1.38<H>    Ca    8.4      20 Dec 2023 05:13  Phos  2.5     12-20  Mg     2.2     12-20    Urinalysis Basic - ( 20 Dec 2023 05:13 )    Color: x / Appearance: x / SG: x / pH: x  Gluc: 83 mg/dL / Ketone: x  / Bili: x / Urobili: x   Blood: x / Protein: x / Nitrite: x   Leuk Esterase: x / RBC: x / WBC x   Sq Epi: x / Non Sq Epi: x / Bacteria: x        COVID-19 PCR: NotDetec (07 Dec 2023 13:44)

## 2023-12-21 NOTE — PROVIDER CONTACT NOTE (HYPOGLYCEMIA EVENT) - NS PROVIDER CONTACT RECOMMEND-HYPO
10/18/23:    Brief encounter with pt & wife in radiation oncology today.  Neither pt or wife had questions or concerns today, but wife requested another handout for the homemade mouth rinse (baking soda, water, salt).  Provided handout on Dry Mouth, which included the recipe.      Oncology RD remains available per protocol.   Dextrose 5% and NS 0.9% infusing at 50ml/hr  Repeat BS taken s/p treatment, NP made aware of result

## 2023-12-22 DIAGNOSIS — E87.1 HYPO-OSMOLALITY AND HYPONATREMIA: ICD-10-CM

## 2023-12-22 LAB
ANION GAP SERPL CALC-SCNC: 15 MMOL/L — SIGNIFICANT CHANGE UP (ref 5–17)
ANION GAP SERPL CALC-SCNC: 15 MMOL/L — SIGNIFICANT CHANGE UP (ref 5–17)
BLD GP AB SCN SERPL QL: NEGATIVE — SIGNIFICANT CHANGE UP
BLD GP AB SCN SERPL QL: NEGATIVE — SIGNIFICANT CHANGE UP
BUN SERPL-MCNC: 11 MG/DL — SIGNIFICANT CHANGE UP (ref 7–23)
BUN SERPL-MCNC: 11 MG/DL — SIGNIFICANT CHANGE UP (ref 7–23)
CALCIUM SERPL-MCNC: 8.3 MG/DL — LOW (ref 8.4–10.5)
CALCIUM SERPL-MCNC: 8.3 MG/DL — LOW (ref 8.4–10.5)
CHLORIDE SERPL-SCNC: 116 MMOL/L — HIGH (ref 96–108)
CHLORIDE SERPL-SCNC: 116 MMOL/L — HIGH (ref 96–108)
CO2 SERPL-SCNC: 19 MMOL/L — LOW (ref 22–31)
CO2 SERPL-SCNC: 19 MMOL/L — LOW (ref 22–31)
CREAT SERPL-MCNC: 1.7 MG/DL — HIGH (ref 0.5–1.3)
CREAT SERPL-MCNC: 1.7 MG/DL — HIGH (ref 0.5–1.3)
EGFR: 28 ML/MIN/1.73M2 — LOW
EGFR: 28 ML/MIN/1.73M2 — LOW
GLUCOSE BLDC GLUCOMTR-MCNC: 128 MG/DL — HIGH (ref 70–99)
GLUCOSE BLDC GLUCOMTR-MCNC: 128 MG/DL — HIGH (ref 70–99)
GLUCOSE BLDC GLUCOMTR-MCNC: 148 MG/DL — HIGH (ref 70–99)
GLUCOSE BLDC GLUCOMTR-MCNC: 148 MG/DL — HIGH (ref 70–99)
GLUCOSE BLDC GLUCOMTR-MCNC: 172 MG/DL — HIGH (ref 70–99)
GLUCOSE BLDC GLUCOMTR-MCNC: 172 MG/DL — HIGH (ref 70–99)
GLUCOSE SERPL-MCNC: 102 MG/DL — HIGH (ref 70–99)
GLUCOSE SERPL-MCNC: 102 MG/DL — HIGH (ref 70–99)
HCT VFR BLD CALC: 22.2 % — LOW (ref 34.5–45)
HCT VFR BLD CALC: 22.2 % — LOW (ref 34.5–45)
HGB BLD-MCNC: 6.8 G/DL — CRITICAL LOW (ref 11.5–15.5)
HGB BLD-MCNC: 6.8 G/DL — CRITICAL LOW (ref 11.5–15.5)
MAGNESIUM SERPL-MCNC: 1.8 MG/DL — SIGNIFICANT CHANGE UP (ref 1.6–2.6)
MAGNESIUM SERPL-MCNC: 1.8 MG/DL — SIGNIFICANT CHANGE UP (ref 1.6–2.6)
MCHC RBC-ENTMCNC: 26.5 PG — LOW (ref 27–34)
MCHC RBC-ENTMCNC: 26.5 PG — LOW (ref 27–34)
MCHC RBC-ENTMCNC: 30.6 GM/DL — LOW (ref 32–36)
MCHC RBC-ENTMCNC: 30.6 GM/DL — LOW (ref 32–36)
MCV RBC AUTO: 86.4 FL — SIGNIFICANT CHANGE UP (ref 80–100)
MCV RBC AUTO: 86.4 FL — SIGNIFICANT CHANGE UP (ref 80–100)
NRBC # BLD: 0 /100 WBCS — SIGNIFICANT CHANGE UP (ref 0–0)
NRBC # BLD: 0 /100 WBCS — SIGNIFICANT CHANGE UP (ref 0–0)
PHOSPHATE SERPL-MCNC: 3.2 MG/DL — SIGNIFICANT CHANGE UP (ref 2.5–4.5)
PHOSPHATE SERPL-MCNC: 3.2 MG/DL — SIGNIFICANT CHANGE UP (ref 2.5–4.5)
PLATELET # BLD AUTO: 221 K/UL — SIGNIFICANT CHANGE UP (ref 150–400)
PLATELET # BLD AUTO: 221 K/UL — SIGNIFICANT CHANGE UP (ref 150–400)
POTASSIUM SERPL-MCNC: 3.5 MMOL/L — SIGNIFICANT CHANGE UP (ref 3.5–5.3)
POTASSIUM SERPL-MCNC: 3.5 MMOL/L — SIGNIFICANT CHANGE UP (ref 3.5–5.3)
POTASSIUM SERPL-SCNC: 3.5 MMOL/L — SIGNIFICANT CHANGE UP (ref 3.5–5.3)
POTASSIUM SERPL-SCNC: 3.5 MMOL/L — SIGNIFICANT CHANGE UP (ref 3.5–5.3)
RBC # BLD: 2.57 M/UL — LOW (ref 3.8–5.2)
RBC # BLD: 2.57 M/UL — LOW (ref 3.8–5.2)
RBC # FLD: 19.4 % — HIGH (ref 10.3–14.5)
RBC # FLD: 19.4 % — HIGH (ref 10.3–14.5)
RH IG SCN BLD-IMP: POSITIVE — SIGNIFICANT CHANGE UP
RH IG SCN BLD-IMP: POSITIVE — SIGNIFICANT CHANGE UP
SODIUM SERPL-SCNC: 150 MMOL/L — HIGH (ref 135–145)
SODIUM SERPL-SCNC: 150 MMOL/L — HIGH (ref 135–145)
WBC # BLD: 5.7 K/UL — SIGNIFICANT CHANGE UP (ref 3.8–10.5)
WBC # BLD: 5.7 K/UL — SIGNIFICANT CHANGE UP (ref 3.8–10.5)
WBC # FLD AUTO: 5.7 K/UL — SIGNIFICANT CHANGE UP (ref 3.8–10.5)
WBC # FLD AUTO: 5.7 K/UL — SIGNIFICANT CHANGE UP (ref 3.8–10.5)

## 2023-12-22 PROCEDURE — 99232 SBSQ HOSP IP/OBS MODERATE 35: CPT

## 2023-12-22 RX ORDER — QUETIAPINE FUMARATE 200 MG/1
12.5 TABLET, FILM COATED ORAL ONCE
Refills: 0 | Status: DISCONTINUED | OUTPATIENT
Start: 2023-12-22 | End: 2023-12-22

## 2023-12-22 RX ORDER — SODIUM CHLORIDE 9 MG/ML
1000 INJECTION, SOLUTION INTRAVENOUS
Refills: 0 | Status: DISCONTINUED | OUTPATIENT
Start: 2023-12-22 | End: 2023-12-23

## 2023-12-22 RX ADMIN — Medication 100 MILLIGRAM(S): at 16:53

## 2023-12-22 RX ADMIN — Medication 200 MILLIGRAM(S): at 18:16

## 2023-12-22 RX ADMIN — PANTOPRAZOLE SODIUM 40 MILLIGRAM(S): 20 TABLET, DELAYED RELEASE ORAL at 17:52

## 2023-12-22 RX ADMIN — PANTOPRAZOLE SODIUM 40 MILLIGRAM(S): 20 TABLET, DELAYED RELEASE ORAL at 05:55

## 2023-12-22 RX ADMIN — SODIUM CHLORIDE 100 MILLILITER(S): 9 INJECTION, SOLUTION INTRAVENOUS at 17:53

## 2023-12-22 RX ADMIN — CHLORHEXIDINE GLUCONATE 1 APPLICATION(S): 213 SOLUTION TOPICAL at 12:01

## 2023-12-22 RX ADMIN — Medication 200 MILLIGRAM(S): at 05:55

## 2023-12-22 RX ADMIN — Medication 100 MILLIGRAM(S): at 05:55

## 2023-12-22 NOTE — PROGRESS NOTE ADULT - ASSESSMENT
89F PMH CHF, dementia, CKD4, Anemia, HTN, HLD, CAD, DM, coming from [facility] due to PEG tube dislodgement

## 2023-12-22 NOTE — PROGRESS NOTE ADULT - SUBJECTIVE AND OBJECTIVE BOX
[facility] Division of Hospital Medicine  Jimbo Quevedo MD  Available via MS Teams    SUBJECTIVE / OVERNIGHT EVENTS: Patient seen and examined at bedside, resting comfortably and in no acute distress. Unable to participate in ROS at time of exam due to underlying neurologic status    MEDICATIONS  (STANDING):  chlorhexidine 2% Cloths 1 Application(s) Topical daily  ciprofloxacin   IVPB 400 milliGRAM(s) IV Intermittent every 12 hours  dextrose 5%. 1000 milliLiter(s) (100 mL/Hr) IV Continuous <Continuous>  metroNIDAZOLE  IVPB 500 milliGRAM(s) IV Intermittent every 8 hours  pantoprazole  Injectable 40 milliGRAM(s) IV Push two times a day    MEDICATIONS  (PRN):    I&O's Summary  21 Dec 2023 07:01  -  22 Dec 2023 07:00  --------------------------------------------------------  IN: 900 mL / OUT: 0 mL / NET: 900 mL        PHYSICAL EXAM:  Vital Signs Last 24 Hrs  T(C): 36.7 (22 Dec 2023 12:16), Max: 36.9 (21 Dec 2023 23:16)  T(F): 98 (22 Dec 2023 12:16), Max: 98.5 (21 Dec 2023 23:16)  HR: 72 (22 Dec 2023 12:16) (72 - 76)  BP: 156/67 (22 Dec 2023 12:16) (131/66 - 166/64)  RR: 18 (22 Dec 2023 12:16) (18 - 18)  SpO2: 98% (22 Dec 2023 12:16) (97% - 99%)    Parameters below as of 22 Dec 2023 12:16  Patient On (Oxygen Delivery Method): room air      CONSTITUTIONAL: NAD, well-groomed  EYES: PERRLA; conjunctiva and sclera clear  ENMT: Moist oral mucosa, no pharyngeal injection or exudates; normal dentition  NECK: Supple, no palpable masses; no thyromegaly  RESPIRATORY: Normal respiratory effort; lungs are clear to auscultation bilaterally  CARDIOVASCULAR: normal S1 and S2, no murmur/rub/gallop; No lower extremity edema  ABDOMEN: Nontender to palpation, normoactive bowel sounds, no rebound/guarding; No hepatosplenomegaly  MUSCULOSKELETAL:  no clubbing or cyanosis of digits; no joint swelling or tenderness to palpation  PSYCH: A+O to person, place, and time; affect appropriate  NEUROLOGY: CN 2-12 are intact and symmetric; no gross sensory deficits   SKIN: No rashes; no palpable lesions    LABS:                        6.8    5.70  )-----------( 221      ( 22 Dec 2023 07:25 )             22.2     150<H>  |  116<H>  |  11  ----------------------------<  102<H>  3.5   |  19<L>  |  1.70<H>    Ca    8.3<L>      22 Dec 2023 07:24  Phos  3.2     12-22  Mg     1.8     12-22    Urinalysis Basic - ( 22 Dec 2023 07:24 )    Color: x / Appearance: x / SG: x / pH: x  Gluc: 102 mg/dL / Ketone: x  / Bili: x / Urobili: x   Blood: x / Protein: x / Nitrite: x   Leuk Esterase: x / RBC: x / WBC x   Sq Epi: x / Non Sq Epi: x / Bacteria: x        COVID-19 PCR: Martina (07 Dec 2023 13:44) [facility] Division of Hospital Medicine  Jimbo Quevedo MD  Available via MS Teams    SUBJECTIVE / OVERNIGHT EVENTS: Patient seen and examined at bedside, resting comfortably and in no acute distress. Unable to participate in ROS at time of exam due to underlying neurologic status    MEDICATIONS  (STANDING):  chlorhexidine 2% Cloths 1 Application(s) Topical daily  ciprofloxacin   IVPB 400 milliGRAM(s) IV Intermittent every 12 hours  dextrose 5%. 1000 milliLiter(s) (100 mL/Hr) IV Continuous <Continuous>  metroNIDAZOLE  IVPB 500 milliGRAM(s) IV Intermittent every 8 hours  pantoprazole  Injectable 40 milliGRAM(s) IV Push two times a day    MEDICATIONS  (PRN):    I&O's Summary  21 Dec 2023 07:01  -  22 Dec 2023 07:00  --------------------------------------------------------  IN: 900 mL / OUT: 0 mL / NET: 900 mL        PHYSICAL EXAM:  Vital Signs Last 24 Hrs  T(C): 36.7 (22 Dec 2023 12:16), Max: 36.9 (21 Dec 2023 23:16)  T(F): 98 (22 Dec 2023 12:16), Max: 98.5 (21 Dec 2023 23:16)  HR: 72 (22 Dec 2023 12:16) (72 - 76)  BP: 156/67 (22 Dec 2023 12:16) (131/66 - 166/64)  RR: 18 (22 Dec 2023 12:16) (18 - 18)  SpO2: 98% (22 Dec 2023 12:16) (97% - 99%)    Parameters below as of 22 Dec 2023 12:16  Patient On (Oxygen Delivery Method): room air      CONSTITUTIONAL: NAD, well-groomed  EYES: PERRLA; conjunctiva and sclera clear  ENMT: Moist oral mucosa, no pharyngeal injection or exudates; normal dentition  NECK: Supple, no palpable masses; no thyromegaly  RESPIRATORY: Normal respiratory effort; lungs are clear to auscultation bilaterally  CARDIOVASCULAR: normal S1 and S2, no murmur/rub/gallop; No lower extremity edema  ABDOMEN: Nontender to palpation, normoactive bowel sounds, no rebound/guarding; No hepatosplenomegaly  MUSCULOSKELETAL:  no clubbing or cyanosis of digits; no joint swelling or tenderness to palpation  PSYCH: A+O to person, place, and time; affect appropriate  NEUROLOGY: CN 2-12 are intact and symmetric; no gross sensory deficits   SKIN: No rashes; no palpable lesions    LABS:                        6.8    5.70  )-----------( 221      ( 22 Dec 2023 07:25 )             22.2     150<H>  |  116<H>  |  11  ----------------------------<  102<H>  3.5   |  19<L>  |  1.70<H>    Ca    8.3<L>      22 Dec 2023 07:24  Phos  3.2     12-22  Mg     1.8     12-22    Urinalysis Basic - ( 22 Dec 2023 07:24 )    Color: x / Appearance: x / SG: x / pH: x  Gluc: 102 mg/dL / Ketone: x  / Bili: x / Urobili: x   Blood: x / Protein: x / Nitrite: x   Leuk Esterase: x / RBC: x / WBC x   Sq Epi: x / Non Sq Epi: x / Bacteria: x        COVID-19 PCR: Martina (07 Dec 2023 13:44)

## 2023-12-23 DIAGNOSIS — E87.0 HYPEROSMOLALITY AND HYPERNATREMIA: ICD-10-CM

## 2023-12-23 LAB
ANION GAP SERPL CALC-SCNC: 15 MMOL/L — SIGNIFICANT CHANGE UP (ref 5–17)
ANION GAP SERPL CALC-SCNC: 15 MMOL/L — SIGNIFICANT CHANGE UP (ref 5–17)
BUN SERPL-MCNC: 13 MG/DL — SIGNIFICANT CHANGE UP (ref 7–23)
BUN SERPL-MCNC: 13 MG/DL — SIGNIFICANT CHANGE UP (ref 7–23)
CALCIUM SERPL-MCNC: 8.8 MG/DL — SIGNIFICANT CHANGE UP (ref 8.4–10.5)
CALCIUM SERPL-MCNC: 8.8 MG/DL — SIGNIFICANT CHANGE UP (ref 8.4–10.5)
CHLORIDE SERPL-SCNC: 112 MMOL/L — HIGH (ref 96–108)
CHLORIDE SERPL-SCNC: 112 MMOL/L — HIGH (ref 96–108)
CO2 SERPL-SCNC: 17 MMOL/L — LOW (ref 22–31)
CO2 SERPL-SCNC: 17 MMOL/L — LOW (ref 22–31)
CREAT SERPL-MCNC: 1.59 MG/DL — HIGH (ref 0.5–1.3)
CREAT SERPL-MCNC: 1.59 MG/DL — HIGH (ref 0.5–1.3)
EGFR: 30 ML/MIN/1.73M2 — LOW
EGFR: 30 ML/MIN/1.73M2 — LOW
GLUCOSE BLDC GLUCOMTR-MCNC: 107 MG/DL — HIGH (ref 70–99)
GLUCOSE BLDC GLUCOMTR-MCNC: 107 MG/DL — HIGH (ref 70–99)
GLUCOSE BLDC GLUCOMTR-MCNC: 119 MG/DL — HIGH (ref 70–99)
GLUCOSE BLDC GLUCOMTR-MCNC: 119 MG/DL — HIGH (ref 70–99)
GLUCOSE BLDC GLUCOMTR-MCNC: 158 MG/DL — HIGH (ref 70–99)
GLUCOSE BLDC GLUCOMTR-MCNC: 158 MG/DL — HIGH (ref 70–99)
GLUCOSE SERPL-MCNC: 92 MG/DL — SIGNIFICANT CHANGE UP (ref 70–99)
GLUCOSE SERPL-MCNC: 92 MG/DL — SIGNIFICANT CHANGE UP (ref 70–99)
HCT VFR BLD CALC: 28.5 % — LOW (ref 34.5–45)
HCT VFR BLD CALC: 28.5 % — LOW (ref 34.5–45)
HGB BLD-MCNC: 9.2 G/DL — LOW (ref 11.5–15.5)
HGB BLD-MCNC: 9.2 G/DL — LOW (ref 11.5–15.5)
MCHC RBC-ENTMCNC: 27.5 PG — SIGNIFICANT CHANGE UP (ref 27–34)
MCHC RBC-ENTMCNC: 27.5 PG — SIGNIFICANT CHANGE UP (ref 27–34)
MCHC RBC-ENTMCNC: 32.3 GM/DL — SIGNIFICANT CHANGE UP (ref 32–36)
MCHC RBC-ENTMCNC: 32.3 GM/DL — SIGNIFICANT CHANGE UP (ref 32–36)
MCV RBC AUTO: 85.3 FL — SIGNIFICANT CHANGE UP (ref 80–100)
MCV RBC AUTO: 85.3 FL — SIGNIFICANT CHANGE UP (ref 80–100)
NRBC # BLD: 0 /100 WBCS — SIGNIFICANT CHANGE UP (ref 0–0)
NRBC # BLD: 0 /100 WBCS — SIGNIFICANT CHANGE UP (ref 0–0)
PLATELET # BLD AUTO: 229 K/UL — SIGNIFICANT CHANGE UP (ref 150–400)
PLATELET # BLD AUTO: 229 K/UL — SIGNIFICANT CHANGE UP (ref 150–400)
POTASSIUM SERPL-MCNC: 3.1 MMOL/L — LOW (ref 3.5–5.3)
POTASSIUM SERPL-MCNC: 3.1 MMOL/L — LOW (ref 3.5–5.3)
POTASSIUM SERPL-SCNC: 3.1 MMOL/L — LOW (ref 3.5–5.3)
POTASSIUM SERPL-SCNC: 3.1 MMOL/L — LOW (ref 3.5–5.3)
RBC # BLD: 3.34 M/UL — LOW (ref 3.8–5.2)
RBC # BLD: 3.34 M/UL — LOW (ref 3.8–5.2)
RBC # FLD: 18.6 % — HIGH (ref 10.3–14.5)
RBC # FLD: 18.6 % — HIGH (ref 10.3–14.5)
SODIUM SERPL-SCNC: 144 MMOL/L — SIGNIFICANT CHANGE UP (ref 135–145)
SODIUM SERPL-SCNC: 144 MMOL/L — SIGNIFICANT CHANGE UP (ref 135–145)
WBC # BLD: 8.45 K/UL — SIGNIFICANT CHANGE UP (ref 3.8–10.5)
WBC # BLD: 8.45 K/UL — SIGNIFICANT CHANGE UP (ref 3.8–10.5)
WBC # FLD AUTO: 8.45 K/UL — SIGNIFICANT CHANGE UP (ref 3.8–10.5)
WBC # FLD AUTO: 8.45 K/UL — SIGNIFICANT CHANGE UP (ref 3.8–10.5)

## 2023-12-23 PROCEDURE — 99232 SBSQ HOSP IP/OBS MODERATE 35: CPT

## 2023-12-23 RX ORDER — OLANZAPINE 15 MG/1
2.5 TABLET, FILM COATED ORAL ONCE
Refills: 0 | Status: COMPLETED | OUTPATIENT
Start: 2023-12-23 | End: 2023-12-23

## 2023-12-23 RX ORDER — POTASSIUM CHLORIDE 20 MEQ
10 PACKET (EA) ORAL
Refills: 0 | Status: COMPLETED | OUTPATIENT
Start: 2023-12-23 | End: 2023-12-23

## 2023-12-23 RX ORDER — SODIUM CHLORIDE 9 MG/ML
1000 INJECTION, SOLUTION INTRAVENOUS
Refills: 0 | Status: DISCONTINUED | OUTPATIENT
Start: 2023-12-23 | End: 2023-12-24

## 2023-12-23 RX ORDER — ACETAMINOPHEN 500 MG
1000 TABLET ORAL ONCE
Refills: 0 | Status: COMPLETED | OUTPATIENT
Start: 2023-12-23 | End: 2023-12-23

## 2023-12-23 RX ADMIN — Medication 100 MILLIGRAM(S): at 17:07

## 2023-12-23 RX ADMIN — PANTOPRAZOLE SODIUM 40 MILLIGRAM(S): 20 TABLET, DELAYED RELEASE ORAL at 06:45

## 2023-12-23 RX ADMIN — Medication 200 MILLIGRAM(S): at 06:45

## 2023-12-23 RX ADMIN — Medication 1000 MILLIGRAM(S): at 11:00

## 2023-12-23 RX ADMIN — Medication 400 MILLIGRAM(S): at 09:59

## 2023-12-23 RX ADMIN — Medication 100 MILLIEQUIVALENT(S): at 10:24

## 2023-12-23 RX ADMIN — SODIUM CHLORIDE 100 MILLILITER(S): 9 INJECTION, SOLUTION INTRAVENOUS at 17:08

## 2023-12-23 RX ADMIN — Medication 100 MILLIEQUIVALENT(S): at 12:30

## 2023-12-23 RX ADMIN — OLANZAPINE 2.5 MILLIGRAM(S): 15 TABLET, FILM COATED ORAL at 17:23

## 2023-12-23 RX ADMIN — Medication 100 MILLIGRAM(S): at 08:21

## 2023-12-23 RX ADMIN — Medication 100 MILLIGRAM(S): at 00:20

## 2023-12-23 RX ADMIN — Medication 100 MILLIEQUIVALENT(S): at 22:12

## 2023-12-23 RX ADMIN — PANTOPRAZOLE SODIUM 40 MILLIGRAM(S): 20 TABLET, DELAYED RELEASE ORAL at 17:45

## 2023-12-23 RX ADMIN — CHLORHEXIDINE GLUCONATE 1 APPLICATION(S): 213 SOLUTION TOPICAL at 17:09

## 2023-12-23 RX ADMIN — Medication 200 MILLIGRAM(S): at 17:44

## 2023-12-23 NOTE — PROGRESS NOTE ADULT - SUBJECTIVE AND OBJECTIVE BOX
[facility] Division of Hospital Medicine  Jada Poe MD  Available via MS Teams    SUBJECTIVE / OVERNIGHT EVENTS:  no acute events overnight   pulled out her IV this AM  alert, awake and oriented to name only  unable to obtain accurate ROS given advanced dementia     ADDITIONAL REVIEW OF SYSTEMS:   as noted above     MEDICATIONS  (STANDING):  chlorhexidine 2% Cloths 1 Application(s) Topical daily  ciprofloxacin   IVPB 400 milliGRAM(s) IV Intermittent every 12 hours  dextrose 5%. 1000 milliLiter(s) (100 mL/Hr) IV Continuous <Continuous>  metroNIDAZOLE  IVPB 500 milliGRAM(s) IV Intermittent every 8 hours  pantoprazole  Injectable 40 milliGRAM(s) IV Push two times a day  potassium chloride  10 mEq/100 mL IVPB 10 milliEquivalent(s) IV Intermittent every 1 hour    MEDICATIONS  (PRN):      I&O's Summary    22 Dec 2023 07:01  -  23 Dec 2023 07:00  --------------------------------------------------------  IN: 1000 mL / OUT: 0 mL / NET: 1000 mL    23 Dec 2023 07:01  -  23 Dec 2023 11:14  --------------------------------------------------------  IN: 300 mL / OUT: 0 mL / NET: 300 mL        PHYSICAL EXAM:  Vital Signs Last 24 Hrs  T(C): 36.7 (23 Dec 2023 08:29), Max: 36.8 (22 Dec 2023 13:45)  T(F): 98 (23 Dec 2023 08:29), Max: 98.2 (22 Dec 2023 13:45)  HR: 75 (23 Dec 2023 08:29) (69 - 75)  BP: 176/76 (23 Dec 2023 08:29) (156/67 - 187/77)  BP(mean): --  RR: 18 (23 Dec 2023 08:29) (18 - 18)  SpO2: 97% (23 Dec 2023 08:29) (97% - 100%)    Parameters below as of 23 Dec 2023 08:29  Patient On (Oxygen Delivery Method): room air      PHYSICAL EXAM:  GENERAL: NAD, elderly, chronically ill appearing   HEAD:  Atraumatic, normocephalic  EYES: EOMI, conjunctiva and sclera clear  NECK: Supple, no JVD  CHEST/LUNG: Clear to auscultation bilaterally; no wheezing or rales  HEART: Regular rate and rhythm; no murmurs, no LE edema   ABDOMEN: Soft, nontender, nondistended; bowel sounds present  EXTREMITIES:  2+ Peripheral Pulses, no clubbing, cyanosis  PSYCH: calm, alert and awake, oriented x 1 (name)  SKIN: No rashes or lesions      LABS:                        9.2    8.45  )-----------( 229      ( 23 Dec 2023 07:27 )             28.5     12-23    144  |  112<H>  |  13  ----------------------------<  92  3.1<L>   |  17<L>  |  1.59<H>    Ca    8.8      23 Dec 2023 07:27  Phos  3.2     12-22  Mg     1.8     12-22            Urinalysis Basic - ( 23 Dec 2023 07:27 )    Color: x / Appearance: x / SG: x / pH: x  Gluc: 92 mg/dL / Ketone: x  / Bili: x / Urobili: x   Blood: x / Protein: x / Nitrite: x   Leuk Esterase: x / RBC: x / WBC x   Sq Epi: x / Non Sq Epi: x / Bacteria: x        COVID-19 PCR: NotDetec (07 Dec 2023 13:44)      RADIOLOGY & ADDITIONAL TESTS:  New Imaging Personally Reviewed Today:  New Electrocardiogram Personally Reviewed Today:  Other Results Reviewed Today:   Prior or Outpatient Records Reviewed Today with Summary:    COORDINATION OF CARE:  Consultant Communication and Details of Discussion (where applicable):     [facility] Division of Hospital Medicine  Jada Poe MD  Available via MS Teams    SUBJECTIVE / OVERNIGHT EVENTS:  no acute events overnight   pulled out her IV this AM  alert, awake and oriented to name only  unable to obtain accurate ROS given advanced dementia     ADDITIONAL REVIEW OF SYSTEMS:   as noted above     MEDICATIONS  (STANDING):  chlorhexidine 2% Cloths 1 Application(s) Topical daily  ciprofloxacin   IVPB 400 milliGRAM(s) IV Intermittent every 12 hours  dextrose 5%. 1000 milliLiter(s) (100 mL/Hr) IV Continuous <Continuous>  metroNIDAZOLE  IVPB 500 milliGRAM(s) IV Intermittent every 8 hours  pantoprazole  Injectable 40 milliGRAM(s) IV Push two times a day  potassium chloride  10 mEq/100 mL IVPB 10 milliEquivalent(s) IV Intermittent every 1 hour    MEDICATIONS  (PRN):      I&O's Summary    22 Dec 2023 07:01  -  23 Dec 2023 07:00  --------------------------------------------------------  IN: 1000 mL / OUT: 0 mL / NET: 1000 mL    23 Dec 2023 07:01  -  23 Dec 2023 11:14  --------------------------------------------------------  IN: 300 mL / OUT: 0 mL / NET: 300 mL        PHYSICAL EXAM:  Vital Signs Last 24 Hrs  T(C): 36.7 (23 Dec 2023 08:29), Max: 36.8 (22 Dec 2023 13:45)  T(F): 98 (23 Dec 2023 08:29), Max: 98.2 (22 Dec 2023 13:45)  HR: 75 (23 Dec 2023 08:29) (69 - 75)  BP: 176/76 (23 Dec 2023 08:29) (156/67 - 187/77)  BP(mean): --  RR: 18 (23 Dec 2023 08:29) (18 - 18)  SpO2: 97% (23 Dec 2023 08:29) (97% - 100%)    Parameters below as of 23 Dec 2023 08:29  Patient On (Oxygen Delivery Method): room air      PHYSICAL EXAM:  GENERAL: NAD, elderly, chronically ill appearing   HEAD:  Atraumatic, normocephalic  EYES: EOMI, conjunctiva and sclera clear  NECK: Supple, no JVD  CHEST/LUNG: Clear to auscultation bilaterally; no wheezing or rales  HEART: Regular rate and rhythm; no murmurs, no LE edema   ABDOMEN: Soft, nontender, nondistended; bowel sounds present  EXTREMITIES:  2+ Peripheral Pulses, no clubbing, cyanosis  PSYCH: calm, alert and awake, oriented x 1 (name)  SKIN: No rashes or lesions      LABS:                        9.2    8.45  )-----------( 229      ( 23 Dec 2023 07:27 )             28.5     12-23    144  |  112<H>  |  13  ----------------------------<  92  3.1<L>   |  17<L>  |  1.59<H>    Ca    8.8      23 Dec 2023 07:27  Phos  3.2     12-22  Mg     1.8     12-22            Urinalysis Basic - ( 23 Dec 2023 07:27 )    Color: x / Appearance: x / SG: x / pH: x  Gluc: 92 mg/dL / Ketone: x  / Bili: x / Urobili: x   Blood: x / Protein: x / Nitrite: x   Leuk Esterase: x / RBC: x / WBC x   Sq Epi: x / Non Sq Epi: x / Bacteria: x        COVID-19 PCR: NotDetec (07 Dec 2023 13:44)      RADIOLOGY & ADDITIONAL TESTS:  New Imaging Personally Reviewed Today:  New Electrocardiogram Personally Reviewed Today:  Other Results Reviewed Today:   Prior or Outpatient Records Reviewed Today with Summary:    COORDINATION OF CARE:  Consultant Communication and Details of Discussion (where applicable):

## 2023-12-24 LAB
ANION GAP SERPL CALC-SCNC: 13 MMOL/L — SIGNIFICANT CHANGE UP (ref 5–17)
ANION GAP SERPL CALC-SCNC: 13 MMOL/L — SIGNIFICANT CHANGE UP (ref 5–17)
BUN SERPL-MCNC: 11 MG/DL — SIGNIFICANT CHANGE UP (ref 7–23)
BUN SERPL-MCNC: 11 MG/DL — SIGNIFICANT CHANGE UP (ref 7–23)
CALCIUM SERPL-MCNC: 8.6 MG/DL — SIGNIFICANT CHANGE UP (ref 8.4–10.5)
CALCIUM SERPL-MCNC: 8.6 MG/DL — SIGNIFICANT CHANGE UP (ref 8.4–10.5)
CHLORIDE SERPL-SCNC: 108 MMOL/L — SIGNIFICANT CHANGE UP (ref 96–108)
CHLORIDE SERPL-SCNC: 108 MMOL/L — SIGNIFICANT CHANGE UP (ref 96–108)
CO2 SERPL-SCNC: 19 MMOL/L — LOW (ref 22–31)
CO2 SERPL-SCNC: 19 MMOL/L — LOW (ref 22–31)
CREAT SERPL-MCNC: 1.49 MG/DL — HIGH (ref 0.5–1.3)
CREAT SERPL-MCNC: 1.49 MG/DL — HIGH (ref 0.5–1.3)
EGFR: 33 ML/MIN/1.73M2 — LOW
EGFR: 33 ML/MIN/1.73M2 — LOW
GLUCOSE BLDC GLUCOMTR-MCNC: 103 MG/DL — HIGH (ref 70–99)
GLUCOSE BLDC GLUCOMTR-MCNC: 103 MG/DL — HIGH (ref 70–99)
GLUCOSE BLDC GLUCOMTR-MCNC: 136 MG/DL — HIGH (ref 70–99)
GLUCOSE BLDC GLUCOMTR-MCNC: 136 MG/DL — HIGH (ref 70–99)
GLUCOSE BLDC GLUCOMTR-MCNC: 202 MG/DL — HIGH (ref 70–99)
GLUCOSE BLDC GLUCOMTR-MCNC: 202 MG/DL — HIGH (ref 70–99)
GLUCOSE BLDC GLUCOMTR-MCNC: 95 MG/DL — SIGNIFICANT CHANGE UP (ref 70–99)
GLUCOSE BLDC GLUCOMTR-MCNC: 95 MG/DL — SIGNIFICANT CHANGE UP (ref 70–99)
GLUCOSE SERPL-MCNC: 92 MG/DL — SIGNIFICANT CHANGE UP (ref 70–99)
GLUCOSE SERPL-MCNC: 92 MG/DL — SIGNIFICANT CHANGE UP (ref 70–99)
HCT VFR BLD CALC: 28 % — LOW (ref 34.5–45)
HCT VFR BLD CALC: 28 % — LOW (ref 34.5–45)
HGB BLD-MCNC: 9.1 G/DL — LOW (ref 11.5–15.5)
HGB BLD-MCNC: 9.1 G/DL — LOW (ref 11.5–15.5)
MCHC RBC-ENTMCNC: 27.2 PG — SIGNIFICANT CHANGE UP (ref 27–34)
MCHC RBC-ENTMCNC: 27.2 PG — SIGNIFICANT CHANGE UP (ref 27–34)
MCHC RBC-ENTMCNC: 32.5 GM/DL — SIGNIFICANT CHANGE UP (ref 32–36)
MCHC RBC-ENTMCNC: 32.5 GM/DL — SIGNIFICANT CHANGE UP (ref 32–36)
MCV RBC AUTO: 83.6 FL — SIGNIFICANT CHANGE UP (ref 80–100)
MCV RBC AUTO: 83.6 FL — SIGNIFICANT CHANGE UP (ref 80–100)
NRBC # BLD: 0 /100 WBCS — SIGNIFICANT CHANGE UP (ref 0–0)
NRBC # BLD: 0 /100 WBCS — SIGNIFICANT CHANGE UP (ref 0–0)
PLATELET # BLD AUTO: 221 K/UL — SIGNIFICANT CHANGE UP (ref 150–400)
PLATELET # BLD AUTO: 221 K/UL — SIGNIFICANT CHANGE UP (ref 150–400)
POTASSIUM SERPL-MCNC: 2.7 MMOL/L — CRITICAL LOW (ref 3.5–5.3)
POTASSIUM SERPL-MCNC: 2.7 MMOL/L — CRITICAL LOW (ref 3.5–5.3)
POTASSIUM SERPL-MCNC: 4.1 MMOL/L — SIGNIFICANT CHANGE UP (ref 3.5–5.3)
POTASSIUM SERPL-MCNC: 4.1 MMOL/L — SIGNIFICANT CHANGE UP (ref 3.5–5.3)
POTASSIUM SERPL-SCNC: 2.7 MMOL/L — CRITICAL LOW (ref 3.5–5.3)
POTASSIUM SERPL-SCNC: 2.7 MMOL/L — CRITICAL LOW (ref 3.5–5.3)
POTASSIUM SERPL-SCNC: 4.1 MMOL/L — SIGNIFICANT CHANGE UP (ref 3.5–5.3)
POTASSIUM SERPL-SCNC: 4.1 MMOL/L — SIGNIFICANT CHANGE UP (ref 3.5–5.3)
RBC # BLD: 3.35 M/UL — LOW (ref 3.8–5.2)
RBC # BLD: 3.35 M/UL — LOW (ref 3.8–5.2)
RBC # FLD: 18.4 % — HIGH (ref 10.3–14.5)
RBC # FLD: 18.4 % — HIGH (ref 10.3–14.5)
SODIUM SERPL-SCNC: 140 MMOL/L — SIGNIFICANT CHANGE UP (ref 135–145)
SODIUM SERPL-SCNC: 140 MMOL/L — SIGNIFICANT CHANGE UP (ref 135–145)
WBC # BLD: 6.33 K/UL — SIGNIFICANT CHANGE UP (ref 3.8–10.5)
WBC # BLD: 6.33 K/UL — SIGNIFICANT CHANGE UP (ref 3.8–10.5)
WBC # FLD AUTO: 6.33 K/UL — SIGNIFICANT CHANGE UP (ref 3.8–10.5)
WBC # FLD AUTO: 6.33 K/UL — SIGNIFICANT CHANGE UP (ref 3.8–10.5)

## 2023-12-24 PROCEDURE — 99232 SBSQ HOSP IP/OBS MODERATE 35: CPT

## 2023-12-24 RX ORDER — ONDANSETRON 8 MG/1
4 TABLET, FILM COATED ORAL ONCE
Refills: 0 | Status: COMPLETED | OUTPATIENT
Start: 2023-12-24 | End: 2023-12-24

## 2023-12-24 RX ORDER — SODIUM CHLORIDE 9 MG/ML
1000 INJECTION, SOLUTION INTRAVENOUS
Refills: 0 | Status: DISCONTINUED | OUTPATIENT
Start: 2023-12-24 | End: 2023-12-25

## 2023-12-24 RX ORDER — POTASSIUM CHLORIDE 20 MEQ
10 PACKET (EA) ORAL
Refills: 0 | Status: COMPLETED | OUTPATIENT
Start: 2023-12-24 | End: 2023-12-24

## 2023-12-24 RX ADMIN — Medication 100 MILLIEQUIVALENT(S): at 13:13

## 2023-12-24 RX ADMIN — PANTOPRAZOLE SODIUM 40 MILLIGRAM(S): 20 TABLET, DELAYED RELEASE ORAL at 05:26

## 2023-12-24 RX ADMIN — CHLORHEXIDINE GLUCONATE 1 APPLICATION(S): 213 SOLUTION TOPICAL at 13:14

## 2023-12-24 RX ADMIN — Medication 100 MILLIGRAM(S): at 16:12

## 2023-12-24 RX ADMIN — Medication 200 MILLIGRAM(S): at 05:26

## 2023-12-24 RX ADMIN — Medication 200 MILLIGRAM(S): at 18:09

## 2023-12-24 RX ADMIN — Medication 100 MILLIEQUIVALENT(S): at 11:50

## 2023-12-24 RX ADMIN — Medication 100 MILLIGRAM(S): at 08:50

## 2023-12-24 RX ADMIN — Medication 100 MILLIEQUIVALENT(S): at 10:09

## 2023-12-24 RX ADMIN — Medication 100 MILLIEQUIVALENT(S): at 16:25

## 2023-12-24 RX ADMIN — SODIUM CHLORIDE 100 MILLILITER(S): 9 INJECTION, SOLUTION INTRAVENOUS at 01:25

## 2023-12-24 RX ADMIN — ONDANSETRON 4 MILLIGRAM(S): 8 TABLET, FILM COATED ORAL at 19:34

## 2023-12-24 RX ADMIN — SODIUM CHLORIDE 100 MILLILITER(S): 9 INJECTION, SOLUTION INTRAVENOUS at 13:14

## 2023-12-24 RX ADMIN — Medication 100 MILLIEQUIVALENT(S): at 14:35

## 2023-12-24 RX ADMIN — Medication 100 MILLIEQUIVALENT(S): at 17:55

## 2023-12-24 RX ADMIN — PANTOPRAZOLE SODIUM 40 MILLIGRAM(S): 20 TABLET, DELAYED RELEASE ORAL at 18:08

## 2023-12-24 RX ADMIN — Medication 100 MILLIGRAM(S): at 00:18

## 2023-12-24 NOTE — PROVIDER CONTACT NOTE (CRITICAL VALUE NOTIFICATION) - ACTION/TREATMENT ORDERED:
will order supplementation
STAT BMP ordered
Type and screen, CBC, BMP ordered
no order at this time
K+ 10meq IVPB X 3 riders

## 2023-12-24 NOTE — PROGRESS NOTE ADULT - SUBJECTIVE AND OBJECTIVE BOX
[facility] Division of Hospital Medicine  Jada Poe MD  Available via MS Teams    SUBJECTIVE / OVERNIGHT EVENTS:  sundowning overnight, s/p zyprexa 2.5IM   pt not endorsing complaints, although limited ROS 2/2 dementia     ADDITIONAL REVIEW OF SYSTEMS:  as noted above     MEDICATIONS  (STANDING):  chlorhexidine 2% Cloths 1 Application(s) Topical daily  ciprofloxacin   IVPB 400 milliGRAM(s) IV Intermittent every 12 hours  dextrose 5%. 1000 milliLiter(s) (100 mL/Hr) IV Continuous <Continuous>  metroNIDAZOLE  IVPB 500 milliGRAM(s) IV Intermittent every 8 hours  pantoprazole  Injectable 40 milliGRAM(s) IV Push two times a day  potassium chloride  10 mEq/100 mL IVPB 10 milliEquivalent(s) IV Intermittent every 1 hour    MEDICATIONS  (PRN):      I&O's Summary    23 Dec 2023 07:01  -  24 Dec 2023 07:00  --------------------------------------------------------  IN: 1500 mL / OUT: 0 mL / NET: 1500 mL    24 Dec 2023 07:01  -  24 Dec 2023 11:28  --------------------------------------------------------  IN: 100 mL / OUT: 0 mL / NET: 100 mL        PHYSICAL EXAM:  Vital Signs Last 24 Hrs  T(C): 36.7 (24 Dec 2023 08:16), Max: 36.8 (23 Dec 2023 16:25)  T(F): 98.1 (24 Dec 2023 08:16), Max: 98.2 (23 Dec 2023 16:25)  HR: 77 (24 Dec 2023 08:16) (72 - 80)  BP: 168/81 (24 Dec 2023 08:16) (134/63 - 168/81)  BP(mean): --  RR: 18 (24 Dec 2023 08:16) (18 - 18)  SpO2: 98% (24 Dec 2023 08:16) (97% - 98%)    Parameters below as of 24 Dec 2023 08:16  Patient On (Oxygen Delivery Method): room air      PHYSICAL EXAM:  GENERAL: NAD, well-developed, elderly   HEAD:  Atraumatic, normocephalic  EYES: EOMI, conjunctiva and sclera clear  NECK: Supple, no JVD  CHEST/LUNG: Clear to auscultation bilaterally; no wheezing or rales  HEART: Regular rate and rhythm; no murmurs, no LE edema   ABDOMEN: Soft, nontender, nondistended; bowel sounds present  EXTREMITIES:  2+ Peripheral Pulses, no clubbing, cyanosis  PSYCH: alert, awake, cooperative, oriented to name   SKIN: No rashes or lesions      LABS:                        9.1    6.33  )-----------( 221      ( 24 Dec 2023 06:50 )             28.0     12-24    140  |  108  |  11  ----------------------------<  92  2.7<LL>   |  19<L>  |  1.49<H>    Ca    8.6      24 Dec 2023 07:00            Urinalysis Basic - ( 24 Dec 2023 07:00 )    Color: x / Appearance: x / SG: x / pH: x  Gluc: 92 mg/dL / Ketone: x  / Bili: x / Urobili: x   Blood: x / Protein: x / Nitrite: x   Leuk Esterase: x / RBC: x / WBC x   Sq Epi: x / Non Sq Epi: x / Bacteria: x        COVID-19 PCR: NotDetec (07 Dec 2023 13:44)      RADIOLOGY & ADDITIONAL TESTS:  New Imaging Personally Reviewed Today:  New Electrocardiogram Personally Reviewed Today:  Other Results Reviewed Today:   Prior or Outpatient Records Reviewed Today with Summary:    COORDINATION OF CARE:  Consultant Communication and Details of Discussion (where applicable):     [facility] Division of Hospital Medicine  Jada Poe MD  Available via MS Teams    SUBJECTIVE / OVERNIGHT EVENTS:  sundowning overnight, s/p zyprexa 2.5IM   pt not endorsing complaints, although limited ROS 2/2 dementia     ADDITIONAL REVIEW OF SYSTEMS:  as noted above     MEDICATIONS  (STANDING):  chlorhexidine 2% Cloths 1 Application(s) Topical daily  ciprofloxacin   IVPB 400 milliGRAM(s) IV Intermittent every 12 hours  dextrose 5%. 1000 milliLiter(s) (100 mL/Hr) IV Continuous <Continuous>  metroNIDAZOLE  IVPB 500 milliGRAM(s) IV Intermittent every 8 hours  pantoprazole  Injectable 40 milliGRAM(s) IV Push two times a day  potassium chloride  10 mEq/100 mL IVPB 10 milliEquivalent(s) IV Intermittent every 1 hour    MEDICATIONS  (PRN):      I&O's Summary    23 Dec 2023 07:01  -  24 Dec 2023 07:00  --------------------------------------------------------  IN: 1500 mL / OUT: 0 mL / NET: 1500 mL    24 Dec 2023 07:01  -  24 Dec 2023 11:28  --------------------------------------------------------  IN: 100 mL / OUT: 0 mL / NET: 100 mL        PHYSICAL EXAM:  Vital Signs Last 24 Hrs  T(C): 36.7 (24 Dec 2023 08:16), Max: 36.8 (23 Dec 2023 16:25)  T(F): 98.1 (24 Dec 2023 08:16), Max: 98.2 (23 Dec 2023 16:25)  HR: 77 (24 Dec 2023 08:16) (72 - 80)  BP: 168/81 (24 Dec 2023 08:16) (134/63 - 168/81)  BP(mean): --  RR: 18 (24 Dec 2023 08:16) (18 - 18)  SpO2: 98% (24 Dec 2023 08:16) (97% - 98%)    Parameters below as of 24 Dec 2023 08:16  Patient On (Oxygen Delivery Method): room air      PHYSICAL EXAM:  GENERAL: NAD, well-developed, elderly   HEAD:  Atraumatic, normocephalic  EYES: EOMI, conjunctiva and sclera clear  NECK: Supple, no JVD  CHEST/LUNG: Clear to auscultation bilaterally; no wheezing or rales  HEART: Regular rate and rhythm; no murmurs, no LE edema   ABDOMEN: Soft, nontender, nondistended; bowel sounds present  EXTREMITIES:  2+ Peripheral Pulses, no clubbing, cyanosis  PSYCH: alert, awake, cooperative, oriented to name   SKIN: No rashes or lesions      LABS:                        9.1    6.33  )-----------( 221      ( 24 Dec 2023 06:50 )             28.0     12-24    140  |  108  |  11  ----------------------------<  92  2.7<LL>   |  19<L>  |  1.49<H>    Ca    8.6      24 Dec 2023 07:00            Urinalysis Basic - ( 24 Dec 2023 07:00 )    Color: x / Appearance: x / SG: x / pH: x  Gluc: 92 mg/dL / Ketone: x  / Bili: x / Urobili: x   Blood: x / Protein: x / Nitrite: x   Leuk Esterase: x / RBC: x / WBC x   Sq Epi: x / Non Sq Epi: x / Bacteria: x        COVID-19 PCR: NotDetec (07 Dec 2023 13:44)      RADIOLOGY & ADDITIONAL TESTS:  New Imaging Personally Reviewed Today:  New Electrocardiogram Personally Reviewed Today:  Other Results Reviewed Today:   Prior or Outpatient Records Reviewed Today with Summary:    COORDINATION OF CARE:  Consultant Communication and Details of Discussion (where applicable):

## 2023-12-25 DIAGNOSIS — E87.1 HYPO-OSMOLALITY AND HYPONATREMIA: ICD-10-CM

## 2023-12-25 LAB
ANION GAP SERPL CALC-SCNC: 11 MMOL/L — SIGNIFICANT CHANGE UP (ref 5–17)
ANION GAP SERPL CALC-SCNC: 11 MMOL/L — SIGNIFICANT CHANGE UP (ref 5–17)
ANION GAP SERPL CALC-SCNC: 12 MMOL/L — SIGNIFICANT CHANGE UP (ref 5–17)
ANION GAP SERPL CALC-SCNC: 12 MMOL/L — SIGNIFICANT CHANGE UP (ref 5–17)
APPEARANCE UR: CLEAR — SIGNIFICANT CHANGE UP
APPEARANCE UR: CLEAR — SIGNIFICANT CHANGE UP
BILIRUB UR-MCNC: NEGATIVE — SIGNIFICANT CHANGE UP
BILIRUB UR-MCNC: NEGATIVE — SIGNIFICANT CHANGE UP
BUN SERPL-MCNC: 11 MG/DL — SIGNIFICANT CHANGE UP (ref 7–23)
CALCIUM SERPL-MCNC: 7.9 MG/DL — LOW (ref 8.4–10.5)
CHLORIDE SERPL-SCNC: 96 MMOL/L — SIGNIFICANT CHANGE UP (ref 96–108)
CHLORIDE SERPL-SCNC: 96 MMOL/L — SIGNIFICANT CHANGE UP (ref 96–108)
CHLORIDE SERPL-SCNC: 99 MMOL/L — SIGNIFICANT CHANGE UP (ref 96–108)
CHLORIDE SERPL-SCNC: 99 MMOL/L — SIGNIFICANT CHANGE UP (ref 96–108)
CO2 SERPL-SCNC: 15 MMOL/L — LOW (ref 22–31)
CO2 SERPL-SCNC: 15 MMOL/L — LOW (ref 22–31)
CO2 SERPL-SCNC: 16 MMOL/L — LOW (ref 22–31)
CO2 SERPL-SCNC: 16 MMOL/L — LOW (ref 22–31)
COLOR SPEC: YELLOW — SIGNIFICANT CHANGE UP
COLOR SPEC: YELLOW — SIGNIFICANT CHANGE UP
CREAT SERPL-MCNC: 2 MG/DL — HIGH (ref 0.5–1.3)
CREAT SERPL-MCNC: 2 MG/DL — HIGH (ref 0.5–1.3)
CREAT SERPL-MCNC: 2.1 MG/DL — HIGH (ref 0.5–1.3)
CREAT SERPL-MCNC: 2.1 MG/DL — HIGH (ref 0.5–1.3)
DIFF PNL FLD: ABNORMAL
DIFF PNL FLD: ABNORMAL
EGFR: 22 ML/MIN/1.73M2 — LOW
EGFR: 22 ML/MIN/1.73M2 — LOW
EGFR: 23 ML/MIN/1.73M2 — LOW
EGFR: 23 ML/MIN/1.73M2 — LOW
GLUCOSE BLDC GLUCOMTR-MCNC: 100 MG/DL — HIGH (ref 70–99)
GLUCOSE BLDC GLUCOMTR-MCNC: 100 MG/DL — HIGH (ref 70–99)
GLUCOSE BLDC GLUCOMTR-MCNC: 129 MG/DL — HIGH (ref 70–99)
GLUCOSE BLDC GLUCOMTR-MCNC: 129 MG/DL — HIGH (ref 70–99)
GLUCOSE SERPL-MCNC: 108 MG/DL — HIGH (ref 70–99)
GLUCOSE SERPL-MCNC: 108 MG/DL — HIGH (ref 70–99)
GLUCOSE SERPL-MCNC: 128 MG/DL — HIGH (ref 70–99)
GLUCOSE SERPL-MCNC: 128 MG/DL — HIGH (ref 70–99)
GLUCOSE UR QL: 100 MG/DL
GLUCOSE UR QL: 100 MG/DL
HCT VFR BLD CALC: 25.5 % — LOW (ref 34.5–45)
HGB BLD-MCNC: 8.3 G/DL — LOW (ref 11.5–15.5)
HGB BLD-MCNC: 8.3 G/DL — LOW (ref 11.5–15.5)
HGB BLD-MCNC: 8.5 G/DL — LOW (ref 11.5–15.5)
HGB BLD-MCNC: 8.5 G/DL — LOW (ref 11.5–15.5)
KETONES UR-MCNC: NEGATIVE MG/DL — SIGNIFICANT CHANGE UP
KETONES UR-MCNC: NEGATIVE MG/DL — SIGNIFICANT CHANGE UP
LEUKOCYTE ESTERASE UR-ACNC: ABNORMAL
LEUKOCYTE ESTERASE UR-ACNC: ABNORMAL
MCHC RBC-ENTMCNC: 27.5 PG — SIGNIFICANT CHANGE UP (ref 27–34)
MCHC RBC-ENTMCNC: 27.5 PG — SIGNIFICANT CHANGE UP (ref 27–34)
MCHC RBC-ENTMCNC: 27.6 PG — SIGNIFICANT CHANGE UP (ref 27–34)
MCHC RBC-ENTMCNC: 27.6 PG — SIGNIFICANT CHANGE UP (ref 27–34)
MCHC RBC-ENTMCNC: 32.5 GM/DL — SIGNIFICANT CHANGE UP (ref 32–36)
MCHC RBC-ENTMCNC: 32.5 GM/DL — SIGNIFICANT CHANGE UP (ref 32–36)
MCHC RBC-ENTMCNC: 33.3 GM/DL — SIGNIFICANT CHANGE UP (ref 32–36)
MCHC RBC-ENTMCNC: 33.3 GM/DL — SIGNIFICANT CHANGE UP (ref 32–36)
MCV RBC AUTO: 82.8 FL — SIGNIFICANT CHANGE UP (ref 80–100)
MCV RBC AUTO: 82.8 FL — SIGNIFICANT CHANGE UP (ref 80–100)
MCV RBC AUTO: 84.4 FL — SIGNIFICANT CHANGE UP (ref 80–100)
MCV RBC AUTO: 84.4 FL — SIGNIFICANT CHANGE UP (ref 80–100)
NITRITE UR-MCNC: NEGATIVE — SIGNIFICANT CHANGE UP
NITRITE UR-MCNC: NEGATIVE — SIGNIFICANT CHANGE UP
NRBC # BLD: 0 /100 WBCS — SIGNIFICANT CHANGE UP (ref 0–0)
PH UR: 6.5 — SIGNIFICANT CHANGE UP (ref 5–8)
PH UR: 6.5 — SIGNIFICANT CHANGE UP (ref 5–8)
PLATELET # BLD AUTO: 147 K/UL — LOW (ref 150–400)
PLATELET # BLD AUTO: 147 K/UL — LOW (ref 150–400)
PLATELET # BLD AUTO: 189 K/UL — SIGNIFICANT CHANGE UP (ref 150–400)
PLATELET # BLD AUTO: 189 K/UL — SIGNIFICANT CHANGE UP (ref 150–400)
POTASSIUM SERPL-MCNC: 3.8 MMOL/L — SIGNIFICANT CHANGE UP (ref 3.5–5.3)
POTASSIUM SERPL-MCNC: 3.8 MMOL/L — SIGNIFICANT CHANGE UP (ref 3.5–5.3)
POTASSIUM SERPL-MCNC: 3.9 MMOL/L — SIGNIFICANT CHANGE UP (ref 3.5–5.3)
POTASSIUM SERPL-MCNC: 3.9 MMOL/L — SIGNIFICANT CHANGE UP (ref 3.5–5.3)
POTASSIUM SERPL-SCNC: 3.8 MMOL/L — SIGNIFICANT CHANGE UP (ref 3.5–5.3)
POTASSIUM SERPL-SCNC: 3.8 MMOL/L — SIGNIFICANT CHANGE UP (ref 3.5–5.3)
POTASSIUM SERPL-SCNC: 3.9 MMOL/L — SIGNIFICANT CHANGE UP (ref 3.5–5.3)
POTASSIUM SERPL-SCNC: 3.9 MMOL/L — SIGNIFICANT CHANGE UP (ref 3.5–5.3)
PROT UR-MCNC: SIGNIFICANT CHANGE UP MG/DL
PROT UR-MCNC: SIGNIFICANT CHANGE UP MG/DL
RBC # BLD: 3.02 M/UL — LOW (ref 3.8–5.2)
RBC # BLD: 3.02 M/UL — LOW (ref 3.8–5.2)
RBC # BLD: 3.08 M/UL — LOW (ref 3.8–5.2)
RBC # BLD: 3.08 M/UL — LOW (ref 3.8–5.2)
RBC # FLD: 18.8 % — HIGH (ref 10.3–14.5)
RBC # FLD: 18.8 % — HIGH (ref 10.3–14.5)
RBC # FLD: 18.9 % — HIGH (ref 10.3–14.5)
RBC # FLD: 18.9 % — HIGH (ref 10.3–14.5)
SODIUM SERPL-SCNC: 123 MMOL/L — LOW (ref 135–145)
SODIUM SERPL-SCNC: 123 MMOL/L — LOW (ref 135–145)
SODIUM SERPL-SCNC: 126 MMOL/L — LOW (ref 135–145)
SODIUM SERPL-SCNC: 126 MMOL/L — LOW (ref 135–145)
SP GR SPEC: 1.01 — SIGNIFICANT CHANGE UP (ref 1–1.03)
SP GR SPEC: 1.01 — SIGNIFICANT CHANGE UP (ref 1–1.03)
UROBILINOGEN FLD QL: 0.2 MG/DL — SIGNIFICANT CHANGE UP (ref 0.2–1)
UROBILINOGEN FLD QL: 0.2 MG/DL — SIGNIFICANT CHANGE UP (ref 0.2–1)
WBC # BLD: 6.91 K/UL — SIGNIFICANT CHANGE UP (ref 3.8–10.5)
WBC # BLD: 6.91 K/UL — SIGNIFICANT CHANGE UP (ref 3.8–10.5)
WBC # BLD: 8.59 K/UL — SIGNIFICANT CHANGE UP (ref 3.8–10.5)
WBC # BLD: 8.59 K/UL — SIGNIFICANT CHANGE UP (ref 3.8–10.5)
WBC # FLD AUTO: 6.91 K/UL — SIGNIFICANT CHANGE UP (ref 3.8–10.5)
WBC # FLD AUTO: 6.91 K/UL — SIGNIFICANT CHANGE UP (ref 3.8–10.5)
WBC # FLD AUTO: 8.59 K/UL — SIGNIFICANT CHANGE UP (ref 3.8–10.5)
WBC # FLD AUTO: 8.59 K/UL — SIGNIFICANT CHANGE UP (ref 3.8–10.5)

## 2023-12-25 PROCEDURE — 99232 SBSQ HOSP IP/OBS MODERATE 35: CPT

## 2023-12-25 RX ORDER — CIPROFLOXACIN LACTATE 400MG/40ML
400 VIAL (ML) INTRAVENOUS EVERY 12 HOURS
Refills: 0 | Status: DISCONTINUED | OUTPATIENT
Start: 2023-12-25 | End: 2023-12-25

## 2023-12-25 RX ADMIN — SODIUM CHLORIDE 100 MILLILITER(S): 9 INJECTION, SOLUTION INTRAVENOUS at 00:37

## 2023-12-25 RX ADMIN — Medication 100 MILLIGRAM(S): at 08:13

## 2023-12-25 RX ADMIN — CHLORHEXIDINE GLUCONATE 1 APPLICATION(S): 213 SOLUTION TOPICAL at 11:04

## 2023-12-25 RX ADMIN — PANTOPRAZOLE SODIUM 40 MILLIGRAM(S): 20 TABLET, DELAYED RELEASE ORAL at 17:44

## 2023-12-25 RX ADMIN — PANTOPRAZOLE SODIUM 40 MILLIGRAM(S): 20 TABLET, DELAYED RELEASE ORAL at 06:04

## 2023-12-25 RX ADMIN — Medication 200 MILLIGRAM(S): at 06:04

## 2023-12-25 RX ADMIN — Medication 100 MILLIGRAM(S): at 16:23

## 2023-12-25 RX ADMIN — SODIUM CHLORIDE 100 MILLILITER(S): 9 INJECTION, SOLUTION INTRAVENOUS at 11:06

## 2023-12-25 RX ADMIN — Medication 100 MILLIGRAM(S): at 00:36

## 2023-12-25 NOTE — CHART NOTE - NSCHARTNOTEFT_GEN_A_CORE
89 y/o female admitted with dialoged PEG tube  now with Worsening creatinine, hyponatremia and urinary retention ( straight cath x1, 1100ml drained). Renal follow-up appreciated, UA, bladder scan q6h, and BMP q4hrs ordered. 89 y/o female admitted with dialoged PEG tube  now with worsening creatinine, hyponatremia ( NA+)123, and urinary retention ( straight cath x1, 1100ml drained). Dr. Encarnacion- Renal follow-up appreciated; Empiric antibiotics: completed flagyl and Cipro discontinued d/t pre- made in d5w;  UA, bladder scan q6h, and BMP q4hrs ordered. The above d/w Dr. Poe.

## 2023-12-25 NOTE — PROGRESS NOTE ADULT - SUBJECTIVE AND OBJECTIVE BOX
[facility] Division of Hospital Medicine  Jada Poe MD  Available via MS Teams    SUBJECTIVE / OVERNIGHT EVENTS:  no acute events overnight   pt lethargic this AM, unable to obtain ROS 2/2 baseline dementia     ADDITIONAL REVIEW OF SYSTEMS:  as noted above     MEDICATIONS  (STANDING):  chlorhexidine 2% Cloths 1 Application(s) Topical daily  ciprofloxacin   IVPB 400 milliGRAM(s) IV Intermittent every 12 hours  metroNIDAZOLE  IVPB 500 milliGRAM(s) IV Intermittent every 8 hours  pantoprazole  Injectable 40 milliGRAM(s) IV Push two times a day    MEDICATIONS  (PRN):      I&O's Summary    24 Dec 2023 07:01  -  25 Dec 2023 07:00  --------------------------------------------------------  IN: 2100 mL / OUT: 0 mL / NET: 2100 mL    25 Dec 2023 07:01  -  25 Dec 2023 14:45  --------------------------------------------------------  IN: 0 mL / OUT: 0 mL / NET: 0 mL        PHYSICAL EXAM:  Vital Signs Last 24 Hrs  T(C): 36.4 (25 Dec 2023 08:32), Max: 36.7 (24 Dec 2023 16:27)  T(F): 97.6 (25 Dec 2023 08:32), Max: 98 (24 Dec 2023 16:27)  HR: 93 (25 Dec 2023 08:32) (78 - 93)  BP: 138/71 (25 Dec 2023 08:32) (136/83 - 149/77)  BP(mean): --  RR: 18 (25 Dec 2023 08:32) (18 - 18)  SpO2: 100% (25 Dec 2023 08:32) (97% - 100%)    Parameters below as of 25 Dec 2023 08:32  Patient On (Oxygen Delivery Method): room air      PHYSICAL EXAM:  GENERAL: NAD, elderly, lethargic   HEAD:  Atraumatic, normocephalic  EYES: EOMI, conjunctiva and sclera clear  NECK: Supple, no JVD  CHEST/LUNG: Clear to auscultation bilaterally; no wheezing or rales  HEART: Regular rate and rhythm; no murmurs, no LE edema   ABDOMEN: Soft, nontender, nondistended; bowel sounds present  EXTREMITIES:  2+ Peripheral Pulses, no clubbing, cyanosis  SKIN: No rashes or lesions      LABS:                        8.5    6.91  )-----------( 189      ( 25 Dec 2023 07:27 )             25.5     12-25    123<L>  |  96  |  11  ----------------------------<  108<H>  3.8   |  15<L>  |  2.10<H>    Ca    7.9<L>      25 Dec 2023 11:31            Urinalysis Basic - ( 25 Dec 2023 11:31 )    Color: x / Appearance: x / SG: x / pH: x  Gluc: 108 mg/dL / Ketone: x  / Bili: x / Urobili: x   Blood: x / Protein: x / Nitrite: x   Leuk Esterase: x / RBC: x / WBC x   Sq Epi: x / Non Sq Epi: x / Bacteria: x        COVID-19 PCR: NotDetec (07 Dec 2023 13:44)      RADIOLOGY & ADDITIONAL TESTS:  New Imaging Personally Reviewed Today:  New Electrocardiogram Personally Reviewed Today:  Other Results Reviewed Today:   Prior or Outpatient Records Reviewed Today with Summary:    COORDINATION OF CARE:  Consultant Communication and Details of Discussion (where applicable):     [facility] Division of Hospital Medicine  Jada Poe MD  Available via MS Teams    SUBJECTIVE / OVERNIGHT EVENTS:  no acute events overnight   pt lethargic this AM, unable to obtain ROS 2/2 baseline dementia     ADDITIONAL REVIEW OF SYSTEMS:  as noted above     MEDICATIONS  (STANDING):  chlorhexidine 2% Cloths 1 Application(s) Topical daily  ciprofloxacin   IVPB 400 milliGRAM(s) IV Intermittent every 12 hours  metroNIDAZOLE  IVPB 500 milliGRAM(s) IV Intermittent every 8 hours  pantoprazole  Injectable 40 milliGRAM(s) IV Push two times a day    MEDICATIONS  (PRN):      I&O's Summary    24 Dec 2023 07:01  -  25 Dec 2023 07:00  --------------------------------------------------------  IN: 2100 mL / OUT: 0 mL / NET: 2100 mL    25 Dec 2023 07:01  -  25 Dec 2023 14:45  --------------------------------------------------------  IN: 0 mL / OUT: 0 mL / NET: 0 mL        PHYSICAL EXAM:  Vital Signs Last 24 Hrs  T(C): 36.4 (25 Dec 2023 08:32), Max: 36.7 (24 Dec 2023 16:27)  T(F): 97.6 (25 Dec 2023 08:32), Max: 98 (24 Dec 2023 16:27)  HR: 93 (25 Dec 2023 08:32) (78 - 93)  BP: 138/71 (25 Dec 2023 08:32) (136/83 - 149/77)  BP(mean): --  RR: 18 (25 Dec 2023 08:32) (18 - 18)  SpO2: 100% (25 Dec 2023 08:32) (97% - 100%)    Parameters below as of 25 Dec 2023 08:32  Patient On (Oxygen Delivery Method): room air      PHYSICAL EXAM:  GENERAL: NAD, elderly, lethargic   HEAD:  Atraumatic, normocephalic  EYES: EOMI, conjunctiva and sclera clear  NECK: Supple, no JVD  CHEST/LUNG: Clear to auscultation bilaterally; no wheezing or rales  HEART: Regular rate and rhythm; no murmurs, no LE edema   ABDOMEN: Soft, nontender, nondistended; bowel sounds present  EXTREMITIES:  2+ Peripheral Pulses, no clubbing, cyanosis  SKIN: No rashes or lesions      LABS:                        8.5    6.91  )-----------( 189      ( 25 Dec 2023 07:27 )             25.5     12-25    123<L>  |  96  |  11  ----------------------------<  108<H>  3.8   |  15<L>  |  2.10<H>    Ca    7.9<L>      25 Dec 2023 11:31            Urinalysis Basic - ( 25 Dec 2023 11:31 )    Color: x / Appearance: x / SG: x / pH: x  Gluc: 108 mg/dL / Ketone: x  / Bili: x / Urobili: x   Blood: x / Protein: x / Nitrite: x   Leuk Esterase: x / RBC: x / WBC x   Sq Epi: x / Non Sq Epi: x / Bacteria: x        COVID-19 PCR: NotDetec (07 Dec 2023 13:44)      RADIOLOGY & ADDITIONAL TESTS:  New Imaging Personally Reviewed Today:  New Electrocardiogram Personally Reviewed Today:  Other Results Reviewed Today:   Prior or Outpatient Records Reviewed Today with Summary:    COORDINATION OF CARE:  Consultant Communication and Details of Discussion (where applicable):

## 2023-12-25 NOTE — CHART NOTE - NSCHARTNOTEFT_GEN_A_CORE
Brief GI Note:    Tentative plan for EGD/PEG placement 12/26. Please ensure NPO at midnight, early AM labs including CBC, BMP, PT/INR.      Josué Pringle MD  PGY-5 GI/Hep Fellow  [employer]

## 2023-12-26 LAB
ANION GAP SERPL CALC-SCNC: 10 MMOL/L — SIGNIFICANT CHANGE UP (ref 5–17)
ANION GAP SERPL CALC-SCNC: 10 MMOL/L — SIGNIFICANT CHANGE UP (ref 5–17)
ANION GAP SERPL CALC-SCNC: 11 MMOL/L — SIGNIFICANT CHANGE UP (ref 5–17)
ANION GAP SERPL CALC-SCNC: 15 MMOL/L — SIGNIFICANT CHANGE UP (ref 5–17)
ANION GAP SERPL CALC-SCNC: 15 MMOL/L — SIGNIFICANT CHANGE UP (ref 5–17)
ANION GAP SERPL CALC-SCNC: 17 MMOL/L — SIGNIFICANT CHANGE UP (ref 5–17)
ANION GAP SERPL CALC-SCNC: 17 MMOL/L — SIGNIFICANT CHANGE UP (ref 5–17)
APTT BLD: 29.3 SEC — SIGNIFICANT CHANGE UP (ref 24.5–35.6)
APTT BLD: 29.3 SEC — SIGNIFICANT CHANGE UP (ref 24.5–35.6)
BUN SERPL-MCNC: 12 MG/DL — SIGNIFICANT CHANGE UP (ref 7–23)
BUN SERPL-MCNC: 61 MG/DL — HIGH (ref 7–23)
BUN SERPL-MCNC: 61 MG/DL — HIGH (ref 7–23)
CALCIUM SERPL-MCNC: 7.8 MG/DL — LOW (ref 8.4–10.5)
CALCIUM SERPL-MCNC: 7.8 MG/DL — LOW (ref 8.4–10.5)
CALCIUM SERPL-MCNC: 7.9 MG/DL — LOW (ref 8.4–10.5)
CALCIUM SERPL-MCNC: 7.9 MG/DL — LOW (ref 8.4–10.5)
CALCIUM SERPL-MCNC: 8.1 MG/DL — LOW (ref 8.4–10.5)
CALCIUM SERPL-MCNC: 8.1 MG/DL — LOW (ref 8.4–10.5)
CALCIUM SERPL-MCNC: 8.2 MG/DL — LOW (ref 8.4–10.5)
CALCIUM SERPL-MCNC: 8.2 MG/DL — LOW (ref 8.4–10.5)
CALCIUM SERPL-MCNC: 8.4 MG/DL — SIGNIFICANT CHANGE UP (ref 8.4–10.5)
CALCIUM SERPL-MCNC: 8.4 MG/DL — SIGNIFICANT CHANGE UP (ref 8.4–10.5)
CHLORIDE SERPL-SCNC: 109 MMOL/L — HIGH (ref 96–108)
CHLORIDE SERPL-SCNC: 109 MMOL/L — HIGH (ref 96–108)
CHLORIDE SERPL-SCNC: 95 MMOL/L — LOW (ref 96–108)
CHLORIDE SERPL-SCNC: 95 MMOL/L — LOW (ref 96–108)
CHLORIDE SERPL-SCNC: 97 MMOL/L — SIGNIFICANT CHANGE UP (ref 96–108)
CHLORIDE SERPL-SCNC: 97 MMOL/L — SIGNIFICANT CHANGE UP (ref 96–108)
CHLORIDE SERPL-SCNC: 99 MMOL/L — SIGNIFICANT CHANGE UP (ref 96–108)
CO2 SERPL-SCNC: 16 MMOL/L — LOW (ref 22–31)
CO2 SERPL-SCNC: 17 MMOL/L — LOW (ref 22–31)
CO2 SERPL-SCNC: 25 MMOL/L — SIGNIFICANT CHANGE UP (ref 22–31)
CO2 SERPL-SCNC: 25 MMOL/L — SIGNIFICANT CHANGE UP (ref 22–31)
CREAT SERPL-MCNC: 2.11 MG/DL — HIGH (ref 0.5–1.3)
CREAT SERPL-MCNC: 2.11 MG/DL — HIGH (ref 0.5–1.3)
CREAT SERPL-MCNC: 2.23 MG/DL — HIGH (ref 0.5–1.3)
CREAT SERPL-MCNC: 2.23 MG/DL — HIGH (ref 0.5–1.3)
CREAT SERPL-MCNC: 2.25 MG/DL — HIGH (ref 0.5–1.3)
CREAT SERPL-MCNC: 2.25 MG/DL — HIGH (ref 0.5–1.3)
CREAT SERPL-MCNC: 2.29 MG/DL — HIGH (ref 0.5–1.3)
CREAT SERPL-MCNC: 2.29 MG/DL — HIGH (ref 0.5–1.3)
CREAT SERPL-MCNC: 3.93 MG/DL — HIGH (ref 0.5–1.3)
CREAT SERPL-MCNC: 3.93 MG/DL — HIGH (ref 0.5–1.3)
EGFR: 10 ML/MIN/1.73M2 — LOW
EGFR: 10 ML/MIN/1.73M2 — LOW
EGFR: 20 ML/MIN/1.73M2 — LOW
EGFR: 22 ML/MIN/1.73M2 — LOW
EGFR: 22 ML/MIN/1.73M2 — LOW
GLUCOSE BLDC GLUCOMTR-MCNC: 87 MG/DL — SIGNIFICANT CHANGE UP (ref 70–99)
GLUCOSE BLDC GLUCOMTR-MCNC: 87 MG/DL — SIGNIFICANT CHANGE UP (ref 70–99)
GLUCOSE BLDC GLUCOMTR-MCNC: 92 MG/DL — SIGNIFICANT CHANGE UP (ref 70–99)
GLUCOSE BLDC GLUCOMTR-MCNC: 92 MG/DL — SIGNIFICANT CHANGE UP (ref 70–99)
GLUCOSE SERPL-MCNC: 86 MG/DL — SIGNIFICANT CHANGE UP (ref 70–99)
GLUCOSE SERPL-MCNC: 86 MG/DL — SIGNIFICANT CHANGE UP (ref 70–99)
GLUCOSE SERPL-MCNC: 89 MG/DL — SIGNIFICANT CHANGE UP (ref 70–99)
GLUCOSE SERPL-MCNC: 89 MG/DL — SIGNIFICANT CHANGE UP (ref 70–99)
GLUCOSE SERPL-MCNC: 91 MG/DL — SIGNIFICANT CHANGE UP (ref 70–99)
GLUCOSE SERPL-MCNC: 91 MG/DL — SIGNIFICANT CHANGE UP (ref 70–99)
GLUCOSE SERPL-MCNC: 93 MG/DL — SIGNIFICANT CHANGE UP (ref 70–99)
GLUCOSE SERPL-MCNC: 93 MG/DL — SIGNIFICANT CHANGE UP (ref 70–99)
GLUCOSE SERPL-MCNC: 97 MG/DL — SIGNIFICANT CHANGE UP (ref 70–99)
GLUCOSE SERPL-MCNC: 97 MG/DL — SIGNIFICANT CHANGE UP (ref 70–99)
HCT VFR BLD CALC: 25.2 % — LOW (ref 34.5–45)
HCT VFR BLD CALC: 25.2 % — LOW (ref 34.5–45)
HGB BLD-MCNC: 8.2 G/DL — LOW (ref 11.5–15.5)
HGB BLD-MCNC: 8.2 G/DL — LOW (ref 11.5–15.5)
INR BLD: 1.25 RATIO — HIGH (ref 0.85–1.18)
INR BLD: 1.25 RATIO — HIGH (ref 0.85–1.18)
MAGNESIUM SERPL-MCNC: 1.1 MG/DL — LOW (ref 1.6–2.6)
MAGNESIUM SERPL-MCNC: 1.1 MG/DL — LOW (ref 1.6–2.6)
MAGNESIUM SERPL-MCNC: 2.1 MG/DL — SIGNIFICANT CHANGE UP (ref 1.6–2.6)
MAGNESIUM SERPL-MCNC: 2.1 MG/DL — SIGNIFICANT CHANGE UP (ref 1.6–2.6)
MCHC RBC-ENTMCNC: 27.3 PG — SIGNIFICANT CHANGE UP (ref 27–34)
MCHC RBC-ENTMCNC: 27.3 PG — SIGNIFICANT CHANGE UP (ref 27–34)
MCHC RBC-ENTMCNC: 32.5 GM/DL — SIGNIFICANT CHANGE UP (ref 32–36)
MCHC RBC-ENTMCNC: 32.5 GM/DL — SIGNIFICANT CHANGE UP (ref 32–36)
MCV RBC AUTO: 84 FL — SIGNIFICANT CHANGE UP (ref 80–100)
MCV RBC AUTO: 84 FL — SIGNIFICANT CHANGE UP (ref 80–100)
NRBC # BLD: 0 /100 WBCS — SIGNIFICANT CHANGE UP (ref 0–0)
NRBC # BLD: 0 /100 WBCS — SIGNIFICANT CHANGE UP (ref 0–0)
PHOSPHATE SERPL-MCNC: 5.5 MG/DL — HIGH (ref 2.5–4.5)
PHOSPHATE SERPL-MCNC: 5.5 MG/DL — HIGH (ref 2.5–4.5)
PLATELET # BLD AUTO: 152 K/UL — SIGNIFICANT CHANGE UP (ref 150–400)
PLATELET # BLD AUTO: 152 K/UL — SIGNIFICANT CHANGE UP (ref 150–400)
POTASSIUM SERPL-MCNC: 3.8 MMOL/L — SIGNIFICANT CHANGE UP (ref 3.5–5.3)
POTASSIUM SERPL-MCNC: 3.8 MMOL/L — SIGNIFICANT CHANGE UP (ref 3.5–5.3)
POTASSIUM SERPL-MCNC: 3.9 MMOL/L — SIGNIFICANT CHANGE UP (ref 3.5–5.3)
POTASSIUM SERPL-MCNC: 3.9 MMOL/L — SIGNIFICANT CHANGE UP (ref 3.5–5.3)
POTASSIUM SERPL-MCNC: 4 MMOL/L — SIGNIFICANT CHANGE UP (ref 3.5–5.3)
POTASSIUM SERPL-MCNC: 4 MMOL/L — SIGNIFICANT CHANGE UP (ref 3.5–5.3)
POTASSIUM SERPL-MCNC: 4.1 MMOL/L — SIGNIFICANT CHANGE UP (ref 3.5–5.3)
POTASSIUM SERPL-MCNC: 4.1 MMOL/L — SIGNIFICANT CHANGE UP (ref 3.5–5.3)
POTASSIUM SERPL-MCNC: 4.2 MMOL/L — SIGNIFICANT CHANGE UP (ref 3.5–5.3)
POTASSIUM SERPL-MCNC: 4.2 MMOL/L — SIGNIFICANT CHANGE UP (ref 3.5–5.3)
POTASSIUM SERPL-SCNC: 3.8 MMOL/L — SIGNIFICANT CHANGE UP (ref 3.5–5.3)
POTASSIUM SERPL-SCNC: 3.8 MMOL/L — SIGNIFICANT CHANGE UP (ref 3.5–5.3)
POTASSIUM SERPL-SCNC: 3.9 MMOL/L — SIGNIFICANT CHANGE UP (ref 3.5–5.3)
POTASSIUM SERPL-SCNC: 3.9 MMOL/L — SIGNIFICANT CHANGE UP (ref 3.5–5.3)
POTASSIUM SERPL-SCNC: 4 MMOL/L — SIGNIFICANT CHANGE UP (ref 3.5–5.3)
POTASSIUM SERPL-SCNC: 4 MMOL/L — SIGNIFICANT CHANGE UP (ref 3.5–5.3)
POTASSIUM SERPL-SCNC: 4.1 MMOL/L — SIGNIFICANT CHANGE UP (ref 3.5–5.3)
POTASSIUM SERPL-SCNC: 4.1 MMOL/L — SIGNIFICANT CHANGE UP (ref 3.5–5.3)
POTASSIUM SERPL-SCNC: 4.2 MMOL/L — SIGNIFICANT CHANGE UP (ref 3.5–5.3)
POTASSIUM SERPL-SCNC: 4.2 MMOL/L — SIGNIFICANT CHANGE UP (ref 3.5–5.3)
PROTHROM AB SERPL-ACNC: 13 SEC — SIGNIFICANT CHANGE UP (ref 9.5–13)
PROTHROM AB SERPL-ACNC: 13 SEC — SIGNIFICANT CHANGE UP (ref 9.5–13)
RBC # BLD: 3 M/UL — LOW (ref 3.8–5.2)
RBC # BLD: 3 M/UL — LOW (ref 3.8–5.2)
RBC # FLD: 19.2 % — HIGH (ref 10.3–14.5)
RBC # FLD: 19.2 % — HIGH (ref 10.3–14.5)
SODIUM SERPL-SCNC: 125 MMOL/L — LOW (ref 135–145)
SODIUM SERPL-SCNC: 130 MMOL/L — LOW (ref 135–145)
SODIUM SERPL-SCNC: 130 MMOL/L — LOW (ref 135–145)
SODIUM SERPL-SCNC: 137 MMOL/L — SIGNIFICANT CHANGE UP (ref 135–145)
WBC # BLD: 7.55 K/UL — SIGNIFICANT CHANGE UP (ref 3.8–10.5)
WBC # BLD: 7.55 K/UL — SIGNIFICANT CHANGE UP (ref 3.8–10.5)
WBC # FLD AUTO: 7.55 K/UL — SIGNIFICANT CHANGE UP (ref 3.8–10.5)
WBC # FLD AUTO: 7.55 K/UL — SIGNIFICANT CHANGE UP (ref 3.8–10.5)

## 2023-12-26 PROCEDURE — 99232 SBSQ HOSP IP/OBS MODERATE 35: CPT

## 2023-12-26 PROCEDURE — 43246 EGD PLACE GASTROSTOMY TUBE: CPT | Mod: GC

## 2023-12-26 PROCEDURE — 99233 SBSQ HOSP IP/OBS HIGH 50: CPT

## 2023-12-26 PROCEDURE — 43255 EGD CONTROL BLEEDING ANY: CPT | Mod: GC,59

## 2023-12-26 DEVICE — PEG KT SAFETY 20FR: Type: IMPLANTABLE DEVICE | Status: FUNCTIONAL

## 2023-12-26 RX ORDER — ATORVASTATIN CALCIUM 80 MG/1
80 TABLET, FILM COATED ORAL AT BEDTIME
Refills: 0 | Status: DISCONTINUED | OUTPATIENT
Start: 2023-12-26 | End: 2023-12-29

## 2023-12-26 RX ORDER — SODIUM CHLORIDE 9 MG/ML
1000 INJECTION INTRAMUSCULAR; INTRAVENOUS; SUBCUTANEOUS
Refills: 0 | Status: DISCONTINUED | OUTPATIENT
Start: 2023-12-26 | End: 2023-12-26

## 2023-12-26 RX ORDER — SODIUM BICARBONATE 1 MEQ/ML
650 SYRINGE (ML) INTRAVENOUS THREE TIMES A DAY
Refills: 0 | Status: DISCONTINUED | OUTPATIENT
Start: 2023-12-26 | End: 2023-12-29

## 2023-12-26 RX ORDER — MAGNESIUM SULFATE 500 MG/ML
2 VIAL (ML) INJECTION ONCE
Refills: 0 | Status: COMPLETED | OUTPATIENT
Start: 2023-12-26 | End: 2023-12-26

## 2023-12-26 RX ADMIN — SODIUM CHLORIDE 50 MILLILITER(S): 9 INJECTION INTRAMUSCULAR; INTRAVENOUS; SUBCUTANEOUS at 16:03

## 2023-12-26 RX ADMIN — PANTOPRAZOLE SODIUM 40 MILLIGRAM(S): 20 TABLET, DELAYED RELEASE ORAL at 05:22

## 2023-12-26 RX ADMIN — Medication 25 GRAM(S): at 09:39

## 2023-12-26 RX ADMIN — ATORVASTATIN CALCIUM 80 MILLIGRAM(S): 80 TABLET, FILM COATED ORAL at 21:43

## 2023-12-26 RX ADMIN — Medication 650 MILLIGRAM(S): at 21:43

## 2023-12-26 RX ADMIN — Medication 100 MILLIGRAM(S): at 12:01

## 2023-12-26 RX ADMIN — PANTOPRAZOLE SODIUM 40 MILLIGRAM(S): 20 TABLET, DELAYED RELEASE ORAL at 17:37

## 2023-12-26 NOTE — PROGRESS NOTE ADULT - SUBJECTIVE AND OBJECTIVE BOX
[facility] Division of Hospital Medicine  Jada Poe MD  Available via MS Teams    SUBJECTIVE / OVERNIGHT EVENTS:  no acute events overnight   pt more alert this AM, oriented x 2  states she needs to urinate, other denies complaints     ADDITIONAL REVIEW OF SYSTEMS:  limited ROS as pt with baseline advanced dementia     MEDICATIONS  (STANDING):  chlorhexidine 2% Cloths 1 Application(s) Topical daily  clindamycin IVPB 900 milliGRAM(s) IV Intermittent once  pantoprazole  Injectable 40 milliGRAM(s) IV Push two times a day  sodium chloride 0.9%. 1000 milliLiter(s) (50 mL/Hr) IV Continuous <Continuous>    MEDICATIONS  (PRN):      I&O's Summary    25 Dec 2023 07:01  -  26 Dec 2023 07:00  --------------------------------------------------------  IN: 200 mL / OUT: 2100 mL / NET: -1900 mL        PHYSICAL EXAM:  Vital Signs Last 24 Hrs  T(C): 36.5 (26 Dec 2023 11:53), Max: 36.8 (26 Dec 2023 07:45)  T(F): 97.7 (26 Dec 2023 11:53), Max: 98.2 (26 Dec 2023 07:45)  HR: 94 (26 Dec 2023 11:53) (78 - 96)  BP: 134/63 (26 Dec 2023 11:53) (132/69 - 155/60)  BP(mean): --  RR: 22 (26 Dec 2023 11:53) (18 - 22)  SpO2: 100% (26 Dec 2023 11:53) (98% - 100%)    Parameters below as of 26 Dec 2023 11:53  Patient On (Oxygen Delivery Method): room air      PHYSICAL EXAM:  GENERAL: NAD, elderly, chronically ill appearing   HEAD:  Atraumatic, normocephalic  EYES: EOMI, conjunctiva and sclera clear  NECK: Supple, no JVD  CHEST/LUNG: Clear to auscultation bilaterally; no wheezing or rales  HEART: Regular rate and rhythm; no murmurs, no LE edema   ABDOMEN: Soft, nontender, nondistended; bowel sounds present  EXTREMITIES:  2+ Peripheral Pulses, no clubbing, cyanosis  PSYCH: AAOx2 (name, place), calm affect, not anxious  SKIN: No rashes or lesions      LABS:                        8.2    7.55  )-----------( 152      ( 26 Dec 2023 07:27 )             25.2     12-26    130<L>  |  99  |  12  ----------------------------<  89  3.9   |  16<L>  |  2.25<H>    Ca    8.4      26 Dec 2023 07:25  Mg     1.1     12-26      PT/INR - ( 26 Dec 2023 07:28 )   PT: 13.0 sec;   INR: 1.25 ratio         PTT - ( 26 Dec 2023 07:28 )  PTT:29.3 sec      Urinalysis Basic - ( 26 Dec 2023 07:25 )    Color: x / Appearance: x / SG: x / pH: x  Gluc: 89 mg/dL / Ketone: x  / Bili: x / Urobili: x   Blood: x / Protein: x / Nitrite: x   Leuk Esterase: x / RBC: x / WBC x   Sq Epi: x / Non Sq Epi: x / Bacteria: x        COVID-19 PCR: NotDetec (07 Dec 2023 13:44)      RADIOLOGY & ADDITIONAL TESTS:  New Imaging Personally Reviewed Today:  New Electrocardiogram Personally Reviewed Today:  Other Results Reviewed Today:   Prior or Outpatient Records Reviewed Today with Summary:    COORDINATION OF CARE:  Consultant Communication and Details of Discussion (where applicable):     [facility] Division of Hospital Medicine  Jada Poe MD  Available via MS Teams    SUBJECTIVE / OVERNIGHT EVENTS:  no acute events overnight   pt more alert this AM, oriented x 2  states she needs to urinate, other denies complaints     ADDITIONAL REVIEW OF SYSTEMS:  limited ROS as pt with baseline advanced dementia     MEDICATIONS  (STANDING):  chlorhexidine 2% Cloths 1 Application(s) Topical daily  clindamycin IVPB 900 milliGRAM(s) IV Intermittent once  pantoprazole  Injectable 40 milliGRAM(s) IV Push two times a day  sodium chloride 0.9%. 1000 milliLiter(s) (50 mL/Hr) IV Continuous <Continuous>    MEDICATIONS  (PRN):      I&O's Summary    25 Dec 2023 07:01  -  26 Dec 2023 07:00  --------------------------------------------------------  IN: 200 mL / OUT: 2100 mL / NET: -1900 mL        PHYSICAL EXAM:  Vital Signs Last 24 Hrs  T(C): 36.5 (26 Dec 2023 11:53), Max: 36.8 (26 Dec 2023 07:45)  T(F): 97.7 (26 Dec 2023 11:53), Max: 98.2 (26 Dec 2023 07:45)  HR: 94 (26 Dec 2023 11:53) (78 - 96)  BP: 134/63 (26 Dec 2023 11:53) (132/69 - 155/60)  BP(mean): --  RR: 22 (26 Dec 2023 11:53) (18 - 22)  SpO2: 100% (26 Dec 2023 11:53) (98% - 100%)    Parameters below as of 26 Dec 2023 11:53  Patient On (Oxygen Delivery Method): room air      PHYSICAL EXAM:  GENERAL: NAD, elderly, chronically ill appearing   HEAD:  Atraumatic, normocephalic  EYES: EOMI, conjunctiva and sclera clear  NECK: Supple, no JVD  CHEST/LUNG: Clear to auscultation bilaterally; no wheezing or rales  HEART: Regular rate and rhythm; no murmurs, no LE edema   ABDOMEN: Soft, nontender, nondistended; bowel sounds present  EXTREMITIES:  2+ Peripheral Pulses, no clubbing, cyanosis  PSYCH: AAOx2 (name, place), calm affect, not anxious  SKIN: No rashes or lesions      LABS:                        8.2    7.55  )-----------( 152      ( 26 Dec 2023 07:27 )             25.2     12-26    130<L>  |  99  |  12  ----------------------------<  89  3.9   |  16<L>  |  2.25<H>    Ca    8.4      26 Dec 2023 07:25  Mg     1.1     12-26      PT/INR - ( 26 Dec 2023 07:28 )   PT: 13.0 sec;   INR: 1.25 ratio         PTT - ( 26 Dec 2023 07:28 )  PTT:29.3 sec      Urinalysis Basic - ( 26 Dec 2023 07:25 )    Color: x / Appearance: x / SG: x / pH: x  Gluc: 89 mg/dL / Ketone: x  / Bili: x / Urobili: x   Blood: x / Protein: x / Nitrite: x   Leuk Esterase: x / RBC: x / WBC x   Sq Epi: x / Non Sq Epi: x / Bacteria: x        COVID-19 PCR: NotDetec (07 Dec 2023 13:44)      RADIOLOGY & ADDITIONAL TESTS:  New Imaging Personally Reviewed Today:  New Electrocardiogram Personally Reviewed Today:  Other Results Reviewed Today:   Prior or Outpatient Records Reviewed Today with Summary:    COORDINATION OF CARE:  Consultant Communication and Details of Discussion (where applicable):

## 2023-12-26 NOTE — PRE-ANESTHESIA EVALUATION ADULT - LAST ECHOCARDIOGRAM
11/29/23 mod-severe decreased LV function, EF 39% with wall motion abnormalites, decreased RV function, Mild AS, Mod AI.

## 2023-12-26 NOTE — PRE-ANESTHESIA EVALUATION ADULT - NSANTHPMHFT_GEN_ALL_CORE
89F PMH CHF, dementia, CKD4, Anemia, HTN, HLD, CAD, DM, coming from [facility] due to PEG tube dislodgement 89F PMH CHF, dementia, CKD4, Anemia, HTN, HLD, CAD s/p stents, DM, coming from [facility] due to PEG tube dislodgement    PEG previously placed for failure to thrive. denies CP/SOB, N/V, GERD, dysphagia.

## 2023-12-26 NOTE — PROGRESS NOTE ADULT - SUBJECTIVE AND OBJECTIVE BOX
[facility] Division of Kidney Diseases & Hypertension  FOLLOW UP NOTE  --------------------------------------------------------------------------------  Chief Complaint: PEG dislodged    24 hour events/subjective: Patient seen and evaluated at bedside this morning resting comfortably.  she has no complaints at this time.  no cp no difficulty breathing she is awake alert and conversant this morning.    PAST HISTORY  --------------------------------------------------------------------------------  No significant changes to PMH, PSH, FHx, SHx, unless otherwise noted    ALLERGIES & MEDICATIONS  --------------------------------------------------------------------------------  Allergies    penicillin (Anaphylaxis)  Celebrex (Rash)  aspirin (Anaphylaxis)    Intolerances      Standing Inpatient Medications  chlorhexidine 2% Cloths 1 Application(s) Topical daily  pantoprazole  Injectable 40 milliGRAM(s) IV Push two times a day    PRN Inpatient Medications      REVIEW OF SYSTEMS  --------------------------------------------------------------------------------  Gen: no fever  Respiratory: no sob  CV: no cp  GI: no ab pain  : no complaints  MSK: no complaints    VITALS/PHYSICAL EXAM  --------------------------------------------------------------------------------  T(C): 36.4 (12-26-23 @ 00:27), Max: 36.5 (12-25-23 @ 16:57)  HR: 78 (12-26-23 @ 00:27) (78 - 93)  BP: 155/60 (12-26-23 @ 00:27) (138/71 - 155/60)  ABP: --  ABP(mean): --  RR: 18 (12-26-23 @ 00:27) (18 - 18)  SpO2: 98% (12-26-23 @ 00:27) (98% - 100%)  CVP(mm Hg): --        12-25-23 @ 07:01  -  12-26-23 @ 07:00  --------------------------------------------------------  IN: 200 mL / OUT: 2100 mL / NET: -1900 mL      Physical Exam:  	Gen: no distress  	HEENT: anicteric  	Pulm: CTA B/L  	CV: RRR, S1S2; no rub  	Abd: soft dressing over site of prior PEG  	: no suprapubic tenderness  	LE: no edema  	Neuro: awake conversant  	Psych: calm  	Skin: dry    LABS/STUDIES  --------------------------------------------------------------------------------              8.3    8.59  >-----------<  147      [12-25-23 @ 18:19]              25.5     125  |  99  |  12  ----------------------------<  97      [12-26-23 @ 01:45]  4.1   |  16  |  2.29        Ca     7.8     [12-26-23 @ 01:45]            Creatinine Trend:  SCr 2.29 [12-26 @ 01:45]  SCr 2.23 [12-26 @ 00:21]  SCr 2.10 [12-25 @ 11:31]  SCr 2.00 [12-25 @ 07:27]  SCr 1.49 [12-24 @ 07:00]    Urinalysis - [12-26-23 @ 01:45]      Color  / Appearance  / SG  / pH       Gluc 97 / Ketone   / Bili  / Urobili        Blood  / Protein  / Leuk Est  / Nitrite       RBC  / WBC  / Hyaline  / Gran  / Sq Epi  / Non Sq Epi  / Bacteria            [facility] Division of Kidney Diseases & Hypertension  FOLLOW UP NOTE  --------------------------------------------------------------------------------  Chief Complaint: PEG dislodged    24 hour events/subjective: Patient seen and evaluated at bedside this morning resting comfortably.  she has no complaints at this time.  no cp no difficulty breathing she is awake alert and conversant this morning.    PAST HISTORY  --------------------------------------------------------------------------------  No significant changes to PMH, PSH, FHx, SHx, unless otherwise noted    ALLERGIES & MEDICATIONS  --------------------------------------------------------------------------------  Allergies    penicillin (Anaphylaxis)  Celebrex (Rash)  aspirin (Anaphylaxis)    Intolerances      Standing Inpatient Medications  chlorhexidine 2% Cloths 1 Application(s) Topical daily  pantoprazole  Injectable 40 milliGRAM(s) IV Push two times a day    PRN Inpatient Medications      REVIEW OF SYSTEMS  --------------------------------------------------------------------------------  Gen: no fever  Respiratory: no sob  CV: no cp  GI: no ab pain  : no complaints  MSK: no complaints    VITALS/PHYSICAL EXAM  --------------------------------------------------------------------------------  T(C): 36.4 (12-26-23 @ 00:27), Max: 36.5 (12-25-23 @ 16:57)  HR: 78 (12-26-23 @ 00:27) (78 - 93)  BP: 155/60 (12-26-23 @ 00:27) (138/71 - 155/60)  ABP: --  ABP(mean): --  RR: 18 (12-26-23 @ 00:27) (18 - 18)  SpO2: 98% (12-26-23 @ 00:27) (98% - 100%)  CVP(mm Hg): --        12-25-23 @ 07:01  -  12-26-23 @ 07:00  --------------------------------------------------------  IN: 200 mL / OUT: 2100 mL / NET: -1900 mL      Physical Exam:  	Gen: no distress  	HEENT: anicteric  	Pulm: CTA B/L  	CV: RRR, S1S2; no rub  	Abd: soft dressing over site of prior PEG  	: no suprapubic tenderness  	LE: no edema  	Neuro: awake conversant  	Psych: calm  	Skin: dry    LABS/STUDIES  --------------------------------------------------------------------------------              8.3    8.59  >-----------<  147      [12-25-23 @ 18:19]              25.5     125  |  99  |  12  ----------------------------<  97      [12-26-23 @ 01:45]  4.1   |  16  |  2.29        Ca     7.8     [12-26-23 @ 01:45]            Creatinine Trend:  SCr 2.29 [12-26 @ 01:45]  SCr 2.23 [12-26 @ 00:21]  SCr 2.10 [12-25 @ 11:31]  SCr 2.00 [12-25 @ 07:27]  SCr 1.49 [12-24 @ 07:00]    Urinalysis - [12-26-23 @ 01:45]      Color  / Appearance  / SG  / pH       Gluc 97 / Ketone   / Bili  / Urobili        Blood  / Protein  / Leuk Est  / Nitrite       RBC  / WBC  / Hyaline  / Gran  / Sq Epi  / Non Sq Epi  / Bacteria

## 2023-12-26 NOTE — PRE PROCEDURE NOTE - PRE PROCEDURE EVALUATION
Pre-Endoscopy Evaluation    Attending Physician:  Dr. Syed    Procedure: EGD/PEG    Indication for Procedure:    PAST MEDICAL & SURGICAL HISTORY:  Hypertension      Diabetes Mellitus, Type 2      Hyperlipidemia      Dementia      CAD (coronary artery disease)      History of TIA (transient ischemic attack)      Anemia      HFrEF (heart failure with reduced ejection fraction)      History of Cholecystectomy      S/P hip replacement, left          Allergies    penicillin (Anaphylaxis)  Celebrex (Rash)  aspirin (Anaphylaxis)    Intolerances        Medications: MEDICATIONS  (STANDING):  chlorhexidine 2% Cloths 1 Application(s) Topical daily  clindamycin IVPB 900 milliGRAM(s) IV Intermittent once  pantoprazole  Injectable 40 milliGRAM(s) IV Push two times a day  sodium chloride 0.9%. 1000 milliLiter(s) (50 mL/Hr) IV Continuous <Continuous>    MEDICATIONS  (PRN):      Pertinent lab data:                        8.2    7.55  )-----------( 152      ( 26 Dec 2023 07:27 )             25.2     12-26    130<L>  |  99  |  12  ----------------------------<  89  3.9   |  16<L>  |  2.25<H>    Ca    8.4      26 Dec 2023 07:25  Mg     1.1     12-26      PT/INR - ( 26 Dec 2023 07:28 )   PT: 13.0 sec;   INR: 1.25 ratio         PTT - ( 26 Dec 2023 07:28 )  PTT:29.3 sec            Physical Examination:  Daily Height in cm: 152.4 (26 Dec 2023 11:29)    Daily   Vital Signs Last 24 Hrs  T(C): 36.5 (26 Dec 2023 11:53), Max: 36.8 (26 Dec 2023 07:45)  T(F): 97.7 (26 Dec 2023 11:53), Max: 98.2 (26 Dec 2023 07:45)  HR: 94 (26 Dec 2023 11:53) (78 - 96)  BP: 134/63 (26 Dec 2023 11:53) (132/69 - 155/60)  BP(mean): --  RR: 22 (26 Dec 2023 11:53) (18 - 22)  SpO2: 100% (26 Dec 2023 11:53) (98% - 100%)    Parameters below as of 26 Dec 2023 07:45  Patient On (Oxygen Delivery Method): room air      GENERAL: no acute distress  NEURO: alert  HEENT: NCAT, no conjunctival pallor appreciated  CHEST: no respiratory distress, no accessory muscle use  CARDIAC: regular rate, +S1/S2  ABDOMEN: soft, nontender, no rebound or guarding  EXTREMITIES: warm, well perfused  SKIN: no lesions noted    Comments:    ASA Class: I []  II []  III []  IV []    The patient is a suitable candidate for the planned procedure unless box checked [ ]  No, explain:

## 2023-12-26 NOTE — CHART NOTE - NSCHARTNOTEFT_GEN_A_CORE
Nutrition Follow Up Note  Patient seen for: inadequate diet order >3 days.    Per chart, pt is a 89 year old Female with PMH of CHF, dementia, CKD4, Anemia, HTN, HLD, CAD, DM, coming from [facility] due to PEG tube dislodgement.    Source: [x] Patient       [x] Medical Record        [x] RN        [] Family at bedside       [] Other:    -If unable to interview patient: [] Trach/Vent/BiPAP  [] Disoriented/confused/inappropriate to interview    Diet Order:   Diet, NPO (23)    - Nutrition-related concerns:      - Pt NPO at this time, pending PEG placement today. Pt NPO x 10 days in-house. Last seen by SLP  with recommendation for mildly thickened liquids and unable to make recommendations for food textures due to pt refusal and limited PO trials. Per [facility] discussions with family, family agreeable to PEG placement.      - Pt at risk of refeeding syndrome in setting of prolonged inadequate nutrition. Mg low today (1.1 mg/dl). K+ and phosphorus WNL at this time.      - Hx CKD4 noted, with recovering LESLIE/ATN per nephrology.       - Hyponatremia -> per chart, likely due to hypotonic fluids and urinary retention, s/p morales, improved 125 -> 130 mmol/L today.      - hx T2DM noted. A1c 6.3%. No antihyperglycemic regimen ordered at this time. BG readings x last 72 hours =  mg/dl.      - Pt unable to consent to nutrition-focused physical examination due to hx dementia, confusion. Visually noted pt with moderate muscle wasting to temporalis region and calf region.      - hx HFrEF noted per chart    GI: RN and pt deny any nausea, vomiting, constipation, diarrhea.  Last BM  per chart.   Bowel Regimen? [] Yes   [x] No      Weights:   Daily Weight in k.1 (12-20, standing), 53.3 (12-12, standing), 53 kg (12-11, standing), 52.9 (12-09, standing), 56.8 (-30, standing)  Dosing wt: 52 kg (12-17, standing)  Wt trending up, possibly in setting of fluid shifts vs scale variances. RD to continue to monitor weight trends as able/available.     MEDICATIONS  (STANDING):  chlorhexidine 2% Cloths 1 Application(s) Topical daily  pantoprazole  Injectable 40 milliGRAM(s) IV Push two times a day  sodium chloride 0.9%. 1000 milliLiter(s) (50 mL/Hr) IV Continuous <Continuous>      Pertinent Labs:    @ 07:25: Na 130<L>, BUN 12, Cr 2.25<H>, BG 89, K+ 3.9, Phos --, Mg 1.1<L>   @ 01:45: Na 125<L>, BUN 12, Cr 2.29<H>, BG 97, K+ 4.1,   @ 00:21: Na 125<L>, BUN 12, Cr 2.23<H>, BG 86, K+ 4.0    A1C with Estimated Average Glucose Result: 6.3 % (23 @ 07:25)  A1C with Estimated Average Glucose Result: 6.3 % (23 @ 08:48)    Finger Sticks:  POCT Blood Glucose.: 87 mg/dL ( @ 08:08)  POCT Blood Glucose.: 100 mg/dL ( @ 18:03)      Skin per nursing documentation: No pressure injuries noted.  Edema per nursing documentation: none noted    Estimated Needs:   [x] recalculated: based on dosing wt: 52 kg (12-17, standing)  27-32 kcal/kg = 3057-8324 kcal/day  1-1.2 g pro/kg = 52-62 g pro/day  fluids per team  based on dosing wt: 52 kg (12-17, standing)    Previous Nutrition Diagnosis: Inadequate Protein Energy Intake  Nutrition Diagnosis is: [x] advanced as below    Nutrition Care Plan:  [x] In Progress  [] Achieved  [] Not applicable    New Nutrition Diagnosis: [x] Severe acute malnutrition related to inability to consume sufficient energy as evidenced by pt meeting </=50% EER >/= 5 days, moderate muscle wasting.  Goal: Pt will meet >80% estimated nutrient needs.     Nutrition Interventions:     Education Provided   [] Yes:  [x] No: not appropriate/warranted    Recommendations:      1) When medically feasible, would recommend initiating Jevity 1.5 @10 ml/hr, advance by 10 ml q8 hours as tolerated to goal rate 40 ml/hr x 24 hours. Free water flushes per team.  - At goal, will meet 960 ml formula, 1440 kcal, 61 g pro, and 730 ml free water.  - Will meet 27.7 kcal/kg and 1.1 g pro/kg based on dosing wt: 52 kg (12-17, standing)  2) Recommend Nephro-Jens and thiamine, pending no medical contraindications, to aid in the prevention of micronutrient deficiencies.          - Pt at risk of refeeding syndrome -> Monitor K+, Phos, and Mg and replete PRN prior to initiating/advancing enteral feeds.  3) RD remains available to adjust EN formulary, volume/rate PRN.   4) Monitor GI tolerance, skin integrity, labs, weight, and bowel movement regularity.   5) malnutrition alert placed in chart   6) Nutrition plan of care to be in line with medical GOC and pt/family wishes     Monitoring and Evaluation:   Continue to monitor nutritional intake, tolerance to diet prescription, weights, labs, skin integrity    RD remains available upon request and will follow up per protocol  Nadira Zaidi, RHEAN, CDN (Available via [place] TEAMS) Nutrition Follow Up Note  Patient seen for: inadequate diet order >3 days.    Per chart, pt is a 89 year old Female with PMH of CHF, dementia, CKD4, Anemia, HTN, HLD, CAD, DM, coming from [facility] due to PEG tube dislodgement.    Source: [x] Patient       [x] Medical Record        [x] RN        [] Family at bedside       [] Other:    -If unable to interview patient: [] Trach/Vent/BiPAP  [] Disoriented/confused/inappropriate to interview    Diet Order:   Diet, NPO (23)    - Nutrition-related concerns:      - Pt NPO at this time, pending PEG placement today. Pt NPO x 10 days in-house. Last seen by SLP  with recommendation for mildly thickened liquids and unable to make recommendations for food textures due to pt refusal and limited PO trials. Per [facility] discussions with family, family agreeable to PEG placement.      - Pt at risk of refeeding syndrome in setting of prolonged inadequate nutrition. Mg low today (1.1 mg/dl). K+ and phosphorus WNL at this time.      - Hx CKD4 noted, with recovering LESLIE/ATN per nephrology.       - Hyponatremia -> per chart, likely due to hypotonic fluids and urinary retention, s/p morales, improved 125 -> 130 mmol/L today.      - hx T2DM noted. A1c 6.3%. No antihyperglycemic regimen ordered at this time. BG readings x last 72 hours =  mg/dl.      - Pt unable to consent to nutrition-focused physical examination due to hx dementia, confusion. Visually noted pt with moderate muscle wasting to temporalis region and calf region.      - hx HFrEF noted per chart    GI: RN and pt deny any nausea, vomiting, constipation, diarrhea.  Last BM  per chart.   Bowel Regimen? [] Yes   [x] No      Weights:   Daily Weight in k.1 (12-20, standing), 53.3 (12-12, standing), 53 kg (12-11, standing), 52.9 (12-09, standing), 56.8 (-30, standing)  Dosing wt: 52 kg (12-17, standing)  Wt trending up, possibly in setting of fluid shifts vs scale variances. RD to continue to monitor weight trends as able/available.     MEDICATIONS  (STANDING):  chlorhexidine 2% Cloths 1 Application(s) Topical daily  pantoprazole  Injectable 40 milliGRAM(s) IV Push two times a day  sodium chloride 0.9%. 1000 milliLiter(s) (50 mL/Hr) IV Continuous <Continuous>      Pertinent Labs:    @ 07:25: Na 130<L>, BUN 12, Cr 2.25<H>, BG 89, K+ 3.9, Phos --, Mg 1.1<L>   @ 01:45: Na 125<L>, BUN 12, Cr 2.29<H>, BG 97, K+ 4.1,   @ 00:21: Na 125<L>, BUN 12, Cr 2.23<H>, BG 86, K+ 4.0    A1C with Estimated Average Glucose Result: 6.3 % (23 @ 07:25)  A1C with Estimated Average Glucose Result: 6.3 % (23 @ 08:48)    Finger Sticks:  POCT Blood Glucose.: 87 mg/dL ( @ 08:08)  POCT Blood Glucose.: 100 mg/dL ( @ 18:03)      Skin per nursing documentation: No pressure injuries noted.  Edema per nursing documentation: none noted    Estimated Needs:   [x] recalculated: based on dosing wt: 52 kg (12-17, standing)  27-32 kcal/kg = 6516-1441 kcal/day  1-1.2 g pro/kg = 52-62 g pro/day  fluids per team  based on dosing wt: 52 kg (12-17, standing)    Previous Nutrition Diagnosis: Inadequate Protein Energy Intake  Nutrition Diagnosis is: [x] advanced as below    Nutrition Care Plan:  [x] In Progress  [] Achieved  [] Not applicable    New Nutrition Diagnosis: [x] Severe acute malnutrition related to inability to consume sufficient energy as evidenced by pt meeting </=50% EER >/= 5 days, moderate muscle wasting.  Goal: Pt will meet >80% estimated nutrient needs.     Nutrition Interventions:     Education Provided   [] Yes:  [x] No: not appropriate/warranted    Recommendations:      1) When medically feasible, would recommend initiating Jevity 1.5 @10 ml/hr, advance by 10 ml q8 hours as tolerated to goal rate 40 ml/hr x 24 hours. Free water flushes per team.  - At goal, will meet 960 ml formula, 1440 kcal, 61 g pro, and 730 ml free water.  - Will meet 27.7 kcal/kg and 1.1 g pro/kg based on dosing wt: 52 kg (12-17, standing)  2) Recommend Nephro-Jens and thiamine, pending no medical contraindications, to aid in the prevention of micronutrient deficiencies.          - Pt at risk of refeeding syndrome -> Monitor K+, Phos, and Mg and replete PRN prior to initiating/advancing enteral feeds.  3) RD remains available to adjust EN formulary, volume/rate PRN.   4) Monitor GI tolerance, skin integrity, labs, weight, and bowel movement regularity.   5) malnutrition alert placed in chart   6) Nutrition plan of care to be in line with medical GOC and pt/family wishes     Monitoring and Evaluation:   Continue to monitor nutritional intake, tolerance to diet prescription, weights, labs, skin integrity    RD remains available upon request and will follow up per protocol  Nadira Zaidi, RHEAN, CDN (Available via MS TEAMS)

## 2023-12-26 NOTE — PRE-ANESTHESIA EVALUATION ADULT - NSANTHPEFT_GEN_ALL_CORE
GENERAL: NAD  HEAD:  Atraumatic, Normocephalic  CHEST/LUNG: Clear to auscultation bilaterally  HEART: Normal S1/S2  PSYCH: AAOx2  NEUROLOGY: non-focal

## 2023-12-27 LAB
ANION GAP SERPL CALC-SCNC: 12 MMOL/L — SIGNIFICANT CHANGE UP (ref 5–17)
ANION GAP SERPL CALC-SCNC: 12 MMOL/L — SIGNIFICANT CHANGE UP (ref 5–17)
ANION GAP SERPL CALC-SCNC: 13 MMOL/L — SIGNIFICANT CHANGE UP (ref 5–17)
ANION GAP SERPL CALC-SCNC: 13 MMOL/L — SIGNIFICANT CHANGE UP (ref 5–17)
BUN SERPL-MCNC: 11 MG/DL — SIGNIFICANT CHANGE UP (ref 7–23)
BUN SERPL-MCNC: 11 MG/DL — SIGNIFICANT CHANGE UP (ref 7–23)
BUN SERPL-MCNC: 12 MG/DL — SIGNIFICANT CHANGE UP (ref 7–23)
BUN SERPL-MCNC: 12 MG/DL — SIGNIFICANT CHANGE UP (ref 7–23)
CALCIUM SERPL-MCNC: 8.1 MG/DL — LOW (ref 8.4–10.5)
CALCIUM SERPL-MCNC: 8.1 MG/DL — LOW (ref 8.4–10.5)
CALCIUM SERPL-MCNC: 8.2 MG/DL — LOW (ref 8.4–10.5)
CALCIUM SERPL-MCNC: 8.2 MG/DL — LOW (ref 8.4–10.5)
CHLORIDE SERPL-SCNC: 108 MMOL/L — SIGNIFICANT CHANGE UP (ref 96–108)
CO2 SERPL-SCNC: 17 MMOL/L — LOW (ref 22–31)
CREAT SERPL-MCNC: 1.88 MG/DL — HIGH (ref 0.5–1.3)
CREAT SERPL-MCNC: 1.88 MG/DL — HIGH (ref 0.5–1.3)
CREAT SERPL-MCNC: 1.97 MG/DL — HIGH (ref 0.5–1.3)
CREAT SERPL-MCNC: 1.97 MG/DL — HIGH (ref 0.5–1.3)
EGFR: 24 ML/MIN/1.73M2 — LOW
EGFR: 24 ML/MIN/1.73M2 — LOW
EGFR: 25 ML/MIN/1.73M2 — LOW
EGFR: 25 ML/MIN/1.73M2 — LOW
GLUCOSE BLDC GLUCOMTR-MCNC: 114 MG/DL — HIGH (ref 70–99)
GLUCOSE BLDC GLUCOMTR-MCNC: 114 MG/DL — HIGH (ref 70–99)
GLUCOSE BLDC GLUCOMTR-MCNC: 79 MG/DL — SIGNIFICANT CHANGE UP (ref 70–99)
GLUCOSE BLDC GLUCOMTR-MCNC: 79 MG/DL — SIGNIFICANT CHANGE UP (ref 70–99)
GLUCOSE BLDC GLUCOMTR-MCNC: 84 MG/DL — SIGNIFICANT CHANGE UP (ref 70–99)
GLUCOSE BLDC GLUCOMTR-MCNC: 84 MG/DL — SIGNIFICANT CHANGE UP (ref 70–99)
GLUCOSE BLDC GLUCOMTR-MCNC: 86 MG/DL — SIGNIFICANT CHANGE UP (ref 70–99)
GLUCOSE BLDC GLUCOMTR-MCNC: 89 MG/DL — SIGNIFICANT CHANGE UP (ref 70–99)
GLUCOSE BLDC GLUCOMTR-MCNC: 89 MG/DL — SIGNIFICANT CHANGE UP (ref 70–99)
GLUCOSE BLDC GLUCOMTR-MCNC: 94 MG/DL — SIGNIFICANT CHANGE UP (ref 70–99)
GLUCOSE BLDC GLUCOMTR-MCNC: 94 MG/DL — SIGNIFICANT CHANGE UP (ref 70–99)
GLUCOSE SERPL-MCNC: 77 MG/DL — SIGNIFICANT CHANGE UP (ref 70–99)
GLUCOSE SERPL-MCNC: 77 MG/DL — SIGNIFICANT CHANGE UP (ref 70–99)
GLUCOSE SERPL-MCNC: 97 MG/DL — SIGNIFICANT CHANGE UP (ref 70–99)
GLUCOSE SERPL-MCNC: 97 MG/DL — SIGNIFICANT CHANGE UP (ref 70–99)
HCT VFR BLD CALC: 24.2 % — LOW (ref 34.5–45)
HCT VFR BLD CALC: 24.2 % — LOW (ref 34.5–45)
HGB BLD-MCNC: 8.3 G/DL — LOW (ref 11.5–15.5)
HGB BLD-MCNC: 8.3 G/DL — LOW (ref 11.5–15.5)
MAGNESIUM SERPL-MCNC: 1.8 MG/DL — SIGNIFICANT CHANGE UP (ref 1.6–2.6)
MAGNESIUM SERPL-MCNC: 1.8 MG/DL — SIGNIFICANT CHANGE UP (ref 1.6–2.6)
MCHC RBC-ENTMCNC: 28.1 PG — SIGNIFICANT CHANGE UP (ref 27–34)
MCHC RBC-ENTMCNC: 28.1 PG — SIGNIFICANT CHANGE UP (ref 27–34)
MCHC RBC-ENTMCNC: 34.3 GM/DL — SIGNIFICANT CHANGE UP (ref 32–36)
MCHC RBC-ENTMCNC: 34.3 GM/DL — SIGNIFICANT CHANGE UP (ref 32–36)
MCV RBC AUTO: 82 FL — SIGNIFICANT CHANGE UP (ref 80–100)
MCV RBC AUTO: 82 FL — SIGNIFICANT CHANGE UP (ref 80–100)
NRBC # BLD: 0 /100 WBCS — SIGNIFICANT CHANGE UP (ref 0–0)
NRBC # BLD: 0 /100 WBCS — SIGNIFICANT CHANGE UP (ref 0–0)
PLATELET # BLD AUTO: 163 K/UL — SIGNIFICANT CHANGE UP (ref 150–400)
PLATELET # BLD AUTO: 163 K/UL — SIGNIFICANT CHANGE UP (ref 150–400)
POTASSIUM SERPL-MCNC: 3.1 MMOL/L — LOW (ref 3.5–5.3)
POTASSIUM SERPL-MCNC: 3.1 MMOL/L — LOW (ref 3.5–5.3)
POTASSIUM SERPL-MCNC: 3.6 MMOL/L — SIGNIFICANT CHANGE UP (ref 3.5–5.3)
POTASSIUM SERPL-MCNC: 3.6 MMOL/L — SIGNIFICANT CHANGE UP (ref 3.5–5.3)
POTASSIUM SERPL-SCNC: 3.1 MMOL/L — LOW (ref 3.5–5.3)
POTASSIUM SERPL-SCNC: 3.1 MMOL/L — LOW (ref 3.5–5.3)
POTASSIUM SERPL-SCNC: 3.6 MMOL/L — SIGNIFICANT CHANGE UP (ref 3.5–5.3)
POTASSIUM SERPL-SCNC: 3.6 MMOL/L — SIGNIFICANT CHANGE UP (ref 3.5–5.3)
RBC # BLD: 2.95 M/UL — LOW (ref 3.8–5.2)
RBC # BLD: 2.95 M/UL — LOW (ref 3.8–5.2)
RBC # FLD: 20 % — HIGH (ref 10.3–14.5)
RBC # FLD: 20 % — HIGH (ref 10.3–14.5)
SODIUM SERPL-SCNC: 137 MMOL/L — SIGNIFICANT CHANGE UP (ref 135–145)
SODIUM SERPL-SCNC: 137 MMOL/L — SIGNIFICANT CHANGE UP (ref 135–145)
SODIUM SERPL-SCNC: 138 MMOL/L — SIGNIFICANT CHANGE UP (ref 135–145)
SODIUM SERPL-SCNC: 138 MMOL/L — SIGNIFICANT CHANGE UP (ref 135–145)
WBC # BLD: 7.63 K/UL — SIGNIFICANT CHANGE UP (ref 3.8–10.5)
WBC # BLD: 7.63 K/UL — SIGNIFICANT CHANGE UP (ref 3.8–10.5)
WBC # FLD AUTO: 7.63 K/UL — SIGNIFICANT CHANGE UP (ref 3.8–10.5)
WBC # FLD AUTO: 7.63 K/UL — SIGNIFICANT CHANGE UP (ref 3.8–10.5)

## 2023-12-27 PROCEDURE — 99232 SBSQ HOSP IP/OBS MODERATE 35: CPT | Mod: GC

## 2023-12-27 PROCEDURE — 99232 SBSQ HOSP IP/OBS MODERATE 35: CPT

## 2023-12-27 RX ORDER — ESCITALOPRAM OXALATE 10 MG/1
10 TABLET, FILM COATED ORAL DAILY
Refills: 0 | Status: DISCONTINUED | OUTPATIENT
Start: 2023-12-27 | End: 2023-12-29

## 2023-12-27 RX ORDER — POTASSIUM CHLORIDE 20 MEQ
40 PACKET (EA) ORAL ONCE
Refills: 0 | Status: DISCONTINUED | OUTPATIENT
Start: 2023-12-27 | End: 2023-12-27

## 2023-12-27 RX ORDER — AMLODIPINE BESYLATE 2.5 MG/1
10 TABLET ORAL DAILY
Refills: 0 | Status: DISCONTINUED | OUTPATIENT
Start: 2023-12-27 | End: 2023-12-29

## 2023-12-27 RX ORDER — DEXTROSE 50 % IN WATER 50 %
8 SYRINGE (ML) INTRAVENOUS ONCE
Refills: 0 | Status: COMPLETED | OUTPATIENT
Start: 2023-12-27 | End: 2023-12-27

## 2023-12-27 RX ORDER — POTASSIUM CHLORIDE 20 MEQ
40 PACKET (EA) ORAL ONCE
Refills: 0 | Status: COMPLETED | OUTPATIENT
Start: 2023-12-27 | End: 2023-12-27

## 2023-12-27 RX ADMIN — ESCITALOPRAM OXALATE 10 MILLIGRAM(S): 10 TABLET, FILM COATED ORAL at 18:04

## 2023-12-27 RX ADMIN — Medication 650 MILLIGRAM(S): at 18:01

## 2023-12-27 RX ADMIN — Medication 650 MILLIGRAM(S): at 05:37

## 2023-12-27 RX ADMIN — Medication 650 MILLIGRAM(S): at 22:38

## 2023-12-27 RX ADMIN — PANTOPRAZOLE SODIUM 40 MILLIGRAM(S): 20 TABLET, DELAYED RELEASE ORAL at 05:36

## 2023-12-27 RX ADMIN — Medication 40 MILLIEQUIVALENT(S): at 22:38

## 2023-12-27 RX ADMIN — CHLORHEXIDINE GLUCONATE 1 APPLICATION(S): 213 SOLUTION TOPICAL at 12:01

## 2023-12-27 RX ADMIN — ATORVASTATIN CALCIUM 80 MILLIGRAM(S): 80 TABLET, FILM COATED ORAL at 22:38

## 2023-12-27 RX ADMIN — Medication 8 GRAM(S): at 02:29

## 2023-12-27 NOTE — PROGRESS NOTE ADULT - ASSESSMENT
89F PMH CHF, dementia, CKD4, Anemia, HTN, HLD, CAD, DM, coming from [facility] due to PEG tube dislodgement, c/b hyponatremia and urinary retention.

## 2023-12-27 NOTE — PROGRESS NOTE ADULT - ATTENDING COMMENTS
Reviewed CT with radiology today  While there is reported tract, does not have air in it suggestive of open tract but rather of fluid collection along tract (possible linear phlegmon)  Still with drainage today of viscous material.  Does not fully appear as pus today  Given clarification re tract - would give 5 days of abx  Depending on drainage tomm, would consider endoscopic closure with Ovesco if family willing  Could place dobhoff or do S&S eval post ovesco closure as well with plan for PEG placement next week
Readmitted regarding patient pulling recently placed PEG out while in NH. Appears comfortable/NAD. Minimal tenderness at GT site but no diffuse abdominal tenderness or rebound. Keep n.p.o. for now. No attempt to reinsert the PEG as this was a recent PEG placement and doubt established gastrocutaneous fistula exists. Observe for next several days. Antibiotics per primary team. If stable can consider new PEG placement upcoming week.
This am with minimal drainage at site - suspect track finally closing  Abx are on board and patient doing well. No role for endoscopy at this time  Extended convo with nephew (deferred by HCP to nephew) by our team regarding change in plan and role for nutrition    Planned for possible S&S today to see if can eat some food, even if not enough to for full calories  Would recommend dobhoff placement for tube feeds, but nephew declines at this time    Given pulled PEG with new closure of site, would tentatively plan for endoscopic PEG placement next week.  c/w abx at this time
still with some discharge from outside wound - pus like but less so than yesterday  CT scan planned for today  surgery consult pending CT scan  abx held as per primary team pending CT scan results given otherwise benign appearance  Discussed care directly attending to attending as well  NPO  IV PPI BID    GI to follow
Agree with above. OK to start feeds via PEG. 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 Continue to monitor H/H. Keep abdominal binder on patient at all times to avoid dislodgement. Please call with questions.
On exam today - patient appears stable with no abd pain  However has pus like discharge (odiferous as well) from PEG site in patient with peg placed 6 days prior to being pulled (i.e. no tract formation likely)  Spoke directly with NP and with MD today     - Please restart Antibiotics  - IV PPI BID or IV PPI Drip (to minimize gastric secretions)  - needs Cross sectional imaging today to r/o collection  - Surgical consult given pulled PEG prior to track creation  - NPO

## 2023-12-27 NOTE — PROGRESS NOTE ADULT - ASSESSMENT
89yo F w/Hx of CHF, dementia, CKD S4, Anemia, HTN, HLD, CAD, DM, coming from [facility] due to PEG tube dislodgement.    #PEG dislodged   - placed 12/6; dislodged prior to admission 12/16  - Admission exam with purulent, foul-smelling drainage through PEG tract; no obvious abscess noted on exam; non-peritoneal exam; currently not on abx  - Drainage lessened but still present on 12/20  - Reviewed CT A/P with radiology: fluid collection is present along the previous PEG tract, but difficulty to characterize. May represent a fluid-filled tract vs. a phlegm. No air bubbles visualized in the track. Unclear if the track is still functional.    - Patient is non-toxic appearing  - Minimal to no drainage from PEG stoma. PEG tract is likely closed at this time  - SLP re-eval appreciated. Unable to clear for PO at this time given lack of participation.   - S/p PEG placement 12/26    Recommendations:  - May administer enteral feeds via PEG today   - Nutrition input appreciated on enteral feeds  - Keep bumper clean and dry, would avoid gauze between the PEG bumper and skin  - Maintain aspiration precautions and elevate head of bed during feeds  - Close observation to prevent PEG dislodgement, would recommend abdominal binder for protection/prevention of PEG dislodgment; Keep the entire feeding tube under the binder between feeds.   - If PEG dislodges, please don't reinsert it back. Please call GI team back immediately.   - Eventual Speech and Swallow evaluation as an outpatient to evaluate for swallowing if patient is a candidate; would not be a candidate for PEG removal until at least 6-8 weeks post placement   - Pain control and rest of care per primary team    Note incomplete until finalized by attending signature/attestation.    Kenya Gaxiola  GI/Hepatology Fellow    MONDAY-FRIDAY 8AM-5PM:  Pager# [phone] ([facility]) or [phone] ([facility])    NON-URGENT CONSULTS:  Please email [email or web address]  AT NIGHT AND ON WEEKENDS:  Contact on-call GI fellow via answering service ([phone]) from 5pm-8am and on weekends/holidays   89yo F w/Hx of CHF, dementia, CKD S4, Anemia, HTN, HLD, CAD, DM, coming from [facility] due to PEG tube dislodgement.    #PEG dislodged   - placed 12/6; dislodged prior to admission 12/16  - Admission exam with purulent, foul-smelling drainage through PEG tract; no obvious abscess noted on exam; non-peritoneal exam; currently not on abx  - Drainage lessened but still present on 12/20  - Reviewed CT A/P with radiology: fluid collection is present along the previous PEG tract, but difficulty to characterize. May represent a fluid-filled tract vs. a phlegm. No air bubbles visualized in the track. Unclear if the track is still functional.    - Patient is non-toxic appearing  - Minimal to no drainage from PEG stoma. PEG tract is likely closed at this time  - SLP re-eval appreciated. Unable to clear for PO at this time given lack of participation.   - S/p PEG placement 12/26    Recommendations:  - May administer enteral feeds via PEG today   - Nutrition input appreciated on enteral feeds  - Keep bumper clean and dry, would avoid gauze between the PEG bumper and skin  - Maintain aspiration precautions and elevate head of bed during feeds  - Close observation to prevent PEG dislodgement, would recommend abdominal binder for protection/prevention of PEG dislodgment; Keep the entire feeding tube under the binder between feeds.   - If PEG dislodges, please don't reinsert it back. Please call GI team back immediately.   - Eventual Speech and Swallow evaluation as an outpatient to evaluate for swallowing if patient is a candidate; would not be a candidate for PEG removal until at least 6-8 weeks post placement   - Pain control and rest of care per primary team    Note incomplete until finalized by attending signature/attestation.    Kenya Gaxiola  GI/Hepatology Fellow    MONDAY-FRIDAY 8AM-5PM:  Pager# [phone] ([facility]) or [phone] ([facility])    NON-URGENT CONSULTS:  Please email [email or web address]  AT NIGHT AND ON WEEKENDS:  Contact on-call GI fellow via answering service ([phone]) from 5pm-8am and on weekends/holidays   89yo F w/Hx of CHF, dementia, CKD S4, Anemia, HTN, HLD, CAD, DM, coming from [facility] due to PEG tube dislodgement.    #PEG dislodged   - placed 12/6; dislodged prior to admission 12/16  - Admission exam with purulent, foul-smelling drainage through PEG tract; no obvious abscess noted on exam; non-peritoneal exam; currently not on abx  - Drainage lessened but still present on 12/20  - Reviewed CT A/P with radiology: fluid collection is present along the previous PEG tract, but difficulty to characterize. May represent a fluid-filled tract vs. a phlegm. No air bubbles visualized in the track. Unclear if the track is still functional.    - Patient is non-toxic appearing  - Minimal to no drainage from PEG stoma. PEG tract is likely closed at this time  - SLP re-eval appreciated. Unable to clear for PO at this time given lack of participation.   - S/p PEG placement 12/26    Recommendations:  - May administer enteral feeds via PEG today   - Nutrition input appreciated on enteral feeds  - Keep bumper clean and dry, would avoid gauze between the PEG bumper and skin  - Maintain aspiration precautions and elevate head of bed during feeds  - Close observation to prevent PEG dislodgement, would recommend abdominal binder for protection/prevention of PEG dislodgment; Keep the entire feeding tube under the binder between feeds.   - If PEG dislodges, please don't reinsert it back. Please call GI team back immediately.   - Eventual Speech and Swallow evaluation as an outpatient to evaluate for swallowing if patient is a candidate; would not be a candidate for PEG removal until at least 6-8 weeks post placement   - Pain control and rest of care per primary team  - GI team will sign off. Please reconsult as needed    Note incomplete until finalized by attending signature/attestation.    Kenya Gaxiola  GI/Hepatology Fellow    MONDAY-FRIDAY 8AM-5PM:  Pager# [phone] ([facility]) or [phone] ([facility])    NON-URGENT CONSULTS:  Please email [email or web address] OR [email or web address]  AT NIGHT AND ON WEEKENDS:  Contact on-call GI fellow via answering service ([phone]) from 5pm-8am and on weekends/holidays   89yo F w/Hx of CHF, dementia, CKD S4, Anemia, HTN, HLD, CAD, DM, coming from [facility] due to PEG tube dislodgement.    #PEG dislodged   - placed 12/6; dislodged prior to admission 12/16  - Admission exam with purulent, foul-smelling drainage through PEG tract; no obvious abscess noted on exam; non-peritoneal exam; currently not on abx  - Drainage lessened but still present on 12/20  - Reviewed CT A/P with radiology: fluid collection is present along the previous PEG tract, but difficulty to characterize. May represent a fluid-filled tract vs. a phlegm. No air bubbles visualized in the track. Unclear if the track is still functional.    - Patient is non-toxic appearing  - Minimal to no drainage from PEG stoma. PEG tract is likely closed at this time  - SLP re-eval appreciated. Unable to clear for PO at this time given lack of participation.   - S/p PEG placement 12/26    Recommendations:  - May administer enteral feeds via PEG today   - Nutrition input appreciated on enteral feeds  - Keep bumper clean and dry, would avoid gauze between the PEG bumper and skin  - Maintain aspiration precautions and elevate head of bed during feeds  - Close observation to prevent PEG dislodgement, would recommend abdominal binder for protection/prevention of PEG dislodgment; Keep the entire feeding tube under the binder between feeds.   - If PEG dislodges, please don't reinsert it back. Please call GI team back immediately.   - Eventual Speech and Swallow evaluation as an outpatient to evaluate for swallowing if patient is a candidate; would not be a candidate for PEG removal until at least 6-8 weeks post placement   - Pain control and rest of care per primary team  - GI team will sign off. Please reconsult as needed    Note incomplete until finalized by attending signature/attestation.    Kenya Gaxiola  GI/Hepatology Fellow    MONDAY-FRIDAY 8AM-5PM:  Pager# [phone] ([facility]) or [phone] ([facility])    NON-URGENT CONSULTS:  Please email [email or web address] OR [email or web address]  AT NIGHT AND ON WEEKENDS:  Contact on-call GI fellow via answering service ([phone]) from 5pm-8am and on weekends/holidays

## 2023-12-27 NOTE — PROGRESS NOTE ADULT - SUBJECTIVE AND OBJECTIVE BOX
[facility] Division of Hospital Medicine  Jada Poe MD  Available via MS Teams    SUBJECTIVE / OVERNIGHT EVENTS:  no acute events overnight   alert/awake this AM, engaging in conversation  denies any complaints, limited ROS 2/2 baseline dementia     ADDITIONAL REVIEW OF SYSTEMS:  as noted above     MEDICATIONS  (STANDING):  atorvastatin 80 milliGRAM(s) Oral at bedtime  chlorhexidine 2% Cloths 1 Application(s) Topical daily  pantoprazole  Injectable 40 milliGRAM(s) IV Push two times a day  potassium chloride    Tablet ER 40 milliEquivalent(s) Oral once  sodium bicarbonate 650 milliGRAM(s) Oral three times a day    MEDICATIONS  (PRN):      I&O's Summary    26 Dec 2023 07:01  -  27 Dec 2023 07:00  --------------------------------------------------------  IN: 0 mL / OUT: 2050 mL / NET: -2050 mL    27 Dec 2023 07:01  -  27 Dec 2023 12:28  --------------------------------------------------------  IN: 0 mL / OUT: 400 mL / NET: -400 mL        PHYSICAL EXAM:  Vital Signs Last 24 Hrs  T(C): 36.6 (27 Dec 2023 08:56), Max: 36.6 (27 Dec 2023 08:56)  T(F): 97.9 (27 Dec 2023 08:56), Max: 97.9 (27 Dec 2023 08:56)  HR: 78 (27 Dec 2023 08:56) (66 - 82)  BP: 155/65 (27 Dec 2023 08:56) (120/58 - 155/65)  BP(mean): --  RR: 18 (27 Dec 2023 08:56) (16 - 18)  SpO2: 96% (27 Dec 2023 08:56) (96% - 100%)    Parameters below as of 27 Dec 2023 08:56  Patient On (Oxygen Delivery Method): room air      PHYSICAL EXAM:  GENERAL: NAD, well-developed, elderly, chronically ill appearing   HEAD:  Atraumatic, normocephalic  EYES: EOMI, conjunctiva and sclera clear  NECK: Supple, no JVD  CHEST/LUNG: Clear to auscultation bilaterally; no wheezing or rales  HEART: Regular rate and rhythm; no murmurs, no LE edema   ABDOMEN: Soft, nontender, nondistended; bowel sounds present  EXTREMITIES:  2+ Peripheral Pulses, no clubbing, cyanosis  PSYCH: AAOx1 (name), calm affect, not anxious  SKIN: No rashes or lesions      LABS:                        8.3    7.63  )-----------( 163      ( 27 Dec 2023 09:15 )             24.2     12-27    137  |  108  |  11  ----------------------------<  77  3.1<L>   |  17<L>  |  1.88<H>    Ca    8.1<L>      27 Dec 2023 09:15  Phos  5.5     12-26  Mg     2.1     12-26      PT/INR - ( 26 Dec 2023 07:28 )   PT: 13.0 sec;   INR: 1.25 ratio         PTT - ( 26 Dec 2023 07:28 )  PTT:29.3 sec      Urinalysis Basic - ( 27 Dec 2023 09:15 )    Color: x / Appearance: x / SG: x / pH: x  Gluc: 77 mg/dL / Ketone: x  / Bili: x / Urobili: x   Blood: x / Protein: x / Nitrite: x   Leuk Esterase: x / RBC: x / WBC x   Sq Epi: x / Non Sq Epi: x / Bacteria: x        COVID-19 PCR: NotDetec (07 Dec 2023 13:44)      RADIOLOGY & ADDITIONAL TESTS:  New Imaging Personally Reviewed Today:  New Electrocardiogram Personally Reviewed Today:  Other Results Reviewed Today:   Prior or Outpatient Records Reviewed Today with Summary:    COORDINATION OF CARE:  Consultant Communication and Details of Discussion (where applicable):     [facility] Division of Hospital Medicine  Jada Poe MD  Available via MS Teams    SUBJECTIVE / OVERNIGHT EVENTS:  no acute events overnight   alert/awake this AM, engaging in conversation  denies any complaints, limited ROS 2/2 baseline dementia     ADDITIONAL REVIEW OF SYSTEMS:  as noted above     MEDICATIONS  (STANDING):  atorvastatin 80 milliGRAM(s) Oral at bedtime  chlorhexidine 2% Cloths 1 Application(s) Topical daily  pantoprazole  Injectable 40 milliGRAM(s) IV Push two times a day  potassium chloride    Tablet ER 40 milliEquivalent(s) Oral once  sodium bicarbonate 650 milliGRAM(s) Oral three times a day    MEDICATIONS  (PRN):      I&O's Summary    26 Dec 2023 07:01  -  27 Dec 2023 07:00  --------------------------------------------------------  IN: 0 mL / OUT: 2050 mL / NET: -2050 mL    27 Dec 2023 07:01  -  27 Dec 2023 12:28  --------------------------------------------------------  IN: 0 mL / OUT: 400 mL / NET: -400 mL        PHYSICAL EXAM:  Vital Signs Last 24 Hrs  T(C): 36.6 (27 Dec 2023 08:56), Max: 36.6 (27 Dec 2023 08:56)  T(F): 97.9 (27 Dec 2023 08:56), Max: 97.9 (27 Dec 2023 08:56)  HR: 78 (27 Dec 2023 08:56) (66 - 82)  BP: 155/65 (27 Dec 2023 08:56) (120/58 - 155/65)  BP(mean): --  RR: 18 (27 Dec 2023 08:56) (16 - 18)  SpO2: 96% (27 Dec 2023 08:56) (96% - 100%)    Parameters below as of 27 Dec 2023 08:56  Patient On (Oxygen Delivery Method): room air      PHYSICAL EXAM:  GENERAL: NAD, well-developed, elderly, chronically ill appearing   HEAD:  Atraumatic, normocephalic  EYES: EOMI, conjunctiva and sclera clear  NECK: Supple, no JVD  CHEST/LUNG: Clear to auscultation bilaterally; no wheezing or rales  HEART: Regular rate and rhythm; no murmurs, no LE edema   ABDOMEN: Soft, nontender, nondistended; bowel sounds present  EXTREMITIES:  2+ Peripheral Pulses, no clubbing, cyanosis  PSYCH: AAOx1 (name), calm affect, not anxious  SKIN: No rashes or lesions      LABS:                        8.3    7.63  )-----------( 163      ( 27 Dec 2023 09:15 )             24.2     12-27    137  |  108  |  11  ----------------------------<  77  3.1<L>   |  17<L>  |  1.88<H>    Ca    8.1<L>      27 Dec 2023 09:15  Phos  5.5     12-26  Mg     2.1     12-26      PT/INR - ( 26 Dec 2023 07:28 )   PT: 13.0 sec;   INR: 1.25 ratio         PTT - ( 26 Dec 2023 07:28 )  PTT:29.3 sec      Urinalysis Basic - ( 27 Dec 2023 09:15 )    Color: x / Appearance: x / SG: x / pH: x  Gluc: 77 mg/dL / Ketone: x  / Bili: x / Urobili: x   Blood: x / Protein: x / Nitrite: x   Leuk Esterase: x / RBC: x / WBC x   Sq Epi: x / Non Sq Epi: x / Bacteria: x        COVID-19 PCR: NotDetec (07 Dec 2023 13:44)      RADIOLOGY & ADDITIONAL TESTS:  New Imaging Personally Reviewed Today:  New Electrocardiogram Personally Reviewed Today:  Other Results Reviewed Today:   Prior or Outpatient Records Reviewed Today with Summary:    COORDINATION OF CARE:  Consultant Communication and Details of Discussion (where applicable):

## 2023-12-27 NOTE — PROGRESS NOTE ADULT - SUBJECTIVE AND OBJECTIVE BOX
Interval Events:   No acute events overnight.        Hospital Medications:  atorvastatin 80 milliGRAM(s) Oral at bedtime  chlorhexidine 2% Cloths 1 Application(s) Topical daily  pantoprazole  Injectable 40 milliGRAM(s) IV Push two times a day  sodium bicarbonate 650 milliGRAM(s) Oral three times a day      PHYSICAL EXAM:   Vital Signs:  Vital Signs Last 24 Hrs  T(C): 36.6 (27 Dec 2023 08:56), Max: 36.8 (26 Dec 2023 11:29)  T(F): 97.9 (27 Dec 2023 08:56), Max: 97.9 (27 Dec 2023 08:56)  HR: 78 (27 Dec 2023 08:56) (66 - 96)  BP: 155/65 (27 Dec 2023 08:56) (120/58 - 155/65)  BP(mean): --  RR: 18 (27 Dec 2023 08:56) (16 - 22)  SpO2: 96% (27 Dec 2023 08:56) (96% - 100%)    Parameters below as of 27 Dec 2023 08:56  Patient On (Oxygen Delivery Method): room air      Daily Height in cm: 152.4 (26 Dec 2023 11:29)    Daily Weight in k (27 Dec 2023 08:56)    GENERAL: no acute distress  NEURO: alert and confused  HEENT: NCAT, anicteric sclera  CHEST: no respiratory distress, no accessory muscle use  CARDIAC: regular rate, +S1/S2  ABDOMEN: soft, nontender, no rebound or guarding; PEG site clean without bleeding or oozing; external bumper loosely touching the skin, tube is movable without tension. External bumper ~ 4 cm  EXTREMITIES: warm, well perfused  SKIN: no active lesions noted    LABS: reviewed                        8.2    7.55  )-----------( 152      ( 26 Dec 2023 07:27 )             25.2     12    138  |  108  |  12  ----------------------------<  97  3.6   |  17<L>  |  1.97<H>    Ca    8.2<L>      27 Dec 2023 04:39  Phos  5.5       Mg     2.1              Interval Events:   No acute events overnight.        Hospital Medications:  atorvastatin 80 milliGRAM(s) Oral at bedtime  chlorhexidine 2% Cloths 1 Application(s) Topical daily  pantoprazole  Injectable 40 milliGRAM(s) IV Push two times a day  sodium bicarbonate 650 milliGRAM(s) Oral three times a day      PHYSICAL EXAM:   Vital Signs:  Vital Signs Last 24 Hrs  T(C): 36.6 (27 Dec 2023 08:56), Max: 36.8 (26 Dec 2023 11:29)  T(F): 97.9 (27 Dec 2023 08:56), Max: 97.9 (27 Dec 2023 08:56)  HR: 78 (27 Dec 2023 08:56) (66 - 96)  BP: 155/65 (27 Dec 2023 08:56) (120/58 - 155/65)  BP(mean): --  RR: 18 (27 Dec 2023 08:56) (16 - 22)  SpO2: 96% (27 Dec 2023 08:56) (96% - 100%)    Parameters below as of 27 Dec 2023 08:56  Patient On (Oxygen Delivery Method): room air      Daily Height in cm: 152.4 (26 Dec 2023 11:29)    Daily Weight in k (27 Dec 2023 08:56)    GENERAL: no acute distress  NEURO: alert and confused  HEENT: NCAT, anicteric sclera  CHEST: no respiratory distress, no accessory muscle use  CARDIAC: regular rate, +S1/S2  ABDOMEN: soft, nontender, no rebound or guarding; PEG site clean without bleeding or oozing; external bumper loosely touching the skin, tube is movable without tension. External bumper ~ 3.5 cm  EXTREMITIES: warm, well perfused  SKIN: no active lesions noted    LABS: reviewed                        8.2    7.55  )-----------( 152      ( 26 Dec 2023 07:27 )             25.2     12    138  |  108  |  12  ----------------------------<  97  3.6   |  17<L>  |  1.97<H>    Ca    8.2<L>      27 Dec 2023 04:39  Phos  5.5       Mg     2.1

## 2023-12-28 LAB
ANION GAP SERPL CALC-SCNC: 12 MMOL/L — SIGNIFICANT CHANGE UP (ref 5–17)
ANION GAP SERPL CALC-SCNC: 12 MMOL/L — SIGNIFICANT CHANGE UP (ref 5–17)
BUN SERPL-MCNC: 9 MG/DL — SIGNIFICANT CHANGE UP (ref 7–23)
BUN SERPL-MCNC: 9 MG/DL — SIGNIFICANT CHANGE UP (ref 7–23)
CALCIUM SERPL-MCNC: 8.3 MG/DL — LOW (ref 8.4–10.5)
CALCIUM SERPL-MCNC: 8.3 MG/DL — LOW (ref 8.4–10.5)
CHLORIDE SERPL-SCNC: 111 MMOL/L — HIGH (ref 96–108)
CHLORIDE SERPL-SCNC: 111 MMOL/L — HIGH (ref 96–108)
CO2 SERPL-SCNC: 19 MMOL/L — LOW (ref 22–31)
CO2 SERPL-SCNC: 19 MMOL/L — LOW (ref 22–31)
CREAT SERPL-MCNC: 1.65 MG/DL — HIGH (ref 0.5–1.3)
CREAT SERPL-MCNC: 1.65 MG/DL — HIGH (ref 0.5–1.3)
EGFR: 29 ML/MIN/1.73M2 — LOW
EGFR: 29 ML/MIN/1.73M2 — LOW
GLUCOSE BLDC GLUCOMTR-MCNC: 147 MG/DL — HIGH (ref 70–99)
GLUCOSE BLDC GLUCOMTR-MCNC: 147 MG/DL — HIGH (ref 70–99)
GLUCOSE BLDC GLUCOMTR-MCNC: 165 MG/DL — HIGH (ref 70–99)
GLUCOSE BLDC GLUCOMTR-MCNC: 165 MG/DL — HIGH (ref 70–99)
GLUCOSE BLDC GLUCOMTR-MCNC: 181 MG/DL — HIGH (ref 70–99)
GLUCOSE BLDC GLUCOMTR-MCNC: 181 MG/DL — HIGH (ref 70–99)
GLUCOSE BLDC GLUCOMTR-MCNC: 98 MG/DL — SIGNIFICANT CHANGE UP (ref 70–99)
GLUCOSE BLDC GLUCOMTR-MCNC: 98 MG/DL — SIGNIFICANT CHANGE UP (ref 70–99)
GLUCOSE SERPL-MCNC: 155 MG/DL — HIGH (ref 70–99)
GLUCOSE SERPL-MCNC: 155 MG/DL — HIGH (ref 70–99)
HCT VFR BLD CALC: 26.9 % — LOW (ref 34.5–45)
HCT VFR BLD CALC: 26.9 % — LOW (ref 34.5–45)
HGB BLD-MCNC: 9 G/DL — LOW (ref 11.5–15.5)
HGB BLD-MCNC: 9 G/DL — LOW (ref 11.5–15.5)
MAGNESIUM SERPL-MCNC: 1.6 MG/DL — SIGNIFICANT CHANGE UP (ref 1.6–2.6)
MAGNESIUM SERPL-MCNC: 1.6 MG/DL — SIGNIFICANT CHANGE UP (ref 1.6–2.6)
MCHC RBC-ENTMCNC: 27.8 PG — SIGNIFICANT CHANGE UP (ref 27–34)
MCHC RBC-ENTMCNC: 27.8 PG — SIGNIFICANT CHANGE UP (ref 27–34)
MCHC RBC-ENTMCNC: 33.5 GM/DL — SIGNIFICANT CHANGE UP (ref 32–36)
MCHC RBC-ENTMCNC: 33.5 GM/DL — SIGNIFICANT CHANGE UP (ref 32–36)
MCV RBC AUTO: 83 FL — SIGNIFICANT CHANGE UP (ref 80–100)
MCV RBC AUTO: 83 FL — SIGNIFICANT CHANGE UP (ref 80–100)
NRBC # BLD: 0 /100 WBCS — SIGNIFICANT CHANGE UP (ref 0–0)
NRBC # BLD: 0 /100 WBCS — SIGNIFICANT CHANGE UP (ref 0–0)
PHOSPHATE SERPL-MCNC: 2.3 MG/DL — LOW (ref 2.5–4.5)
PHOSPHATE SERPL-MCNC: 2.3 MG/DL — LOW (ref 2.5–4.5)
PLATELET # BLD AUTO: 174 K/UL — SIGNIFICANT CHANGE UP (ref 150–400)
PLATELET # BLD AUTO: 174 K/UL — SIGNIFICANT CHANGE UP (ref 150–400)
POTASSIUM SERPL-MCNC: 3.9 MMOL/L — SIGNIFICANT CHANGE UP (ref 3.5–5.3)
POTASSIUM SERPL-MCNC: 3.9 MMOL/L — SIGNIFICANT CHANGE UP (ref 3.5–5.3)
POTASSIUM SERPL-SCNC: 3.9 MMOL/L — SIGNIFICANT CHANGE UP (ref 3.5–5.3)
POTASSIUM SERPL-SCNC: 3.9 MMOL/L — SIGNIFICANT CHANGE UP (ref 3.5–5.3)
RBC # BLD: 3.24 M/UL — LOW (ref 3.8–5.2)
RBC # BLD: 3.24 M/UL — LOW (ref 3.8–5.2)
RBC # FLD: 21.2 % — HIGH (ref 10.3–14.5)
RBC # FLD: 21.2 % — HIGH (ref 10.3–14.5)
SODIUM SERPL-SCNC: 142 MMOL/L — SIGNIFICANT CHANGE UP (ref 135–145)
SODIUM SERPL-SCNC: 142 MMOL/L — SIGNIFICANT CHANGE UP (ref 135–145)
WBC # BLD: 8.77 K/UL — SIGNIFICANT CHANGE UP (ref 3.8–10.5)
WBC # BLD: 8.77 K/UL — SIGNIFICANT CHANGE UP (ref 3.8–10.5)
WBC # FLD AUTO: 8.77 K/UL — SIGNIFICANT CHANGE UP (ref 3.8–10.5)
WBC # FLD AUTO: 8.77 K/UL — SIGNIFICANT CHANGE UP (ref 3.8–10.5)

## 2023-12-28 PROCEDURE — 99232 SBSQ HOSP IP/OBS MODERATE 35: CPT

## 2023-12-28 RX ORDER — ACETAMINOPHEN 500 MG
650 TABLET ORAL ONCE
Refills: 0 | Status: COMPLETED | OUTPATIENT
Start: 2023-12-28 | End: 2023-12-28

## 2023-12-28 RX ORDER — PANTOPRAZOLE SODIUM 20 MG/1
40 TABLET, DELAYED RELEASE ORAL DAILY
Refills: 0 | Status: DISCONTINUED | OUTPATIENT
Start: 2023-12-28 | End: 2023-12-29

## 2023-12-28 RX ORDER — SODIUM,POTASSIUM PHOSPHATES 278-250MG
1 POWDER IN PACKET (EA) ORAL THREE TIMES A DAY
Refills: 0 | Status: DISCONTINUED | OUTPATIENT
Start: 2023-12-28 | End: 2023-12-28

## 2023-12-28 RX ORDER — POTASSIUM PHOSPHATE, MONOBASIC POTASSIUM PHOSPHATE, DIBASIC 236; 224 MG/ML; MG/ML
15 INJECTION, SOLUTION INTRAVENOUS ONCE
Refills: 0 | Status: DISCONTINUED | OUTPATIENT
Start: 2023-12-28 | End: 2023-12-28

## 2023-12-28 RX ORDER — SODIUM,POTASSIUM PHOSPHATES 278-250MG
1 POWDER IN PACKET (EA) ORAL THREE TIMES A DAY
Refills: 0 | Status: COMPLETED | OUTPATIENT
Start: 2023-12-28 | End: 2023-12-29

## 2023-12-28 RX ADMIN — ATORVASTATIN CALCIUM 80 MILLIGRAM(S): 80 TABLET, FILM COATED ORAL at 21:17

## 2023-12-28 RX ADMIN — ESCITALOPRAM OXALATE 10 MILLIGRAM(S): 10 TABLET, FILM COATED ORAL at 11:16

## 2023-12-28 RX ADMIN — Medication 650 MILLIGRAM(S): at 21:17

## 2023-12-28 RX ADMIN — Medication 650 MILLIGRAM(S): at 11:16

## 2023-12-28 RX ADMIN — CHLORHEXIDINE GLUCONATE 1 APPLICATION(S): 213 SOLUTION TOPICAL at 11:22

## 2023-12-28 RX ADMIN — Medication 650 MILLIGRAM(S): at 05:43

## 2023-12-28 RX ADMIN — Medication 1 PACKET(S): at 13:28

## 2023-12-28 RX ADMIN — PANTOPRAZOLE SODIUM 40 MILLIGRAM(S): 20 TABLET, DELAYED RELEASE ORAL at 13:27

## 2023-12-28 RX ADMIN — Medication 1 PACKET(S): at 21:17

## 2023-12-28 RX ADMIN — Medication 650 MILLIGRAM(S): at 11:50

## 2023-12-28 RX ADMIN — AMLODIPINE BESYLATE 10 MILLIGRAM(S): 2.5 TABLET ORAL at 05:43

## 2023-12-28 NOTE — PROGRESS NOTE ADULT - PROVIDER SPECIALTY LIST ADULT
Gastroenterology
Internal Medicine
Gastroenterology
Nephrology
Hospitalist
Hospitalist
Internal Medicine
Hospitalist
Internal Medicine
Hospitalist
Hospitalist
Internal Medicine
Hospitalist
Internal Medicine

## 2023-12-28 NOTE — PROGRESS NOTE ADULT - PROBLEM SELECTOR PLAN 1
PEG removed from patient. Patient remains without leukocytosis, afebrile, abdomen is soft and nontender.   - GI consulted via email to re-place PEG tube  - CT w. IV contrast ordered today to evaluate for infection  - Plan to tentatively start antibiotics following CT scan pending results
PEG removed from patient. Patient remains without leukocytosis, afebrile, abdomen is soft and nontender. CT abdomen w. IV contrast without intraabdominal pathology.  - GI consulted, appreciate ongoing recommendations   - Possible PEG tube replacement next week, goals of care discussion are ongoing as below   - As per GI recommendations, patient is now on antibiotics, will continue for 5 day course with cipro and flagyl (end 12/25) Notably patient is with a recorded history of PCN allergy, unclear reaction severity.  - d/w nahed Thurman (surgeon) who has been discussing with family - would like to c/w PEG placement with abdominal binder   - plan for PEG tentatively 12/26 - pt NPO
PEG removed from patient  - GI consulted via email to re-place PEG tube, pending procedure scheduling   - S/s evaluation requested requested  - Will provide PRN IV meds for pain, HTN, etc.
PEG removed from patient s/p 5d course of cipro/flagyl for possible infection; now s/p PEG replacement 12/26  - started on medications 12/26  - cleared by GI - will start enteral feeds today per nutrition recs   - monitor BMP closely for refeeding syndrome   - abdominal binder at all times
PEG removed from patient. Patient remains without leukocytosis, afebrile, abdomen is soft and nontender. CT abdomen w. IV contrast without intraabdominal pathology.  - GI consulted, appreciate ongoing recommendations   - Possible PEG tube replacement next week, goals of care discussion are ongoing as below   - As per GI recommendations, patient is now on antibiotics, will continue for 5 day course with cipro and flagyl. Notably patient is with a recorded history of PCN allergy, unclear reaction severity.  - Extensive discussion with nephDimas davalos, who is a surgeon (number in provider handoff), plan is for extensive goals of care discussion this weekend
Pt with recovering LESLIE/ATN. Pt was discharged earlier this month and being followed by nephrology for LESLIE/ATN 2/2 hypotension, volume depletion and retention. Baseline Cr 1.72 note on 11/9/23. On admission Cr 7.0 (11/26), peaked to 7.14 (11/27) and Scr elevated/improved to 2.99 on discharge.     This admission pt presents with Scr 2.28 on 12/15 and improved to 1.71 (12/18). But now trended up after contrast to 2.29 today. Patient also without nutrition for days.  Has been on D5 W (hypotonic) since 12/23.  Suspect that this ATN from contrast with some contribution from hypovolemia.  Consider starting NS at 50 cc / hr with repeat bmp to check sodium.
PEG removed from patient. Patient remains without leukocytosis, afebrile, abdomen is soft and nontender. CT abdomen w. IV contrast without intraabdominal pathology.  - GI consulted, appreciate ongoing recommendations   - Possible PEG tube replacement next week, goals of care discussion are ongoing as below   - As per GI recommendations, patient is now on antibiotics, will continue for 5 day course with cipro and flagyl (end 12/25) Notably patient is with a recorded history of PCN allergy, unclear reaction severity.  - Extensive discussion with Dimas avitia, who is a surgeon (number in provider handoff), plan is for extensive goals of care discussion this weekend
PEG removed from patient s/p 5d course of cipro/flagyl for possible infection; now s/p PEG replacement 12/26  - started on tube feeds 12/27, titrating to goal of 40cc/hr   - monitor BMP closely for refeeding syndrome   - abdominal binder at all times
PEG removed from patient. Patient remains without leukocytosis, afebrile, abdomen is soft and nontender. CT abdomen w. IV contrast without intraabdominal pathology.  - GI consulted, appreciate ongoing recommendations   - Continue to monitor off antibiotics
PEG removed from patient  - GI consulted via email to re-place PEG tube, pending procedure scheduling   - Swallow eval requested  - Will provide PRN IV meds for pain, HTN, etc.
PEG removed from patient. Patient remains without leukocytosis, afebrile, abdomen is soft and nontender. CT abdomen w. IV contrast without intraabdominal pathology.  - GI consulted, appreciate ongoing recommendations   - Possible PEG tube replacement next week, goals of care discussion are ongoing as below   - As per GI recommendations, patient is now on antibiotics, will continue for 5 day course with cipro and flagyl. Notably patient is with a recorded history of PCN allergy, unclear reaction severity.  - Extensive discussion with nephDimas davalos, who is a surgeon (number in provider handoff), plan is for extensive goals of care discussion this weekend
PEG removed from patient. Patient remains without leukocytosis, afebrile, abdomen is soft and nontender. CT abdomen w. IV contrast without intraabdominal pathology.  - GI consulted, s/p 5d course of cipro/flagyl   - Plan for PEG placement today 12/26  - d/w nahed Thurman (surgeon) who has been discussing with family - would like to c/w PEG placement with abdominal binder after
PEG removed from patient. Patient remains without leukocytosis, afebrile, abdomen is soft and nontender. CT abdomen w. IV contrast without intraabdominal pathology.  - GI consulted, appreciate ongoing recommendations   - Possible PEG tube replacement next week, will reconsult S/S evaluation today   - As per GI recommendations, patient is now on antibiotics, will continue for 5 day course with cipro and flagyl. Notably patient is with a recorded history of PCN allergy, unclear reaction severity.
PEG removed from patient  - GI consulted via email to re-place PEG tube as they placed it originally. Unclear when last dose of plavix was  - Swallow eval reqquested  - Will provide PRN IV meds for pain, HTN, etc.

## 2023-12-28 NOTE — PROGRESS NOTE ADULT - PROBLEM SELECTOR PLAN 6
resume home amlodipine 10mg via PEG
- C/w escitalopram when PEG replaced
- C/w escitalopram when PEG replaced
- C/w with amlodipine when PEG replaced
- C/w with amlodipine when PEG replaced
- C/w escitalopram when PEG replaced
- C/w with amlodipine when PEG replaced
resume home amlodipine 10mg via PEG
- C/w escitalopram when PEG replaced
- C/w with amlodipine when PEG replaced
- C/w escitalopram when PEG replaced

## 2023-12-28 NOTE — PROGRESS NOTE ADULT - PROBLEM SELECTOR PROBLEM 5
CAD (coronary artery disease)
HTN (hypertension)
CAD (coronary artery disease)
CAD (coronary artery disease)
HTN (hypertension)
HTN (hypertension)
CAD (coronary artery disease)
HTN (hypertension)
HTN (hypertension)
CAD (coronary artery disease)

## 2023-12-28 NOTE — PROGRESS NOTE ADULT - REASON FOR ADMISSION
PEG dislodged

## 2023-12-28 NOTE — PROGRESS NOTE ADULT - PROBLEM SELECTOR PROBLEM 3
Stage 4 chronic kidney disease
Anemia of chronic disease
Hyponatremia
Anemia of chronic disease
Anemia of chronic disease
Hyponatremia
Hyponatremia
Anemia of chronic disease
Hyponatremia

## 2023-12-28 NOTE — PROGRESS NOTE ADULT - PROBLEM SELECTOR PLAN 5
- C/w with amlodipine when PEG replaced
- C/w plavix and statin after PEG replaced
- C/w with amlodipine when PEG replaced
- C/w with amlodipine when PEG replaced
- C/w plavix and statin after PEG replaced
- c/w statin via PEG  - holding plavix for now, resume on d/c
- c/w statin via PEG  - holding plavix for now, resume on d/c
- C/w with amlodipine when PEG replaced
- C/w plavix and statin after PEG replaced
- C/w plavix and statin after PEG replaced
- C/w with amlodipine when PEG replaced
- C/w plavix and statin after PEG replaced
- C/w plavix and statin after PEG replaced

## 2023-12-28 NOTE — PROGRESS NOTE ADULT - PROBLEM SELECTOR PLAN 4
- C/w plavix and statin after PEG replaced
- C/w plavix and statin after PEG replaced
Pt with hypernatremia to 150 12/23, s/p IVF  - improved to 144 this AM  - c/w D5 to c/w hydration
Hgb of 8.4  - Likely multifactorial due to CKD and mild bleeding for PEG dislodgement  - C/w folate after PEG replaced
- Cw plavix and statin after PEG replaced
- C/w plavix and statin after PEG replaced
- C/w plavix and statin after PEG replaced
Na 150 on BMP today. FWD 1.7L   - D5w ordered, repeat BMP with AM labs
Pt with hypernatremia to 150 12/22  - on D5 given lack of oral access, PEG dislodged   - resolved - 140 this AM  - c/w D5 to c/w hydration
Hgb of 6.8 12/22, likely AOCD/ESRD, s/p 1u PRBC  - Hb stable ~8.5  - Likely multifactorial due to CKD and mild bleeding for PEG dislodgement  - C/w folate via PEG   - c/t trend Hb daily
Hgb of 6.8 12/22, likely AOCD/ESRD, s/p 1u PRBC  - Hb stable ~8.5-9  - Likely multifactorial due to CKD and mild bleeding for PEG dislodgement  - C/w folate after PEG replaced  - c/t trend Hb daily
Hgb of 6.8 12/22, likely AOCD/ESRD, s/p 1u PRBC  - Hb stable ~8.5  - Likely multifactorial due to CKD and mild bleeding for PEG dislodgement  - C/w folate via PEG   - c/t trend Hb daily
Hgb of 6.8 12/22, likely AOCD/ESRD, s/p 1u PRBC  - Hb stable ~8.5-9  - Likely multifactorial due to CKD and mild bleeding for PEG dislodgement  - C/w folate after PEG replaced  - c/t trend Hb daily

## 2023-12-28 NOTE — PROGRESS NOTE ADULT - PROBLEM SELECTOR PLAN 7
- C/w escitalopram when PEG replaced
- C/w escitalopram via PEG
- C/w escitalopram when PEG replaced
- C/w escitalopram via PEG
- C/w escitalopram when PEG replaced

## 2023-12-28 NOTE — PROGRESS NOTE ADULT - PROBLEM SELECTOR PLAN 3
Pt with baseline Stage 4 CKD  - Renally dose meds when re-ordered  - C/w sodium bicarb after PEG placed
Hgb of 6.8 12/22, likely AOCD/ESRD, s/p 1u PRBC  - Hb stable at 9.1 today   - Likely multifactorial due to CKD and mild bleeding for PEG dislodgement  - C/w folate after PEG replaced  - c/t trend Hb daily
Hgb of 6.8 12/22, likely AOCD/ESRD, s/p 1u PRBC  - Hb 9.2 today   - Likely multifactorial due to CKD and mild bleeding for PEG dislodgement  - C/w folate after PEG replaced  - c/t trend Hb daily
hyponatremia likely due to hypotonic fluids and urinary retention noted 12/25  - s/p morales placement 12/26, 1100cc out   - improved from 123-->130  - will give NS at 50cc/hr  - BMP q6h for now  - renal recs appreciated
Hgb of 8.4  - Likely multifactorial due to CKD and mild bleeding for PEG dislodgement  - C/w folate after PEG replaced
Hgb of 8.4  - Likely multifactorial due to CKD and mild bleeding for PEG dislodgement  - C/w folate after PEG replaced
Pt with hypernatremia a few days ago, s/p D5 with improvement but now with significant hyponatremia to 123  - repeat BMP showing persistent hypoNa - ?2/2 D5  - will consult renal to assess further  - monitor BMP closely
Hgb of 8.4  - Likely multifactorial due to CKD and mild bleeding for PEG dislodgement  - C/w folate after PEG replaced
hyponatremia likely due to hypotonic fluids and urinary retention noted 12/25  - Na now back to normal  - TOV ongoing
Hgb of 8.4  - Likely multifactorial due to CKD and mild bleeding for PEG dislodgement  - Cw folate after PEG replaced
Hgb of 8.4  - Likely multifactorial due to CKD and mild bleeding for PEG dislodgement  - C/w folate after PEG replaced
hyponatremia likely due to hypotonic fluids and urinary retention noted 12/25  - s/p morales placement 12/26, 1100cc out; s/p NS yesterday  - Na now back to normal  - will attempt TOV today, monitor bladder scans
Hgb of 6.8 today. No active signs of bleeding.   - Likely multifactorial due to CKD and mild bleeding for PEG dislodgement  - C/w folate after PEG replaced  - 1 unit pRBC today

## 2023-12-28 NOTE — PROGRESS NOTE ADULT - SUBJECTIVE AND OBJECTIVE BOX
[facility] Division of Hospital Medicine  Jada Poe MD  Available via MS Teams    SUBJECTIVE / OVERNIGHT EVENTS:  no acute events overnight   pt tolerating feeds well  morales removed yesterday, had the be cath'd once this AM   pt alert/awake, unable to obtain ROS 2/2 baseline dementia     ADDITIONAL REVIEW OF SYSTEMS:  as noted above     MEDICATIONS  (STANDING):  amLODIPine   Tablet 10 milliGRAM(s) Oral daily  atorvastatin 80 milliGRAM(s) Oral at bedtime  chlorhexidine 2% Cloths 1 Application(s) Topical daily  escitalopram 10 milliGRAM(s) Oral daily  pantoprazole   Suspension 40 milliGRAM(s) Oral daily  potassium phosphate / sodium phosphate Tablet (K-PHOS No. 2) 1 Tablet(s) Oral three times a day  sodium bicarbonate 650 milliGRAM(s) Oral three times a day    MEDICATIONS  (PRN):      I&O's Summary    27 Dec 2023 07:01  -  28 Dec 2023 07:00  --------------------------------------------------------  IN: 0 mL / OUT: 450 mL / NET: -450 mL        PHYSICAL EXAM:  Vital Signs Last 24 Hrs  T(C): 36.3 (28 Dec 2023 10:57), Max: 36.4 (27 Dec 2023 23:44)  T(F): 97.4 (28 Dec 2023 10:57), Max: 97.5 (27 Dec 2023 23:44)  HR: 77 (28 Dec 2023 10:57) (71 - 78)  BP: 153/65 (28 Dec 2023 10:57) (153/65 - 170/69)  BP(mean): --  RR: 18 (28 Dec 2023 10:57) (18 - 18)  SpO2: 98% (28 Dec 2023 10:57) (98% - 100%)    Parameters below as of 28 Dec 2023 10:57  Patient On (Oxygen Delivery Method): room air      PHYSICAL EXAM:  GENERAL: NAD, well-developed, elderly, chronically ill appearing   HEAD:  Atraumatic, normocephalic  EYES: EOMI, conjunctiva and sclera clear  NECK: Supple, no JVD  CHEST/LUNG: Clear to auscultation bilaterally; no wheezing or rales  HEART: Regular rate and rhythm; no murmurs, no LE edema   ABDOMEN: Soft, nontender, nondistended; bowel sounds present, abdominal binder in place   EXTREMITIES:  2+ Peripheral Pulses, no clubbing, cyanosis  PSYCH: AAOx1 (name), calm affect, not anxious  SKIN: No rashes or lesions    LABS:                        9.0    8.77  )-----------( 174      ( 28 Dec 2023 09:00 )             26.9     12-28    142  |  111<H>  |  9   ----------------------------<  155<H>  3.9   |  19<L>  |  1.65<H>    Ca    8.3<L>      28 Dec 2023 09:00  Phos  2.3     12-28  Mg     1.6     12-28            Urinalysis Basic - ( 28 Dec 2023 09:00 )    Color: x / Appearance: x / SG: x / pH: x  Gluc: 155 mg/dL / Ketone: x  / Bili: x / Urobili: x   Blood: x / Protein: x / Nitrite: x   Leuk Esterase: x / RBC: x / WBC x   Sq Epi: x / Non Sq Epi: x / Bacteria: x        COVID-19 PCR: NotDetec (07 Dec 2023 13:44)      RADIOLOGY & ADDITIONAL TESTS:  New Imaging Personally Reviewed Today:  New Electrocardiogram Personally Reviewed Today:  Other Results Reviewed Today:   Prior or Outpatient Records Reviewed Today with Summary:    COORDINATION OF CARE:  Consultant Communication and Details of Discussion (where applicable):     [facility] Division of Hospital Medicine  Jada Poe MD  Available via MS Teams    SUBJECTIVE / OVERNIGHT EVENTS:  no acute events overnight   pt tolerating feeds well  morales removed yesterday, had the be cath'd once this AM   pt alert/awake, unable to obtain ROS 2/2 baseline dementia     ADDITIONAL REVIEW OF SYSTEMS:  as noted above     MEDICATIONS  (STANDING):  amLODIPine   Tablet 10 milliGRAM(s) Oral daily  atorvastatin 80 milliGRAM(s) Oral at bedtime  chlorhexidine 2% Cloths 1 Application(s) Topical daily  escitalopram 10 milliGRAM(s) Oral daily  pantoprazole   Suspension 40 milliGRAM(s) Oral daily  potassium phosphate / sodium phosphate Tablet (K-PHOS No. 2) 1 Tablet(s) Oral three times a day  sodium bicarbonate 650 milliGRAM(s) Oral three times a day    MEDICATIONS  (PRN):      I&O's Summary    27 Dec 2023 07:01  -  28 Dec 2023 07:00  --------------------------------------------------------  IN: 0 mL / OUT: 450 mL / NET: -450 mL        PHYSICAL EXAM:  Vital Signs Last 24 Hrs  T(C): 36.3 (28 Dec 2023 10:57), Max: 36.4 (27 Dec 2023 23:44)  T(F): 97.4 (28 Dec 2023 10:57), Max: 97.5 (27 Dec 2023 23:44)  HR: 77 (28 Dec 2023 10:57) (71 - 78)  BP: 153/65 (28 Dec 2023 10:57) (153/65 - 170/69)  BP(mean): --  RR: 18 (28 Dec 2023 10:57) (18 - 18)  SpO2: 98% (28 Dec 2023 10:57) (98% - 100%)    Parameters below as of 28 Dec 2023 10:57  Patient On (Oxygen Delivery Method): room air      PHYSICAL EXAM:  GENERAL: NAD, well-developed, elderly, chronically ill appearing   HEAD:  Atraumatic, normocephalic  EYES: EOMI, conjunctiva and sclera clear  NECK: Supple, no JVD  CHEST/LUNG: Clear to auscultation bilaterally; no wheezing or rales  HEART: Regular rate and rhythm; no murmurs, no LE edema   ABDOMEN: Soft, nontender, nondistended; bowel sounds present, abdominal binder in place   EXTREMITIES:  2+ Peripheral Pulses, no clubbing, cyanosis  PSYCH: AAOx1 (name), calm affect, not anxious  SKIN: No rashes or lesions    LABS:                        9.0    8.77  )-----------( 174      ( 28 Dec 2023 09:00 )             26.9     12-28    142  |  111<H>  |  9   ----------------------------<  155<H>  3.9   |  19<L>  |  1.65<H>    Ca    8.3<L>      28 Dec 2023 09:00  Phos  2.3     12-28  Mg     1.6     12-28            Urinalysis Basic - ( 28 Dec 2023 09:00 )    Color: x / Appearance: x / SG: x / pH: x  Gluc: 155 mg/dL / Ketone: x  / Bili: x / Urobili: x   Blood: x / Protein: x / Nitrite: x   Leuk Esterase: x / RBC: x / WBC x   Sq Epi: x / Non Sq Epi: x / Bacteria: x        COVID-19 PCR: NotDetec (07 Dec 2023 13:44)      RADIOLOGY & ADDITIONAL TESTS:  New Imaging Personally Reviewed Today:  New Electrocardiogram Personally Reviewed Today:  Other Results Reviewed Today:   Prior or Outpatient Records Reviewed Today with Summary:    COORDINATION OF CARE:  Consultant Communication and Details of Discussion (where applicable):

## 2023-12-28 NOTE — PROGRESS NOTE ADULT - PROBLEM SELECTOR PROBLEM 4
CAD (coronary artery disease)
Anemia of chronic disease
Hypernatremia
Hyponatremia
Anemia of chronic disease
CAD (coronary artery disease)
Anemia of chronic disease
Anemia of chronic disease
CAD (coronary artery disease)
Hypernatremia
Anemia of chronic disease

## 2023-12-28 NOTE — PROGRESS NOTE ADULT - PROBLEM SELECTOR PROBLEM 6
HTN (hypertension)
Major depression
HTN (hypertension)
HTN (hypertension)
Major depression
HTN (hypertension)
HTN (hypertension)
Major depression

## 2023-12-28 NOTE — CHART NOTE - NSCHARTNOTEFT_GEN_A_CORE
Patient will resume feeds.  creatinine and sodium improving.  optimize nutrition.  monitor bmp.  nephrology will sign off call with questions Improved

## 2023-12-28 NOTE — PROGRESS NOTE ADULT - PROBLEM SELECTOR PROBLEM 2
Stage 4 chronic kidney disease
Hyponatremia
Stage 4 chronic kidney disease
Hypernatremia
Stage 4 chronic kidney disease

## 2023-12-28 NOTE — PROGRESS NOTE ADULT - PROBLEM SELECTOR PLAN 2
Pt with baseline Stage 4 CKD.  - Renally dose meds when re-ordered  - C/w sodium bicarb after PEG placed
Pt with baseline Stage 4 CKD.  - Renally dose meds when re-ordered  - C/w sodium bicarb after PEG placed  - Cr fluctuating, now back up to ~2 - awaiting renal recs
Hypovolemic hyponatremia with D5 W infusion.  Asymptomatic.  Recommend NS at 50 cc / hr check urine sodium and urine osmolality.  REcommend Q6 BMP.
Pt with baseline Stage 4 CKD  - Renally dose meds when re-ordered  - Cw sodium bicarb after PEG placed
Pt with baseline Stage 4 CKD.  - Renally dose meds when re-ordered  - C/w sodium bicarb after PEG placed  - Cr fluctuating, now >2 - likely from urinary retention, c/t monitor
Pt with baseline Stage 4 CKD  - Renally dose meds when re-ordered  - C/w sodium bicarb after PEG placed  - Plan for CT w. IV contrast as described above, concern for significant LESLIE, nephrology consult requested and recommending pre and post procedure hydration
Pt with baseline Stage 4 CKD.  - recent LESLIE 2/2 retention, s/p morales placement with improvement of Cr near to baseline   - morales removed yesterday for TOV - if fails, will replace morales for d/c   - C/w sodium bicarb via PEG   - Renally dose meds
Pt with baseline Stage 4 CKD.  - Renally dose meds when re-ordered  - C/w sodium bicarb after PEG placed
Na 147 today, free water deficit 1.2L. Likely secondary to dehydration.   - Will start D5w at 50 cc/hr for 24 hours and reassess
Pt with baseline Stage 4 CKD  - Renally dose meds when re-ordered  - C/w sodium bicarb after PEG placed
Pt with baseline Stage 4 CKD.  - Renally dose meds when re-ordered  - C/w sodium bicarb after PEG placed
Pt with baseline Stage 4 CKD.  - recent LESLIE 2/2 retention  - now with morales in place, with improvement in Cr near to baseline  - C/w sodium bicarb via PEG   - Renally dose meds when re-ordered  - will TOV 12/27

## 2023-12-28 NOTE — PROGRESS NOTE ADULT - PROBLEM SELECTOR PROBLEM 1
PEG tube malfunction
LESLIE (acute kidney injury)
PEG tube malfunction

## 2023-12-28 NOTE — PROGRESS NOTE ADULT - NSPROGADDITIONALINFOA_GEN_ALL_CORE
Case d/w ACP on IDRs
Case d/w ACP on IDRs  Goals of care ongoing
Case d/w ACP on IDRs
Case d/w ACP on IDRs
Case d/w ACP, pending GI
Case d/w ACP in person
Case d/w ACP on unit
d/c planning once feeds at goal and TOV completed   back to NH

## 2023-12-29 ENCOUNTER — TRANSCRIPTION ENCOUNTER (OUTPATIENT)
Age: 88
End: 2023-12-29

## 2023-12-29 VITALS
HEART RATE: 90 BPM | OXYGEN SATURATION: 97 % | DIASTOLIC BLOOD PRESSURE: 65 MMHG | TEMPERATURE: 98 F | SYSTOLIC BLOOD PRESSURE: 150 MMHG | RESPIRATION RATE: 18 BRPM

## 2023-12-29 LAB
ANION GAP SERPL CALC-SCNC: 12 MMOL/L — SIGNIFICANT CHANGE UP (ref 5–17)
ANION GAP SERPL CALC-SCNC: 12 MMOL/L — SIGNIFICANT CHANGE UP (ref 5–17)
BUN SERPL-MCNC: 12 MG/DL — SIGNIFICANT CHANGE UP (ref 7–23)
BUN SERPL-MCNC: 12 MG/DL — SIGNIFICANT CHANGE UP (ref 7–23)
CALCIUM SERPL-MCNC: 8.3 MG/DL — LOW (ref 8.4–10.5)
CALCIUM SERPL-MCNC: 8.3 MG/DL — LOW (ref 8.4–10.5)
CHLORIDE SERPL-SCNC: 111 MMOL/L — HIGH (ref 96–108)
CHLORIDE SERPL-SCNC: 111 MMOL/L — HIGH (ref 96–108)
CO2 SERPL-SCNC: 21 MMOL/L — LOW (ref 22–31)
CO2 SERPL-SCNC: 21 MMOL/L — LOW (ref 22–31)
CREAT SERPL-MCNC: 1.54 MG/DL — HIGH (ref 0.5–1.3)
CREAT SERPL-MCNC: 1.54 MG/DL — HIGH (ref 0.5–1.3)
EGFR: 32 ML/MIN/1.73M2 — LOW
EGFR: 32 ML/MIN/1.73M2 — LOW
GLUCOSE BLDC GLUCOMTR-MCNC: 133 MG/DL — HIGH (ref 70–99)
GLUCOSE BLDC GLUCOMTR-MCNC: 133 MG/DL — HIGH (ref 70–99)
GLUCOSE BLDC GLUCOMTR-MCNC: 178 MG/DL — HIGH (ref 70–99)
GLUCOSE BLDC GLUCOMTR-MCNC: 178 MG/DL — HIGH (ref 70–99)
GLUCOSE SERPL-MCNC: 169 MG/DL — HIGH (ref 70–99)
GLUCOSE SERPL-MCNC: 169 MG/DL — HIGH (ref 70–99)
POTASSIUM SERPL-MCNC: 3.7 MMOL/L — SIGNIFICANT CHANGE UP (ref 3.5–5.3)
POTASSIUM SERPL-MCNC: 3.7 MMOL/L — SIGNIFICANT CHANGE UP (ref 3.5–5.3)
POTASSIUM SERPL-SCNC: 3.7 MMOL/L — SIGNIFICANT CHANGE UP (ref 3.5–5.3)
POTASSIUM SERPL-SCNC: 3.7 MMOL/L — SIGNIFICANT CHANGE UP (ref 3.5–5.3)
SODIUM SERPL-SCNC: 144 MMOL/L — SIGNIFICANT CHANGE UP (ref 135–145)
SODIUM SERPL-SCNC: 144 MMOL/L — SIGNIFICANT CHANGE UP (ref 135–145)

## 2023-12-29 PROCEDURE — 97116 GAIT TRAINING THERAPY: CPT

## 2023-12-29 PROCEDURE — 97162 PT EVAL MOD COMPLEX 30 MIN: CPT

## 2023-12-29 PROCEDURE — 97530 THERAPEUTIC ACTIVITIES: CPT

## 2023-12-29 PROCEDURE — 84100 ASSAY OF PHOSPHORUS: CPT

## 2023-12-29 PROCEDURE — 85025 COMPLETE CBC W/AUTO DIFF WBC: CPT

## 2023-12-29 PROCEDURE — L8699: CPT

## 2023-12-29 PROCEDURE — 86900 BLOOD TYPING SEROLOGIC ABO: CPT

## 2023-12-29 PROCEDURE — 86901 BLOOD TYPING SEROLOGIC RH(D): CPT

## 2023-12-29 PROCEDURE — 36415 COLL VENOUS BLD VENIPUNCTURE: CPT

## 2023-12-29 PROCEDURE — 86923 COMPATIBILITY TEST ELECTRIC: CPT

## 2023-12-29 PROCEDURE — 86850 RBC ANTIBODY SCREEN: CPT

## 2023-12-29 PROCEDURE — 83036 HEMOGLOBIN GLYCOSYLATED A1C: CPT

## 2023-12-29 PROCEDURE — 85610 PROTHROMBIN TIME: CPT

## 2023-12-29 PROCEDURE — 92610 EVALUATE SWALLOWING FUNCTION: CPT

## 2023-12-29 PROCEDURE — 85730 THROMBOPLASTIN TIME PARTIAL: CPT

## 2023-12-29 PROCEDURE — 71045 X-RAY EXAM CHEST 1 VIEW: CPT

## 2023-12-29 PROCEDURE — 83735 ASSAY OF MAGNESIUM: CPT

## 2023-12-29 PROCEDURE — 36430 TRANSFUSION BLD/BLD COMPNT: CPT

## 2023-12-29 PROCEDURE — 99285 EMERGENCY DEPT VISIT HI MDM: CPT

## 2023-12-29 PROCEDURE — 80048 BASIC METABOLIC PNL TOTAL CA: CPT

## 2023-12-29 PROCEDURE — 74177 CT ABD & PELVIS W/CONTRAST: CPT

## 2023-12-29 PROCEDURE — 81001 URINALYSIS AUTO W/SCOPE: CPT

## 2023-12-29 PROCEDURE — 85027 COMPLETE CBC AUTOMATED: CPT

## 2023-12-29 PROCEDURE — 82962 GLUCOSE BLOOD TEST: CPT

## 2023-12-29 PROCEDURE — 80061 LIPID PANEL: CPT

## 2023-12-29 PROCEDURE — P9040: CPT

## 2023-12-29 PROCEDURE — 84132 ASSAY OF SERUM POTASSIUM: CPT

## 2023-12-29 PROCEDURE — 80053 COMPREHEN METABOLIC PANEL: CPT

## 2023-12-29 RX ADMIN — PANTOPRAZOLE SODIUM 40 MILLIGRAM(S): 20 TABLET, DELAYED RELEASE ORAL at 12:19

## 2023-12-29 RX ADMIN — CHLORHEXIDINE GLUCONATE 1 APPLICATION(S): 213 SOLUTION TOPICAL at 12:19

## 2023-12-29 RX ADMIN — Medication 650 MILLIGRAM(S): at 13:55

## 2023-12-29 RX ADMIN — ESCITALOPRAM OXALATE 10 MILLIGRAM(S): 10 TABLET, FILM COATED ORAL at 12:19

## 2023-12-29 RX ADMIN — Medication 650 MILLIGRAM(S): at 05:08

## 2023-12-29 RX ADMIN — AMLODIPINE BESYLATE 10 MILLIGRAM(S): 2.5 TABLET ORAL at 05:08

## 2023-12-29 NOTE — DISCHARGE NOTE PROVIDER - NSDCFUADDAPPT_GEN_ALL_CORE_FT
APPTS ARE READY TO BE MADE: [ ] YES    Best Family or Patient Contact (if needed):    Additional Information about above appointments (if needed):    1:   2:   3:     Other comments or requests:    APPTS ARE READY TO BE MADE: [x ] YES    Best Family or Patient Contact (if needed):    Additional Information about above appointments (if needed):    1:   2:   3:     Other comments or requests:

## 2023-12-29 NOTE — PROVIDER CONTACT NOTE (OTHER) - BACKGROUND
Admit dx: g tube malfunciton/displacement  PMH: dementia, CAD, CHF, DM2, HTN, HLD, anemia
Admit dx: G tube malfunction/dislodged  PMH: Dementia, HTN, DM2, HLD, DURAN, CAD, CHF, anemia
Pt admitted with gastrostomy malfunction. PMHx HFrEF, anemia, CAD, HTN
Pt admitted for pulling PEG (gastrostomy malfunction) with h/o HF, anemia, DM2, LESLIE, hypernatremia
pmhx CHF, dementia, HTN, CAD
pmhx dementia, anemia, HTN, CAD, CHF

## 2023-12-29 NOTE — DISCHARGE NOTE PROVIDER - HOSPITAL COURSE
HPI:  Pt A&Ox2 which is baseline. Poor historian. Hx obtained via chart review.    89yo F w/Hx of CHF, dementia, CKD S4, Anemia, HTN, HLD, CAD, DM, coming from [facility] due to PEG tube dislodgement. Pt accidently dislodged her PEG tube while at the nursing home. Unwitnessed, occurred around 5pm. Patient was recently admitted on 11/26/23 and discharged on 12/12/23 due to LESLIE and during that hospital stay PEG tube was placed due to concern of patient not eating/having enough PO intake. Patient denies any abdominal pain or nausea. Does not recall what happened.  (16 Dec 2023 03:03)    Hospital Course:  Pt admitted for PEG replacement. GI evaluated pt and after further GOC discussion with family given pts tendency to pull PEG, it was decided to replace PEG, which was performed 12/26. Pt has had abdominal binder in place, restarted on tube feeds and tolerating well. Course c/b urinary retention and hyponatremia; hyponatremia improved after retention resolved. However, pt did not pass TOV so morales catheter was replaced and pt to be discharged with catheter, to attempt TOV at NH.     Pt at baseline mental status.     Important Medication Changes and Reason:  No medication changes     Active or Pending Issues Requiring Follow-up:  f/u PMD, f/u urology for morales removal     Advanced Directives:   [ ] Full code  [ ] DNR  [ ] Hospice    Discharge Diagnoses:  PEG dislodgement  hyponatremia  urinary retention   CAD  HTN  anemia of chronic disease  advanced dementia          HPI:  Pt A&Ox2 which is baseline. Poor historian. Hx obtained via chart review.    89yo F w/Hx of CHF, dementia, CKD S4, Anemia, HTN, HLD, CAD, DM, coming from [facility] due to PEG tube dislodgement. Pt accidently dislodged her PEG tube while at the nursing home. Unwitnessed, occurred around 5pm. Patient was recently admitted on 11/26/23 and discharged on 12/12/23 due to LESLIE and during that hospital stay PEG tube was placed due to concern of patient not eating/having enough PO intake. Patient denies any abdominal pain or nausea. Does not recall what happened.  (16 Dec 2023 03:03)    Hospital Course:  Pt admitted for PEG replacement. GI evaluated pt and after further GOC discussion with family given pts tendency to pull PEG, it was decided to replace PEG, which was performed 12/26. Pt has had abdominal binder in place, restarted on tube feeds and tolerating well. Course c/b urinary retention and hyponatremia; hyponatremia improved after retention resolved. However, pt did not pass TOV so morales catheter was replaced and pt to be discharged with catheter, to attempt TOV at NH.     Pt at baseline mental status.     Important Medication Changes and Reason:  No medication changes     Active or Pending Issues Requiring Follow-up:  f/u PMD, f/u urology for morales removal     Advanced Directives:   [ ] Full code  [ ] DNR  [ ] Hospice    Discharge Diagnoses:  PEG dislodgement  hyponatremia  urinary retention   CAD  HTN  anemia of chronic disease  advanced dementia          HPI:  Pt A&Ox2 which is baseline. Poor historian. Hx obtained via chart review.    89yo F w/Hx of CHF, dementia, CKD S4, Anemia, HTN, HLD, CAD, DM, coming from [facility] due to PEG tube dislodgement. Pt accidently dislodged her PEG tube while at the nursing home. Unwitnessed, occurred around 5pm. Patient was recently admitted on 11/26/23 and discharged on 12/12/23 due to LESLIE and during that hospital stay PEG tube was placed due to concern of patient not eating/having enough PO intake. Patient denies any abdominal pain or nausea. Does not recall what happened.  (16 Dec 2023 03:03)    Hospital Course:  Pt admitted for PEG replacement. GI evaluated pt and after further GOC discussion with family given pts tendency to pull PEG, it was decided to replace PEG, which was performed 12/26. Pt has had abdominal binder in place, restarted on tube feeds and tolerating well. Course c/b urinary retention and hyponatremia; hyponatremia improved after retention resolved. However, pt did not pass TOV so morales catheter was replaced and pt to be discharged with catheter, to attempt TOV at NH.     Pt at baseline mental status.     Important Medication Changes and Reason:  No medication changes     Active or Pending Issues Requiring Follow-up:  f/u PMD, f/u urology for morales removal     Advanced Directives:   [ ] Full code[  ] DNR [  ] Hospice      Discharge Diagnosis   Peg tube malfunction   Hyponatremia   CHF  Dementia   HTN  HLD  CAD  DM   Anemia of chronic disease            HPI:  Pt A&Ox2 which is baseline. Poor historian. Hx obtained via chart review.    89yo F w/Hx of CHF, dementia, CKD S4, Anemia, HTN, HLD, CAD, DM, coming from [facility] due to PEG tube dislodgement. Pt accidently dislodged her PEG tube while at the nursing home. Unwitnessed, occurred around 5pm. Patient was recently admitted on 11/26/23 and discharged on 12/12/23 due to LESLIE and during that hospital stay PEG tube was placed due to concern of patient not eating/having enough PO intake. Patient denies any abdominal pain or nausea. Does not recall what happened.  (16 Dec 2023 03:03)    Hospital Course:  Pt admitted for PEG replacement. GI evaluated pt and after further GOC discussion with family given pts tendency to pull PEG, it was decided to replace PEG, which was performed 12/26. Pt has had abdominal binder in place, restarted on tube feeds and tolerating well. Course c/b urinary retention and hyponatremia; hyponatremia improved after retention resolved. However, pt did not pass TOV so morales catheter was replaced and pt to be discharged with catheter, to attempt TOV at NH.     Pt at baseline mental status.     Important Medication Changes and Reason:  No medication changes     Active or Pending Issues Requiring Follow-up:  f/u PMD, f/u urology for morales removal     Advanced Directives:   [ ] Full code[  ] DNR [  ] Hospice      Discharge Diagnosis   Peg tube malfunction   Hyponatremia   CHF  Dementia   HTN  HLD  CAD  DM   Anemia of chronic disease

## 2023-12-29 NOTE — DISCHARGE NOTE NURSING/CASE MANAGEMENT/SOCIAL WORK - NSDCVIVACCINE_GEN_ALL_CORE_FT
Tdap; 19-Jan-2023 00:14; Abigail Guillen (RN); [employer]; [id number] (Exp. Date: 08-Dec-2024); IntraMuscular; Deltoid Right.; 0.5 milliLiter(s); VIS (VIS Published: 09-May-2013, VIS Presented: 19-Jan-2023);    Tdap; 19-Jan-2023 00:14; Abigail Guillen (RN); [employer]; [id number] (Exp. Date: 08-Dec-2024); IntraMuscular; Deltoid Right.; 0.5 milliLiter(s); VIS (VIS Published: 09-May-2013, VIS Presented: 19-Jan-2023);

## 2023-12-29 NOTE — PROVIDER CONTACT NOTE (OTHER) - ACTION/TREATMENT ORDERED:
Zofran IVP ordered
will order tylenol
ERROL Quarles aware. No new orders at this time.
PA aware, 8 gram dextrose IV push ordered and given, will recheck FS 30 min after administration of dextrose as per PA.
ERROL Quarles notified and entered orders for straight cath. Plan of care ongoing.
DON Machado aware, recheck bladder scan at 23:00 - will endorse to night RN
NP notified and aware. reassess bladder scan again at 17:00.

## 2023-12-29 NOTE — DISCHARGE NOTE NURSING/CASE MANAGEMENT/SOCIAL WORK - NSDCPEFALRISK_GEN_ALL_CORE
For information on Fall & Injury Prevention, visit: [email or web address] OR  [email or web address] OR  https://www.cdc.gov/steadi/patient.html For information on Fall & Injury Prevention, visit: [email or web address] OR  [email or web address]-injury OR  https://www.cdc.gov/steadi/patient.html

## 2023-12-29 NOTE — DISCHARGE NOTE PROVIDER - NSFOLLOWUPCLINICS_GEN_ALL_ED_FT
[facility]  Urology  [address] [place] [address], [place] [address]  Phone: [phone]  Fax:      [facility]  Urology  [address] [place] [address], [place] [address]  Phone: [phone]  Fax:

## 2023-12-29 NOTE — DISCHARGE NOTE NURSING/CASE MANAGEMENT/SOCIAL WORK - PATIENT PORTAL LINK FT
You can access the FollowMyHealth Patient Portal offered by [facility] by registering at the following website: [email or web address]. By joining [employer]’s FollowMyHealth portal, you will also be able to view your health information using other applications (apps) compatible with our system. You can access the FollowMyHealth Patient Portal offered by [facility] by registering at the following website: [email or web address]. By joining [facility]’s FollowMyHealth portal, you will also be able to view your health information using other applications (apps) compatible with our system.

## 2023-12-29 NOTE — DISCHARGE NOTE PROVIDER - NSDCMRMEDTOKEN_GEN_ALL_CORE_FT
aluminum hydroxide-magnesium hydroxide 200 mg-200 mg/5 mL oral suspension: 30 milliliter(s) orally every 4 hours As needed Dyspepsia  amLODIPine 10 mg oral tablet: 1 tab(s) orally once a day  atorvastatin 80 mg oral tablet: 1 tab(s) orally once a day (at bedtime)  cholecalciferol oral tablet: 2000 unit(s) orally once a day  clopidogrel 75 mg oral tablet: 1 tab(s) orally once a day  escitalopram 10 mg oral tablet: 1 tab(s) orally once a day  folic acid 1 mg oral tablet: 1 tab(s) orally once a day  melatonin 5 mg oral tablet: 1 tab(s) orally once a day (at bedtime)  pantoprazole 40 mg oral granule, delayed release: orally once a day  polyethylene glycol 3350 oral powder for reconstitution: 17 gram(s) orally once a day  sodium bicarbonate 650 mg oral tablet: 2 tab(s) orally 3 times a day

## 2023-12-29 NOTE — DISCHARGE NOTE PROVIDER - PROVIDER RX CONTACT NUMBER
PT DAILY TREATMENT NOTE - [facility] 2-15    Patient Name: Tone Henriquez  Date:2020  : 1951  [x]  Patient  Verified  Payor: Darrick Naranjo / Plan: [facility] HUMAN MEDICARE CHOICE PPO/PFFS / Product Type: Managed Care Medicare /    In time:1215  Out time: 1318  Total Treatment Time (min): 63  Total Timed Codes (min): 48  1:1 Treatment Time (MC only): 43  Visit #: 3    Treatment Area: Low back pain [M54.5]  Neck pain [M54.2]    SUBJECTIVE  Pain Level (0-10 scale)4/10 low back 5-6/shoulders  Any medication changes, allergies to medications, adverse drug reactions, diagnosis change, or new procedure performed?: [x] No    [] Yes (see summary sheet for update)  Subjective functional status/changes:   [] No changes reported  Reports she just woke up this morning so she hasn't used her arms as much. Was a little stiff doing the arm bike. OBJECTIVE              Modality rationale: decrease pain and increase tissue extensibility to improve the patients ability to perform ADLs    Min Type Additional Details   15  - Estim: -Att   -Unatt        -TENS instruct                  -IFC  -Premod   -NMES                     -Other:  -w/US   -w/ice   -w/heat  Position: supine  Location:right lumbar, CRESCENCIO UT     -  Traction: - Cervical       -Lumbar                       - Prone          -Supine                       -Intermittent   -Continuous Lbs:  - before manual  - after manual  -w/heat     -  Ultrasound: -Continuous   - Pulsed at:                           -1MHz   -3MHz Location:  W/cm2:     - Paraffin         Location:   -w/heat    -  Ice     -  Heat  -  Ice massage Position:supine  Location:lumbar, cervical     -  Laser  -  Other: Position:  Location:        -  Vasopneumatic Device Pressure:       - lo - med - hi   Temperature:       - Skin assessment post-treatment:  -intact -redness- no adverse reaction    -redness  adverse reaction:      48 min Therapeutic Exercise:  []?  See flow sheet :   Rationale: increase ROM and increase strength to improve the patients ability to perform ADLs        -- min Manual Therapy: STM in left sidelying to the right lumbar paraspinals. Rationale: increase ROM and increase strength to improve the patients ability to perform ADLs                                                                  With   []? TE   []? TA   []? neuro   []? other: Patient Education: [x]? Review HEP    []? Progressed/Changed HEP based on:   []? positioning   []? body mechanics   []? transfers   []? heat/ice application    []? other:       Other Objective/Functional Measures:      Pain Level (0-10 scale) post treatment:  0/10      ASSESSMENT/Changes in Function:   Good toleration of added resistance exercises. Demonstrating improved form and posture throughout. Will progress as she tolerates and pain levels decrease. Responded well to Estim and heat  Patient will continue to benefit from skilled PT services to modify and progress therapeutic interventions, address functional mobility deficits, address ROM deficits, address strength deficits, analyze and address soft tissue restrictions, analyze and cue movement patterns, analyze and modify body mechanics/ergonomics, assess and modify postural abnormalities and instruct in home and community integration to attain remaining goals. [x]  See Plan of Care  []  See progress note/recertification  []  See Discharge Summary         Progress towards goals / Updated goals:  Short Term Goals: To be accomplished in 4 weeks:              Patient will demonstrate 120 degrees of shoulder flexion bilaterallyprogressing              Patient will report a 50% reduction of stiffness in her neck upon waking up- progressing  Long Term Goals:  To be accomplished in 8 weeks:              Patient will improve active cervical side bending and flexion by 10 degrees              Patient will have equivalent UE strength bilaterally as measured by MMT  Frequency / Duration: Patient to be seen 2 times per week for 8 weeks.     Patient/ Caregiver education and instruction: activity modification and exercises    PLAN  [x]  Upgrade activities as tolerated     [x]  Continue plan of care  []  Update interventions per flow sheet       []  Discharge due to:_  []  Other:_      Marylene Apo, PT 12/18/2020 [id number]

## 2023-12-29 NOTE — DISCHARGE NOTE PROVIDER - DETAILS OF MALNUTRITION DIAGNOSIS/DIAGNOSES
This patient has been assessed with a concern for Malnutrition and was treated during this hospitalization for the following Nutrition diagnosis/diagnoses:     -  12/26/2023: Severe protein-calorie malnutrition

## 2023-12-29 NOTE — DISCHARGE NOTE PROVIDER - NSDCCPCAREPLAN_GEN_ALL_CORE_FT
PRINCIPAL DISCHARGE DIAGNOSIS  Diagnosis: PEG tube malfunction  Assessment and Plan of Treatment: Your PEG tube was replaced and you were started on tube feeds, tolerating well. Please keep abdominal binder in place to avoid dislodgement. Follow up with GI.      SECONDARY DISCHARGE DIAGNOSES  Diagnosis: Urinary retention  Assessment and Plan of Treatment: You were found to have urinary retention and a catheter wa splaced. We attempted a trial of void, but you were unable to start urinating on your own, so the morales was relpaced. Please follow-up with your PMD to reattempt TOV at a later time.    Diagnosis: Anemia of chronic disease  Assessment and Plan of Treatment: You were given 1 unit of blood, with improvement and stability of your blood counts. Monitor periodic blood work with PMD.    Diagnosis: Stage 4 chronic kidney disease  Assessment and Plan of Treatment: Your creatinine was monitored, it went up transiently when you started retaining urine, but improved after catheter was placed. Please conitnue to periodically monitor you kindey levels.

## 2023-12-29 NOTE — PROVIDER CONTACT NOTE (OTHER) - DATE AND TIME:
29-Dec-2023 02:57
29-Dec-2023 05:57
27-Dec-2023 01:50
28-Dec-2023 13:30
24-Dec-2023 18:45
28-Dec-2023 17:10
17-Dec-2023 12:30

## 2023-12-29 NOTE — PROVIDER CONTACT NOTE (OTHER) - SITUATION
Patient recently had morales removed, and has since been retaining urine. Straight cath performed once yesterday.
Pt with 1X bout emesis
patient was straight cath x1 overnight. 17:00 bladder scan was 270ml, patient has not voided this shift
Pt had morales removed and needs bladder scan monitoring for urinary retention.
Pt oob to chair, pt is moaning, pt c/o headache
Pt FS 79
patient was straight cathx1 on night shift, bladder scan q8hr volume is 221ml. patient has not voided on my shift

## 2023-12-29 NOTE — DISCHARGE NOTE PROVIDER - ATTENDING DISCHARGE PHYSICAL EXAMINATION:
Pt seen and examined, alert/awake, engaging in conversation, unable to obtain accurate ROS 2/2 baseline dementia     GENERAL: NAD, well-developed, elderly, chronically ill appearing   HEAD:  Atraumatic, normocephalic  EYES: EOMI, conjunctiva and sclera clear  NECK: Supple, no JVD  CHEST/LUNG: Clear to auscultation bilaterally; no wheezing or rales  HEART: Regular rate and rhythm; no murmurs, no LE edema   ABDOMEN: Soft, nontender, nondistended; bowel sounds present, abdominal binder in place   EXTREMITIES:  2+ Peripheral Pulses, no clubbing, cyanosis  PSYCH: AAOx1 (name), calm affect, not anxious  SKIN: No rashes or lesions    Pt at goal TF rate, tolerating well; abdominal binder to stay in place at all times   Pt did not pass TOV, will replace morales catheter   Pt stable for discharge back to NH/[facility] - to reattempt TOV at later time  Pt seen and examined, alert/awake, engaging in conversation, unable to obtain accurate ROS 2/2 baseline dementia     GENERAL: NAD, well-developed, elderly, chronically ill appearing   HEAD:  Atraumatic, normocephalic  EYES: EOMI, conjunctiva and sclera clear  NECK: Supple, no JVD  CHEST/LUNG: Clear to auscultation bilaterally; no wheezing or rales  HEART: Regular rate and rhythm; no murmurs, no LE edema   ABDOMEN: Soft, nontender, nondistended; bowel sounds present, abdominal binder in place   EXTREMITIES:  2+ Peripheral Pulses, no clubbing, cyanosis  PSYCH: AAOx1 (name), calm affect, not anxious  SKIN: No rashes or lesions    Pt at goal TF rate, tolerating well; abdominal binder to stay in place at all times   Pt did not pass TOV, will replace morales catheter   Pt stable for discharge back to NH/[facility] - to reattempt TOV at later time

## 2023-12-29 NOTE — PROVIDER CONTACT NOTE (OTHER) - ASSESSMENT
Bladder scan volume 375cc. No distention noted.
patient was straight cathx1 on night shift, bladder scan q8hr volume is 221ml. patient has not voided on my shift. patient did not complain of pain on palpation and did not feel distended
Pt A&OX1-2, VSS. No distress noted. Pt noted with 1 bout of emesis (clear in color)
Pt confused. Pt NPO -  PEG in place
Bladder scan 448 cc. Underpad is dry. Abdomen midly distended. Tender upon palpation.
patient was straight cath x1 overnight. 17:00 bladder scan was 270ml, patient has not voided this shift. no complaints of pain.

## 2023-12-29 NOTE — DISCHARGE NOTE PROVIDER - CARE PROVIDER_API CALL
Bill Shaffer  Internal Medicine  [address], [place] [address]  Phone: [phone]  Fax: [phone]  Follow Up Time:    Bill Shaffer  Internal Medicine  [address], [place] [address]  Phone: [phone]  Fax: [phone]  Follow Up Time:

## 2024-01-31 NOTE — DISCHARGE NOTE NURSING/CASE MANAGEMENT/SOCIAL WORK - NSDCFUADDAPPT_GEN_ALL_CORE_FT
series) Never done    Depression Screen  Never done    HIV screen  Never done    Chlamydia/GC screen  Never done    Hepatitis C screen  Never done    DTaP/Tdap/Td vaccine (1 - Tdap) Never done    Pap smear  Never done    Flu vaccine (1) Never done        -[facility]  [address]  [place], [place]  Ph: [phone]  
visit.    I have discussed the diagnosis with the patient and the intended plan as seen in the above orders, as well as medication side effects and warnings.  The plan of care was reviewed with the patient, accepted their input and they have verbalized their agreement with the treatment plan. The patient has the right to refuse treatment at anytime, even after the visit has concluded. If he/she changes their mind after visit is completed and would like an alteration to the agreed up plan, another visit may be necessary to discuss an alternate plan. The patient has received an After-Visit Summary for today's visit.     Thank you allowing me to be a part of your care, If you have any further questions please reach out to me via [employer] Message or call [facility] office at [phone].      Electronically signed  Thao Anderson, Family Nurse Practitioner       Please note: This document has been created using a voice recognition software. Unrecognized errors in transcription may be present.    
APPTS ARE READY TO BE MADE: [x ] YES    Best Family or Patient Contact (if needed):    Additional Information about above appointments (if needed):    1:   2:   3:     Other comments or requests:

## 2024-02-02 NOTE — CHART NOTE - NSCHARTNOTEFT_GEN_A_CORE
Patient is a 74 year old F w pmhx AUD., barrets esophagus, colitis, a fib on eloquis, and spinal stenosis who presents with 48 hours of shortness of breath. At baseline patient has shortness of breath when ambulating, now patient feels shortness of breath when at rest. The patient says that her symptoms had gotten better as of 4 hours ago but still wanted to get checked out by a physician. The patient denies chest pain, cough, sick contacts, jaw or arm pain, fevers, chills, nausea or vomitting. She describes slight pressure on her chest when ambulating but had a stress test two months ago that was normal. The patient does not smoke but drinks two - three glasses of wine per day. Informed by ED attending just now,  Dr. Grover; patient admitted to Dr. Hoffman overnight but away and requested his covering physicians Dr. Chacko or Dr. Webster to see; Both are unable to see and Patient admitted to hospitalist service. Will staff patient; d/w MAR Patient is a 74 year old F w pmhx AUD., barrets esophagus, colitis, a fib on eloquis, and spinal stenosis who presents with 48 hours of shortness of breath. At baseline patient has shortness of breath when ambulating, now patient feels shortness of breath when at rest. The patient says that her symptoms had gotten better as of 4 hours ago but still wanted to get checked out by a physician. The patient denies chest pain, cough, sick contacts, jaw or arm pain, fevers, chills, nausea or vomiting. She describes pressure on her chest when she has shortness of breath. Patient had a stress test two months ago that was normal. The patient does not smoke but drinks two - three glasses of wine per day. She denies hx of asthma, COPD, or black stools. Last colonscopy was April 2023 - only polyps were found. Patient is a 74 year old F w pmhx AUD., barrets esophagus, colitis, a fib on Eliquis, and spinal stenosis who presents with 48 hours of shortness of breath. At baseline patient has shortness of breath when ambulating, now patient feels shortness of breath when at rest. currently at rest feeling ok.  The patient denies chest pain, cough, sick contacts, jaw or arm pain, fevers, chills, nausea or vomiting. She describes pressure on her chest when she has shortness of breath. Patient had a stress test two months ago that was normal. The patient does not smoke but drinks two - three glasses of wine per day. She denies hx of asthma, COPD, or black stools. Last colonscopy was April 2023 - only polyps were found. Patient is a 74 year old F w pmhx AUD., barrets esophagus, colitis, a fib on Eliquis, and spinal stenosis who presents with 48 hours of shortness of breath. At baseline patient has shortness of breath when ambulating, now patient feels shortness of breath when at rest and new exertional chest tightness . currently at rest feeling ok.  The patient denies , cough, sick contacts, jaw or arm pain, fevers, chills, nausea or vomiting. She describes pressure on her chest simultaneous when she has shortness of breath. The patient does not smoke but drinks two - three glasses of wine at night. She denies hx of asthma, COPD, or black stools. Last colonscopy was April 2023 - only polyps were found.

## 2024-05-15 NOTE — PATIENT PROFILE ADULT - DISASTER - TOBACCO USE
History Of Present Illness  Daphne Morris is a 70 y.o. female presenting with a PMHx of recurrent falls, cellulitis, CKD on dialysis, HTN, T2DM on insulin, anemia secondary to CKD, hypothyroidism, afib not on AC, and depression with anxiety who presents from [facility] for outpatient failure of abx for cellulitis. Pt states she has had recurrent episodes of trauma to the arms and legs causing ulcerative wounds that are incredibly painful. She was discharged home from the hospital 5/2 with a course of oral cephalexin and seen by home healthcare. She went to dialysis today and the dialysis nurses told her to go to the ED due to erythema of the bilateral legs and her chronic leg ulcers. Pt states worsening of the lower leg ulcers on her [birth year] after she tripped using her walker while out at a [employer] (mechanical fall). She reports no systemic systems including f/c, n/v, abdominal pain, syncope, malaise. She gets dialysis Tuesdays, Thursdays, Saturdays. Has not missed session recently.     Reviewing medical chart, she has been treated for recurrent cellulitis with media images of many ulcers present in the EMR. She has never had fever or elevated WBC. She states she has pain with her ulcers, but has not noticed swelling or spreading erythema. Denies legs are warm to the touch. A wound culture was obtained in April (unclear source) and showed MRSA with VRE.     ED course: Xray of tibia and fibula unremarkable except for soft tissue ulcer. CT C spine and head clear.  CMP with glucose 166, Cr 1.51, Ca 7.8, albumin 2.7, lactate normal, blood culture x2 obtained  CBC with Hb of 9.8  Treated with Vanc, Ceftriaxone    Floor Course: Continued Vancomycin. 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 ESR, CRP elevated. Consulted ID and Nephrology. Plan for HD 5/16. Currently on 2 L O2 NC,patient on RA at baseline. Continue to wean. Continue Vancomycin and HD. Overnight 5/16-5/17,  patient had increased  work of breathing around 3 am. Patient was given albuterol, EKG and D-Dimer were obtained, and patient was put on 6L O2 with improvement. D-dimer was over 3,000. Heparin drip was started, as was V/Q scan which showed intermediate risk of PE. VBG showed pH 7.25, CO2 70, Bicarb 30.  This morning (5/17), patient denied SOB, currently on 4 L O2 NC. V/Q scan showed intermediate risk of PE. Will obtain CT PE after discussion with Nephrology due to patient's scheduled HD. Ordered US of lower extremities. Ordered repeat VBG, can consider Bipap based on results. Continue Vancomycin. As of 5/18, PE and DVT workup negative, discontinued heparin drip. Found to have significant pleural effusion on CT Angio; significant right-sided pleural effusion with surrounding subsegmental atelectasis; trace left-sided pleural effusion with surrounding atelectasis; stable tree-in-bud infiltrates noted in the right lung could be infectious. Incentive spirometer. Consulted Pulmonology. As of 5/19, patient had thoracentesis with removal of 1300 mL light yellow colored fluid. Continued vancomycin. Ordered PT/OT eval for recurrent falls/deconditioning. Patient currently on 3 L O2, continue to wean as tolerated. As of 5/20, patient is on RA. Patient refusing SNF, is medically ready for discharge. Needs one more dose of vancomycin after dialysis 5/21.    Never smoker

## 2024-05-30 NOTE — PROVIDER CONTACT NOTE (OTHER) - REASON
48 hour Holter monitor   Echocardiogram  Follow up in Cardiology Clinic in 6 months or sooner pending results   Follow up with PCP as directed   patient refusing AM PO meds

## 2024-06-01 NOTE — H&P ADULT - DOES THIS PATIENT HAVE A HISTORY OF OR HAS BEEN DX WITH HEART FAILURE?
Problem: Skin Injury Risk Increased  Goal: Skin Health and Integrity  Outcome: Progressing     Problem: Adult Inpatient Plan of Care  Goal: Plan of Care Review  Outcome: Progressing  Goal: Patient-Specific Goal (Individualized)  Outcome: Progressing  Goal: Absence of Hospital-Acquired Illness or Injury  Outcome: Progressing  Goal: Optimal Comfort and Wellbeing  Outcome: Progressing  Goal: Readiness for Transition of Care  Outcome: Progressing     Problem: Infection  Goal: Absence of Infection Signs and Symptoms  Outcome: Progressing     Problem: Wound  Goal: Optimal Coping  Outcome: Progressing  Goal: Optimal Functional Ability  Outcome: Progressing  Goal: Absence of Infection Signs and Symptoms  Outcome: Progressing  Goal: Improved Oral Intake  Outcome: Progressing  Goal: Optimal Pain Control and Function  Outcome: Progressing  Goal: Skin Health and Integrity  Outcome: Progressing  Goal: Optimal Wound Healing  Outcome: Progressing     Problem: Sepsis/Septic Shock  Goal: Optimal Coping  Outcome: Progressing  Goal: Absence of Bleeding  Outcome: Progressing  Goal: Blood Glucose Level Within Targeted Range  Outcome: Progressing  Goal: Absence of Infection Signs and Symptoms  Outcome: Progressing  Goal: Optimal Nutrition Intake  Outcome: Progressing     Problem: Diabetes Comorbidity  Goal: Blood Glucose Level Within Targeted Range  Outcome: Progressing      no

## 2024-06-11 ENCOUNTER — INPATIENT (INPATIENT)
Facility: HOSPITAL | Age: 89
LOS: 2 days | Discharge: NURSING, REHAB OR LONG-TERM CARE | DRG: 812 | End: 2024-06-14
Attending: INTERNAL MEDICINE | Admitting: INTERNAL MEDICINE
Payer: MEDICARE

## 2024-06-11 VITALS
SYSTOLIC BLOOD PRESSURE: 112 MMHG | OXYGEN SATURATION: 94 % | TEMPERATURE: 98 F | HEART RATE: 73 BPM | HEIGHT: 59 IN | WEIGHT: 105.82 LBS | DIASTOLIC BLOOD PRESSURE: 65 MMHG | RESPIRATION RATE: 16 BRPM

## 2024-06-11 DIAGNOSIS — Z96.642 PRESENCE OF LEFT ARTIFICIAL HIP JOINT: Chronic | ICD-10-CM

## 2024-06-11 DIAGNOSIS — D64.9 ANEMIA, UNSPECIFIED: ICD-10-CM

## 2024-06-11 LAB
ALBUMIN SERPL ELPH-MCNC: 2.3 G/DL — LOW (ref 3.5–5)
ALP SERPL-CCNC: 95 U/L — SIGNIFICANT CHANGE UP (ref 40–120)
ALT FLD-CCNC: 56 U/L DA — SIGNIFICANT CHANGE UP (ref 10–60)
ANION GAP SERPL CALC-SCNC: 6 MMOL/L — SIGNIFICANT CHANGE UP (ref 5–17)
ANISOCYTOSIS BLD QL: SLIGHT — SIGNIFICANT CHANGE UP
APTT BLD: 30.9 SEC — SIGNIFICANT CHANGE UP (ref 24.5–35.6)
AST SERPL-CCNC: 41 U/L — HIGH (ref 10–40)
BASOPHILS # BLD AUTO: 0.01 K/UL — SIGNIFICANT CHANGE UP (ref 0–0.2)
BASOPHILS NFR BLD AUTO: 0.1 % — SIGNIFICANT CHANGE UP (ref 0–2)
BILIRUB SERPL-MCNC: 0.4 MG/DL — SIGNIFICANT CHANGE UP (ref 0.2–1.2)
BLD GP AB SCN SERPL QL: SIGNIFICANT CHANGE UP
BUN SERPL-MCNC: 30 MG/DL — HIGH (ref 7–18)
CALCIUM SERPL-MCNC: 9.1 MG/DL — SIGNIFICANT CHANGE UP (ref 8.4–10.5)
CHLORIDE SERPL-SCNC: 106 MMOL/L — SIGNIFICANT CHANGE UP (ref 96–108)
CO2 SERPL-SCNC: 28 MMOL/L — SIGNIFICANT CHANGE UP (ref 22–31)
CREAT SERPL-MCNC: 0.88 MG/DL — SIGNIFICANT CHANGE UP (ref 0.5–1.3)
DACRYOCYTES BLD QL SMEAR: SLIGHT — SIGNIFICANT CHANGE UP
EGFR: 62 ML/MIN/1.73M2 — SIGNIFICANT CHANGE UP
ELLIPTOCYTES BLD QL SMEAR: SLIGHT — SIGNIFICANT CHANGE UP
EOSINOPHIL # BLD AUTO: 0.02 K/UL — SIGNIFICANT CHANGE UP (ref 0–0.5)
EOSINOPHIL NFR BLD AUTO: 0.2 % — SIGNIFICANT CHANGE UP (ref 0–6)
GLUCOSE SERPL-MCNC: 151 MG/DL — HIGH (ref 70–99)
HCT VFR BLD CALC: 22.5 % — LOW (ref 34.5–45)
HGB BLD-MCNC: 7 G/DL — CRITICAL LOW (ref 11.5–15.5)
HYPOCHROMIA BLD QL: SLIGHT — SIGNIFICANT CHANGE UP
IMM GRANULOCYTES NFR BLD AUTO: 0.6 % — SIGNIFICANT CHANGE UP (ref 0–0.9)
INR BLD: 1.04 RATIO — SIGNIFICANT CHANGE UP (ref 0.85–1.18)
LG PLATELETS BLD QL AUTO: SLIGHT — SIGNIFICANT CHANGE UP
LYMPHOCYTES # BLD AUTO: 0.91 K/UL — LOW (ref 1–3.3)
LYMPHOCYTES # BLD AUTO: 8.7 % — LOW (ref 13–44)
MAGNESIUM SERPL-MCNC: 2 MG/DL — SIGNIFICANT CHANGE UP (ref 1.6–2.6)
MANUAL SMEAR VERIFICATION: SIGNIFICANT CHANGE UP
MCHC RBC-ENTMCNC: 24.3 PG — LOW (ref 27–34)
MCHC RBC-ENTMCNC: 31.1 GM/DL — LOW (ref 32–36)
MCV RBC AUTO: 78.1 FL — LOW (ref 80–100)
MICROCYTES BLD QL: SLIGHT — SIGNIFICANT CHANGE UP
MONOCYTES # BLD AUTO: 0.94 K/UL — HIGH (ref 0–0.9)
MONOCYTES NFR BLD AUTO: 9 % — SIGNIFICANT CHANGE UP (ref 2–14)
NEUTROPHILS # BLD AUTO: 8.5 K/UL — HIGH (ref 1.8–7.4)
NEUTROPHILS NFR BLD AUTO: 81.4 % — HIGH (ref 43–77)
NRBC # BLD: 0 /100 WBCS — SIGNIFICANT CHANGE UP (ref 0–0)
NT-PROBNP SERPL-SCNC: 6678 PG/ML — HIGH (ref 0–450)
PLAT MORPH BLD: NORMAL — SIGNIFICANT CHANGE UP
PLATELET # BLD AUTO: 437 K/UL — HIGH (ref 150–400)
POIKILOCYTOSIS BLD QL AUTO: SIGNIFICANT CHANGE UP
POTASSIUM SERPL-MCNC: 3.6 MMOL/L — SIGNIFICANT CHANGE UP (ref 3.5–5.3)
POTASSIUM SERPL-SCNC: 3.6 MMOL/L — SIGNIFICANT CHANGE UP (ref 3.5–5.3)
PROT SERPL-MCNC: 6.6 G/DL — SIGNIFICANT CHANGE UP (ref 6–8.3)
PROTHROM AB SERPL-ACNC: 11.8 SEC — SIGNIFICANT CHANGE UP (ref 9.5–13)
RBC # BLD: 2.88 M/UL — LOW (ref 3.8–5.2)
RBC # FLD: 15.5 % — HIGH (ref 10.3–14.5)
RBC BLD AUTO: ABNORMAL
SODIUM SERPL-SCNC: 140 MMOL/L — SIGNIFICANT CHANGE UP (ref 135–145)
WBC # BLD: 10.44 K/UL — SIGNIFICANT CHANGE UP (ref 3.8–10.5)
WBC # FLD AUTO: 10.44 K/UL — SIGNIFICANT CHANGE UP (ref 3.8–10.5)

## 2024-06-11 PROCEDURE — 99285 EMERGENCY DEPT VISIT HI MDM: CPT

## 2024-06-11 PROCEDURE — 71045 X-RAY EXAM CHEST 1 VIEW: CPT | Mod: 26

## 2024-06-11 RX ORDER — ACETAMINOPHEN 500 MG
650 TABLET ORAL EVERY 6 HOURS
Refills: 0 | Status: DISCONTINUED | OUTPATIENT
Start: 2024-06-11 | End: 2024-06-12

## 2024-06-11 RX ORDER — SODIUM BICARBONATE 1 MEQ/ML
650 SYRINGE (ML) INTRAVENOUS THREE TIMES A DAY
Refills: 0 | Status: DISCONTINUED | OUTPATIENT
Start: 2024-06-11 | End: 2024-06-12

## 2024-06-11 RX ORDER — AMLODIPINE BESYLATE 2.5 MG/1
10 TABLET ORAL DAILY
Refills: 0 | Status: DISCONTINUED | OUTPATIENT
Start: 2024-06-11 | End: 2024-06-12

## 2024-06-11 RX ORDER — PANTOPRAZOLE SODIUM 20 MG/1
40 TABLET, DELAYED RELEASE ORAL
Refills: 0 | Status: DISCONTINUED | OUTPATIENT
Start: 2024-06-11 | End: 2024-06-13

## 2024-06-11 RX ORDER — ALBUTEROL 90 UG/1
2 AEROSOL, METERED ORAL EVERY 6 HOURS
Refills: 0 | Status: DISCONTINUED | OUTPATIENT
Start: 2024-06-11 | End: 2024-06-14

## 2024-06-11 RX ORDER — ESOMEPRAZOLE MAGNESIUM 40 MG/1
1 CAPSULE, DELAYED RELEASE ORAL
Refills: 0 | DISCHARGE

## 2024-06-11 RX ORDER — ENOXAPARIN SODIUM 100 MG/ML
40 INJECTION SUBCUTANEOUS EVERY 24 HOURS
Refills: 0 | Status: DISCONTINUED | OUTPATIENT
Start: 2024-06-11 | End: 2024-06-11

## 2024-06-11 RX ORDER — SODIUM CHLORIDE 9 MG/ML
1000 INJECTION INTRAMUSCULAR; INTRAVENOUS; SUBCUTANEOUS ONCE
Refills: 0 | Status: DISCONTINUED | OUTPATIENT
Start: 2024-06-11 | End: 2024-06-11

## 2024-06-11 RX ORDER — LACTULOSE 10 G/15ML
10 SOLUTION ORAL DAILY
Refills: 0 | Status: DISCONTINUED | OUTPATIENT
Start: 2024-06-11 | End: 2024-06-12

## 2024-06-11 RX ORDER — CHOLECALCIFEROL (VITAMIN D3) 125 MCG
2000 CAPSULE ORAL DAILY
Refills: 0 | Status: DISCONTINUED | OUTPATIENT
Start: 2024-06-11 | End: 2024-06-12

## 2024-06-11 RX ORDER — ESCITALOPRAM OXALATE 10 MG/1
2 TABLET, FILM COATED ORAL
Refills: 0 | DISCHARGE

## 2024-06-11 RX ORDER — FOLIC ACID 0.8 MG
1 TABLET ORAL DAILY
Refills: 0 | Status: DISCONTINUED | OUTPATIENT
Start: 2024-06-11 | End: 2024-06-12

## 2024-06-11 RX ORDER — ALBUTEROL 90 UG/1
3 AEROSOL, METERED ORAL
Refills: 0 | DISCHARGE

## 2024-06-11 RX ORDER — ATORVASTATIN CALCIUM 80 MG/1
80 TABLET, FILM COATED ORAL AT BEDTIME
Refills: 0 | Status: DISCONTINUED | OUTPATIENT
Start: 2024-06-11 | End: 2024-06-12

## 2024-06-11 RX ORDER — ESCITALOPRAM OXALATE 10 MG/1
10 TABLET, FILM COATED ORAL DAILY
Refills: 0 | Status: DISCONTINUED | OUTPATIENT
Start: 2024-06-11 | End: 2024-06-12

## 2024-06-11 RX ORDER — LACTULOSE 10 G/15ML
1 SOLUTION ORAL
Refills: 0 | DISCHARGE

## 2024-06-11 RX ORDER — LANOLIN ALCOHOL/MO/W.PET/CERES
1 CREAM (GRAM) TOPICAL
Refills: 0 | DISCHARGE

## 2024-06-11 RX ORDER — LANOLIN ALCOHOL/MO/W.PET/CERES
3 CREAM (GRAM) TOPICAL AT BEDTIME
Refills: 0 | Status: DISCONTINUED | OUTPATIENT
Start: 2024-06-11 | End: 2024-06-12

## 2024-06-11 NOTE — H&P ADULT - PROBLEM SELECTOR PLAN 4
- Patient with history of HLD  - Patient on Atorvastatin at home  - Continue home antihyperlipidemic

## 2024-06-11 NOTE — H&P ADULT - PROBLEM SELECTOR PLAN 5
- Patient with history of DM  - Patient not on antidm meds at home  - Start patient on Insulin sliding scale ACHS  - Start patient on Finger sticks ACHS  - Start Diabetic Diet

## 2024-06-11 NOTE — H&P ADULT - NSHPPHYSICALEXAM_GEN_ALL_CORE
T(C): 36.8 (06-12-24 @ 00:26), Max: 37.1 (06-11-24 @ 17:42)  T(F): 98.2 (06-12-24 @ 00:26), Max: 98.8 (06-11-24 @ 17:42)  HR: 72 (06-12-24 @ 00:26) (68 - 73)  BP: 142/62 (06-12-24 @ 00:26) (108/60 - 142/62)  RR: 18 (06-12-24 @ 00:26) (16 - 18)  SpO2: 95% (06-12-24 @ 00:26) (94% - 95%)    GENERAL: NAD; lying in bed; Pleasant   HEAD:  Atraumatic, Normocephalic  EYES: EOMI, PERRLA, conjunctiva and sclera clear  ENMT: No tonsillar erythema, exudates, or enlargement; Moist mucous membranes  NECK: Supple, normal appearance, No JVD; Normal thyroid; Trachea midline  NERVOUS SYSTEM:  Alert & Oriented X3,  Motor Strength 5/5 B/L upper and lower extremities, sensation intact  CHEST/LUNG: Lungs clear to auscultation bilaterally, No rales, rhonchi, wheezing   HEART: Regular rate and rhythm; No murmurs, rubs, or gallops  ABDOMEN: Soft, Nontender, Nondistended; Bowel sounds present  EXTREMITIES:  2+ Peripheral Pulses, No clubbing, cyanosis, or edema  SKIN: No rashes or lesions; T(C): 36.8 (06-12-24 @ 00:26), Max: 37.1 (06-11-24 @ 17:42)  T(F): 98.2 (06-12-24 @ 00:26), Max: 98.8 (06-11-24 @ 17:42)  HR: 72 (06-12-24 @ 00:26) (68 - 73)  BP: 142/62 (06-12-24 @ 00:26) (108/60 - 142/62)  RR: 18 (06-12-24 @ 00:26) (16 - 18)  SpO2: 95% (06-12-24 @ 00:26) (94% - 95%)    GENERAL: NAD; lying in bed; Pleasant   HEAD:  Atraumatic, Normocephalic  EYES: EOMI, PERRLA, conjunctiva and sclera clear  ENMT: No tonsillar erythema, exudates, or enlargement;   NECK: Supple, normal appearance, No JVD; Trachea midline  NERVOUS SYSTEM:  Alert & Oriented X1, Non focal  CHEST/LUNG: Lungs clear to auscultation bilaterally, No rales, rhonchi, wheezing   HEART: Regular rate and rhythm; No murmurs, rubs, or gallops  ABDOMEN: Soft, Nontender, Nondistended  EXTREMITIES:  2+ Peripheral Pulses, No clubbing, cyanosis, or edema  SKIN: No rashes or lesions;

## 2024-06-11 NOTE — ED ADULT NURSE NOTE - NSFALLRISKINTERV_ED_ALL_ED
Assistance OOB with selected safe patient handling equipment if applicable/Communicate fall risk and risk factors to all staff, patient, and family/Provide visual cue: yellow wristband, yellow gown, etc/Reinforce activity limits and safety measures with patient and family/Toileting schedule using arm’s reach rule for commode and bathroom/Call bell, personal items and telephone in reach/Instruct patient to call for assistance before getting out of bed/chair/stretcher/Non-slip footwear applied when patient is off stretcher/[place] to call system/Physically safe environment - no spills, clutter or unnecessary equipment/Purposeful Proactive Rounding/Room/bathroom lighting operational, light cord in reach

## 2024-06-11 NOTE — H&P ADULT - PROBLEM SELECTOR PLAN 3
- Patient with history of HTN  - Patient on Amlodipine at home  - Continue home antihypertensives with holding parameters

## 2024-06-11 NOTE — H&P ADULT - ATTENDING COMMENTS
Pt lacks any signs / symptoms to indicate any active GI bleed. However, pt is set to receive 2 units of PRBC transfusion in addition to IV PPI for now. Will f/u Fe+ studies and retic count in the mean time. GI consult to follow.

## 2024-06-11 NOTE — H&P ADULT - PROBLEM SELECTOR PLAN 6
- History of CHF with reduced ejection fraction  - Echo from 2023: Left ventricular systolic function is moderately to severely decreased with an EF of 39%

## 2024-06-11 NOTE — ED PROVIDER NOTE - CLINICAL SUMMARY MEDICAL DECISION MAKING FREE TEXT BOX
89-year-old past medical history of dementia, CHF, CKD, anemia, hypertension, hyperlipidemia, coronary artery disease, diabetes.  She is presenting to the emergency department with report of abnormal hemoglobin.  Reported to be 6.7.  Patient has no shortness of breath but patient is a poor historian and unable to provide a Cognistat history oriented to place and person but not to time.  Patient is chronically ill-appearing, elderly appears stated age, abdomen soft nontender nondistended, does have conjunctival pallor.  Will send basic labs including type and screen.  Will need rectal examination.  Anticipate admission for anemia.  As per chart review her baseline hemoglobin is around 9. 89-year-old past medical history of dementia, CHF, CKD, anemia, hypertension, hyperlipidemia, coronary artery disease, diabetes.  She is presenting to the emergency department with report of abnormal hemoglobin.  Reported to be 6.7.  Patient has no shortness of breath but patient is a poor historian and unable to provide a Cognistat history oriented to place and person but not to time.  Patient is chronically ill-appearing, elderly appears stated age, abdomen soft nontender nondistended, does have conjunctival pallor.  Will send basic labs including type and screen.  Will need rectal examination.  Anticipate admission for anemia.  As per chart review her baseline hemoglobin is around 9.    ===================================  update (Emile Rodriguez MD; attending emergency medicine and medical toxicology)    CBC with anemia given CHF will transfuse patient, family member daughter consented to blood over the phone required admission to the hospital for management of anemia and CHF concomitantly.  Case discussed with accepting physician.  The patient's condition was not amenable to outpatient treatment due to either the lack of feasibility of outpatient care coordination, possibility for further decompensation with adverse outcome if discharge, or treatments and diagnostic  modalities only available during an inpatient hospitalization.    no melena, of note

## 2024-06-11 NOTE — H&P ADULT - PROBLEM SELECTOR PLAN 1
- Presented with Hb of 6.8 in outpatient Rehab  - Hemodynamically stable and afebrile  - Hb in ED of 7  - S/P 1U PRBC in ED  - Goal Hb of 8 given cardiac history  - No signs of active bleeding  - BID PPI for now  - NPO  - Monitor for signs of active bleeding  - Consider anemia labs however patient s/p 1U PRBC in ED - Presented with Hb of 6.8 in outpatient Rehab  - Hemodynamically stable and afebrile  - Hb in ED of 7  - S/P 1U PRBC in ED  - Goal Hb of 8 given cardiac history  - No signs of active bleeding  - BID PPI for now  - NPO  - Monitor for signs of active bleeding  - Consider anemia labs however patient s/p 1U PRBC in ED  - GI Dr. Forbes consulted

## 2024-06-11 NOTE — H&P ADULT - HISTORY OF PRESENT ILLNESS
Patient is a 89 [facility], with PMH of HTN, HLD, DM2, CAD, HFrEF, CKD4, Peg placement, TIA, and anemia who was sent into the ED for anemia.  Patient poor historian.  Patient states she is feeling well and does not know why she was sent into the hospital.  Patients Hb found to be 6.7 at the rehab facility.  Patient denies all acute medical complaints at this time.  patient denies signs of active bleeding, denies melena, or BRBPR.  Patient denies nausea, vomiting, headache, blurry vision, dizziness, chest pain, abdominal pain, constipation, diarrhea, leg swelling.

## 2024-06-11 NOTE — H&P ADULT - ASSESSMENT
Patient is a 89 [facility], with PMH of HTN, HLD, DM2, CAD, HFrEF, CKD4, Peg placement, TIA, and anemia who was sent into the ED for anemia.  In the ED patient hemodynamically stable and afebrile.  Patients labels showing Hb of 7.  Patient s/p 1U PRBC in the ED.  Patient is being admitted to medicine for anemia.

## 2024-06-12 DIAGNOSIS — N18.4 CHRONIC KIDNEY DISEASE, STAGE 4 (SEVERE): ICD-10-CM

## 2024-06-12 DIAGNOSIS — R05.9 COUGH, UNSPECIFIED: ICD-10-CM

## 2024-06-12 DIAGNOSIS — I50.22 CHRONIC SYSTOLIC (CONGESTIVE) HEART FAILURE: ICD-10-CM

## 2024-06-12 DIAGNOSIS — Z75.8 OTHER PROBLEMS RELATED TO MEDICAL FACILITIES AND OTHER HEALTH CARE: ICD-10-CM

## 2024-06-12 DIAGNOSIS — I10 ESSENTIAL (PRIMARY) HYPERTENSION: ICD-10-CM

## 2024-06-12 DIAGNOSIS — E78.5 HYPERLIPIDEMIA, UNSPECIFIED: ICD-10-CM

## 2024-06-12 DIAGNOSIS — D64.9 ANEMIA, UNSPECIFIED: ICD-10-CM

## 2024-06-12 DIAGNOSIS — E11.9 TYPE 2 DIABETES MELLITUS WITHOUT COMPLICATIONS: ICD-10-CM

## 2024-06-12 DIAGNOSIS — Z29.9 ENCOUNTER FOR PROPHYLACTIC MEASURES, UNSPECIFIED: ICD-10-CM

## 2024-06-12 LAB
ANION GAP SERPL CALC-SCNC: 6 MMOL/L — SIGNIFICANT CHANGE UP (ref 5–17)
BUN SERPL-MCNC: 22 MG/DL — HIGH (ref 7–18)
CALCIUM SERPL-MCNC: 8.9 MG/DL — SIGNIFICANT CHANGE UP (ref 8.4–10.5)
CHLORIDE SERPL-SCNC: 109 MMOL/L — HIGH (ref 96–108)
CO2 SERPL-SCNC: 25 MMOL/L — SIGNIFICANT CHANGE UP (ref 22–31)
CREAT SERPL-MCNC: 0.79 MG/DL — SIGNIFICANT CHANGE UP (ref 0.5–1.3)
EGFR: 70 ML/MIN/1.73M2 — SIGNIFICANT CHANGE UP
FERRITIN SERPL-MCNC: 83 NG/ML — SIGNIFICANT CHANGE UP (ref 13–330)
GLUCOSE BLDC GLUCOMTR-MCNC: 119 MG/DL — HIGH (ref 70–99)
GLUCOSE BLDC GLUCOMTR-MCNC: 136 MG/DL — HIGH (ref 70–99)
GLUCOSE SERPL-MCNC: 98 MG/DL — SIGNIFICANT CHANGE UP (ref 70–99)
HCT VFR BLD CALC: 25.5 % — LOW (ref 34.5–45)
HCT VFR BLD CALC: 27.6 % — LOW (ref 34.5–45)
HGB BLD-MCNC: 8.4 G/DL — LOW (ref 11.5–15.5)
HGB BLD-MCNC: 8.8 G/DL — LOW (ref 11.5–15.5)
IRON SATN MFR SERPL: 31 UG/DL — LOW (ref 40–150)
IRON SATN MFR SERPL: 7 % — LOW (ref 15–50)
MAGNESIUM SERPL-MCNC: 2.3 MG/DL — SIGNIFICANT CHANGE UP (ref 1.6–2.6)
MCHC RBC-ENTMCNC: 25.4 PG — LOW (ref 27–34)
MCHC RBC-ENTMCNC: 26.2 PG — LOW (ref 27–34)
MCHC RBC-ENTMCNC: 31.9 GM/DL — LOW (ref 32–36)
MCHC RBC-ENTMCNC: 32.9 GM/DL — SIGNIFICANT CHANGE UP (ref 32–36)
MCV RBC AUTO: 79.4 FL — LOW (ref 80–100)
MCV RBC AUTO: 79.8 FL — LOW (ref 80–100)
NRBC # BLD: 0 /100 WBCS — SIGNIFICANT CHANGE UP (ref 0–0)
NRBC # BLD: 0 /100 WBCS — SIGNIFICANT CHANGE UP (ref 0–0)
PHOSPHATE SERPL-MCNC: 2.6 MG/DL — SIGNIFICANT CHANGE UP (ref 2.5–4.5)
PLATELET # BLD AUTO: 411 K/UL — HIGH (ref 150–400)
PLATELET # BLD AUTO: 439 K/UL — HIGH (ref 150–400)
POTASSIUM SERPL-MCNC: 3.6 MMOL/L — SIGNIFICANT CHANGE UP (ref 3.5–5.3)
POTASSIUM SERPL-SCNC: 3.6 MMOL/L — SIGNIFICANT CHANGE UP (ref 3.5–5.3)
RBC # BLD: 3.21 M/UL — LOW (ref 3.8–5.2)
RBC # BLD: 3.37 M/UL — LOW (ref 3.8–5.2)
RBC # BLD: 3.46 M/UL — LOW (ref 3.8–5.2)
RBC # FLD: 15.1 % — HIGH (ref 10.3–14.5)
RBC # FLD: 15.3 % — HIGH (ref 10.3–14.5)
RETICS #: 47.5 K/UL — SIGNIFICANT CHANGE UP (ref 25–125)
RETICS/RBC NFR: 1.4 % — SIGNIFICANT CHANGE UP (ref 0.5–2.5)
SODIUM SERPL-SCNC: 140 MMOL/L — SIGNIFICANT CHANGE UP (ref 135–145)
TIBC SERPL-MCNC: 424 UG/DL — SIGNIFICANT CHANGE UP (ref 250–450)
TRANSFERRIN SERPL-MCNC: 225 MG/DL — SIGNIFICANT CHANGE UP (ref 200–360)
UIBC SERPL-MCNC: 393 UG/DL — HIGH (ref 110–370)
WBC # BLD: 10.82 K/UL — HIGH (ref 3.8–10.5)
WBC # BLD: 9.97 K/UL — SIGNIFICANT CHANGE UP (ref 3.8–10.5)
WBC # FLD AUTO: 10.82 K/UL — HIGH (ref 3.8–10.5)
WBC # FLD AUTO: 9.97 K/UL — SIGNIFICANT CHANGE UP (ref 3.8–10.5)

## 2024-06-12 RX ORDER — ACETAMINOPHEN 500 MG
650 TABLET ORAL EVERY 6 HOURS
Refills: 0 | Status: DISCONTINUED | OUTPATIENT
Start: 2024-06-12 | End: 2024-06-12

## 2024-06-12 RX ORDER — SACUBITRIL AND VALSARTAN 24; 26 MG/1; MG/1
1 TABLET, FILM COATED ORAL
Refills: 0 | Status: DISCONTINUED | OUTPATIENT
Start: 2024-06-12 | End: 2024-06-12

## 2024-06-12 RX ORDER — FOLIC ACID 0.8 MG
1 TABLET ORAL DAILY
Refills: 0 | Status: DISCONTINUED | OUTPATIENT
Start: 2024-06-12 | End: 2024-06-14

## 2024-06-12 RX ORDER — IPRATROPIUM/ALBUTEROL SULFATE 18-103MCG
3 AEROSOL WITH ADAPTER (GRAM) INHALATION ONCE
Refills: 0 | Status: COMPLETED | OUTPATIENT
Start: 2024-06-12 | End: 2024-06-12

## 2024-06-12 RX ORDER — CARVEDILOL PHOSPHATE 80 MG/1
3.12 CAPSULE, EXTENDED RELEASE ORAL EVERY 12 HOURS
Refills: 0 | Status: DISCONTINUED | OUTPATIENT
Start: 2024-06-12 | End: 2024-06-14

## 2024-06-12 RX ORDER — LANOLIN ALCOHOL/MO/W.PET/CERES
3 CREAM (GRAM) TOPICAL AT BEDTIME
Refills: 0 | Status: DISCONTINUED | OUTPATIENT
Start: 2024-06-12 | End: 2024-06-14

## 2024-06-12 RX ORDER — LACTULOSE 10 G/15ML
10 SOLUTION ORAL DAILY
Refills: 0 | Status: DISCONTINUED | OUTPATIENT
Start: 2024-06-12 | End: 2024-06-14

## 2024-06-12 RX ORDER — ACETAMINOPHEN 500 MG
650 TABLET ORAL EVERY 6 HOURS
Refills: 0 | Status: DISCONTINUED | OUTPATIENT
Start: 2024-06-12 | End: 2024-06-14

## 2024-06-12 RX ORDER — ESCITALOPRAM OXALATE 10 MG/1
10 TABLET, FILM COATED ORAL DAILY
Refills: 0 | Status: DISCONTINUED | OUTPATIENT
Start: 2024-06-12 | End: 2024-06-14

## 2024-06-12 RX ORDER — SACUBITRIL AND VALSARTAN 24; 26 MG/1; MG/1
1 TABLET, FILM COATED ORAL
Refills: 0 | Status: DISCONTINUED | OUTPATIENT
Start: 2024-06-12 | End: 2024-06-14

## 2024-06-12 RX ORDER — ATORVASTATIN CALCIUM 80 MG/1
80 TABLET, FILM COATED ORAL AT BEDTIME
Refills: 0 | Status: DISCONTINUED | OUTPATIENT
Start: 2024-06-12 | End: 2024-06-14

## 2024-06-12 RX ORDER — CARVEDILOL PHOSPHATE 80 MG/1
3.12 CAPSULE, EXTENDED RELEASE ORAL EVERY 12 HOURS
Refills: 0 | Status: DISCONTINUED | OUTPATIENT
Start: 2024-06-12 | End: 2024-06-12

## 2024-06-12 RX ORDER — CHOLECALCIFEROL (VITAMIN D3) 125 MCG
2000 CAPSULE ORAL DAILY
Refills: 0 | Status: DISCONTINUED | OUTPATIENT
Start: 2024-06-12 | End: 2024-06-14

## 2024-06-12 RX ADMIN — Medication 3 MILLIGRAM(S): at 21:27

## 2024-06-12 RX ADMIN — Medication 2000 UNIT(S): at 18:30

## 2024-06-12 RX ADMIN — CARVEDILOL PHOSPHATE 3.12 MILLIGRAM(S): 80 CAPSULE, EXTENDED RELEASE ORAL at 18:30

## 2024-06-12 RX ADMIN — SACUBITRIL AND VALSARTAN 1 TABLET(S): 24; 26 TABLET, FILM COATED ORAL at 18:31

## 2024-06-12 RX ADMIN — Medication 100 MILLIGRAM(S): at 18:29

## 2024-06-12 RX ADMIN — Medication 100 MILLIGRAM(S): at 23:18

## 2024-06-12 RX ADMIN — ATORVASTATIN CALCIUM 80 MILLIGRAM(S): 80 TABLET, FILM COATED ORAL at 21:27

## 2024-06-12 RX ADMIN — ESCITALOPRAM OXALATE 10 MILLIGRAM(S): 10 TABLET, FILM COATED ORAL at 23:19

## 2024-06-12 RX ADMIN — Medication 1 MILLIGRAM(S): at 18:29

## 2024-06-12 NOTE — PROGRESS NOTE ADULT - SUBJECTIVE AND OBJECTIVE BOX
NP Note discussed with  Primary Attending    INTERVAL HPI/OVERNIGHT EVENTS: no new complaints, no overnight event, afebrile. Spoke with pt daughter at bedside.     MEDICATIONS  (STANDING):  albuterol/ipratropium for Nebulization. 3 milliLiter(s) Nebulizer once  atorvastatin 80 milliGRAM(s) Oral at bedtime  carvedilol 3.125 milliGRAM(s) Oral every 12 hours  cholecalciferol 2000 Unit(s) Oral daily  escitalopram 10 milliGRAM(s) Oral daily  folic acid 1 milliGRAM(s) Oral daily  guaiFENesin Oral Liquid (Sugar-Free) 100 milliGRAM(s) Oral every 6 hours  pantoprazole  Injectable 40 milliGRAM(s) IV Push two times a day  sacubitril 24 mG/valsartan 26 mG 1 Tablet(s) Oral two times a day    MEDICATIONS  (PRN):  acetaminophen   Oral Liquid .. 650 milliGRAM(s) Oral every 6 hours PRN Temp greater or equal to 38C (100.4F)  albuterol    90 MICROgram(s) HFA Inhaler 2 Puff(s) Inhalation every 6 hours PRN Shortness of Breath and/or Wheezing  lactulose Syrup 10 Gram(s) Oral daily PRN Constipation  melatonin 3 milliGRAM(s) Oral at bedtime PRN Insomnia      __________________________________________________  REVIEW OF SYSTEMS:  unable due to language barrier and confusion periodically, pt talks to daughter in Lithuanian minimally     Vital Signs Last 24 Hrs  T(C): 36.2 (12 Jun 2024 11:54), Max: 37.1 (11 Jun 2024 17:42)  T(F): 97.2 (12 Jun 2024 11:54), Max: 98.8 (11 Jun 2024 17:42)  HR: 73 (12 Jun 2024 11:54) (68 - 81)  BP: 152/71 (12 Jun 2024 11:54) (108/60 - 152/71)  BP(mean): --  RR: 18 (12 Jun 2024 11:54) (16 - 18)  SpO2: 93% (12 Jun 2024 11:54) (93% - 95%)    Parameters below as of 12 Jun 2024 11:54  Patient On (Oxygen Delivery Method): room air  ________________________________________________  PHYSICAL EXAM:  GENERAL: NAD, cachectic   HEENT: Normocephalic;  conjunctivae and sclerae clear; moist mucous membranes;   NECK : supple  CHEST/LUNG: Clear to auscultation bilaterally with good air entry   HEART: S1 S2  regular; no murmurs, gallops or rubs  ABDOMEN: Soft, Nontender, Nondistended; Bowel sounds present, Peg +   EXTREMITIES: no cyanosis; no edema; no calf tenderness  SKIN: warm and dry; no rash  NERVOUS SYSTEM:  Awake and alert; Oriented  to self ; no new deficits    _________________________________________________  LABS:                        8.8    9.97  )-----------( 439      ( 12 Jun 2024 05:30 )             27.6     06-12    140  |  109<H>  |  22<H>  ----------------------------<  98  3.6   |  25  |  0.79    Ca    8.9      12 Jun 2024 05:30  Phos  2.6     06-12  Mg     2.3     06-12    TPro  6.6  /  Alb  2.3<L>  /  TBili  0.4  /  DBili  x   /  AST  41<H>  /  ALT  56  /  AlkPhos  95  06-11    PT/INR - ( 11 Jun 2024 17:20 )   PT: 11.8 sec;   INR: 1.04 ratio         PTT - ( 11 Jun 2024 17:20 )  PTT:30.9 sec  Urinalysis Basic - ( 12 Jun 2024 05:30 )    Color: x / Appearance: x / SG: x / pH: x  Gluc: 98 mg/dL / Ketone: x  / Bili: x / Urobili: x   Blood: x / Protein: x / Nitrite: x   Leuk Esterase: x / RBC: x / WBC x   Sq Epi: x / Non Sq Epi: x / Bacteria: x      CAPILLARY BLOOD GLUCOSE  POCT Blood Glucose.: 119 mg/dL (12 Jun 2024 11:18)    RADIOLOGY & ADDITIONAL TESTS:    Imaging  Reviewed:  YES  < from: Xray Chest 1 View AP/PA (06.11.24 @ 19:27) >    ACC: [id number] EXAM:  XR CHEST AP OR PA 1V   ORDERED BY:  GINA LAGUNA     PROCEDURE DATE:  06/11/2024          INTERPRETATION:  An AP chest radiograph was performed for cough.    Comparison is made to 12/15/2023.    The lungs are clear bilaterally. There are no infiltrates on either side.   There is no pneumothorax. There are no pleural effusions. There is no   hilar or mediastinal widening. The thoracic aorta is atherosclerotic. The   cardiac silhouette is marginally enlarged and associated with central   pulmonary venous engorgement but without cristine pulmonary edema.. There   are left coronary artery stents. The bony thorax shows no acute findings,   while noting degenerative changes of the thoracic spine.    IMPRESSION:  1. Marginal cardiac enlargement with central pulmonary venous   engorgement, atherosclerotic aorta and left coronary artery stents   however there is no cristine pulmonary edema.  2. No evidence of pneumonia or pleural effusion.  3. Degenerative changes of the thoracic spine.    --- End of Report ---            RUSSELL ZUNIGA MD; Attending Radiologist  This document has been electronically signed. Jun 11 2024  7:29PM    < end of copied text >    Consultant(s) Notes Reviewed:   YES      Plan of care was discussed with patient and /or primary care giver; all questions and concerns were addressed

## 2024-06-12 NOTE — PHYSICAL THERAPY INITIAL EVALUATION ADULT - BED MOBILITY LIMITATIONS, REHAB EVAL
----- Message from Perry Truong MD sent at 12/4/2018  6:42 AM EST -----  B12 level is normal.  Sed rate is normal.  CK level is normal.  CMP is normal.  CBC is normal.  All labs are normal.  No diabetes seen. Consider neurology consult for further evaluation if desired. decreased ability to use arms for pushing/pulling/decreased ability to use legs for bridging/pushing

## 2024-06-12 NOTE — CONSULT NOTE ADULT - SUBJECTIVE AND OBJECTIVE BOX
Date of Service  06-12-24 @ 12:26    CHIEF COMPLAINT:Patient is a 89y old  Female who presents with a chief complaint of Anemia .      HPI:  Patient is a 89 year old female from [facility], with PMH of HTN, HLD, DM2, CAD, HFrEF, CKD4, Peg placement, TIA, and anemia who was sent into the ED for anemia.  Patient poor historian.  Patient states she is feeling well and does not know why she was sent into the hospital.  Patients Hb found to be 6.7 at the rehab facility.  Patient denies all acute medical complaints at this time.  patient denies signs of active bleeding, denies melena, or BRBPR.  Patient denies nausea, vomiting, headache, blurry vision, dizziness, chest pain, abdominal pain, constipation, diarrhea, leg swelling.  (11 Jun 2024 23:25)      PAST MEDICAL & SURGICAL HISTORY:  Hypertension      Diabetes Mellitus, Type 2      Hyperlipidemia      Dementia      CAD (coronary artery disease)      History of TIA (transient ischemic attack)      Anemia      HFrEF (heart failure with reduced ejection fraction)      History of Cholecystectomy      S/P hip replacement, left          MEDICATIONS  (STANDING):  albuterol/ipratropium for Nebulization. 3 milliLiter(s) Nebulizer once  amLODIPine   Tablet 10 milliGRAM(s) Oral daily  atorvastatin 80 milliGRAM(s) Oral at bedtime  cholecalciferol 2000 Unit(s) Oral daily  escitalopram 10 milliGRAM(s) Oral daily  folic acid 1 milliGRAM(s) Oral daily  guaiFENesin Oral Liquid (Sugar-Free) 100 milliGRAM(s) Oral every 6 hours  pantoprazole  Injectable 40 milliGRAM(s) IV Push two times a day  sodium bicarbonate 650 milliGRAM(s) Oral three times a day    MEDICATIONS  (PRN):  acetaminophen     Tablet .. 650 milliGRAM(s) Oral every 6 hours PRN Temp greater or equal to 38C (100.4F), Mild Pain (1 - 3)  albuterol    90 MICROgram(s) HFA Inhaler 2 Puff(s) Inhalation every 6 hours PRN Shortness of Breath and/or Wheezing  lactulose Syrup 10 Gram(s) Oral daily PRN Constipation  melatonin 3 milliGRAM(s) Oral at bedtime PRN Insomnia      FAMILY HISTORY:No hx of CAD      SOCIAL HISTORY:    [x ] Non-smoker    [x ] Alcohol-denies    Allergies    aspirin (Anaphylaxis)  penicillin (Anaphylaxis)  Celebrex (Rash)    Intolerances    	    REVIEW OF SYSTEMS:  CONSTITUTIONAL: No fever, weight loss, or fatigue  EYES: No eye pain, visual disturbances, or discharge  ENT:  No difficulty hearing, tinnitus, vertigo; No sinus or throat pain  NECK: No pain or stiffness  RESPIRATORY: No cough, wheezing, chills or hemoptysis; No Shortness of Breath  CARDIOVASCULAR: No chest pain, palpitations, passing out, dizziness, or leg swelling  GASTROINTESTINAL: No abdominal or epigastric pain. No nausea, vomiting, or hematemesis; No diarrhea or constipation. No melena or hematochezia.  GENITOURINARY: No dysuria, frequency, hematuria, or incontinence  NEUROLOGICAL: No headaches, memory loss, loss of strength, numbness, or tremors  SKIN: No itching, burning, rashes, or lesions   LYMPH Nodes: No enlarged glands  ENDOCRINE: No heat or cold intolerance; No hair loss  MUSCULOSKELETAL: No joint pain or swelling; No muscle, back, or extremity pain  PSYCHIATRIC: No depression, anxiety, mood swings, or difficulty sleeping  HEME/LYMPH: No easy bruising, or bleeding gums  ALLERGY AND IMMUNOLOGIC: No hives or eczema	      PHYSICAL EXAM:  T(C): 36.2 (06-12-24 @ 11:54), Max: 37.1 (06-11-24 @ 17:42)  HR: 73 (06-12-24 @ 11:54) (68 - 81)  BP: 152/71 (06-12-24 @ 11:54) (108/60 - 152/71)  RR: 18 (06-12-24 @ 11:54) (16 - 18)  SpO2: 93% (06-12-24 @ 11:54) (93% - 95%)  Wt(kg): --  I&O's Summary      Appearance: Normal	  HEENT:   Normal oral mucosa, PERRL, EOMI	  Lymphatic: No lymphadenopathy  Cardiovascular: Normal S1 S2, No JVD, No murmurs, No edema  Respiratory: Lungs clear to auscultation	  Gastrointestinal:  Soft, Non-tender, + BS	  Skin: No rashes, No ecchymoses, No cyanosis	  Neurologic: Non-focal  Extremities: Normal range of motion, No clubbing, cyanosis or edema  Vascular: Peripheral pulses palpable 2+ bilaterally      LABS:	 	                          8.8    9.97  )-----------( 439      ( 12 Jun 2024 05:30 )             27.6     06-12    140  |  109<H>  |  22<H>  ----------------------------<  98  3.6   |  25  |  0.79    Ca    8.9      12 Jun 2024 05:30  Phos  2.6     06-12  Mg     2.3     06-12    TPro  6.6  /  Alb  2.3<L>  /  TBili  0.4  /  DBili  x   /  AST  41<H>  /  ALT  56  /  AlkPhos  95  06-11    < from: TTE W or WO Ultrasound Enhancing Agent (11.29.23 @ 12:42) >  CONCLUSIONS:      1. Technically difficult image quality.   2. Left ventricular cavity is severely dilated. Left ventricular wall thickness is normal. Left ventricular systolic function is moderately to severely decreased with an ejection fraction of 39 % by Lomeli's method of disks. Regional wall motion abnormalities consistent with ischemic heart disease.   3. Multiple segmental abnormalities exist. See findings.   4. Moderate aortic regurgitation.    ________________________________________________________________________________________  FINDINGS:     Left Ventricle:  After obtainingconsent, Definity ultrasound enhancing agent was given for enhanced left ventricular opacification and improved delineation of the left ventricular endocardial borders. The left ventricular cavity is severely dilated. Left ventricular wall thickness is normal. Left ventricular systolic function is moderately to severely decreased with a calculated ejection fraction of 39 % by the Lomeli's biplane method of disks. There are regional wall motion abnormalities consistent with ischemic heart disease. Impaired relaxation with normal wall motion. There is no evidence of a left ventricular thrombus.  LV Wall Scoring: The apical septal segment, apical lateral segment, apical  inferior segment, and apex are aneurysmal. The mid anteroseptal segment, mid  inferoseptal segment, and mid inferior segment are akinetic. The basal and mid  anterolateral wall is hypokinetic. All remaining scored segments are normal.          Right Ventricle:  The right ventricular cavity is normal in size and reduced systolic function. Tricuspid annular plane systolic excursion (TAPSE) is 1.3 cm (normal >=1.7 cm).     Left Atrium:  The left atrium is moderately dilated with an indexed volume of 30.69 ml/m².     Right Atrium:  The right atrium is normal in size with anindexed volume of 12.65 ml/m².     Interatrial Septum:  The interatrial septum appears intact.     Aortic Valve:  There is moderate calcification of the aortic valve leaflets. There is mild aortic stenosis. There is moderate aortic regurgitation. AI VMax is 4.59 m/s. AI pressure half time is 642 msec. AI slope is 2.14 m/s².     Mitral Valve:  Structurally normal mitral valve with normal leaflet excursion. There is calcification of the mitral valve annulus. There is trace mitral regurgitation.     Tricuspid Valve:  Structurally normal tricuspid valve with normal leaflet excursion. There is mild tricuspid regurgitation. Estimated pulmonary artery systolic pressure is 27 mmHg, consistent with normal pulmonary artery pressure.     Pulmonic Valve:  Structurally normal pulmonic valve with normal leaflet excursion.     Aorta:  The aortic annulus and aortic root appear normal in size.     Pericardium:  No pericardial effusion seen.     Systemic Veins:  The inferior vena cava is normal in size (normal <2.1cm) with normal inspiratory collapse (normal >50%) consistent with normal right atrial pressure (~3, range 0-5mmHg).    < end of copied text >    < from: CT Abdomen and Pelvis w/ IV Cont (12.19.23 @ 07:37) >  ACC: [id number] EXAM:  CT ABDOMEN AND PELVIS IC   ORDERED BY: TAMI COPELAND     PROCEDURE DATE:  12/19/2023          INTERPRETATION:  CLINICAL INFORMATION: Concern for dislodged PEG tube    COMPARISON: CT abdomen pelvis 11/9/2023    CONTRAST/COMPLICATIONS:  IV Contrast: Omnipaque 350  90 cc administered   10 cc discarded  Oral Contrast: NONE  Complications: None reported at time of study completion    PROCEDURE:  CT of the Abdomen and Pelvis was performed.  Sagittal and coronal reformats were performed.    FINDINGS:  LOWER CHEST: Trace bilateral pleural effusions associated atelectasis.   Coronary artery calcifications.    LIVER: Within normal limits.  BILE DUCTS: Normal caliber.  GALLBLADDER: Cholecystectomy.  SPLEEN: Within normal limits.  PANCREAS: Within normal limits.  ADRENALS: Within normal limits.  KIDNEYS/URETERS: No hydronephrosis. Right upper pole renal cyst.    BLADDER: Within normal limits.  REPRODUCTIVE ORGANS: Uterus and bilateral adnexa within normal limits.    BOWEL: No bowel obstruction. Appendix is normal. Gastrostomy tube tract   extends from the distal body/proximal antrum to the skin surface. Small   amount of fluid along the tract.  PERITONEUM: Trace pelvic ascites. No drainable intra-abdominal fluid   collection.  VESSELS: Atherosclerotic changes.  RETROPERITONEUM/LYMPH NODES: No lymphadenopathy.  ABDOMINAL WALL: As above  BONES: Left hip ORIF. Grade 1 L4-5 anterolisthesis.    IMPRESSION:  Fluid-filled gastrostomy tube tract. No drainable intra-abdominalfluid   collection.          --- End of Report ---           ALEKS VICK MD; Resident Radiologist  This document has been electronically signed.    < end of copied text >    
[  ] STAT REQUEST              [ X ] ROUTINE REQUEST    Patient is a 89 year old female with iron deficiency anemia. GI consulted to evaluate.       HPI:  Patient is a 89 year old female from [facility], with past medical history significant for Dementia, HTN, Hyperlipidemia, DM2, CAD, CHF, CKD4, Dementia, TIA, Peg placement, TIA, presented to the emergency room with sever worsening anemia. Patient is poor historian. Patients Hb found to be 6.7 at the rehab facility. Reported patient with worsening constipation and acid reflux other wise no abdominal pain, nausea, vomiting, hematemesis, hematochezia, melena, fever, chills, chest pain, SOB, cough, hematuria, dysuria or diarrhea reported.         PAIN MANAGEMENT:  Pain Scale:                 0/10  Pain Location:      Prior Colonoscopy:  Unknown      PAST MEDICAL HISTORY    Hypertension    Diabetes Mellitus, Type 2     Hyperlipidemia    Dementia    CAD (coronary artery disease)    TIAs    CHF    Anemia    Dysphagia             PAST SURGICAL HISTORY    Cholecystectomy    Hip replacement, right    Peg tube placement         Allergies    aspirin (Anaphylaxis)  penicillin (Anaphylaxis)  Celebrex (Rash)    Intolerances  None         MEDICATIONS  (STANDING):  atorvastatin 80 milliGRAM(s) Oral at bedtime  carvedilol 3.125 milliGRAM(s) Oral every 12 hours  cholecalciferol 2000 Unit(s) Oral daily  escitalopram 10 milliGRAM(s) Oral daily  folic acid 1 milliGRAM(s) Oral daily  guaiFENesin Oral Liquid (Sugar-Free) 100 milliGRAM(s) Oral every 6 hours  pantoprazole  Injectable 40 milliGRAM(s) IV Push two times a day  sacubitril 24 mG/valsartan 26 mG 1 Tablet(s) Oral two times a day    MEDICATIONS  (PRN):  acetaminophen   Oral Liquid .. 650 milliGRAM(s) Oral every 6 hours PRN Temp greater or equal to 38C (100.4F)  albuterol    90 MICROgram(s) HFA Inhaler 2 Puff(s) Inhalation every 6 hours PRN Shortness of Breath and/or Wheezing  lactulose Syrup 10 Gram(s) Oral daily PRN Constipation  melatonin 3 milliGRAM(s) Oral at bedtime PRN Insomnia      SOCIAL HISTORY  Advanced Directives:       [ X ] Full Code       [  ] DNR  Marital Status:         [  ] M      [ X ] S      [  ] D       [  ] W  Children:       [ X ] Yes      [  ] No  Occupation:        [  ] Employed       [ X ] Unemployed       [  ] Retired  Diet:       [  ] Regular       [ X ] PEG feeding          [  ] NG tube feeding  Drug Use:           [ X ] Patient denied          [  ] Yes  Alcohol:           [ X ] No             [  ] Yes (socially)         [  ] Yes (chronic)  Tobacco:           [  ] Yes           [ X ] No      FAMILY HISTORY  [ X ] Heart Disease            [ X ] Diabetes             [ X ] HTN             [  ] Colon Cancer             [  ] Stomach Cancer              [  ] Pancreatic Cancer      VITAL SIGNS   Vital Signs Last 24 Hrs  T(C): 36.3 (2024 20:32), Max: 36.8 (2024 00:26)  T(F): 97.4 (2024 20:32), Max: 98.2 (2024 00:26)  HR: 69 (2024 20:32) (68 - 81)  BP: 109/64 (2024 20:32) (108/60 - 152/71)   RR: 18 (2024 20:32) (18 - 18)  SpO2: 96% (2024 20:32) (93% - 96%)  Parameters below as of 2024 20:32  Patient On (Oxygen Delivery Method): room air  Daily Weight in k.3 (2024 05:28)         CBC Full  -  ( 2024 10:20 )  WBC Count : x  RBC Count : 3.37 M/uL  Hemoglobin : x  Hematocrit : x  Platelet Count - Automated : x  Mean Cell Volume : x  Mean Cell Hemoglobin : x  Mean Cell Hemoglobin Concentration : x  Auto Neutrophil # : x  Auto Lymphocyte # : x  Auto Monocyte # : x  Auto Eosinophil # : x  Auto Basophil # : x  Auto Neutrophil % : x  Auto Lymphocyte % : x  Auto Monocyte % : x  Auto Eosinophil % : x  Auto Basophil % : x      06-12    140  |  109<H>  |  22<H>  ----------------------------<  98  3.6   |  25  |  0.79    Ca    8.9      2024 05:30  Phos  2.6     06-12  Mg     2.3     06-12    TPro  6.6  /  Alb  2.3<L>  /  TBili  0.4  /  DBili  x   /  AST  41<H>  /  ALT  56  /  AlkPhos  95  06-11     PT/INR - ( 2024 17:20 )   PT: 11.8 sec;   INR: 1.04 ratio       PTT - ( 2024 17:20 )  PTT:30.9 sec    IIron with Total Binding Capacity (24 @ 10:20)   Iron - Total Binding Capacity.: 424 ug/dL  % Saturation, Iron: 7 %  Iron Total: 31 ug/dL  Unsaturated Iron Binding Capacity: 393 ug/dL    Urinalysis (23 @ 18:59)   Glucose Qualitative, Urine: 100 mg/dL  Blood, Urine: Trace  pH Urine: 6.5  Color: Yellow  Urine Appearance: Clear  Bilirubin: Negative  Ketone - Urine: Negative mg/dL  Specific Gravity: 1.008  Protein, Urine: Trace mg/dL  Urobilinogen: 0.2 mg/dL  Nitrite: Negative  Leukocyte Esterase Concentration: Small      ECG      Ventricular Rate 76 BPM    Atrial Rate 76 BPM    P-R Interval 156 ms    QRS Duration 86 ms    Q-T Interval 422 ms    QTC Calculation(Bazett) 474 ms    P Axis 77 degrees    R Axis -23 degrees    T Axis 89 degrees    Diagnosis Line NORMAL SINUS RHYTHM  ANTERIOR INFARCT (CITED ON OR BEFORE 03-AUG-2010)  ABNORMAL ECG         RADIOLOGY/IMAGING                  ACC: [id number] EXAM:  CT ABDOMEN AND PELVIS IC   ORDERED BY: TAMI COPELAND     PROCEDURE DATE:  2023          INTERPRETATION:  CLINICAL INFORMATION: Concern for dislodged PEG tube    COMPARISON: CT abdomen pelvis 2023    CONTRAST/COMPLICATIONS:  IV Contrast: Omnipaque 350  90 cc administered   10 cc discarded  Oral Contrast: NONE  Complications: None reported at time of study completion    PROCEDURE:  CT of the Abdomen and Pelvis was performed.  Sagittal and coronal reformats were performed.    FINDINGS:  LOWER CHEST: Trace bilateral pleural effusions associated atelectasis.   Coronary artery calcifications.    LIVER: Within normal limits.  BILE DUCTS: Normal caliber.  GALLBLADDER: Cholecystectomy.  SPLEEN: Within normal limits.  PANCREAS: Within normal limits.  ADRENALS: Within normal limits.  KIDNEYS/URETERS: No hydronephrosis. Right upper pole renal cyst.    BLADDER: Within normal limits.  REPRODUCTIVE ORGANS: Uterus and bilateral adnexa within normal limits.    BOWEL: No bowel obstruction. Appendix is normal. Gastrostomy tube tract   extends from the distal body/proximal antrum to the skin surface. Small   amount of fluid along the tract.  PERITONEUM: Trace pelvic ascites. No drainable intra-abdominal fluid   collection.  VESSELS: Atherosclerotic changes.  RETROPERITONEUM/LYMPH NODES: No lymphadenopathy.  ABDOMINAL WALL: As above  BONES: Left hip ORIF. Grade 1 L4-5 anterolisthesis.    IMPRESSION:  Fluid-filled gastrostomy tube tract. No drainable intra-abdominalfluid   collection.

## 2024-06-12 NOTE — PROGRESS NOTE ADULT - SUBJECTIVE AND OBJECTIVE BOX
PRASANTH REICH  MR# [id number]  89yFemale        Patient is a 89y old  Female who presents with a chief complaint of Anemia (11 Jun 2024 23:25)      INTERVAL HPI/OVERNIGHT EVENTS:  Patient seen and examined at bedside. No notations of chest pain, palpitation, SOB, orthopnea, nausea, vomiting or abdominal pain.    ALLERGIES  aspirin (Anaphylaxis)  penicillin (Anaphylaxis)  Celebrex (Rash)      MEDICATIONS  acetaminophen     Tablet .. 650 milliGRAM(s) Oral every 6 hours PRN Temp greater or equal to 38C (100.4F), Mild Pain (1 - 3)  albuterol    90 MICROgram(s) HFA Inhaler 2 Puff(s) Inhalation every 6 hours PRN Shortness of Breath and/or Wheezing  albuterol/ipratropium for Nebulization. 3 milliLiter(s) Nebulizer once  amLODIPine   Tablet 10 milliGRAM(s) Oral daily  atorvastatin 80 milliGRAM(s) Oral at bedtime  cholecalciferol 2000 Unit(s) Oral daily  escitalopram 10 milliGRAM(s) Oral daily  folic acid 1 milliGRAM(s) Oral daily  guaiFENesin Oral Liquid (Sugar-Free) 100 milliGRAM(s) Oral every 6 hours  lactulose Syrup 10 Gram(s) Oral daily PRN Constipation  melatonin 3 milliGRAM(s) Oral at bedtime PRN Insomnia  pantoprazole  Injectable 40 milliGRAM(s) IV Push two times a day  sodium bicarbonate 650 milliGRAM(s) Oral three times a day              REVIEW OF SYSTEMS:  CONSTITUTIONAL: No fever, weight loss, or fatigue  EYES: No eye pain, visual disturbances, or discharge  ENT:  No difficulty hearing, tinnitus, vertigo; No sinus or throat pain  NECK: No pain or stiffness  RESPIRATORY: No cough, wheezing, chills or hemoptysis; No Shortness of Breath  CARDIOVASCULAR: No chest pain, palpitations, passing out, dizziness, or leg swelling  GASTROINTESTINAL: No abdominal or epigastric pain. No nausea, vomiting, or hematemesis; No diarrhea or constipation. No melena or hematochezia.  GENITOURINARY: No dysuria, frequency, hematuria, or incontinence  NEUROLOGICAL: No headaches, memory loss, loss of strength, numbness, or tremors  SKIN: No itching, burning, rashes, or lesions   LYMPH Nodes: No enlarged glands  ENDOCRINE: No heat or cold intolerance; No hair loss  MUSCULOSKELETAL: No joint pain or swelling; No muscle, back, or extremity pain  PSYCHIATRIC: No depression, anxiety, mood swings, or difficulty sleeping  HEME/LYMPH: No easy bruising, or bleeding gums  ALLERGY AND IMMUNOLOGIC: No hives or eczema	    [ ] All others negative	  [ ] Unable to obtain      T(C): 36.4 (06-12-24 @ 05:28), Max: 37.1 (06-11-24 @ 17:42)  T(F): 97.6 (06-12-24 @ 05:28), Max: 98.8 (06-11-24 @ 17:42)  HR: 80 (06-12-24 @ 05:28) (68 - 81)  BP: 140/58 (06-12-24 @ 05:28) (108/60 - 143/55)  RR: 18 (06-12-24 @ 05:28) (16 - 18)  SpO2: 95% (06-12-24 @ 05:28) (94% - 95%)  Wt(kg): --  Height (cm): 149.9 (06-11 @ 16:07)  Weight (kg): 48 (06-11 @ 16:07)  BMI (kg/m2): 21.4 (06-11 @ 16:07)  BSA (m2): 1.41 (06-11 @ 16:07)  I&O's Summary        PHYSICAL EXAM:  A X O x  HEAD:  Atraumatic, Normocephalic  EYES: EOMI, PERRLA, conjunctiva and sclera clear  NECK: Supple, No JVD, Normal thyroid  Resp: CTAB, No crackles, wheezing,   CVS: Regular rate and rhythm; No discernable murmurs, rubs, or gallops  ABD: Soft, Nontender, Nondistended; Bowel sounds present  EXTREMITIES:  2+ Peripheral Pulses, No edema  LYMPH: No dicernable lymphadenopathy noted  GENERAL: NAD, well-groomed, well-developed      LABS:                        8.8    9.97  )-----------( 439      ( 12 Jun 2024 05:30 )             27.6     06-12    140  |  109<H>  |  22<H>  ----------------------------<  98  3.6   |  25  |  0.79    Ca    8.9      12 Jun 2024 05:30  Phos  2.6     06-12  Mg     2.3     06-12    TPro  6.6  /  Alb  2.3<L>  /  TBili  0.4  /  DBili  x   /  AST  41<H>  /  ALT  56  /  AlkPhos  95  06-11    PT/INR - ( 11 Jun 2024 17:20 )   PT: 11.8 sec;   INR: 1.04 ratio         PTT - ( 11 Jun 2024 17:20 )  PTT:30.9 sec  Urinalysis Basic - ( 12 Jun 2024 05:30 )    Color: x / Appearance: x / SG: x / pH: x  Gluc: 98 mg/dL / Ketone: x  / Bili: x / Urobili: x   Blood: x / Protein: x / Nitrite: x   Leuk Esterase: x / RBC: x / WBC x   Sq Epi: x / Non Sq Epi: x / Bacteria: x      CAPILLARY BLOOD GLUCOSE      POCT Blood Glucose.: 119 mg/dL (12 Jun 2024 11:18)      Troponins:  ProBNP:  Lipid Profile:   HgA1c:  TSH:           RADIOLOGY & ADDITIONAL TESTS:    Imaging Personally Reviewed:  [ ] YES  [ ] NO      Consultant(s) Notes Reviewed:  [x ] YES  [ ] NO    Care Discussed with Consultants/Other Providers [ x] YES  [ ] NO          PAST MEDICAL & SURGICAL HISTORY:  Hypertension      Diabetes Mellitus, Type 2      Hyperlipidemia      Dementia      CAD (coronary artery disease)      History of TIA (transient ischemic attack)      Anemia      HFrEF (heart failure with reduced ejection fraction)      History of Cholecystectomy      S/P hip replacement, left            Anemia    No pertinent family history in first degree relatives    Handoff    MEWS Score    Hypertension    Diabetes Mellitus, Type 2    History of Cholecystectomy    Hyperlipidemia    Dementia    CAD (coronary artery disease)    History of TIAs    History of TIA (transient ischemic attack)    Anemia    HFrEF (heart failure with reduced ejection fraction)    Anemia    Anemia    Stage 4 chronic kidney disease    HTN (hypertension)    HLD (hyperlipidemia)    DM (diabetes mellitus)    Chronic HFrEF (heart failure with reduced ejection fraction)    Prophylactic measure    History of Cholecystectomy    S/P hip replacement, right    S/P hip replacement, left    ABNORMAL LABS    90+    [email or web address]

## 2024-06-12 NOTE — PATIENT PROFILE ADULT - FALL HARM RISK - HARM RISK INTERVENTIONS

## 2024-06-12 NOTE — CONSULT NOTE ADULT - ASSESSMENT
1. Iron deficiency anemia (etiology is not clear)  2. No evidence of acute GI bleeding  3. R/o chronic GI bleeding  4. R/o Malignancy  5. Dysphagia  6. Constipation  7. GERD    Suggestions:    1. Monitor H/H  2. Transfuse PRBC as needed  3. Protonix 40mg daily  4. Avoid NSAID  5. CT-Scan of abdomen and pelvis  6. Check stool for occult blood  7. Continue PEG feeding  8. Aspiration precaution  9. Daily stool softener as needed  10. Iron supplement IV  11. DVT prophylaxis    
89 year old female from [facility], with PMH of HTN, HLD, DM2, CAD, HFrEF, CKD4, Peg placement, TIA, and anemia who was sent into the ED for fe def anemia.  1.Fe def anemia-GI eval,IV Iron.  2.CAD and CHF-add coreg 3.125mg bid and entresto.  3.HTN-d/c norvasc.  4.Lipid d/o-statin.  5.GI and DVT prophylaxis.

## 2024-06-12 NOTE — PHYSICAL THERAPY INITIAL EVALUATION ADULT - ADDITIONAL COMMENTS
[facility] Pt states that there is no stairs to enter/exit. Chart states that pt ambulated with SC prior to admission. Pt able to confirm this.

## 2024-06-13 ENCOUNTER — TRANSCRIPTION ENCOUNTER (OUTPATIENT)
Age: 89
End: 2024-06-13

## 2024-06-13 LAB
ANION GAP SERPL CALC-SCNC: 4 MMOL/L — LOW (ref 5–17)
BUN SERPL-MCNC: 27 MG/DL — HIGH (ref 7–18)
CALCIUM SERPL-MCNC: 8.4 MG/DL — SIGNIFICANT CHANGE UP (ref 8.4–10.5)
CHLORIDE SERPL-SCNC: 111 MMOL/L — HIGH (ref 96–108)
CO2 SERPL-SCNC: 26 MMOL/L — SIGNIFICANT CHANGE UP (ref 22–31)
CREAT SERPL-MCNC: 0.8 MG/DL — SIGNIFICANT CHANGE UP (ref 0.5–1.3)
EGFR: 69 ML/MIN/1.73M2 — SIGNIFICANT CHANGE UP
GLUCOSE BLDC GLUCOMTR-MCNC: 106 MG/DL — HIGH (ref 70–99)
GLUCOSE BLDC GLUCOMTR-MCNC: 106 MG/DL — HIGH (ref 70–99)
GLUCOSE BLDC GLUCOMTR-MCNC: 107 MG/DL — HIGH (ref 70–99)
GLUCOSE BLDC GLUCOMTR-MCNC: 115 MG/DL — HIGH (ref 70–99)
GLUCOSE SERPL-MCNC: 105 MG/DL — HIGH (ref 70–99)
HCT VFR BLD CALC: 27.3 % — LOW (ref 34.5–45)
HGB BLD-MCNC: 8.6 G/DL — LOW (ref 11.5–15.5)
MCHC RBC-ENTMCNC: 25.1 PG — LOW (ref 27–34)
MCHC RBC-ENTMCNC: 31.5 GM/DL — LOW (ref 32–36)
MCV RBC AUTO: 79.8 FL — LOW (ref 80–100)
NRBC # BLD: 0 /100 WBCS — SIGNIFICANT CHANGE UP (ref 0–0)
PLATELET # BLD AUTO: 400 K/UL — SIGNIFICANT CHANGE UP (ref 150–400)
POTASSIUM SERPL-MCNC: 3.7 MMOL/L — SIGNIFICANT CHANGE UP (ref 3.5–5.3)
POTASSIUM SERPL-SCNC: 3.7 MMOL/L — SIGNIFICANT CHANGE UP (ref 3.5–5.3)
RBC # BLD: 3.42 M/UL — LOW (ref 3.8–5.2)
RBC # FLD: 15.5 % — HIGH (ref 10.3–14.5)
SODIUM SERPL-SCNC: 141 MMOL/L — SIGNIFICANT CHANGE UP (ref 135–145)
WBC # BLD: 8.9 K/UL — SIGNIFICANT CHANGE UP (ref 3.8–10.5)
WBC # FLD AUTO: 8.9 K/UL — SIGNIFICANT CHANGE UP (ref 3.8–10.5)

## 2024-06-13 RX ORDER — ACETAMINOPHEN 500 MG
20.31 TABLET ORAL
Qty: 0 | Refills: 0 | DISCHARGE
Start: 2024-06-13

## 2024-06-13 RX ORDER — CARVEDILOL PHOSPHATE 80 MG/1
1 CAPSULE, EXTENDED RELEASE ORAL
Qty: 0 | Refills: 0 | DISCHARGE
Start: 2024-06-13

## 2024-06-13 RX ORDER — SENNA PLUS 8.6 MG/1
1 TABLET ORAL
Qty: 0 | Refills: 0 | DISCHARGE
Start: 2024-06-13

## 2024-06-13 RX ORDER — SENNA PLUS 8.6 MG/1
1 TABLET ORAL AT BEDTIME
Refills: 0 | Status: DISCONTINUED | OUTPATIENT
Start: 2024-06-13 | End: 2024-06-14

## 2024-06-13 RX ORDER — PANTOPRAZOLE SODIUM 20 MG/1
40 TABLET, DELAYED RELEASE ORAL
Refills: 0 | Status: DISCONTINUED | OUTPATIENT
Start: 2024-06-13 | End: 2024-06-14

## 2024-06-13 RX ORDER — SACUBITRIL AND VALSARTAN 24; 26 MG/1; MG/1
1 TABLET, FILM COATED ORAL
Qty: 0 | Refills: 0 | DISCHARGE
Start: 2024-06-13

## 2024-06-13 RX ADMIN — SACUBITRIL AND VALSARTAN 1 TABLET(S): 24; 26 TABLET, FILM COATED ORAL at 06:31

## 2024-06-13 RX ADMIN — ESCITALOPRAM OXALATE 10 MILLIGRAM(S): 10 TABLET, FILM COATED ORAL at 12:32

## 2024-06-13 RX ADMIN — CARVEDILOL PHOSPHATE 3.12 MILLIGRAM(S): 80 CAPSULE, EXTENDED RELEASE ORAL at 06:31

## 2024-06-13 RX ADMIN — Medication 100 MILLIGRAM(S): at 12:32

## 2024-06-13 RX ADMIN — PANTOPRAZOLE SODIUM 40 MILLIGRAM(S): 20 TABLET, DELAYED RELEASE ORAL at 17:16

## 2024-06-13 RX ADMIN — Medication 1 MILLIGRAM(S): at 12:32

## 2024-06-13 RX ADMIN — Medication 3 MILLIGRAM(S): at 22:39

## 2024-06-13 RX ADMIN — Medication 100 MILLIGRAM(S): at 17:16

## 2024-06-13 RX ADMIN — CARVEDILOL PHOSPHATE 3.12 MILLIGRAM(S): 80 CAPSULE, EXTENDED RELEASE ORAL at 17:16

## 2024-06-13 RX ADMIN — Medication 2000 UNIT(S): at 12:32

## 2024-06-13 RX ADMIN — ATORVASTATIN CALCIUM 80 MILLIGRAM(S): 80 TABLET, FILM COATED ORAL at 22:39

## 2024-06-13 RX ADMIN — Medication 100 MILLIGRAM(S): at 06:31

## 2024-06-13 RX ADMIN — SENNA PLUS 1 TABLET(S): 8.6 TABLET ORAL at 22:39

## 2024-06-13 RX ADMIN — SACUBITRIL AND VALSARTAN 1 TABLET(S): 24; 26 TABLET, FILM COATED ORAL at 17:16

## 2024-06-13 NOTE — DISCHARGE NOTE NURSING/CASE MANAGEMENT/SOCIAL WORK - NSDCVIVACCINE_GEN_ALL_CORE_FT
Tdap; 19-Jan-2023 00:14; Abigail Guillen (RN); [employer]; [id number] (Exp. Date: 08-Dec-2024); IntraMuscular; Deltoid Right.; 0.5 milliLiter(s); VIS (VIS Published: 09-May-2013, VIS Presented: 19-Jan-2023);

## 2024-06-13 NOTE — DIETITIAN INITIAL EVALUATION ADULT - NSFNSPHYEXAMSKINFT_GEN_A_CORE
Pressure Injury 1: coccyx, Stage I  Pressure Injury 2: none, none  Pressure Injury 3: none, none  Pressure Injury 4: none, none  Pressure Injury 5: none, none  Pressure Injury 6: none, none  Pressure Injury 7: none, none  Pressure Injury 8: none, none  Pressure Injury 9: none, none  Pressure Injury 10: none, none  Pressure Injury 11: none, none, Pressure Injury 1: coccyx, Stage I  Pressure Injury 2: none, none  Pressure Injury 3: none, none  Pressure Injury 4: none, none  Pressure Injury 5: none, none  Pressure Injury 6: none, none  Pressure Injury 7: none, none  Pressure Injury 8: none, none  Pressure Injury 9: none, none  Pressure Injury 10: none, none  Pressure Injury 11: none, none, Pressure Injury 1: coccyx, Stage I  Pressure Injury 2: none, none  Pressure Injury 3: none, none  Pressure Injury 4: none, none  Pressure Injury 5: none, none  Pressure Injury 6: none, none  Pressure Injury 7: none, none  Pressure Injury 8: none, none  Pressure Injury 9: none, none  Pressure Injury 10: none, none  Pressure Injury 11: none, none

## 2024-06-13 NOTE — DISCHARGE NOTE NURSING/CASE MANAGEMENT/SOCIAL WORK - NSDCPEFALRISK_GEN_ALL_CORE
For information on Fall & Injury Prevention, visit: [email or web address] OR  [email or web address] OR  https://www.cdc.gov/steadi/patient.html

## 2024-06-13 NOTE — DIETITIAN INITIAL EVALUATION ADULT - OTHER INFO
Pt Visited. Pt asleep. Pt has [place] Over her face. Pt is on Enhanced supervision.  Nutrition consult Ordered Verbally also By NP.  Pt is from NH. TF Rx ??? Bed scale 101 LB. Ht 5 feet .Labs noted. Pt with H/o DM. . Pt admitted w Anemia. S/P Blood transfusion. Will suggest Glucerna 1.2 Formula. Pt with PU stage 1 @ coccyx.

## 2024-06-13 NOTE — DISCHARGE NOTE PROVIDER - HOSPITAL COURSE
Patient is a 89 year old female from [facility], with PMH of HTN, HLD, DM2, CAD, HFrEF, CKD4, Peg placement, TIA, and anemia who was sent into the ED for anemia.  Patient poor historian.  Patient states she is feeling well and does not know why she was sent into the hospital.  Patients Hb found to be 6.7 at the rehab facility.  Patient denies all acute medical complaints at this time.  patient denies signs of active bleeding, denies melena, or BRBPR.  Patient denies nausea, vomiting, headache, blurry vision, dizziness, chest pain, abdominal pain, constipation, diarrhea, leg swelling.   Pt. admitted to medicine for CRISTINA, transfused one unit PRBCs with appropriate response, Hgn 8.8 at this time.  GI consulted, no s&s of GIB.    Pt. is alert and pleasantly confused, ambulates with no difficulty, HD stable.  Discussed with attending.  Pt. is stable for discharge back to [facility].

## 2024-06-13 NOTE — DISCHARGE NOTE PROVIDER - INSTRUCTIONS
NPO with PEG feeding  Resume tube feeding as prior to hospitalization NPO with PEG feeding  Glucerna 1.2 @55cc/hr x 16 hrs  H2O flushes 150ml q6hrs

## 2024-06-13 NOTE — DISCHARGE NOTE NURSING/CASE MANAGEMENT/SOCIAL WORK - PATIENT PORTAL LINK FT
You can access the FollowMyHealth Patient Portal offered by [facility] by registering at the following website: [email or web address]. By joining [facility]’s FollowMyHealth portal, you will also be able to view your health information using other applications (apps) compatible with our system.

## 2024-06-13 NOTE — DISCHARGE NOTE PROVIDER - NSDCCPCAREPLAN_GEN_ALL_CORE_FT
PRINCIPAL DISCHARGE DIAGNOSIS  Diagnosis: Anemia  Assessment and Plan of Treatment: You were sent from facility with anemia with hgn 6.7.  You showed no signs and symptoms of bleeding.  Your anemia is likely due to chronic kidney disease.  You received one unit packed blood cells with appropriate response.  Your hemoglobin is 8.8 at this time.  Follow up with outpatient doctor for continued blood count monitoring and anemia work up.      SECONDARY DISCHARGE DIAGNOSES  Diagnosis: HTN (hypertension)  Assessment and Plan of Treatment:     Diagnosis: Stage 4 chronic kidney disease  Assessment and Plan of Treatment: Your serum creatinine noted stable while here.  Avoid nephrotoxic medications such as NSAIDs, IV contrast etc     PRINCIPAL DISCHARGE DIAGNOSIS  Diagnosis: Anemia  Assessment and Plan of Treatment: You were sent from facility with anemia with hgn 6.7.  You showed no signs and symptoms of bleeding.  Your anemia is likely due to chronic kidney disease.  You received one unit packed blood cells with appropriate response.  Your hemoglobin is 8.8 at this time.  Follow up with outpatient doctor for continued blood count monitoring and anemia work up.      SECONDARY DISCHARGE DIAGNOSES  Diagnosis: HTN (hypertension)  Assessment and Plan of Treatment: Take medications as prescribed      Diagnosis: Stage 4 chronic kidney disease  Assessment and Plan of Treatment: Your serum creatinine noted stable while here.  Avoid nephrotoxic medications such as NSAIDs, IV contrast etc     PRINCIPAL DISCHARGE DIAGNOSIS  Diagnosis: Anemia  Assessment and Plan of Treatment: You were sent from facility with anemia with hgb 6.7.  You showed no signs and symptoms of bleeding.  Your anemia is likely due to chronic kidney disease.  You received one unit packed blood cells with appropriate response.  Your hemoglobin is 8.8 at this time.  Follow up with outpatient doctor for continued blood count monitoring and anemia work up.      SECONDARY DISCHARGE DIAGNOSES  Diagnosis: Stage 4 chronic kidney disease  Assessment and Plan of Treatment: You have a isotryof CKD. Please avoid taking (NSAIDs) - (ex: Ibuprofen, Advil, Celebrex, Naprosyn)  Avoid taking any nephrotoxic agents (can harm kidneys) - Intravenous contrast for diagnostic testing, combination cold medications.  Have all medications adjusted for your renal function by your Health Care Provider.  Blood pressure control is important.  Take all medication as prescribed.  Follow up with your PCP.    Diagnosis: HLD (hyperlipidemia)  Assessment and Plan of Treatment: You have a history of hyperlipidemia, which is when you have too much cholesterol in your blood. High amounts of cholesterol in your blood can put you at higher risks for heart attck, strokes and other health problems. Follow up with PCP for treatment goals, continue medication as prescribed, have liver function testing every 3 months as anti lipid medications can cause liver irritation, eat low fat meals, avoid red meat, butter, fried foods and cheese. Get daily exercise.      Diagnosis: DM (diabetes mellitus)  Assessment and Plan of Treatment: Continue to follow with your primary care MD or your endocrinologist. Discuss what the goal hemoglobin A1C level is for you.  Follow a heart healthy diabetic diet. If you check your fingerstick glucose at home, call your MD if it is greater than 250mg/dL on 2 occasions or less than 100mg/dL on 2 occasions. Know signs of low blood sugar, such as: dizziness, shakiness, sweating, confusion, hunger, nervousness- drink 4 ounces apple juice if occurs and call your doctor. Know early signs of high blood sugar, such as: frequent urination, increased thirst, blurry vision, fatigue, headache - call your doctor if this occurs.      Diagnosis: Chronic HFrEF (heart failure with reduced ejection fraction)  Assessment and Plan of Treatment: You have a history of heart failure is a condition in which the heart is no longer able to pump oxygen-rich blood to the rest of the body efficiently.   Weigh yourself daily.  If you gain 3lbs in 3 days, or 5lbs in a week call your Health Care Provider.  Do not eat or drink foods containing more than 2000mg of salt (sodium) in your diet every day.  Call your Health Care Provider if you have any swelling or increased swelling in your feet, ankles, and/or stomach.  Take all of your medication as directed.  If you become dizzy call your Health Care Provider.      Diagnosis: HTN (hypertension)  Assessment and Plan of Treatment: You have a history of high blood pressure. High blood pressure is a condition that puts you at risk for heart attack, stroke and kidney disease. Please continue to take your medications as prescribed. You can also help control your blood pressure by maintaining a healthy weight, eating a diet low in fat and rich in fruits and vegetables, reduce the amount of salt in your diet. Also, reduce alcohol and try to include some form of physical activity daily for at least 30 mins. Follow up with your medical doctor to establish long term blood pressure treatment goals.  Notify your doctor if you have any of the following symptoms:   Dizziness, Lightheadedness, Blurry vision, Headache, Chest pain, Shortness of breath

## 2024-06-13 NOTE — DIETITIAN INITIAL EVALUATION ADULT - PROBLEM SELECTOR PLAN 1
- Presented with Hb of 6.8 in outpatient Rehab  - Hemodynamically stable and afebrile  - Hb in ED of 7  - S/P 1U PRBC in ED  - Goal Hb of 8 given cardiac history  - No signs of active bleeding  - BID PPI for now  - NPO  - Monitor for signs of active bleeding  - Consider anemia labs however patient s/p 1U PRBC in ED  - GI Dr. Forbes consulted

## 2024-06-13 NOTE — PROGRESS NOTE ADULT - SUBJECTIVE AND OBJECTIVE BOX
Date of Service 06-13-24 @ 12:28    CHIEF COMPLAINT:Patient is a 89y old  Female who presents with a chief complaint of Anemia .Pt appears comfortable.    	  REVIEW OF SYSTEMS:  CONSTITUTIONAL: No fever, weight loss, or fatigue  EYES: No eye pain, visual disturbances, or discharge  ENT:  No difficulty hearing, tinnitus, vertigo; No sinus or throat pain  NECK: No pain or stiffness  RESPIRATORY: No cough, wheezing, chills or hemoptysis; No Shortness of Breath  CARDIOVASCULAR: No chest pain, palpitations, passing out, dizziness, or leg swelling  GASTROINTESTINAL: No abdominal or epigastric pain. No nausea, vomiting, or hematemesis; No diarrhea or constipation. No melena or hematochezia.  GENITOURINARY: No dysuria, frequency, hematuria, or incontinence  NEUROLOGICAL: No headaches, memory loss, loss of strength, numbness, or tremors  SKIN: No itching, burning, rashes, or lesions   LYMPH Nodes: No enlarged glands  ENDOCRINE: No heat or cold intolerance; No hair loss  MUSCULOSKELETAL: No joint pain or swelling; No muscle, back, or extremity pain  PSYCHIATRIC: No depression, anxiety, mood swings, or difficulty sleeping  HEME/LYMPH: No easy bruising, or bleeding gums  ALLERGY AND IMMUNOLOGIC: No hives or eczema	        PHYSICAL EXAM:  T(C): 36.8 (06-13-24 @ 04:45), Max: 36.8 (06-13-24 @ 04:45)  HR: 72 (06-13-24 @ 04:45) (69 - 72)  BP: 148/66 (06-13-24 @ 04:45) (109/64 - 148/66)  RR: 18 (06-13-24 @ 04:45) (18 - 18)  SpO2: 95% (06-13-24 @ 04:45) (95% - 96%)        Appearance: Normal	  HEENT:   Normal oral mucosa, PERRL, EOMI	  Lymphatic: No lymphadenopathy  Cardiovascular: Normal S1 S2, No JVD, No murmurs, No edema  Respiratory: Lungs clear to auscultation	  Psychiatry: A & O x 3, Mood & affect appropriate  Gastrointestinal:  Soft, Non-tender, + BS	  Skin: No rashes, No ecchymoses, No cyanosis	  Neurologic: Non-focal  Extremities: Normal range of motion, No clubbing, cyanosis or edema  Vascular: Peripheral pulses palpable 2+ bilaterally    MEDICATIONS  (STANDING):  atorvastatin 80 milliGRAM(s) Oral at bedtime  carvedilol 3.125 milliGRAM(s) Oral every 12 hours  cholecalciferol 2000 Unit(s) Oral daily  escitalopram 10 milliGRAM(s) Oral daily  folic acid 1 milliGRAM(s) Oral daily  guaiFENesin Oral Liquid (Sugar-Free) 100 milliGRAM(s) Oral every 6 hours  pantoprazole    Tablet 40 milliGRAM(s) Oral two times a day  sacubitril 24 mG/valsartan 26 mG 1 Tablet(s) Oral two times a day  senna 1 Tablet(s) Oral at bedtime       	  	  LABS:	 	                         8.8    9.97  )-----------( 439      ( 12 Jun 2024 05:30 )             27.6     06-12    140  |  109<H>  |  22<H>  ----------------------------<  98  3.6   |  25  |  0.79    Ca    8.9      12 Jun 2024 05:30  Phos  2.6     06-12  Mg     2.3     06-12    TPro  6.6  /  Alb  2.3<L>  /  TBili  0.4  /  DBili  x   /  AST  41<H>  /  ALT  56  /  AlkPhos  95  06-11

## 2024-06-13 NOTE — DIETITIAN INITIAL EVALUATION ADULT - ENTERAL
Glucerna 1.2 Initial Goal rate @  55 ml/hr x 16 hr to provide 880 ml, 1056 calories, protein 53 gms, free water 708 ml/day

## 2024-06-13 NOTE — DIETITIAN INITIAL EVALUATION ADULT - PERTINENT LABORATORY DATA
06-13    141  |  111<H>  |  27<H>  ----------------------------<  105<H>  3.7   |  26  |  0.80    Ca    8.4      13 Jun 2024 12:00  Phos  2.6     06-12  Mg     2.3     06-12    TPro  6.6  /  Alb  2.3<L>  /  TBili  0.4  /  DBili  x   /  AST  41<H>  /  ALT  56  /  AlkPhos  95  06-11  POCT Blood Glucose.: 106 mg/dL (06-13-24 @ 11:39)  A1C with Estimated Average Glucose Result: 6.3 % (12-16-23 @ 07:25)  A1C with Estimated Average Glucose Result: 6.3 % (11-26-23 @ 08:48)

## 2024-06-13 NOTE — PROGRESS NOTE ADULT - SUBJECTIVE AND OBJECTIVE BOX
NP Note discussed with  Primary Attending    Patient is a 89y old  Female who presents with a chief complaint of Anemia     (13 Jun 2024 13:33)      INTERVAL HPI/OVERNIGHT EVENTS: no new complaints    MEDICATIONS  (STANDING):  atorvastatin 80 milliGRAM(s) Oral at bedtime  carvedilol 3.125 milliGRAM(s) Oral every 12 hours  cholecalciferol 2000 Unit(s) Oral daily  escitalopram 10 milliGRAM(s) Oral daily  folic acid 1 milliGRAM(s) Oral daily  guaiFENesin Oral Liquid (Sugar-Free) 100 milliGRAM(s) Oral every 6 hours  pantoprazole    Tablet 40 milliGRAM(s) Oral two times a day  sacubitril 24 mG/valsartan 26 mG 1 Tablet(s) Oral two times a day  senna 1 Tablet(s) Oral at bedtime    MEDICATIONS  (PRN):  acetaminophen   Oral Liquid .. 650 milliGRAM(s) Oral every 6 hours PRN Temp greater or equal to 38C (100.4F)  albuterol    90 MICROgram(s) HFA Inhaler 2 Puff(s) Inhalation every 6 hours PRN Shortness of Breath and/or Wheezing  lactulose Syrup 10 Gram(s) Oral daily PRN Constipation  melatonin 3 milliGRAM(s) Oral at bedtime PRN Insomnia      __________________________________________________  REVIEW OF SYSTEMS:    CONSTITUTIONAL: No fever,   EYES: no acute visual disturbances  NECK: No pain or stiffness  RESPIRATORY: No cough; No shortness of breath  CARDIOVASCULAR: No chest pain, no palpitations  GASTROINTESTINAL: No pain. No nausea or vomiting; No diarrhea   NEUROLOGICAL: No headache or numbness, no tremors  MUSCULOSKELETAL: No joint pain, no muscle pain  GENITOURINARY: no dysuria, no frequency, no hesitancy  PSYCHIATRY: no depression , no anxiety  ALL OTHER  ROS negative        Vital Signs Last 24 Hrs  T(C): 36.1 (13 Jun 2024 12:49), Max: 36.8 (13 Jun 2024 04:45)  T(F): 97 (13 Jun 2024 12:49), Max: 98.2 (13 Jun 2024 04:45)  HR: 59 (13 Jun 2024 12:49) (59 - 72)  BP: 107/54 (13 Jun 2024 12:49) (107/54 - 148/66)  BP(mean): --  RR: 17 (13 Jun 2024 12:49) (17 - 18)  SpO2: 95% (13 Jun 2024 12:49) (95% - 96%)    Parameters below as of 13 Jun 2024 12:49  Patient On (Oxygen Delivery Method): room air        ________________________________________________  PHYSICAL EXAM:  GENERAL: NAD  HEENT: Normocephalic;  conjunctivae and sclerae clear; moist mucous membranes;   NECK : supple  CHEST/LUNG: Clear to auscultation bilaterally with good air entry   HEART: S1 S2  regular; no murmurs, gallops or rubs  ABDOMEN: Soft, Nontender, Nondistended; Bowel sounds present  EXTREMITIES: no cyanosis; no edema; no calf tenderness  SKIN: warm and dry; no rash  NERVOUS SYSTEM:  Awake and alert; Oriented  to place, person and time ; no new deficits    _________________________________________________  LABS:                        8.6    8.90  )-----------( 400      ( 13 Jun 2024 12:00 )             27.3     06-13    141  |  111<H>  |  27<H>  ----------------------------<  105<H>  3.7   |  26  |  0.80    Ca    8.4      13 Jun 2024 12:00  Phos  2.6     06-12  Mg     2.3     06-12    TPro  6.6  /  Alb  2.3<L>  /  TBili  0.4  /  DBili  x   /  AST  41<H>  /  ALT  56  /  AlkPhos  95  06-11    PT/INR - ( 11 Jun 2024 17:20 )   PT: 11.8 sec;   INR: 1.04 ratio         PTT - ( 11 Jun 2024 17:20 )  PTT:30.9 sec  Urinalysis Basic - ( 13 Jun 2024 12:00 )    Color: x / Appearance: x / SG: x / pH: x  Gluc: 105 mg/dL / Ketone: x  / Bili: x / Urobili: x   Blood: x / Protein: x / Nitrite: x   Leuk Esterase: x / RBC: x / WBC x   Sq Epi: x / Non Sq Epi: x / Bacteria: x      CAPILLARY BLOOD GLUCOSE      POCT Blood Glucose.: 106 mg/dL (13 Jun 2024 11:39)  POCT Blood Glucose.: 107 mg/dL (13 Jun 2024 06:38)  POCT Blood Glucose.: 115 mg/dL (12 Jun 2024 23:59)  POCT Blood Glucose.: 136 mg/dL (12 Jun 2024 17:17)        RADIOLOGY & ADDITIONAL TESTS:    Imaging Personally Reviewed:  YES/NO    Consultant(s) Notes Reviewed:   YES/ No    Care Discussed with Consultants :     Plan of care was discussed with patient and /or primary care giver; all questions and concerns were addressed and care was aligned with patient's wishes.     NP Note discussed with  Primary Attending    Patient is a 89y old  Female who presents with a chief complaint of Anemia..  Seen at bedside, comfortable, pleasantly confused      (13 Jun 2024 13:33)      INTERVAL HPI/OVERNIGHT EVENTS: no new complaints    MEDICATIONS  (STANDING):  atorvastatin 80 milliGRAM(s) Oral at bedtime  carvedilol 3.125 milliGRAM(s) Oral every 12 hours  cholecalciferol 2000 Unit(s) Oral daily  escitalopram 10 milliGRAM(s) Oral daily  folic acid 1 milliGRAM(s) Oral daily  guaiFENesin Oral Liquid (Sugar-Free) 100 milliGRAM(s) Oral every 6 hours  pantoprazole    Tablet 40 milliGRAM(s) Oral two times a day  sacubitril 24 mG/valsartan 26 mG 1 Tablet(s) Oral two times a day  senna 1 Tablet(s) Oral at bedtime    MEDICATIONS  (PRN):  acetaminophen   Oral Liquid .. 650 milliGRAM(s) Oral every 6 hours PRN Temp greater or equal to 38C (100.4F)  albuterol    90 MICROgram(s) HFA Inhaler 2 Puff(s) Inhalation every 6 hours PRN Shortness of Breath and/or Wheezing  lactulose Syrup 10 Gram(s) Oral daily PRN Constipation  melatonin 3 milliGRAM(s) Oral at bedtime PRN Insomnia      __________________________________________________  REVIEW OF SYSTEMS:    CONSTITUTIONAL: No fever,   EYES: no acute visual disturbances  NECK: No pain or stiffness  RESPIRATORY: No cough; No shortness of breath  CARDIOVASCULAR: No chest pain, no palpitations  GASTROINTESTINAL: No pain. No nausea or vomiting; No diarrhea   NEUROLOGICAL: No headache or numbness, no tremors  MUSCULOSKELETAL: No joint pain, no muscle pain  GENITOURINARY: no dysuria, no frequency, no hesitancy  PSYCHIATRY: no depression , no anxiety  ALL OTHER  ROS negative        Vital Signs Last 24 Hrs  T(C): 36.1 (13 Jun 2024 12:49), Max: 36.8 (13 Jun 2024 04:45)  T(F): 97 (13 Jun 2024 12:49), Max: 98.2 (13 Jun 2024 04:45)  HR: 59 (13 Jun 2024 12:49) (59 - 72)  BP: 107/54 (13 Jun 2024 12:49) (107/54 - 148/66)  BP(mean): --  RR: 17 (13 Jun 2024 12:49) (17 - 18)  SpO2: 95% (13 Jun 2024 12:49) (95% - 96%)    Parameters below as of 13 Jun 2024 12:49  Patient On (Oxygen Delivery Method): room air        ________________________________________________  PHYSICAL EXAM:  Appropriate for age, pleasantly confused  GENERAL: NAD  HEENT: Normocephalic;  conjunctivae and sclerae clear; moist mucous membranes;   NECK : supple  CHEST/LUNG: Clear to auscultation bilaterally with good air entry   HEART: S1 S2  regular; no murmurs, gallops or rubs  ABDOMEN: Soft, Nontender, Nondistended; Bowel sounds present  EXTREMITIES: no cyanosis; no edema; no calf tenderness  SKIN: warm and dry; no rash  NERVOUS SYSTEM:  Awake and alert; pleasantly confused  LABS:                        8.6    8.90  )-----------( 400      ( 13 Jun 2024 12:00 )             27.3     06-13    141  |  111<H>  |  27<H>  ----------------------------<  105<H>  3.7   |  26  |  0.80    Ca    8.4      13 Jun 2024 12:00  Phos  2.6     06-12  Mg     2.3     06-12    TPro  6.6  /  Alb  2.3<L>  /  TBili  0.4  /  DBili  x   /  AST  41<H>  /  ALT  56  /  AlkPhos  95  06-11    PT/INR - ( 11 Jun 2024 17:20 )   PT: 11.8 sec;   INR: 1.04 ratio         PTT - ( 11 Jun 2024 17:20 )  PTT:30.9 sec  Urinalysis Basic - ( 13 Jun 2024 12:00 )    Color: x / Appearance: x / SG: x / pH: x  Gluc: 105 mg/dL / Ketone: x  / Bili: x / Urobili: x   Blood: x / Protein: x / Nitrite: x   Leuk Esterase: x / RBC: x / WBC x   Sq Epi: x / Non Sq Epi: x / Bacteria: x      CAPILLARY BLOOD GLUCOSE      POCT Blood Glucose.: 106 mg/dL (13 Jun 2024 11:39)  POCT Blood Glucose.: 107 mg/dL (13 Jun 2024 06:38)  POCT Blood Glucose.: 115 mg/dL (12 Jun 2024 23:59)  POCT Blood Glucose.: 136 mg/dL (12 Jun 2024 17:17)      RADIOLOGY & ADDITIONAL TESTS:    < from: Xray Chest 1 View AP/PA (06.11.24 @ 19:27) >    ACC: [id number] EXAM:  XR CHEST AP OR PA 1V   ORDERED BY:  GINA LAGUNA     PROCEDURE DATE:  06/11/2024          INTERPRETATION:  An AP chest radiograph was performed for cough.    Comparison is made to 12/15/2023.    The lungs are clear bilaterally. There are no infiltrates on either side.   There is no pneumothorax. There are no pleural effusions. There is no   hilar or mediastinal widening. The thoracic aorta is atherosclerotic. The   cardiac silhouette is marginally enlarged and associated with central   pulmonary venous engorgement but without cristine pulmonary edema.. There   are left coronary artery stents. The bony thorax shows no acute findings,   while noting degenerative changes of the thoracic spine.    IMPRESSION:  1. Marginal cardiac enlargement with central pulmonary venous   engorgement, atherosclerotic aorta and left coronary artery stents   however there is no cristine pulmonary edema.  2. No evidence of pneumonia or pleural effusion.  3. Degenerative changes of the thoracic spine.    < end of copied text >    Imaging Personally Reviewed:  YES/NO    Consultant(s) Notes Reviewed:   YES/ No    Care Discussed with Consultants :     Plan of care was discussed with patient and /or primary care giver; all questions and concerns were addressed and care was aligned with patient's wishes.

## 2024-06-13 NOTE — DIETITIAN INITIAL EVALUATION ADULT - PERTINENT MEDS FT
MEDICATIONS  (STANDING):  atorvastatin 80 milliGRAM(s) Oral at bedtime  carvedilol 3.125 milliGRAM(s) Oral every 12 hours  cholecalciferol 2000 Unit(s) Oral daily  escitalopram 10 milliGRAM(s) Oral daily  folic acid 1 milliGRAM(s) Oral daily  guaiFENesin Oral Liquid (Sugar-Free) 100 milliGRAM(s) Oral every 6 hours  pantoprazole    Tablet 40 milliGRAM(s) Oral two times a day  sacubitril 24 mG/valsartan 26 mG 1 Tablet(s) Oral two times a day  senna 1 Tablet(s) Oral at bedtime    MEDICATIONS  (PRN):  acetaminophen   Oral Liquid .. 650 milliGRAM(s) Oral every 6 hours PRN Temp greater or equal to 38C (100.4F)  albuterol    90 MICROgram(s) HFA Inhaler 2 Puff(s) Inhalation every 6 hours PRN Shortness of Breath and/or Wheezing  lactulose Syrup 10 Gram(s) Oral daily PRN Constipation  melatonin 3 milliGRAM(s) Oral at bedtime PRN Insomnia

## 2024-06-13 NOTE — DISCHARGE NOTE PROVIDER - CARE PROVIDER_API CALL
Bill Shaffer  Internal Medicine  [address], [place] [address]  Phone: [phone]  Fax: [phone]  Follow Up Time: 1 week

## 2024-06-13 NOTE — PROGRESS NOTE ADULT - SUBJECTIVE AND OBJECTIVE BOX
[   ] ICU                                          [   ] CCU                                      [ X  ] Medical Floor    Patient is a 89 year old female with iron deficiency anemia. GI consulted to evaluate.        [facility] is a 89 [facility], with past medical history significant for Dementia, HTN, Hyperlipidemia, DM2, CAD, CHF, CKD4, Dementia, TIA, Peg placement, TIA, presented to the emergency room with sever worsening anemia. Patient is poor historian. Patients Hb found to be 6.7 at the rehab facility. Reported patient with worsening constipation and acid reflux other wise no abdominal pain, nausea, vomiting, hematemesis, hematochezia, melena, fever, chills, chest pain, SOB, cough, hematuria, dysuria or diarrhea reported.       Patient appears comfortable. No new complaints reported, No abdominal pain, N/V, hematemesis, hematochezia, melena, fever, chills, chest pain, SOB, cough or diarrhea reported.      PAIN MANAGEMENT:  Pain Scale:                 0/10  Pain Location:      Prior Colonoscopy:  Unknown      PAST MEDICAL HISTORY    Hypertension    Diabetes Mellitus, Type 2     Hyperlipidemia    Dementia    CAD (coronary artery disease)    TIAs    CHF    Anemia    Dysphagia             PAST SURGICAL HISTORY    Cholecystectomy    Hip replacement, right    Peg tube placement         Allergies    aspirin (Anaphylaxis)  penicillin (Anaphylaxis)  Celebrex (Rash)    Intolerances  None         MEDICATIONS  (STANDING):  atorvastatin 80 milliGRAM(s) Oral at bedtime  carvedilol 3.125 milliGRAM(s) Oral every 12 hours  cholecalciferol 2000 Unit(s) Oral daily  escitalopram 10 milliGRAM(s) Oral daily  folic acid 1 milliGRAM(s) Oral daily  guaiFENesin Oral Liquid (Sugar-Free) 100 milliGRAM(s) Oral every 6 hours  pantoprazole  Injectable 40 milliGRAM(s) IV Push two times a day  sacubitril 24 mG/valsartan 26 mG 1 Tablet(s) Oral two times a day    MEDICATIONS  (PRN):  acetaminophen   Oral Liquid .. 650 milliGRAM(s) Oral every 6 hours PRN Temp greater or equal to 38C (100.4F)  albuterol    90 MICROgram(s) HFA Inhaler 2 Puff(s) Inhalation every 6 hours PRN Shortness of Breath and/or Wheezing  lactulose Syrup 10 Gram(s) Oral daily PRN Constipation  melatonin 3 milliGRAM(s) Oral at bedtime PRN Insomnia      SOCIAL HISTORY  Advanced Directives:       [ X ] Full Code       [  ] DNR  Marital Status:         [  ] M      [ X ] S      [  ] D       [  ] W  Children:       [ X ] Yes      [  ] No  Occupation:        [  ] Employed       [ X ] Unemployed       [  ] Retired  Diet:       [  ] Regular       [ X ] PEG feeding          [  ] NG tube feeding  Drug Use:           [ X ] Patient denied          [  ] Yes  Alcohol:           [ X ] No             [  ] Yes (socially)         [  ] Yes (chronic)  Tobacco:           [  ] Yes           [ X ] No      FAMILY HISTORY  [ X ] Heart Disease            [ X ] Diabetes             [ X ] HTN             [  ] Colon Cancer             [  ] Stomach Cancer              [  ] Pancreatic Cancer          VITALS   Vital Signs Last 24 Hrs  T(C): 36.8 (06-13-24 @ 04:45), Max: 36.8 (06-13-24 @ 04:45)  T(F): 98.2 (06-13-24 @ 04:45), Max: 98.2 (06-13-24 @ 04:45)  HR: 72 (06-13-24 @ 04:45) (69 - 73)  BP: 148/66 (06-13-24 @ 04:45) (109/64 - 152/71)   RR: 18 (06-13-24 @ 04:45) (18 - 18)  SpO2: 95% (06-13-24 @ 04:45) (93% - 96%)        MEDICATIONS  (STANDING):  atorvastatin 80 milliGRAM(s) Oral at bedtime  carvedilol 3.125 milliGRAM(s) Oral every 12 hours  cholecalciferol 2000 Unit(s) Oral daily  escitalopram 10 milliGRAM(s) Oral daily  folic acid 1 milliGRAM(s) Oral daily  guaiFENesin Oral Liquid (Sugar-Free) 100 milliGRAM(s) Oral every 6 hours  pantoprazole    Tablet 40 milliGRAM(s) Oral two times a day  sacubitril 24 mG/valsartan 26 mG 1 Tablet(s) Oral two times a day  senna 1 Tablet(s) Oral at bedtime    MEDICATIONS  (PRN):  acetaminophen   Oral Liquid .. 650 milliGRAM(s) Oral every 6 hours PRN Temp greater or equal to 38C (100.4F)  albuterol    90 MICROgram(s) HFA Inhaler 2 Puff(s) Inhalation every 6 hours PRN Shortness of Breath and/or Wheezing  lactulose Syrup 10 Gram(s) Oral daily PRN Constipation  melatonin 3 milliGRAM(s) Oral at bedtime PRN Insomnia                            8.8    9.97  )-----------( 439      ( 12 Jun 2024 05:30 )             27.6       06-12    140  |  109<H>  |  22<H>  ----------------------------<  98  3.6   |  25  |  0.79    Ca    8.9      12 Jun 2024 05:30  Phos  2.6     06-12  Mg     2.3     06-12    TPro  6.6  /  Alb  2.3<L>  /  TBili  0.4  /  DBili  x   /  AST  41<H>  /  ALT  56  /  AlkPhos  95  06-11      PT/INR - ( 11 Jun 2024 17:20 )   PT: 11.8 sec;   INR: 1.04 ratio         PTT - ( 11 Jun 2024 17:20 )  PTT:30.9 sec

## 2024-06-13 NOTE — DIETITIAN INITIAL EVALUATION ADULT - PROBLEM/PLAN-1
Attending attestation note    I was available at all times during the patient's care, while the advance care provider evaluated the patient within the emergency department.  I was available to provide consultation, offer advice, and full evaluation of the patient.  I did not see the patient myself.    Khurram Alcala MD     DISPLAY PLAN FREE TEXT

## 2024-06-13 NOTE — PROGRESS NOTE ADULT - SUBJECTIVE AND OBJECTIVE BOX
PRASANTH REICH  MR# [id number]  89yFemale        Patient is a 89y old  Female who presents with a chief complaint of Anemia (13 Jun 2024 10:47)      INTERVAL HPI/OVERNIGHT EVENTS:  Patient seen and examined at bedside. No notations of chest pain, palpitation, SOB, orthopnea, nausea, vomiting or abdominal pain.    ALLERGIES  aspirin (Anaphylaxis)  penicillin (Anaphylaxis)  Celebrex (Rash)      MEDICATIONS  acetaminophen   Oral Liquid .. 650 milliGRAM(s) Oral every 6 hours PRN Temp greater or equal to 38C (100.4F)  albuterol    90 MICROgram(s) HFA Inhaler 2 Puff(s) Inhalation every 6 hours PRN Shortness of Breath and/or Wheezing  atorvastatin 80 milliGRAM(s) Oral at bedtime  carvedilol 3.125 milliGRAM(s) Oral every 12 hours  cholecalciferol 2000 Unit(s) Oral daily  escitalopram 10 milliGRAM(s) Oral daily  folic acid 1 milliGRAM(s) Oral daily  guaiFENesin Oral Liquid (Sugar-Free) 100 milliGRAM(s) Oral every 6 hours  lactulose Syrup 10 Gram(s) Oral daily PRN Constipation  melatonin 3 milliGRAM(s) Oral at bedtime PRN Insomnia  pantoprazole    Tablet 40 milliGRAM(s) Oral two times a day  sacubitril 24 mG/valsartan 26 mG 1 Tablet(s) Oral two times a day  senna 1 Tablet(s) Oral at bedtime              REVIEW OF SYSTEMS:  CONSTITUTIONAL: No fever, weight loss, or fatigue  EYES: No eye pain, visual disturbances, or discharge  ENT:  No difficulty hearing, tinnitus, vertigo; No sinus or throat pain  NECK: No pain or stiffness  RESPIRATORY: No cough, wheezing, chills or hemoptysis; No Shortness of Breath  CARDIOVASCULAR: No chest pain, palpitations, passing out, dizziness, or leg swelling  GASTROINTESTINAL: No abdominal or epigastric pain. No nausea, vomiting, or hematemesis; No diarrhea or constipation. No melena or hematochezia.  GENITOURINARY: No dysuria, frequency, hematuria, or incontinence  NEUROLOGICAL: No headaches, memory loss, loss of strength, numbness, or tremors  SKIN: No itching, burning, rashes, or lesions   LYMPH Nodes: No enlarged glands  ENDOCRINE: No heat or cold intolerance; No hair loss  MUSCULOSKELETAL: No joint pain or swelling; No muscle, back, or extremity pain  PSYCHIATRIC: No depression, anxiety, mood swings, or difficulty sleeping  HEME/LYMPH: No easy bruising, or bleeding gums  ALLERGY AND IMMUNOLOGIC: No hives or eczema	    [ ] All others negative	  [ ] Unable to obtain      T(C): 36.8 (06-13-24 @ 04:45), Max: 36.8 (06-13-24 @ 04:45)  T(F): 98.2 (06-13-24 @ 04:45), Max: 98.2 (06-13-24 @ 04:45)  HR: 72 (06-13-24 @ 04:45) (69 - 72)  BP: 148/66 (06-13-24 @ 04:45) (109/64 - 148/66)  RR: 18 (06-13-24 @ 04:45) (18 - 18)  SpO2: 95% (06-13-24 @ 04:45) (95% - 96%)  Wt(kg): --    I&O's Summary        PHYSICAL EXAM:  A X O x  HEAD:  Atraumatic, Normocephalic  EYES: EOMI, PERRLA, conjunctiva and sclera clear  NECK: Supple, No JVD, Normal thyroid  Resp: CTAB, No crackles, wheezing,   CVS: Regular rate and rhythm; No discernable murmurs, rubs, or gallops  ABD: Soft, Nontender, Nondistended; Bowel sounds present  EXTREMITIES:  2+ Peripheral Pulses, No edema  LYMPH: No dicernable lymphadenopathy noted  GENERAL: NAD, well-groomed, well-developed      LABS:                        8.8    9.97  )-----------( 439      ( 12 Jun 2024 05:30 )             27.6     06-12    140  |  109<H>  |  22<H>  ----------------------------<  98  3.6   |  25  |  0.79    Ca    8.9      12 Jun 2024 05:30  Phos  2.6     06-12  Mg     2.3     06-12    TPro  6.6  /  Alb  2.3<L>  /  TBili  0.4  /  DBili  x   /  AST  41<H>  /  ALT  56  /  AlkPhos  95  06-11    PT/INR - ( 11 Jun 2024 17:20 )   PT: 11.8 sec;   INR: 1.04 ratio         PTT - ( 11 Jun 2024 17:20 )  PTT:30.9 sec  Urinalysis Basic - ( 12 Jun 2024 05:30 )    Color: x / Appearance: x / SG: x / pH: x  Gluc: 98 mg/dL / Ketone: x  / Bili: x / Urobili: x   Blood: x / Protein: x / Nitrite: x   Leuk Esterase: x / RBC: x / WBC x   Sq Epi: x / Non Sq Epi: x / Bacteria: x      CAPILLARY BLOOD GLUCOSE      POCT Blood Glucose.: 106 mg/dL (13 Jun 2024 11:39)  POCT Blood Glucose.: 107 mg/dL (13 Jun 2024 06:38)  POCT Blood Glucose.: 115 mg/dL (12 Jun 2024 23:59)  POCT Blood Glucose.: 136 mg/dL (12 Jun 2024 17:17)      Troponins:  ProBNP:  Lipid Profile:   HgA1c:  TSH:           RADIOLOGY & ADDITIONAL TESTS:    Imaging Personally Reviewed:  [ ] YES  [ ] NO      Consultant(s) Notes Reviewed:  [x ] YES  [ ] NO    Care Discussed with Consultants/Other Providers [ x] YES  [ ] NO          PAST MEDICAL & SURGICAL HISTORY:  Hypertension      Diabetes Mellitus, Type 2      Hyperlipidemia      Dementia      CAD (coronary artery disease)      History of TIA (transient ischemic attack)      Anemia      HFrEF (heart failure with reduced ejection fraction)      History of Cholecystectomy      S/P hip replacement, left            Anemia    No pertinent family history in first degree relatives    Handoff    MEWS Score    Hypertension    Diabetes Mellitus, Type 2    History of Cholecystectomy    Hyperlipidemia    Dementia    CAD (coronary artery disease)    History of TIAs    History of TIA (transient ischemic attack)    Anemia    HFrEF (heart failure with reduced ejection fraction)    Anemia    Anemia    Stage 4 chronic kidney disease    HTN (hypertension)    HLD (hyperlipidemia)    DM (diabetes mellitus)    Chronic HFrEF (heart failure with reduced ejection fraction)    Prophylactic measure    Discharge planning issues    Cough    History of Cholecystectomy    S/P hip replacement, right    S/P hip replacement, left    ABNORMAL LABS    90+    Stage 4 chronic kidney disease    HTN (hypertension)    SysAdmin_VisitLink

## 2024-06-13 NOTE — DISCHARGE NOTE PROVIDER - NSDCMRMEDTOKEN_GEN_ALL_CORE_FT
acetaminophen 160 mg/5 mL oral suspension: 20.31 milliliter(s) orally every 6 hours as needed for Temp greater or equal to 38C (100.4F)  albuterol 2.5 mg/3 mL (0.083%) inhalation solution: 3 milliliter(s) by nebulizer every 8 hours as needed for  shortness of breath and/or wheezing  atorvastatin 80 mg oral tablet: 1 tab(s) orally once a day (at bedtime)  carvedilol 3.125 mg oral tablet: 1 tab(s) orally every 12 hours  cholecalciferol oral tablet: 2000 unit(s) orally once a day  clopidogrel 75 mg oral tablet: 1 tab(s) orally once a day  escitalopram 5 mg oral tablet: 2 tab(s) orally once a day  folic acid 1 mg oral tablet: 1 tab(s) orally once a day  guaiFENesin 100 mg/5 mL oral liquid: 5 milliliter(s) orally every 6 hours  lactulose 10 g oral powder for reconstitution: 1 each orally once a day  melatonin 5 mg oral tablet: 1 tab(s) orally once a day (at bedtime)  NexIUM 40 mg oral delayed release capsule: 1 cap(s) orally once a day  sacubitril-valsartan 24 mg-26 mg oral tablet: 1 tab(s) orally 2 times a day  senna leaf extract oral tablet: 1 tab(s) orally once a day (at bedtime)  sodium bicarbonate 650 mg oral tablet: 2 tab(s) orally 3 times a day

## 2024-06-14 VITALS
OXYGEN SATURATION: 100 % | RESPIRATION RATE: 17 BRPM | SYSTOLIC BLOOD PRESSURE: 124 MMHG | DIASTOLIC BLOOD PRESSURE: 54 MMHG | TEMPERATURE: 98 F | WEIGHT: 219.14 LBS | HEART RATE: 63 BPM

## 2024-06-14 LAB
ANION GAP SERPL CALC-SCNC: 8 MMOL/L — SIGNIFICANT CHANGE UP (ref 5–17)
BUN SERPL-MCNC: 30 MG/DL — HIGH (ref 7–18)
CALCIUM SERPL-MCNC: 8.6 MG/DL — SIGNIFICANT CHANGE UP (ref 8.4–10.5)
CHLORIDE SERPL-SCNC: 111 MMOL/L — HIGH (ref 96–108)
CO2 SERPL-SCNC: 23 MMOL/L — SIGNIFICANT CHANGE UP (ref 22–31)
CREAT SERPL-MCNC: 1 MG/DL — SIGNIFICANT CHANGE UP (ref 0.5–1.3)
EGFR: 53 ML/MIN/1.73M2 — LOW
GLUCOSE BLDC GLUCOMTR-MCNC: 112 MG/DL — HIGH (ref 70–99)
GLUCOSE BLDC GLUCOMTR-MCNC: 96 MG/DL — SIGNIFICANT CHANGE UP (ref 70–99)
GLUCOSE SERPL-MCNC: 100 MG/DL — HIGH (ref 70–99)
HCT VFR BLD CALC: 28.4 % — LOW (ref 34.5–45)
HGB BLD-MCNC: 9 G/DL — LOW (ref 11.5–15.5)
MCHC RBC-ENTMCNC: 25.4 PG — LOW (ref 27–34)
MCHC RBC-ENTMCNC: 31.7 GM/DL — LOW (ref 32–36)
MCV RBC AUTO: 80 FL — SIGNIFICANT CHANGE UP (ref 80–100)
NRBC # BLD: 0 /100 WBCS — SIGNIFICANT CHANGE UP (ref 0–0)
PLATELET # BLD AUTO: 497 K/UL — HIGH (ref 150–400)
POTASSIUM SERPL-MCNC: 3.7 MMOL/L — SIGNIFICANT CHANGE UP (ref 3.5–5.3)
POTASSIUM SERPL-SCNC: 3.7 MMOL/L — SIGNIFICANT CHANGE UP (ref 3.5–5.3)
RBC # BLD: 3.55 M/UL — LOW (ref 3.8–5.2)
RBC # FLD: 15.8 % — HIGH (ref 10.3–14.5)
SODIUM SERPL-SCNC: 142 MMOL/L — SIGNIFICANT CHANGE UP (ref 135–145)
WBC # BLD: 9.84 K/UL — SIGNIFICANT CHANGE UP (ref 3.8–10.5)
WBC # FLD AUTO: 9.84 K/UL — SIGNIFICANT CHANGE UP (ref 3.8–10.5)

## 2024-06-14 PROCEDURE — 99285 EMERGENCY DEPT VISIT HI MDM: CPT | Mod: 25

## 2024-06-14 PROCEDURE — 83550 IRON BINDING TEST: CPT

## 2024-06-14 PROCEDURE — 80048 BASIC METABOLIC PNL TOTAL CA: CPT

## 2024-06-14 PROCEDURE — 85045 AUTOMATED RETICULOCYTE COUNT: CPT

## 2024-06-14 PROCEDURE — 86923 COMPATIBILITY TEST ELECTRIC: CPT

## 2024-06-14 PROCEDURE — 80053 COMPREHEN METABOLIC PANEL: CPT

## 2024-06-14 PROCEDURE — 86850 RBC ANTIBODY SCREEN: CPT

## 2024-06-14 PROCEDURE — 82728 ASSAY OF FERRITIN: CPT

## 2024-06-14 PROCEDURE — 86900 BLOOD TYPING SEROLOGIC ABO: CPT

## 2024-06-14 PROCEDURE — 85027 COMPLETE CBC AUTOMATED: CPT

## 2024-06-14 PROCEDURE — 83735 ASSAY OF MAGNESIUM: CPT

## 2024-06-14 PROCEDURE — 86901 BLOOD TYPING SEROLOGIC RH(D): CPT

## 2024-06-14 PROCEDURE — 82962 GLUCOSE BLOOD TEST: CPT

## 2024-06-14 PROCEDURE — 36430 TRANSFUSION BLD/BLD COMPNT: CPT

## 2024-06-14 PROCEDURE — 85610 PROTHROMBIN TIME: CPT

## 2024-06-14 PROCEDURE — 84100 ASSAY OF PHOSPHORUS: CPT

## 2024-06-14 PROCEDURE — 97162 PT EVAL MOD COMPLEX 30 MIN: CPT

## 2024-06-14 PROCEDURE — 85025 COMPLETE CBC W/AUTO DIFF WBC: CPT

## 2024-06-14 PROCEDURE — 36415 COLL VENOUS BLD VENIPUNCTURE: CPT

## 2024-06-14 PROCEDURE — 84466 ASSAY OF TRANSFERRIN: CPT

## 2024-06-14 PROCEDURE — 71045 X-RAY EXAM CHEST 1 VIEW: CPT

## 2024-06-14 PROCEDURE — 85730 THROMBOPLASTIN TIME PARTIAL: CPT

## 2024-06-14 PROCEDURE — P9040: CPT

## 2024-06-14 PROCEDURE — 83880 ASSAY OF NATRIURETIC PEPTIDE: CPT

## 2024-06-14 PROCEDURE — 83540 ASSAY OF IRON: CPT

## 2024-06-14 RX ADMIN — Medication 2000 UNIT(S): at 12:30

## 2024-06-14 RX ADMIN — Medication 1 MILLIGRAM(S): at 12:30

## 2024-06-14 RX ADMIN — ESCITALOPRAM OXALATE 10 MILLIGRAM(S): 10 TABLET, FILM COATED ORAL at 12:30

## 2024-06-14 RX ADMIN — SACUBITRIL AND VALSARTAN 1 TABLET(S): 24; 26 TABLET, FILM COATED ORAL at 06:42

## 2024-06-14 RX ADMIN — Medication 100 MILLIGRAM(S): at 06:42

## 2024-06-14 RX ADMIN — PANTOPRAZOLE SODIUM 40 MILLIGRAM(S): 20 TABLET, DELAYED RELEASE ORAL at 06:42

## 2024-06-14 RX ADMIN — Medication 100 MILLIGRAM(S): at 12:30

## 2024-06-14 RX ADMIN — CARVEDILOL PHOSPHATE 3.12 MILLIGRAM(S): 80 CAPSULE, EXTENDED RELEASE ORAL at 06:42

## 2024-06-14 NOTE — PROGRESS NOTE ADULT - SUBJECTIVE AND OBJECTIVE BOX
[   ] ICU                                          [   ] CCU                                      [  X ] Medical Floor    Patient is a 89 year old female with iron deficiency anemia. GI consulted to evaluate.        [facility] is a 89 [facility], with past medical history significant for Dementia, HTN, Hyperlipidemia, DM2, CAD, CHF, CKD4, Dementia, TIA, Peg placement, TIA, presented to the emergency room with sever worsening anemia. Patient is poor historian. Patients Hb found to be 6.7 at the rehab facility. Reported patient with worsening constipation and acid reflux other wise no abdominal pain, nausea, vomiting, hematemesis, hematochezia, melena, fever, chills, chest pain, SOB, cough, hematuria, dysuria or diarrhea reported.       Patient appears comfortable. No new complaints reported, No abdominal pain, N/V, hematemesis, hematochezia, melena, fever, chills, chest pain, SOB, cough or diarrhea reported.      PAIN MANAGEMENT:  Pain Scale:                 0/10  Pain Location:      Prior Colonoscopy:  Unknown      PAST MEDICAL HISTORY    Hypertension    Diabetes Mellitus, Type 2     Hyperlipidemia    Dementia    CAD (coronary artery disease)    TIAs    CHF    Anemia    Dysphagia             PAST SURGICAL HISTORY    Cholecystectomy    Hip replacement, right    Peg tube placement         Allergies    aspirin (Anaphylaxis)  penicillin (Anaphylaxis)  Celebrex (Rash)    Intolerances  None         MEDICATIONS  (STANDING):  atorvastatin 80 milliGRAM(s) Oral at bedtime  carvedilol 3.125 milliGRAM(s) Oral every 12 hours  cholecalciferol 2000 Unit(s) Oral daily  escitalopram 10 milliGRAM(s) Oral daily  folic acid 1 milliGRAM(s) Oral daily  guaiFENesin Oral Liquid (Sugar-Free) 100 milliGRAM(s) Oral every 6 hours  pantoprazole  Injectable 40 milliGRAM(s) IV Push two times a day  sacubitril 24 mG/valsartan 26 mG 1 Tablet(s) Oral two times a day    MEDICATIONS  (PRN):  acetaminophen   Oral Liquid .. 650 milliGRAM(s) Oral every 6 hours PRN Temp greater or equal to 38C (100.4F)  albuterol    90 MICROgram(s) HFA Inhaler 2 Puff(s) Inhalation every 6 hours PRN Shortness of Breath and/or Wheezing  lactulose Syrup 10 Gram(s) Oral daily PRN Constipation  melatonin 3 milliGRAM(s) Oral at bedtime PRN Insomnia      SOCIAL HISTORY  Advanced Directives:       [ X ] Full Code       [  ] DNR  Marital Status:         [  ] M      [ X ] S      [  ] D       [  ] W  Children:       [ X ] Yes      [  ] No  Occupation:        [  ] Employed       [ X ] Unemployed       [  ] Retired  Diet:       [  ] Regular       [ X ] PEG feeding          [  ] NG tube feeding  Drug Use:           [ X ] Patient denied          [  ] Yes  Alcohol:           [ X ] No             [  ] Yes (socially)         [  ] Yes (chronic)  Tobacco:           [  ] Yes           [ X ] No      FAMILY HISTORY  [ X ] Heart Disease            [ X ] Diabetes             [ X ] HTN             [  ] Colon Cancer             [  ] Stomach Cancer              [  ] Pancreatic Cancer        VITALS   Vital Signs Last 24 Hrs  T(C): 36.4 (06-14-24 @ 05:15), Max: 36.5 (06-13-24 @ 20:41)  T(F): 97.5 (06-14-24 @ 05:15), Max: 97.7 (06-13-24 @ 20:41)  HR: 63 (06-14-24 @ 05:15) (63 - 70)  BP: 124/54 (06-14-24 @ 05:15) (106/56 - 124/54)   RR: 17 (06-14-24 @ 05:15) (17 - 18)  SpO2: 100% (06-14-24 @ 05:15) (95% - 100%)      MEDICATIONS  (STANDING):  atorvastatin 80 milliGRAM(s) Oral at bedtime  carvedilol 3.125 milliGRAM(s) Oral every 12 hours  cholecalciferol 2000 Unit(s) Oral daily  escitalopram 10 milliGRAM(s) Oral daily  folic acid 1 milliGRAM(s) Oral daily  guaiFENesin Oral Liquid (Sugar-Free) 100 milliGRAM(s) Oral every 6 hours  pantoprazole    Tablet 40 milliGRAM(s) Oral two times a day  sacubitril 24 mG/valsartan 26 mG 1 Tablet(s) Oral two times a day  senna 1 Tablet(s) Oral at bedtime    MEDICATIONS  (PRN):  acetaminophen   Oral Liquid .. 650 milliGRAM(s) Oral every 6 hours PRN Temp greater or equal to 38C (100.4F)  albuterol    90 MICROgram(s) HFA Inhaler 2 Puff(s) Inhalation every 6 hours PRN Shortness of Breath and/or Wheezing  lactulose Syrup 10 Gram(s) Oral daily PRN Constipation  melatonin 3 milliGRAM(s) Oral at bedtime PRN Insomnia                            9.0    9.84  )-----------( 497      ( 14 Jun 2024 06:15 )             28.4       06-14    142  |  111<H>  |  30<H>  ----------------------------<  100<H>  3.7   |  23  |  1.00    Ca    8.6      14 Jun 2024 06:15

## 2024-06-14 NOTE — PROGRESS NOTE ADULT - PROBLEM/PLAN-2
----- Message from Vinod Laguerre sent at 6/30/2022  2:20 PM CDT -----  Contact: pt    Who Called:quiten    Does the patient know what this is regarding?:pt want stronger rx  Would the patient rather a call back or a response via [facility]? callback  Best Call Back Number:.[phone]    Additional Information: sildenafiL (VIAGRA) 100 MG tablet        
DISPLAY PLAN FREE TEXT

## 2024-06-14 NOTE — PROGRESS NOTE ADULT - SUBJECTIVE AND OBJECTIVE BOX
PRASANTH REICH  MR# [id number]  89yFemale        Patient is a 89y old  Female who presents with a chief complaint of Anemia (13 Jun 2024 16:48)      INTERVAL HPI/OVERNIGHT EVENTS:  Patient seen and examined at bedside. No notations of chest pain, palpitation, SOB, orthopnea, nausea, vomiting or abdominal pain.    ALLERGIES  aspirin (Anaphylaxis)  penicillin (Anaphylaxis)  Celebrex (Rash)      MEDICATIONS  acetaminophen   Oral Liquid .. 650 milliGRAM(s) Oral every 6 hours PRN Temp greater or equal to 38C (100.4F)  albuterol    90 MICROgram(s) HFA Inhaler 2 Puff(s) Inhalation every 6 hours PRN Shortness of Breath and/or Wheezing  atorvastatin 80 milliGRAM(s) Oral at bedtime  carvedilol 3.125 milliGRAM(s) Oral every 12 hours  cholecalciferol 2000 Unit(s) Oral daily  escitalopram 10 milliGRAM(s) Oral daily  folic acid 1 milliGRAM(s) Oral daily  guaiFENesin Oral Liquid (Sugar-Free) 100 milliGRAM(s) Oral every 6 hours  lactulose Syrup 10 Gram(s) Oral daily PRN Constipation  melatonin 3 milliGRAM(s) Oral at bedtime PRN Insomnia  pantoprazole    Tablet 40 milliGRAM(s) Oral two times a day  sacubitril 24 mG/valsartan 26 mG 1 Tablet(s) Oral two times a day  senna 1 Tablet(s) Oral at bedtime              REVIEW OF SYSTEMS:  CONSTITUTIONAL: No fever, weight loss, or fatigue  EYES: No eye pain, visual disturbances, or discharge  ENT:  No difficulty hearing, tinnitus, vertigo; No sinus or throat pain  NECK: No pain or stiffness  RESPIRATORY: No cough, wheezing, chills or hemoptysis; No Shortness of Breath  CARDIOVASCULAR: No chest pain, palpitations, passing out, dizziness, or leg swelling  GASTROINTESTINAL: No abdominal or epigastric pain. No nausea, vomiting, or hematemesis; No diarrhea or constipation. No melena or hematochezia.  GENITOURINARY: No dysuria, frequency, hematuria, or incontinence  NEUROLOGICAL: No headaches, memory loss, loss of strength, numbness, or tremors  SKIN: No itching, burning, rashes, or lesions   LYMPH Nodes: No enlarged glands  ENDOCRINE: No heat or cold intolerance; No hair loss  MUSCULOSKELETAL: No joint pain or swelling; No muscle, back, or extremity pain  PSYCHIATRIC: No depression, anxiety, mood swings, or difficulty sleeping  HEME/LYMPH: No easy bruising, or bleeding gums  ALLERGY AND IMMUNOLOGIC: No hives or eczema	    [ ] All others negative	  [ ] Unable to obtain      T(C): 36.4 (06-14-24 @ 05:15), Max: 36.5 (06-13-24 @ 20:41)  T(F): 97.5 (06-14-24 @ 05:15), Max: 97.7 (06-13-24 @ 20:41)  HR: 63 (06-14-24 @ 05:15) (59 - 70)  BP: 124/54 (06-14-24 @ 05:15) (106/56 - 124/54)  RR: 17 (06-14-24 @ 05:15) (17 - 18)  SpO2: 100% (06-14-24 @ 05:15) (95% - 100%)  Wt(kg): --    I&O's Summary        PHYSICAL EXAM:  A X O x  HEAD:  Atraumatic, Normocephalic  EYES: EOMI, PERRLA, conjunctiva and sclera clear  NECK: Supple, No JVD, Normal thyroid  Resp: CTAB, No crackles, wheezing,   CVS: Regular rate and rhythm; No discernable murmurs, rubs, or gallops  ABD: Soft, Nontender, Nondistended; Bowel sounds present  EXTREMITIES:  2+ Peripheral Pulses, No edema  LYMPH: No dicernable lymphadenopathy noted  GENERAL: NAD, well-groomed, well-developed      LABS:                        9.0    9.84  )-----------( 497      ( 14 Jun 2024 06:15 )             28.4     06-14    142  |  111<H>  |  30<H>  ----------------------------<  100<H>  3.7   |  23  |  1.00    Ca    8.6      14 Jun 2024 06:15        Urinalysis Basic - ( 14 Jun 2024 06:15 )    Color: x / Appearance: x / SG: x / pH: x  Gluc: 100 mg/dL / Ketone: x  / Bili: x / Urobili: x   Blood: x / Protein: x / Nitrite: x   Leuk Esterase: x / RBC: x / WBC x   Sq Epi: x / Non Sq Epi: x / Bacteria: x      CAPILLARY BLOOD GLUCOSE      POCT Blood Glucose.: 96 mg/dL (14 Jun 2024 05:56)  POCT Blood Glucose.: 112 mg/dL (14 Jun 2024 00:21)  POCT Blood Glucose.: 106 mg/dL (13 Jun 2024 16:56)      Troponins:  ProBNP:  Lipid Profile:   HgA1c:  TSH:           RADIOLOGY & ADDITIONAL TESTS:    Imaging Personally Reviewed:  [ ] YES  [ ] NO      Consultant(s) Notes Reviewed:  [x ] YES  [ ] NO    Care Discussed with Consultants/Other Providers [ x] YES  [ ] NO          PAST MEDICAL & SURGICAL HISTORY:  Hypertension      Diabetes Mellitus, Type 2      Hyperlipidemia      Dementia      CAD (coronary artery disease)      History of TIA (transient ischemic attack)      Anemia      HFrEF (heart failure with reduced ejection fraction)      History of Cholecystectomy      S/P hip replacement, left            Anemia    No pertinent family history in first degree relatives    Handoff    MEWS Score    Hypertension    Diabetes Mellitus, Type 2    History of Cholecystectomy    Hyperlipidemia    Dementia    CAD (coronary artery disease)    History of TIAs    History of TIA (transient ischemic attack)    Anemia    HFrEF (heart failure with reduced ejection fraction)    Anemia    Anemia    Stage 4 chronic kidney disease    HTN (hypertension)    HLD (hyperlipidemia)    DM (diabetes mellitus)    Chronic HFrEF (heart failure with reduced ejection fraction)    Prophylactic measure    Discharge planning issues    Cough    History of Cholecystectomy    S/P hip replacement, right    S/P hip replacement, left    ABNORMAL LABS    90+    Stage 4 chronic kidney disease    HTN (hypertension)    HLD (hyperlipidemia)    DM (diabetes mellitus)    Chronic HFrEF (heart failure with reduced ejection fraction)    SysAdmin_VisitLink

## 2024-06-14 NOTE — PROGRESS NOTE ADULT - PROBLEM SELECTOR PLAN 8
- SCD for DVT PPX given low Hb

## 2024-06-14 NOTE — PROGRESS NOTE ADULT - ATTENDING SUPERVISION STATEMENT
Resident
Ahsan Metcalf), Cardiovascular Disease; Internal Medicine  [address] [place] [address]  Phone: [phone]  Fax: [phone]
Resident
Ahsan Metcalf), Cardiovascular Disease; Internal Medicine  [address] [place] [address]  [address]  [place] [address]  Phone: [phone]  Fax: [phone]    Brigido Christiansen), Urology  [address] [facility] [address]  [place] [address]  Phone: [phone]  Fax: [phone]

## 2024-06-14 NOTE — PROGRESS NOTE ADULT - ASSESSMENT
89 year old female from [facility], with PMH of HTN, HLD, DM2, CAD, HFrEF, CKD4, Peg placement, TIA, and anemia who was sent into the ED for fe def anemia.  1.Fe def anemia-GI eval,IV Iron.  2.CAD and CHF-coreg 3.125mg bid and entresto.  3.HTN-coreg,entresto.  4.Lipid d/o-statin.  5.GI and DVT prophylaxis.
1. Iron deficiency anemia (etiology is not clear)  2. No evidence of acute GI bleeding (H/H stable)  3. R/o chronic GI bleeding  4. R/o Malignancy  5. Dysphagia  6. Constipation  7. GERD    Suggestions:    1. Monitor H/H  2. Transfuse PRBC as needed  3. Protonix 40mg daily  4. Avoid NSAID  5. CT-Scan of abdomen and pelvis  6. Check stool for occult blood  7. Continue PEG feeding  8. Aspiration precaution  9. Daily stool softener as needed  10. Iron supplement IV  11. DVT prophylaxis    
[facility] 89 [facility], with PMH of HTN, HLD, DM2, CAD, HFrEF, CKD4, Peg placement, TIA, and anemia who was sent into the ED for anemia.  In the ED patient hemodynamically stable and afebrile.  Patients labels showing Hb of 7.  Patient s/p 1U PRBC in the ED.  Patient is being admitted to medicine for anemia.
1. Iron deficiency anemia (etiology is not clear)  2. No evidence of acute GI bleeding (H/H stable)  3. R/o chronic GI bleeding  4. R/o Malignancy  5. Dysphagia  6. Constipation  7. GERD    Suggestions:    1. Monitor H/H  2. Transfuse PRBC as needed  3. Protonix 40mg daily  4. Avoid NSAID  5. CT-Scan of abdomen and pelvis  6. Check stool for occult blood  7. Continue PEG feeding  8. Aspiration precaution  9. Daily stool softener as needed  10. Iron supplement IV  11. DVT prophylaxis    
[facility] 89 [facility], with PMH of HTN, HLD, DM2, CAD, HFrEF, CKD4, Peg placement, TIA, and anemia who was sent into the ED for anemia.  In the ED patient hemodynamically stable and afebrile.  Patients labels showing Hb of 7.  Patient s/p 1U PRBC in the ED.    Patient is being admitted to medicine for anemia. post transfusion Hgb stable around 8.8.  Pt. is HD stable with no s&s of bleed, stable for discharge back to [facility] 6/14-9am.
Patient is a 89 year old female from [facility], with PMH of HTN, HLD, DM2, CAD, HFrEF, CKD4, Peg placement, TIA, and anemia who was sent into the ED for anemia.  In the ED patient hemodynamically stable and afebrile.  Patients labels showing Hb of 7.  Patient s/p 1U PRBC in the ED.    Patient is being admitted to medicine for anemia. post transfusion Hgb stable around 8.8. 
Patient is a 89 year old female from [facility], with PMH of HTN, HLD, DM2, CAD, HFrEF, CKD4, Peg placement, TIA, and anemia who was sent into the ED for anemia.  In the ED patient hemodynamically stable and afebrile.  Patients labels showing Hb of 7.  Patient s/p 1U PRBC in the ED.    Patient is being admitted to medicine for anemia. post transfusion Hgb stable around 8.8. 
[facility] is a 89 [facility], with PMH of HTN, HLD, DM2, CAD, HFrEF, CKD4, Peg placement, TIA, and anemia who was sent into the ED for anemia.  In the ED patient hemodynamically stable and afebrile.  Patients labels showing Hb of 7.  Patient s/p 1U PRBC in the ED.    Patient is being admitted to medicine for anemia. post transfusion Hgb stable around 8.8.  Pt. is HD stable with no s&s of bleed, stable for discharge back to [facility] 6/14-9am.

## 2024-06-14 NOTE — PROGRESS NOTE ADULT - PROBLEM SELECTOR PROBLEM 6
Chronic HFrEF (heart failure with reduced ejection fraction)
HLD (hyperlipidemia)

## 2024-06-14 NOTE — PROGRESS NOTE ADULT - GASTROINTESTINAL
soft/nontender/nondistended/normal active bowel sounds/no guarding/no rigidity/no organomegaly/no palpable rajat

## 2024-06-14 NOTE — PROGRESS NOTE ADULT - PROBLEM SELECTOR PLAN 5
- Patient with history of DM  - Patient not on antidm meds at home  - Start patient on Insulin sliding scale ACHS  - Start patient on Finger sticks ACHS  - Start Diabetic Diet
- Patient with history of HTN  - Patient on Amlodipine at home  - Continue home antihypertensives with holding parameters

## 2024-06-14 NOTE — PROGRESS NOTE ADULT - MUSCULOSKELETAL
no joint swelling/no joint erythema/no joint warmth/no calf tenderness
no joint swelling/no joint erythema/no joint warmth/no calf tenderness

## 2024-06-14 NOTE — PROGRESS NOTE ADULT - PROBLEM SELECTOR PLAN 7
- Patient with history of DM  - Patient not on anti dm meds at home  - Finger sticks ACHS with ISS  - Started Glucerna 1.2 feeding via PEG
- Patient with history of DM  - Patient not on antidm meds at home  - Start patient on Insulin sliding scale ACHS  - Start patient on Finger sticks ACHS  - Start Diabetic Diet
- SCD for DVT PPX given low Hb
- Patient with history of DM  - Patient not on anti dm meds at home  - Finger sticks ACHS with ISS  - Started Glucerna 1.2 feeding via PEG
- Patient with history of DM  - Patient not on antidm meds at home  - Start patient on Insulin sliding scale ACHS  - Start patient on Finger sticks ACHS  - Start Diabetic Diet

## 2024-06-14 NOTE — PROGRESS NOTE ADULT - PROBLEM SELECTOR PLAN 6
- Patient with history of HLD  - Patient on Atorvastatin at home  - Continue home statin
- History of CHF with reduced ejection fraction  - Echo from 2023: Left ventricular systolic function is moderately to severely decreased with an EF of 39%
- Patient with history of HLD  - Patient on Atorvastatin at home  - Continue home antihyperlipidemic
- Patient with history of HLD  - Patient on Atorvastatin at home  - Continue home statin
- Patient with history of HLD  - Patient on Atorvastatin at home  - Continue home antihyperlipidemic

## 2024-06-14 NOTE — PROGRESS NOTE ADULT - PROBLEM SELECTOR PLAN 3
- History of CHF with reduced ejection fraction  - Echo from 2023: Left ventricular systolic function is moderately to severely decreased with an EF of 39%  - card dr. Washington consulted.
- Patient with history of HTN  - Patient on Amlodipine at home  - Continue home antihypertensives with holding parameters
- History of CHF with reduced ejection fraction  - Echo from 2023: Left ventricular systolic function is moderately to severely decreased with an EF of 39%  - card dr. Washington following
- History of CHF with reduced ejection fraction  - Echo from 2023: Left ventricular systolic function is moderately to severely decreased with an EF of 39%  - card dr. Washington consulted.
- History of CHF with reduced ejection fraction  - Echo from 2023: Left ventricular systolic function is moderately to severely decreased with an EF of 39%  - card dr. Washington following

## 2024-06-14 NOTE — PROGRESS NOTE ADULT - PROBLEM SELECTOR PLAN 4
- History of CKD stage 4  - Scr on admission of 0.88  - GFR of 62  - SCr remains stable at 0.80
- History of CKD stage 4  - Scr on admission of 0.88  - GFR of 62  - SCr remains stable at 0.80
- Patient with history of HLD  - Patient on Atorvastatin at home  - Continue home antihyperlipidemic
- History of CKD stage 4  - Scr on admission of 0.88  - GFR of 62  - Monitor renal function  - Scr worsening, will call nephrology.
- History of CKD stage 4  - Scr on admission of 0.88  - GFR of 62  - Monitor renal function  - Scr worsening, will call nephrology.

## 2024-06-14 NOTE — PROGRESS NOTE ADULT - PROBLEM SELECTOR PROBLEM 3
Chronic HFrEF (heart failure with reduced ejection fraction)
Chronic HFrEF (heart failure with reduced ejection fraction)
HTN (hypertension)
Chronic HFrEF (heart failure with reduced ejection fraction)
Chronic HFrEF (heart failure with reduced ejection fraction)

## 2024-06-14 NOTE — PROGRESS NOTE ADULT - PROBLEM SELECTOR PLAN 1
- Presented with Hb of 6.8 in outpatient Rehab  - Hemodynamically stable and afebrile  - Hb in ED of 7  - S/P 1U PRBC in ED  - Goal Hb of 8 given cardiac history  - No signs of active bleeding  - BID PPI for now  - NPO except meds via peg  - Monitor for signs of active bleeding  - Consider anemia labs however patient s/p 1U PRBC in ED  - GI Dr. Forbes consulted  - will hold off IV iron for now, monitor CBC, Iron panel  - if pt will need IV iron, will call dr. Bart smith for approval
- Presented with Hb of 6.8 in outpatient Rehab  - Hemodynamically stable and afebrile  - Hb in ED of 7  - S/P 1U PRBC in ED  - Goal Hb of 8 given cardiac history  - No signs of active bleeding  - BID PPI for now  - NPO except meds via peg  - Monitor for signs of active bleeding  - Consider anemia labs however patient s/p 1U PRBC in ED  - GI Dr. Forbes consulted  - will hold off IV iron for now, monitor CBC, Iron panel  - if pt will need IV iron, will call dr. Bart smith for approval
- Presented with Hb of 6.8 in outpatient Rehab  - Hemodynamically stable and afebrile  - Hb in ED of 7  - S/P 1U PRBC in ED  - Goal Hb of 8 given cardiac history  - No signs of active bleeding  - BID PPI for now  - Started TF via PEG  - Consider anemia labs however patient s/p 1U PRBC in ED  - GI Dr. Forbes following  - will hold off IV iron for now, monitor CBC, Iron panel  - if pt will need IV iron, will call dr. Bart smith for approval
- Presented with Hb of 6.8 in outpatient Rehab  - Hemodynamically stable and afebrile  - Hb in ED of 7  - S/P 1U PRBC in ED  - Goal Hb of 8 given cardiac history  - No signs of active bleeding  - BID PPI for now  - NPO  - Monitor for signs of active bleeding  - Consider anemia labs however patient s/p 1U PRBC in ED  - GI Dr. Forbes consulted
- Presented with Hb of 6.8 in outpatient Rehab  - Hemodynamically stable and afebrile  - Hb in ED of 7  - S/P 1U PRBC in ED  - Goal Hb of 8 given cardiac history  - No signs of active bleeding  - BID PPI for now  - Started TF via PEG  - Consider anemia labs however patient s/p 1U PRBC in ED  - GI Dr. Forbes following  - will hold off IV iron for now, monitor CBC, Iron panel  - if pt will need IV iron, will call dr. Bart smith for approval

## 2024-06-14 NOTE — PROGRESS NOTE ADULT - PROBLEM SELECTOR PLAN 2
- History of CKD stage 4  - Scr on admission of 0.88  - GFR of 62  - Monitor renal function
-family c/o pt cough all night, daughter states pt was recently treated for PNA at NH  -PE: wheezing right side. no cough at present  -give cough syrup prn, one dose duonebx  -initial CXR negative pulmonary abnormality  -monitor for improvement. will f/u repeat CXR or CT chest in 2-3 days if symptoms worsening.

## 2024-06-14 NOTE — PROGRESS NOTE ADULT - PROVIDER SPECIALTY LIST ADULT
Internal Medicine
Internal Medicine
Cardiology
Gastroenterology
Gastroenterology
Internal Medicine

## 2024-06-14 NOTE — PROGRESS NOTE ADULT - ATTENDING COMMENTS
IV Venofer ordered given Fe+ panel.  Protonix / Carafate regimen recommended empirically. Pt needs EGD / Colonoscopy as out pt with pertinent studies (i.e. r/o H. pylori). They can not be done in this setting as no indication of an emergently active GIB exist. Awaiting D/C planning.
Pt lacks any signs / symptoms to indicate any active GI bleed. However, pt is set to receive 2 units of PRBC transfusion in addition to IV PPI for now. Will f/u Fe+ studies and retic count in the mean time. GI consult to follow.

## 2024-08-18 NOTE — ED PROVIDER NOTE - NS ED MD DISPO SPECIAL CONSIDERATION1
CONSTITUTIONAL: NAD  SKIN: Warm, dry  HEAD: NCAT  EYES: Clear conjunctiva   ENT: MMM  NECK: Supple  CARD: RRR, S1, S2; no M/R/G  RESP: Normal respiratory effort, CTAB  ABD: Soft, nontender. No rebound, rigidity, or guarding  EXT: Pulses palpable distally. multiple superficial linear abrasions to inner left forearm, consistent with self-harm  NEURO: Grossly intact. Awake, alert, moving all extremities, no facial asymmetry.
None

## 2024-08-27 NOTE — ED PROVIDER NOTE - CPE EDP EYES NORM
Pt arrived via EMS, complaining of seizure activity at home. Pt received ativan rectal prior to EMS arrival. Pt given versed via EMS. Pt currently lethargic, alert and oriented. Pt with hx of seizures compliant with seizure medications.        normal...

## 2024-08-28 NOTE — PROGRESS NOTE ADULT - PROBLEM SELECTOR PLAN 9
From [facility] rehab  Stable for discharge back to [facility]   scheduled 6/14-9am
From [facility]  pending GI reccs, dr. Forbes consulted.   Resume feeding when GI clears. NPO except meds  Monitor CBC  Monitor cough
Pt declines/No
From [facility]  pending GI reccs, dr. Forbes consulted.   Resume feeding when GI clears. NPO except meds  Monitor CBC  Monitor cough
From [facility] re[facility]  Stable for discharge back to [facility]   scheduled 6/14-9am

## 2024-08-30 NOTE — PROVIDER CONTACT NOTE (OTHER) - REASON
17:00 bladder scan 270 ml
Bladder scan 448cc
bladder scan volume 221ml
Pt with 1X bout emesis
hip pain/injury
Bladder scan 375cc
Pt FS 79

## 2024-12-10 NOTE — PROGRESS NOTE ADULT - PROBLEM/PLAN-5
home
DISPLAY PLAN FREE TEXT

## 2025-01-13 NOTE — PROGRESS NOTE ADULT - PROBLEM/PLAN-9
DISPLAY PLAN FREE TEXT
None
DISPLAY PLAN FREE TEXT

## 2025-02-03 NOTE — ED ADULT NURSE NOTE - NS ED NURSE REPORT GIVEN DT
----- Message from Deisy MASSEY sent at 2/3/2025 12:00 PM CST -----  Regarding: Colonoscopy Order  Hello,     Patient called to schedule colonoscopy. Can you please review chart, and if necessary place new order for colonoscopy.     Thank you,   16-Dec-2023 01:50

## 2025-02-04 NOTE — ED ADULT NURSE NOTE - TEMPLATE LIST FOR HEAD TO TOE ASSESSMENT
For information on Fall & Injury Prevention, visit: [email or web address] OR  [email or web address] OR  https://www.cdc.gov/steadi/patient.html
General
